# Patient Record
Sex: MALE | Race: WHITE | NOT HISPANIC OR LATINO | Employment: FULL TIME | URBAN - METROPOLITAN AREA
[De-identification: names, ages, dates, MRNs, and addresses within clinical notes are randomized per-mention and may not be internally consistent; named-entity substitution may affect disease eponyms.]

---

## 2017-01-13 ENCOUNTER — HOSPITAL ENCOUNTER (EMERGENCY)
Facility: HOSPITAL | Age: 57
Discharge: HOME | End: 2017-01-13
Attending: EMERGENCY MEDICINE
Payer: COMMERCIAL

## 2017-01-13 VITALS
HEIGHT: 73 IN | OXYGEN SATURATION: 95 % | RESPIRATION RATE: 16 BRPM | WEIGHT: 240 LBS | TEMPERATURE: 98 F | BODY MASS INDEX: 31.81 KG/M2 | DIASTOLIC BLOOD PRESSURE: 92 MMHG | HEART RATE: 56 BPM | SYSTOLIC BLOOD PRESSURE: 159 MMHG

## 2017-01-13 DIAGNOSIS — M54.9 PAIN IN BACK: ICD-10-CM

## 2017-01-13 DIAGNOSIS — M62.838 MUSCLE SPASM: Primary | ICD-10-CM

## 2017-01-13 LAB
BILIRUB UR QL STRIP: NEGATIVE
CLARITY UR: CLEAR
COLOR UR: YELLOW
GLUCOSE UR QL STRIP: NEGATIVE
HGB UR QL STRIP: ABNORMAL
KETONES UR QL STRIP: NEGATIVE
LEUKOCYTE ESTERASE UR QL STRIP: NEGATIVE
NITRITE UR QL STRIP: NEGATIVE
PH UR STRIP: 6 [PH] (ref 5–8)
PROT UR QL STRIP: NEGATIVE
SP GR UR STRIP: 1.02 (ref 1–1.03)
URN SPEC COLLECT METH UR: ABNORMAL
UROBILINOGEN UR STRIP-ACNC: NEGATIVE EU/DL

## 2017-01-13 PROCEDURE — 99284 EMERGENCY DEPT VISIT MOD MDM: CPT | Mod: 25

## 2017-01-13 PROCEDURE — 96372 THER/PROPH/DIAG INJ SC/IM: CPT

## 2017-01-13 PROCEDURE — 81003 URINALYSIS AUTO W/O SCOPE: CPT

## 2017-01-13 PROCEDURE — 63600175 PHARM REV CODE 636 W HCPCS: Performed by: EMERGENCY MEDICINE

## 2017-01-13 RX ORDER — ORPHENADRINE CITRATE 30 MG/ML
30 INJECTION INTRAMUSCULAR; INTRAVENOUS
Status: COMPLETED | OUTPATIENT
Start: 2017-01-13 | End: 2017-01-13

## 2017-01-13 RX ORDER — HYDROCODONE BITARTRATE AND ACETAMINOPHEN 5; 325 MG/1; MG/1
1 TABLET ORAL EVERY 6 HOURS PRN
Qty: 20 TABLET | Refills: 0 | Status: SHIPPED | OUTPATIENT
Start: 2017-01-13 | End: 2017-04-18

## 2017-01-13 RX ORDER — CYCLOBENZAPRINE HCL 10 MG
10 TABLET ORAL 3 TIMES DAILY PRN
Qty: 15 TABLET | Refills: 0 | Status: SHIPPED | OUTPATIENT
Start: 2017-01-13 | End: 2017-01-18

## 2017-01-13 RX ORDER — KETOROLAC TROMETHAMINE 30 MG/ML
30 INJECTION, SOLUTION INTRAMUSCULAR; INTRAVENOUS
Status: COMPLETED | OUTPATIENT
Start: 2017-01-13 | End: 2017-01-13

## 2017-01-13 RX ORDER — NAPROXEN 500 MG/1
500 TABLET ORAL 2 TIMES DAILY WITH MEALS
Qty: 14 TABLET | Refills: 0 | Status: SHIPPED | OUTPATIENT
Start: 2017-01-13 | End: 2017-02-06 | Stop reason: ALTCHOICE

## 2017-01-13 RX ADMIN — ORPHENADRINE CITRATE 30 MG: 30 INJECTION INTRAMUSCULAR; INTRAVENOUS at 02:01

## 2017-01-13 RX ADMIN — KETOROLAC TROMETHAMINE 30 MG: 30 INJECTION, SOLUTION INTRAMUSCULAR at 02:01

## 2017-01-13 NOTE — ED PROVIDER NOTES
Encounter Date: 1/13/2017       History     Chief Complaint   Patient presents with    Back Pain     pain to middle of back since lifting a heavy table about 10 days ago. Pain occasionally radiates to right side.      Review of patient's allergies indicates:  No Known Allergies  HPI Comments: Jonathan Villela, a 56 y.o. male, complains of mid back pain particularly on the right side for the past 10 days.  He said he did some heavy lifting about 10 days ago and felt a sharp pain in his mid back but 20 minutes after he had been lifting any objects.  He said over the past 10 days he has had gradually increasing pain in the lower back and in the mid back with intermittent relief with over-the-counter medication.  He denies any prior history of injuries to his back problems with back pain.  No history of kidney stones.  He denies any urinary tract or bowel problems.  Pain location: Mid back pain  Pain Severity: Moderate but severe with position changes    Pain timing: Approximately 10 days  Pain character: Sharp but nonradiating, no pain and lower extremities    Associated with or Modified by: (see above)        Past Medical History   Diagnosis Date    Colitis [id number]     infectious?    Diverticulitis     Hypertension     Migraine     Migraine headache     Obstructive sleep apnea (adult) (pediatric)     PUD (peptic ulcer disease)      Past Medical History Pertinent Negatives   Diagnosis Date Noted    Elevated PSA 8/14/2014    Kidney stone 8/14/2014    Urinary tract infection 8/14/2014     Past Surgical History   Procedure Laterality Date    Leg surgery      Nasal septum surgery      Cholecystectomy       Family History   Problem Relation Age of Onset    Crohn's disease       brother, sister, father, nephew    Prostate cancer Neg Hx     Kidney disease Neg Hx     Melanoma Neg Hx      Social History   Substance Use Topics    Smoking status: Never Smoker    Smokeless tobacco: None    Alcohol use Yes       Comment: ocasionally     Review of Systems   Constitutional: Negative.    Genitourinary: Negative.    Musculoskeletal: Positive for back pain.   All other systems reviewed and are negative.      Physical Exam   Initial Vitals   BP Pulse Resp Temp SpO2   01/13/17 1408 01/13/17 1408 01/13/17 1408 01/13/17 1408 01/13/17 1408   158/98 65 16 97.6 °F (36.4 °C) 95 %     Physical Exam    Nursing note and vitals reviewed.  Constitutional: He appears well-developed and well-nourished. No distress.   Abdominal: Soft. There is no tenderness.   Musculoskeletal:   Back: There is muscle tenderness in the para spinous muscles of the lower thoracic and upper lumbar region particularly on the right side.  There is no specific CVA tenderness.  Straight leg raises are equal to greater than 60° with mid back pain.  There is decreased truncal range of motion secondary to pain and position changes.  There is full range of motion of all joints of both upper and both lower extremities without tenderness.   Neurological: He is alert. He has normal strength. No sensory deficit.   Skin: Skin is warm and dry. No rash noted.   Psychiatric: He has a normal mood and affect. His behavior is normal. Thought content normal.         ED Course   Procedures  Labs Reviewed   URINALYSIS             Medical Decision Making:   Initial Assessment:   56 y.o. male, complains of mid back pain particularly on the right side for the past 10 days.  He said he did some heavy lifting about 10 days ago and felt a sharp pain in his mid back but 20 minutes after he had been lifting any objects.  He said over the past 10 days he has had gradually increasing pain in the lower back and in the mid back with intermittent relief with over-the-counter medication.  He denies any prior history of injuries to his back problems with back pain.  No history of kidney stones.  He denies any urinary tract or bowel problems.  Pain location: Mid back pain  Pain Severity: Moderate  but severe with position changes      Physical exam: Unremarkable except for paraspinous muscle spasm in the mid back.  Normal neurologic exam.  ED Management:  Improved with medication.  No significant findings on x-rays with only age-appropriate degenerative changes.  We'll treat for muscle spasm of the back with Flexeril, Naprosyn and Norco.  He'll follow-up with his primary care physician if not improved.                     ED Course   Comment By Time   Improved with medication.  No significant findings on x-rays with only age-appropriate degenerative changes.   Song Morales Jr., MD 01/13 1606   Elevated BP readings noted. + history of hypertension on medication.  Will follow up with his PCP. Song Morales Jr., MD 01/13 6178     Clinical Impression:   The primary encounter diagnosis was Muscle spasm. A diagnosis of Pain in back was also pertinent to this visit.          Song Morales Jr., MD  01/13/17 6285

## 2017-01-13 NOTE — DISCHARGE INSTRUCTIONS
Back Pain (Acute or Chronic)    Back pain is one of the most common problems. The good news is that most people feel better in 1 to 2 weeks, and most of the rest in 1 to 2 months. Most people can remain active.  People experience and describe pain differently; not everyone is the same.  · The pain can be sharp, stabbing, shooting, aching, cramping or burning.  · Movement, standing, bending, lifting, sitting, or walking may worsen pain.  · It can be localized to one spot or area, or it can be more generalized.  · It can spread or radiate upwards, to the front, or go down your arms or legs (sciatica).  · It can cause muscle spasm.  Most of the time, mechanical problems with the muscles or spine cause the pain. Mechanical problems are usually caused by an injury to the muscles or ligaments. While illness can cause back pain, it is usually not caused by a serious illness. Mechanical problems include:   · Physical activity such as sports, exercise, work, or normal activity  · Overexertion, lifting, pushing, pulling incorrectly or too aggressively  · Sudden twisting, bending, or stretching from an accident, or accidental movement  · Poor posture  · Stretching or moving wrong, without noticing pain at the time  · Poor coordination, lack of regular exercise (check with your doctor about this)  · Spinal disc disease or arthritis  · Stress  Pain can also be related to pregnancy, or illness like appendicitis, bladder or kidney infections, pelvic infections, and many other things.  Acute back pain usually gets better in 1 to 2 weeks. Back pain related to disk disease, arthritis in the spinal joints or spinal stenosis (narrowing of the spinal canal) can become chronic and last for months or years.  Unless you had a physical injury (for example, a car accident or fall) X-rays are usually not needed for the initial evaluation of back pain. If pain continues and does not respond to medical treatment, X-rays and other tests may be  needed.  Home care  Try these home care recommendations:  · When in bed, try to find a position of comfort. A firm mattress is best. Try lying flat on your back with pillows under your knees. You can also try lying on your side with your knees bent up towards your chest and a pillow between your knees.  · At first, do not try to stretch out the sore spots. If there is a strain, it is not like the good soreness you get after exercising without an injury. In this case, stretching may make it worse.  · Avoid prolong sitting, long car rides, or travel. This puts more stress on the lower back than standing or walking.  · During the first 24 to 72 hours after an acute injury or flare up of chronic back pain, apply an ice pack to the painful area for 20 minutes and then remove it for 20 minutes. Do this over a period of 60 to 90 minutes or several times a day. This will reduce swelling and pain. Wrap the ice pack in a thin towel or plastic to protect your skin.  · You can start with ice, then switch to heat. Heat (hot shower, hot bath, or heating pad) reduces pain and works well for muscle spasms. Heat can be applied to the painful area for 20 minutes then remove it for 20 minutes. Do this over a period of 60 to 90 minutes or several times a day. Do not sleep on a heating pad. It can lead to skin burns or tissue damage.  · You can alternate ice and heat therapy. Talk with your doctor about the best treatment for your back pain.  · Therapeutic massage can help relax the back muscles without stretching them.  · Be aware of safe lifting methods and do not lift anything without stretching first.  Medicines  Talk to your doctor before using medicine, especially if you have other medical problems or are taking other medicines.  · You may use over-the-counter medicine as directed on the bottle to control pain, unless another pain medicine was prescribed. If you have chronic conditions like diabetes, liver or kidney disease,  stomach ulcers, or gastrointestinal bleeding, or are taking blood thinners, talk to your doctor before taking any medicine.  · Be careful if you are given a prescription medicines, narcotics, or medicine for muscle spasms. They can cause drowsiness, affect your coordination, reflexes, and judgement. Do not drive or operate heavy machinery.  Follow-up care  Follow up with your healthcare provider, or as advised.   A radiologist will review any X-rays that were taken. Your provide will notify you of any new findings that may affect your care.  Call 911  Call emergency services if any of the following occur:  · Trouble breathing  · Confusion  · Very drowsy or trouble awakening  · Fainting or loss of consciousness  · Rapid or very slow heart rate  · Loss of bowel or bladder control  When to seek medical advice  Call your healthcare provider right away if any of these occur:   · Pain becomes worse or spreads to your legs  · Weakness or numbness in one or both legs  · Numbness in the groin or genital area  © 2437-8239 [employer]. [address] [place] [address]. All rights reserved. This information is not intended as a substitute for professional medical care. Always follow your healthcare professional's instructions.          Muscle Spasm  A muscle spasm is a sudden tightening of the muscle you cant control. This may be caused by strain, overworking the muscle, or injury. It can also be caused by dehydration, electrolyte imbalance, diabetes, alcohol use, and certain medicines. If it goes on long enough the muscle spasm causes pain. Common areas for muscle spasm are the legs, neck, and back.  Home care  · Heat, massage, and stretching will help relax muscle spasm.  · When the spasm is in your arm or leg, stretch the muscle passively. To do this, have someone bend or straighten the joint above or below the muscle until you feel the stretch on the sore muscle. You can stretch the  muscle actively by moving the affected body part. This will stretch the muscle that is in spasm. For example, if the spasm is in your calf, bend the ankle so your toes point upward toward your knee. This will stretch your calf muscle.  · You may use over-the-counter pain medicine to control pain, unless another medicine was prescribed. If you have chronic liver or kidney disease or ever had a stomach ulcer or GI bleeding, talk with your healthcare provider before using these medicines.  Follow-up care  Follow up with your healthcare provider, or as advised.    When to seek medical advice  Call your healthcare provider right away if any of the following occur:  · Fingers or toes become swollen, cold, blue, numb, or tingly  · You develop weakness in the affected arm or leg  · Pain increases and is not controlled by the above measures  © 7562-1703 [employer]. [address] [place] [address], [place] [address]. All rights reserved. This information is not intended as a substitute for professional medical care. Always follow your healthcare professional's instructions.

## 2017-01-13 NOTE — ED AVS SNAPSHOT
[facility]  [address]  [place] [address]               Jonathan Villela   2017  2:27 PM   ED    Description:  Male : 1960   Department:  [facility]           Your Care was Coordinated By:     Provider Role From To    Song Morales Jr., MD Attending Provider 17 1430 --    Brigette Gardner PA-C Physician Assistant 17 1430 17 1432      Reason for Visit     Back Pain           Diagnoses this Visit        Comments    Muscle spasm    -  Primary     Pain in back           ED Disposition     None           To Do List           Follow-up Information     Follow up with Manish Joel MD.    Specialty:  Family Medicine    Why:  If symptoms worsen or if not improved    Contact information:    [address]  [address]  [place] [address]  [phone]         These Medications        Disp Refills Start End    cyclobenzaprine (FLEXERIL) 10 MG tablet 15 tablet 0 2017    Take 1 tablet (10 mg total) by mouth 3 (three) times daily as needed for Muscle spasms. - Oral    [facility] [address] [place] [address] [address]  [address] [facility]: [phone]       naproxen (NAPROSYN) 500 MG tablet 14 tablet 0 2017     Take 1 tablet (500 mg total) by mouth 2 (two) times daily with meals. - Oral    [facility] [address] [place] [address] [address] [facility]: [phone]       hydrocodone-acetaminophen 5-325mg (NORCO) 5-325 mg per tablet 20 tablet 0 2017     Take 1 tablet by mouth every 6 (six) hours as needed for Pain. - Oral    [facility] [address] [place] [address] [address] [facility]: [phone]         [facility]     [facility] -  Assistance  Registered nurses in the [facility] provide clinical advisement, health education, appointment  booking, and other advisory services.  Call for this free service at [phone].             Medications           Message regarding Medications     Verify the changes and/or additions to your medication regime listed below are the same as discussed with your clinician today.  If any of these changes or additions are incorrect, please notify your healthcare provider.        START taking these NEW medications        Refills    cyclobenzaprine (FLEXERIL) 10 MG tablet 0    Sig: Take 1 tablet (10 mg total) by mouth 3 (three) times daily as needed for Muscle spasms.    Class: Print    Route: Oral    naproxen (NAPROSYN) 500 MG tablet 0    Sig: Take 1 tablet (500 mg total) by mouth 2 (two) times daily with meals.    Class: Print    Route: Oral    hydrocodone-acetaminophen 5-325mg (NORCO) 5-325 mg per tablet 0    Sig: Take 1 tablet by mouth every 6 (six) hours as needed for Pain.    Class: Print    Route: Oral      These medications were administered today        Dose Freq    ketorolac injection 30 mg 30 mg ED 1 Time    Sig: Inject 30 mg into the muscle ED 1 Time.    Class: Normal    Route: Intramuscular    orphenadrine injection 30 mg 30 mg ED 1 Time    Sig: Inject 1 mL (30 mg total) into the muscle ED 1 Time.    Class: Normal    Route: Intramuscular           Verify that the below list of medications is an accurate representation of the medications you are currently taking.  If none reported, the list may be blank. If incorrect, please contact your healthcare provider. Carry this list with you in case of emergency.           Current Medications     betamethasone valerate 0.1% (VALISONE) 0.1 % Crea CHAVEZ AA BID    butalbital-acetaminophen-caffeine -40 mg (FIORICET) -40 mg per tablet Take 1-2 tablets by mouth every 6 to 8 hours as needed for Headaches.    cyclobenzaprine (FLEXERIL) 10 MG tablet Take 1 tablet (10 mg total) by mouth 3 (three) times daily as needed for Muscle spasms.    dicyclomine (BENTYL) 10 MG  "capsule as needed.     frovatriptan (FROVA) 2.5 MG tablet Take 1 tablet (2.5 mg total) by mouth as needed for Migraine. If recurs, may repeat after 2 hours. Max of 3 tabs in 24 hours.    hydrocodone-acetaminophen 5-325mg (NORCO) 5-325 mg per tablet Take 1 tablet by mouth every 6 (six) hours as needed for Pain.    irbesartan-hydrochlorothiazide (AVALIDE) 300-12.5 mg per tablet Take 1 tablet by mouth once daily.    metronidazole 1% (METROGEL) 1 % Gel CHAVEZ QHS QHS AS DIRECTED    mometasone (ELOCON) 0.1 % lotion USE QHS  TO EAR PRN    naproxen (NAPROSYN) 500 MG tablet Take 1 tablet (500 mg total) by mouth 2 (two) times daily with meals.    ondansetron (ZOFRAN) 4 MG tablet Take 1 tablet (4 mg total) by mouth every 6 (six) hours.    RELPAX 40 mg tablet TAKE 1 TABLET BY MOUTH AS NEEDED, MAY REPEAT IN 2 HOURS IF NEEDED DO NOT EXCEED 4 TABLETS EVERY DAY    triamcinolone acetonide 0.1% (KENALOG) 0.1 % cream AAA bid after cool blow dry to affected areas of groin    verapamil (VERELAN) 120 MG C24P Take 1 capsule (120 mg total) by mouth once daily.           Clinical Reference Information           Your Vitals Were     BP Pulse Temp Resp Height Weight    159/92 (BP Location: Right arm, Patient Position: Sitting, BP Method: Automatic) 56 98.4 °F (36.9 °C) (Oral) 16 6' 1" (1.854 m) 108.9 kg (240 lb)    SpO2 BMI             95% 31.66 kg/m2         Allergies as of 1/13/2017     No Known Allergies      Immunizations Administered on Date of Encounter - 1/13/2017     None      ED Micro, Lab, POCT     Start Ordered       Status Ordering Provider    01/13/17 1440 01/13/17 1440  Urinalysis Clean Catch  STAT      Final result       ED Imaging Orders     Start Ordered       Status Ordering Provider    01/13/17 1441 01/13/17 1440  X-Ray Lumbar Spine Ap And Lateral  1 time imaging      Final result     01/13/17 1440 01/13/17 1440  X-Ray Thoracic Spine AP Lateral  1 time imaging      Final result         Discharge Instructions         Back Pain " (Acute or Chronic)    Back pain is one of the most common problems. The good news is that most people feel better in 1 to 2 weeks, and most of the rest in 1 to 2 months. Most people can remain active.  People experience and describe pain differently; not everyone is the same.  · The pain can be sharp, stabbing, shooting, aching, cramping or burning.  · Movement, standing, bending, lifting, sitting, or walking may worsen pain.  · It can be localized to one spot or area, or it can be more generalized.  · It can spread or radiate upwards, to the front, or go down your arms or legs (sciatica).  · It can cause muscle spasm.  Most of the time, mechanical problems with the muscles or spine cause the pain. Mechanical problems are usually caused by an injury to the muscles or ligaments. While illness can cause back pain, it is usually not caused by a serious illness. Mechanical problems include:   · Physical activity such as sports, exercise, work, or normal activity  · Overexertion, lifting, pushing, pulling incorrectly or too aggressively  · Sudden twisting, bending, or stretching from an accident, or accidental movement  · Poor posture  · Stretching or moving wrong, without noticing pain at the time  · Poor coordination, lack of regular exercise (check with your doctor about this)  · Spinal disc disease or arthritis  · Stress  Pain can also be related to pregnancy, or illness like appendicitis, bladder or kidney infections, pelvic infections, and many other things.  Acute back pain usually gets better in 1 to 2 weeks. Back pain related to disk disease, arthritis in the spinal joints or spinal stenosis (narrowing of the spinal canal) can become chronic and last for months or years.  Unless you had a physical injury (for example, a car accident or fall) X-rays are usually not needed for the initial evaluation of back pain. If pain continues and does not respond to medical treatment, X-rays and other tests may be  needed.  Home care  Try these home care recommendations:  · When in bed, try to find a position of comfort. A firm mattress is best. Try lying flat on your back with pillows under your knees. You can also try lying on your side with your knees bent up towards your chest and a pillow between your knees.  · At first, do not try to stretch out the sore spots. If there is a strain, it is not like the good soreness you get after exercising without an injury. In this case, stretching may make it worse.  · Avoid prolong sitting, long car rides, or travel. This puts more stress on the lower back than standing or walking.  · During the first 24 to 72 hours after an acute injury or flare up of chronic back pain, apply an ice pack to the painful area for 20 minutes and then remove it for 20 minutes. Do this over a period of 60 to 90 minutes or several times a day. This will reduce swelling and pain. Wrap the ice pack in a thin towel or plastic to protect your skin.  · You can start with ice, then switch to heat. Heat (hot shower, hot bath, or heating pad) reduces pain and works well for muscle spasms. Heat can be applied to the painful area for 20 minutes then remove it for 20 minutes. Do this over a period of 60 to 90 minutes or several times a day. Do not sleep on a heating pad. It can lead to skin burns or tissue damage.  · You can alternate ice and heat therapy. Talk with your doctor about the best treatment for your back pain.  · Therapeutic massage can help relax the back muscles without stretching them.  · Be aware of safe lifting methods and do not lift anything without stretching first.  Medicines  Talk to your doctor before using medicine, especially if you have other medical problems or are taking other medicines.  · You may use over-the-counter medicine as directed on the bottle to control pain, unless another pain medicine was prescribed. If you have chronic conditions like diabetes, liver or kidney disease,  stomach ulcers, or gastrointestinal bleeding, or are taking blood thinners, talk to your doctor before taking any medicine.  · Be careful if you are given a prescription medicines, narcotics, or medicine for muscle spasms. They can cause drowsiness, affect your coordination, reflexes, and judgement. Do not drive or operate heavy machinery.  Follow-up care  Follow up with your healthcare provider, or as advised.   A radiologist will review any X-rays that were taken. Your provide will notify you of any new findings that may affect your care.  Call 911  Call emergency services if any of the following occur:  · Trouble breathing  · Confusion  · Very drowsy or trouble awakening  · Fainting or loss of consciousness  · Rapid or very slow heart rate  · Loss of bowel or bladder control  When to seek medical advice  Call your healthcare provider right away if any of these occur:   · Pain becomes worse or spreads to your legs  · Weakness or numbness in one or both legs  · Numbness in the groin or genital area  © 5779-2285 [employer]. [address] [place] [address]. All rights reserved. This information is not intended as a substitute for professional medical care. Always follow your healthcare professional's instructions.          Muscle Spasm  A muscle spasm is a sudden tightening of the muscle you cant control. This may be caused by strain, overworking the muscle, or injury. It can also be caused by dehydration, electrolyte imbalance, diabetes, alcohol use, and certain medicines. If it goes on long enough the muscle spasm causes pain. Common areas for muscle spasm are the legs, neck, and back.  Home care  · Heat, massage, and stretching will help relax muscle spasm.  · When the spasm is in your arm or leg, stretch the muscle passively. To do this, have someone bend or straighten the joint above or below the muscle until you feel the stretch on the sore muscle. You can stretch the  muscle actively by moving the affected body part. This will stretch the muscle that is in spasm. For example, if the spasm is in your calf, bend the ankle so your toes point upward toward your knee. This will stretch your calf muscle.  · You may use over-the-counter pain medicine to control pain, unless another medicine was prescribed. If you have chronic liver or kidney disease or ever had a stomach ulcer or GI bleeding, talk with your healthcare provider before using these medicines.  Follow-up care  Follow up with your healthcare provider, or as advised.    When to seek medical advice  Call your healthcare provider right away if any of the following occur:  · Fingers or toes become swollen, cold, blue, numb, or tingly  · You develop weakness in the affected arm or leg  · Pain increases and is not controlled by the above measures  © 3489-0104 [employer]. [address] [place] [address]. All rights reserved. This information is not intended as a substitute for professional medical care. Always follow your healthcare professional's instructions.          Your Scheduled Appointments     Feb 06, 2017  8:40 AM CST   Follow Up/Office Visit with MD [facility] - Cardiology ([place])    [address] [facility] [address], [address]  [facility] [address] [phone]            Feb 15, 2017  9:20 AM CST   Ultrasound with Jose Reyes III, MD   [facility] ([place]    [address] [place] [id number] [phone]               [facility] complies with applicable Federal civil rights laws and does not discriminate on the basis of race, color, national origin, age, disability, or sex.        Language Assistance Services     ATTENTION: Language assistance services are available, free of charge. Please call [phone].      ATENCIÓN: Si chengla paola, tiene a harp disposición servicios gratuitos de asistencia lingüística. Llame al [phone].     CHÚ Ý:  N?u b?n nói Ti?ng Vi?t, có các d?ch v? h? tr? ngôn ng? mi?n phí judyh cho b?n. G?i s? [phone].

## 2017-01-13 NOTE — ED NOTES
Pt states he lifted something heavy approx 10 days ago and since then c/o mid back pain that is  worse with movement.

## 2017-01-20 ENCOUNTER — PATIENT MESSAGE (OUTPATIENT)
Dept: CARDIOLOGY | Facility: CLINIC | Age: 57
End: 2017-01-20

## 2017-01-23 NOTE — TELEPHONE ENCOUNTER
----- Message from Lea Hoffman sent at 1/23/2017  9:27 AM CST -----  Contact: self, [phone]  Patient requests to speak with you about why his irbesartan medication has not been refilled 10 days ago when his pharmacy sent in initial request. Please advise.

## 2017-01-24 ENCOUNTER — TELEPHONE (OUTPATIENT)
Dept: CARDIOLOGY | Facility: HOSPITAL | Age: 57
End: 2017-01-24

## 2017-01-24 RX ORDER — IRBESARTAN AND HYDROCHLOROTHIAZIDE 300; 12.5 MG/1; MG/1
1 TABLET, FILM COATED ORAL DAILY
Qty: 90 TABLET | Refills: 3 | Status: SHIPPED | OUTPATIENT
Start: 2017-01-24 | End: 2017-04-21 | Stop reason: SDUPTHER

## 2017-01-24 NOTE — TELEPHONE ENCOUNTER
I filled today      I just received his prescription request      It was sent to me 10 days ago      Hope all is ok      Thanks      ZN

## 2017-01-29 ENCOUNTER — PATIENT MESSAGE (OUTPATIENT)
Dept: NEUROLOGY | Facility: CLINIC | Age: 57
End: 2017-01-29

## 2017-01-29 DIAGNOSIS — G43.709 CHRONIC MIGRAINE WITHOUT AURA WITHOUT STATUS MIGRAINOSUS, NOT INTRACTABLE: ICD-10-CM

## 2017-01-30 RX ORDER — FROVATRIPTAN SUCCINATE 2.5 MG/1
2.5 TABLET, FILM COATED ORAL
Qty: 9 TABLET | Refills: 3 | Status: SHIPPED | OUTPATIENT
Start: 2017-01-30 | End: 2017-04-18 | Stop reason: SDUPTHER

## 2017-02-06 ENCOUNTER — OFFICE VISIT (OUTPATIENT)
Dept: CARDIOLOGY | Facility: CLINIC | Age: 57
End: 2017-02-06
Payer: COMMERCIAL

## 2017-02-06 VITALS
BODY MASS INDEX: 32.37 KG/M2 | SYSTOLIC BLOOD PRESSURE: 130 MMHG | HEART RATE: 82 BPM | HEIGHT: 72 IN | WEIGHT: 239 LBS | DIASTOLIC BLOOD PRESSURE: 90 MMHG

## 2017-02-06 DIAGNOSIS — N52.9 ERECTILE DYSFUNCTION, UNSPECIFIED ERECTILE DYSFUNCTION TYPE: ICD-10-CM

## 2017-02-06 DIAGNOSIS — G47.30 SLEEP APNEA, UNSPECIFIED TYPE: ICD-10-CM

## 2017-02-06 DIAGNOSIS — I10 ESSENTIAL HYPERTENSION: Primary | ICD-10-CM

## 2017-02-06 PROCEDURE — 99213 OFFICE O/P EST LOW 20 MIN: CPT | Mod: S$GLB,,, | Performed by: INTERNAL MEDICINE

## 2017-02-06 PROCEDURE — 99999 PR PBB SHADOW E&M-EST. PATIENT-LVL III: CPT | Mod: PBBFAC,,, | Performed by: INTERNAL MEDICINE

## 2017-02-06 PROCEDURE — 3075F SYST BP GE 130 - 139MM HG: CPT | Mod: S$GLB,,, | Performed by: INTERNAL MEDICINE

## 2017-02-06 PROCEDURE — 3080F DIAST BP >= 90 MM HG: CPT | Mod: S$GLB,,, | Performed by: INTERNAL MEDICINE

## 2017-02-06 NOTE — PROGRESS NOTES
Subjective:   Patient ID:  Jonathan Villela is a 56 y.o. male who presents for follow up of Hypertension; Sleep Apnea; and Hyperlipidemia      HPI:         Jonathan Villela 56 y.o. male is here follow up and feeling well without any new complaints. He has a history of hypertension. He had an extensive work up at [facility] that was unremarkable. He is currently on Avalide 300/12.5 mg daily. His BP is well controlled. He has ED documented before he started avalide but he wanted to know whether his ED is related to medication. He was prescribed cialis in 2014 but did not try it because of cost. He is seeing neurology for his headaches. He has 10 yr cardiac risk of 8.4% as of 2017. He has DELVIN and uses his CPAP machine.             Patient Active Problem List    Diagnosis Date Noted    Stiff neck 2016    Abnormal posture 2016    BPH with urinary obstruction 2015    Insomnia 2015    Snorings 2015    Sleep apnea 2015    Fatigue 2015    Somnolence, daytime 2015    Allergic rhinitis due to allergen 2015    Erectile dysfunction 2014    Hypertension     PUD (peptic ulcer disease)     Common migraine 2014           Right Arm BP - Sittin/90  Left Arm BP - Sittin/82        LABS    LAST HbA1c  Lab Results   Component Value Date    HGBA1C 5.4 2014       Lipid panel  Lab Results   Component Value Date    CHOL 220 (H) 2014     Lab Results   Component Value Date    HDL 44 2014     Lab Results   Component Value Date    LDLCALC 143.2 2014     Lab Results   Component Value Date    TRIG 164 (H) 2014     Lab Results   Component Value Date    CHOLHDL 20.0 2014            Review of Systems   Constitution: Negative for diaphoresis, weakness, night sweats, weight gain and weight loss.   HENT: Negative for congestion.    Eyes: Negative for blurred vision, discharge and double vision.   Cardiovascular: Negative for  chest pain, claudication, cyanosis, dyspnea on exertion, irregular heartbeat, leg swelling, near-syncope, orthopnea, palpitations, paroxysmal nocturnal dyspnea and syncope.   Respiratory: Negative for cough, shortness of breath and wheezing.    Endocrine: Negative for cold intolerance, heat intolerance and polyphagia.   Hematologic/Lymphatic: Negative for adenopathy and bleeding problem. Does not bruise/bleed easily.   Skin: Negative for dry skin and nail changes.   Musculoskeletal: Negative for arthritis, back pain, falls, joint pain, myalgias and neck pain.   Gastrointestinal: Negative for bloating, abdominal pain, change in bowel habit and constipation.   Genitourinary: Positive for decreased libido. Negative for bladder incontinence, dysuria, flank pain, genital sores and missed menses.   Neurological: Negative for aphonia, brief paralysis, difficulty with concentration and dizziness.   Psychiatric/Behavioral: Negative for altered mental status and memory loss. The patient does not have insomnia.    Allergic/Immunologic: Negative for environmental allergies.       Objective:   Physical Exam   Constitutional: He is oriented to person, place, and time. He appears well-developed and well-nourished. He is not intubated.   HENT:   Head: Normocephalic and atraumatic.   Right Ear: External ear normal.   Left Ear: External ear normal.   Mouth/Throat: Oropharynx is clear and moist.   Eyes: Conjunctivae and EOM are normal. Pupils are equal, round, and reactive to light. Right eye exhibits no discharge. Left eye exhibits no discharge. No scleral icterus.   Neck: Normal range of motion. Neck supple. Normal carotid pulses, no hepatojugular reflux and no JVD present. Carotid bruit is not present. No tracheal deviation present. No thyromegaly present.   Cardiovascular: Normal rate, regular rhythm, S1 normal and S2 normal.   No extrasystoles are present. PMI is not displaced.  Exam reveals no gallop, no S3, no distant heart  sounds, no friction rub and no midsystolic click.    No murmur heard.  Pulses:       Carotid pulses are 2+ on the right side, and 2+ on the left side.       Radial pulses are 2+ on the right side, and 2+ on the left side.        Femoral pulses are 2+ on the right side, and 2+ on the left side.       Popliteal pulses are 2+ on the right side, and 2+ on the left side.        Dorsalis pedis pulses are 2+ on the right side, and 2+ on the left side.        Posterior tibial pulses are 2+ on the right side, and 2+ on the left side.   Pulmonary/Chest: Effort normal and breath sounds normal. No accessory muscle usage or stridor. No apnea, no tachypnea and no bradypnea. He is not intubated. No respiratory distress. He has no decreased breath sounds. He has no wheezes. He has no rales. He exhibits no tenderness and no bony tenderness.   Abdominal: He exhibits no distension, no pulsatile liver, no abdominal bruit, no ascites, no pulsatile midline mass and no mass. There is no tenderness. There is no rebound and no guarding.   Musculoskeletal: Normal range of motion. He exhibits no edema or tenderness.   Lymphadenopathy:     He has no cervical adenopathy.   Neurological: He is alert and oriented to person, place, and time. He has normal reflexes. No cranial nerve deficit. Coordination normal.   Skin: Skin is warm. No rash noted. No erythema. No pallor.   Psychiatric: He has a normal mood and affect. His behavior is normal. Judgment and thought content normal.       Assessment:     1. Essential hypertension    2. Erectile dysfunction, unspecified erectile dysfunction type    3. Sleep apnea, unspecified type        Plan:           Exercise  Weight loss  Continue with avalide  Low salt diet  CPAP for DELVIN      He should be on statin in view of 8.4% 10 yr cardiac risk   His risk can drop with weight loss which will ultimately improve his lipid profile and blood pressure   He would like to try life style changes for now      Referral  to urology for ED    Continue with current medical plan and lifestyle changes.  Return sooner for concerns or questions. If symptoms persist go to the ED  I have reviewed all pertinent data on this patient       I have reviewed the patient's medical history in detail and updated the computerized patient record.    Orders Placed This Encounter   Procedures    Ambulatory consult to Urology     Referral Priority:   Routine     Referral Type:   Consultation     Referral Reason:   Specialty Services Required     Requested Specialty:   Urology     Number of Visits Requested:   1       Follow up as scheduled. Return sooner for concerns or questions            He expressed verbal understanding and agreed with the plan  Follow up yearly        Patient's Medications   New Prescriptions    No medications on file   Previous Medications    BUTALBITAL-ACETAMINOPHEN-CAFFEINE -40 MG (FIORICET) -40 MG PER TABLET    Take 1-2 tablets by mouth every 6 to 8 hours as needed for Headaches.    DICYCLOMINE (BENTYL) 10 MG CAPSULE    as needed.     FROVATRIPTAN (FROVA) 2.5 MG TABLET    Take 1 tablet (2.5 mg total) by mouth as needed for Migraine. If recurs, may repeat after 2 hours. Max of 3 tabs in 24 hours.    HYDROCODONE-ACETAMINOPHEN 5-325MG (NORCO) 5-325 MG PER TABLET    Take 1 tablet by mouth every 6 (six) hours as needed for Pain.    IRBESARTAN-HYDROCHLOROTHIAZIDE (AVALIDE) 300-12.5 MG PER TABLET    Take 1 tablet by mouth once daily.    METRONIDAZOLE 1% (METROGEL) 1 % GEL    CHAVEZ QHS QHS AS DIRECTED    ONDANSETRON (ZOFRAN) 4 MG TABLET    Take 1 tablet (4 mg total) by mouth every 6 (six) hours.    RELPAX 40 MG TABLET    TAKE 1 TABLET BY MOUTH AS NEEDED, MAY REPEAT IN 2 HOURS IF NEEDED DO NOT EXCEED 4 TABLETS EVERY DAY    TRIAMCINOLONE ACETONIDE 0.1% (KENALOG) 0.1 % CREAM    AAA bid after cool blow dry to affected areas of groin   Modified Medications    No medications on file   Discontinued Medications

## 2017-02-06 NOTE — PATIENT INSTRUCTIONS

## 2017-02-09 DIAGNOSIS — L98.9 DISEASE OF SKIN AND SUBCUTANEOUS TISSUE: ICD-10-CM

## 2017-02-10 ENCOUNTER — HOSPITAL ENCOUNTER (INPATIENT)
Facility: HOSPITAL | Age: 57
LOS: 2 days | Discharge: HOME | DRG: 378 | End: 2017-02-12
Attending: EMERGENCY MEDICINE | Admitting: EMERGENCY MEDICINE
Payer: COMMERCIAL

## 2017-02-10 ENCOUNTER — ANESTHESIA EVENT (OUTPATIENT)
Dept: ENDOSCOPY | Facility: HOSPITAL | Age: 57
DRG: 378 | End: 2017-02-10
Payer: COMMERCIAL

## 2017-02-10 DIAGNOSIS — K92.1 GASTROINTESTINAL HEMORRHAGE WITH MELENA: ICD-10-CM

## 2017-02-10 DIAGNOSIS — I95.1 ORTHOSTATIC HYPOTENSION: ICD-10-CM

## 2017-02-10 DIAGNOSIS — G43.009 MIGRAINE WITHOUT AURA AND WITHOUT STATUS MIGRAINOSUS, NOT INTRACTABLE: ICD-10-CM

## 2017-02-10 DIAGNOSIS — K92.2 GASTROINTESTINAL HEMORRHAGE, UNSPECIFIED GASTROINTESTINAL HEMORRHAGE TYPE: Primary | ICD-10-CM

## 2017-02-10 DIAGNOSIS — K92.2 ACUTE UPPER GI BLEED: ICD-10-CM

## 2017-02-10 DIAGNOSIS — K92.1 MELENA: ICD-10-CM

## 2017-02-10 DIAGNOSIS — I10 ESSENTIAL HYPERTENSION: ICD-10-CM

## 2017-02-10 DIAGNOSIS — D62 ACUTE POST-HEMORRHAGIC ANEMIA: ICD-10-CM

## 2017-02-10 DIAGNOSIS — K25.4 GASTROINTESTINAL HEMORRHAGE ASSOCIATED WITH GASTRIC ULCER: ICD-10-CM

## 2017-02-10 LAB
ABO + RH BLD: NORMAL
ALBUMIN SERPL BCP-MCNC: 3.7 G/DL
ALP SERPL-CCNC: 46 U/L
ALT SERPL W/O P-5'-P-CCNC: 33 U/L
ANION GAP SERPL CALC-SCNC: 8 MMOL/L
AST SERPL-CCNC: 25 U/L
BACTERIA #/AREA URNS AUTO: ABNORMAL /HPF
BASOPHILS # BLD AUTO: 0.05 K/UL
BASOPHILS NFR BLD: 0.4 %
BILIRUB SERPL-MCNC: 0.7 MG/DL
BILIRUB UR QL STRIP: ABNORMAL
BLD GP AB SCN CELLS X3 SERPL QL: NORMAL
BUN SERPL-MCNC: 56 MG/DL
CALCIUM SERPL-MCNC: 8.9 MG/DL
CHLORIDE SERPL-SCNC: 104 MMOL/L
CLARITY UR REFRACT.AUTO: CLEAR
CO2 SERPL-SCNC: 23 MMOL/L
COLOR UR AUTO: YELLOW
CREAT SERPL-MCNC: 1.3 MG/DL
DIFFERENTIAL METHOD: ABNORMAL
EOSINOPHIL # BLD AUTO: 0.1 K/UL
EOSINOPHIL NFR BLD: 0.8 %
ERYTHROCYTE [DISTWIDTH] IN BLOOD BY AUTOMATED COUNT: 12.5 %
EST. GFR  (AFRICAN AMERICAN): >60 ML/MIN/1.73 M^2
EST. GFR  (NON AFRICAN AMERICAN): >60 ML/MIN/1.73 M^2
GLUCOSE SERPL-MCNC: 115 MG/DL
GLUCOSE UR QL STRIP: NEGATIVE
HCT VFR BLD AUTO: 33.8 %
HCT VFR BLD AUTO: 38.4 %
HGB BLD-MCNC: 11.7 G/DL
HGB BLD-MCNC: 13.5 G/DL
HGB UR QL STRIP: NEGATIVE
HYALINE CASTS UR QL AUTO: 96 /LPF
INR PPP: 1.1
KETONES UR QL STRIP: NEGATIVE
LEUKOCYTE ESTERASE UR QL STRIP: ABNORMAL
LYMPHOCYTES # BLD AUTO: 2.3 K/UL
LYMPHOCYTES NFR BLD: 19.3 %
MCH RBC QN AUTO: 31 PG
MCHC RBC AUTO-ENTMCNC: 35.2 %
MCV RBC AUTO: 88 FL
MICROSCOPIC COMMENT: ABNORMAL
MONOCYTES # BLD AUTO: 0.6 K/UL
MONOCYTES NFR BLD: 5 %
NEUTROPHILS # BLD AUTO: 8.5 K/UL
NEUTROPHILS NFR BLD: 73.2 %
NITRITE UR QL STRIP: NEGATIVE
PH UR STRIP: 6 [PH] (ref 5–8)
PLATELET # BLD AUTO: 304 K/UL
PMV BLD AUTO: 9.4 FL
POTASSIUM SERPL-SCNC: 4.1 MMOL/L
PROT SERPL-MCNC: 6.7 G/DL
PROT UR QL STRIP: ABNORMAL
PROTHROMBIN TIME: 11.5 SEC
RBC # BLD AUTO: 4.36 M/UL
RBC #/AREA URNS AUTO: 3 /HPF (ref 0–4)
SODIUM SERPL-SCNC: 135 MMOL/L
SP GR UR STRIP: 1.02 (ref 1–1.03)
SQUAMOUS #/AREA URNS AUTO: 1 /HPF
URN SPEC COLLECT METH UR: ABNORMAL
UROBILINOGEN UR STRIP-ACNC: 2 EU/DL
WBC # BLD AUTO: 11.64 K/UL
WBC #/AREA URNS AUTO: 3 /HPF (ref 0–5)

## 2017-02-10 PROCEDURE — 86900 BLOOD TYPING SEROLOGIC ABO: CPT

## 2017-02-10 PROCEDURE — 81001 URINALYSIS AUTO W/SCOPE: CPT

## 2017-02-10 PROCEDURE — 96376 TX/PRO/DX INJ SAME DRUG ADON: CPT

## 2017-02-10 PROCEDURE — 96366 THER/PROPH/DIAG IV INF ADDON: CPT

## 2017-02-10 PROCEDURE — 36415 COLL VENOUS BLD VENIPUNCTURE: CPT

## 2017-02-10 PROCEDURE — 63600175 PHARM REV CODE 636 W HCPCS: Performed by: PHYSICIAN ASSISTANT

## 2017-02-10 PROCEDURE — 25000003 PHARM REV CODE 250: Performed by: PHYSICIAN ASSISTANT

## 2017-02-10 PROCEDURE — 85018 HEMOGLOBIN: CPT

## 2017-02-10 PROCEDURE — 96375 TX/PRO/DX INJ NEW DRUG ADDON: CPT

## 2017-02-10 PROCEDURE — 11000001 HC ACUTE MED/SURG PRIVATE ROOM

## 2017-02-10 PROCEDURE — 86850 RBC ANTIBODY SCREEN: CPT

## 2017-02-10 PROCEDURE — 85610 PROTHROMBIN TIME: CPT

## 2017-02-10 PROCEDURE — 99285 EMERGENCY DEPT VISIT HI MDM: CPT | Mod: ,,, | Performed by: EMERGENCY MEDICINE

## 2017-02-10 PROCEDURE — 99285 EMERGENCY DEPT VISIT HI MDM: CPT | Mod: 25

## 2017-02-10 PROCEDURE — 80053 COMPREHEN METABOLIC PANEL: CPT

## 2017-02-10 PROCEDURE — 99223 1ST HOSP IP/OBS HIGH 75: CPT | Mod: ,,, | Performed by: HOSPITALIST

## 2017-02-10 PROCEDURE — 96361 HYDRATE IV INFUSION ADD-ON: CPT

## 2017-02-10 PROCEDURE — C9113 INJ PANTOPRAZOLE SODIUM, VIA: HCPCS | Performed by: PHYSICIAN ASSISTANT

## 2017-02-10 PROCEDURE — 96365 THER/PROPH/DIAG IV INF INIT: CPT

## 2017-02-10 PROCEDURE — 85014 HEMATOCRIT: CPT

## 2017-02-10 PROCEDURE — 85025 COMPLETE CBC W/AUTO DIFF WBC: CPT

## 2017-02-10 RX ORDER — ONDANSETRON 2 MG/ML
4 INJECTION INTRAMUSCULAR; INTRAVENOUS
Status: COMPLETED | OUTPATIENT
Start: 2017-02-10 | End: 2017-02-10

## 2017-02-10 RX ORDER — ACETAMINOPHEN 325 MG/1
650 TABLET ORAL EVERY 6 HOURS PRN
Status: DISCONTINUED | OUTPATIENT
Start: 2017-02-10 | End: 2017-02-12 | Stop reason: HOSPADM

## 2017-02-10 RX ORDER — IBUPROFEN 200 MG
24 TABLET ORAL
Status: DISCONTINUED | OUTPATIENT
Start: 2017-02-10 | End: 2017-02-12 | Stop reason: HOSPADM

## 2017-02-10 RX ORDER — OXYCODONE HYDROCHLORIDE 5 MG/1
10 TABLET ORAL EVERY 4 HOURS PRN
Status: DISCONTINUED | OUTPATIENT
Start: 2017-02-10 | End: 2017-02-12 | Stop reason: HOSPADM

## 2017-02-10 RX ORDER — PANTOPRAZOLE SODIUM 40 MG/10ML
80 INJECTION, POWDER, LYOPHILIZED, FOR SOLUTION INTRAVENOUS
Status: COMPLETED | OUTPATIENT
Start: 2017-02-10 | End: 2017-02-10

## 2017-02-10 RX ORDER — AMOXICILLIN 250 MG
1 CAPSULE ORAL 2 TIMES DAILY PRN
Status: DISCONTINUED | OUTPATIENT
Start: 2017-02-10 | End: 2017-02-12 | Stop reason: HOSPADM

## 2017-02-10 RX ORDER — TRIAMCINOLONE ACETONIDE 1 MG/G
CREAM TOPICAL
Qty: 60 G | Refills: 0 | Status: ON HOLD | OUTPATIENT
Start: 2017-02-10 | End: 2017-02-16

## 2017-02-10 RX ORDER — IBUPROFEN 200 MG
16 TABLET ORAL
Status: DISCONTINUED | OUTPATIENT
Start: 2017-02-10 | End: 2017-02-12 | Stop reason: HOSPADM

## 2017-02-10 RX ORDER — RAMELTEON 8 MG/1
8 TABLET ORAL NIGHTLY PRN
Status: DISCONTINUED | OUTPATIENT
Start: 2017-02-10 | End: 2017-02-12 | Stop reason: HOSPADM

## 2017-02-10 RX ORDER — GLUCAGON 1 MG
1 KIT INJECTION
Status: DISCONTINUED | OUTPATIENT
Start: 2017-02-10 | End: 2017-02-12 | Stop reason: HOSPADM

## 2017-02-10 RX ORDER — ONDANSETRON 4 MG/1
8 TABLET, ORALLY DISINTEGRATING ORAL EVERY 8 HOURS PRN
Status: DISCONTINUED | OUTPATIENT
Start: 2017-02-10 | End: 2017-02-12 | Stop reason: HOSPADM

## 2017-02-10 RX ORDER — OXYCODONE HYDROCHLORIDE 5 MG/1
5 TABLET ORAL EVERY 4 HOURS PRN
Status: DISCONTINUED | OUTPATIENT
Start: 2017-02-10 | End: 2017-02-12 | Stop reason: HOSPADM

## 2017-02-10 RX ADMIN — ONDANSETRON 4 MG: 2 INJECTION INTRAMUSCULAR; INTRAVENOUS at 03:02

## 2017-02-10 RX ADMIN — PANTOPRAZOLE SODIUM 8 MG/HR: 40 INJECTION, POWDER, FOR SOLUTION INTRAVENOUS at 03:02

## 2017-02-10 RX ADMIN — SODIUM CHLORIDE 1000 ML: 0.9 INJECTION, SOLUTION INTRAVENOUS at 01:02

## 2017-02-10 RX ADMIN — PANTOPRAZOLE SODIUM 80 MG: 40 INJECTION, POWDER, FOR SOLUTION INTRAVENOUS at 02:02

## 2017-02-10 RX ADMIN — PANTOPRAZOLE SODIUM 8 MG/HR: 40 INJECTION, POWDER, FOR SOLUTION INTRAVENOUS at 08:02

## 2017-02-10 RX ADMIN — SODIUM CHLORIDE 1000 ML: 0.9 INJECTION, SOLUTION INTRAVENOUS at 03:02

## 2017-02-10 NOTE — IP AVS SNAPSHOT
[facility]  [address]  [facility] [address]  Phone: [phone]           Patient Discharge Instructions     Our goal is to set you up for success. This packet includes information on your condition, medications, and your home care. It will help you to care for yourself so you don't get sicker and need to go back to the hospital.     Please ask your nurse if you have any questions.        There are many details to remember when preparing to leave the hospital. Here is what you will need to do:    1. Take your medicine. If you are prescribed medications, review your Medication List in the following pages. You may have new medications to  at the pharmacy and others that you'll need to stop taking. Review the instructions for how and when to take your medications. Talk with your doctor or nurses if you are unsure of what to do.     2. Go to your follow-up appointments. Specific follow-up information is listed in the following pages. Your may be contacted by a transition nurse or clinical provider about future appointments. Be sure we have all of the phone numbers to reach you, if needed. Please contact your provider's office if you are unable to make an appointment.     3. Watch for warning signs. Your doctor or nurse will give you detailed warning signs to watch for and when to call for assistance. These instructions may also include educational information about your condition. If you experience any of warning signs to your health, call your doctor.               [facility]  Unless otherwise directed by your provider, please contact [facility], our nurse care line that is available for 24/7 assistance.     [phone] (toll-free)    Registered nurses in the [facility] provide clinical advisement, health education, appointment booking, and other advisory services.                    ** Verify the list of medication(s) below is accurate and up  to date. Carry this with you in case of emergency. If your medications have changed, please notify your healthcare provider.             Medication List      START taking these medications        Additional Info                      folic acid 1 MG tablet   Commonly known as:  FOLVITE   Quantity:  30 tablet   Refills:  0   Dose:  1 mg    Last time this was given:  1 mg on 2/12/2017 11:35 AM   Instructions:  Take 1 tablet (1 mg total) by mouth once daily.     Begin Date    AM    Noon    PM    Bedtime       iron polysaccharides 150 mg iron Cap   Commonly known as:  NIFEREX   Refills:  0   Dose:  150 mg    Last time this was given:  150 mg on 2/12/2017 11:35 AM   Instructions:  Take 1 capsule (150 mg total) by mouth once daily.     Begin Date    AM    Noon    PM    Bedtime       pantoprazole 40 MG tablet   Commonly known as:  PROTONIX   Quantity:  90 tablet   Refills:  3   Dose:  40 mg    Last time this was given:  40 mg on 2/12/2017  6:21 AM   Instructions:  Take 1 tablet (40 mg total) by mouth before breakfast.     Begin Date    AM    Noon    PM    Bedtime         CHANGE how you take these medications        Additional Info                      ondansetron 4 MG tablet   Commonly known as:  ZOFRAN   Quantity:  12 tablet   Refills:  0   Dose:  4 mg   What changed:    - when to take this  - reasons to take this    Instructions:  Take 1 tablet (4 mg total) by mouth every 6 (six) hours.     Begin Date    AM    Noon    PM    Bedtime       * triamcinolone acetonide 0.1% 0.1 % cream   Commonly known as:  KENALOG   Quantity:  1 Bottle   Refills:  3   What changed:  Another medication with the same name was added. Make sure you understand how and when to take each.   Comments:  Pharmacist: mix 30 grams of TAC 0.1% cream with 30 grams of Loprox cream in 120cc of Milk of Magnesia    Instructions:  AAA bid after cool blow dry to affected areas of groin     Begin Date    AM    Noon    PM    Bedtime       * triamcinolone acetonide  0.1% 0.1 % cream   Commonly known as:  KENALOG   Quantity:  60 g   Refills:  0   What changed:  You were already taking a medication with the same name, and this prescription was added. Make sure you understand how and when to take each.    Instructions:  APPLY TO THE AFFECTED AREA TWICE DAILY     Begin Date    AM    Noon    PM    Bedtime       * Notice:  This list has 2 medication(s) that are the same as other medications prescribed for you. Read the directions carefully, and ask your doctor or other care provider to review them with you.      CONTINUE taking these medications        Additional Info                      butalbital-acetaminophen-caffeine -40 mg -40 mg per tablet   Commonly known as:  FIORICET   Quantity:  20 tablet   Refills:  0   Dose:  1-2 tablet    Instructions:  Take 1-2 tablets by mouth every 6 to 8 hours as needed for Headaches.     Begin Date    AM    Noon    PM    Bedtime       dicyclomine 10 MG capsule   Commonly known as:  BENTYL   Refills:  1   Dose:  10 mg    Instructions:  Take 10 mg by mouth as needed.     Begin Date    AM    Noon    PM    Bedtime       frovatriptan 2.5 MG tablet   Commonly known as:  FROVA   Quantity:  9 tablet   Refills:  3   Dose:  2.5 mg    Instructions:  Take 1 tablet (2.5 mg total) by mouth as needed for Migraine. If recurs, may repeat after 2 hours. Max of 3 tabs in 24 hours.     Begin Date    AM    Noon    PM    Bedtime       hydrocodone-acetaminophen 5-325mg 5-325 mg per tablet   Commonly known as:  NORCO   Quantity:  20 tablet   Refills:  0   Dose:  1 tablet    Instructions:  Take 1 tablet by mouth every 6 (six) hours as needed for Pain.     Begin Date    AM    Noon    PM    Bedtime       irbesartan-hydrochlorothiazide 300-12.5 mg per tablet   Commonly known as:  AVALIDE   Quantity:  90 tablet   Refills:  3   Dose:  1 tablet    Instructions:  Take 1 tablet by mouth once daily.     Begin Date    AM    Noon    PM    Bedtime       metronidazole 1% 1  % Gel   Commonly known as:  METROGEL   Refills:  0    Instructions:  CHAVEZ QHS QHS AS DIRECTED     Begin Date    AM    Noon    PM    Bedtime       RELPAX 40 MG tablet   Quantity:  9 tablet   Refills:  0   Generic drug:  eletriptan    Instructions:  TAKE 1 TABLET BY MOUTH AS NEEDED, MAY REPEAT IN 2 HOURS IF NEEDED DO NOT EXCEED 4 TABLETS EVERY DAY     Begin Date    AM    Noon    PM    Bedtime            Where to Get Your Medications      These medications were sent to [id number]  [address] - [address] AT [facility] [address]  [address] [address]     Phone:  [phone]     folic acid 1 MG tablet    triamcinolone acetonide 0.1% 0.1 % cream         You can get these medications from any pharmacy     Bring a paper prescription for each of these medications     pantoprazole 40 MG tablet       You don't need a prescription for these medications     iron polysaccharides 150 mg iron Cap                  Please bring to all follow up appointments:    1. A copy of your discharge instructions.  2. All medicines you are currently taking in their original bottles.  3. Identification and insurance card.    Please arrive 15 minutes ahead of scheduled appointment time.    Please call 24 hours in advance if you must reschedule your appointment and/or time.        Your Scheduled Appointments     Feb 15, 2017  9:20 AM CST   Ultrasound with Jose Reyes III, MD   [facility]    [address]  [place] [address] [phone]              Follow-up Information     Follow up with Manish Joel MD In 1 week.    Specialty:  Family Medicine    Why:  Recent GI bleeding with hypotension    Contact information:    [address]  [address]  [place] [address] [phone]          Discharge Instructions     Future Orders    CBC W/ AUTO DIFFERENTIAL     Activity as tolerated     Call MD for:  difficulty breathing or increased cough     Call MD for:  increased  confusion or weakness     Call MD for:  persistent dizziness, light-headedness, or visual disturbances     Call MD for:  persistent nausea and vomiting or diarrhea     Call MD for:  severe persistent headache     Call MD for:  severe uncontrolled pain     Call MD for:  temperature >100.4     Call MD for:  worsening rash     Call MD for:     Scheduling Instructions:    Any significant dark tar-like stools (melena) in the following days    Diet general     Questions:    Total calories:      Fat restriction, if any:      Protein restriction, if any:      Na restriction, if any:      Fluid restriction:      Additional restrictions:          Discharge Instructions         Upper GI Endoscopy     During endoscopy, a long, flexible tube is used to view the inside of your upper GI tract.      Upper GI endoscopy allows your healthcare provider to look directly into the beginning of your gastrointestinal (GI) tract. The esophagus, stomach, and duodenum (the first part of the small intestine) make up the upper GI tract.   Before the exam  Follow these and any other instructions you are given before your endoscopy. If you dont follow the healthcare providers instructions carefully, the test may need to be canceled or done over:  · Don't eat or drink anything after midnight the night before your exam. If your exam is in the afternoon, drink only clear liquids in the morning. Don't eat or drink anything for 8 hours before the exam. In some cases, you may be able to take medicines with sips of water until 2 hours before the procedure. Speak with your healthcare provider about this.   · Bring your X-rays and any other test results you have.  · Because you will be sedated, arrange for an adult to drive you home after the exam.  · Tell your healthcare provider before the exam if you are taking any medicines or have any medical problems.  The procedure  Here is what to expect:  · You will lie on the endoscopy table. Usually patients  lie on the left side.  · You will be monitored and given oxygen.  · Your throat may be numbed with a spray or gargle. You are given medicine through an intravenous (IV) line that will help you relax and remain comfortable. You may be awake or asleep during the procedure.  · The healthcare provider will put the endoscope in your mouth and down your esophagus. It is thinner than most pieces of food that you swallow. It will not affect your breathing. The medicine helps keep you from gagging.  · Air is put into your GI tract to expand it. It can make you burp.  · During the procedure, the healthcare provider can take biopsies (tissue samples), remove abnormalities, such as polyps, or treat abnormalities through a variety of devices placed through the endoscope. You will not feel this.   · The endoscope carries images of your upper GI tract to a video screen. If you are awake, you may be able to look at the images.  · After the procedure is done, you will rest for a time. An adult must drive you home.  When to call your healthcare provider  Contact your healthcare provider if you have:  · Black or tarry stools, or blood in your stool  · Fever  · Pain in your belly that does not go away  · Nausea and vomiting, or vomiting blood   Date Last Reviewed: 7/1/2016  © 6201-7847 [employer]. [address] [place] [address]. All rights reserved. This information is not intended as a substitute for professional medical care. Always follow your healthcare professional's instructions.            Primary Diagnosis     Your primary diagnosis was:  Gastrointestinal Hemorrhage With Melena      Admission Information     Date & Time Provider Department CSN    2/10/2017  1:34 PM MAXIME Richardson MD [facility] [id number]      Care Providers     Provider Role Specialty Primary office phone    MAXIME Richardson MD Attending Provider Hospitalist [phone]    Juan Self MD Consulting Physician   "Hospitalist [phone]    Yoshi Erazo MD Surgeon  Gastroenterology [phone]      Your Vitals Were     BP Pulse Temp Resp Height Weight    154/86 (BP Location: Left arm, Patient Position: Lying, BP Method: Automatic) 95 97.3 °F (36.3 °C) (Oral) 18 6' 1" (1.854 m) 105.1 kg (231 lb 12.8 oz)    SpO2 BMI             95% 30.58 kg/m2         Recent Lab Values        8/29/2014                           8:58 AM           A1C 5.4                       Pending Labs     Order Current Status    Specimen to Pathology - Surgery Collected (02/11/17 1252)    Ferritin In process    H.Pylori Antibody IgG In process    H.Pylori Antibody IgG In process    Leukemia/Lymphoma Screen - Tissue Other (Specify Comments Below) In process    Leukemia/Lymphoma Screen - Tissue Other (Specify Comments Below) (Jar 1: RPMI, duodenal bulb, evaluate for lymphoma) Preliminary result      Allergies as of 2/12/2017     No Known Allergies      Advance Directives     An advance directive is a document which, in the event you are no longer able to make decisions for yourself, tells your healthcare team what kind of treatment you do or do not want to receive, or who you would like to make those decisions for you.  If you do not currently have an advance directive, [facility] encourages you to create one.  For more information call:  [phone]), [phone] ([phone]),  or log on to [email or web address].        Language Assistance Services     ATTENTION: Language assistance services are available, free of charge. Please call [phone].      ATENCIÓN: Si habla español, tiene a harp disposición servicios gratuitos de asistencia lingüística. Llame al [phone].     OSCAR Ý: N?u b?n nói Ti?ng Vi?t, có các d?ch v? h? tr? ngôn ng? mi?n phí dành cho b?n. G?i s? [phone].         [facility] complies with applicable Federal civil rights laws and does not discriminate on the basis of race, color, " national origin, age, disability, or sex.

## 2017-02-10 NOTE — PROGRESS NOTES
GI Note    Received call regarding patient Manohar, 55yo presenting with a 5 day history of melenic stool.      Patient reports being dizzy today; hypotense on arrival to ED but responding to IV fluids.     Hb 13.5, baseline ~16  INR 1.1      Recommendations:  Protonix 80mg IV bolus x 1 then gtt at 8mg/hr  Intravascular resuscitation/support with IVFs   Serial H/H's and pRBCs transfusion as indicated  Discontinue all NSAIDs and Heparin products  Please correct any coagulopathy with platelets and FFP to a goal of platelets >50K and INR <2.0  Maintain IV access with 2 large bore IVs  Clear liquid diet today and Keep NPO past midnight  Plan for EGD tomorrow AM or emergently if there is hemodynamic compromise in the setting of overt GI bleeding    Please notify GI team if there is significant change in patient's clinical status      Yahir Suarez   Gastroenterology Fellow PGY V  [phone]

## 2017-02-10 NOTE — ED NOTES
Pt placed on cardiac monitor, continuous pulse ox, cycling blood pressures. Side rails up x2, call bell in reach, bed in low position with brake engaged. Wife at bedside. Report to MI Cardenas LPN. Waiting for protonix drip to arrive from pharmacy

## 2017-02-10 NOTE — ANESTHESIA PREPROCEDURE EVALUATION
02/10/2017  Jonathan Villela is a 56 y.o., male c PmHx hypertension, PUD, diverticulitis, DELVIN,  presents to the ED complaining of epigastric abdominal pain for the past 6 days. Melanotic stools and drop in hgb noted; he is evaluated for below procedure    Pre-operative evaluation for Procedure(s) (LRB):  ESOPHAGOGASTRODUODENOSCOPY (EGD) (N/A)    IV Access:  PIV RF 18g    Oxygen/Ventilatory Requirements:  Room air    Infusions:   pantoprozole 40 mg in dextrose 5 % 100 mL infusion (ready to mix system) 8 mg/hr (02/10/17 6877)         Last Airway:        Patient Active Problem List   Diagnosis    Common migraine    Hypertension    PUD (peptic ulcer disease)    Erectile dysfunction    Insomnia    Snorings    Sleep apnea    Fatigue    Somnolence, daytime    Allergic rhinitis due to allergen    BPH with urinary obstruction    Stiff neck    Abnormal posture    Gastrointestinal hemorrhage       Review of patient's allergies indicates:  No Known Allergies    No current facility-administered medications on file prior to encounter.      Current Outpatient Prescriptions on File Prior to Encounter   Medication Sig Dispense Refill    butalbital-acetaminophen-caffeine -40 mg (FIORICET) -40 mg per tablet Take 1-2 tablets by mouth every 6 to 8 hours as needed for Headaches. 20 tablet 0    dicyclomine (BENTYL) 10 MG capsule Take 10 mg by mouth as needed.   1    frovatriptan (FROVA) 2.5 MG tablet Take 1 tablet (2.5 mg total) by mouth as needed for Migraine. If recurs, may repeat after 2 hours. Max of 3 tabs in 24 hours. 9 tablet 3    hydrocodone-acetaminophen 5-325mg (NORCO) 5-325 mg per tablet Take 1 tablet by mouth every 6 (six) hours as needed for Pain. 20 tablet 0    irbesartan-hydrochlorothiazide (AVALIDE) 300-12.5 mg per tablet Take 1 tablet by mouth once daily. 90 tablet 3     metronidazole 1% (METROGEL) 1 % Gel CHAVEZ QHS QHS AS DIRECTED  0    ondansetron (ZOFRAN) 4 MG tablet Take 1 tablet (4 mg total) by mouth every 6 (six) hours. (Patient taking differently: Take 4 mg by mouth every 6 (six) hours as needed. ) 12 tablet 0    RELPAX 40 mg tablet TAKE 1 TABLET BY MOUTH AS NEEDED, MAY REPEAT IN 2 HOURS IF NEEDED DO NOT EXCEED 4 TABLETS EVERY DAY 9 tablet 0    triamcinolone acetonide 0.1% (KENALOG) 0.1 % cream AAA bid after cool blow dry to affected areas of groin 1 Bottle 3    triamcinolone acetonide 0.1% (KENALOG) 0.1 % cream APPLY TO THE AFFECTED AREA TWICE DAILY 60 g 0       Past Surgical History   Procedure Laterality Date    Leg surgery      Nasal septum surgery      Cholecystectomy         Social History     Social History    Marital status:      Spouse name: N/A    Number of children: N/A    Years of education: N/A     Occupational History    Not on file.     Social History Main Topics    Smoking status: Never Smoker    Smokeless tobacco: Not on file    Alcohol use Yes      Comment: ocasionally    Drug use: No    Sexual activity: Yes     Partners: Female     Other Topics Concern    Not on file     Social History Narrative         Vital Signs Range (Last 24H):  Temp:  [36.7 °C (98 °F)]   Pulse:  []   Resp:  [18-20]   BP: ()/(56-68)   SpO2:  [94 %-96 %]       CBC:   Recent Labs      02/10/17   1357   WBC  11.64   RBC  4.36*   HGB  13.5*   HCT  38.4*   PLT  304   MCV  88   MCH  31.0   MCHC  35.2       CMP:   Recent Labs      02/10/17   1357   NA  135*   K  4.1   CL  104   CO2  23   BUN  56*   CREATININE  1.3   GLU  115*   CALCIUM  8.9   ALBUMIN  3.7   PROT  6.7   ALKPHOS  46*   ALT  33   AST  25   BILITOT  0.7       INR  Recent Labs      02/10/17   1357   INR  1.1           Diagnostic Studies:      EKG:  None on file    2D Echo:  None on file    [facility] Anesthesia Evaluation    I have reviewed the Patient Summary Reports.    I have reviewed the Nursing  Notes.   I have reviewed the Medications.     Review of Systems  Anesthesia Hx:  No problems with previous Anesthesia  History of prior surgery of interest to airway management or planning: Denies Family Hx of Anesthesia complications.   Denies Personal Hx of Anesthesia complications.   Social:  Social Alcohol Use, Non-Smoker    Hematology/Oncology:         -- Anemia:   Cardiovascular:   Hypertension    Pulmonary:   Sleep Apnea    Renal/:  Renal/ Normal     Hepatic/GI:   PUD,    Musculoskeletal:  Musculoskeletal Normal    Neurological:   Headaches    Endocrine:  Endocrine Normal    Dermatological:  Skin Normal    Psych:  Psychiatric Normal           Physical Exam  General:  Well nourished, Obesity    Airway/Jaw/Neck:  Airway Findings: Mouth Opening: Normal Mallampati: III  Improves to I with phonation.  TM Distance: Normal, at least 6 cm      Dental:  Dental Findings: In tact   Chest/Lungs:  Chest/Lungs Findings: Clear to auscultation     Heart/Vascular:  Heart Findings: Rate: Normal        Mental Status:  Mental Status Findings:  Cooperative         Anesthesia Plan  Type of Anesthesia, risks & benefits discussed:  Anesthesia Type:  general, MAC  Patient's Preference:   Intra-op Monitoring Plan:   Intra-op Monitoring Plan Comments:   Post Op Pain Control Plan:   Post Op Pain Control Plan Comments:   Induction:   IV  Beta Blocker:  Patient is not currently on a Beta-Blocker (No further documentation required).       Informed Consent: Patient understands risks and agrees with Anesthesia plan.  Questions answered. Anesthesia consent signed with patient.  ASA Score: 2     Day of Surgery Review of History & Physical:    H&P update referred to the provider.         Ready For Surgery From Anesthesia Perspective.

## 2017-02-10 NOTE — ED PROVIDER NOTES
"Encounter Date: 2/10/2017       History     Chief Complaint   Patient presents with    Weakness     C/o generalized weakness and nausea x 1 week.      Review of patient's allergies indicates:  No Known Allergies    HPI Comments: 56-year-old white male with past medical history of hypertension, PUD, diverticulitis presents to the ED complaining of epigastric abdominal pain for the past 6 days.  He describes the pain as "hunger pains" with associated nausea and severe heartburn.  He took a Prilosec last night with some relief of his symptoms.  He was feeling worse today became very pale and lightheaded.  His wife took his blood pressure and noted that it was 87/52.  He drink some water and blood pressure improved.  He had some BRBPR over the weekend and has had 5 episodes of melena today.  He reports a mild headache.  He had a colonoscopy 15 years ago.  Has had multiple EGDs and was diagnosed with peptic ulcer disease.  He is not currently on a PPI daily - takes as needed (was previously on protonix).  He denies any NSAID or steroid use but does report taking a lot of over-the-counter supplements.  He denies fever, chills, chest pain, shortness of breath, dysuria, hematuria, flank pain, numbness, weakness.  He occasionally drinks alcohol and denies tobacco or drug use.    The history is provided by the patient.     Past Medical History   Diagnosis Date    Colitis [id number]     infectious?    Diverticulitis     Hypertension     Migraine     Migraine headache     Obstructive sleep apnea (adult) (pediatric)     PUD (peptic ulcer disease)      Past Medical History Pertinent Negatives   Diagnosis Date Noted    Elevated PSA 8/14/2014    Kidney stone 8/14/2014    Urinary tract infection 8/14/2014     Past Surgical History   Procedure Laterality Date    Leg surgery      Nasal septum surgery      Cholecystectomy       Family History   Problem Relation Age of Onset    Crohn's disease       brother, sister, " father, nephew    Prostate cancer Neg Hx     Kidney disease Neg Hx     Melanoma Neg Hx      Social History   Substance Use Topics    Smoking status: Never Smoker    Smokeless tobacco: None    Alcohol use Yes      Comment: ocasionally     Review of Systems   Constitutional: Positive for fatigue. Negative for chills and fever.   HENT: Negative for congestion, rhinorrhea and sore throat.    Eyes: Negative for photophobia and visual disturbance.   Respiratory: Negative for shortness of breath.    Cardiovascular: Negative for chest pain.   Gastrointestinal: Positive for abdominal pain, blood in stool and nausea. Negative for constipation, diarrhea and vomiting.   Genitourinary: Negative for dysuria and hematuria.   Musculoskeletal: Negative for back pain, neck pain and neck stiffness.   Skin: Negative for rash and wound.   Neurological: Positive for light-headedness and headaches. Negative for dizziness, syncope, speech difficulty, weakness and numbness.   Psychiatric/Behavioral: Negative for confusion.       Physical Exam   Initial Vitals   BP Pulse Resp Temp SpO2   02/10/17 1319 02/10/17 1319 02/10/17 1319 02/10/17 1319 02/10/17 1319   112/58 105 18 98 °F (36.7 °C) 94 %     Physical Exam    Nursing note and vitals reviewed.  Constitutional: He appears well-developed and well-nourished. He is not diaphoretic. No distress.   HENT:   Head: Normocephalic and atraumatic.   Neck: Normal range of motion. Neck supple.   Cardiovascular: Regular rhythm and normal heart sounds. Tachycardia present.  Exam reveals no gallop and no friction rub.    No murmur heard.  Pulmonary/Chest: Breath sounds normal. He has no wheezes. He has no rhonchi. He has no rales.   Abdominal: Soft. Bowel sounds are normal. There is no tenderness. There is no rebound and no guarding.   Genitourinary: Rectal exam shows guaiac positive stool. Rectal exam shows no external hemorrhoid, no internal hemorrhoid, no fissure, no tenderness and anal tone  normal. Guaiac positive stool. : Acceptable.  Musculoskeletal: Normal range of motion.   Neurological: He is alert and oriented to person, place, and time.   Skin: Skin is warm and dry. No rash noted. No erythema.   Psychiatric: He has a normal mood and affect.         ED Course   Procedures  Labs Reviewed   CBC W/ AUTO DIFFERENTIAL - Abnormal; Notable for the following:        Result Value    RBC 4.36 (*)     Hemoglobin 13.5 (*)     Hematocrit 38.4 (*)     Gran # 8.5 (*)     Gran% 73.2 (*)     All other components within normal limits   COMPREHENSIVE METABOLIC PANEL - Abnormal; Notable for the following:     Sodium 135 (*)     Glucose 115 (*)     BUN, Bld 56 (*)     Alkaline Phosphatase 46 (*)     All other components within normal limits   URINALYSIS - Abnormal; Notable for the following:     Protein, UA Trace (*)     Leukocytes, UA Trace (*)     All other components within normal limits   URINALYSIS MICROSCOPIC - Abnormal; Notable for the following:     Hyaline Casts, UA 96 (*)     All other components within normal limits   PROTIME-INR   HEMATOCRIT   HEMOGLOBIN   TYPE & SCREEN             Medical Decision Making:   History:   Old Medical Records: I decided to obtain old medical records.  Clinical Tests:   Lab Tests: Ordered and Reviewed  Other:   I have discussed this case with another health care provider.       APC / Resident Notes:   56-year-old white male with past medical history of hypertension, PUD, diverticulitis presents to the ED complaining of epigastric abdominal pain for the past 6 days.  Tachycardic at 105.  /58.  Regular rhythm without murmurs.  Lungs are clear to auscultation bilaterally without wheezes.  Abdomen is soft and nontender to palpation.  Patient is orthostatic.  Rectal exam reveals dark brown heme positive stool.  DDx includes but is not limited to gastritis, PUD, GI bleed. Will get labs, give IVF and reassess.    CBC shows anemia with H/H 13.5/38.4 (baseline  16.0/47.1).  CMP shows elevated BUN at 56.  PT/INR WNL at 11.5/1.1.  Blood type: O positive. UA with no signs of infection or hematuria.     He was given 2L fluids, 80mg IV protonix, placed on protonix drip.  Will discuss with GI.    3:50 PM  Spoke with GI - consult placed. They will see the patient and likely scope tomorrow.  Blood consent obtained.     Will admit to internal medicine for further management of GI bleed.  Discussed with Dr Richardson prior to admission.          Attending Attestation:     Physician Attestation Statement for NP/PA:   I have conducted a face to face encounter with this patient in addition to the NP/PA, due to Medical Complexity          Attending ED Notes:   56y M with PUD presents with BRBPR and now melana. Hypotensive at home. Heme positive stool on exam. Low BP and symptomatic. Orthostatic positive. Will check labs, start protonix drip.    Addendum: concern for low BP, tachycardia. H&H dropped from prior, but still reasonable. Protonix drip continued. Given symptoms will admit.           ED Course     Clinical Impression:   The primary encounter diagnosis was Gastrointestinal hemorrhage, unspecified gastrointestinal hemorrhage type. Diagnoses of Melena and Orthostatic hypotension were also pertinent to this visit.    Disposition:   Disposition: Admitted  Condition: Fair  Internal Medicine       Rita Wesley PA-C  02/10/17 1642       Aurea Jones MD  02/10/17 9110

## 2017-02-10 NOTE — ED NOTES
LOC: The patient is awake, alert, and oriented to place, time, situation. Affect is appropriate.  Speech is appropriate and clear.     APPEARANCE: Patient resting comfortably in no acute distress.  Patient is clean and well groomed.    SKIN: The skin is warm and dry; color consistent with ethnicity.  Patient has normal skin turgor and moist mucus membranes.  Skin intact; no breakdown or bruising noted.     MUSCULOSKELETAL: Patient moving upper and lower extremities without difficulty.  Denies weakness.     RESPIRATORY: Airway is open and patent. Respirations spontaneous, even, easy, and non-labored.  Patient has a normal effort and rate.  No accessory muscle use noted. Denies cough.     CARDIAC:  Normal rhythm and rate noted.  No peripheral edema noted. No complaints of chest pain.      ABDOMEN: Soft and non tender to palpation.  No distention noted. Pt complains of nausea.    NEUROLOGIC: Eyes open spontaneously.  Behavior appropriate to situation.  Follows commands; facial expression symmetrical.  Purposeful motor response noted; normal sensation in all extremities.  Pt complains of weakness.

## 2017-02-10 NOTE — ED NOTES
Bed: 11  Expected date: 2/10/17  Expected time: 11:55 AM  Means of arrival:   Comments:  PROCEDURE RM

## 2017-02-10 NOTE — ED TRIAGE NOTES
Pt reporting dizziness and nausea with low blood pressure since last week.  Pt reporting bright red stool 2-3 days ago that is now black and tarry.  Pt has a hx of ulcers, IBS, and Diverticulitis.  Pt reporting one episode of vomiting on Sunday.

## 2017-02-11 ENCOUNTER — TELEPHONE (OUTPATIENT)
Dept: ENDOSCOPY | Facility: HOSPITAL | Age: 57
End: 2017-02-11

## 2017-02-11 ENCOUNTER — SURGERY (OUTPATIENT)
Age: 57
End: 2017-02-11

## 2017-02-11 ENCOUNTER — ANESTHESIA (OUTPATIENT)
Dept: ENDOSCOPY | Facility: HOSPITAL | Age: 57
DRG: 378 | End: 2017-02-11
Payer: COMMERCIAL

## 2017-02-11 DIAGNOSIS — K25.4 GASTROINTESTINAL HEMORRHAGE ASSOCIATED WITH GASTRIC ULCER: ICD-10-CM

## 2017-02-11 DIAGNOSIS — K25.9 MULTIPLE GASTRIC ULCERS: ICD-10-CM

## 2017-02-11 DIAGNOSIS — K25.9 MULTIPLE GASTRIC ULCERS: Primary | ICD-10-CM

## 2017-02-11 PROBLEM — K92.1 GASTROINTESTINAL HEMORRHAGE WITH MELENA: Status: ACTIVE | Noted: 2017-02-11

## 2017-02-11 PROBLEM — D62 ACUTE POST-HEMORRHAGIC ANEMIA: Status: ACTIVE | Noted: 2017-02-11

## 2017-02-11 PROBLEM — K92.2 ACUTE UPPER GI BLEED: Status: ACTIVE | Noted: 2017-02-11

## 2017-02-11 PROBLEM — R12 HEART BURN: Status: ACTIVE | Noted: 2017-02-11

## 2017-02-11 PROBLEM — K21.9 GASTROESOPHAGEAL REFLUX DISEASE WITHOUT ESOPHAGITIS: Status: ACTIVE | Noted: 2017-02-11

## 2017-02-11 LAB
ANION GAP SERPL CALC-SCNC: 7 MMOL/L
BUN SERPL-MCNC: 31 MG/DL
CALCIUM SERPL-MCNC: 8.8 MG/DL
CHLORIDE SERPL-SCNC: 106 MMOL/L
CO2 SERPL-SCNC: 25 MMOL/L
CREAT SERPL-MCNC: 0.9 MG/DL
EST. GFR  (AFRICAN AMERICAN): >60 ML/MIN/1.73 M^2
EST. GFR  (NON AFRICAN AMERICAN): >60 ML/MIN/1.73 M^2
GLUCOSE SERPL-MCNC: 87 MG/DL
HCT VFR BLD AUTO: 32.9 %
HCT VFR BLD AUTO: 34.1 %
HCT VFR BLD AUTO: 35.5 %
HGB BLD-MCNC: 11.2 G/DL
HGB BLD-MCNC: 11.7 G/DL
HGB BLD-MCNC: 12.2 G/DL
MAGNESIUM SERPL-MCNC: 1.9 MG/DL
PHOSPHATE SERPL-MCNC: 2.6 MG/DL
POTASSIUM SERPL-SCNC: 4.1 MMOL/L
SODIUM SERPL-SCNC: 138 MMOL/L

## 2017-02-11 PROCEDURE — 80048 BASIC METABOLIC PNL TOTAL CA: CPT

## 2017-02-11 PROCEDURE — 99253 IP/OBS CNSLTJ NEW/EST LOW 45: CPT | Mod: ,,, | Performed by: INTERNAL MEDICINE

## 2017-02-11 PROCEDURE — 99232 SBSQ HOSP IP/OBS MODERATE 35: CPT | Mod: ,,, | Performed by: INTERNAL MEDICINE

## 2017-02-11 PROCEDURE — 37000008 HC ANESTHESIA 1ST 15 MINUTES: Performed by: INTERNAL MEDICINE

## 2017-02-11 PROCEDURE — 25000003 PHARM REV CODE 250: Performed by: NURSE ANESTHETIST, CERTIFIED REGISTERED

## 2017-02-11 PROCEDURE — 37000009 HC ANESTHESIA EA ADD 15 MINS: Performed by: INTERNAL MEDICINE

## 2017-02-11 PROCEDURE — 63600175 PHARM REV CODE 636 W HCPCS: Performed by: NURSE ANESTHETIST, CERTIFIED REGISTERED

## 2017-02-11 PROCEDURE — 0DB68ZX EXCISION OF STOMACH, VIA NATURAL OR ARTIFICIAL OPENING ENDOSCOPIC, DIAGNOSTIC: ICD-10-PCS | Performed by: INTERNAL MEDICINE

## 2017-02-11 PROCEDURE — 88305 TISSUE EXAM BY PATHOLOGIST: CPT | Performed by: PATHOLOGY

## 2017-02-11 PROCEDURE — 27000221 HC OXYGEN, UP TO 24 HOURS

## 2017-02-11 PROCEDURE — D9220A PRA ANESTHESIA: Mod: ,,, | Performed by: ANESTHESIOLOGY

## 2017-02-11 PROCEDURE — 25000003 PHARM REV CODE 250: Performed by: HOSPITALIST

## 2017-02-11 PROCEDURE — 88184 FLOWCYTOMETRY/ TC 1 MARKER: CPT | Performed by: PATHOLOGY

## 2017-02-11 PROCEDURE — 84100 ASSAY OF PHOSPHORUS: CPT

## 2017-02-11 PROCEDURE — 25000003 PHARM REV CODE 250: Performed by: INTERNAL MEDICINE

## 2017-02-11 PROCEDURE — 85014 HEMATOCRIT: CPT | Mod: 91

## 2017-02-11 PROCEDURE — 0DB98ZX EXCISION OF DUODENUM, VIA NATURAL OR ARTIFICIAL OPENING ENDOSCOPIC, DIAGNOSTIC: ICD-10-PCS | Performed by: INTERNAL MEDICINE

## 2017-02-11 PROCEDURE — 43239 EGD BIOPSY SINGLE/MULTIPLE: CPT | Mod: ,,, | Performed by: INTERNAL MEDICINE

## 2017-02-11 PROCEDURE — 43239 EGD BIOPSY SINGLE/MULTIPLE: CPT | Performed by: INTERNAL MEDICINE

## 2017-02-11 PROCEDURE — 86677 HELICOBACTER PYLORI ANTIBODY: CPT

## 2017-02-11 PROCEDURE — 85018 HEMOGLOBIN: CPT

## 2017-02-11 PROCEDURE — 27201012 HC FORCEPS, HOT/COLD, DISP: Performed by: INTERNAL MEDICINE

## 2017-02-11 PROCEDURE — 88185 FLOWCYTOMETRY/TC ADD-ON: CPT | Performed by: PATHOLOGY

## 2017-02-11 PROCEDURE — 11000001 HC ACUTE MED/SURG PRIVATE ROOM

## 2017-02-11 PROCEDURE — 25000003 PHARM REV CODE 250: Performed by: PHYSICIAN ASSISTANT

## 2017-02-11 PROCEDURE — 83735 ASSAY OF MAGNESIUM: CPT

## 2017-02-11 PROCEDURE — 88189 FLOWCYTOMETRY/READ 16 & >: CPT | Mod: ,,, | Performed by: PATHOLOGY

## 2017-02-11 PROCEDURE — 88305 TISSUE EXAM BY PATHOLOGIST: CPT | Mod: 26,,, | Performed by: PATHOLOGY

## 2017-02-11 PROCEDURE — 36415 COLL VENOUS BLD VENIPUNCTURE: CPT

## 2017-02-11 RX ORDER — PANTOPRAZOLE SODIUM 40 MG/1
40 TABLET, DELAYED RELEASE ORAL
Qty: 90 TABLET | Refills: 3 | Status: SHIPPED | OUTPATIENT
Start: 2017-02-11 | End: 2017-02-11 | Stop reason: SDUPTHER

## 2017-02-11 RX ORDER — SODIUM CHLORIDE 9 MG/ML
INJECTION, SOLUTION INTRAVENOUS ONCE
Status: COMPLETED | OUTPATIENT
Start: 2017-02-11 | End: 2017-02-11

## 2017-02-11 RX ORDER — PHENYLEPHRINE HYDROCHLORIDE 10 MG/ML
INJECTION INTRAVENOUS
Status: DISCONTINUED | OUTPATIENT
Start: 2017-02-11 | End: 2017-02-11

## 2017-02-11 RX ORDER — ROCURONIUM BROMIDE 10 MG/ML
INJECTION, SOLUTION INTRAVENOUS
Status: DISCONTINUED | OUTPATIENT
Start: 2017-02-11 | End: 2017-02-11

## 2017-02-11 RX ORDER — PROPOFOL 10 MG/ML
VIAL (ML) INTRAVENOUS
Status: DISCONTINUED | OUTPATIENT
Start: 2017-02-11 | End: 2017-02-11

## 2017-02-11 RX ORDER — MIDAZOLAM HYDROCHLORIDE 1 MG/ML
INJECTION, SOLUTION INTRAMUSCULAR; INTRAVENOUS
Status: DISCONTINUED | OUTPATIENT
Start: 2017-02-11 | End: 2017-02-11

## 2017-02-11 RX ORDER — PANTOPRAZOLE SODIUM 40 MG/1
40 TABLET, DELAYED RELEASE ORAL EVERY MORNING
Status: DISCONTINUED | OUTPATIENT
Start: 2017-02-12 | End: 2017-02-12 | Stop reason: HOSPADM

## 2017-02-11 RX ORDER — LIDOCAINE HCL/PF 100 MG/5ML
SYRINGE (ML) INTRAVENOUS
Status: DISCONTINUED | OUTPATIENT
Start: 2017-02-11 | End: 2017-02-11

## 2017-02-11 RX ORDER — SODIUM CHLORIDE 9 MG/ML
INJECTION, SOLUTION INTRAVENOUS CONTINUOUS PRN
Status: DISCONTINUED | OUTPATIENT
Start: 2017-02-11 | End: 2017-02-11

## 2017-02-11 RX ORDER — PANTOPRAZOLE SODIUM 40 MG/1
40 TABLET, DELAYED RELEASE ORAL
Qty: 90 TABLET | Refills: 3 | Status: SHIPPED | OUTPATIENT
Start: 2017-02-11 | End: 2017-04-18

## 2017-02-11 RX ORDER — BUTALBITAL, ACETAMINOPHEN AND CAFFEINE 50; 325; 40 MG/1; MG/1; MG/1
1 TABLET ORAL EVERY 6 HOURS PRN
Status: DISCONTINUED | OUTPATIENT
Start: 2017-02-11 | End: 2017-02-12 | Stop reason: HOSPADM

## 2017-02-11 RX ORDER — SUCCINYLCHOLINE CHLORIDE 20 MG/ML
INJECTION INTRAMUSCULAR; INTRAVENOUS
Status: DISCONTINUED | OUTPATIENT
Start: 2017-02-11 | End: 2017-02-11

## 2017-02-11 RX ORDER — PROPOFOL 10 MG/ML
VIAL (ML) INTRAVENOUS CONTINUOUS PRN
Status: DISCONTINUED | OUTPATIENT
Start: 2017-02-11 | End: 2017-02-11

## 2017-02-11 RX ORDER — FENTANYL CITRATE 50 UG/ML
INJECTION, SOLUTION INTRAMUSCULAR; INTRAVENOUS
Status: DISCONTINUED | OUTPATIENT
Start: 2017-02-11 | End: 2017-02-11

## 2017-02-11 RX ORDER — ONDANSETRON 2 MG/ML
INJECTION INTRAMUSCULAR; INTRAVENOUS
Status: DISCONTINUED | OUTPATIENT
Start: 2017-02-11 | End: 2017-02-11

## 2017-02-11 RX ADMIN — MIDAZOLAM HYDROCHLORIDE 2 MG: 1 INJECTION, SOLUTION INTRAMUSCULAR; INTRAVENOUS at 11:02

## 2017-02-11 RX ADMIN — PHENYLEPHRINE HYDROCHLORIDE 100 MCG: 10 INJECTION INTRAVENOUS at 12:02

## 2017-02-11 RX ADMIN — PROPOFOL 100 MCG/KG/MIN: 10 INJECTION, EMULSION INTRAVENOUS at 12:02

## 2017-02-11 RX ADMIN — LIDOCAINE HYDROCHLORIDE 80 MG: 20 INJECTION, SOLUTION INTRAVENOUS at 12:02

## 2017-02-11 RX ADMIN — SODIUM CHLORIDE: 0.9 INJECTION, SOLUTION INTRAVENOUS at 11:02

## 2017-02-11 RX ADMIN — PANTOPRAZOLE SODIUM 8 MG/HR: 40 INJECTION, POWDER, FOR SOLUTION INTRAVENOUS at 05:02

## 2017-02-11 RX ADMIN — PHENYLEPHRINE HYDROCHLORIDE 200 MCG: 10 INJECTION INTRAVENOUS at 12:02

## 2017-02-11 RX ADMIN — PANTOPRAZOLE SODIUM 8 MG/HR: 40 INJECTION, POWDER, FOR SOLUTION INTRAVENOUS at 12:02

## 2017-02-11 RX ADMIN — PROPOFOL 50 MG: 10 INJECTION, EMULSION INTRAVENOUS at 12:02

## 2017-02-11 RX ADMIN — ROCURONIUM BROMIDE 10 MG: 10 INJECTION, SOLUTION INTRAVENOUS at 12:02

## 2017-02-11 RX ADMIN — ONDANSETRON 4 MG: 2 INJECTION INTRAMUSCULAR; INTRAVENOUS at 12:02

## 2017-02-11 RX ADMIN — FENTANYL CITRATE 50 MCG: 50 INJECTION, SOLUTION INTRAMUSCULAR; INTRAVENOUS at 12:02

## 2017-02-11 RX ADMIN — PROPOFOL 100 MG: 10 INJECTION, EMULSION INTRAVENOUS at 12:02

## 2017-02-11 RX ADMIN — SUCCINYLCHOLINE CHLORIDE 140 MG: 20 INJECTION, SOLUTION INTRAMUSCULAR; INTRAVENOUS at 12:02

## 2017-02-11 RX ADMIN — ONDANSETRON 8 MG: 4 TABLET, ORALLY DISINTEGRATING ORAL at 08:02

## 2017-02-11 RX ADMIN — SODIUM CHLORIDE: 0.9 INJECTION, SOLUTION INTRAVENOUS at 05:02

## 2017-02-11 RX ADMIN — PROPOFOL 80 MG: 10 INJECTION, EMULSION INTRAVENOUS at 12:02

## 2017-02-11 NOTE — TRANSFER OF CARE
"Anesthesia Transfer of Care Note    Patient: Jonathan Villela    Procedure(s) Performed: Procedure(s) (LRB):  ESOPHAGOGASTRODUODENOSCOPY (EGD) (N/A)    Patient location: PACU    Anesthesia Type: general    Transport from OR: Transported from OR on 6-10 L/min O2 by face mask with adequate spontaneous ventilation    Post pain: adequate analgesia    Post assessment: no apparent anesthetic complications and tolerated procedure well    Post vital signs: stable    Level of consciousness: awake, alert and oriented    Nausea/Vomiting: no nausea/vomiting    Complications: none          Last vitals:   Visit Vitals    /86 (BP Location: Right arm, Patient Position: Lying, BP Method: Automatic)    Pulse 96    Temp 36.7 °C (98 °F) (Axillary)    Resp 16    Ht 6' 1" (1.854 m)    Wt 105.1 kg (231 lb 12.8 oz)    SpO2 96%    BMI 30.58 kg/m2     "

## 2017-02-11 NOTE — ASSESSMENT & PLAN NOTE
- Acute upper GI bleed likely due to PUD vs gastritis vs AVM   - Positive orthostatic with dizziness upon presentation to ED   - Received 2 L NS bolus in ED with resolution of dizziness  - Currently asymptomatic; repeat Hgb 11.7 ( down from 13.5 at presentation )  - Abdominal exam benign   - Hemodynamically stable and no signs of active bleeding   - Last BM with melena was at home around 2 pm prior to coming to ED   -GI consult noted and appreciated with plan to have EGD in morning   -Intravascular volume resuscitation with IVF and PRBC transfusion prn Hgb < 7  - NPO, IVF, PPI infusion   - Serial H/H with CBC Q 8 hrs  - Maintain 2 functional IV access   - Avoid NSAID, antiplatelet, AC use

## 2017-02-11 NOTE — H&P
[facility] Medicine  History & Physical    Patient Name: Jonathan Villela  MRN: [id number]  Admission Date: 2/10/2017  Attending Physician: MAXIME Richardson MD   Primary Care Provider: Manish Joel MD    [facility] Medicine Team: [facility] MED O Marie Rowe DO     Patient information was obtained from patient, relative(s) and ER records.     Subjective:     Principal Problem:Gastrointestinal hemorrhage with melena    Chief Complaint:   Chief Complaint   Patient presents with    Weakness     C/o generalized weakness and nausea x 1 week.         HPI: 56 M with h/o PUD, GERD, diverticulitis, HTN, migraine, DELVIN presents to ED with five episodes of dark stool associated with fatigue and dizziness. States he had total five episodes of melena since this morning and last episode was around 2 PM prior to coming to ED. Reports heart burn, but denies abdominal pain, chest pain, palpitation, SOB, nausea, vomiting, fever, chills. Denies NSAID, Alcohol use. He was initially found to be orthostatic in ED with feeling dizzy upon standing that resolved after intravascular volume resuscitation with 2 L NS bolus. Initial hemoglobin of 13.5 which decreased to 11.7 on repeat labs. Patient was evaluated by GI with plan to have EGD in morning. Started on protonix infusion with NPO after midnight.       Of note, patient reports having EGD and multiple colonoscopies at [facility] in 2013 for GI bleed due to nonspecific colitis. States he had unremarkable colonoscopy recently only few months ago.     Currently appears comfortable, conversant, offers no complaints. Significant other at bedside ( Teresa : [phone] ), who wants to be notified in case of change in clinical status.       Past Medical History   Diagnosis Date    Colitis [id number]     infectious?    Diverticulitis     Hypertension     Migraine     Migraine headache     Obstructive sleep apnea (adult) (pediatric)     PUD (peptic ulcer disease)         Past Surgical History   Procedure Laterality Date    Leg surgery      Nasal septum surgery      Cholecystectomy         Review of patient's allergies indicates:  No Known Allergies    No current facility-administered medications on file prior to encounter.      Current Outpatient Prescriptions on File Prior to Encounter   Medication Sig    butalbital-acetaminophen-caffeine -40 mg (FIORICET) -40 mg per tablet Take 1-2 tablets by mouth every 6 to 8 hours as needed for Headaches.    dicyclomine (BENTYL) 10 MG capsule Take 10 mg by mouth as needed.     frovatriptan (FROVA) 2.5 MG tablet Take 1 tablet (2.5 mg total) by mouth as needed for Migraine. If recurs, may repeat after 2 hours. Max of 3 tabs in 24 hours.    hydrocodone-acetaminophen 5-325mg (NORCO) 5-325 mg per tablet Take 1 tablet by mouth every 6 (six) hours as needed for Pain.    irbesartan-hydrochlorothiazide (AVALIDE) 300-12.5 mg per tablet Take 1 tablet by mouth once daily.    metronidazole 1% (METROGEL) 1 % Gel CHAVEZ QHS QHS AS DIRECTED    ondansetron (ZOFRAN) 4 MG tablet Take 1 tablet (4 mg total) by mouth every 6 (six) hours. (Patient taking differently: Take 4 mg by mouth every 6 (six) hours as needed. )    RELPAX 40 mg tablet TAKE 1 TABLET BY MOUTH AS NEEDED, MAY REPEAT IN 2 HOURS IF NEEDED DO NOT EXCEED 4 TABLETS EVERY DAY    triamcinolone acetonide 0.1% (KENALOG) 0.1 % cream AAA bid after cool blow dry to affected areas of groin    triamcinolone acetonide 0.1% (KENALOG) 0.1 % cream APPLY TO THE AFFECTED AREA TWICE DAILY     Family History     Problem Relation (Age of Onset)    Crohn's disease         Social History Main Topics    Smoking status: Never Smoker    Smokeless tobacco: Not on file    Alcohol use Yes      Comment: ocasionally    Drug use: No    Sexual activity: Yes     Partners: Female     Review of Systems   Constitutional: Positive for fatigue. Negative for activity change, appetite change, chills,  diaphoresis, fever and unexpected weight change.   HENT: Negative for congestion, dental problem, drooling, ear discharge, ear pain, facial swelling, hearing loss, mouth sores, nosebleeds, postnasal drip, rhinorrhea, sinus pressure, sneezing, sore throat, tinnitus, trouble swallowing and voice change.    Eyes: Negative for photophobia, pain, discharge, redness, itching and visual disturbance.   Respiratory: Negative for apnea, cough, choking, chest tightness, shortness of breath, wheezing and stridor.    Cardiovascular: Negative for chest pain, palpitations and leg swelling.   Gastrointestinal: Negative for abdominal distention, abdominal pain, anal bleeding, blood in stool, constipation, diarrhea, nausea, rectal pain and vomiting.   Endocrine: Negative for cold intolerance, heat intolerance, polydipsia, polyphagia and polyuria.   Genitourinary: Negative for decreased urine volume, difficulty urinating, discharge, dysuria, enuresis, flank pain, frequency, genital sores, hematuria, penile pain, penile swelling, scrotal swelling, testicular pain and urgency.   Musculoskeletal: Negative for arthralgias, back pain, gait problem, joint swelling, myalgias, neck pain and neck stiffness.   Skin: Negative for color change, pallor, rash and wound.   Allergic/Immunologic: Negative for environmental allergies, food allergies and immunocompromised state.   Neurological: Positive for dizziness and light-headedness. Negative for tremors, seizures, syncope, facial asymmetry, speech difficulty, weakness, numbness and headaches.   Hematological: Negative for adenopathy. Does not bruise/bleed easily.   Psychiatric/Behavioral: Negative for agitation, behavioral problems, confusion, decreased concentration, dysphoric mood, hallucinations, self-injury, sleep disturbance and suicidal ideas. The patient is not nervous/anxious and is not hyperactive.      Objective:     Vital Signs (Most Recent):  Temp: 97.7 °F (36.5 °C) (02/11/17  0358)  Pulse: 74 (02/11/17 0358)  Resp: 16 (02/11/17 0358)  BP: (!) 96/54 (02/11/17 0358)  SpO2: 96 % (02/11/17 0358) Vital Signs (24h Range):  Temp:  [97.7 °F (36.5 °C)-98.1 °F (36.7 °C)] 97.7 °F (36.5 °C)  Pulse:  [] 74  Resp:  [16-24] 16  SpO2:  [93 %-96 %] 96 %  BP: ()/(52-76) 96/54     Weight: 105.1 kg (231 lb 12.8 oz)  Body mass index is 30.58 kg/(m^2).    Physical Exam   Constitutional: He is oriented to person, place, and time. He appears well-developed and well-nourished. No distress.   HENT:   Head: Normocephalic and atraumatic.   Right Ear: External ear normal.   Left Ear: External ear normal.   Nose: Nose normal.   Mouth/Throat: Oropharynx is clear and moist. No oropharyngeal exudate.   Eyes: Conjunctivae and EOM are normal. Pupils are equal, round, and reactive to light. Right eye exhibits no discharge. Left eye exhibits no discharge. No scleral icterus.   Neck: Normal range of motion. Neck supple. No JVD present. No tracheal deviation present. No thyromegaly present.   Cardiovascular: Normal rate, regular rhythm, normal heart sounds and intact distal pulses.  Exam reveals no gallop and no friction rub.    No murmur heard.  Pulmonary/Chest: Effort normal and breath sounds normal. No stridor. No respiratory distress. He has no wheezes. He has no rales. He exhibits no tenderness.   Abdominal: Soft. Bowel sounds are normal. He exhibits no distension and no mass. There is no tenderness. No hernia.   Musculoskeletal: Normal range of motion. He exhibits no edema, tenderness or deformity.   Lymphadenopathy:     He has no cervical adenopathy.   Neurological: He is alert and oriented to person, place, and time. He has normal reflexes. He displays normal reflexes. No cranial nerve deficit. He exhibits normal muscle tone. Coordination normal.   Skin: Skin is warm and dry. No rash noted. He is not diaphoretic. No erythema. No pallor.   Psychiatric: He has a normal mood and affect. His behavior is  normal. Judgment and thought content normal.        Significant Labs:   Admission on 02/10/2017   Component Date Value Ref Range Status    WBC 02/10/2017 11.64  3.90 - 12.70 K/uL Final    RBC 02/10/2017 4.36* 4.60 - 6.20 M/uL Final    Hemoglobin 02/10/2017 13.5* 14.0 - 18.0 g/dL Final    Hematocrit 02/10/2017 38.4* 40.0 - 54.0 % Final    MCV 02/10/2017 88  82 - 98 fL Final    MCH 02/10/2017 31.0  27.0 - 31.0 pg Final    MCHC 02/10/2017 35.2  32.0 - 36.0 % Final    RDW 02/10/2017 12.5  11.5 - 14.5 % Final    Platelets 02/10/2017 304  150 - 350 K/uL Final    MPV 02/10/2017 9.4  9.2 - 12.9 fL Final    Gran # 02/10/2017 8.5* 1.8 - 7.7 K/uL Final    Lymph # 02/10/2017 2.3  1.0 - 4.8 K/uL Final    Mono # 02/10/2017 0.6  0.3 - 1.0 K/uL Final    Eos # 02/10/2017 0.1  0.0 - 0.5 K/uL Final    Baso # 02/10/2017 0.05  0.00 - 0.20 K/uL Final    Gran% 02/10/2017 73.2* 38.0 - 73.0 % Final    Lymph% 02/10/2017 19.3  18.0 - 48.0 % Final    Mono% 02/10/2017 5.0  4.0 - 15.0 % Final    Eosinophil% 02/10/2017 0.8  0.0 - 8.0 % Final    Basophil% 02/10/2017 0.4  0.0 - 1.9 % Final    Differential Method 02/10/2017 Automated   Final    Sodium 02/10/2017 135* 136 - 145 mmol/L Final    Potassium 02/10/2017 4.1  3.5 - 5.1 mmol/L Final    Chloride 02/10/2017 104  95 - 110 mmol/L Final    CO2 02/10/2017 23  23 - 29 mmol/L Final    Glucose 02/10/2017 115* 70 - 110 mg/dL Final    BUN, Bld 02/10/2017 56* 6 - 20 mg/dL Final    Creatinine 02/10/2017 1.3  0.5 - 1.4 mg/dL Final    Calcium 02/10/2017 8.9  8.7 - 10.5 mg/dL Final    Total Protein 02/10/2017 6.7  6.0 - 8.4 g/dL Final    Albumin 02/10/2017 3.7  3.5 - 5.2 g/dL Final    Total Bilirubin 02/10/2017 0.7  0.1 - 1.0 mg/dL Final    Alkaline Phosphatase 02/10/2017 46* 55 - 135 U/L Final    AST 02/10/2017 25  10 - 40 U/L Final    ALT 02/10/2017 33  10 - 44 U/L Final    Anion Gap 02/10/2017 8  8 - 16 mmol/L Final    eGFR if African American 02/10/2017 >60.0  >60  mL/min/1.73 m^2 Final    eGFR if non African American 02/10/2017 >60.0  >60 mL/min/1.73 m^2 Final    Prothrombin Time 02/10/2017 11.5  9.0 - 12.5 sec Final    INR 02/10/2017 1.1  0.8 - 1.2 Final    Group & Rh 02/10/2017 O POS   Final    Indirect Enly 02/10/2017 NEG   Final    Specimen UA 02/10/2017 Urine, Clean Catch   Final    Color, UA 02/10/2017 Yellow  Yellow, Straw, Leyda Final    Appearance, UA 02/10/2017 Clear  Clear Final    pH, UA 02/10/2017 6.0  5.0 - 8.0 Final    Specific Gravity, UA 02/10/2017 1.020  1.005 - 1.030 Final    Protein, UA 02/10/2017 Trace* Negative Final    Glucose, UA 02/10/2017 Negative  Negative Final    Ketones, UA 02/10/2017 Negative  Negative Final    Bilirubin (UA) 02/10/2017 Trace  Negative Final    Occult Blood UA 02/10/2017 Negative  Negative Final    Nitrite, UA 02/10/2017 Negative  Negative Final    Urobilinogen, UA 02/10/2017 2.0  <2.0 EU/dL Final    Leukocytes, UA 02/10/2017 Trace* Negative Final    RBC, UA 02/10/2017 3  0 - 4 /hpf Final    WBC, UA 02/10/2017 3  0 - 5 /hpf Final    Bacteria, UA 02/10/2017 Occasional  None-Occ /hpf Final    Squam Epithel, UA 02/10/2017 1  /hpf Final    Hyaline Casts, UA 02/10/2017 96* 0-1/lpf /lpf Final    Microscopic Comment 02/10/2017 SEE COMMENT   Final    Hematocrit 02/10/2017 33.8* 40.0 - 54.0 % Final    Hemoglobin 02/10/2017 11.7* 14.0 - 18.0 g/dL Final         Significant Imaging: I have reviewed and interpreted all pertinent imaging results/findings within the past 24 hours.    Assessment/Plan:     * Gastrointestinal hemorrhage with melena  - Acute upper GI bleed likely due to PUD vs gastritis vs AVM   - Positive orthostatic with dizziness upon presentation to ED   - Received 2 L NS bolus in ED with resolution of dizziness  - Currently asymptomatic; repeat Hgb 11.7 ( down from 13.5 at presentation )  - Abdominal exam benign   - Hemodynamically stable and no signs of active bleeding   - Last BM with melena was  at home around 2 pm prior to coming to ED   -GI consult noted and appreciated with plan to have EGD in morning   -Intravascular volume resuscitation with IVF and PRBC transfusion prn Hgb < 7  - NPO, IVF, PPI infusion   - Serial H/H with CBC Q 8 hrs  - Maintain 2 functional IV access   - Avoid NSAID, antiplatelet, AC use       Hypertension  - Hold avalide in setting of acute GI bleed       Acute post-hemorrhagic anemia  - Blood transfusion prn Hgb < 7      Common migraine  - Fioricet prn       VTE Risk Mitigation         Ordered     Medium Risk of VTE  Once      02/10/17 1805     Place sequential compression device  Until discontinued      02/10/17 1805     Place JOVANY hose  Until discontinued      02/10/17 1805        Marie Rowe DO  Department of Hospital Medicine   [facility]

## 2017-02-11 NOTE — CONSULTS
"Gastroenterology  Consult note      SUBJECTIVE:     Reason for Consult: Melena    History of Present Illness:  Patient is a 56 y.o. male with a questionable history of Crohn's disease in past but after multiple EGD and colonoscopy in past he says he was told that he does not have Crohn's disease. He [place] [facility] [place] with a 1 day history of melena.    The patient states he felt nauseated this past week and then on Friday morning experienced a large, black tarry stool.  He denies BRBPR.  No vomiting.  No hematemesis.  No abdominal pain.  No fevers, chills, rigors.  He endorses NSAID use - Stephanie back pain recently.    The patient states he underwent "12 or 13" colonoscopies in 2013 which he states were initially consistent with Crohn's disease however when he was scoped at [facility] (2013) they told him he was "in remission."  He states he was put on "every Crohn's medication," none of which helped.  He does not remember any medication names.     Last EGD 2013, WNL.            PTA Medications   Medication Sig    butalbital-acetaminophen-caffeine -40 mg (FIORICET) -40 mg per tablet Take 1-2 tablets by mouth every 6 to 8 hours as needed for Headaches.    dicyclomine (BENTYL) 10 MG capsule Take 10 mg by mouth as needed.     frovatriptan (FROVA) 2.5 MG tablet Take 1 tablet (2.5 mg total) by mouth as needed for Migraine. If recurs, may repeat after 2 hours. Max of 3 tabs in 24 hours.    hydrocodone-acetaminophen 5-325mg (NORCO) 5-325 mg per tablet Take 1 tablet by mouth every 6 (six) hours as needed for Pain.    irbesartan-hydrochlorothiazide (AVALIDE) 300-12.5 mg per tablet Take 1 tablet by mouth once daily.    metronidazole 1% (METROGEL) 1 % Gel CHAVEZ QHS QHS AS DIRECTED    ondansetron (ZOFRAN) 4 MG tablet Take 1 tablet (4 mg total) by mouth every 6 (six) hours. (Patient taking differently: Take 4 mg by mouth every 6 (six) hours as needed. )    RELPAX 40 mg tablet TAKE 1 TABLET BY MOUTH AS " NEEDED, MAY REPEAT IN 2 HOURS IF NEEDED DO NOT EXCEED 4 TABLETS EVERY DAY    triamcinolone acetonide 0.1% (KENALOG) 0.1 % cream AAA bid after cool blow dry to affected areas of groin    triamcinolone acetonide 0.1% (KENALOG) 0.1 % cream APPLY TO THE AFFECTED AREA TWICE DAILY       Review of patient's allergies indicates:  No Known Allergies     Past Medical History   Diagnosis Date    Colitis [id number]     infectious?    Diverticulitis     Hypertension     Migraine     Migraine headache     Obstructive sleep apnea (adult) (pediatric)     PUD (peptic ulcer disease)      Past Surgical History   Procedure Laterality Date    Leg surgery      Nasal septum surgery      Cholecystectomy       Family History   Problem Relation Age of Onset    Crohn's disease       brother, sister, father, nephew    Prostate cancer Neg Hx     Kidney disease Neg Hx     Melanoma Neg Hx      Social History   Substance Use Topics    Smoking status: Never Smoker    Smokeless tobacco: None    Alcohol use Yes      Comment: ocasionally       Review of Systems:  Constitutional: no fever, chills or change in weight   Eyes: no visual changes   ENT: no sore throat or dysphagia  Respiratory: no cough or shortness of breath   Cardiovascular: no chest pain or palpitations   Gastrointestinal: as per HPI  Hematologic/Lymphatic: no easy bruising or lymphadenopathy   Musculoskeletal: no arthralgias or myalgias   Neurological: no seizures, tremors or change in mental status  Behavioral/Psych: no auditory or visual hallucinations    OBJECTIVE:     Vital Signs (Most Recent)  Temp: 98 °F (36.7 °C) (02/11/17 1146)  Pulse: 84 (02/11/17 1146)  Resp: 16 (02/11/17 1146)  BP: 137/79 (02/11/17 1146)  SpO2: 98 % (02/11/17 1146)    Physical Exam:  General appearance: well developed, well nourished, no acute distress  Eyes: anicteric sclerae  Throat: lips, mucosa, and tongue normal, Mallampati II  Neck: supple, symmetrical, trachea midline, no cervical  lymphadenopathy  Lungs: breath sounds vesicular in nature, air entry equal bilaterally, no wheeze nor crepitations  Heart: regular rate and rhythm, S1, S2 clearly audible, no murmur, click, rub or gallop  Abdomen: soft, non-tender, non-distended; BS present and normal; no masses,  no organomegaly, no rebound tenderness, rigidity, or guarding  Extremities: no cyanosis or edema, no clubbing  Pulses: 2+ and symmetric  Skin: Skin color, texture, turgor normal.   Neurologic: No focal deficits noted      Laboratory    CBC:   Recent Labs  Lab 02/10/17  1357 02/10/17  2200 02/11/17  0417 02/11/17  0833   WBC 11.64  --   --   --    RBC 4.36*  --   --   --    HGB 13.5* 11.7* 12.2* 11.7*   HCT 38.4* 33.8* 35.5* 34.1*     --   --   --    MCV 88  --   --   --    MCH 31.0  --   --   --    MCHC 35.2  --   --   --      BMP:   Recent Labs  Lab 02/10/17  1357 02/11/17  0408   * 87   * 138   K 4.1 4.1    106   CO2 23 25   BUN 56* 31*   CREATININE 1.3 0.9   CALCIUM 8.9 8.8   MG  --  1.9     Coagulation:   Recent Labs  Lab 02/10/17  1357   INR 1.1           ASSESSMENT/PLAN:     Melena  - Likely 2/2 NSAID induced PUD given recent use.  Esophagitis vs AVM are alternative possibilities  - Hemodynamically stable      Recommendations:   Continue PPI  EGD today  Keep NPO   patient to avoid NSAIDs    Continue follow up with outpatient gastroenterologist for history of colitis (uncertain etiology)      Yahir Suarez   Gastroenterology Fellow PGY V  [phone]  I was present with the fellow during the above evaluation, including history and exam.  I discussed the case with the fellow and agree with the findings and plan as documented in the fellow's note.

## 2017-02-11 NOTE — H&P
[facility]  History & Physical    Subjective:      Chief Complaint/Reason for Admission:     EGD    Jonathan Villela is a 56 y.o. male.    Past Medical History   Diagnosis Date    Colitis [id number]     infectious?    Diverticulitis     Hypertension     Migraine     Migraine headache     Obstructive sleep apnea (adult) (pediatric)     PUD (peptic ulcer disease)      Past Surgical History   Procedure Laterality Date    Leg surgery      Nasal septum surgery      Cholecystectomy       Family History   Problem Relation Age of Onset    Crohn's disease       brother, sister, father, nephew    Prostate cancer Neg Hx     Kidney disease Neg Hx     Melanoma Neg Hx      Social History   Substance Use Topics    Smoking status: Never Smoker    Smokeless tobacco: None    Alcohol use Yes      Comment: ocasionally       PTA Medications   Medication Sig    butalbital-acetaminophen-caffeine -40 mg (FIORICET) -40 mg per tablet Take 1-2 tablets by mouth every 6 to 8 hours as needed for Headaches.    dicyclomine (BENTYL) 10 MG capsule Take 10 mg by mouth as needed.     frovatriptan (FROVA) 2.5 MG tablet Take 1 tablet (2.5 mg total) by mouth as needed for Migraine. If recurs, may repeat after 2 hours. Max of 3 tabs in 24 hours.    hydrocodone-acetaminophen 5-325mg (NORCO) 5-325 mg per tablet Take 1 tablet by mouth every 6 (six) hours as needed for Pain.    irbesartan-hydrochlorothiazide (AVALIDE) 300-12.5 mg per tablet Take 1 tablet by mouth once daily.    metronidazole 1% (METROGEL) 1 % Gel CHAVEZ QHS QHS AS DIRECTED    ondansetron (ZOFRAN) 4 MG tablet Take 1 tablet (4 mg total) by mouth every 6 (six) hours. (Patient taking differently: Take 4 mg by mouth every 6 (six) hours as needed. )    RELPAX 40 mg tablet TAKE 1 TABLET BY MOUTH AS NEEDED, MAY REPEAT IN 2 HOURS IF NEEDED DO NOT EXCEED 4 TABLETS EVERY DAY    triamcinolone acetonide 0.1% (KENALOG) 0.1 % cream AAA bid after cool  blow dry to affected areas of groin    triamcinolone acetonide 0.1% (KENALOG) 0.1 % cream APPLY TO THE AFFECTED AREA TWICE DAILY     Review of patient's allergies indicates:  No Known Allergies     Review of Systems   Constitutional: Negative for chills, fever and weight loss.   Respiratory: Negative for shortness of breath and wheezing.    Cardiovascular: Negative for chest pain.   Gastrointestinal: Positive for blood in stool and melena.       Objective:      Vital Signs (Most Recent)  Temp: 98 °F (36.7 °C) (02/11/17 1146)  Pulse: 84 (02/11/17 1146)  Resp: 16 (02/11/17 1146)  BP: 137/79 (02/11/17 1146)  SpO2: 98 % (02/11/17 1146)    Vital Signs Range (Last 24H):  Temp:  [97.7 °F (36.5 °C)-98.1 °F (36.7 °C)]   Pulse:  []   Resp:  [16-24]   BP: ()/(52-79)   SpO2:  [93 %-98 %]     Physical Exam   Constitutional: He is oriented to person, place, and time. He appears well-developed and well-nourished.   Cardiovascular: Normal rate.    Pulmonary/Chest: Effort normal and breath sounds normal.   Abdominal: Soft. Bowel sounds are normal. He exhibits no distension. There is no tenderness.   Neurological: He is alert and oriented to person, place, and time.   Skin: Skin is warm and dry.   Psychiatric: He has a normal mood and affect. His behavior is normal. Judgment and thought content normal.     Assessment:      Active Hospital Problems    Diagnosis  POA    *Gastrointestinal hemorrhage with melena [K92.1]  Yes    Acute upper GI bleed [K92.2]  Yes    Heart burn [R12]  Yes    Gastroesophageal reflux disease without esophagitis [K21.9]  Yes    Acute post-hemorrhagic anemia [D62]  Yes    Fatigue [R53.83]  Yes    Hypertension [I10]  Yes    Common migraine [G43.009]  Yes      Resolved Hospital Problems    Diagnosis Date Resolved POA   No resolved problems to display.       Plan:    EGD for GI bleeding and history of GERD and NSAID uses last C-scope not here but reported by pt and his wife as normal 3 years  ago.

## 2017-02-11 NOTE — PROGRESS NOTES
GI Note    EGD today showed multiple likely NSAID-induced ulcers in antrum and duodenum.  Fresh blood in stomach likely 2/2 recently bleeding ulcers.  Biopsies taken for H.Pylori and of small bowel lesions (atypical appearing mucosa).       Recommendations:  - Await pathology results.  - Telephone endoscopist for pathology results in 2 weeks.  - Use Protonix (pantoprazole) 40 mg by mouth once daily. Best taken 45 minutes before  breakfast in the morning.  - Consider avoiding all non-steroidal anti-inflammatory drugs (aspirin, ibuprofen,  naproxen, etc.), unless needed for cardiovascular protection. Recommend you discuss  with your prescribing doctor (of your aspirin) to see if cardiovascular benefits of your  aspirin outweigh the risks of GI bleeding.  - Repeat the upper endoscopy in 8 weeks to check healing.  - The findings and recommendations were discussed with the patient.  - The findings and recommendations were discussed with the patient's family.  - The findings and recommendations were discussed with the patient's primary physician.  - Refer to a gastroenterologist.  - Return to GI clinic.  - Recommend out patient follow up Hemoglobin next week. Orders placed.  - Discontinue the use of any products containing nicotine.    If patient tolerates diet and feels well this afternoon, can be discharged from GI standpoint.        Yahir Suarez   Gastroenterology Fellow PGY V  [phone]

## 2017-02-11 NOTE — ANESTHESIA POSTPROCEDURE EVALUATION
"Anesthesia Post Evaluation    Patient: Jonathan Villela    Procedure(s) Performed: Procedure(s) (LRB):  ESOPHAGOGASTRODUODENOSCOPY (EGD) (N/A)    Final Anesthesia Type: general  Patient location during evaluation: PACU  Patient participation: Yes- Able to Participate  Level of consciousness: awake and alert and oriented  Post-procedure vital signs: reviewed and stable  Pain management: adequate  Airway patency: patent  PONV status at discharge: No PONV  Anesthetic complications: no      Cardiovascular status: hemodynamically stable  Respiratory status: unassisted, spontaneous ventilation and room air  Hydration status: euvolemic  Follow-up not needed.        Visit Vitals    /73    Pulse 78    Temp 36.7 °C (98 °F) (Axillary)    Resp 20    Ht 6' 1" (1.854 m)    Wt 105.1 kg (231 lb 12.8 oz)    SpO2 95%    BMI 30.58 kg/m2       Pain/Sara Score: Pain Assessment Performed: Yes (2/11/2017 12:58 PM)  Presence of Pain: denies (2/11/2017 12:58 PM)  Sara Score: 9 (2/11/2017 12:58 PM)      "

## 2017-02-11 NOTE — SUBJECTIVE & OBJECTIVE
Past Medical History   Diagnosis Date    Colitis [id number]     infectious?    Diverticulitis     Hypertension     Migraine     Migraine headache     Obstructive sleep apnea (adult) (pediatric)     PUD (peptic ulcer disease)        Past Surgical History   Procedure Laterality Date    Leg surgery      Nasal septum surgery      Cholecystectomy         Review of patient's allergies indicates:  No Known Allergies    No current facility-administered medications on file prior to encounter.      Current Outpatient Prescriptions on File Prior to Encounter   Medication Sig    butalbital-acetaminophen-caffeine -40 mg (FIORICET) -40 mg per tablet Take 1-2 tablets by mouth every 6 to 8 hours as needed for Headaches.    dicyclomine (BENTYL) 10 MG capsule Take 10 mg by mouth as needed.     frovatriptan (FROVA) 2.5 MG tablet Take 1 tablet (2.5 mg total) by mouth as needed for Migraine. If recurs, may repeat after 2 hours. Max of 3 tabs in 24 hours.    hydrocodone-acetaminophen 5-325mg (NORCO) 5-325 mg per tablet Take 1 tablet by mouth every 6 (six) hours as needed for Pain.    irbesartan-hydrochlorothiazide (AVALIDE) 300-12.5 mg per tablet Take 1 tablet by mouth once daily.    metronidazole 1% (METROGEL) 1 % Gel CHAVEZ QHS QHS AS DIRECTED    ondansetron (ZOFRAN) 4 MG tablet Take 1 tablet (4 mg total) by mouth every 6 (six) hours. (Patient taking differently: Take 4 mg by mouth every 6 (six) hours as needed. )    RELPAX 40 mg tablet TAKE 1 TABLET BY MOUTH AS NEEDED, MAY REPEAT IN 2 HOURS IF NEEDED DO NOT EXCEED 4 TABLETS EVERY DAY    triamcinolone acetonide 0.1% (KENALOG) 0.1 % cream AAA bid after cool blow dry to affected areas of groin    triamcinolone acetonide 0.1% (KENALOG) 0.1 % cream APPLY TO THE AFFECTED AREA TWICE DAILY     Family History     Problem Relation (Age of Onset)    Crohn's disease         Social History Main Topics    Smoking status: Never Smoker    Smokeless tobacco: Not on file     Alcohol use Yes      Comment: ocasionally    Drug use: No    Sexual activity: Yes     Partners: Female     Review of Systems   Constitutional: Positive for fatigue. Negative for activity change, appetite change, chills, diaphoresis, fever and unexpected weight change.   HENT: Negative for congestion, dental problem, drooling, ear discharge, ear pain, facial swelling, hearing loss, mouth sores, nosebleeds, postnasal drip, rhinorrhea, sinus pressure, sneezing, sore throat, tinnitus, trouble swallowing and voice change.    Eyes: Negative for photophobia, pain, discharge, redness, itching and visual disturbance.   Respiratory: Negative for apnea, cough, choking, chest tightness, shortness of breath, wheezing and stridor.    Cardiovascular: Negative for chest pain, palpitations and leg swelling.   Gastrointestinal: Negative for abdominal distention, abdominal pain, anal bleeding, blood in stool, constipation, diarrhea, nausea, rectal pain and vomiting.   Endocrine: Negative for cold intolerance, heat intolerance, polydipsia, polyphagia and polyuria.   Genitourinary: Negative for decreased urine volume, difficulty urinating, discharge, dysuria, enuresis, flank pain, frequency, genital sores, hematuria, penile pain, penile swelling, scrotal swelling, testicular pain and urgency.   Musculoskeletal: Negative for arthralgias, back pain, gait problem, joint swelling, myalgias, neck pain and neck stiffness.   Skin: Negative for color change, pallor, rash and wound.   Allergic/Immunologic: Negative for environmental allergies, food allergies and immunocompromised state.   Neurological: Positive for dizziness and light-headedness. Negative for tremors, seizures, syncope, facial asymmetry, speech difficulty, weakness, numbness and headaches.   Hematological: Negative for adenopathy. Does not bruise/bleed easily.   Psychiatric/Behavioral: Negative for agitation, behavioral problems, confusion, decreased concentration,  dysphoric mood, hallucinations, self-injury, sleep disturbance and suicidal ideas. The patient is not nervous/anxious and is not hyperactive.      Objective:     Vital Signs (Most Recent):  Temp: 97.7 °F (36.5 °C) (02/11/17 0358)  Pulse: 74 (02/11/17 0358)  Resp: 16 (02/11/17 0358)  BP: (!) 96/54 (02/11/17 0358)  SpO2: 96 % (02/11/17 0358) Vital Signs (24h Range):  Temp:  [97.7 °F (36.5 °C)-98.1 °F (36.7 °C)] 97.7 °F (36.5 °C)  Pulse:  [] 74  Resp:  [16-24] 16  SpO2:  [93 %-96 %] 96 %  BP: ()/(52-76) 96/54     Weight: 105.1 kg (231 lb 12.8 oz)  Body mass index is 30.58 kg/(m^2).    Physical Exam   Constitutional: He is oriented to person, place, and time. He appears well-developed and well-nourished. No distress.   HENT:   Head: Normocephalic and atraumatic.   Right Ear: External ear normal.   Left Ear: External ear normal.   Nose: Nose normal.   Mouth/Throat: Oropharynx is clear and moist. No oropharyngeal exudate.   Eyes: Conjunctivae and EOM are normal. Pupils are equal, round, and reactive to light. Right eye exhibits no discharge. Left eye exhibits no discharge. No scleral icterus.   Neck: Normal range of motion. Neck supple. No JVD present. No tracheal deviation present. No thyromegaly present.   Cardiovascular: Normal rate, regular rhythm, normal heart sounds and intact distal pulses.  Exam reveals no gallop and no friction rub.    No murmur heard.  Pulmonary/Chest: Effort normal and breath sounds normal. No stridor. No respiratory distress. He has no wheezes. He has no rales. He exhibits no tenderness.   Abdominal: Soft. Bowel sounds are normal. He exhibits no distension and no mass. There is no tenderness. No hernia.   Musculoskeletal: Normal range of motion. He exhibits no edema, tenderness or deformity.   Lymphadenopathy:     He has no cervical adenopathy.   Neurological: He is alert and oriented to person, place, and time. He has normal reflexes. He displays normal reflexes. No cranial nerve  deficit. He exhibits normal muscle tone. Coordination normal.   Skin: Skin is warm and dry. No rash noted. He is not diaphoretic. No erythema. No pallor.   Psychiatric: He has a normal mood and affect. His behavior is normal. Judgment and thought content normal.        Significant Labs:   Admission on 02/10/2017   Component Date Value Ref Range Status    WBC 02/10/2017 11.64  3.90 - 12.70 K/uL Final    RBC 02/10/2017 4.36* 4.60 - 6.20 M/uL Final    Hemoglobin 02/10/2017 13.5* 14.0 - 18.0 g/dL Final    Hematocrit 02/10/2017 38.4* 40.0 - 54.0 % Final    MCV 02/10/2017 88  82 - 98 fL Final    MCH 02/10/2017 31.0  27.0 - 31.0 pg Final    MCHC 02/10/2017 35.2  32.0 - 36.0 % Final    RDW 02/10/2017 12.5  11.5 - 14.5 % Final    Platelets 02/10/2017 304  150 - 350 K/uL Final    MPV 02/10/2017 9.4  9.2 - 12.9 fL Final    Gran # 02/10/2017 8.5* 1.8 - 7.7 K/uL Final    Lymph # 02/10/2017 2.3  1.0 - 4.8 K/uL Final    Mono # 02/10/2017 0.6  0.3 - 1.0 K/uL Final    Eos # 02/10/2017 0.1  0.0 - 0.5 K/uL Final    Baso # 02/10/2017 0.05  0.00 - 0.20 K/uL Final    Gran% 02/10/2017 73.2* 38.0 - 73.0 % Final    Lymph% 02/10/2017 19.3  18.0 - 48.0 % Final    Mono% 02/10/2017 5.0  4.0 - 15.0 % Final    Eosinophil% 02/10/2017 0.8  0.0 - 8.0 % Final    Basophil% 02/10/2017 0.4  0.0 - 1.9 % Final    Differential Method 02/10/2017 Automated   Final    Sodium 02/10/2017 135* 136 - 145 mmol/L Final    Potassium 02/10/2017 4.1  3.5 - 5.1 mmol/L Final    Chloride 02/10/2017 104  95 - 110 mmol/L Final    CO2 02/10/2017 23  23 - 29 mmol/L Final    Glucose 02/10/2017 115* 70 - 110 mg/dL Final    BUN, Bld 02/10/2017 56* 6 - 20 mg/dL Final    Creatinine 02/10/2017 1.3  0.5 - 1.4 mg/dL Final    Calcium 02/10/2017 8.9  8.7 - 10.5 mg/dL Final    Total Protein 02/10/2017 6.7  6.0 - 8.4 g/dL Final    Albumin 02/10/2017 3.7  3.5 - 5.2 g/dL Final    Total Bilirubin 02/10/2017 0.7  0.1 - 1.0 mg/dL Final    Alkaline Phosphatase  02/10/2017 46* 55 - 135 U/L Final    AST 02/10/2017 25  10 - 40 U/L Final    ALT 02/10/2017 33  10 - 44 U/L Final    Anion Gap 02/10/2017 8  8 - 16 mmol/L Final    eGFR if African American 02/10/2017 >60.0  >60 mL/min/1.73 m^2 Final    eGFR if non African American 02/10/2017 >60.0  >60 mL/min/1.73 m^2 Final    Prothrombin Time 02/10/2017 11.5  9.0 - 12.5 sec Final    INR 02/10/2017 1.1  0.8 - 1.2 Final    Group & Rh 02/10/2017 O POS   Final    Indirect Nely 02/10/2017 NEG   Final    Specimen UA 02/10/2017 Urine, Clean Catch   Final    Color, UA 02/10/2017 Yellow  Yellow, Straw, Leyda Final    Appearance, UA 02/10/2017 Clear  Clear Final    pH, UA 02/10/2017 6.0  5.0 - 8.0 Final    Specific Gravity, UA 02/10/2017 1.020  1.005 - 1.030 Final    Protein, UA 02/10/2017 Trace* Negative Final    Glucose, UA 02/10/2017 Negative  Negative Final    Ketones, UA 02/10/2017 Negative  Negative Final    Bilirubin (UA) 02/10/2017 Trace  Negative Final    Occult Blood UA 02/10/2017 Negative  Negative Final    Nitrite, UA 02/10/2017 Negative  Negative Final    Urobilinogen, UA 02/10/2017 2.0  <2.0 EU/dL Final    Leukocytes, UA 02/10/2017 Trace* Negative Final    RBC, UA 02/10/2017 3  0 - 4 /hpf Final    WBC, UA 02/10/2017 3  0 - 5 /hpf Final    Bacteria, UA 02/10/2017 Occasional  None-Occ /hpf Final    Squam Epithel, UA 02/10/2017 1  /hpf Final    Hyaline Casts, UA 02/10/2017 96* 0-1/lpf /lpf Final    Microscopic Comment 02/10/2017 SEE COMMENT   Final    Hematocrit 02/10/2017 33.8* 40.0 - 54.0 % Final    Hemoglobin 02/10/2017 11.7* 14.0 - 18.0 g/dL Final         Significant Imaging: I have reviewed and interpreted all pertinent imaging results/findings within the past 24 hours.

## 2017-02-11 NOTE — DISCHARGE INSTRUCTIONS

## 2017-02-11 NOTE — PROGRESS NOTES
[facility] Hospital Medicine  Progress Note    Patient Name: Jonathan Villela  MRN: [id number]  Patient Class: IP- Inpatient   Admission Date: 2/10/2017  Length of Stay: 1 days  Attending Physician: MAXIME Richardson MD  Primary Care Provider: Manish Joel MD    [facility] Medicine Team: [facility] HOSP MED O MATHIEU Richardson MD    Subjective:     Principal Problem:Gastrointestinal hemorrhage with melena      Interval History: Mr. Villela is resting comfortably on my exam with his wife also in the room.  He reports a mild headache.  He has no active abdominal pain right now, though prior days he had some burning GERD sensation and a full-tight pain to his gastric area.  He is still having a very small amount of melena. No nausea or vomiting.    Review of Systems   Constitutional: Negative for chills, fatigue and fever.   HENT: Negative for congestion.    Respiratory: Negative for cough and shortness of breath.    Cardiovascular: Negative for chest pain.   Gastrointestinal: Positive for abdominal pain and blood in stool. Negative for constipation, diarrhea, nausea and vomiting.   Neurological: Positive for dizziness, light-headedness and headaches. Negative for weakness.   Psychiatric/Behavioral: Negative for confusion. The patient is not nervous/anxious.      Objective:     Vital Signs (Most Recent):  Temp: 97.8 °F (36.6 °C) (02/11/17 0857)  Pulse: 79 (02/11/17 0857)  Resp: 18 (02/11/17 0857)  BP: 111/61 (02/11/17 0857)  SpO2: (!) 94 % (02/11/17 0857) Vital Signs (24h Range):  Temp:  [97.7 °F (36.5 °C)-98.1 °F (36.7 °C)] 97.8 °F (36.6 °C)  Pulse:  [] 79  Resp:  [16-24] 18  SpO2:  [93 %-96 %] 94 %  BP: ()/(52-76) 111/61     Weight: 105.1 kg (231 lb 12.8 oz)  Body mass index is 30.58 kg/(m^2).    Intake/Output Summary (Last 24 hours) at 02/11/17 0921  Last data filed at 02/10/17 2018   Gross per 24 hour   Intake                0 ml   Output              975 ml   Net             -975 ml       Physical Exam   Constitutional: He is oriented to person, place, and time. He appears well-developed and well-nourished.   HENT:   Head: Normocephalic and atraumatic.   Nose: Nose normal.   Mouth/Throat: Oropharynx is clear and moist. No oropharyngeal exudate.   Eyes: Conjunctivae and EOM are normal. Pupils are equal, round, and reactive to light. Right eye exhibits no discharge. Left eye exhibits no discharge. No scleral icterus.   Pale conjunctivae   Neck: Normal range of motion. Neck supple. No JVD present. No tracheal deviation present. No thyromegaly present.   Cardiovascular: Normal rate, regular rhythm, normal heart sounds and intact distal pulses.  Exam reveals no gallop and no friction rub.    No murmur heard.  Pulmonary/Chest: Effort normal and breath sounds normal. No stridor. No tachypnea and no bradypnea. No respiratory distress. He has no decreased breath sounds. He has no wheezes. He has no rhonchi. He has no rales. He exhibits no tenderness.   Abdominal: Soft. Bowel sounds are normal. He exhibits no distension and no mass. There is no tenderness. There is no rebound and no guarding.   Genitourinary:   Genitourinary Comments: No scott   Musculoskeletal: Normal range of motion. He exhibits no edema or tenderness.   Lymphadenopathy:     He has no cervical adenopathy.   No edema   Neurological: He is alert and oriented to person, place, and time. He is not disoriented. He displays normal reflexes. No cranial nerve deficit or sensory deficit. He exhibits normal muscle tone. Coordination normal. GCS eye subscore is 4. GCS verbal subscore is 5. GCS motor subscore is 6.   Skin: Skin is warm and dry. No rash noted. No erythema. No pallor.   Pale   Psychiatric: He has a normal mood and affect. His speech is normal and behavior is normal. Judgment and thought content normal. He is not actively hallucinating. Cognition and memory are normal. He is attentive.   Nursing note and vitals reviewed.      Significant  Labs:   Recent Results (from the past 24 hour(s))   CBC auto differential    Collection Time: 02/10/17  1:57 PM   Result Value Ref Range    WBC 11.64 3.90 - 12.70 K/uL    RBC 4.36 (L) 4.60 - 6.20 M/uL    Hemoglobin 13.5 (L) 14.0 - 18.0 g/dL    Hematocrit 38.4 (L) 40.0 - 54.0 %    MCV 88 82 - 98 fL    MCH 31.0 27.0 - 31.0 pg    MCHC 35.2 32.0 - 36.0 %    RDW 12.5 11.5 - 14.5 %    Platelets 304 150 - 350 K/uL    MPV 9.4 9.2 - 12.9 fL    Gran # 8.5 (H) 1.8 - 7.7 K/uL    Lymph # 2.3 1.0 - 4.8 K/uL    Mono # 0.6 0.3 - 1.0 K/uL    Eos # 0.1 0.0 - 0.5 K/uL    Baso # 0.05 0.00 - 0.20 K/uL    Gran% 73.2 (H) 38.0 - 73.0 %    Lymph% 19.3 18.0 - 48.0 %    Mono% 5.0 4.0 - 15.0 %    Eosinophil% 0.8 0.0 - 8.0 %    Basophil% 0.4 0.0 - 1.9 %    Differential Method Automated    Comprehensive metabolic panel    Collection Time: 02/10/17  1:57 PM   Result Value Ref Range    Sodium 135 (L) 136 - 145 mmol/L    Potassium 4.1 3.5 - 5.1 mmol/L    Chloride 104 95 - 110 mmol/L    CO2 23 23 - 29 mmol/L    Glucose 115 (H) 70 - 110 mg/dL    BUN, Bld 56 (H) 6 - 20 mg/dL    Creatinine 1.3 0.5 - 1.4 mg/dL    Calcium 8.9 8.7 - 10.5 mg/dL    Total Protein 6.7 6.0 - 8.4 g/dL    Albumin 3.7 3.5 - 5.2 g/dL    Total Bilirubin 0.7 0.1 - 1.0 mg/dL    Alkaline Phosphatase 46 (L) 55 - 135 U/L    AST 25 10 - 40 U/L    ALT 33 10 - 44 U/L    Anion Gap 8 8 - 16 mmol/L    eGFR if African American >60.0 >60 mL/min/1.73 m^2    eGFR if non African American >60.0 >60 mL/min/1.73 m^2   Protime-INR    Collection Time: 02/10/17  1:57 PM   Result Value Ref Range    Prothrombin Time 11.5 9.0 - 12.5 sec    INR 1.1 0.8 - 1.2   Type & Screen    Collection Time: 02/10/17  1:57 PM   Result Value Ref Range    Group & Rh O POS     Indirect Nely NEG    Urinalysis - clean catch    Collection Time: 02/10/17  2:24 PM   Result Value Ref Range    Specimen UA Urine, Clean Catch     Color, UA Yellow Yellow, Straw, Leyda    Appearance, UA Clear Clear    pH, UA 6.0 5.0 - 8.0    Specific  Gravity, UA 1.020 1.005 - 1.030    Protein, UA Trace (A) Negative    Glucose, UA Negative Negative    Ketones, UA Negative Negative    Bilirubin (UA) Trace Negative    Occult Blood UA Negative Negative    Nitrite, UA Negative Negative    Urobilinogen, UA 2.0 <2.0 EU/dL    Leukocytes, UA Trace (A) Negative   Urinalysis Microscopic    Collection Time: 02/10/17  2:24 PM   Result Value Ref Range    RBC, UA 3 0 - 4 /hpf    WBC, UA 3 0 - 5 /hpf    Bacteria, UA Occasional None-Occ /hpf    Squam Epithel, UA 1 /hpf    Hyaline Casts, UA 96 (A) 0-1/lpf /lpf    Microscopic Comment SEE COMMENT    Hematocrit    Collection Time: 02/10/17 10:00 PM   Result Value Ref Range    Hematocrit 33.8 (L) 40.0 - 54.0 %   Hemoglobin    Collection Time: 02/10/17 10:00 PM   Result Value Ref Range    Hemoglobin 11.7 (L) 14.0 - 18.0 g/dL   Magnesium    Collection Time: 02/11/17  4:08 AM   Result Value Ref Range    Magnesium 1.9 1.6 - 2.6 mg/dL   Phosphorus    Collection Time: 02/11/17  4:08 AM   Result Value Ref Range    Phosphorus 2.6 (L) 2.7 - 4.5 mg/dL   Basic Metabolic Panel (BMP)    Collection Time: 02/11/17  4:08 AM   Result Value Ref Range    Sodium 138 136 - 145 mmol/L    Potassium 4.1 3.5 - 5.1 mmol/L    Chloride 106 95 - 110 mmol/L    CO2 25 23 - 29 mmol/L    Glucose 87 70 - 110 mg/dL    BUN, Bld 31 (H) 6 - 20 mg/dL    Creatinine 0.9 0.5 - 1.4 mg/dL    Calcium 8.8 8.7 - 10.5 mg/dL    Anion Gap 7 (L) 8 - 16 mmol/L    eGFR if African American >60.0 >60 mL/min/1.73 m^2    eGFR if non African American >60.0 >60 mL/min/1.73 m^2   Hematocrit    Collection Time: 02/11/17  4:17 AM   Result Value Ref Range    Hematocrit 35.5 (L) 40.0 - 54.0 %   Hemoglobin    Collection Time: 02/11/17  4:17 AM   Result Value Ref Range    Hemoglobin 12.2 (L) 14.0 - 18.0 g/dL   Hematocrit    Collection Time: 02/11/17  8:33 AM   Result Value Ref Range    Hematocrit 34.1 (L) 40.0 - 54.0 %   Hemoglobin    Collection Time: 02/11/17  8:33 AM   Result Value Ref Range     Hemoglobin 11.7 (L) 14.0 - 18.0 g/dL   Results for MARY WORKMAN (MRN [id number]) as of 2/11/2017 12:12   Ref. Range 2/9/2015 08:00 2/10/2017 13:57 2/10/2017 22:00 2/11/2017 04:17 2/11/2017 08:33   Hemoglobin Latest Ref Range: 14.0 - 18.0 g/dL 16.0 13.5 (L) 11.7 (L) 12.2 (L) 11.7 (L)   Hematocrit Latest Ref Range: 40.0 - 54.0 % 47.1 38.4 (L) 33.8 (L) 35.5 (L) 34.1 (L)         Significant Imaging:   EGD PENDING    Assessment/Plan:      Gastrointestinal hemorrhage with melena  - Acute upper GI bleed likely due to PUD vs gastritis vs AVM   - Positive orthostatic with dizziness upon presentation to ED  - Received 2 L NS bolus in ED with resolution of dizziness  - Currently asymptomatic; repeat Hgb 11.7 ( down from 13.5 at presentation )  - Abdominal exam benign   - Hemodynamically stable, though still some melena over night  -Intravascular volume resuscitation with IVF and PRBC transfusion prn Hgb < 7  - NPO, IVF, PPI infusion - Protonix 80/8mg x 72 hours  - Serial H/H with CBC Q 8 hrs  - Maintain 2 functional IV access   - Avoid NSAID, antiplatelet, AC use   - Patient going for EGD after my exam at bedside  - H. Pylori sab        Hypertension  - Holding avalide in setting of acute GI bleed         Acute post-hemorrhagic anemia  - Blood transfusion prn Hgb < 7        Common migraine  - Fioricet prn     VTE Risk Mitigation         Ordered     Medium Risk of VTE  Once      02/10/17 1805     Place sequential compression device  Until discontinued      02/10/17 1805     Place JOVANY hose  Until discontinued      02/10/17 1805          MATHIEU Richardson MD  Department of Hospital Medicine   [facility] [address]

## 2017-02-12 ENCOUNTER — TELEPHONE (OUTPATIENT)
Dept: GASTROENTEROLOGY | Facility: CLINIC | Age: 57
End: 2017-02-12

## 2017-02-12 VITALS
DIASTOLIC BLOOD PRESSURE: 86 MMHG | OXYGEN SATURATION: 95 % | HEIGHT: 73 IN | RESPIRATION RATE: 18 BRPM | TEMPERATURE: 97 F | SYSTOLIC BLOOD PRESSURE: 154 MMHG | WEIGHT: 231.81 LBS | BODY MASS INDEX: 30.72 KG/M2 | HEART RATE: 95 BPM

## 2017-02-12 DIAGNOSIS — T39.395A GASTRIC ULCER DUE TO NONSTEROIDAL ANTI-INFLAMMATORY DRUG (NSAID): Primary | ICD-10-CM

## 2017-02-12 DIAGNOSIS — K25.9 GASTRIC ULCER DUE TO NONSTEROIDAL ANTI-INFLAMMATORY DRUG (NSAID): Primary | ICD-10-CM

## 2017-02-12 LAB
HCT VFR BLD AUTO: 30 %
HCT VFR BLD AUTO: 30.9 %
HGB BLD-MCNC: 10.2 G/DL
HGB BLD-MCNC: 10.5 G/DL

## 2017-02-12 PROCEDURE — 25000003 PHARM REV CODE 250: Performed by: INTERNAL MEDICINE

## 2017-02-12 PROCEDURE — 36415 COLL VENOUS BLD VENIPUNCTURE: CPT

## 2017-02-12 PROCEDURE — 63600175 PHARM REV CODE 636 W HCPCS: Performed by: INTERNAL MEDICINE

## 2017-02-12 PROCEDURE — 86677 HELICOBACTER PYLORI ANTIBODY: CPT

## 2017-02-12 PROCEDURE — 85014 HEMATOCRIT: CPT

## 2017-02-12 PROCEDURE — 85018 HEMOGLOBIN: CPT

## 2017-02-12 PROCEDURE — 99239 HOSP IP/OBS DSCHRG MGMT >30: CPT | Mod: ,,, | Performed by: INTERNAL MEDICINE

## 2017-02-12 RX ORDER — IRON POLYSACCHARIDE COMPLEX 150 MG
150 CAPSULE ORAL DAILY
Status: DISCONTINUED | OUTPATIENT
Start: 2017-02-12 | End: 2017-02-12 | Stop reason: HOSPADM

## 2017-02-12 RX ORDER — IRON POLYSACCHARIDE COMPLEX 150 MG
150 CAPSULE ORAL DAILY
Refills: 0 | Status: ON HOLD | COMMUNITY
Start: 2017-02-12 | End: 2017-02-16

## 2017-02-12 RX ORDER — FOLIC ACID 1 MG/1
1 TABLET ORAL DAILY
Status: DISCONTINUED | OUTPATIENT
Start: 2017-02-12 | End: 2017-02-12 | Stop reason: HOSPADM

## 2017-02-12 RX ORDER — CYANOCOBALAMIN 1000 UG/ML
1000 INJECTION, SOLUTION INTRAMUSCULAR; SUBCUTANEOUS ONCE
Status: COMPLETED | OUTPATIENT
Start: 2017-02-12 | End: 2017-02-12

## 2017-02-12 RX ORDER — FOLIC ACID 1 MG/1
1 TABLET ORAL DAILY
Qty: 30 TABLET | Refills: 0 | Status: ON HOLD | OUTPATIENT
Start: 2017-02-12 | End: 2017-02-16

## 2017-02-12 RX ADMIN — CYANOCOBALAMIN 1000 MCG: 1000 INJECTION, SOLUTION INTRAMUSCULAR; SUBCUTANEOUS at 12:02

## 2017-02-12 RX ADMIN — Medication 150 MG: at 11:02

## 2017-02-12 RX ADMIN — PANTOPRAZOLE SODIUM 40 MG: 40 TABLET, DELAYED RELEASE ORAL at 06:02

## 2017-02-12 RX ADMIN — FOLIC ACID 1 MG: 1 TABLET ORAL at 11:02

## 2017-02-12 NOTE — PLAN OF CARE
02/12/2017      Jonathan Villela  [address] [place] [id number]          Hospital Medicine Dept.  [facility] [address] [place] [address]  [phone] after hours  [phone] fax Jonathan Villela has been hospitalized at the [facility] since 2/10/2017.  Please excuse the patient from duties.  Patient may return on Monday, 2/20/17.  No restrictions.     Please contact me if you have any questions.                  __________________________  W Tru Richardson MD  02/12/2017

## 2017-02-12 NOTE — PROGRESS NOTES
[facility] Hospital Medicine  Progress Note    Patient Name: Jonathan Villela  MRN: [id number]  Patient Class: IP- Inpatient   Admission Date: 2/10/2017  Length of Stay: 2 days  Attending Physician: MAXIME Richardson MD  Primary Care Provider: Manish Joel MD    [facility] Medicine Team: [facility] HOSP MED O MATHIEU Richardson MD    Subjective:     Principal Problem:Gastrointestinal hemorrhage with melena    Interval History:Mr. Villela underwent his EGD well and has tolerated his diet with no abdominal pain, nausea or vomiting.  He denies dizziness or lightheadedness.  He is still having a mild amount of melena.    Review of Systems   Constitutional: Positive for fatigue. Negative for chills and fever.   HENT: Negative for congestion.    Respiratory: Negative for cough and shortness of breath.    Cardiovascular: Negative for chest pain.   Gastrointestinal: Negative for abdominal pain, constipation, diarrhea, nausea and vomiting.        + mild dark tar like stool   Neurological: Negative for dizziness and weakness.   Psychiatric/Behavioral: Negative for confusion. The patient is not nervous/anxious.      Objective:     Vital Signs (Most Recent):  Temp: 98.8 °F (37.1 °C) (02/12/17 0515)  Pulse: 97 (02/12/17 0900)  Resp: 16 (02/12/17 0515)  BP: (!) 108/56 (02/12/17 0515)  SpO2: (!) 93 % (02/12/17 0515) Vital Signs (24h Range):  Temp:  [98 °F (36.7 °C)-98.8 °F (37.1 °C)] 98.8 °F (37.1 °C)  Pulse:  [64-97] 97  Resp:  [15-20] 16  SpO2:  [93 %-98 %] 93 %  BP: (103-139)/(56-86) 108/56     Weight: 105.1 kg (231 lb 12.8 oz)  Body mass index is 30.58 kg/(m^2).    Intake/Output Summary (Last 24 hours) at 02/12/17 0919  Last data filed at 02/12/17 0500   Gross per 24 hour   Intake              240 ml   Output                0 ml   Net              240 ml      Physical Exam  Constitutional: He is oriented to person, place, and time. He appears well-developed and well-nourished.   HENT:   Head: Normocephalic and  atraumatic.   Nose: Nose normal.   Mouth/Throat: Oropharynx is clear and moist. No oropharyngeal exudate.   Eyes: Conjunctivae and EOM are normal. Pupils are equal, round, and reactive to light. Right eye exhibits no discharge. Left eye exhibits no discharge. No scleral icterus.   Pale conjunctivae   Neck: Normal range of motion. Neck supple. No JVD present. No tracheal deviation present. No thyromegaly present.   Cardiovascular: Normal rate, regular rhythm, normal heart sounds and intact distal pulses. Exam reveals no gallop and no friction rub.   No murmur heard.  Pulmonary/Chest: Effort normal and breath sounds normal. No stridor. No tachypnea and no bradypnea. No respiratory distress. He has no decreased breath sounds. He has no wheezes. He has no rhonchi. He has no rales. He exhibits no tenderness.   Abdominal: Soft. Bowel sounds are normal. He exhibits no distension and no mass. There is no tenderness. There is no rebound and no guarding.   Genitourinary:   Genitourinary Comments: No scott   Musculoskeletal: Normal range of motion. He exhibits no edema or tenderness.   Lymphadenopathy:   He has no cervical adenopathy.   No edema   Neurological: He is alert and oriented to person, place, and time. He is not disoriented. He displays normal reflexes. No cranial nerve deficit or sensory deficit. He exhibits normal muscle tone. Coordination normal. GCS eye subscore is 4. GCS verbal subscore is 5. GCS motor subscore is 6.   Skin: Skin is warm and dry. No rash noted. No erythema. No pallor.   Pale   Psychiatric: He has a normal mood and affect. His speech is normal and behavior is normal. Judgment and thought content normal. He is not actively hallucinating. Cognition and memory are normal. He is attentive.   Nursing note and vitals reviewed.  Significant Labs:   Recent Results (from the past 24 hour(s))   Leukemia/Lymphoma Screen - Tissue Other (Specify Comments Below) (Jar 1: RPMI, duodenal bulb, evaluate for  lymphoma)    Collection Time: 02/11/17  1:00 PM   Result Value Ref Range    Specimen Type - Tissue OTHER    Hematocrit    Collection Time: 02/11/17  4:24 PM   Result Value Ref Range    Hematocrit 32.9 (L) 40.0 - 54.0 %   Hemoglobin    Collection Time: 02/11/17  4:24 PM   Result Value Ref Range    Hemoglobin 11.2 (L) 14.0 - 18.0 g/dL   Hematocrit    Collection Time: 02/11/17 11:48 PM   Result Value Ref Range    Hematocrit 30.0 (L) 40.0 - 54.0 %   Hemoglobin    Collection Time: 02/11/17 11:48 PM   Result Value Ref Range    Hemoglobin 10.2 (L) 14.0 - 18.0 g/dL   Hematocrit    Collection Time: 02/12/17  8:18 AM   Result Value Ref Range    Hematocrit 30.9 (L) 40.0 - 54.0 %   Hemoglobin    Collection Time: 02/12/17  8:18 AM   Result Value Ref Range    Hemoglobin 10.5 (L) 14.0 - 18.0 g/dL         Significant Imaging:   Impression:   - Granular mucosa in the duodenal bulb. Biopsied.                        - Duodenal erosions without bleeding. Biopsied.                        - Erythematous mucosa in the stomach. Biopsied.                        - Non-bleeding gastric ulcers with a clean ulcer                         base ([place] Class III). Biopsied.                        - 1 cm hiatus hernia.                        - [place] Grade B reflux esophagitis.  Recommendation:       - Return patient to hospital best for ongoing care.                        - Await pathology results.                        - Telephone endoscopist for pathology results in 2                         weeks.                        - Use Protonix (pantoprazole) 40 mg by mouth once                         daily. Best taken 45 minutes before breakfast in                         the morning.                        - Consider avoiding all non-steroidal                         anti-inflammatory drugs (aspirin, ibuprofen,                         naproxen, etc.), unless needed for cardiovascular                         protection. Recommend you discuss with your                          prescribing doctor (of your aspirin) to see if                         cardiovascular benefits of your aspirin outweigh                         the risks of GI bleeding.                        - Repeat the upper endoscopy in 8 weeks to check                         healing.                        - The findings and recommendations were discussed                         with the patient.                        - The findings and recommendations were discussed                         with the patient's family.                        - The findings and recommendations were discussed                         with the patient's primary physician.                        - Refer to a gastroenterologist.                        - Return to GI clinic.                        - Recommend out patient follow up Hemoglobin next                         week. Orders placed.                        - Discontinue the use of any products containing                         nicotine.  Attending Participation:       I was present from insertion to withdraw and performed procedure        with the fellow.  Yoshi Erazo MD  2/11/2017 1:12:14 PM    Assessment/Plan:      Gastric ulcer with hemorrhage  Gastrointestinal hemorrhage with melena     - Granular mucosa in the duodenal bulb. Biopsied.       - Duodenal erosions without bleeding. Biopsied.        - Erythematous mucosa in the stomach. Biopsied.                        - Non-bleeding gastric ulcers with a clean ulcer base ([place] Class III). Biopsied.               - 1 cm hiatus hernia.                          - [place] Grade B reflux esophagitis.  - Received 2 L NS bolus in ED with resolution of dizziness  - Currently asymptomatic; repeat Hgb 11.7 ( down from 13.5 at presentation )  - Abdominal exam benign   - Hemodynamically stable, though still some melena over night  - Intravascular volume resuscitated with IVF and PRBC transfusion prn Hgb < 7  - NPO, IVF, PPI  infusion - Protonix 80/8mg x 72 hours  - Serial H/H with CBC Q 8 hrs  - Maintain 2 functional IV access   - Avoid NSAID, antiplatelet, AC use   - H. Pylori sab  - Use Protonix (pantoprazole) 40 mg by mouth once daily. Best taken 45 minutes before breakfast in  the morning.  - Avoid NSAIDS  - Repeat the upper endoscopy in 8 weeks to check healing.          Hypertension  - Holding avalide in setting of acute GI bleed   - May restart at home in one week if BP remains stable and follow up with PCP          Acute post-hemorrhagic anemia  - Blood transfusion prn Hgb < 7  - Hg remained relatively stable over hospital stay    Results for MARY WORKMAN (MRN [id number]) as of 2/12/2017 18:29   Ref. Range 2/10/2017 13:57 2/10/2017 22:00 2/11/2017 04:17 2/11/2017 08:33 2/11/2017 16:24 2/11/2017 23:48 2/12/2017 08:18   Hemoglobin Latest Ref Range: 14.0 - 18.0 g/dL 13.5 (L) 11.7 (L) 12.2 (L) 11.7 (L) 11.2 (L) 10.2 (L) 10.5 (L)   Hematocrit Latest Ref Range: 40.0 - 54.0 % 38.4 (L) 33.8 (L) 35.5 (L) 34.1 (L) 32.9 (L) 30.0 (L) 30.9 (L)         Common migraine  - Fioricet prn     VTE Risk Mitigation         Ordered     Medium Risk of VTE  Once      02/10/17 1805     Place sequential compression device  Until discontinued      02/10/17 1805     Place JOVANY hose  Until discontinued      02/10/17 1805          MATHIEU Richardson MD  Department of Hospital Medicine   [facility]

## 2017-02-12 NOTE — NURSING
Pt being discharged home with orders to follow up with GI clinic and Primary care physician.  Pt instructed to take all medications as ordered and to return to hospital if signs and symptoms reoccurs.  Pt states understanding; IV site removed; applied pressure to site with gauze and coban; tolerated well; awaiting pt escort for discharge home.

## 2017-02-13 ENCOUNTER — PATIENT MESSAGE (OUTPATIENT)
Dept: GASTROENTEROLOGY | Facility: CLINIC | Age: 57
End: 2017-02-13

## 2017-02-13 ENCOUNTER — TELEPHONE (OUTPATIENT)
Dept: GASTROENTEROLOGY | Facility: CLINIC | Age: 57
End: 2017-02-13

## 2017-02-13 DIAGNOSIS — K25.4 GASTROINTESTINAL HEMORRHAGE ASSOCIATED WITH GASTRIC ULCER: Primary | ICD-10-CM

## 2017-02-13 LAB
FLOW CYTOMETRY ANTIBODIES ANALYZED - TISSUE: NORMAL
FLOW CYTOMETRY COMMENT - TISSUE: NORMAL
FLOW CYTOMETRY INTERPRETATION - TISSUE: NORMAL
SPECIMEN TYPE - TISSUE: NORMAL

## 2017-02-13 NOTE — TELEPHONE ENCOUNTER
----- Message from Yahir Suarez MD sent at 2/12/2017 11:36 AM CST -----  Diana - this patient needs a follow up EGD in 8 weeks with Dr. Erazo.  Order placed.    Thanks,  Yahir

## 2017-02-13 NOTE — DISCHARGE SUMMARY
[facility] Hospital Medicine  Discharge Summary      Patient Name: Jonathan Villela  MRN: [id number]  Admission Date: 2/10/2017  Hospital Length of Stay: 2 days  Discharge Date and Time:  02/12/2017 6:31 PM  Attending Physician: No att. providers found   Discharging Provider: MATHIEU Richardson MD  Primary Care Provider: Manish Joel MD    [facility] Medicine Team: [facility]OSP MED O MATHIEU Richardson MD    HPI: 56 M with h/o PUD, GERD, diverticulitis, HTN, migraine, DELVIN presents to ED with five episodes of dark stool associated with fatigue and dizziness. States he had total five episodes of melena since this morning and last episode was around 2 PM prior to coming to ED. Reports heart burn, but denies abdominal pain, chest pain, palpitation, SOB, nausea, vomiting, fever, chills. Denies NSAID, Alcohol use. He was initially found to be orthostatic in ED with feeling dizzy upon standing that resolved after intravascular volume resuscitation with 2 L NS bolus. Initial hemoglobin of 13.5 which decreased to 11.7 on repeat labs. Patient was evaluated by GI with plan to have EGD in morning. Started on protonix infusion with NPO after midnight.         Of note, patient reports having EGD and multiple colonoscopies at [facility] in 2013 for GI bleed due to nonspecific colitis. States he had unremarkable colonoscopy recently only few months ago.     Procedure(s) (LRB):  ESOPHAGOGASTRODUODENOSCOPY (EGD) (N/A)      Indwelling Lines/Drains at time of discharge:   Lines/Drains/Airways          No matching active lines, drains, or airways        Hospital Course:  Gastric ulcer with hemorrhage  Gastrointestinal hemorrhage with melena  Underwent EGD:   - Granular mucosa in the duodenal bulb. Biopsied.    - Duodenal erosions without bleeding. Biopsied.    - Erythematous mucosa in the stomach. Biopsied.    - Non-bleeding gastric ulcers with a clean ulcer base ([place] Class III). Biopsied.              - 1 cm hiatus hernia.               - LA Grade B reflux esophagitis.  - Received 2 L NS bolus in ED with resolution of dizziness  - Currently asymptomatic; repeat Hgb 11.7 ( down from 13.5 at presentation )  - Abdominal exam benign   - Hemodynamically stable, though still some melena over night  - Intravascular volume resuscitated with IVF and PRBC transfusion prn Hgb < 7  - NPO, IVF, PPI infusion - Protonix 80/8mg x 72 hours  - Serial H/H with CBC Q 8 hrs  - Maintain 2 functional IV access   - Avoid NSAID, antiplatelet, AC use   - H. Pylori sab  - Use Protonix (pantoprazole) 40 mg by mouth once daily. Best taken 45 minutes before breakfast in  the morning.  - Avoid NSAIDS  - Repeat the upper endoscopy in 8 weeks to check healing.          Hypertension  - Holding avalide in setting of acute GI bleed   - May restart at home in one week if BP remains stable and follow up with PCP          Acute post-hemorrhagic anemia  - Blood transfusion prn Hgb < 7  - Hg remained relatively stable over hospital stay    Results for MARY WORKMAN (MRN [id number]) as of 2/12/2017 18:29    Ref. Range 2/10/2017 13:57 2/10/2017 22:00 2/11/2017 04:17 2/11/2017 08:33 2/11/2017 16:24 2/11/2017 23:48 2/12/2017 08:18   Hemoglobin Latest Ref Range: 14.0 - 18.0 g/dL 13.5 (L) 11.7 (L) 12.2 (L) 11.7 (L) 11.2 (L) 10.2 (L) 10.5 (L)   Hematocrit Latest Ref Range: 40.0 - 54.0 % 38.4 (L) 33.8 (L) 35.5 (L) 34.1 (L) 32.9 (L) 30.0 (L) 30.9 (L)          Common migraine  - Fioricet prn     Consults:   Consults         Status Ordering Provider     Inpatient consult to Gastroenterology  Once     Provider:  (Not yet assigned)    Completed KEREN FLORES          Significant Diagnostic Studies:  Recent Results (from the past 24 hour(s))   Hematocrit    Collection Time: 02/11/17 11:48 PM   Result Value Ref Range    Hematocrit 30.0 (L) 40.0 - 54.0 %   Hemoglobin    Collection Time: 02/11/17 11:48 PM   Result Value Ref Range    Hemoglobin 10.2 (L) 14.0 - 18.0 g/dL   Hematocrit     Collection Time: 02/12/17  8:18 AM   Result Value Ref Range    Hematocrit 30.9 (L) 40.0 - 54.0 %   Hemoglobin    Collection Time: 02/12/17  8:18 AM   Result Value Ref Range    Hemoglobin 10.5 (L) 14.0 - 18.0 g/dL       EGD:  Impression:   - Granular mucosa in the duodenal bulb. Biopsied.                        - Duodenal erosions without bleeding. Biopsied.                        - Erythematous mucosa in the stomach. Biopsied.                        - Non-bleeding gastric ulcers with a clean ulcer                         base ([place] Class III). Biopsied.                        - 1 cm hiatus hernia.                        - LA Grade B reflux esophagitis.  Recommendation:       - Return patient to hospital best for ongoing care.                        - Await pathology results.                        - Telephone endoscopist for pathology results in 2                         weeks.                        - Use Protonix (pantoprazole) 40 mg by mouth once                         daily. Best taken 45 minutes before breakfast in                         the morning.                        - Consider avoiding all non-steroidal                         anti-inflammatory drugs (aspirin, ibuprofen,                         naproxen, etc.), unless needed for cardiovascular                         protection. Recommend you discuss with your                         prescribing doctor (of your aspirin) to see if                         cardiovascular benefits of your aspirin outweigh                         the risks of GI bleeding.                        - Repeat the upper endoscopy in 8 weeks to check                         healing.                        - The findings and recommendations were discussed                         with the patient.                        - The findings and recommendations were discussed                         with the patient's family.                        - The findings and recommendations were  discussed                         with the patient's primary physician.                        - Refer to a gastroenterologist.                        - Return to GI clinic.                        - Recommend out patient follow up Hemoglobin next                         week. Orders placed.                        - Discontinue the use of any products containing                         nicotine.  Attending Participation:       I was present from insertion to withdraw and performed procedure        with the fellow.  Yoshi Erazo MD  2/11/2017 1:12:14 PM  Pending Diagnostic Studies:     Procedure Component Value Units Date/Time    Ferritin [[id number]] Collected:  02/11/17 1254    Order Status:  Sent Lab Status:  In process Updated:  02/11/17 1255    Specimen:  Blood from Blood     H.Pylori Antibody IgG [[id number]] Collected:  02/12/17 0352    Order Status:  Sent Lab Status:  In process Updated:  02/12/17 0438    Specimen:  Blood from Blood     H.Pylori Antibody IgG [[id number]] Collected:  02/11/17 0408    Order Status:  Sent Lab Status:  In process Updated:  02/11/17 0446    Specimen:  Blood from Blood     Leukemia/Lymphoma Screen - Tissue Other (Specify Comments Below) [[id number]] Collected:  02/11/17 1250    Order Status:  Sent Lab Status:  In process Updated:  02/11/17 1251    Specimen:  Tissue         Final Active Diagnoses:    Diagnosis Date Noted POA    PRINCIPAL PROBLEM:  Gastrointestinal hemorrhage with melena [K92.1] 02/11/2017 Yes    Acute upper GI bleed [K92.2] 02/11/2017 Yes    Heart burn [R12] 02/11/2017 Yes    Gastroesophageal reflux disease without esophagitis [K21.9] 02/11/2017 Yes    Acute post-hemorrhagic anemia [D62] 02/11/2017 Yes    Gastrointestinal hemorrhage [K92.2] 02/10/2017 Yes    Fatigue [R53.83] 02/09/2015 Yes    Hypertension [I10]  Yes    Common migraine [G43.009] 01/31/2014 Yes      Problems Resolved During this Admission:    Diagnosis Date Noted Date Resolved POA       Discharged Condition: good    Disposition: Home or Self Care    Follow Up:  Follow-up Information     Follow up with Manish Joel MD In 1 week.    Specialty:  Family Medicine    Why:  Recent GI bleeding with hypotension    Contact information:    [address]  [address]  [place] [address] [phone]          Patient Instructions:     CBC W/ AUTO DIFFERENTIAL   Standing Status: Future  Standing Exp. Date: 04/13/18     Diet general     Activity as tolerated     Call MD for:  temperature >100.4     Call MD for:  persistent nausea and vomiting or diarrhea     Call MD for:  severe uncontrolled pain     Call MD for:  difficulty breathing or increased cough     Call MD for:  severe persistent headache     Call MD for:  worsening rash     Call MD for:  persistent dizziness, light-headedness, or visual disturbances     Call MD for:  increased confusion or weakness     Call MD for:   Scheduling Instructions: Any significant dark tar-like stools (melena) in the following days       Medications:  Reconciled Home Medications:   Discharge Medication List as of 2/12/2017  1:24 PM      START taking these medications    Details   folic acid (FOLVITE) 1 MG tablet Take 1 tablet (1 mg total) by mouth once daily., Starting 2/12/2017, Until Mon 2/12/18, Normal      iron polysaccharides (NIFEREX) 150 mg iron Cap Take 1 capsule (150 mg total) by mouth once daily., Starting 2/12/2017, Until Discontinued, OTC         CONTINUE these medications which have NOT CHANGED    Details   butalbital-acetaminophen-caffeine -40 mg (FIORICET) -40 mg per tablet Take 1-2 tablets by mouth every 6 to 8 hours as needed for Headaches., Starting 6/20/2016, Until Discontinued, Print      dicyclomine (BENTYL) 10 MG capsule Take 10 mg by mouth as needed. , Starting 12/3/2014, Until Discontinued, Historical Med      frovatriptan (FROVA) 2.5 MG tablet Take 1 tablet (2.5 mg total) by mouth as needed for Migraine. If recurs, may repeat after 2  hours. Max of 3 tabs in 24 hours., Starting 1/30/2017, Until Wed 3/1/17, Normal      hydrocodone-acetaminophen 5-325mg (NORCO) 5-325 mg per tablet Take 1 tablet by mouth every 6 (six) hours as needed for Pain., Starting 1/13/2017, Until Discontinued, Print      irbesartan-hydrochlorothiazide (AVALIDE) 300-12.5 mg per tablet Take 1 tablet by mouth once daily., Starting 1/24/2017, Until Discontinued, Normal      metronidazole 1% (METROGEL) 1 % Gel CHAVEZ QHS QHS AS DIRECTED, Historical Med      ondansetron (ZOFRAN) 4 MG tablet Take 1 tablet (4 mg total) by mouth every 6 (six) hours., Starting 1/15/2014, Until Discontinued, Print      RELPAX 40 mg tablet TAKE 1 TABLET BY MOUTH AS NEEDED, MAY REPEAT IN 2 HOURS IF NEEDED DO NOT EXCEED 4 TABLETS EVERY DAY, Normal      !! triamcinolone acetonide 0.1% (KENALOG) 0.1 % cream AAA bid after cool blow dry to affected areas of groin, Normal      !! triamcinolone acetonide 0.1% (KENALOG) 0.1 % cream APPLY TO THE AFFECTED AREA TWICE DAILY, Normal      pantoprazole (PROTONIX) 40 MG tablet Take 1 tablet (40 mg total) by mouth before breakfast., Starting 2/11/2017, Until Sun 2/11/18, Print       !! - Potential duplicate medications found. Please discuss with provider.        Time spent on the discharge of patient: 30 minutes    MATHIEU Richardson MD  Department of Hospital Medicine  [facility]

## 2017-02-14 ENCOUNTER — LAB VISIT (OUTPATIENT)
Dept: LAB | Facility: HOSPITAL | Age: 57
End: 2017-02-14
Attending: INTERNAL MEDICINE
Payer: COMMERCIAL

## 2017-02-14 ENCOUNTER — PATIENT MESSAGE (OUTPATIENT)
Dept: GASTROENTEROLOGY | Facility: CLINIC | Age: 57
End: 2017-02-14

## 2017-02-14 DIAGNOSIS — K25.4 GASTROINTESTINAL HEMORRHAGE ASSOCIATED WITH GASTRIC ULCER: ICD-10-CM

## 2017-02-14 LAB
BASOPHILS # BLD AUTO: 0.04 K/UL
BASOPHILS NFR BLD: 0.5 %
DIFFERENTIAL METHOD: ABNORMAL
EOSINOPHIL # BLD AUTO: 0.1 K/UL
EOSINOPHIL NFR BLD: 1.4 %
ERYTHROCYTE [DISTWIDTH] IN BLOOD BY AUTOMATED COUNT: 12.9 %
FERRITIN SERPL-MCNC: 173 NG/ML
HCT VFR BLD AUTO: 24.3 %
HGB BLD-MCNC: 8 G/DL
INR PPP: 1.1
IRON SERPL-MCNC: 28 UG/DL
LYMPHOCYTES # BLD AUTO: 1.8 K/UL
LYMPHOCYTES NFR BLD: 23.1 %
MCH RBC QN AUTO: 29.6 PG
MCHC RBC AUTO-ENTMCNC: 32.9 %
MCV RBC AUTO: 90 FL
MONOCYTES # BLD AUTO: 0.6 K/UL
MONOCYTES NFR BLD: 8.1 %
NEUTROPHILS # BLD AUTO: 5 K/UL
NEUTROPHILS NFR BLD: 65.3 %
PLATELET # BLD AUTO: 316 K/UL
PMV BLD AUTO: 9.6 FL
PROTHROMBIN TIME: 11.2 SEC
RBC # BLD AUTO: 2.7 M/UL
SATURATED IRON: 9 %
TOTAL IRON BINDING CAPACITY: 299 UG/DL
TRANSFERRIN SERPL-MCNC: 202 MG/DL
WBC # BLD AUTO: 7.66 K/UL

## 2017-02-14 PROCEDURE — 83540 ASSAY OF IRON: CPT

## 2017-02-14 PROCEDURE — 85610 PROTHROMBIN TIME: CPT

## 2017-02-14 PROCEDURE — 82728 ASSAY OF FERRITIN: CPT

## 2017-02-14 PROCEDURE — 36415 COLL VENOUS BLD VENIPUNCTURE: CPT | Mod: PO

## 2017-02-14 PROCEDURE — 85025 COMPLETE CBC W/AUTO DIFF WBC: CPT

## 2017-02-15 ENCOUNTER — LAB VISIT (OUTPATIENT)
Dept: LAB | Facility: HOSPITAL | Age: 57
End: 2017-02-15
Attending: INTERNAL MEDICINE
Payer: COMMERCIAL

## 2017-02-15 ENCOUNTER — TELEPHONE (OUTPATIENT)
Dept: GASTROENTEROLOGY | Facility: CLINIC | Age: 57
End: 2017-02-15

## 2017-02-15 ENCOUNTER — HOSPITAL ENCOUNTER (INPATIENT)
Facility: HOSPITAL | Age: 57
LOS: 2 days | Discharge: HOME | DRG: 378 | End: 2017-02-17
Attending: INTERNAL MEDICINE | Admitting: INTERNAL MEDICINE
Payer: COMMERCIAL

## 2017-02-15 ENCOUNTER — OFFICE VISIT (OUTPATIENT)
Dept: NEUROLOGY | Facility: CLINIC | Age: 57
End: 2017-02-15
Payer: COMMERCIAL

## 2017-02-15 VITALS
HEIGHT: 72 IN | BODY MASS INDEX: 32.61 KG/M2 | DIASTOLIC BLOOD PRESSURE: 86 MMHG | SYSTOLIC BLOOD PRESSURE: 128 MMHG | WEIGHT: 240.75 LBS | HEART RATE: 90 BPM

## 2017-02-15 DIAGNOSIS — R29.3 ABNORMAL POSTURE: ICD-10-CM

## 2017-02-15 DIAGNOSIS — N13.8 BPH WITH URINARY OBSTRUCTION: ICD-10-CM

## 2017-02-15 DIAGNOSIS — K92.2 ACUTE UPPER GI BLEED: ICD-10-CM

## 2017-02-15 DIAGNOSIS — G47.33 OBSTRUCTIVE SLEEP APNEA SYNDROME: ICD-10-CM

## 2017-02-15 DIAGNOSIS — M54.81 BILATERAL OCCIPITAL NEURALGIA: ICD-10-CM

## 2017-02-15 DIAGNOSIS — D64.9 LOW HEMOGLOBIN: ICD-10-CM

## 2017-02-15 DIAGNOSIS — K25.0 ACUTE GASTRIC ULCER WITH HEMORRHAGE: ICD-10-CM

## 2017-02-15 DIAGNOSIS — N40.1 BPH WITH URINARY OBSTRUCTION: ICD-10-CM

## 2017-02-15 DIAGNOSIS — R06.09 DOE (DYSPNEA ON EXERTION): ICD-10-CM

## 2017-02-15 DIAGNOSIS — K21.9 GASTROESOPHAGEAL REFLUX DISEASE WITHOUT ESOPHAGITIS: ICD-10-CM

## 2017-02-15 DIAGNOSIS — R40.0 SOMNOLENCE, DAYTIME: ICD-10-CM

## 2017-02-15 DIAGNOSIS — G43.009 MIGRAINE WITHOUT AURA AND WITHOUT STATUS MIGRAINOSUS, NOT INTRACTABLE: ICD-10-CM

## 2017-02-15 DIAGNOSIS — D62 ACUTE POST-HEMORRHAGIC ANEMIA: Primary | ICD-10-CM

## 2017-02-15 DIAGNOSIS — D64.9 LOW HEMOGLOBIN: Primary | ICD-10-CM

## 2017-02-15 DIAGNOSIS — M43.6 STIFF NECK: ICD-10-CM

## 2017-02-15 DIAGNOSIS — K27.9 PUD (PEPTIC ULCER DISEASE): ICD-10-CM

## 2017-02-15 DIAGNOSIS — K92.2 GI BLEED: ICD-10-CM

## 2017-02-15 DIAGNOSIS — I10 ESSENTIAL HYPERTENSION: ICD-10-CM

## 2017-02-15 PROBLEM — K92.1 GASTROINTESTINAL HEMORRHAGE WITH MELENA: Status: RESOLVED | Noted: 2017-02-11 | Resolved: 2017-02-15

## 2017-02-15 PROBLEM — R12 HEART BURN: Status: RESOLVED | Noted: 2017-02-11 | Resolved: 2017-02-15

## 2017-02-15 LAB
ALBUMIN SERPL BCP-MCNC: 3.3 G/DL
ALP SERPL-CCNC: 63 U/L
ALT SERPL W/O P-5'-P-CCNC: 27 U/L
ANION GAP SERPL CALC-SCNC: 7 MMOL/L
AST SERPL-CCNC: 21 U/L
BASOPHILS # BLD AUTO: 0.05 K/UL
BASOPHILS NFR BLD: 0.7 %
BILIRUB SERPL-MCNC: 0.3 MG/DL
BUN SERPL-MCNC: 12 MG/DL
CALCIUM SERPL-MCNC: 8.8 MG/DL
CHLORIDE SERPL-SCNC: 104 MMOL/L
CO2 SERPL-SCNC: 29 MMOL/L
CREAT SERPL-MCNC: 0.9 MG/DL
DIFFERENTIAL METHOD: ABNORMAL
EOSINOPHIL # BLD AUTO: 0.2 K/UL
EOSINOPHIL NFR BLD: 2.5 %
ERYTHROCYTE [DISTWIDTH] IN BLOOD BY AUTOMATED COUNT: 13.1 %
ERYTHROCYTE [DISTWIDTH] IN BLOOD BY AUTOMATED COUNT: 13.3 %
EST. GFR  (AFRICAN AMERICAN): >60 ML/MIN/1.73 M^2
EST. GFR  (NON AFRICAN AMERICAN): >60 ML/MIN/1.73 M^2
GLUCOSE SERPL-MCNC: 83 MG/DL
H PYLORI IGG SERPL QL IA: NEGATIVE
H PYLORI IGG SERPL QL IA: NEGATIVE
HCT VFR BLD AUTO: 23.5 %
HCT VFR BLD AUTO: 23.6 %
HGB BLD-MCNC: 8 G/DL
HGB BLD-MCNC: 8.1 G/DL
LYMPHOCYTES # BLD AUTO: 1.8 K/UL
LYMPHOCYTES NFR BLD: 25.6 %
MCH RBC QN AUTO: 30.2 PG
MCH RBC QN AUTO: 30.4 PG
MCHC RBC AUTO-ENTMCNC: 33.9 %
MCHC RBC AUTO-ENTMCNC: 34.5 %
MCV RBC AUTO: 88 FL
MCV RBC AUTO: 90 FL
MONOCYTES # BLD AUTO: 0.7 K/UL
MONOCYTES NFR BLD: 9.2 %
NEUTROPHILS # BLD AUTO: 4.3 K/UL
NEUTROPHILS NFR BLD: 60.4 %
PLATELET # BLD AUTO: 325 K/UL
PLATELET # BLD AUTO: 331 K/UL
PMV BLD AUTO: 8.6 FL
PMV BLD AUTO: 9.2 FL
POTASSIUM SERPL-SCNC: 3.8 MMOL/L
PROT SERPL-MCNC: 6.4 G/DL
RBC # BLD AUTO: 2.63 M/UL
RBC # BLD AUTO: 2.68 M/UL
SODIUM SERPL-SCNC: 140 MMOL/L
WBC # BLD AUTO: 7.07 K/UL
WBC # BLD AUTO: 7.31 K/UL

## 2017-02-15 PROCEDURE — 63600175 PHARM REV CODE 636 W HCPCS: Performed by: INTERNAL MEDICINE

## 2017-02-15 PROCEDURE — 64640 INJECTION TREATMENT OF NERVE: CPT | Mod: 59,50,S$GLB, | Performed by: PSYCHIATRY & NEUROLOGY

## 2017-02-15 PROCEDURE — C9113 INJ PANTOPRAZOLE SODIUM, VIA: HCPCS | Performed by: INTERNAL MEDICINE

## 2017-02-15 PROCEDURE — 36415 COLL VENOUS BLD VENIPUNCTURE: CPT

## 2017-02-15 PROCEDURE — 85027 COMPLETE CBC AUTOMATED: CPT

## 2017-02-15 PROCEDURE — 64450 NJX AA&/STRD OTHER PN/BRANCH: CPT | Mod: 50,51,S$GLB, | Performed by: PSYCHIATRY & NEUROLOGY

## 2017-02-15 PROCEDURE — 64405 NJX AA&/STRD GR OCPL NRV: CPT | Mod: 50,51,S$GLB, | Performed by: PSYCHIATRY & NEUROLOGY

## 2017-02-15 PROCEDURE — 85025 COMPLETE CBC W/AUTO DIFF WBC: CPT

## 2017-02-15 PROCEDURE — 99499 UNLISTED E&M SERVICE: CPT | Mod: S$GLB,,, | Performed by: PSYCHIATRY & NEUROLOGY

## 2017-02-15 PROCEDURE — 99999 PR PBB SHADOW E&M-EST. PATIENT-LVL III: CPT | Mod: PBBFAC,,, | Performed by: PSYCHIATRY & NEUROLOGY

## 2017-02-15 PROCEDURE — 76942 ECHO GUIDE FOR BIOPSY: CPT | Mod: S$GLB,,, | Performed by: PSYCHIATRY & NEUROLOGY

## 2017-02-15 PROCEDURE — 11000001 HC ACUTE MED/SURG PRIVATE ROOM

## 2017-02-15 PROCEDURE — 80053 COMPREHEN METABOLIC PANEL: CPT

## 2017-02-15 PROCEDURE — 25000003 PHARM REV CODE 250: Performed by: STUDENT IN AN ORGANIZED HEALTH CARE EDUCATION/TRAINING PROGRAM

## 2017-02-15 RX ORDER — PANTOPRAZOLE SODIUM 40 MG/10ML
40 INJECTION, POWDER, LYOPHILIZED, FOR SOLUTION INTRAVENOUS 2 TIMES DAILY
Status: DISCONTINUED | OUTPATIENT
Start: 2017-02-15 | End: 2017-02-17 | Stop reason: HOSPADM

## 2017-02-15 RX ORDER — SUMATRIPTAN 50 MG/1
50 TABLET, FILM COATED ORAL EVERY 6 HOURS PRN
Status: DISCONTINUED | OUTPATIENT
Start: 2017-02-15 | End: 2017-02-17 | Stop reason: HOSPADM

## 2017-02-15 RX ORDER — FOLIC ACID 1 MG/1
1 TABLET ORAL DAILY
Status: DISCONTINUED | OUTPATIENT
Start: 2017-02-16 | End: 2017-02-17 | Stop reason: HOSPADM

## 2017-02-15 RX ORDER — RAMELTEON 8 MG/1
8 TABLET ORAL NIGHTLY PRN
Status: DISCONTINUED | OUTPATIENT
Start: 2017-02-15 | End: 2017-02-17 | Stop reason: HOSPADM

## 2017-02-15 RX ORDER — BUTALBITAL, ACETAMINOPHEN AND CAFFEINE 50; 325; 40 MG/1; MG/1; MG/1
1 TABLET ORAL EVERY 6 HOURS PRN
Status: DISCONTINUED | OUTPATIENT
Start: 2017-02-15 | End: 2017-02-17 | Stop reason: HOSPADM

## 2017-02-15 RX ADMIN — PANTOPRAZOLE SODIUM 40 MG: 40 INJECTION, POWDER, FOR SOLUTION INTRAVENOUS at 09:02

## 2017-02-15 RX ADMIN — RAMELTEON 8 MG: 8 TABLET, FILM COATED ORAL at 11:02

## 2017-02-15 NOTE — IP AVS SNAPSHOT
[facility]  [address]  [facility] [address]  Phone: [phone]           Patient Discharge Instructions     Our goal is to set you up for success. This packet includes information on your condition, medications, and your home care. It will help you to care for yourself so you don't get sicker and need to go back to the hospital.     Please ask your nurse if you have any questions.        There are many details to remember when preparing to leave the hospital. Here is what you will need to do:    1. Take your medicine. If you are prescribed medications, review your Medication List in the following pages. You may have new medications to  at the pharmacy and others that you'll need to stop taking. Review the instructions for how and when to take your medications. Talk with your doctor or nurses if you are unsure of what to do.     2. Go to your follow-up appointments. Specific follow-up information is listed in the following pages. Your may be contacted by a transition nurse or clinical provider about future appointments. Be sure we have all of the phone numbers to reach you, if needed. Please contact your provider's office if you are unable to make an appointment.     3. Watch for warning signs. Your doctor or nurse will give you detailed warning signs to watch for and when to call for assistance. These instructions may also include educational information about your condition. If you experience any of warning signs to your health, call your doctor.               [facility]  Unless otherwise directed by your provider, please contact [facility], our nurse care line that is available for 24/7 assistance.     [phone] (toll-free)    Registered nurses in the [facility] provide clinical advisement, health education, appointment booking, and other advisory services.                    ** Verify the list of medication(s) below is accurate and up  to date. Carry this with you in case of emergency. If your medications have changed, please notify your healthcare provider.             Medication List      CHANGE how you take these medications        Additional Info                      ondansetron 4 MG tablet   Commonly known as:  ZOFRAN   Quantity:  12 tablet   Refills:  0   Dose:  4 mg   What changed:    - when to take this  - reasons to take this    Last time this was given:  4 mg on 2/16/2017  8:58 PM   Instructions:  Take 1 tablet (4 mg total) by mouth every 6 (six) hours.     Begin Date    AM    Noon    PM    Bedtime         CONTINUE taking these medications        Additional Info                      butalbital-acetaminophen-caffeine -40 mg -40 mg per tablet   Commonly known as:  FIORICET   Quantity:  20 tablet   Refills:  0   Dose:  1-2 tablet    Last time this was given:  1 tablet on 2/16/2017  8:38 PM   Instructions:  Take 1-2 tablets by mouth every 6 to 8 hours as needed for Headaches.     Begin Date    AM    Noon    PM    Bedtime       frovatriptan 2.5 MG tablet   Commonly known as:  FROVA   Quantity:  9 tablet   Refills:  3   Dose:  2.5 mg    Instructions:  Take 1 tablet (2.5 mg total) by mouth as needed for Migraine. If recurs, may repeat after 2 hours. Max of 3 tabs in 24 hours.     Begin Date    AM    Noon    PM    Bedtime       hydrocodone-acetaminophen 5-325mg 5-325 mg per tablet   Commonly known as:  NORCO   Quantity:  20 tablet   Refills:  0   Dose:  1 tablet    Instructions:  Take 1 tablet by mouth every 6 (six) hours as needed for Pain.     Begin Date    AM    Noon    PM    Bedtime       irbesartan-hydrochlorothiazide 300-12.5 mg per tablet   Commonly known as:  AVALIDE   Quantity:  90 tablet   Refills:  3   Dose:  1 tablet    Instructions:  Take 1 tablet by mouth once daily.     Begin Date    AM    Noon    PM    Bedtime       metronidazole 1% 1 % Gel   Commonly known as:  METROGEL   Refills:  0    Instructions:  Apply at  bedtime as directed as needed for skin infection     Begin Date    AM    Noon    PM    Bedtime       NIFEREX-150 FORTE ORAL   Refills:  0   Dose:  1 tablet    Instructions:  Take 1 tablet by mouth once daily.     Begin Date    AM    Noon    PM    Bedtime       pantoprazole 40 MG tablet   Commonly known as:  PROTONIX   Quantity:  90 tablet   Refills:  3   Dose:  40 mg    Instructions:  Take 1 tablet (40 mg total) by mouth before breakfast.     Begin Date    AM    Noon    PM    Bedtime       RELPAX 40 MG tablet   Quantity:  9 tablet   Refills:  0   Generic drug:  eletriptan    Instructions:  TAKE 1 TABLET BY MOUTH AS NEEDED, MAY REPEAT IN 2 HOURS IF NEEDED DO NOT EXCEED 4 TABLETS EVERY DAY     Begin Date    AM    Noon    PM    Bedtime       triamcinolone acetonide 0.1% 0.1 % cream   Commonly known as:  KENALOG   Quantity:  1 Bottle   Refills:  3   Comments:  Pharmacist: mix 30 grams of TAC 0.1% cream with 30 grams of Loprox cream in 120cc of Milk of Magnesia    Instructions:  AAA bid after cool blow dry to affected areas of groin     Begin Date    AM    Noon    PM    Bedtime                  Please bring to all follow up appointments:    1. A copy of your discharge instructions.  2. All medicines you are currently taking in their original bottles.  3. Identification and insurance card.    Please arrive 15 minutes ahead of scheduled appointment time.    Please call 24 hours in advance if you must reschedule your appointment and/or time.        Your Scheduled Appointments     Mar 08, 2017  4:00 PM CST   GASTROENTEROLOGY ESTABLISHED PATIENT with Yahir Suarez MD   [facility] - Gastroenterology ([facility] )    [address]  [place] [address] [phone]            Apr 18, 2017  2:40 PM CDT   Neurology - Established Patient with Jose Reyes III, MD   [facility]    [address]  [facility] [address] [phone]              Your Future Surgeries/Procedures     Apr 12,  2017   Surgery with Yoshi Erazo MD   [facility] ([facility])    [address]  [facility] [id number] [phone]              Follow-up Information     Follow up with Yahir Alejandre MD On 3/8/2017.    Specialty:  Gastroenterology    Why:  At 4:00 PM in GI Clinic    Contact information:    [address] [facility] [address]  [phone]          Follow up with Jose Reyes III, MD On 4/18/2017.    Specialty:  Neurology    Why:  At 2:40 PM in Neurology Clinic    Contact information:    [address]  [address]  [facility] [address]  [phone]          Follow up with Manish Joel MD In 1 week.    Specialty:  Family Medicine    Why:  Nurse will call you to schedule follow-up appt     Contact information:    [address]  [address]  [place] [id number] [phone]          Discharge Instructions     Future Orders    CBC W/ AUTO DIFFERENTIAL     Process Instructions:    Please collect a Lavender, EDTA tube or EDTA Microtainer.  If the patient is a known platelet clumper, please collect an additional citrate (blue top) tube.    Diet general     Questions:    Total calories:      Fat restriction, if any:      Protein restriction, if any:      Na restriction, if any:      Fluid restriction:      Additional restrictions:          Discharge Instructions       Upper GI Endoscopy     During endoscopy, a long, flexible tube is used to view the inside of your upper GI tract.      Upper GI endoscopy allows your healthcare provider to look directly into the beginning of your gastrointestinal (GI) tract. The esophagus, stomach, and duodenum (the first part of the small intestine) make up the upper GI tract.   Before the exam  Follow these and any other instructions you are given before your endoscopy. If you dont follow the healthcare providers instructions carefully, the test may need to be canceled or done over:  · Don't eat or drink anything after midnight  the night before your exam. If your exam is in the afternoon, drink only clear liquids in the morning. Don't eat or drink anything for 8 hours before the exam. In some cases, you may be able to take medicines with sips of water until 2 hours before the procedure. Speak with your healthcare provider about this.   · Bring your X-rays and any other test results you have.  · Because you will be sedated, arrange for an adult to drive you home after the exam.  · Tell your healthcare provider before the exam if you are taking any medicines or have any medical problems.  The procedure  Here is what to expect:  · You will lie on the endoscopy table. Usually patients lie on the left side.  · You will be monitored and given oxygen.  · Your throat may be numbed with a spray or gargle. You are given medicine through an intravenous (IV) line that will help you relax and remain comfortable. You may be awake or asleep during the procedure.  · The healthcare provider will put the endoscope in your mouth and down your esophagus. It is thinner than most pieces of food that you swallow. It will not affect your breathing. The medicine helps keep you from gagging.  · Air is put into your GI tract to expand it. It can make you burp.  · During the procedure, the healthcare provider can take biopsies (tissue samples), remove abnormalities, such as polyps, or treat abnormalities through a variety of devices placed through the endoscope. You will not feel this.   · The endoscope carries images of your upper GI tract to a video screen. If you are awake, you may be able to look at the images.  · After the procedure is done, you will rest for a time. An adult must drive you home.  When to call your healthcare provider  Contact your healthcare provider if you have:  · Black or tarry stools, or blood in your stool  · Fever  · Pain in your belly that does not go away  · Nausea and vomiting, or vomiting blood   Date Last Reviewed: 7/1/2016  ©  2800-2232 [employer]. [address] [place] [address], [place] [address]. All rights reserved. This information is not intended as a substitute for professional medical care. Always follow your healthcare professional's instructions.      Colonoscopy     A camera attached to a flexible tube with a viewing lens is used to take video pictures.     Colonoscopy is a test to view the inside of your lower digestive tract (colon and rectum). Sometimes it can show the last part of the small intestine (ileum). During the test, small pieces of tissue may be removed for testing. This is called a biopsy. Small growths, such as polyps, may also be removed.   Why is colonoscopy done?  The test is done to help look for colon cancer. And it can help find the source of abdominal pain, bleeding, and changes in bowel habits. It may be needed once a year, depending on factors such as your:  · Age  · Health history  · Family health history  · Symptoms  · Results from any prior colonoscopy  Risks and possible complications  These include:  · Bleeding               · A puncture or tear in the colon   · Risks of anesthesia  · A cancer lesion not being seen  Getting ready   To prepare for the test:  · Talk with your healthcare provider about the risks of the test (see below). Also ask your healthcare provider about alternatives to the test.  · Tell your healthcare provider about any medicines you take. Also tell him or her about any health conditions you may have.  · Make sure your rectum and colon are empty for the test. Follow the diet and bowel prep instructions exactly. If you dont, the test may need to be rescheduled.  · Plan for a friend or family member to drive you home after the test.     Colonoscopy provides an inside view of the entire colon.     You may discuss the results with your doctor right away or at a future visit.  During the test   The test is usually done in the hospital on an outpatient basis. This means you go  home the same day. The procedure takes about 30 minutes. During that time:  · You are given relaxing (sedating) medicine through an IV line. You may be drowsy, or fully asleep.  · The healthcare provider will first give you a physical exam to check for anal and rectal problems.  · Then the anus is lubricated and the scope inserted.  · If you are awake, you may have a feeling similar to needing to have a bowel movement. You may also feel pressure as air is pumped into the colon. Its OK to pass gas during the procedure.  · Biopsy, polyp removal, or other treatments may be done during the test.  After the test   You may have gas right after the test. It can help to try to pass it to help prevent later bloating. Your healthcare provider may discuss the results with you right away. Or you may need to schedule a follow-up visit to talk about the results. After the test, you can go back to your normal eating and other activities. You may be tired from the sedation and need to rest for a few hours.  Date Last Reviewed: 11/1/2016 © 2000-2016 [employer]. [address] [place] [address]. All rights reserved. This information is not intended as a substitute for professional medical care. Always follow your healthcare professional's instructions.        Upper GI Endoscopy     During endoscopy, a long, flexible tube is used to view the inside of your upper GI tract.      Upper GI endoscopy allows your healthcare provider to look directly into the beginning of your gastrointestinal (GI) tract. The esophagus, stomach, and duodenum (the first part of the small intestine) make up the upper GI tract.   Before the exam  Follow these and any other instructions you are given before your endoscopy. If you dont follow the healthcare providers instructions carefully, the test may need to be canceled or done over:  · Don't eat or drink anything after midnight the night before your exam. If your exam is in the  afternoon, drink only clear liquids in the morning. Don't eat or drink anything for 8 hours before the exam. In some cases, you may be able to take medicines with sips of water until 2 hours before the procedure. Speak with your healthcare provider about this.   · Bring your X-rays and any other test results you have.  · Because you will be sedated, arrange for an adult to drive you home after the exam.  · Tell your healthcare provider before the exam if you are taking any medicines or have any medical problems.  The procedure  Here is what to expect:  · You will lie on the endoscopy table. Usually patients lie on the left side.  · You will be monitored and given oxygen.  · Your throat may be numbed with a spray or gargle. You are given medicine through an intravenous (IV) line that will help you relax and remain comfortable. You may be awake or asleep during the procedure.  · The healthcare provider will put the endoscope in your mouth and down your esophagus. It is thinner than most pieces of food that you swallow. It will not affect your breathing. The medicine helps keep you from gagging.  · Air is put into your GI tract to expand it. It can make you burp.  · During the procedure, the healthcare provider can take biopsies (tissue samples), remove abnormalities, such as polyps, or treat abnormalities through a variety of devices placed through the endoscope. You will not feel this.   · The endoscope carries images of your upper GI tract to a video screen. If you are awake, you may be able to look at the images.  · After the procedure is done, you will rest for a time. An adult must drive you home.  When to call your healthcare provider  Contact your healthcare provider if you have:  · Black or tarry stools, or blood in your stool  · Fever  · Pain in your belly that does not go away  · Nausea and vomiting, or vomiting blood   Date Last Reviewed: 7/1/2016  © 9964-7748 [employer]. [address] [place] [address]  [place] [address]. All rights reserved. This information is not intended as a substitute for professional medical care. Always follow your healthcare professional's instructions.              Primary Diagnosis     Your primary diagnosis was:  Bleeding From Upper Gastrointestinal Tract      Admission Information     Date & Time Provider Department CSN    2/15/2017  6:30 PM Temitope Nguyen MD [facility] [id number]      Care Providers     Provider Role Specialty Primary office phone    Temitope Nguyen MD Attending Provider Hospitalist [phone]    Temitope Nguyen MD Team Attending  Hospitalist [phone]    Yoshi Erazo MD Surgeon  Gastroenterology [phone]      Your Vitals Were     BP Pulse Temp Resp Height Weight    112/70 (BP Location: Right arm, Patient Position: Lying, BP Method: Automatic) 81 97.1 °F (36.2 °C) (Oral) 16 6' (1.829 m) 108.1 kg (238 lb 5.1 oz)    SpO2 BMI             95% 32.32 kg/m2         Recent Lab Values        8/29/2014                           8:58 AM           A1C 5.4                       Allergies as of 2/17/2017     No Known Allergies      Advance Directives     An advance directive is a document which, in the event you are no longer able to make decisions for yourself, tells your healthcare team what kind of treatment you do or do not want to receive, or who you would like to make those decisions for you.  If you do not currently have an advance directive, [facility] encourages you to create one.  For more information call:  [phone]), [phone] ([phone]),  or log on to [email or web address].        Language Assistance Services     ATTENTION: Language assistance services are available, free of charge. Please call [phone].      ATENCIÓN: Si habla español, tiene a harp disposición servicios gratuitos de asistencia lingüística. Llame al [phone].     CHÚ Ý: N?u b?n nói Ti?ng Vi?t, có các d?ch v? h? tr? ngôn  ng? mi?n phí dành cho b?n. G?i s? [phone].         [facility] complies with applicable Federal civil rights laws and does not discriminate on the basis of race, color, national origin, age, disability, or sex.

## 2017-02-15 NOTE — TELEPHONE ENCOUNTER
----- Message from Yoshi Erazo MD sent at 2/15/2017 11:21 AM CST -----  VERY IMPORTANT:    Desi - please tell Jonathan that his hemoglobin has dropped to 8.0 g/dl and not to drive or climb or operated machinery and please have him come today for a repeat Stat hemoglobin to make sure he is not dropping further.  Make sure he is taking his PPI as directed from his EGD and and is taking his iron.    Please order US Aorta today if possible or tomorrow if not fasting today.

## 2017-02-15 NOTE — PLAN OF CARE
Direct Admit from Clinic Acceptance Note    Clinic Physician or Mid-Level provider/Clinic giving report: Dr. Yahir Suarez from Gastroenterology clinic    Accepting Physician for admission to hospital: Millicent Alcaraz     Date of acceptance: 02/15/2017     Reason for direct admission from clinic: Patient needs repeat EGD due to concern for continued GI bleeding in patient recently discharged from [facility] for upper GI bleed due to gastric ulcers as patient still having dark stools and drop in Hgb to 8    Report from Clinic Physician/Mid-Level Provider: 55 y/o male with past medical history of peptic ulcer disease, GERD, migraine headaches due to occipital neuralgia and DELVIN was recently admitted to [facility] from 2/10/2017-2/12/2017 for an upper GI bleed and found on EGD on 2/11 to have duodenal erosions without bleeding and non-bleeding cratered gastric ulcers that were clean based. Patient was discharged on 2/12 with oral Protonix to treat.Hgb on hospital discharge was 10.5. Patient had follow-up labs done yesterday that showed a drop in Hgb to 8 and GI clinic called patient to come in today. In clinic today, patient reported he is still having dark, black stools and repeat Hgb today was 8.1. Patient was hemodynamically stable in clinic but due to concern for continued ongoing bleeding GI clinic is requesting admit to hospital for repeat endoscopy.     To Do List upon arrival: Consult GI for repeat EGD to re-evaluate upper GI tract; Serial H/H      Please call extension [phone] upon patient arrival to floor for Hospital Medicine admit team assignment and for additional admit orders for the patient.      MILLICENT ALCARAZ MD  Attending Staff Physician   Hospital Medicine  Pager: [phone]  Spectralink: [phone]

## 2017-02-15 NOTE — PROGRESS NOTES
[facility]  Date/Time: 2/15/2017 9:40 AM  Performed by: NOLBERTO DENNEY III  Authorized by: NOLBERTO DENNEY III   Local anesthesia used: yes  Anesthesia: nerve block    Anesthesia:  Local anesthesia used: yes  Anesthesia: nerve block  Local Anesthetic: lidocaine 1% without epinephrine   Anesthetic total: 4 mL  Sedation:  Patient sedated: no    Patient tolerance: Patient tolerated the procedure well with no immediate complications  Comments: Informed consent was obtained and will be scanned into the electronic medical record.  The patient was placed into a prone position with the neck flexed forward.  Nerve block was accomplished first with ultrasound guidance and anatomic landmarks using lidocaine 1% without epinephrine injected through a 27-gauge needle with topical spray and aesthetic and cleaning solution apply before injection at bilateral greater and lesser occipital nerves.  Afterwards cryoneurolysis was performed using ultrasound guidance and anatomic landmarks after nerve block at bilateral greater and lesser occipital nerves.     There were 4 sites treated using a total of 16 treatment cycles.

## 2017-02-15 NOTE — TELEPHONE ENCOUNTER
"----- Message from Yoshi Erazo MD sent at 2/15/2017 11:24 AM CST -----  Desi - please tell Jonathan that his EGD pathology was benign and no Celiac and no H. Pylori.    SPECIMEN  1) Biopsy for celiac sprue.  2) Stomach. Rule out H. Pylori.    FINAL PATHOLOGIC DIAGNOSIS    1. Submitted as "biopsy for celiac sprue", biopsy:  Duodenal mucosa with no significant histopathologic abnormality.  No evidence of celiac sprue, dysplasia or malignancy.    2. Stomach, biopsy:  Chronic gastritis.  No evidence of Helicobacter species on H&E stain.    Diagnosed by: Lolita Esteban M.D.  "

## 2017-02-15 NOTE — TELEPHONE ENCOUNTER
----- Message from Eliza Mobley sent at 2/15/2017 12:11 PM CST -----  Contact: Alvarez Moore- [phone]  Bryant sinha is returning a missed call regarding his blood test results- please call Teresa back at [phone]

## 2017-02-16 ENCOUNTER — ANESTHESIA EVENT (OUTPATIENT)
Dept: ENDOSCOPY | Facility: HOSPITAL | Age: 57
DRG: 378 | End: 2017-02-16
Payer: COMMERCIAL

## 2017-02-16 PROBLEM — R07.89 CHEST PRESSURE: Status: ACTIVE | Noted: 2017-02-16

## 2017-02-16 LAB
ERYTHROCYTE [DISTWIDTH] IN BLOOD BY AUTOMATED COUNT: 13.1 %
ERYTHROCYTE [DISTWIDTH] IN BLOOD BY AUTOMATED COUNT: 13.4 %
HCT VFR BLD AUTO: 23.4 %
HCT VFR BLD AUTO: 24.9 %
HGB BLD-MCNC: 7.8 G/DL
HGB BLD-MCNC: 8.4 G/DL
MCH RBC QN AUTO: 30.1 PG
MCH RBC QN AUTO: 30.1 PG
MCHC RBC AUTO-ENTMCNC: 33.3 %
MCHC RBC AUTO-ENTMCNC: 33.7 %
MCV RBC AUTO: 89 FL
MCV RBC AUTO: 90 FL
PLATELET # BLD AUTO: 301 K/UL
PLATELET # BLD AUTO: 356 K/UL
PMV BLD AUTO: 8.5 FL
PMV BLD AUTO: 9.1 FL
RBC # BLD AUTO: 2.59 M/UL
RBC # BLD AUTO: 2.79 M/UL
TROPONIN I SERPL DL<=0.01 NG/ML-MCNC: 0.01 NG/ML
WBC # BLD AUTO: 7.13 K/UL
WBC # BLD AUTO: 7.6 K/UL

## 2017-02-16 PROCEDURE — C9113 INJ PANTOPRAZOLE SODIUM, VIA: HCPCS | Performed by: INTERNAL MEDICINE

## 2017-02-16 PROCEDURE — 25000003 PHARM REV CODE 250: Performed by: STUDENT IN AN ORGANIZED HEALTH CARE EDUCATION/TRAINING PROGRAM

## 2017-02-16 PROCEDURE — 99223 1ST HOSP IP/OBS HIGH 75: CPT | Mod: ,,, | Performed by: HOSPITALIST

## 2017-02-16 PROCEDURE — 11000001 HC ACUTE MED/SURG PRIVATE ROOM

## 2017-02-16 PROCEDURE — 84484 ASSAY OF TROPONIN QUANT: CPT

## 2017-02-16 PROCEDURE — 25000003 PHARM REV CODE 250: Performed by: ANESTHESIOLOGY

## 2017-02-16 PROCEDURE — 99253 IP/OBS CNSLTJ NEW/EST LOW 45: CPT | Mod: ,,, | Performed by: INTERNAL MEDICINE

## 2017-02-16 PROCEDURE — 93010 ELECTROCARDIOGRAM REPORT: CPT | Mod: ,,, | Performed by: INTERNAL MEDICINE

## 2017-02-16 PROCEDURE — 36415 COLL VENOUS BLD VENIPUNCTURE: CPT

## 2017-02-16 PROCEDURE — 93005 ELECTROCARDIOGRAM TRACING: CPT

## 2017-02-16 PROCEDURE — 25000003 PHARM REV CODE 250: Performed by: INTERNAL MEDICINE

## 2017-02-16 PROCEDURE — 85027 COMPLETE CBC AUTOMATED: CPT

## 2017-02-16 PROCEDURE — 63600175 PHARM REV CODE 636 W HCPCS: Performed by: INTERNAL MEDICINE

## 2017-02-16 RX ORDER — ONDANSETRON 4 MG/1
4 TABLET, FILM COATED ORAL EVERY 8 HOURS PRN
Status: DISCONTINUED | OUTPATIENT
Start: 2017-02-16 | End: 2017-02-17 | Stop reason: HOSPADM

## 2017-02-16 RX ORDER — POLYETHYLENE GLYCOL 3350, SODIUM SULFATE ANHYDROUS, SODIUM BICARBONATE, SODIUM CHLORIDE, POTASSIUM CHLORIDE 236; 22.74; 6.74; 5.86; 2.97 G/4L; G/4L; G/4L; G/4L; G/4L
4000 POWDER, FOR SOLUTION ORAL ONCE
Status: COMPLETED | OUTPATIENT
Start: 2017-02-16 | End: 2017-02-16

## 2017-02-16 RX ADMIN — FOLIC ACID 1 MG: 1 TABLET ORAL at 08:02

## 2017-02-16 RX ADMIN — BUTALBITAL, ACETAMINOPHEN AND CAFFEINE 1 TABLET: 50; 325; 40 TABLET ORAL at 08:02

## 2017-02-16 RX ADMIN — SODIUM CHLORIDE 1000 ML: 0.9 INJECTION, SOLUTION INTRAVENOUS at 07:02

## 2017-02-16 RX ADMIN — PANTOPRAZOLE SODIUM 40 MG: 40 INJECTION, POWDER, FOR SOLUTION INTRAVENOUS at 08:02

## 2017-02-16 RX ADMIN — ONDANSETRON 4 MG: 4 TABLET, FILM COATED ORAL at 08:02

## 2017-02-16 RX ADMIN — POLYETHYLENE GLYCOL 3350, SODIUM SULFATE ANHYDROUS, SODIUM BICARBONATE, SODIUM CHLORIDE, POTASSIUM CHLORIDE 4000 ML: 236; 22.74; 6.74; 5.86; 2.97 POWDER, FOR SOLUTION ORAL at 05:02

## 2017-02-16 NOTE — H&P
"[facility] Hospital Medicine  History and Physical     Patient Name: Jonathan Villela  MRN: [id number]  Patient Class: IP- Inpatient   Admission Date: 2/15/2017  Length of Stay: 1  Attending Physician: Temitope Nguyen MD  Primary Care Provider Manish Joel MD                                                        [facility]: [facility] 1 Maulik Narvaez MD    SUBJECTIVE:     Principle Problem:  Acute upper GI bleed      HPI:     Mr. Villela is a 56 year old gentleman with PMH of PUD, GERD, migraine, and recent upper GIB here for evaluation of continued UGIB. He had been admitted from 2/10-2/12 for melanotic stools with symptomatic anemia and underwent EGD which revealed granular mucosa, duodenal erosions w/o bleeding, erythematous gastric mucosa, non-bleeding gastric ulcers, 1cm hiatus hernia, and grade B esophagitis. When his Hgb had stabilized at ~10.5 he was discharged home. Since that time his GERD symptoms had actually improved and he was able to tolerate a smaller but otherwise normal diet. He had f/u labs done 2/14 but did not get the results of those until the day of admission (2/15). He also did not have any bowel movements at home until he had two the night prior to admission, both of which were distinctly melanotic. He also noted that before his previous admission he was experiencing hematochezia in addition to melena.     At the time of exam his only complaints were weakness, fatigue, DICKINSON, and left sided abd pain which he described as "a baseball under my ribcage." He did also suffer a bad migraine the night before admission but this had resolved by the time of exam.    Review of Systems   Constitutional: Positive for malaise/fatigue. Negative for chills and fever.   HENT: Negative for congestion and sore throat.    Eyes: Negative for blurred vision and redness.   Respiratory: Positive for shortness of breath. Negative for cough and wheezing.    Cardiovascular: Negative for " chest pain, palpitations and leg swelling.   Gastrointestinal: Positive for abdominal pain and melena. Negative for blood in stool, diarrhea, nausea and vomiting.   Genitourinary: Negative for dysuria and hematuria.   Musculoskeletal: Negative for myalgias and neck pain.   Skin: Negative for rash.   Neurological: Positive for weakness. Negative for dizziness, focal weakness and seizures.   Psychiatric/Behavioral: The patient is not nervous/anxious.      OBJECTIVE:       Vital Signs Range (Last 24H):  Temp:  [97.6 °F (36.4 °C)-98.5 °F (36.9 °C)]   Pulse:  [58-93]   Resp:  [18-20]   BP: ()/(55-88)   SpO2:  [92 %-100 %] Body mass index is 32.32 kg/(m^2).    I & O (Last 24H):    Intake/Output Summary (Last 24 hours) at 02/16/17 1021  Last data filed at 02/16/17 0600   Gross per 24 hour   Intake                0 ml   Output                0 ml   Net                0 ml       Physical Exam:  General: well developed, well nourished, appears stated age, no distress  Eyes: conjunctivae/corneas clear. PERRL.  ENT: MMM, nasal turbinates not inflamed, no oropharyngeal edema or erythema  Neck: supple, symmetrical, trachea midline, no JVD and thyroid not enlarged, symmetric, no tenderness/mass/nodules  Lungs:  clear to auscultation bilaterally and normal respiratory effort  Cardiovascular: Heart: regular rate and rhythm, S1, S2 normal, no murmur, click, rub or gallop. Chest Wall: no tenderness. Extremities: no cyanosis or clubbing. No edema. Pulses: 2+ and symmetric.  Abdomen: soft, non-distended; TTP along left side; bowel sounds normal; no masses,  no organomegaly.  Musculoskeletal: moves all extremities spontaneously  Lymph Nodes: No cervical or supraclavicular adenopathy      Diagnostic Results:  Lab Results   Component Value Date    WBC 7.13 02/16/2017    HGB 7.8 (L) 02/16/2017    HCT 23.4 (L) 02/16/2017    MCV 90 02/16/2017     02/16/2017       Recent Labs  Lab 02/15/17  2055   GLU 83      K 3.8       CO2 29   BUN 12   CREATININE 0.9   CALCIUM 8.8     Lab Results   Component Value Date    INR 1.1 02/14/2017    INR 1.1 02/10/2017     Lab Results   Component Value Date    HGBA1C 5.4 08/29/2014       Significant imaging: I have reviewed all pertinent imaging results/findings within the past 24 hours    ASSESSMENT/PLAN:     Current Problems List:  Active Hospital Problems    Diagnosis  POA    *Acute upper GI bleed [K92.2]  Yes    Acute gastric ulcer with hemorrhage [K25.0]  Yes    DICKINSON (dyspnea on exertion) [R06.09]  Yes    Bilateral occipital neuralgia [M54.81]  Yes    Acute post-hemorrhagic anemia [D62]  Yes    Gastroesophageal reflux disease without esophagitis [K21.9]  Yes    Obstructive sleep apnea syndrome [G47.33]  Yes    Essential hypertension [I10]  Yes    PUD (peptic ulcer disease) [K27.9]  Yes    Migraine without aura and without status migrainosus, not intractable [G43.009]  Yes      Resolved Hospital Problems    Diagnosis Date Resolved POA   No resolved problems to display.       Active Problems, Status & Treatment Plan Addressed Today:    UGIB/ PUD/ acute post-hemorrhagic anemia  -- given history likely from gastric ulcers  -- repeat Hgb stable at ~8  -- will check again now and at least Q12H  -- currently HDS  -- GI consult, anticipate EGD +/- C-scope  -- NPO  -- protonix 40mg IV BID  -- prn ant-emetic    HTN  -- BP adequately controlled  -- will hold home irbesartan-HCTZ 300-12.5mg in context of GIB    Migraine  -- fioricet PRN  -- sumatriptan PRN    VTE Risk Mitigation         Ordered     Medium Risk of VTE  Once      02/15/17 2008     Reason for No Pharmacological VTE Prophylaxis  Once      02/15/17 2008          Signing Physician:    Maulik Narvaez MD  Department of Internal Medicine  PGY-3

## 2017-02-16 NOTE — PLAN OF CARE
Extended Emergency Contact Information  Primary Emergency Contact: deirdre cage   [place]  Mobile Phone: [phone]  Relation: Sister  Secondary Emergency Contact: Violetta Trent   [place]  Mobile Phone: [phone]  Relation: Significant other    Manish Joel MD  [address] / [place] [address]    Future Appointments  Date Time Provider Department Center   2/16/2017 3:30 PM [facility] US6 NOM ULS[facility]   2/17/2017 3:00 PM VASCULAR, LAB [facility] VASCLAB Shawn [facility]   3/8/2017 4:00 PM Yahir Suarez MD [facility] GASTRO [facility]   4/18/2017 2:40 PM Jose Reyes III, MD [facility] NEURO [facility]     Payor: [employer] / Plan: Get In RESOURCES (UMR) / Product Type: Commercial /       [facility] [id number]  [address]  [address] [facility]  [address] [place] [address]  Phone: [phone] Fax: [phone]       02/16/17 1433   Discharge Assessment   Assessment Type Discharge Planning Assessment   Confirmed/corrected address and phone number on facesheet? Yes   Assessment information obtained from? Patient;Caregiver;Medical Record   Expected Length of Stay (days) 3   Communicated expected length of stay with patient/caregiver yes   Type of Healthcare Directive Received (AD and POA forms provided at pt's request. )   If Healthcare Directive is received, is it scanned into Epic? yes   Prior to hospitilization cognitive status: Alert/Oriented   Prior to hospitalization functional status: Independent   Current cognitive status: Alert/Oriented   Current Functional Status: Independent   Arrived From home or self-care   Lives With significant other   Able to Return to Prior Arrangements yes   Is patient able to care for self after discharge? Yes   Does the patient have family/friends to help with healtcare needs after discharge? yes   How many people do you have in your home that can help with your care after  discharge? 1   Who are your caregiver(s) and their phone number(s)? Violetta Trent  (Significant other)     [phone]    Patient's perception of discharge disposition home or selfcare   Readmission Within The Last 30 Days previous discharge plan unsuccessful   Patient currently being followed by outpatient case management? No   Patient currently receives home health services? No   Does the patient currently use HME? No   Patient currently receives private duty nursing? N/A   Patient currently receives any other outside agency services? No   Equipment Currently Used at Home none   Do you have any problems affording any of your prescribed medications? No   Is the patient taking medications as prescribed? yes   Do you have any financial concerns preventing you from receiving the healthcare you need? No   Does the patient have transportation to healthcare appointments? Yes   Transportation Available car   On Dialysis? No   Does the patient receive services at the Coumadin Clinic? No   Are there any open cases? No   Discharge Plan A Home   Discharge Plan B Home   Patient/Family In Agreement With Plan yes

## 2017-02-16 NOTE — CONSULTS
"Gastroenterology  Consult note      SUBJECTIVE:     Reason for Consult: Melena    History of Present Illness:  Patient is a 56 y.o. male with a questionable history of Crohn's disease in past but after multiple EGD and colonoscopy in past he says he was told that he does not have Crohn's disease. He initially presented to [facility] ED last Friday with a 1 day history of melena. An EGD on Saturday showed multiple nonbleeding gastric ulcers.      The patient then returned for a repeat CBC on Tuesday and Wednesday which showed a hemoglobin of ~8.  A rectal exam in the GI department showed melenic stool.     Today the patient reports feeling chest pressure.  No bowel movements since yesterday.      The patient states he underwent "12 or 13" colonoscopies in 2013 which he states were initially consistent with Crohn's disease however when he was scoped at [facility] (2013) they told him he was "in remission."  He states he was put on "every Crohn's medication," none of which helped.  He does not remember any medication names.             PTA Medications   Medication Sig    dicyclomine (BENTYL) 10 MG capsule Take 10 mg by mouth as needed.     folic acid (FOLVITE) 1 MG tablet Take 1 tablet (1 mg total) by mouth once daily.    frovatriptan (FROVA) 2.5 MG tablet Take 1 tablet (2.5 mg total) by mouth as needed for Migraine. If recurs, may repeat after 2 hours. Max of 3 tabs in 24 hours.    irbesartan-hydrochlorothiazide (AVALIDE) 300-12.5 mg per tablet Take 1 tablet by mouth once daily.    iron polysaccharides (NIFEREX) 150 mg iron Cap Take 1 capsule (150 mg total) by mouth once daily.    metronidazole 1% (METROGEL) 1 % Gel CHAVEZ QHS QHS AS DIRECTED    ondansetron (ZOFRAN) 4 MG tablet Take 1 tablet (4 mg total) by mouth every 6 (six) hours. (Patient taking differently: Take 4 mg by mouth every 6 (six) hours as needed. )    pantoprazole (PROTONIX) 40 MG tablet Take 1 tablet (40 mg total) by mouth before breakfast.    RELPAX 40 mg " tablet TAKE 1 TABLET BY MOUTH AS NEEDED, MAY REPEAT IN 2 HOURS IF NEEDED DO NOT EXCEED 4 TABLETS EVERY DAY    triamcinolone acetonide 0.1% (KENALOG) 0.1 % cream AAA bid after cool blow dry to affected areas of groin    triamcinolone acetonide 0.1% (KENALOG) 0.1 % cream APPLY TO THE AFFECTED AREA TWICE DAILY    butalbital-acetaminophen-caffeine -40 mg (FIORICET) -40 mg per tablet Take 1-2 tablets by mouth every 6 to 8 hours as needed for Headaches.    hydrocodone-acetaminophen 5-325mg (NORCO) 5-325 mg per tablet Take 1 tablet by mouth every 6 (six) hours as needed for Pain.       Review of patient's allergies indicates:  No Known Allergies     Past Medical History   Diagnosis Date    Acute gastric ulcer with hemorrhage 2/15/2017    Bilateral occipital neuralgia     BPH with urinary obstruction 12/31/2015    Colitis [id number]     infectious?    Diverticulitis     Essential hypertension     Gastroesophageal reflux disease without esophagitis 2/11/2017    Migraine without aura and without status migrainosus, not intractable 1/31/2014    Obstructive sleep apnea syndrome 2/9/2015    PUD (peptic ulcer disease)      Past Surgical History   Procedure Laterality Date    Leg surgery      Nasal septum surgery      Cholecystectomy       Family History   Problem Relation Age of Onset    Crohn's disease       brother, sister, father, nephew    Prostate cancer Neg Hx     Kidney disease Neg Hx     Melanoma Neg Hx      Social History   Substance Use Topics    Smoking status: Never Smoker    Smokeless tobacco: None    Alcohol use Yes      Comment: ocasionally       Review of Systems:  Constitutional: no fever, chills or change in weight   Eyes: no visual changes   ENT: no sore throat or dysphagia  Respiratory: no cough or shortness of breath   Cardiovascular: per HPI  Gastrointestinal: as per HPI  Hematologic/Lymphatic: no easy bruising or lymphadenopathy   Musculoskeletal: no arthralgias or  myalgias   Neurological: no seizures, tremors or change in mental status  Behavioral/Psych: no auditory or visual hallucinations    OBJECTIVE:     Vital Signs (Most Recent)  Temp: 98 °F (36.7 °C) (02/16/17 0747)  Pulse: 66 (02/16/17 0900)  Resp: 20 (02/16/17 0747)  BP: 114/64 (02/16/17 0747)  SpO2: (!) 92 % (02/16/17 0747)    Physical Exam:  General appearance: well developed, well nourished, no acute distress  Eyes: anicteric sclerae  Throat: lips, mucosa, and tongue normal, Mallampati II  Neck: supple, symmetrical, trachea midline, no cervical lymphadenopathy  Lungs: breath sounds vesicular in nature, air entry equal bilaterally, no wheeze nor crepitations  Heart: regular rate and rhythm, S1, S2 clearly audible, no murmur, click, rub or gallop  Abdomen: soft, non-tender, non-distended; BS present and normal; no masses,  no organomegaly, no rebound tenderness, rigidity, or guarding  Extremities: no cyanosis or edema, no clubbing  Pulses: 2+ and symmetric  Skin: Skin color, texture, turgor normal.   Neurologic: No focal deficits noted      Laboratory    CBC:     Recent Labs  Lab 02/15/17  1305 02/15/17  2056 02/16/17  0515   WBC 7.07 7.31 7.13   RBC 2.68* 2.63* 2.59*   HGB 8.1* 8.0* 7.8*   HCT 23.5* 23.6* 23.4*    331 301   MCV 88 90 90   MCH 30.2 30.4 30.1   MCHC 34.5 33.9 33.3     BMP:     Recent Labs  Lab 02/10/17  1357 02/11/17  0408 02/15/17  2055   * 87 83   * 138 140   K 4.1 4.1 3.8    106 104   CO2 23 25 29   BUN 56* 31* 12   CREATININE 1.3 0.9 0.9   CALCIUM 8.9 8.8 8.8   MG  --  1.9  --      Coagulation:     Recent Labs  Lab 02/14/17  0830   INR 1.1           ASSESSMENT/PLAN:     Anemia, Ongoing melena  - May be 2/2 ongoing ooze from gastric ulcers vs colonic source vs no ongoing bleeding and current presentation is sequelae of recent bleed    Recommendations:   EGD&Colonoscopy tomorrow  Clear liquid diet today and NPO after midnight  Bedside commode and Tuck's wipes to  bedside  Golytely 1 gallon:   1/2 gallon between 6-8pm (8oz q15min)   1/2 gallon between 4-6am (8oz until complete)    Consider transfusing 1U PRBC given patient's current complaint of chest pressure (consider EKG+Troponin)    Yahir Suarez   Gastroenterology Fellow PGY V  [phone]  I was present with the fellow during the above evaluation, including history and exam.  I discussed the case with the fellow and agree with the findings and plan as documented in the fellow's note.

## 2017-02-16 NOTE — PROGRESS NOTES
"[facility] Hospital Medicine  Progress Note     Patient Name: Jonathan Villela  MRN: [id number]  Patient Class: IP- Inpatient   Admission Date: 2/15/2017  Length of Stay: 1  Attending Physician: Temitope Nguyen MD  Primary Care Provider Manish Joel MD                                                        Team: [facility] 1 Dashawn Sorenson MD    SUBJECTIVE:     Principle Problem:  Acute upper GI bleed      Interval history:   No BM since admission. Hypotensive this AM with systolic of 90's, patient asymptomatic. Heart rate stable. Patient reports feeling a pressure in his chest as "someone is sitting on my chest."        Review of Systems   Constitutional: Positive for malaise/fatigue. Negative for chills, diaphoresis, fever and weight loss.   Neurological: Positive for weakness.   HENT: Negative for congestion and sore throat.   Eyes: Negative for blurred vision and redness.   Respiratory: Positive for shortness of breath. Negative for cough and wheezing.   Cardiovascular: Positive for chest pressure. Negative for chest pain, palpitations and leg swelling.   Gastrointestinal: Negative abdominal pain, diarrhea, nausea and vomiting.    Genitourinary: Negative for dysuria and hematuria.   Musculoskeletal: Negative for myalgias and neck pain.   Skin: Negative for rash.   Neurological: Negative for dizziness, focal weakness and seizures.   Psychiatric/Behavioral: The patient is not nervous/anxious.       OBJECTIVE:       Vital Signs Range (Last 24H):  Temp:  [97.6 °F (36.4 °C)-98.5 °F (36.9 °C)]   Pulse:  [58-93]   Resp:  [16-20]   BP: ()/(55-88)   SpO2:  [92 %-100 %] Body mass index is 32.32 kg/(m^2).    I & O (Last 24H):    Intake/Output Summary (Last 24 hours) at 02/16/17 1412  Last data filed at 02/16/17 0600   Gross per 24 hour   Intake                0 ml   Output                0 ml   Net                0 ml       Physical Exam:  General: well developed, well nourished, appears " stated age, no distress  Eyes: conjunctivae/corneas clear. PERRL.  ENT: MMM, nasal turbinates not inflamed, no oropharyngeal edema or erythema  Neck: supple, symmetrical, trachea midline, no JVD and thyroid not enlarged, symmetric, no tenderness/mass/nodules  Lungs: clear to auscultation bilaterally and normal respiratory effort  Cardiovascular: Heart: regular rate and rhythm, S1, S2 normal, no murmur, click, rub or gallop. Chest Wall: no tenderness. Extremities: no cyanosis or clubbing. No edema. Pulses: 2+ and symmetric.  Abdomen: soft, non-distended; no tenderness to palpation; bowel sounds normal; no masses, no organomegaly.  Musculoskeletal: moves all extremities spontaneously  Lymph Nodes: No cervical or supraclavicular adenopathy      Diagnostic Results:  Lab Results   Component Value Date    WBC 7.13 02/16/2017    HGB 7.8 (L) 02/16/2017    HCT 23.4 (L) 02/16/2017    MCV 90 02/16/2017     02/16/2017       Recent Labs  Lab 02/15/17  2055   GLU 83      K 3.8      CO2 29   BUN 12   CREATININE 0.9   CALCIUM 8.8     Lab Results   Component Value Date    INR 1.1 02/14/2017    INR 1.1 02/10/2017     Lab Results   Component Value Date    HGBA1C 5.4 08/29/2014     No results for input(s): POCTGLUCOSE in the last 72 hours.      Significant imaging: No new imaging.       ASSESSMENT/PLAN:     Current Problems List:  Active Hospital Problems    Diagnosis  POA    *Acute upper GI bleed [K92.2]  Yes    Chest pressure [R07.89]  Yes    Acute gastric ulcer with hemorrhage [K25.0]  Yes    DICKINSON (dyspnea on exertion) [R06.09]  Yes    Bilateral occipital neuralgia [M54.81]  Yes    Acute post-hemorrhagic anemia [D62]  Yes    Gastroesophageal reflux disease without esophagitis [K21.9]  Yes    Obstructive sleep apnea syndrome [G47.33]  Yes    Essential hypertension [I10]  Yes    PUD (peptic ulcer disease) [K27.9]  Yes    Migraine without aura and without status migrainosus, not intractable [G43.009]  Yes       Resolved Hospital Problems    Diagnosis Date Resolved POA   No resolved problems to display.       Active Problems, Status & Treatment Plan Addressed Today:    Acute upper GI bleed   -- given history likely from gastric ulcers  -- repeat Hgb stable at 7.8, no BM since admission   -- one liter of NS bolus for hypotension   -- protonix 40 mg IV BID    -- EGD and Colonoscopy tomorrow with GI  -- Clear liquid diet today and NPO at midnight   -- Golytely prep per GI   -- CBC Q12H      PUD (peptic ulcer disease)  -- As above       Acute gastric ulcer with hemorrhage   -- As above        Essential hypertension   -- currently hypotensive   -- will hold home irbesartan-HCTZ 300-12.5mg in context of GIB       Migraine  -- fioricet PRN  -- sumatriptan PRN      Chest pressure  -- EKG normal sinus with no signs of ischemia   -- 1st troponin negative, will trend         VTE Risk Mitigation         Ordered     Medium Risk of VTE  Once      02/15/17 2008     Reason for No Pharmacological VTE Prophylaxis  Once      02/15/17 2008          Signing Physician:    Dashawn Sorenson MD  Department of Internal Medicine  PGY-1

## 2017-02-16 NOTE — PROGRESS NOTES
Pt arrived to unit as DIrect Admit at this time. VSs obtained and stable. Admission MD Armendariz notified. MD to come evaluate patient and place appropriate orders. 18g PIV placed to left posterior FA, in case blood transfusion indicated during admission. Wife at bedside, bed low position, call bell in reach and audible.

## 2017-02-16 NOTE — ANESTHESIA PREPROCEDURE EVALUATION
02/16/2017  Pre-operative evaluation for Procedure(s) (LRB):  ESOPHAGOGASTRODUODENOSCOPY (EGD) (N/A)  COLONOSCOPY (N/A)    Jonathan Villela is a 56 y.o. male with questionable history of Crohn's Disease who presented with melena. EGD Saturday showed multiple nonbleeding ulcers. He is scheduled for EGD/Colonoscopy tomorrow.     LDA:   - 18 G PIV in Left Forearm  - 18G PIV in Right AC    Prev airway:   Placement Date: 02/11/17; Placement Time: 1222; Method of Intubation: Direct laryngoscopy; Inserted by: CRNA; Airway Device: Endotracheal Tube; Mask Ventilation: Easy; Intubated: Postinduction; Blade: Collins #2; Airway Device Size: 7.5; Style: Cuffed; Cuff Inflation: Minimal occlusive pressure; Inflation Amount: 8; Placement Verified By: Capnometry, Auscultation, ETT Condensation; Grade: Grade I; Complicating Factors: None; Intubation Findings: Positive EtCO2, Bilateral breath sounds, Atraumatic/Condition of teeth unchanged;  Depth of Insertion: 23; Securment: Lips; Complications: None; Breath Sounds: Equal Bilateral; Insertion Attempts: 1; Removal Date: 02/11/17;  Removal Time: 1254    Drips: None    Patient Active Problem List   Diagnosis    Migraine without aura and without status migrainosus, not intractable    Essential hypertension    PUD (peptic ulcer disease)    Erectile dysfunction    Insomnia    Obstructive sleep apnea syndrome    Fatigue    Somnolence, daytime    Allergic rhinitis due to allergen    BPH with urinary obstruction    Stiff neck    Abnormal posture    Acute upper GI bleed    Gastroesophageal reflux disease without esophagitis    Acute post-hemorrhagic anemia    Acute gastric ulcer with hemorrhage    Bilateral occipital neuralgia    DICKINSON (dyspnea on exertion)    Chest pressure       Review of patient's allergies indicates:  No Known Allergies     No current  facility-administered medications on file prior to encounter.      Current Outpatient Prescriptions on File Prior to Encounter   Medication Sig Dispense Refill    dicyclomine (BENTYL) 10 MG capsule Take 10 mg by mouth as needed.   1    folic acid (FOLVITE) 1 MG tablet Take 1 tablet (1 mg total) by mouth once daily. 30 tablet 0    frovatriptan (FROVA) 2.5 MG tablet Take 1 tablet (2.5 mg total) by mouth as needed for Migraine. If recurs, may repeat after 2 hours. Max of 3 tabs in 24 hours. 9 tablet 3    irbesartan-hydrochlorothiazide (AVALIDE) 300-12.5 mg per tablet Take 1 tablet by mouth once daily. 90 tablet 3    iron polysaccharides (NIFEREX) 150 mg iron Cap Take 1 capsule (150 mg total) by mouth once daily.  0    metronidazole 1% (METROGEL) 1 % Gel CHAVEZ QHS QHS AS DIRECTED  0    ondansetron (ZOFRAN) 4 MG tablet Take 1 tablet (4 mg total) by mouth every 6 (six) hours. (Patient taking differently: Take 4 mg by mouth every 6 (six) hours as needed. ) 12 tablet 0    pantoprazole (PROTONIX) 40 MG tablet Take 1 tablet (40 mg total) by mouth before breakfast. 90 tablet 3    RELPAX 40 mg tablet TAKE 1 TABLET BY MOUTH AS NEEDED, MAY REPEAT IN 2 HOURS IF NEEDED DO NOT EXCEED 4 TABLETS EVERY DAY 9 tablet 0    triamcinolone acetonide 0.1% (KENALOG) 0.1 % cream AAA bid after cool blow dry to affected areas of groin 1 Bottle 3    triamcinolone acetonide 0.1% (KENALOG) 0.1 % cream APPLY TO THE AFFECTED AREA TWICE DAILY 60 g 0    butalbital-acetaminophen-caffeine -40 mg (FIORICET) -40 mg per tablet Take 1-2 tablets by mouth every 6 to 8 hours as needed for Headaches. 20 tablet 0    hydrocodone-acetaminophen 5-325mg (NORCO) 5-325 mg per tablet Take 1 tablet by mouth every 6 (six) hours as needed for Pain. 20 tablet 0       Past Surgical History   Procedure Laterality Date    Leg surgery      Nasal septum surgery      Cholecystectomy         Social History     Social History    Marital status:       Spouse name: N/A    Number of children: N/A    Years of education: N/A     Occupational History    Not on file.     Social History Main Topics    Smoking status: Never Smoker    Smokeless tobacco: Not on file    Alcohol use Yes      Comment: ocasionally    Drug use: No    Sexual activity: Yes     Partners: Female     Other Topics Concern    Not on file     Social History Narrative         Vital Signs Range (Last 24H):  Temp:  [36.4 °C (97.6 °F)-36.9 °C (98.5 °F)]   Pulse:  [58-93]   Resp:  [16-20]   BP: ()/(55-88)   SpO2:  [92 %-100 %]       CBC:   Recent Labs      02/15/17   2056  02/16/17   0515   WBC  7.31  7.13   RBC  2.63*  2.59*   HGB  8.0*  7.8*   HCT  23.6*  23.4*   PLT  331  301   MCV  90  90   MCH  30.4  30.1   MCHC  33.9  33.3       CMP:   Recent Labs      02/15/17   2055   NA  140   K  3.8   CL  104   CO2  29   BUN  12   CREATININE  0.9   GLU  83   CALCIUM  8.8   ALBUMIN  3.3*   PROT  6.4   ALKPHOS  63   ALT  27   AST  21   BILITOT  0.3       INR  Recent Labs      02/14/17   0830   INR  1.1           Diagnostic Studies: None      EKG: Normal Sinus Rhythm       2D Echo: None          [facility] Anesthesia Evaluation    I have reviewed the Patient Summary Reports.    I have reviewed the Nursing Notes.   I have reviewed the Medications.     Review of Systems  Anesthesia Hx:  No problems with previous Anesthesia  History of prior surgery of interest to airway management or planning: Previous anesthesia: General, MAC Denies Family Hx of Anesthesia complications.   Denies Personal Hx of Anesthesia complications.   Social:  Social Alcohol Use, Non-Smoker    Hematology/Oncology:         -- Anemia:   Cardiovascular:   Exercise tolerance: poor Hypertension    Pulmonary:   Denies Shortness of breath. Sleep Apnea    Renal/:  Renal/ Normal     Hepatic/GI:   Bowel Prep. PUD, GERD, well controlled    Musculoskeletal:  Musculoskeletal Normal    Neurological:   Headaches    Endocrine:  Endocrine Normal     Dermatological:  Skin Normal    Psych:  Psychiatric Normal           Physical Exam  General:  Well nourished, Obesity    Airway/Jaw/Neck:  Airway Findings: Mouth Opening: Normal General Airway Assessment: Adult  Mallampati: II  Improves to I with phonation.  TM Distance: Normal, at least 6 cm      Dental:  Dental Findings: In tact   Chest/Lungs:  Chest/Lungs Findings: Clear to auscultation, Normal Respiratory Rate     Heart/Vascular:  Heart Findings: Rate: Normal  Rhythm: Regular Rhythm  Heart murmur: negative    Abdomen:  Abdomen Findings: Normal    Musculoskeletal:  Musculoskeletal Findings: Normal   Skin:  Skin Findings: Normal    Mental Status:  Mental Status Findings:  Cooperative         Anesthesia Plan  Type of Anesthesia, risks & benefits discussed:  Anesthesia Type:  general, MAC  Patient's Preference:   Intra-op Monitoring Plan:   Intra-op Monitoring Plan Comments:   Post Op Pain Control Plan:   Post Op Pain Control Plan Comments:   Induction:   IV  Beta Blocker:  Patient is not currently on a Beta-Blocker (No further documentation required).       Informed Consent: Patient understands risks and agrees with Anesthesia plan.  Questions answered. Anesthesia consent signed with patient.  ASA Score: 2     Day of Surgery Review of History & Physical:    H&P update referred to the provider.         Ready For Surgery From Anesthesia Perspective.

## 2017-02-17 ENCOUNTER — SURGERY (OUTPATIENT)
Age: 57
End: 2017-02-17

## 2017-02-17 ENCOUNTER — ANESTHESIA (OUTPATIENT)
Dept: ENDOSCOPY | Facility: HOSPITAL | Age: 57
DRG: 378 | End: 2017-02-17
Payer: COMMERCIAL

## 2017-02-17 VITALS
HEIGHT: 72 IN | OXYGEN SATURATION: 95 % | SYSTOLIC BLOOD PRESSURE: 112 MMHG | TEMPERATURE: 97 F | DIASTOLIC BLOOD PRESSURE: 70 MMHG | RESPIRATION RATE: 16 BRPM | BODY MASS INDEX: 32.28 KG/M2 | WEIGHT: 238.31 LBS | HEART RATE: 82 BPM

## 2017-02-17 PROBLEM — K92.2 GI BLEED: Status: ACTIVE | Noted: 2017-02-17

## 2017-02-17 LAB
ANION GAP SERPL CALC-SCNC: 8 MMOL/L
BUN SERPL-MCNC: 8 MG/DL
CALCIUM SERPL-MCNC: 8.9 MG/DL
CHLORIDE SERPL-SCNC: 102 MMOL/L
CO2 SERPL-SCNC: 29 MMOL/L
CREAT SERPL-MCNC: 0.9 MG/DL
ERYTHROCYTE [DISTWIDTH] IN BLOOD BY AUTOMATED COUNT: 12.9 %
ERYTHROCYTE [DISTWIDTH] IN BLOOD BY AUTOMATED COUNT: 13.3 %
EST. GFR  (AFRICAN AMERICAN): >60 ML/MIN/1.73 M^2
EST. GFR  (NON AFRICAN AMERICAN): >60 ML/MIN/1.73 M^2
GLUCOSE SERPL-MCNC: 93 MG/DL
HCT VFR BLD AUTO: 24.5 %
HCT VFR BLD AUTO: 25.6 %
HGB BLD-MCNC: 8.4 G/DL
HGB BLD-MCNC: 8.8 G/DL
MCH RBC QN AUTO: 29.8 PG
MCH RBC QN AUTO: 30.3 PG
MCHC RBC AUTO-ENTMCNC: 34.3 %
MCHC RBC AUTO-ENTMCNC: 34.4 %
MCV RBC AUTO: 87 FL
MCV RBC AUTO: 88 FL
PLATELET # BLD AUTO: 377 K/UL
PLATELET # BLD AUTO: 400 K/UL
PMV BLD AUTO: 8.2 FL
PMV BLD AUTO: 8.6 FL
POTASSIUM SERPL-SCNC: 3.8 MMOL/L
RBC # BLD AUTO: 2.82 M/UL
RBC # BLD AUTO: 2.9 M/UL
SODIUM SERPL-SCNC: 139 MMOL/L
WBC # BLD AUTO: 6.53 K/UL
WBC # BLD AUTO: 6.63 K/UL

## 2017-02-17 PROCEDURE — 63600175 PHARM REV CODE 636 W HCPCS: Performed by: INTERNAL MEDICINE

## 2017-02-17 PROCEDURE — 25000003 PHARM REV CODE 250: Performed by: NURSE ANESTHETIST, CERTIFIED REGISTERED

## 2017-02-17 PROCEDURE — 99238 HOSP IP/OBS DSCHRG MGMT 30/<: CPT | Mod: ,,, | Performed by: HOSPITALIST

## 2017-02-17 PROCEDURE — 85027 COMPLETE CBC AUTOMATED: CPT

## 2017-02-17 PROCEDURE — 0DJ08ZZ INSPECTION OF UPPER INTESTINAL TRACT, VIA NATURAL OR ARTIFICIAL OPENING ENDOSCOPIC: ICD-10-PCS | Performed by: INTERNAL MEDICINE

## 2017-02-17 PROCEDURE — 43235 EGD DIAGNOSTIC BRUSH WASH: CPT | Mod: 51,,, | Performed by: INTERNAL MEDICINE

## 2017-02-17 PROCEDURE — 0DJD8ZZ INSPECTION OF LOWER INTESTINAL TRACT, VIA NATURAL OR ARTIFICIAL OPENING ENDOSCOPIC: ICD-10-PCS | Performed by: INTERNAL MEDICINE

## 2017-02-17 PROCEDURE — C9113 INJ PANTOPRAZOLE SODIUM, VIA: HCPCS | Performed by: INTERNAL MEDICINE

## 2017-02-17 PROCEDURE — 80048 BASIC METABOLIC PNL TOTAL CA: CPT

## 2017-02-17 PROCEDURE — 36415 COLL VENOUS BLD VENIPUNCTURE: CPT

## 2017-02-17 PROCEDURE — D9220A PRA ANESTHESIA: Mod: ,,, | Performed by: ANESTHESIOLOGY

## 2017-02-17 PROCEDURE — 63600175 PHARM REV CODE 636 W HCPCS: Performed by: NURSE ANESTHETIST, CERTIFIED REGISTERED

## 2017-02-17 PROCEDURE — 37000008 HC ANESTHESIA 1ST 15 MINUTES: Performed by: INTERNAL MEDICINE

## 2017-02-17 PROCEDURE — 45378 DIAGNOSTIC COLONOSCOPY: CPT | Mod: ,,, | Performed by: INTERNAL MEDICINE

## 2017-02-17 PROCEDURE — 43235 EGD DIAGNOSTIC BRUSH WASH: CPT | Performed by: INTERNAL MEDICINE

## 2017-02-17 PROCEDURE — 37000009 HC ANESTHESIA EA ADD 15 MINS: Performed by: INTERNAL MEDICINE

## 2017-02-17 PROCEDURE — 85027 COMPLETE CBC AUTOMATED: CPT | Mod: 91

## 2017-02-17 PROCEDURE — 45378 DIAGNOSTIC COLONOSCOPY: CPT | Performed by: INTERNAL MEDICINE

## 2017-02-17 RX ORDER — PROPOFOL 10 MG/ML
VIAL (ML) INTRAVENOUS
Status: DISCONTINUED | OUTPATIENT
Start: 2017-02-17 | End: 2017-02-17

## 2017-02-17 RX ORDER — LIDOCAINE HCL/PF 100 MG/5ML
SYRINGE (ML) INTRAVENOUS
Status: DISCONTINUED | OUTPATIENT
Start: 2017-02-17 | End: 2017-02-17

## 2017-02-17 RX ORDER — SODIUM CHLORIDE 9 MG/ML
INJECTION, SOLUTION INTRAVENOUS CONTINUOUS PRN
Status: DISCONTINUED | OUTPATIENT
Start: 2017-02-17 | End: 2017-02-17

## 2017-02-17 RX ORDER — PROPOFOL 10 MG/ML
VIAL (ML) INTRAVENOUS CONTINUOUS PRN
Status: DISCONTINUED | OUTPATIENT
Start: 2017-02-17 | End: 2017-02-17

## 2017-02-17 RX ADMIN — LIDOCAINE HYDROCHLORIDE 50 MG: 20 INJECTION, SOLUTION INTRAVENOUS at 01:02

## 2017-02-17 RX ADMIN — PROPOFOL 125 MCG/KG/MIN: 10 INJECTION, EMULSION INTRAVENOUS at 01:02

## 2017-02-17 RX ADMIN — PROPOFOL 30 MG: 10 INJECTION, EMULSION INTRAVENOUS at 01:02

## 2017-02-17 RX ADMIN — PROPOFOL 100 MG: 10 INJECTION, EMULSION INTRAVENOUS at 01:02

## 2017-02-17 RX ADMIN — SODIUM CHLORIDE: 0.9 INJECTION, SOLUTION INTRAVENOUS at 01:02

## 2017-02-17 RX ADMIN — PROPOFOL 50 MG: 10 INJECTION, EMULSION INTRAVENOUS at 01:02

## 2017-02-17 RX ADMIN — PANTOPRAZOLE SODIUM 40 MG: 40 INJECTION, POWDER, FOR SOLUTION INTRAVENOUS at 09:02

## 2017-02-17 NOTE — DISCHARGE INSTRUCTIONS
Upper GI Endoscopy     During endoscopy, a long, flexible tube is used to view the inside of your upper GI tract.      Upper GI endoscopy allows your healthcare provider to look directly into the beginning of your gastrointestinal (GI) tract. The esophagus, stomach, and duodenum (the first part of the small intestine) make up the upper GI tract.   Before the exam  Follow these and any other instructions you are given before your endoscopy. If you dont follow the healthcare providers instructions carefully, the test may need to be canceled or done over:  · Don't eat or drink anything after midnight the night before your exam. If your exam is in the afternoon, drink only clear liquids in the morning. Don't eat or drink anything for 8 hours before the exam. In some cases, you may be able to take medicines with sips of water until 2 hours before the procedure. Speak with your healthcare provider about this.   · Bring your X-rays and any other test results you have.  · Because you will be sedated, arrange for an adult to drive you home after the exam.  · Tell your healthcare provider before the exam if you are taking any medicines or have any medical problems.  The procedure  Here is what to expect:  · You will lie on the endoscopy table. Usually patients lie on the left side.  · You will be monitored and given oxygen.  · Your throat may be numbed with a spray or gargle. You are given medicine through an intravenous (IV) line that will help you relax and remain comfortable. You may be awake or asleep during the procedure.  · The healthcare provider will put the endoscope in your mouth and down your esophagus. It is thinner than most pieces of food that you swallow. It will not affect your breathing. The medicine helps keep you from gagging.  · Air is put into your GI tract to expand it. It can make you burp.  · During the procedure, the healthcare provider can take biopsies (tissue samples), remove abnormalities, such  as polyps, or treat abnormalities through a variety of devices placed through the endoscope. You will not feel this.   · The endoscope carries images of your upper GI tract to a video screen. If you are awake, you may be able to look at the images.  · After the procedure is done, you will rest for a time. An adult must drive you home.  When to call your healthcare provider  Contact your healthcare provider if you have:  · Black or tarry stools, or blood in your stool  · Fever  · Pain in your belly that does not go away  · Nausea and vomiting, or vomiting blood   Date Last Reviewed: 7/1/2016 © 2000-2016 [employer]. [address] [place] [address], [place] [address]. All rights reserved. This information is not intended as a substitute for professional medical care. Always follow your healthcare professional's instructions.      Colonoscopy     A camera attached to a flexible tube with a viewing lens is used to take video pictures.     Colonoscopy is a test to view the inside of your lower digestive tract (colon and rectum). Sometimes it can show the last part of the small intestine (ileum). During the test, small pieces of tissue may be removed for testing. This is called a biopsy. Small growths, such as polyps, may also be removed.   Why is colonoscopy done?  The test is done to help look for colon cancer. And it can help find the source of abdominal pain, bleeding, and changes in bowel habits. It may be needed once a year, depending on factors such as your:  · Age  · Health history  · Family health history  · Symptoms  · Results from any prior colonoscopy  Risks and possible complications  These include:  · Bleeding               · A puncture or tear in the colon   · Risks of anesthesia  · A cancer lesion not being seen  Getting ready   To prepare for the test:  · Talk with your healthcare provider about the risks of the test (see below). Also ask your healthcare provider about alternatives to the  test.  · Tell your healthcare provider about any medicines you take. Also tell him or her about any health conditions you may have.  · Make sure your rectum and colon are empty for the test. Follow the diet and bowel prep instructions exactly. If you dont, the test may need to be rescheduled.  · Plan for a friend or family member to drive you home after the test.     Colonoscopy provides an inside view of the entire colon.     You may discuss the results with your doctor right away or at a future visit.  During the test   The test is usually done in the hospital on an outpatient basis. This means you go home the same day. The procedure takes about 30 minutes. During that time:  · You are given relaxing (sedating) medicine through an IV line. You may be drowsy, or fully asleep.  · The healthcare provider will first give you a physical exam to check for anal and rectal problems.  · Then the anus is lubricated and the scope inserted.  · If you are awake, you may have a feeling similar to needing to have a bowel movement. You may also feel pressure as air is pumped into the colon. Its OK to pass gas during the procedure.  · Biopsy, polyp removal, or other treatments may be done during the test.  After the test   You may have gas right after the test. It can help to try to pass it to help prevent later bloating. Your healthcare provider may discuss the results with you right away. Or you may need to schedule a follow-up visit to talk about the results. After the test, you can go back to your normal eating and other activities. You may be tired from the sedation and need to rest for a few hours.  Date Last Reviewed: 11/1/2016 © 2000-2016 [employer]. [address] [place] [address]. All rights reserved. This information is not intended as a substitute for professional medical care. Always follow your healthcare professional's instructions.        Upper GI Endoscopy     During endoscopy, a long,  flexible tube is used to view the inside of your upper GI tract.      Upper GI endoscopy allows your healthcare provider to look directly into the beginning of your gastrointestinal (GI) tract. The esophagus, stomach, and duodenum (the first part of the small intestine) make up the upper GI tract.   Before the exam  Follow these and any other instructions you are given before your endoscopy. If you dont follow the healthcare providers instructions carefully, the test may need to be canceled or done over:  · Don't eat or drink anything after midnight the night before your exam. If your exam is in the afternoon, drink only clear liquids in the morning. Don't eat or drink anything for 8 hours before the exam. In some cases, you may be able to take medicines with sips of water until 2 hours before the procedure. Speak with your healthcare provider about this.   · Bring your X-rays and any other test results you have.  · Because you will be sedated, arrange for an adult to drive you home after the exam.  · Tell your healthcare provider before the exam if you are taking any medicines or have any medical problems.  The procedure  Here is what to expect:  · You will lie on the endoscopy table. Usually patients lie on the left side.  · You will be monitored and given oxygen.  · Your throat may be numbed with a spray or gargle. You are given medicine through an intravenous (IV) line that will help you relax and remain comfortable. You may be awake or asleep during the procedure.  · The healthcare provider will put the endoscope in your mouth and down your esophagus. It is thinner than most pieces of food that you swallow. It will not affect your breathing. The medicine helps keep you from gagging.  · Air is put into your GI tract to expand it. It can make you burp.  · During the procedure, the healthcare provider can take biopsies (tissue samples), remove abnormalities, such as polyps, or treat abnormalities through a  variety of devices placed through the endoscope. You will not feel this.   · The endoscope carries images of your upper GI tract to a video screen. If you are awake, you may be able to look at the images.  · After the procedure is done, you will rest for a time. An adult must drive you home.  When to call your healthcare provider  Contact your healthcare provider if you have:  · Black or tarry stools, or blood in your stool  · Fever  · Pain in your belly that does not go away  · Nausea and vomiting, or vomiting blood   Date Last Reviewed: 7/1/2016  © 9975-8892 [employer]. [address] [place] [address], [place] [address]. All rights reserved. This information is not intended as a substitute for professional medical care. Always follow your healthcare professional's instructions.

## 2017-02-17 NOTE — PROGRESS NOTES
[facility] Hospital Medicine  Progress Note     Patient Name: Jonathan Villela  MRN: [id number]  Patient Class: IP- Inpatient   Admission Date: 2/15/2017  Length of Stay: 2  Attending Physician: Temitope Nguyen MD  Primary Care Provider Manish Joel MD                                                        Team: [facility] Maulik Narvaez MD    SUBJECTIVE:     Principle Problem:  Acute upper GI bleed      Interval history:   NAEON. Chest pressure resolved. No further blood in stool.     Review of Systems   Constitutional: Negative for chills and fever.   HENT: Negative for congestion and sore throat.    Eyes: Negative for blurred vision and redness.   Respiratory: Negative for cough, shortness of breath and wheezing.    Cardiovascular: Negative for chest pain, palpitations and leg swelling.   Gastrointestinal: Negative for abdominal pain, blood in stool, diarrhea, nausea and vomiting.   Genitourinary: Negative for dysuria and hematuria.   Musculoskeletal: Negative for myalgias and neck pain.   Skin: Negative for rash.   Neurological: Negative for dizziness, focal weakness and seizures.   Psychiatric/Behavioral: The patient is not nervous/anxious.      OBJECTIVE:       Vital Signs Range (Last 24H):  Temp:  [97.3 °F (36.3 °C)-98.8 °F (37.1 °C)]   Pulse:  [56-91]   Resp:  [16-20]   BP: ()/(58-85)   SpO2:  [92 %-97 %] Body mass index is 32.32 kg/(m^2).    I & O (Last 24H):  Intake/Output Summary (Last 24 hours) at 02/17/17 1218  Last data filed at 02/16/17 1850   Gross per 24 hour   Intake              750 ml   Output                0 ml   Net              750 ml       Physical Exam:  General: well developed, well nourished, appears stated age, no distress  Eyes: conjunctivae/corneas clear. PERRL.  ENT: MMM, nasal turbinates not inflamed, no oropharyngeal edema or erythema  Neck: supple, symmetrical, trachea midline, no JVD and thyroid not enlarged, symmetric, no  tenderness/mass/nodules  Lungs:  clear to auscultation bilaterally and normal respiratory effort  Cardiovascular: Heart: regular rate and rhythm, S1, S2 normal, no murmur, click, rub or gallop. Chest Wall: no tenderness. Extremities: no cyanosis or clubbing. No edema. Pulses: 2+ and symmetric.  Abdomen: soft, non-tender non-distended; bowel sounds normal; no masses,  no organomegaly.  Musculoskeletal: moves all extremities spontaneously  Lymph Nodes: No cervical or supraclavicular adenopathy      Diagnostic Results:  Lab Results   Component Value Date    WBC 6.53 02/17/2017    HGB 8.8 (L) 02/17/2017    HCT 25.6 (L) 02/17/2017    MCV 88 02/17/2017     (H) 02/17/2017       Recent Labs  Lab 02/17/17  0422   GLU 93      K 3.8      CO2 29   BUN 8   CREATININE 0.9   CALCIUM 8.9     Lab Results   Component Value Date    INR 1.1 02/14/2017    INR 1.1 02/10/2017     Lab Results   Component Value Date    HGBA1C 5.4 08/29/2014     No results for input(s): POCTGLUCOSE in the last 72 hours.    Significant imaging: I have reviewed all pertinent imaging results/findings within the past 24 hours    ASSESSMENT/PLAN:     Current Problems List:  Active Hospital Problems    Diagnosis  POA    *Acute upper GI bleed [K92.2]  Yes    Chest pressure [R07.89]  Yes    Acute gastric ulcer with hemorrhage [K25.0]  Yes    DICKINSON (dyspnea on exertion) [R06.09]  Yes    Bilateral occipital neuralgia [M54.81]  Yes    Acute post-hemorrhagic anemia [D62]  Yes    Gastroesophageal reflux disease without esophagitis [K21.9]  Yes    Obstructive sleep apnea syndrome [G47.33]  Yes    Essential hypertension [I10]  Yes    PUD (peptic ulcer disease) [K27.9]  Yes    Migraine without aura and without status migrainosus, not intractable [G43.009]  Yes      Resolved Hospital Problems    Diagnosis Date Resolved POA   No resolved problems to display.       Active Problems, Status & Treatment Plan Addressed Today:  Acute upper GI bleed   --  given history likely from gastric ulcers  -- Hgb stable, no further bloody stools  -- one liter of NS bolus for hypotension   -- protonix 40 mg IV BID   -- EGD and Colonoscopy today with GI  -- Clear liquid diet today and NPO at midnight   -- Golytely prep per GI   -- CBC Q12H        PUD (peptic ulcer disease)  -- As above         Acute gastric ulcer with hemorrhage   -- As above          Essential hypertension   -- currently normotensive  -- will hold home irbesartan-HCTZ 300-12.5mg in context of GIB         Migraine  -- fioricet PRN  -- sumatriptan PRN        Chest pressure  -- EKG normal sinus with no signs of ischemia   -- troponin negative  -- resolved    VTE Risk Mitigation         Ordered     Medium Risk of VTE  Once      02/15/17 2008     Reason for No Pharmacological VTE Prophylaxis  Once      02/15/17 2008          Signing Physician:    Maulik Narvaez MD  Department of Internal Medicine  PGY-3

## 2017-02-17 NOTE — NURSING TRANSFER
Nursing Transfer Note      2/17/2017     Transfer To: Hospital, [facility], 9th Floor, Room 931A from [facility].    Transfer Via: Stretcher.    Transfer With: Continuous Cardiac Monitoring.    Transported By: ROBBIN Ireland.    Medicines Sent: None.    Chart Sent with Patient: Yes.    Notified: Provided patient with my SpectraLink so that he could call his girlfriend.  Lolita Seymour RN.    Patient Reassessed at: 1525 on 02/17/2017.    Upon arrival to floor: Cardiac monitor on, patient oriented to room, bed in lowest position, and call bell within reach.

## 2017-02-17 NOTE — H&P
[facility]  History & Physical    Subjective:      Chief Complaint/Reason for Admission:     EGD and colonoscopy    Jonathan Villela is a 56 y.o. male.    Past Medical History   Diagnosis Date    Acute gastric ulcer with hemorrhage 2/15/2017    Bilateral occipital neuralgia     BPH with urinary obstruction 12/31/2015    Colitis [id number]     infectious?    Diverticulitis     Essential hypertension     Gastroesophageal reflux disease without esophagitis 2/11/2017    Migraine without aura and without status migrainosus, not intractable 1/31/2014    Obstructive sleep apnea syndrome 2/9/2015    PUD (peptic ulcer disease)      Past Surgical History   Procedure Laterality Date    Leg surgery      Nasal septum surgery      Cholecystectomy      Esophagogastroduodenoscopy       Family History   Problem Relation Age of Onset    Crohn's disease       brother, sister, father, nephew    Prostate cancer Neg Hx     Kidney disease Neg Hx     Melanoma Neg Hx      Social History   Substance Use Topics    Smoking status: Never Smoker    Smokeless tobacco: None    Alcohol use Yes      Comment: ocasionally       PTA Medications   Medication Sig    butalbital-acetaminophen-caffeine -40 mg (FIORICET) -40 mg per tablet Take 1-2 tablets by mouth every 6 to 8 hours as needed for Headaches.    frovatriptan (FROVA) 2.5 MG tablet Take 1 tablet (2.5 mg total) by mouth as needed for Migraine. If recurs, may repeat after 2 hours. Max of 3 tabs in 24 hours.    hydrocodone-acetaminophen 5-325mg (NORCO) 5-325 mg per tablet Take 1 tablet by mouth every 6 (six) hours as needed for Pain.    irbesartan-hydrochlorothiazide (AVALIDE) 300-12.5 mg per tablet Take 1 tablet by mouth once daily.    IRON PS CMPLX/VIT B12/FA (NIFEREX-150 FORTE ORAL) Take 1 tablet by mouth once daily.    metronidazole 1% (METROGEL) 1 % Gel Apply at bedtime as directed as needed for skin infection    ondansetron  (ZOFRAN) 4 MG tablet Take 1 tablet (4 mg total) by mouth every 6 (six) hours. (Patient taking differently: Take 4 mg by mouth every 6 (six) hours as needed for Nausea. )    pantoprazole (PROTONIX) 40 MG tablet Take 1 tablet (40 mg total) by mouth before breakfast.    RELPAX 40 mg tablet TAKE 1 TABLET BY MOUTH AS NEEDED, MAY REPEAT IN 2 HOURS IF NEEDED DO NOT EXCEED 4 TABLETS EVERY DAY    triamcinolone acetonide 0.1% (KENALOG) 0.1 % cream AAA bid after cool blow dry to affected areas of groin     Review of patient's allergies indicates:  No Known Allergies     Review of Systems   Constitutional: Negative for chills and fever.   Respiratory: Negative for shortness of breath and wheezing.    Cardiovascular: Negative for chest pain.   Gastrointestinal: Positive for blood in stool and melena. Negative for abdominal pain, constipation and diarrhea.       Objective:      Vital Signs (Most Recent)  Temp: 98.6 °F (37 °C) (02/17/17 1132)  Pulse: 84 (02/17/17 1141)  Resp: 16 (02/17/17 1132)  BP: 129/82 (02/17/17 1132)  SpO2: 96 % (02/17/17 1132)    Vital Signs Range (Last 24H):  Temp:  [97.3 °F (36.3 °C)-98.8 °F (37.1 °C)]   Pulse:  [56-91]   Resp:  [16-20]   BP: ()/(58-85)   SpO2:  [92 %-98 %]     Physical Exam   Constitutional: He is oriented to person, place, and time. He appears well-developed and well-nourished.   Pulmonary/Chest: Effort normal and breath sounds normal.   Abdominal: Soft. Bowel sounds are normal. He exhibits no distension. There is no tenderness.   Neurological: He is alert and oriented to person, place, and time.   Skin: Skin is warm and dry.   Psychiatric: He has a normal mood and affect. His behavior is normal. Judgment and thought content normal.           Assessment:      Active Hospital Problems    Diagnosis  POA    *Acute upper GI bleed [K92.2]  Yes    Chest pressure [R07.89]  Yes    Acute gastric ulcer with hemorrhage [K25.0]  Yes    DICKINSON (dyspnea on exertion) [R06.09]  Yes     Bilateral occipital neuralgia [M54.81]  Yes    Acute post-hemorrhagic anemia [D62]  Yes    Gastroesophageal reflux disease without esophagitis [K21.9]  Yes    Obstructive sleep apnea syndrome [G47.33]  Yes    Essential hypertension [I10]  Yes    PUD (peptic ulcer disease) [K27.9]  Yes    Migraine without aura and without status migrainosus, not intractable [G43.009]  Yes      Resolved Hospital Problems    Diagnosis Date Resolved POA   No resolved problems to display.       Plan:    EGD and colonoscopy for history of melena and anemia and hematochezia.

## 2017-02-17 NOTE — ANESTHESIA POSTPROCEDURE EVALUATION
Anesthesia Post Evaluation    Patient: Jonathan Villela    Procedure(s) Performed: Procedure(s) (LRB):  ESOPHAGOGASTRODUODENOSCOPY (EGD) (N/A)  COLONOSCOPY (N/A)    Final Anesthesia Type: general  Patient location during evaluation: PACU  Patient participation: Yes- Able to Participate  Level of consciousness: awake and alert, awake and oriented  Post-procedure vital signs: reviewed and stable  Pain management: adequate  Airway patency: patent  PONV status at discharge: No PONV  Anesthetic complications: no      Cardiovascular status: blood pressure returned to baseline, stable and hemodynamically stable  Respiratory status: unassisted, spontaneous ventilation and room air  Hydration status: euvolemic  Follow-up not needed.        Visit Vitals    /82 (BP Location: Left arm, Patient Position: Lying, BP Method: Automatic)    Pulse 77    Temp 36.6 °C (97.8 °F) (Oral)    Resp 16    Ht 6' (1.829 m)    Wt 108.1 kg (238 lb 5.1 oz)    SpO2 (!) 94%    BMI 32.32 kg/m2       Pain/Sara Score: Pain Assessment Performed: Yes (2/17/2017 11:50 AM)  Presence of Pain: denies (2/17/2017 11:50 AM)  Pain Rating Prior to Med Admin: 4 (2/16/2017  8:38 PM)

## 2017-02-17 NOTE — TRANSFER OF CARE
Anesthesia Transfer of Care Note    Patient: Jonathan Villela    Procedure(s) Performed: Procedure(s) (LRB):  ESOPHAGOGASTRODUODENOSCOPY (EGD) (N/A)  COLONOSCOPY (N/A)    Patient location: [facility]    Anesthesia Type: general    Transport from OR: Transported from OR on room air with adequate spontaneous ventilation    Post pain: adequate analgesia    Post assessment: no apparent anesthetic complications    Post vital signs: stable    Level of consciousness: awake    Nausea/Vomiting: no nausea/vomiting    Complications: none          Last vitals:   Visit Vitals    /82 (BP Location: Left arm, Patient Position: Lying, BP Method: Automatic)    Pulse 77    Temp 36.6 °C (97.8 °F) (Oral)    Resp 16    Ht 6' (1.829 m)    Wt 108.1 kg (238 lb 5.1 oz)    SpO2 (!) 94%    BMI 32.32 kg/m2

## 2017-02-17 NOTE — PHARMACY MED REC
"Admission Medication Reconciliation - Pharmacy Consult Note    The home medication history was taken by Lachelle Barrientos, Pharmacy Tech.    You may go to "Admission" then "Reconcile Home Medications" tabs to review and/or act upon these items.    No issues noted with the medication reconciliation.    Please address this information as you see fit.  Feel free to contact us if you have any questions or require assistance.    Kassy Gongora, PharmD  [id number]        Patient's prior to admission medication regimen was as follows:  Prescriptions Prior to Admission   Medication Sig Dispense Refill Last Dose    butalbital-acetaminophen-caffeine -40 mg (FIORICET) -40 mg per tablet Take 1-2 tablets by mouth every 6 to 8 hours as needed for Headaches. 20 tablet 0     frovatriptan (FROVA) 2.5 MG tablet Take 1 tablet (2.5 mg total) by mouth as needed for Migraine. If recurs, may repeat after 2 hours. Max of 3 tabs in 24 hours. 9 tablet 3 2/14/2017 at night    hydrocodone-acetaminophen 5-325mg (NORCO) 5-325 mg per tablet Take 1 tablet by mouth every 6 (six) hours as needed for Pain. 20 tablet 0     irbesartan-hydrochlorothiazide (AVALIDE) 300-12.5 mg per tablet Take 1 tablet by mouth once daily. 90 tablet 3 2/10/2017 at Unknown time    IRON PS CMPLX/VIT B12/FA (NIFEREX-150 FORTE ORAL) Take 1 tablet by mouth once daily.       metronidazole 1% (METROGEL) 1 % Gel Apply at bedtime as directed as needed for skin infection  0 2/14/2017 at Unknown time    ondansetron (ZOFRAN) 4 MG tablet Take 1 tablet (4 mg total) by mouth every 6 (six) hours. (Patient taking differently: Take 4 mg by mouth every 6 (six) hours as needed for Nausea. ) 12 tablet 0 2/15/2017 at Unknown time    pantoprazole (PROTONIX) 40 MG tablet Take 1 tablet (40 mg total) by mouth before breakfast. 90 tablet 3 2/15/2017 at Unknown time    RELPAX 40 mg tablet TAKE 1 TABLET BY MOUTH AS NEEDED, MAY REPEAT IN 2 HOURS IF NEEDED DO NOT EXCEED 4 TABLETS EVERY " DAY 9 tablet 0 2/14/2017 at night    triamcinolone acetonide 0.1% (KENALOG) 0.1 % cream AAA bid after cool blow dry to affected areas of groin 1 Bottle 3 2/14/2017 at Unknown time         Please insert appropriate  SmartPhrase below:

## 2017-02-17 NOTE — PLAN OF CARE
Patient arrived from Endoscopy with CRNA and Endoscopy Nurse.  Patient stable.  Report received at this time.  Assumed care of patient at this time.

## 2017-02-17 NOTE — PROGRESS NOTES
Verified with Central Telemetry Monitoring that patient is visible on their monitor prior to patient's transfer back to room 931A.  Telemetry Tech verified that patient is visible on monitor.

## 2017-02-17 NOTE — PROGRESS NOTES
Notified Dr. Mack that patient has met all criteria for release from Anesthesia's care.  Dr. Mack stated that he would release patient from Anesthesia's care.

## 2017-02-17 NOTE — PLAN OF CARE
02/17/2017      Jonathan Villela  [address] [place] [address]          Hospital Medicine Dept.  [facility] [address] [place] [address]  [phone] after hours  [phone] fax Jonathan Villela was hospitalized at the [facility] from 2/15/2017 - 2/17/2017. Patient a follow up appointment scheduled for the following week.     Please contact me if you have any questions.                  __________________________  Dashawn Sorenson MD  02/17/2017

## 2017-02-17 NOTE — ANESTHESIA RELEASE NOTE
Anesthesia Release from PACU Note    Patient: Jonathan Villela    Procedure(s) Performed: Procedure(s) (LRB):  ESOPHAGOGASTRODUODENOSCOPY (EGD) (N/A)  COLONOSCOPY (N/A)    Anesthesia type: general    Post pain: Adequate analgesia    Post assessment: no apparent anesthetic complications, tolerated procedure well and no evidence of recall    Last Vitals:   Visit Vitals    /82 (BP Location: Left arm, Patient Position: Lying, BP Method: Automatic)    Pulse 77    Temp 36.6 °C (97.8 °F) (Oral)    Resp 16    Ht 6' (1.829 m)    Wt 108.1 kg (238 lb 5.1 oz)    SpO2 (!) 94%    BMI 32.32 kg/m2       Post vital signs: stable    Level of consciousness: awake, alert  and oriented    Nausea/Vomiting: no nausea/no vomiting    Complications: none    Airway Patency: patent    Respiratory: unassisted, spontaneous ventilation, room air    Cardiovascular: stable and blood pressure at baseline    Hydration: euvolemic

## 2017-02-17 NOTE — DISCHARGE SUMMARY
DISCHARGE SUMMARY  Hospital Medicine    Team: [facility] MED 1    Patient Name: Jonathan Villela  YOB: 1960    Admit Date: 2/15/2017    Discharge Date: 02/17/2017    Discharge Attending Physician: Temitope Nguyen MD     Diagnoses:  Active Hospital Problems    Diagnosis  POA    *Acute upper GI bleed [K92.2]  Yes    GI bleed [K92.2]  Yes    Chest pressure [R07.89]  Yes    Acute gastric ulcer with hemorrhage [K25.0]  Yes    DICKINSON (dyspnea on exertion) [R06.09]  Yes    Bilateral occipital neuralgia [M54.81]  Yes    Acute post-hemorrhagic anemia [D62]  Yes    Gastroesophageal reflux disease without esophagitis [K21.9]  Yes    Obstructive sleep apnea syndrome [G47.33]  Yes    Essential hypertension [I10]  Yes    PUD (peptic ulcer disease) [K27.9]  Yes    Migraine without aura and without status migrainosus, not intractable [G43.009]  Yes      Resolved Hospital Problems    Diagnosis Date Resolved POA   No resolved problems to display.       Discharged Condition: admit problems have stabilized    HOSPITAL COURSE:      Initial Presentation:    Mr. Villela is a 56 year old gentleman with PMH of PUD, GERD, migraine, and recent upper GIB here for evaluation of continued UGIB. He had been admitted from 2/10-2/12 for melanotic stools with symptomatic anemia and underwent EGD which revealed granular mucosa, duodenal erosions w/o bleeding, erythematous gastric mucosa, non-bleeding gastric ulcers, 1cm hiatus hernia, and grade B esophagitis. When his Hgb had stabilized at ~10.5 he was discharged home. Since that time his GERD symptoms had actually improved and he was able to tolerate a smaller but otherwise normal diet. He had f/u labs done 2/14 but did not get the results of those until the day of admission (2/15). He also did not have any bowel movements at home until he had two the night prior to admission, both of which were distinctly melanotic. He also noted that before his previous admission  he was experiencing hematochezia in addition to melena.     Course of Principle Problem for Admission:    Mr. Carrillo's hospital course was uncomplicated. His Hgb did not drop any further from his initial outpatient Hgb that prompted his presentation. He underwent bowel prep and had EGD and c-scope. EGD demonstrated multiple healing ulcers without stigmata of recent bleeding. C-scope was unremarkable.     CONSULTS:     GI    Last CBC/BMP/HgbA1c (if applicable):  Recent Results (from the past 336 hour(s))   CBC auto differential    Collection Time: 02/15/17  1:05 PM   Result Value Ref Range    WBC 7.07 3.90 - 12.70 K/uL    Hemoglobin 8.1 (L) 14.0 - 18.0 g/dL    Hematocrit 23.5 (L) 40.0 - 54.0 %    Platelets 325 150 - 350 K/uL   CBC auto differential    Collection Time: 02/14/17  8:30 AM   Result Value Ref Range    WBC 7.66 3.90 - 12.70 K/uL    Hemoglobin 8.0 (L) 14.0 - 18.0 g/dL    Hematocrit 24.3 (L) 40.0 - 54.0 %    Platelets 316 150 - 350 K/uL   CBC auto differential    Collection Time: 02/10/17  1:57 PM   Result Value Ref Range    WBC 11.64 3.90 - 12.70 K/uL    Hemoglobin 13.5 (L) 14.0 - 18.0 g/dL    Hematocrit 38.4 (L) 40.0 - 54.0 %    Platelets 304 150 - 350 K/uL     Recent Results (from the past 336 hour(s))   Basic metabolic panel    Collection Time: 02/17/17  4:22 AM   Result Value Ref Range    Sodium 139 136 - 145 mmol/L    Potassium 3.8 3.5 - 5.1 mmol/L    Chloride 102 95 - 110 mmol/L    CO2 29 23 - 29 mmol/L    BUN, Bld 8 6 - 20 mg/dL    Creatinine 0.9 0.5 - 1.4 mg/dL    Calcium 8.9 8.7 - 10.5 mg/dL    Anion Gap 8 8 - 16 mmol/L   Basic Metabolic Panel (BMP)    Collection Time: 02/11/17  4:08 AM   Result Value Ref Range    Sodium 138 136 - 145 mmol/L    Potassium 4.1 3.5 - 5.1 mmol/L    Chloride 106 95 - 110 mmol/L    CO2 25 23 - 29 mmol/L    BUN, Bld 31 (H) 6 - 20 mg/dL    Creatinine 0.9 0.5 - 1.4 mg/dL    Calcium 8.8 8.7 - 10.5 mg/dL    Anion Gap 7 (L) 8 - 16 mmol/L     Lab Results   Component  Value Date    HGBA1C 5.4 08/29/2014         Pertinent/Significant Diagnostic Studies:      EGD/C-scope:  Findings:       The terminal ileum appeared normal.       The perianal and digital rectal examinations were normal.       The retroflexed view of the distal rectum and anal verge was normal        and showed no anal or rectal abnormalities.       A few diverticula were found in the recto-sigmoid colon, in the        sigmoid colon, in the descending colon and in the transverse colon.        There was no evidence of diverticular bleeding.       The entire examined colon appeared normal.  Impression:           - The examined portion of the ileum was normal.                        - Diverticulosis in the recto-sigmoid colon, in the                         sigmoid colon, in the descending colon and in the                         transverse colon. There was no evidence of                         diverticular bleeding.                        - The entire examined colon is normal.                        - No specimens collected.  Recommendation:       - Return patient to hospital [facility] for ongoing care.                        - Repeat colonoscopy in 5 years for surveillance.                        - The findings and recommendations were discussed                         with the patient.                        - The findings and recommendations were discussed                         with the patient's primary physician.                        - The findings and recommendations were discussed                         with the designated responsible adult.    Findings:       The examined duodenum was normal.       Patchy mildly erythematous mucosa without bleeding was found in the        prepyloric region of the stomach.       The cardia and gastric fundus were normal on retroflexion.       A 1 cm hiatus hernia was found. The proximal extent of the gastric        folds (end of tubular esophagus) was 42 cm from the incisors.  The        hiatal narrowing was 43 cm from the incisors. The Z-line was 42 cm        from the incisors. Z-line was sharp. No esophagitis and no columnar        esophagus and no lesions seen with NBI or white light.  Impression:           - Normal examined duodenum.                        - Erythematous mucosa in the prepyloric region of                         the stomach.                        - 1 cm hiatus hernia.                        - No specimens collected.  Recommendation:       - Return patient to hospital [facility] for ongoing care.                        - Perform a colonoscopy today.                        - The findings and recommendations were discussed                         with the patient.                        - The findings and recommendations were discussed                         with the patient's primary physician.                        - The findings and recommendations were discussed                         with the patient's family.        Disposition:  Home        Future Scheduled Appointments:  Future Appointments  Date Time Provider Department Center   3/8/2017 4:00 PM Yahir Suarez MD [facility] GASTRO Shawn Deng   4/18/2017 2:40 PM Jose Reyes III, MD [facility] NEURO [facility]       Follow-up Plans from This Hospitalization:  Follow up with Yoshi Erazo MD In 2 weeks.      Specialty: Gastroenterology     Contact information:     [facility] [address]   [facility] [address]   [phone]          Discharge Medication List:        Jonathan Villela   Home Medication Instructions SHAAN:[id number]    Printed on:02/17/17 0652   Medication Information                      butalbital-acetaminophen-caffeine -40 mg (FIORICET) -40 mg per tablet  Take 1-2 tablets by mouth every 6 to 8 hours as needed for Headaches.             frovatriptan (FROVA) 2.5 MG tablet  Take 1 tablet (2.5 mg total) by mouth as needed for Migraine. If recurs, may repeat after 2 hours. Max of 3 tabs in 24  hours.             hydrocodone-acetaminophen 5-325mg (NORCO) 5-325 mg per tablet  Take 1 tablet by mouth every 6 (six) hours as needed for Pain.             irbesartan-hydrochlorothiazide (AVALIDE) 300-12.5 mg per tablet  Take 1 tablet by mouth once daily.             IRON PS CMPLX/VIT B12/FA (NIFEREX-150 FORTE ORAL)  Take 1 tablet by mouth once daily.             metronidazole 1% (METROGEL) 1 % Gel  Apply at bedtime as directed as needed for skin infection             ondansetron (ZOFRAN) 4 MG tablet  Take 1 tablet (4 mg total) by mouth every 6 (six) hours.             pantoprazole (PROTONIX) 40 MG tablet  Take 1 tablet (40 mg total) by mouth before breakfast.             RELPAX 40 mg tablet  TAKE 1 TABLET BY MOUTH AS NEEDED, MAY REPEAT IN 2 HOURS IF NEEDED DO NOT EXCEED 4 TABLETS EVERY DAY             triamcinolone acetonide 0.1% (KENALOG) 0.1 % cream  AAA bid after cool blow dry to affected areas of groin                 Patient Instructions:    Discharge Procedure Orders  CBC W/ AUTO DIFFERENTIAL   Standing Status: Future  Standing Exp. Date: 04/18/18     Diet general         At the time of discharge patient was told to take all medications as prescribed, to keep all followup appointments, and to call their primary care physician or return to the emergency room if they have any worsening or concerning symptoms.      Signing Physician:  Maulik Narvaez MD

## 2017-02-20 ENCOUNTER — TELEPHONE (OUTPATIENT)
Dept: FAMILY MEDICINE | Facility: CLINIC | Age: 57
End: 2017-02-20

## 2017-02-20 DIAGNOSIS — D62 ACUTE BLOOD LOSS ANEMIA: Primary | ICD-10-CM

## 2017-02-20 NOTE — TELEPHONE ENCOUNTER
Pt has a hospital f/u with you on 3/6/2017.  Pt was in hospital for low blood count.  Pt would like to know can he have an order for a H & H.  Please advise

## 2017-02-20 NOTE — TELEPHONE ENCOUNTER
----- Message from Rebekah Morel sent at 2/17/2017  3:59 PM CST -----  Contact: [phone]  Please call to schedule a 1 week hospital f/u for patient

## 2017-02-21 ENCOUNTER — LAB VISIT (OUTPATIENT)
Dept: LAB | Facility: HOSPITAL | Age: 57
End: 2017-02-21
Attending: FAMILY MEDICINE
Payer: COMMERCIAL

## 2017-02-21 DIAGNOSIS — D62 ACUTE BLOOD LOSS ANEMIA: ICD-10-CM

## 2017-02-21 LAB
BASOPHILS # BLD AUTO: 0.03 K/UL
BASOPHILS NFR BLD: 0.4 %
DIFFERENTIAL METHOD: ABNORMAL
EOSINOPHIL # BLD AUTO: 0.1 K/UL
EOSINOPHIL NFR BLD: 1.8 %
ERYTHROCYTE [DISTWIDTH] IN BLOOD BY AUTOMATED COUNT: 13.6 %
HCT VFR BLD AUTO: 27.7 %
HGB BLD-MCNC: 8.9 G/DL
LYMPHOCYTES # BLD AUTO: 1.8 K/UL
LYMPHOCYTES NFR BLD: 24.3 %
MCH RBC QN AUTO: 29.2 PG
MCHC RBC AUTO-ENTMCNC: 32.1 %
MCV RBC AUTO: 91 FL
MONOCYTES # BLD AUTO: 0.7 K/UL
MONOCYTES NFR BLD: 9.5 %
NEUTROPHILS # BLD AUTO: 4.5 K/UL
NEUTROPHILS NFR BLD: 62.3 %
PLATELET # BLD AUTO: 484 K/UL
PMV BLD AUTO: 8.5 FL
RBC # BLD AUTO: 3.05 M/UL
WBC # BLD AUTO: 7.27 K/UL

## 2017-02-21 PROCEDURE — 36415 COLL VENOUS BLD VENIPUNCTURE: CPT | Mod: PO

## 2017-02-21 PROCEDURE — 85025 COMPLETE CBC W/AUTO DIFF WBC: CPT

## 2017-02-22 ENCOUNTER — PATIENT MESSAGE (OUTPATIENT)
Dept: FAMILY MEDICINE | Facility: CLINIC | Age: 57
End: 2017-02-22

## 2017-02-22 ENCOUNTER — TELEPHONE (OUTPATIENT)
Dept: FAMILY MEDICINE | Facility: CLINIC | Age: 57
End: 2017-02-22

## 2017-02-22 NOTE — TELEPHONE ENCOUNTER
Call returned to pt.  Pt was informed that once Dr. Joel has received and review lab results the nurse will give him a call to go over.  Pt verbalized understanding.

## 2017-02-22 NOTE — TELEPHONE ENCOUNTER
----- Message from Obed Shelby sent at 2/22/2017 12:07 PM CST -----  Contact: [phone] or [phone]/self  Pt would like to speak with the nurse about test results.    Please advise

## 2017-02-23 ENCOUNTER — PATIENT MESSAGE (OUTPATIENT)
Dept: FAMILY MEDICINE | Facility: CLINIC | Age: 57
End: 2017-02-23

## 2017-02-23 ENCOUNTER — PATIENT MESSAGE (OUTPATIENT)
Dept: GASTROENTEROLOGY | Facility: CLINIC | Age: 57
End: 2017-02-23

## 2017-02-23 ENCOUNTER — PATIENT MESSAGE (OUTPATIENT)
Dept: NEUROLOGY | Facility: CLINIC | Age: 57
End: 2017-02-23

## 2017-02-24 ENCOUNTER — OFFICE VISIT (OUTPATIENT)
Dept: FAMILY MEDICINE | Facility: CLINIC | Age: 57
End: 2017-02-24
Payer: COMMERCIAL

## 2017-02-24 VITALS
SYSTOLIC BLOOD PRESSURE: 138 MMHG | HEIGHT: 73 IN | DIASTOLIC BLOOD PRESSURE: 86 MMHG | OXYGEN SATURATION: 78 % | WEIGHT: 240.5 LBS | HEART RATE: 97 BPM | BODY MASS INDEX: 31.87 KG/M2

## 2017-02-24 DIAGNOSIS — I10 ESSENTIAL HYPERTENSION: ICD-10-CM

## 2017-02-24 DIAGNOSIS — D62 ACUTE BLOOD LOSS ANEMIA: ICD-10-CM

## 2017-02-24 DIAGNOSIS — K92.2 GASTROINTESTINAL HEMORRHAGE, UNSPECIFIED GASTROINTESTINAL HEMORRHAGE TYPE: Primary | ICD-10-CM

## 2017-02-24 DIAGNOSIS — R53.83 FATIGUE, UNSPECIFIED TYPE: ICD-10-CM

## 2017-02-24 DIAGNOSIS — G47.33 OSA (OBSTRUCTIVE SLEEP APNEA): ICD-10-CM

## 2017-02-24 PROCEDURE — 99215 OFFICE O/P EST HI 40 MIN: CPT | Mod: S$GLB,,, | Performed by: FAMILY MEDICINE

## 2017-02-24 PROCEDURE — 99999 PR PBB SHADOW E&M-EST. PATIENT-LVL IV: CPT | Mod: PBBFAC,,, | Performed by: FAMILY MEDICINE

## 2017-02-24 PROCEDURE — 3079F DIAST BP 80-89 MM HG: CPT | Mod: S$GLB,,, | Performed by: FAMILY MEDICINE

## 2017-02-24 PROCEDURE — 3075F SYST BP GE 130 - 139MM HG: CPT | Mod: S$GLB,,, | Performed by: FAMILY MEDICINE

## 2017-02-24 PROCEDURE — 1160F RVW MEDS BY RX/DR IN RCRD: CPT | Mod: S$GLB,,, | Performed by: FAMILY MEDICINE

## 2017-02-24 NOTE — MR AVS SNAPSHOT
[address]  [place] [address]  Phone: [phone]  Fax: [phone]                  Jonathan Villela   2017 4:00 PM   Office Visit    Description:  Male : 1960   Provider:  Manish Joel MD   Department:  [facility]           Reason for Visit     Hospital Follow Up           Diagnoses this Visit        Comments    DELVIN (obstructive sleep apnea)    -  Primary     Acute blood loss anemia                To Do List           Future Appointments        Provider Department Dept Phone    3/6/2017 7:10 AM APPOINTMENT LAB, [facility] [phone]    3/6/2017 11:00 AM aMnish Joel MD [facility] [phone]    3/8/2017 4:00 PM Yahir Suarez MD [facility] Gastroenterology [phone]    2017 2:40 PM Jose Reyes III, MD [facility] Neurology [phone]      Your Future Surgeries/Procedures     2017   Surgery with Yoshi Erazo MD   [facility]    [address] [facility] [address] [phone]              Goals (5 Years of Data)     None      [facility]     [facility] Care [facility] -  Assistance  Registered nurses in the [facility] provide clinical advisement, health education, appointment booking, and other advisory services.  Call for this free service at [phone].             Medications           Message regarding Medications     Verify the changes and/or additions to your medication regime listed below are the same as discussed with your clinician today.  If any of these changes or additions are incorrect, please notify your healthcare provider.             Verify that the below list of medications is an accurate representation of the medications you are currently taking.  If none reported, the list may be blank. If incorrect, please contact your healthcare provider.  Carry this list with you in case of emergency.           Current Medications     butalbital-acetaminophen-caffeine -40 mg (FIORICET) -40 mg per tablet Take 1-2 tablets by mouth every 6 to 8 hours as needed for Headaches.    frovatriptan (FROVA) 2.5 MG tablet Take 1 tablet (2.5 mg total) by mouth as needed for Migraine. If recurs, may repeat after 2 hours. Max of 3 tabs in 24 hours.    IRON PS CMPLX/VIT B12/FA (NIFEREX-150 FORTE ORAL) Take 1 tablet by mouth once daily.    metronidazole 1% (METROGEL) 1 % Gel Apply at bedtime as directed as needed for skin infection    ondansetron (ZOFRAN) 4 MG tablet Take 1 tablet (4 mg total) by mouth every 6 (six) hours.    pantoprazole (PROTONIX) 40 MG tablet Take 1 tablet (40 mg total) by mouth before breakfast.    RELPAX 40 mg tablet TAKE 1 TABLET BY MOUTH AS NEEDED, MAY REPEAT IN 2 HOURS IF NEEDED DO NOT EXCEED 4 TABLETS EVERY DAY    triamcinolone acetonide 0.1% (KENALOG) 0.1 % cream AAA bid after cool blow dry to affected areas of groin    hydrocodone-acetaminophen 5-325mg (NORCO) 5-325 mg per tablet Take 1 tablet by mouth every 6 (six) hours as needed for Pain.    irbesartan-hydrochlorothiazide (AVALIDE) 300-12.5 mg per tablet Take 1 tablet by mouth once daily.           Clinical Reference Information           Your Vitals Were     BP                   147/87           Blood Pressure          Most Recent Value    BP  (!)  147/87      Allergies as of 2/24/2017     No Known Allergies      Immunizations Administered on Date of Encounter - 2/24/2017     None      Orders Placed During Today's Visit      Normal Orders This Visit    Ambulatory consult to Sleep Disorders     Future Labs/Procedures Expected by Expires    CBC auto differential  2/24/2017 2/24/2018    Ferritin  2/24/2017 4/25/2018    Iron and TIBC  2/24/2017 4/25/2018      Language Assistance Services     ATTENTION: Language assistance services are available, free of charge. Please call [phone].       ATENCIÓN: Si habla español, tiene a harp disposición servicios gratuitos de asistencia lingüística. Medina al [phone].     CHÚ Ý: N?u b?n nói Ti?ng Vi?t, có các d?ch v? h? tr? ngôn ng? mi?n phí dành cho b?n. G?i s? [phone].         [facility] complies with applicable Federal civil rights laws and does not discriminate on the basis of race, color, national origin, age, disability, or sex.

## 2017-02-24 NOTE — PROGRESS NOTES
(Portions of this note were dictated using voice recognition software and may contain dictation related errors in spelling/grammar/syntax not found on text review)    CC:   Chief Complaint   Patient presents with    Hospital Follow Up       HPI: 56 y.o. male  Last office visit was with me over 2 years ago in January 2015    In the meantime he was admitted from 2/10-2/12 for melanotic stools and symptomatic anemia.  Underwent EGD which showed granular mucosa, duodenal erosions without bleeding, erythematous gastric mucosa and nonbleeding gastric ulcers, 1 cm hiatal hernia, and grade B esophagitis.  Stabilize hemoglobin around 10 range prior to discharge home.  Later was readmitted to the hospital on 2:15 secondary to recurrence of melanotic stools and hematochezia.  Again underwent EGD and colonoscopy.  EGD showed multiple healing ulcers.  Colonoscopy is unremarkable.  He is followed by gastroenterology, Dr. Erazo.  He is currently maintained on Protonix 40 mg daily.    Hemoglobin in February 2015 was 16.0.  Hemoglobin alicja in February 15, 2017 was 7.8.  Follow-up testing has shown most recently hemoglobin is stable at 8.9.      Patient had written an email stating that he was having some symptoms like fatigue, shortness of breath, dizziness.  He did have an episode of chest pain in the hospital, initial troponin was negative.  Troponin was drawn 2 hours after his chest pain episode started.  He was concerned whether this could be related to heart problems.  Had discussed chest pain etiology possibility of demand ischemia secondary to his anemia.  Patient had a follow-up with me scheduled on March 6 but was told that if symptoms significantly worsen, may need earlier evaluation    He presents today with his wife.  No more bleeding in the stool.  Still feeling weak when he is doing anything exertional.  No chest pain.  He sometimes feels dizzy.  He was taken off his hypertensive therapy in the hospital secondary  to hypotension.  Has not restarted on this.  Blood pressure at home or in the 130s over 80s         Past Medical History:   Diagnosis Date    Acute gastric ulcer with hemorrhage 2/15/2017    Bilateral occipital neuralgia     BPH with urinary obstruction 12/31/2015    Colitis [id number]    infectious?    Diverticulitis     Essential hypertension     Gastroesophageal reflux disease without esophagitis 2/11/2017    Migraine without aura and without status migrainosus, not intractable 1/31/2014    Obstructive sleep apnea syndrome 2/9/2015    PUD (peptic ulcer disease)        Past Surgical History:   Procedure Laterality Date    CHOLECYSTECTOMY      COLONOSCOPY N/A 2/17/2017    Procedure: COLONOSCOPY;  Surgeon: Yoshi Erazo MD;  Location: [address]);  Service: Endoscopy;  Laterality: N/A;    ESOPHAGOGASTRODUODENOSCOPY      LEG SURGERY      NASAL SEPTUM SURGERY         Family History   Problem Relation Age of Onset    Crohn's disease       brother, sister, father, nephew    Prostate cancer Neg Hx     Kidney disease Neg Hx     Melanoma Neg Hx        Social History     Social History    Marital status:      Spouse name: N/A    Number of children: N/A    Years of education: N/A     Occupational History    Not on file.     Social History Main Topics    Smoking status: Never Smoker    Smokeless tobacco: Not on file    Alcohol use Yes      Comment: ocasionally    Drug use: No    Sexual activity: Yes     Partners: Female     Other Topics Concern    Not on file     Social History Narrative     Lab Results   Component Value Date    WBC 7.27 02/21/2017    HGB 8.9 (L) 02/21/2017    HCT 27.7 (L) 02/21/2017     (H) 02/21/2017    CHOL 220 (H) 08/29/2014    TRIG 164 (H) 08/29/2014    HDL 44 08/29/2014    ALT 27 02/15/2017    AST 21 02/15/2017     02/17/2017    K 3.8 02/17/2017     02/17/2017    CREATININE 0.9 02/17/2017    BUN 8 02/17/2017    CO2 29 02/17/2017    TSH  1.900 02/09/2015    PSA 0.87 08/29/2014    INR 1.1 02/14/2017    HGBA1C 5.4 08/29/2014    LDLCALC 143.2 08/29/2014    GLU 93 02/17/2017       ROS:  GENERAL: Fatigue, weakness.  SKIN: No rashes, no itching.  HEAD: Migraines  EYES: No visual changes  EARS: No ear pain or changes in hearing.  NOSE: No congestion or rhinorrhea.  MOUTH & THROAT: No hoarseness, change in voice, or sore throat.  NODES: Denies swollen glands.  CHEST: Denies DICKINSON, cyanosis, wheezing, cough and sputum production.  CARDIOVASCULAR: Denies chest pain, PND, orthopnea.  ABDOMEN: No nausea, vomiting, or changes in bowel function.  URINARY: No flank pain, dysuria or hematuria.  PERIPHERAL VASCULAR: No claudication or cyanosis.  MUSCULOSKELETAL: Back pain.  NEUROLOGIC: No weakness or numbness.    Vital signs reviewed  PE:   APPEARANCE: Well nourished, well developed, in no acute distress.    HEAD: Normocephalic, atraumatic.  EYES: PERRL. EOMI.   Conjunctivae noninjected.  Conjunctival pallor noted bilaterally  EARS: TM's intact. Light reflex normal. No retraction or perforation  NOSE: Mucosa pink. Airway clear.  MOUTH & THROAT: No tonsillar enlargement. No pharyngeal erythema or exudate.   NECK: Supple with no cervical lymphadenopathy.    CHEST: Good inspiratory effort. Lungs clear to auscultation with no wheezes or crackles.  CARDIOVASCULAR: Normal S1, S2.  Normal heart rate, not tachycardic.  No rubs, murmurs, or gallops.  ABDOMEN: Bowel sounds normal. Not distended. Soft. No tenderness or masses. No organomegaly.  EXTREMITIES: Trace pitting edema bilateral ankles    IMPRESSION    1. Gastrointestinal hemorrhage, unspecified gastrointestinal hemorrhage type    2. DELVIN (obstructive sleep apnea)    3. Acute blood loss anemia    4. Fatigue, unspecified type    5. Essential hypertension          PLAN  Reviewed hospital discharge summary, endoscopy reports, labs as above    We will check CBC again on March 6.  Has appointment with GI coming up on March 8.   He is currently on iron daily but I would advise twice a day iron with vitamin C to help with absorption given his continued symptoms and significant drop overall in his hemoglobin from his baseline of about 16.  If any return of melanotic stools, hematochezia, or significant worsening weakness or shortness of breath, needs to go back to the ER for more urgent evaluation and urgent labs and fluids.  Did not need transfusion but if recurrence of symptoms and/or significant drop in H&H, may need to consider transfusion.    Hypertension: Overall stable without medications.  Stay off Avalide for now.  Blood pressure starts rising, can always reintroduce    Patient is very concerned about his fatigue issues.  We discussed that this is likely the cause of his anemia as above, but also may have a lot of basis with his uncontrolled sleep apnea.  He was tested in 2015 2016.  A 2016 titration study, full control of his sleep-disordered breathing was not possible during the study.  The recommendation was for AutoPap .  Patient states that he is not currently using any device for his sleep apnea because he has difficulty sleeping on his back and was very uncomfortable for him to use machine.  He is wondering if there is any other options out there for him for this.  I will place referral to sleep medicine to discuss other therapy options.    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  Does feel that he will be ready to go to work this Monday favor 27th 2017.  He feels that he would be able to take it easy and not have to do any over strenuous activities especially while he is feeling bad.  He does have an FMLA form to explain his hospitalization.  This will be filled out with the recommendation that his continuous leave can and on February 27 per patient.  He may potentially and unpredictably may need some leeway if any of the above symptoms recur so that he can take off work and either go to the doctor or go to the  emergency room for more urgent issues.  This will be clarified on his form    He will follow back up in the office in 1-2 months or sooner if needed

## 2017-03-06 ENCOUNTER — PATIENT MESSAGE (OUTPATIENT)
Dept: FAMILY MEDICINE | Facility: CLINIC | Age: 57
End: 2017-03-06

## 2017-03-06 ENCOUNTER — LAB VISIT (OUTPATIENT)
Dept: LAB | Facility: HOSPITAL | Age: 57
End: 2017-03-06
Attending: FAMILY MEDICINE
Payer: COMMERCIAL

## 2017-03-06 DIAGNOSIS — D62 ACUTE BLOOD LOSS ANEMIA: ICD-10-CM

## 2017-03-06 DIAGNOSIS — G47.33 OSA (OBSTRUCTIVE SLEEP APNEA): ICD-10-CM

## 2017-03-06 LAB
BASOPHILS # BLD AUTO: 0.03 K/UL
BASOPHILS NFR BLD: 0.5 %
DIFFERENTIAL METHOD: ABNORMAL
EOSINOPHIL # BLD AUTO: 0.1 K/UL
EOSINOPHIL NFR BLD: 2.2 %
ERYTHROCYTE [DISTWIDTH] IN BLOOD BY AUTOMATED COUNT: 13.8 %
FERRITIN SERPL-MCNC: 69 NG/ML
HCT VFR BLD AUTO: 38.5 %
HGB BLD-MCNC: 12.2 G/DL
IRON SERPL-MCNC: 35 UG/DL
LYMPHOCYTES # BLD AUTO: 1.8 K/UL
LYMPHOCYTES NFR BLD: 30.5 %
MCH RBC QN AUTO: 29 PG
MCHC RBC AUTO-ENTMCNC: 31.7 %
MCV RBC AUTO: 91 FL
MONOCYTES # BLD AUTO: 0.4 K/UL
MONOCYTES NFR BLD: 6.8 %
NEUTROPHILS # BLD AUTO: 3.5 K/UL
NEUTROPHILS NFR BLD: 59.7 %
PLATELET # BLD AUTO: 315 K/UL
PMV BLD AUTO: 9.6 FL
RBC # BLD AUTO: 4.21 M/UL
SATURATED IRON: 8 %
TOTAL IRON BINDING CAPACITY: 431 UG/DL
TRANSFERRIN SERPL-MCNC: 291 MG/DL
WBC # BLD AUTO: 5.84 K/UL

## 2017-03-06 PROCEDURE — 82728 ASSAY OF FERRITIN: CPT

## 2017-03-06 PROCEDURE — 85025 COMPLETE CBC W/AUTO DIFF WBC: CPT

## 2017-03-06 PROCEDURE — 83540 ASSAY OF IRON: CPT

## 2017-03-06 PROCEDURE — 36415 COLL VENOUS BLD VENIPUNCTURE: CPT | Mod: PO

## 2017-03-08 ENCOUNTER — OFFICE VISIT (OUTPATIENT)
Dept: GASTROENTEROLOGY | Facility: CLINIC | Age: 57
End: 2017-03-08
Payer: COMMERCIAL

## 2017-03-08 VITALS
HEIGHT: 73 IN | DIASTOLIC BLOOD PRESSURE: 93 MMHG | HEART RATE: 66 BPM | SYSTOLIC BLOOD PRESSURE: 141 MMHG | WEIGHT: 239.44 LBS | BODY MASS INDEX: 31.73 KG/M2

## 2017-03-08 DIAGNOSIS — K25.4 GASTROINTESTINAL HEMORRHAGE ASSOCIATED WITH GASTRIC ULCER: Primary | ICD-10-CM

## 2017-03-08 PROCEDURE — 1160F RVW MEDS BY RX/DR IN RCRD: CPT | Mod: S$GLB,,, | Performed by: INTERNAL MEDICINE

## 2017-03-08 PROCEDURE — 3080F DIAST BP >= 90 MM HG: CPT | Mod: S$GLB,,, | Performed by: INTERNAL MEDICINE

## 2017-03-08 PROCEDURE — 99213 OFFICE O/P EST LOW 20 MIN: CPT | Mod: S$GLB,,, | Performed by: INTERNAL MEDICINE

## 2017-03-08 PROCEDURE — 3077F SYST BP >= 140 MM HG: CPT | Mod: S$GLB,,, | Performed by: INTERNAL MEDICINE

## 2017-03-08 PROCEDURE — 99999 PR PBB SHADOW E&M-EST. PATIENT-LVL III: CPT | Mod: PBBFAC,,, | Performed by: INTERNAL MEDICINE

## 2017-03-08 NOTE — PROGRESS NOTES
Subjective:       Patient ID: Jonathan Villela is a 56 y.o. male.    Chief Complaint: Hx NSAID-induced ulcers    HPI     Mr. Villela is a 56 year old male with a recent history of upper GI bleed 2/2 gastric ulcers.     He was initially admitted to hospital in February with a 1 day history of melena.  A subsequent EGD showed nonbleeding clean-based gastric ulcers.  The patient was subsequently discharged from hospital on a PPI.  A repeat hemoglobin as an outpatient was in the 8-9 range and the patient reported ongoing symptoms of presyncope so he was readmitted to hospital for a repeat EGD which showed healing gastric ulcers. A colonoscopy was also performed which showed nonbleeding diverticulosis.      The patient today reports that he is doing much better.  He has been placed on twice daily iron supplementation by his PCP.  He states he has noticed some occasional red-streaking in his stool but no consistent large volume bleeding or melena.  No abdominal pain.  Some occasional back pain but denies NSAID use.  Still taking Protonix daily.     A CBC on 3/6 showed a Hb of 12.2, up from 8.9      Review of Systems   Constitutional: Negative for chills, diaphoresis, fatigue, fever and unexpected weight change.   HENT: Negative for trouble swallowing and voice change.    Respiratory: Negative for cough, choking and shortness of breath.    Cardiovascular: Negative for chest pain and leg swelling.   Gastrointestinal: Negative for abdominal distention, abdominal pain, anal bleeding, blood in stool, constipation, diarrhea, nausea, rectal pain and vomiting.   Musculoskeletal: Positive for back pain.   Skin: Negative for color change and pallor.   Neurological: Negative for dizziness and tremors.   Psychiatric/Behavioral: Negative for confusion and hallucinations.       Objective:      Physical Exam   Constitutional: He is oriented to person, place, and time. He appears well-developed and well-nourished. No distress.    Eyes: Conjunctivae are normal. No scleral icterus.   Cardiovascular: Normal rate and regular rhythm.    Pulmonary/Chest: Effort normal.   Abdominal: He exhibits no distension.   Neurological: He is alert and oriented to person, place, and time.   Skin: He is not diaphoretic.   Psychiatric: He has a normal mood and affect.       Assessment:       Hx of NSAID-induced gastric ulcers  No recurrent bleeding  Recovering blood counts and iron stores    Plan:       Continue Iron Supplementation    Advised patient that if he does not want repeat EGD (previous repeat EGD showed ulcer healing) in late April (as scheduled) to let me know and I will see him in clinic.    Advised patient to call clinic if he sees melena or BRBPR    Yahir Suarez   Gastroenterology Fellow PGY V  [phone]

## 2017-03-29 ENCOUNTER — TELEPHONE (OUTPATIENT)
Dept: GASTROENTEROLOGY | Facility: CLINIC | Age: 57
End: 2017-03-29

## 2017-03-29 NOTE — TELEPHONE ENCOUNTER
----- Message from Eliza Mobley sent at 3/29/2017  3:13 PM CDT -----  Contact: Alona Malone Serv. [phone]  Kacey- pre service called to determine if the pts procedure was medically urgent or not- please call Ashanti back at ext. [phone]

## 2017-04-02 ENCOUNTER — PATIENT MESSAGE (OUTPATIENT)
Dept: ENDOSCOPY | Facility: HOSPITAL | Age: 57
End: 2017-04-02

## 2017-04-02 ENCOUNTER — DOCUMENTATION ONLY (OUTPATIENT)
Dept: ENDOSCOPY | Facility: HOSPITAL | Age: 57
End: 2017-04-02

## 2017-04-02 ENCOUNTER — TELEPHONE (OUTPATIENT)
Dept: ENDOSCOPY | Facility: HOSPITAL | Age: 57
End: 2017-04-02

## 2017-04-03 NOTE — PROGRESS NOTES
Desi - please tell Jonathan that his abdominal ultrasound was read as follows:      No evidence of abdominal aortic aneurysm.

## 2017-04-11 ENCOUNTER — PATIENT MESSAGE (OUTPATIENT)
Dept: GASTROENTEROLOGY | Facility: CLINIC | Age: 57
End: 2017-04-11

## 2017-04-11 ENCOUNTER — PATIENT MESSAGE (OUTPATIENT)
Dept: FAMILY MEDICINE | Facility: CLINIC | Age: 57
End: 2017-04-11

## 2017-04-11 DIAGNOSIS — K92.2 ACUTE UPPER GI BLEED: Primary | ICD-10-CM

## 2017-04-12 ENCOUNTER — PATIENT MESSAGE (OUTPATIENT)
Dept: ENDOSCOPY | Facility: HOSPITAL | Age: 57
End: 2017-04-12

## 2017-04-18 ENCOUNTER — LAB VISIT (OUTPATIENT)
Dept: LAB | Facility: HOSPITAL | Age: 57
End: 2017-04-18
Attending: FAMILY MEDICINE
Payer: COMMERCIAL

## 2017-04-18 ENCOUNTER — OFFICE VISIT (OUTPATIENT)
Dept: NEUROLOGY | Facility: CLINIC | Age: 57
End: 2017-04-18
Payer: COMMERCIAL

## 2017-04-18 VITALS
DIASTOLIC BLOOD PRESSURE: 98 MMHG | BODY MASS INDEX: 32.26 KG/M2 | HEART RATE: 64 BPM | SYSTOLIC BLOOD PRESSURE: 160 MMHG | WEIGHT: 243.38 LBS | HEIGHT: 73 IN

## 2017-04-18 DIAGNOSIS — K92.2 ACUTE UPPER GI BLEED: ICD-10-CM

## 2017-04-18 DIAGNOSIS — G43.709 CHRONIC MIGRAINE WITHOUT AURA WITHOUT STATUS MIGRAINOSUS, NOT INTRACTABLE: Primary | ICD-10-CM

## 2017-04-18 LAB
BASOPHILS # BLD AUTO: 0.03 K/UL
BASOPHILS NFR BLD: 0.4 %
DIFFERENTIAL METHOD: ABNORMAL
EOSINOPHIL # BLD AUTO: 0.1 K/UL
EOSINOPHIL NFR BLD: 2 %
ERYTHROCYTE [DISTWIDTH] IN BLOOD BY AUTOMATED COUNT: 12.7 %
FERRITIN SERPL-MCNC: 14 NG/ML
HCT VFR BLD AUTO: 45 %
HGB BLD-MCNC: 14.4 G/DL
IRON SERPL-MCNC: 33 UG/DL
LYMPHOCYTES # BLD AUTO: 1.9 K/UL
LYMPHOCYTES NFR BLD: 26.2 %
MCH RBC QN AUTO: 26.8 PG
MCHC RBC AUTO-ENTMCNC: 32 %
MCV RBC AUTO: 84 FL
MONOCYTES # BLD AUTO: 0.7 K/UL
MONOCYTES NFR BLD: 9.1 %
NEUTROPHILS # BLD AUTO: 4.4 K/UL
NEUTROPHILS NFR BLD: 61.7 %
PLATELET # BLD AUTO: 296 K/UL
PMV BLD AUTO: 9.7 FL
RBC # BLD AUTO: 5.37 M/UL
SATURATED IRON: 7 %
TOTAL IRON BINDING CAPACITY: 487 UG/DL
TRANSFERRIN SERPL-MCNC: 329 MG/DL
WBC # BLD AUTO: 7.13 K/UL

## 2017-04-18 PROCEDURE — 99214 OFFICE O/P EST MOD 30 MIN: CPT | Mod: S$GLB,,, | Performed by: PSYCHIATRY & NEUROLOGY

## 2017-04-18 PROCEDURE — 99999 PR PBB SHADOW E&M-EST. PATIENT-LVL III: CPT | Mod: PBBFAC,,, | Performed by: PSYCHIATRY & NEUROLOGY

## 2017-04-18 PROCEDURE — 3080F DIAST BP >= 90 MM HG: CPT | Mod: S$GLB,,, | Performed by: PSYCHIATRY & NEUROLOGY

## 2017-04-18 PROCEDURE — 1160F RVW MEDS BY RX/DR IN RCRD: CPT | Mod: S$GLB,,, | Performed by: PSYCHIATRY & NEUROLOGY

## 2017-04-18 PROCEDURE — 3077F SYST BP >= 140 MM HG: CPT | Mod: S$GLB,,, | Performed by: PSYCHIATRY & NEUROLOGY

## 2017-04-18 RX ORDER — CICLOPIROX OLAMINE 7.7 MG/G
CREAM TOPICAL
COMMUNITY
Start: 2017-03-08 | End: 2017-07-31 | Stop reason: SDUPTHER

## 2017-04-18 RX ORDER — ONDANSETRON 8 MG/1
8 TABLET, ORALLY DISINTEGRATING ORAL EVERY 12 HOURS PRN
Qty: 20 TABLET | Refills: 3 | Status: SHIPPED | OUTPATIENT
Start: 2017-04-18 | End: 2018-08-07

## 2017-04-18 RX ORDER — VERAPAMIL HYDROCHLORIDE 120 MG/1
120 CAPSULE, EXTENDED RELEASE ORAL DAILY
Qty: 30 CAPSULE | Refills: 3 | Status: SHIPPED | OUTPATIENT
Start: 2017-04-18 | End: 2017-06-12

## 2017-04-18 RX ORDER — FROVATRIPTAN SUCCINATE 2.5 MG/1
2.5 TABLET, FILM COATED ORAL
Qty: 9 TABLET | Refills: 3 | Status: SHIPPED | OUTPATIENT
Start: 2017-04-18 | End: 2018-04-19

## 2017-04-18 NOTE — PROGRESS NOTES
Subjective:       Patient ID: Jonathan Villela is a 57 y.o. male.    Reason for Consult: Headache      Interval History:  Jonathan Villela is here for follow up. Their condition has improved with regards to the occipital neuralgia.  He notes he has not had a single day of headache with occipital neuralgia for the last 22 days.  He does note however that his migraines are back and uncontrolled.  He previously stopped taking the verapamil as this was not helping his occipital neuralgia.  He is not currently on any preventative medication for the migraine headaches.  He notes that the distribution of pain is worried had been classically bitemporal and retro-orbital bifrontal with light sensitivity visual blurring significant nausea.  He notes that vomiting sometimes helped him with his symptoms although notes that recently it has not.  He has had to miss time from work due to disability from his headache.  He notes trying to sleep at night is no longer as bothersome as it was before.  He initially seems to be quite upset with the fact that he still having headaches however after discussing it we have again restated previous goals of treating his occipital neuralgia with the cryoneurolysis of the Iovera device.  I have again explained that this is not a treatment for migraines rather specific to occipital neuralgia instead.  I have explained to him that we now need to change tactics and start treating his migraines.  I have explained to him the voltage-gated serious pain that the occipital neuralgia was overriding the potential headaches that he would have for migraine previously.    Objective:     Vitals:    04/18/17 1444   BP: (!) 160/98   Pulse: 64     Patient is awake alert oriented to person place and time.  Cranial nerves II through XII are without focal deficit.  Gait and station within normal limits.  Tinel sign negative at bilateral greater and lesser occipital nerve.  Focused examination was undertaken  today. Over 50% of face to face time of 25 minute visit time was in giving guidance, counseling and discussing treatment options.    Results for orders placed or performed during the hospital encounter of 01/15/14   CT Head Without Contrast    Narrative    CT brain without contrast.    Comparison: None     Technique: Multiple 5 mm axial images of the head were obtained without intravenous contrast.    Findings: No evidence for acute intracranial hemorrhage or sulcal effacement.  Ventricles right larger than the left likely developmental variant, no evidence for a hydrocephalus.  There is no midline shift or mass effect. The visualized paranasal   sinuses and mastoid air cells are clear..    Impression     Unremarkable noncontrast CT head specifically without evidence for acute intracranial hemorrhage. Further evaluation as warrented clinically.      Electronically signed by: WILY GILES DO  Date:     01/15/14  Time:    12:30        Assessment:     1. Chronic migraine without aura without status migrainosus, not intractable  verapamil (VERELAN) 120 MG C24P    ondansetron (ZOFRAN-ODT) 8 MG TbDL    frovatriptan (FROVA) 2.5 MG tablet       Plan:   57-year-old male presents for evaluation and follow-up of headaches.  At this point in time we have successfully treated his occipital neuralgia.  He unfortunately has now had his migraines returned.  At this time I will have him start verapamil again 100 mg daily.  His blood pressure is elevated today so I have told him we'll treat his hypertension as well as his headache potentially with this medication.  Consider titration of this medication in the future.  I'll also refill his Zofran as well as his Frova for his migraine headaches.  I will have her track his headaches for 60 days after that point we'll determine whether or not he is a candidate for Botox headache treatment.  We have discussed risk benefits and alternatives to the treatment plan as outlined above.  I will  follow up with them in 2 month(s).  The patient verbalizes understanding and agreement with the treatment plan. Questions were sought and answered to his stated verbal satisfaction.        Negrito Reyes MD    This note is dictated on Dragon Natural Speaking word recognition program. There are word recognition mistakes that are occasionally missed on review.

## 2017-04-21 RX ORDER — IRBESARTAN AND HYDROCHLOROTHIAZIDE 300; 12.5 MG/1; MG/1
TABLET, FILM COATED ORAL
Qty: 90 TABLET | Refills: 3 | Status: SHIPPED | OUTPATIENT
Start: 2017-04-21 | End: 2017-10-11 | Stop reason: ALTCHOICE

## 2017-04-23 ENCOUNTER — PATIENT MESSAGE (OUTPATIENT)
Dept: FAMILY MEDICINE | Facility: CLINIC | Age: 57
End: 2017-04-23

## 2017-05-03 ENCOUNTER — PATIENT MESSAGE (OUTPATIENT)
Dept: GASTROENTEROLOGY | Facility: CLINIC | Age: 57
End: 2017-05-03

## 2017-05-03 ENCOUNTER — PATIENT MESSAGE (OUTPATIENT)
Dept: NEUROLOGY | Facility: CLINIC | Age: 57
End: 2017-05-03

## 2017-05-03 DIAGNOSIS — G43.709 CHRONIC MIGRAINE WITHOUT AURA WITHOUT STATUS MIGRAINOSUS, NOT INTRACTABLE: Primary | ICD-10-CM

## 2017-05-03 DIAGNOSIS — G43.109 MIGRAINE WITH AURA, WITHOUT MENTION OF INTRACTABLE MIGRAINE WITHOUT MENTION OF STATUS MIGRAINOSUS: Primary | ICD-10-CM

## 2017-05-03 RX ORDER — TOPIRAMATE 25 MG/1
25 TABLET ORAL NIGHTLY
Qty: 60 TABLET | Refills: 3 | Status: SHIPPED | OUTPATIENT
Start: 2017-05-03 | End: 2017-05-03 | Stop reason: ALTCHOICE

## 2017-05-03 RX ORDER — DIVALPROEX SODIUM 500 MG/1
500 TABLET, DELAYED RELEASE ORAL NIGHTLY
Qty: 30 TABLET | Refills: 3 | Status: SHIPPED | OUTPATIENT
Start: 2017-05-03 | End: 2017-06-12

## 2017-06-12 ENCOUNTER — OFFICE VISIT (OUTPATIENT)
Dept: NEUROLOGY | Facility: CLINIC | Age: 57
End: 2017-06-12
Payer: COMMERCIAL

## 2017-06-12 ENCOUNTER — OFFICE VISIT (OUTPATIENT)
Dept: FAMILY MEDICINE | Facility: CLINIC | Age: 57
End: 2017-06-12
Payer: COMMERCIAL

## 2017-06-12 VITALS
WEIGHT: 246.94 LBS | SYSTOLIC BLOOD PRESSURE: 132 MMHG | HEART RATE: 79 BPM | BODY MASS INDEX: 32.73 KG/M2 | HEIGHT: 73 IN | DIASTOLIC BLOOD PRESSURE: 92 MMHG

## 2017-06-12 VITALS
HEART RATE: 78 BPM | HEIGHT: 73 IN | SYSTOLIC BLOOD PRESSURE: 136 MMHG | WEIGHT: 245.81 LBS | DIASTOLIC BLOOD PRESSURE: 86 MMHG | OXYGEN SATURATION: 95 % | BODY MASS INDEX: 32.58 KG/M2

## 2017-06-12 DIAGNOSIS — Z00.00 ROUTINE GENERAL MEDICAL EXAMINATION AT A HEALTH CARE FACILITY: Primary | ICD-10-CM

## 2017-06-12 DIAGNOSIS — M50.30 DDD (DEGENERATIVE DISC DISEASE), CERVICAL: Primary | ICD-10-CM

## 2017-06-12 DIAGNOSIS — G44.86 CERVICOGENIC HEADACHE: ICD-10-CM

## 2017-06-12 DIAGNOSIS — D62 ACUTE BLOOD LOSS ANEMIA: ICD-10-CM

## 2017-06-12 DIAGNOSIS — I10 ESSENTIAL HYPERTENSION: ICD-10-CM

## 2017-06-12 DIAGNOSIS — G43.709 CHRONIC MIGRAINE WITHOUT AURA WITHOUT STATUS MIGRAINOSUS, NOT INTRACTABLE: ICD-10-CM

## 2017-06-12 PROBLEM — K92.2 ACUTE UPPER GI BLEED: Status: RESOLVED | Noted: 2017-02-11 | Resolved: 2017-06-12

## 2017-06-12 PROBLEM — R07.89 CHEST PRESSURE: Status: RESOLVED | Noted: 2017-02-16 | Resolved: 2017-06-12

## 2017-06-12 PROCEDURE — 99214 OFFICE O/P EST MOD 30 MIN: CPT | Mod: S$GLB,,, | Performed by: PSYCHIATRY & NEUROLOGY

## 2017-06-12 PROCEDURE — 99396 PREV VISIT EST AGE 40-64: CPT | Mod: S$GLB,,, | Performed by: FAMILY MEDICINE

## 2017-06-12 PROCEDURE — 99999 PR PBB SHADOW E&M-EST. PATIENT-LVL III: CPT | Mod: PBBFAC,,, | Performed by: FAMILY MEDICINE

## 2017-06-12 PROCEDURE — 99999 PR PBB SHADOW E&M-EST. PATIENT-LVL IV: CPT | Mod: PBBFAC,,, | Performed by: PSYCHIATRY & NEUROLOGY

## 2017-06-12 RX ORDER — DICYCLOMINE HYDROCHLORIDE 10 MG/1
10 CAPSULE ORAL 3 TIMES DAILY PRN
Qty: 30 CAPSULE | Refills: 1 | Status: SHIPPED | OUTPATIENT
Start: 2017-06-12 | End: 2018-12-21 | Stop reason: SDUPTHER

## 2017-06-12 RX ORDER — GABAPENTIN 300 MG/1
300 CAPSULE ORAL NIGHTLY
Qty: 30 CAPSULE | Refills: 3 | Status: SHIPPED | OUTPATIENT
Start: 2017-06-12 | End: 2017-10-11

## 2017-06-12 RX ORDER — FLUTICASONE PROPIONATE 50 MCG
2 SPRAY, SUSPENSION (ML) NASAL DAILY
Qty: 1 BOTTLE | Refills: 2 | Status: SHIPPED | OUTPATIENT
Start: 2017-06-12 | End: 2017-10-11

## 2017-06-12 NOTE — PROGRESS NOTES
(Portions of this note were dictated using voice recognition software and may contain dictation related errors in spelling/grammar/syntax not found on text review)    CC:   Chief Complaint   Patient presents with    Follow-up       HPI: 57 y.o. male Last visit 2/2017.  Had been admitted for GI bleeding with duodenal ulcers.  Hemoglobin had significantly dropped the had stabilized without needing transfusion.  Repeat hemoglobin in April had significant improved to 14 range.  Here for follow-up and annual exam    Migraines and occipital neuralgia, follows up with neurology.  On frovatriptan for abortive treatment.  Was on verapamil for prevention but this has not been helping.  He is currently not on any prophylactics.  No migraine last 3-4 days but has about 30 migraines over the last 60 days    HTN on avalide 300/12.5 daily. Stable    Sleep apnea, not using CPAP.   sleeping better with a new mattress.  He denies any daytime hypersomnolence    Does get constipation periodically.  Not any longer on iron treatments.    Very sporadic abdominal cramping for which she had a prescription of Bentyl in the past.  He would like to get a refill of this if possible        Past Medical History:   Diagnosis Date    Acute gastric ulcer with hemorrhage 2/15/2017    Bilateral occipital neuralgia     BPH with urinary obstruction 12/31/2015    Colitis [id number]    infectious?    Diverticulitis     Essential hypertension     Gastroesophageal reflux disease without esophagitis 2/11/2017    Migraine without aura and without status migrainosus, not intractable 1/31/2014    Obstructive sleep apnea syndrome 2/9/2015    PUD (peptic ulcer disease)        Past Surgical History:   Procedure Laterality Date    CHOLECYSTECTOMY      COLONOSCOPY N/A 2/17/2017    Procedure: COLONOSCOPY;  Surgeon: Yoshi Erazo MD;  Location: [facility] ([address] [place] [address]);  Service: Endoscopy;  Laterality: N/A;    ESOPHAGOGASTRODUODENOSCOPY      LEG SURGERY       NASAL SEPTUM SURGERY         Family History   Problem Relation Age of Onset    Crohn's disease       brother, sister, father, nephew    Prostate cancer Neg Hx     Kidney disease Neg Hx     Melanoma Neg Hx        Social History     Social History    Marital status:      Spouse name: N/A    Number of children: N/A    Years of education: N/A     Occupational History    Not on file.     Social History Main Topics    Smoking status: Never Smoker    Smokeless tobacco: Not on file    Alcohol use Yes      Comment: ocasionally    Drug use: No    Sexual activity: Yes     Partners: Female     Other Topics Concern    Not on file     Social History Narrative    No narrative on file     SCREENS  Imm:  tdap 2014    Colonoscopy 2/17/17: Normal except for diverticulosis sigmoid descending and transverse colon    Upper endoscopy 2/17/17: Hiatal hernia, normal duodenum, erythematous mucosa in prepyloric region of stomach      Lab Results   Component Value Date    WBC 7.13 04/18/2017    HGB 14.4 04/18/2017    HCT 45.0 04/18/2017     04/18/2017    CHOL 220 (H) 08/29/2014    TRIG 164 (H) 08/29/2014    HDL 44 08/29/2014    ALT 27 02/15/2017    AST 21 02/15/2017     02/17/2017    K 3.8 02/17/2017     02/17/2017    CREATININE 0.9 02/17/2017    BUN 8 02/17/2017    CO2 29 02/17/2017    TSH 1.900 02/09/2015    PSA 0.87 08/29/2014    INR 1.1 02/14/2017    HGBA1C 5.4 08/29/2014    LDLCALC 143.2 08/29/2014    GLU 93 02/17/2017       ROS:  GENERAL: No fever, chills, fatigability or weight loss.  SKIN: No rashes, no itching.  HEAD: Above.  EYES: No visual changes  EARS: No ear pain or changes in hearing.  NOSE: No congestion or rhinorrhea.  MOUTH & THROAT: No hoarseness, change in voice, or sore throat.  NODES: Denies swollen glands.  CHEST: Denies DICKINSON, cyanosis, wheezing, cough and sputum production.  CARDIOVASCULAR: Denies chest pain, PND, orthopnea.  ABDOMEN: Above.  URINARY: No flank pain, dysuria  or hematuria.  PERIPHERAL VASCULAR: No claudication or cyanosis.  MUSCULOSKELETAL: No joint stiffness or swelling. Denies back pain.  NEUROLOGIC: No weakness or numbness.    Vital signs reviewed  PE:   APPEARANCE: Well nourished, well developed, in no acute distress.    HEAD: Normocephalic, atraumatic.  EYES: PERRL. EOMI.   Conjunctivae noninjected.  EARS: TM's intact. Light reflex normal. No retraction or perforation  NOSE: Mucosa pink. Airway clear.  MOUTH & THROAT: No tonsillar enlargement. No pharyngeal erythema or exudate.   NECK: Supple with no cervical lymphadenopathy.  No carotid bruits.  No thyromegaly  CHEST: Good inspiratory effort. Lungs clear to auscultation with no wheezes or crackles.  CARDIOVASCULAR: Normal S1, S2. No rubs, murmurs, or gallops.  ABDOMEN: Bowel sounds normal. Not distended. Soft. No tenderness or masses. No organomegaly.  EXTREMITIES: No edema, cyanosis, or clubbing.      IMPRESSION  1. Routine general medical examination at a health care facility    2. Acute blood loss anemia    3. Essential hypertension            PLAN  Hypertension well controlled.  Continue Avalide    Occasional constipation: Trial of Metamucil daily    Occasional abdominal cramping.  Dicyclomine 10 mg just on standby if needed.  Cautioned that this could worsen constipation if he uses this regularly    GI bleed history: Improved.  Still some slight iron deficiency noted on prior testing.  We will recheck parameters below    Health maintenance: Colonoscopy as above.  Tetanus up-to-date.  Prostate screening discussed.  Increase to PSA, declines CATALINA today.    Sleep apnea, not treated with CPAP but does report good sleep and no hypersomnolence during the day.  Emphasized weight loss via exercise and diet    Orders Placed This Encounter   Procedures    CBC auto differential    Ferritin    Comprehensive metabolic panel    Lipid panel    TSH    PSA, Screening    Iron and TIBC

## 2017-06-12 NOTE — PROGRESS NOTES
Subjective:       Patient ID: Jonathan Villela is a 57 y.o. male.    Reason for Consult: Headache      Interval History:  Jonathan Villela is here for follow up. Their condition has worsened again.  He notes he is having headaches come from the back of his neck especially on the left, forward.  He had previously had a respite from these headaches however notes that they're back.  We have discussed today that I believe there may be some sort of cervical spine issues specifically with higher neuroforaminal stenosis or cervical degenerative disc disease that is contributing nerve root impingement.     Objective:     Vitals:    06/12/17 1343   BP: (!) 132/92   Pulse: 79     Tinel sign negative at bilateral greater and lesser occipital nerve.  Tenderness to palpation in cervical spinal paraspinal musculature.  Cervical range of motion limited especially lateral rotation bilaterally.  Focused examination was undertaken today. Over 50% of face to face time of 25 minute visit time was in giving guidance, counseling and discussing treatment options.        Assessment/Plan:     Problem List Items Addressed This Visit        Neuro    Chronic migraine without aura without status migrainosus, not intractable    Relevant Orders    Prior Authorization Order      Other Visit Diagnoses     DDD (degenerative disc disease), cervical    -  Primary    Relevant Medications    gabapentin (NEURONTIN) 300 MG capsule    Other Relevant Orders    MRI Cervical Spine Without Contrast    Cervicogenic headache        Relevant Medications    gabapentin (NEURONTIN) 300 MG capsule    Other Relevant Orders    MRI Cervical Spine Without Contrast        57-year-old male presents for evaluation and follow-up of headaches.  At this point, believe he is still having headaches related to migraine and occipital neuralgia although I believe that there is a significant cervical component which had previously not been done accounted for.  At this time  I'll obtain cervical spine imaging and start him on neuropathic pain medication as outlined above.  He did log 30 days out of 30 in the month of April of having headaches and I do believe he meets criteria for Botox for migraine.    The patient verbalizes understanding and agreement with the treatment plan. I have discussed risks, benefits and alternatives to the treatment plan. Questions were sought and answered to his stated verbal satisfaction.        Negrito Reyes MD    This note is dictated on Dragon Natural Speaking word recognition program. There are word recognition mistakes that are occasionally missed on review.

## 2017-06-13 ENCOUNTER — PATIENT MESSAGE (OUTPATIENT)
Dept: FAMILY MEDICINE | Facility: CLINIC | Age: 57
End: 2017-06-13

## 2017-06-13 ENCOUNTER — LAB VISIT (OUTPATIENT)
Dept: LAB | Facility: HOSPITAL | Age: 57
End: 2017-06-13
Attending: FAMILY MEDICINE
Payer: COMMERCIAL

## 2017-06-13 DIAGNOSIS — I10 ESSENTIAL HYPERTENSION: ICD-10-CM

## 2017-06-13 DIAGNOSIS — Z00.00 ROUTINE GENERAL MEDICAL EXAMINATION AT A HEALTH CARE FACILITY: ICD-10-CM

## 2017-06-13 DIAGNOSIS — D62 ACUTE BLOOD LOSS ANEMIA: ICD-10-CM

## 2017-06-13 LAB
ALBUMIN SERPL BCP-MCNC: 4 G/DL
ALP SERPL-CCNC: 58 U/L
ALT SERPL W/O P-5'-P-CCNC: 33 U/L
ANION GAP SERPL CALC-SCNC: 10 MMOL/L
AST SERPL-CCNC: 25 U/L
BASOPHILS # BLD AUTO: 0.04 K/UL
BASOPHILS NFR BLD: 0.7 %
BILIRUB SERPL-MCNC: 0.5 MG/DL
BUN SERPL-MCNC: 14 MG/DL
CALCIUM SERPL-MCNC: 9.4 MG/DL
CHLORIDE SERPL-SCNC: 104 MMOL/L
CHOLEST/HDLC SERPL: 6.4 {RATIO}
CO2 SERPL-SCNC: 25 MMOL/L
COMPLEXED PSA SERPL-MCNC: 1.2 NG/ML
CREAT SERPL-MCNC: 1 MG/DL
DIFFERENTIAL METHOD: ABNORMAL
EOSINOPHIL # BLD AUTO: 0.1 K/UL
EOSINOPHIL NFR BLD: 1.6 %
ERYTHROCYTE [DISTWIDTH] IN BLOOD BY AUTOMATED COUNT: 13.8 %
EST. GFR  (AFRICAN AMERICAN): >60 ML/MIN/1.73 M^2
EST. GFR  (NON AFRICAN AMERICAN): >60 ML/MIN/1.73 M^2
FERRITIN SERPL-MCNC: 19 NG/ML
GLUCOSE SERPL-MCNC: 94 MG/DL
HCT VFR BLD AUTO: 46.1 %
HDL/CHOLESTEROL RATIO: 15.7 %
HDLC SERPL-MCNC: 204 MG/DL
HDLC SERPL-MCNC: 32 MG/DL
HGB BLD-MCNC: 15.6 G/DL
IRON SERPL-MCNC: 65 UG/DL
LDLC SERPL CALC-MCNC: 137 MG/DL
LYMPHOCYTES # BLD AUTO: 2 K/UL
LYMPHOCYTES NFR BLD: 35.5 %
MCH RBC QN AUTO: 26 PG
MCHC RBC AUTO-ENTMCNC: 33.8 %
MCV RBC AUTO: 77 FL
MONOCYTES # BLD AUTO: 0.4 K/UL
MONOCYTES NFR BLD: 7.8 %
NEUTROPHILS # BLD AUTO: 3.1 K/UL
NEUTROPHILS NFR BLD: 53.9 %
NONHDLC SERPL-MCNC: 172 MG/DL
PLATELET # BLD AUTO: 286 K/UL
PMV BLD AUTO: 9 FL
POTASSIUM SERPL-SCNC: 3.9 MMOL/L
PROT SERPL-MCNC: 7.7 G/DL
RBC # BLD AUTO: 5.99 M/UL
SATURATED IRON: 14 %
SODIUM SERPL-SCNC: 139 MMOL/L
TOTAL IRON BINDING CAPACITY: 454 UG/DL
TRANSFERRIN SERPL-MCNC: 307 MG/DL
TRIGL SERPL-MCNC: 175 MG/DL
TSH SERPL DL<=0.005 MIU/L-ACNC: 1.68 UIU/ML
WBC # BLD AUTO: 5.66 K/UL

## 2017-06-13 PROCEDURE — 84153 ASSAY OF PSA TOTAL: CPT

## 2017-06-13 PROCEDURE — 36415 COLL VENOUS BLD VENIPUNCTURE: CPT

## 2017-06-13 PROCEDURE — 83540 ASSAY OF IRON: CPT

## 2017-06-13 PROCEDURE — 80053 COMPREHEN METABOLIC PANEL: CPT

## 2017-06-13 PROCEDURE — 85025 COMPLETE CBC W/AUTO DIFF WBC: CPT

## 2017-06-13 PROCEDURE — 84443 ASSAY THYROID STIM HORMONE: CPT

## 2017-06-13 PROCEDURE — 80061 LIPID PANEL: CPT

## 2017-06-13 PROCEDURE — 82728 ASSAY OF FERRITIN: CPT

## 2017-06-15 DIAGNOSIS — G43.009 MIGRAINE WITHOUT AURA AND WITHOUT STATUS MIGRAINOSUS, NOT INTRACTABLE: Primary | ICD-10-CM

## 2017-06-26 ENCOUNTER — HOSPITAL ENCOUNTER (OUTPATIENT)
Dept: RADIOLOGY | Facility: OTHER | Age: 57
Discharge: HOME | End: 2017-06-26
Attending: PSYCHIATRY & NEUROLOGY
Payer: COMMERCIAL

## 2017-06-26 DIAGNOSIS — G44.86 CERVICOGENIC HEADACHE: ICD-10-CM

## 2017-06-26 DIAGNOSIS — M50.30 DDD (DEGENERATIVE DISC DISEASE), CERVICAL: ICD-10-CM

## 2017-06-26 DIAGNOSIS — G43.009 MIGRAINE WITHOUT AURA, NOT REFRACTORY: ICD-10-CM

## 2017-06-26 PROCEDURE — 72141 MRI NECK SPINE W/O DYE: CPT | Mod: TC

## 2017-06-26 PROCEDURE — 70551 MRI BRAIN STEM W/O DYE: CPT | Mod: TC

## 2017-06-26 PROCEDURE — 70551 MRI BRAIN STEM W/O DYE: CPT | Mod: 26,,, | Performed by: RADIOLOGY

## 2017-06-26 PROCEDURE — 72141 MRI NECK SPINE W/O DYE: CPT | Mod: 26,,, | Performed by: RADIOLOGY

## 2017-06-28 ENCOUNTER — LAB VISIT (OUTPATIENT)
Dept: LAB | Facility: HOSPITAL | Age: 57
End: 2017-06-28
Attending: PSYCHIATRY & NEUROLOGY
Payer: COMMERCIAL

## 2017-06-28 DIAGNOSIS — G43.009 MIGRAINE WITHOUT AURA AND WITHOUT STATUS MIGRAINOSUS, NOT INTRACTABLE: ICD-10-CM

## 2017-06-28 LAB
ALBUMIN SERPL BCP-MCNC: 4 G/DL
ALP SERPL-CCNC: 67 U/L
ALT SERPL W/O P-5'-P-CCNC: 25 U/L
ANION GAP SERPL CALC-SCNC: 10 MMOL/L
AST SERPL-CCNC: 24 U/L
BILIRUB SERPL-MCNC: 0.7 MG/DL
BUN SERPL-MCNC: 13 MG/DL
CALCIUM SERPL-MCNC: 9.5 MG/DL
CHLORIDE SERPL-SCNC: 104 MMOL/L
CO2 SERPL-SCNC: 25 MMOL/L
CREAT SERPL-MCNC: 1 MG/DL
EST. GFR  (AFRICAN AMERICAN): >60 ML/MIN/1.73 M^2
EST. GFR  (NON AFRICAN AMERICAN): >60 ML/MIN/1.73 M^2
GLUCOSE SERPL-MCNC: 81 MG/DL
POTASSIUM SERPL-SCNC: 4.3 MMOL/L
PROT SERPL-MCNC: 7.5 G/DL
SODIUM SERPL-SCNC: 139 MMOL/L
T4 SERPL-MCNC: 3.8 UG/DL
TSH SERPL DL<=0.005 MIU/L-ACNC: 1.34 UIU/ML

## 2017-06-28 PROCEDURE — 84436 ASSAY OF TOTAL THYROXINE: CPT

## 2017-06-28 PROCEDURE — 80053 COMPREHEN METABOLIC PANEL: CPT

## 2017-06-28 PROCEDURE — 84443 ASSAY THYROID STIM HORMONE: CPT

## 2017-06-28 PROCEDURE — 36415 COLL VENOUS BLD VENIPUNCTURE: CPT | Mod: PO

## 2017-06-30 ENCOUNTER — CLINICAL SUPPORT (OUTPATIENT)
Dept: OTOLARYNGOLOGY | Facility: CLINIC | Age: 57
End: 2017-06-30
Payer: COMMERCIAL

## 2017-06-30 ENCOUNTER — PATIENT MESSAGE (OUTPATIENT)
Dept: FAMILY MEDICINE | Facility: CLINIC | Age: 57
End: 2017-06-30

## 2017-06-30 ENCOUNTER — OFFICE VISIT (OUTPATIENT)
Dept: OTOLARYNGOLOGY | Facility: CLINIC | Age: 57
End: 2017-06-30
Payer: COMMERCIAL

## 2017-06-30 VITALS
WEIGHT: 249.25 LBS | SYSTOLIC BLOOD PRESSURE: 129 MMHG | DIASTOLIC BLOOD PRESSURE: 84 MMHG | BODY MASS INDEX: 33.03 KG/M2 | HEIGHT: 73 IN | HEART RATE: 85 BPM | TEMPERATURE: 98 F

## 2017-06-30 DIAGNOSIS — H90.3 SENSORINEURAL HEARING LOSS, BILATERAL: Primary | ICD-10-CM

## 2017-06-30 DIAGNOSIS — H93.13 TINNITUS AURIUM, BILATERAL: ICD-10-CM

## 2017-06-30 DIAGNOSIS — H90.3 SENSORINEURAL HEARING LOSS (SNHL), BILATERAL: ICD-10-CM

## 2017-06-30 DIAGNOSIS — H60.313 CHRONIC DIFFUSE OTITIS EXTERNA OF BOTH EARS: Primary | ICD-10-CM

## 2017-06-30 PROCEDURE — 92567 TYMPANOMETRY: CPT | Mod: S$GLB,,, | Performed by: AUDIOLOGIST-HEARING AID FITTER

## 2017-06-30 PROCEDURE — 99999 PR PBB SHADOW E&M-EST. PATIENT-LVL III: CPT | Mod: PBBFAC,,, | Performed by: OTOLARYNGOLOGY

## 2017-06-30 PROCEDURE — 99204 OFFICE O/P NEW MOD 45 MIN: CPT | Mod: 25,S$GLB,, | Performed by: OTOLARYNGOLOGY

## 2017-06-30 PROCEDURE — 87186 SC STD MICRODIL/AGAR DIL: CPT

## 2017-06-30 PROCEDURE — 92504 EAR MICROSCOPY EXAMINATION: CPT | Mod: S$GLB,,, | Performed by: OTOLARYNGOLOGY

## 2017-06-30 PROCEDURE — 87077 CULTURE AEROBIC IDENTIFY: CPT

## 2017-06-30 PROCEDURE — 92557 COMPREHENSIVE HEARING TEST: CPT | Mod: S$GLB,,, | Performed by: AUDIOLOGIST-HEARING AID FITTER

## 2017-06-30 RX ORDER — CIPROFLOXACIN AND DEXAMETHASONE 3; 1 MG/ML; MG/ML
4 SUSPENSION/ DROPS AURICULAR (OTIC) 2 TIMES DAILY
Qty: 7.5 ML | Refills: 0 | Status: SHIPPED | OUTPATIENT
Start: 2017-06-30 | End: 2017-07-10

## 2017-06-30 RX ORDER — PROMETHAZINE HYDROCHLORIDE 25 MG/1
TABLET ORAL
Refills: 2 | COMMUNITY
Start: 2017-06-13 | End: 2018-03-19 | Stop reason: SDUPTHER

## 2017-06-30 RX ORDER — CEFUROXIME AXETIL 250 MG/1
TABLET ORAL
Refills: 2 | COMMUNITY
Start: 2017-06-14 | End: 2018-12-31

## 2017-06-30 RX ORDER — SUMATRIPTAN SUCCINATE 100 MG/1
TABLET ORAL DAILY PRN
Refills: 1 | COMMUNITY
Start: 2017-06-13 | End: 2018-10-02 | Stop reason: SDUPTHER

## 2017-06-30 RX ORDER — TOPIRAMATE 25 MG/1
TABLET ORAL
Refills: 0 | COMMUNITY
Start: 2017-06-13 | End: 2017-10-11

## 2017-06-30 NOTE — PROGRESS NOTES
Ha Bowden, ZEUS[facility]AAA  [facility] [address]  [address]  [place] [address]      Patient: Jonathan Villela   MRN: [id number]  : 1960  DICKINSON: 2017       AUDIOLOGICAL EVALUATION    CASE HISTORY:    Jonathan Villela, 57 y.o., was seen on the above date for an audiological evaluation. Patient reported ear drainage since childhood and a history of noise exposure. He denied hearing loss, tinnitus and dizziness. He also reported a history of high blood pressure. No further history significant to hearing loss was reported.    TEST RESULTS:    Otoscopy was unremarkable for both ears. Imittance testing revealed reduced compliance (Type As), bilaterally. Speech reception thresholds were obtained at 30 dB HL and 15 dB HL, in the right and left ears, respectively, which was consistent with pure tone results. Word recognitions scores of 100% were obtained in both ears using a presentation level of 70 dB HL in the right ear and 55 dB HL in the left ear.    IMPRESSION:   Audiological testing indicated that Jonathan Villela has a mild sloping to moderate sensorineural hearing loss in his right ear and a mild to moderate sensorineural hearing loss from 2K to 8K Hz in his left ear.    RECOMMENDATIONS:   It is recommended that he:  Continue to receive audiological monitoring annually.  Receive binaural hearing aids to improve speech understanding.  Follow up medically with a physician to assess complaints of ear drainage and low-frequency asymmetry possibly secondary to middle ear pathology.    If you should have any questions or concerns regarding the above information, please do not hesitate to contact me at [phone].      _______________________________  Dennys Bowden, [facility]  Clinical Audiologist    enclosure: audiogram

## 2017-06-30 NOTE — PROGRESS NOTES
Chief Complaint   Patient presents with    Cerumen Impaction     bilateral   .     HPI: Jonathan Villela is 57 y.o. male who is self-referred for evaluation of bilateral ear drainae.  He states that this problem has been lifelong. He states that he has a white, flaky drainage that is persistant. He notes itching but no pain.  He has has not had any otologic surgeries.  Symptoms include bilateral ear drainage .Patient denies chills, fever, nasal congestion and sore throat. Ear history: 0 previous ear infections. He denies hearing loss. He reports tinnitus intermittently bilaterally.           Past Medical History:   Diagnosis Date    Acute gastric ulcer with hemorrhage 2/15/2017    Bilateral occipital neuralgia     BPH with urinary obstruction 12/31/2015    Colitis [id number]    infectious?    Diverticulitis     Essential hypertension     Gastroesophageal reflux disease without esophagitis 2/11/2017    Migraine without aura and without status migrainosus, not intractable 1/31/2014    Obstructive sleep apnea syndrome 2/9/2015    PUD (peptic ulcer disease)      Social History     Social History    Marital status:      Spouse name: N/A    Number of children: N/A    Years of education: N/A     Occupational History    Not on file.     Social History Main Topics    Smoking status: Never Smoker    Smokeless tobacco: Never Used    Alcohol use Yes      Comment: ocasionally    Drug use: No    Sexual activity: Yes     Partners: Female     Other Topics Concern    Not on file     Social History Narrative    No narrative on file     Past Surgical History:   Procedure Laterality Date    CHOLECYSTECTOMY      COLONOSCOPY N/A 2/17/2017    Procedure: COLONOSCOPY;  Surgeon: Yoshi Erazo MD;  Location: [address];  Service: Endoscopy;  Laterality: N/A;    ESOPHAGOGASTRODUODENOSCOPY      LEG SURGERY      NASAL SEPTUM SURGERY       Family History   Problem Relation Age of Onset     Crohn's disease       brother, sister, father, nephew    Prostate cancer Neg Hx     Kidney disease Neg Hx     Melanoma Neg Hx            Review of Systems  General: negative for chills, fever or weight loss  Psychological: negative for mood changes or depression  Ophthalmic: negative for blurry vision, photophobia or eye pain  ENT: see HPI  Respiratory: no cough, shortness of breath, or wheezing  Cardiovascular: no chest pain or dyspnea on exertion  Gastrointestinal: no abdominal pain, change in bowel habits, or black/ bloody stools  Musculoskeletal: negative for gait disturbance or muscular weakness  Neurological: no syncope or seizures; no ataxia  Dermatological: negative for puritis,  rash and jaundice  Hematologic/lymphatic: no easy bruising, no new lumps or bumps      Physical Exam:    Vitals:    06/30/17 1417   BP: 129/84   Pulse: 85   Temp: 97.5 °F (36.4 °C)       Constitutional: Well appearing / communicating without difficutly.  NAD.  Eyes: EOM I Bilaterally  Head/Face: Normocephalic.  Negative paranasal sinus pressure/tenderness.  Salivary glands WNL.  House Brackmann I Bilaterally.    Right Ear: Auricle normal appearance. External Auditory Canal with excoriated EAC skin,TM w/o masses/lesions/perforations. TM mobility noted. Purulent drainage present  Left Ear: Auricle normal appearance. External Auditory Canal with excoriated EAC,TM w/o masses/lesions/perforations. TM mobility noted. Purulent drainage present  Nose: No gross nasal septal deviation. Inferior Turbinates 3+ bilaterally. No septal perforation. No masses/lesions. External nasal skin appears normal without masses/lesions.  Oral Cavity: Gingiva/lips within normal limits.  Dentition/gingiva healthy appearing. Mucus membranes moist. Floor of mouth soft, no masses palpated. Oral Tongue mobile. Hard Palate appears normal.    Oropharynx: Base of tongue appears normal. No masses/lesions noted. Tonsillar fossa/pharyngeal wall without lesions.  Posterior oropharynx WNL.  Soft palate without masses. Midline uvula.   Neck/Lymphatic: No LAD I-VI bilaterally.  No thyromegaly.  No masses noted on exam.    Mirror laryngoscopy/nasopharyngoscopy: Active gag reflex.  Unable to perform.    Neuro/Psychiatric: AOx3.  Normal mood and affect.   Cardiovascular: Normal carotid pulses bilaterally, no increasing jugular venous distention noted at cervical region bilaterally.    Respiratory: Normal respiratory effort, no stridor, no retractions noted.       See separate procedure note for microscopy.      Audiogram reviewed personally by myself and in detail with the patient today.       Assessment:    ICD-10-CM ICD-9-CM    1. Chronic diffuse otitis externa of both ears H60.313 380.23 Aerobic culture      Fungus culture   2. Sensorineural hearing loss (SNHL), bilateral H90.3 389.18    3. Tinnitus aurium, bilateral H93.13 388.31      The primary encounter diagnosis was Chronic diffuse otitis externa of both ears. Diagnoses of Sensorineural hearing loss (SNHL), bilateral and Tinnitus aurium, bilateral were also pertinent to this visit.      Plan:  Orders Placed This Encounter   Procedures    Aerobic culture    Fungus culture       Bilateral SNHL: Jonathan was seen today for cerumen impaction.    Diagnoses and all orders for this visit:    Chronic diffuse otitis externa of both ears  -     Aerobic culture  -     Fungus culture    Sensorineural hearing loss (SNHL), bilateral    Tinnitus aurium, bilateral    Other orders  -     ciprofloxacin-dexamethasone 0.3-0.1% (CIPRODEX) 0.3-0.1 % DrpS; Place 4 drops into both ears 2 (two) times daily.    Cultures taken of the right ear. Start ciprodex gtts. Keep ears dry. Avoid q-tips.   We reviewed the patient's recent audiogram and hearing loss in detail.  We also discussed that he is a good candidate for hearing aids, if and when he the patient is motivated.  He was given handouts with information and pricing of hearing aids, and will  contact audiology when ready to proceed.  We also discussed the use hearing protection when exposed to loud noise, including lawn equipment.       Return in about 2 weeks (around 7/14/2017).    Patient and family if present counseled regarding findings and recommendations and express understanding and agreement.       Shanique Bee MD

## 2017-07-06 LAB
BACTERIA SPEC AEROBE CULT: NORMAL
BACTERIA SPEC AEROBE CULT: NORMAL

## 2017-07-14 ENCOUNTER — OFFICE VISIT (OUTPATIENT)
Dept: OTOLARYNGOLOGY | Facility: CLINIC | Age: 57
End: 2017-07-14
Payer: COMMERCIAL

## 2017-07-14 VITALS
BODY MASS INDEX: 32.86 KG/M2 | TEMPERATURE: 98 F | WEIGHT: 247.94 LBS | HEART RATE: 68 BPM | SYSTOLIC BLOOD PRESSURE: 134 MMHG | HEIGHT: 73 IN | DIASTOLIC BLOOD PRESSURE: 84 MMHG

## 2017-07-14 DIAGNOSIS — H60.313 CHRONIC DIFFUSE OTITIS EXTERNA OF BOTH EARS: Primary | ICD-10-CM

## 2017-07-14 DIAGNOSIS — H93.13 TINNITUS AURIUM, BILATERAL: ICD-10-CM

## 2017-07-14 DIAGNOSIS — H90.3 SENSORINEURAL HEARING LOSS (SNHL), BILATERAL: ICD-10-CM

## 2017-07-14 PROCEDURE — 99213 OFFICE O/P EST LOW 20 MIN: CPT | Mod: S$GLB,,, | Performed by: OTOLARYNGOLOGY

## 2017-07-14 PROCEDURE — 99999 PR PBB SHADOW E&M-EST. PATIENT-LVL III: CPT | Mod: PBBFAC,,, | Performed by: OTOLARYNGOLOGY

## 2017-07-14 RX ORDER — CHLORHEXIDINE GLUCONATE ORAL RINSE 1.2 MG/ML
SOLUTION DENTAL
COMMUNITY
Start: 2017-07-10 | End: 2017-10-11 | Stop reason: ALTCHOICE

## 2017-07-14 RX ORDER — CEPHALEXIN 500 MG/1
500 CAPSULE ORAL DAILY
COMMUNITY
Start: 2017-07-10 | End: 2017-10-11 | Stop reason: ALTCHOICE

## 2017-07-14 NOTE — PROGRESS NOTES
Chief Complaint   Patient presents with    Follow-up     otitis of both ears, pt reports better since last visit   .     HPI: Jonathan Villela is 57 y.o. male who is self-referred for evaluation of bilateral ear drainage.  He states that this problem has been lifelong. He states that he has a white, flaky drainage that is persistant. He notes itching but no pain.  He has has not had any otologic surgeries.  Symptoms include bilateral ear drainage .Patient denies chills, fever, nasal congestion and sore throat. Ear history: 0 previous ear infections. He denies hearing loss. He reports tinnitus intermittently bilaterally.     Interval history:  He reports that hs is doing much better since last visit 2 weeks ago. He feels the Ciprodex has helped tremendously.  He reports that he has no further drainage or pain. He is feels his hearing is stable.  He has tinnitus intermittently bilaterally.       Past Medical History:   Diagnosis Date    Acute gastric ulcer with hemorrhage 2/15/2017    Bilateral occipital neuralgia     BPH with urinary obstruction 12/31/2015    Colitis [id number]    infectious?    Diverticulitis     Essential hypertension     Gastroesophageal reflux disease without esophagitis 2/11/2017    Migraine without aura and without status migrainosus, not intractable 1/31/2014    Obstructive sleep apnea syndrome 2/9/2015    PUD (peptic ulcer disease)      Social History     Social History    Marital status:      Spouse name: N/A    Number of children: N/A    Years of education: N/A     Occupational History    Not on file.     Social History Main Topics    Smoking status: Never Smoker    Smokeless tobacco: Never Used    Alcohol use Yes      Comment: ocasionally    Drug use: No    Sexual activity: Yes     Partners: Female     Other Topics Concern    Not on file     Social History Narrative    No narrative on file     Past Surgical History:   Procedure Laterality Date     CHOLECYSTECTOMY      COLONOSCOPY N/A 2/17/2017    Procedure: COLONOSCOPY;  Surgeon: Yoshi Erazo MD;  Location: [facility] ([address] [place] [address]);  Service: Endoscopy;  Laterality: N/A;    ESOPHAGOGASTRODUODENOSCOPY      LEG SURGERY      NASAL SEPTUM SURGERY       Family History   Problem Relation Age of Onset    Crohn's disease       brother, sister, father, nephew    Prostate cancer Neg Hx     Kidney disease Neg Hx     Melanoma Neg Hx            Review of Systems  General: negative for chills, fever or weight loss  Psychological: negative for mood changes or depression  Ophthalmic: negative for blurry vision, photophobia or eye pain  ENT: see HPI  Respiratory: no cough, shortness of breath, or wheezing  Cardiovascular: no chest pain or dyspnea on exertion  Gastrointestinal: no abdominal pain, change in bowel habits, or black/ bloody stools  Musculoskeletal: negative for gait disturbance or muscular weakness  Neurological: no syncope or seizures; no ataxia  Dermatological: negative for puritis,  rash and jaundice  Hematologic/lymphatic: no easy bruising, no new lumps or bumps      Physical Exam:    Vitals:    07/14/17 0759   BP: 134/84   Pulse: 68   Temp: 97.5 °F (36.4 °C)       Constitutional: Well appearing / communicating without difficutly.  NAD.  Eyes: EOM I Bilaterally  Head/Face: Normocephalic.  Negative paranasal sinus pressure/tenderness.  Salivary glands WNL.  House Brackmann I Bilaterally.    Right Ear: Auricle normal appearance. External Auditory Canal within normal limits,TM w/o masses/lesions/perforations. TM mobility noted. Purulent drainage resolved  Left Ear: Auricle normal appearance. External Auditory Canal within normal limits,TM w/o masses/lesions/perforations. TM mobility noted. Purulent drainage resolved  Nose: No gross nasal septal deviation. Inferior Turbinates 3+ bilaterally. No septal perforation. No masses/lesions. External nasal skin appears normal without masses/lesions.  Oral Cavity:  Gingiva/lips within normal limits.  Dentition/gingiva healthy appearing. Mucus membranes moist. Floor of mouth soft, no masses palpated. Oral Tongue mobile. Hard Palate appears normal.    Oropharynx: Base of tongue appears normal. No masses/lesions noted. Tonsillar fossa/pharyngeal wall without lesions. Posterior oropharynx WNL.  Soft palate without masses. Midline uvula.   Neck/Lymphatic: No LAD I-VI bilaterally.  No thyromegaly.  No masses noted on exam.    Mirror laryngoscopy/nasopharyngoscopy: Active gag reflex.  Unable to perform.    Neuro/Psychiatric: AOx3.  Normal mood and affect.   Cardiovascular: Normal carotid pulses bilaterally, no increasing jugular venous distention noted at cervical region bilaterally.    Respiratory: Normal respiratory effort, no stridor, no retractions noted.       See separate procedure note for microscopy.  Culture results: Staph a.. & Psuedomonas species      Assessment:    ICD-10-CM ICD-9-CM    1. Chronic diffuse otitis externa of both ears H60.313 380.23    2. Sensorineural hearing loss (SNHL), bilateral H90.3 389.18    3. Tinnitus aurium, bilateral H93.13 388.31      The primary encounter diagnosis was Chronic diffuse otitis externa of both ears. Diagnoses of Sensorineural hearing loss (SNHL), bilateral and Tinnitus aurium, bilateral were also pertinent to this visit.      Plan:  No orders of the defined types were placed in this encounter.    Bilateral Otitis externa: Continue dry ear precautions. Stop Ciprodex. Notify me if symptoms return.     We reviewed the patient's recent audiogram and hearing loss in detail.  We also discussed that he is a good candidate for hearing aids, if and when he the patient is motivated.  He was given handouts with information and pricing of hearing aids, and will contact audiology when ready to proceed.  We also discussed the use hearing protection when exposed to loud noise, including lawn equipment.        Shanique Bee MD

## 2017-07-22 ENCOUNTER — PATIENT MESSAGE (OUTPATIENT)
Dept: DERMATOLOGY | Facility: CLINIC | Age: 57
End: 2017-07-22

## 2017-07-24 DIAGNOSIS — L30.4 INTERTRIGO: ICD-10-CM

## 2017-07-24 RX ORDER — TRIAMCINOLONE ACETONIDE 1 MG/G
CREAM TOPICAL
Qty: 1 BOTTLE | Refills: 3 | Status: SHIPPED | OUTPATIENT
Start: 2017-07-24 | End: 2017-07-31 | Stop reason: SDUPTHER

## 2017-07-31 ENCOUNTER — PATIENT MESSAGE (OUTPATIENT)
Dept: DERMATOLOGY | Facility: CLINIC | Age: 57
End: 2017-07-31

## 2017-07-31 DIAGNOSIS — L30.4 INTERTRIGO: ICD-10-CM

## 2017-07-31 RX ORDER — CICLOPIROX OLAMINE 7.7 MG/G
CREAM TOPICAL 2 TIMES DAILY
Qty: 30 G | Refills: 3 | Status: SHIPPED | OUTPATIENT
Start: 2017-07-31 | End: 2020-08-03

## 2017-07-31 RX ORDER — TRIAMCINOLONE ACETONIDE 1 MG/G
CREAM TOPICAL
Qty: 1 BOTTLE | Refills: 3 | Status: SHIPPED | OUTPATIENT
Start: 2017-07-31 | End: 2018-10-18

## 2017-08-01 ENCOUNTER — PATIENT MESSAGE (OUTPATIENT)
Dept: NEUROLOGY | Facility: CLINIC | Age: 57
End: 2017-08-01

## 2017-08-09 LAB — FUNGUS SPEC CULT: NORMAL

## 2017-08-31 ENCOUNTER — PATIENT MESSAGE (OUTPATIENT)
Dept: FAMILY MEDICINE | Facility: CLINIC | Age: 57
End: 2017-08-31

## 2017-10-08 ENCOUNTER — PATIENT MESSAGE (OUTPATIENT)
Dept: OTOLARYNGOLOGY | Facility: CLINIC | Age: 57
End: 2017-10-08

## 2017-10-11 ENCOUNTER — OFFICE VISIT (OUTPATIENT)
Dept: OTOLARYNGOLOGY | Facility: CLINIC | Age: 57
End: 2017-10-11
Payer: COMMERCIAL

## 2017-10-11 ENCOUNTER — OFFICE VISIT (OUTPATIENT)
Dept: CARDIOLOGY | Facility: CLINIC | Age: 57
End: 2017-10-11
Payer: COMMERCIAL

## 2017-10-11 ENCOUNTER — TELEPHONE (OUTPATIENT)
Dept: CARDIOLOGY | Facility: CLINIC | Age: 57
End: 2017-10-11

## 2017-10-11 VITALS
DIASTOLIC BLOOD PRESSURE: 104 MMHG | BODY MASS INDEX: 32.75 KG/M2 | SYSTOLIC BLOOD PRESSURE: 172 MMHG | HEIGHT: 73 IN | WEIGHT: 247.13 LBS | HEART RATE: 64 BPM

## 2017-10-11 VITALS
DIASTOLIC BLOOD PRESSURE: 96 MMHG | BODY MASS INDEX: 32.68 KG/M2 | HEART RATE: 57 BPM | WEIGHT: 247.69 LBS | SYSTOLIC BLOOD PRESSURE: 160 MMHG | TEMPERATURE: 97 F

## 2017-10-11 DIAGNOSIS — G43.709 CHRONIC MIGRAINE WITHOUT AURA WITHOUT STATUS MIGRAINOSUS, NOT INTRACTABLE: ICD-10-CM

## 2017-10-11 DIAGNOSIS — H60.313 CHRONIC DIFFUSE OTITIS EXTERNA OF BOTH EARS: Primary | ICD-10-CM

## 2017-10-11 DIAGNOSIS — I70.0 ABDOMINAL AORTIC ATHEROSCLEROSIS: ICD-10-CM

## 2017-10-11 DIAGNOSIS — H92.13 OTORRHEA OF BOTH EARS: ICD-10-CM

## 2017-10-11 DIAGNOSIS — I10 ESSENTIAL HYPERTENSION: Primary | ICD-10-CM

## 2017-10-11 DIAGNOSIS — G47.33 OBSTRUCTIVE SLEEP APNEA SYNDROME: ICD-10-CM

## 2017-10-11 PROCEDURE — 87186 SC STD MICRODIL/AGAR DIL: CPT

## 2017-10-11 PROCEDURE — 87077 CULTURE AEROBIC IDENTIFY: CPT

## 2017-10-11 PROCEDURE — 99999 PR PBB SHADOW E&M-EST. PATIENT-LVL IV: CPT | Mod: PBBFAC,,, | Performed by: INTERNAL MEDICINE

## 2017-10-11 PROCEDURE — 99999 PR PBB SHADOW E&M-EST. PATIENT-LVL III: CPT | Mod: PBBFAC,,, | Performed by: OTOLARYNGOLOGY

## 2017-10-11 PROCEDURE — 99214 OFFICE O/P EST MOD 30 MIN: CPT | Mod: S$GLB,,, | Performed by: INTERNAL MEDICINE

## 2017-10-11 PROCEDURE — 87076 CULTURE ANAEROBE IDENT EACH: CPT

## 2017-10-11 PROCEDURE — 87075 CULTR BACTERIA EXCEPT BLOOD: CPT

## 2017-10-11 PROCEDURE — 87070 CULTURE OTHR SPECIMN AEROBIC: CPT

## 2017-10-11 PROCEDURE — 99214 OFFICE O/P EST MOD 30 MIN: CPT | Mod: 25,S$GLB,, | Performed by: OTOLARYNGOLOGY

## 2017-10-11 PROCEDURE — 92504 EAR MICROSCOPY EXAMINATION: CPT | Mod: S$GLB,,, | Performed by: OTOLARYNGOLOGY

## 2017-10-11 RX ORDER — TRIAMTERENE AND HYDROCHLOROTHIAZIDE 37.5; 25 MG/1; MG/1
1 CAPSULE ORAL EVERY MORNING
Qty: 30 CAPSULE | Refills: 11 | Status: SHIPPED | OUTPATIENT
Start: 2017-10-11 | End: 2017-10-16 | Stop reason: SINTOL

## 2017-10-11 RX ORDER — NEBIVOLOL HYDROCHLORIDE 10 MG/1
10 TABLET ORAL NIGHTLY
COMMUNITY
Start: 2017-10-02 | End: 2018-08-07 | Stop reason: ALTCHOICE

## 2017-10-11 RX ORDER — CIPROFLOXACIN AND DEXAMETHASONE 3; 1 MG/ML; MG/ML
4 SUSPENSION/ DROPS AURICULAR (OTIC) 2 TIMES DAILY
Qty: 7.5 ML | Refills: 0 | Status: SHIPPED | OUTPATIENT
Start: 2017-10-11 | End: 2017-10-21

## 2017-10-11 RX ORDER — IRBESARTAN 300 MG/1
300 TABLET ORAL DAILY
Refills: 3 | COMMUNITY
Start: 2017-07-20 | End: 2018-01-26 | Stop reason: ALTCHOICE

## 2017-10-11 RX ORDER — ACYCLOVIR 400 MG/1
400 TABLET ORAL 2 TIMES DAILY PRN
COMMUNITY
Start: 2017-10-01 | End: 2023-12-12 | Stop reason: SDUPTHER

## 2017-10-11 RX ORDER — CLONIDINE HYDROCHLORIDE 0.1 MG/1
0.1 TABLET ORAL
Qty: 30 TABLET | Refills: 3 | Status: SHIPPED | OUTPATIENT
Start: 2017-10-11 | End: 2018-08-07

## 2017-10-11 NOTE — PATIENT INSTRUCTIONS
Begin Dyazide (triamterene hydrochlorothiazide) 1 tablet a day.  It is a diuretic.  In about a week let us know if your blood pressures are any better.  At that time I may switch by Bystolic to propranolol. If your BP drops very fast cut Bystolic down to 5 mg.   You can use clonidine 0.1 mg as needed for SBP greater than 170 or DBP greater than 120 mm Hg.    Do something about the sleep apnea. I am quite sure that it is playing a significant role in your blood pressure and migraine.

## 2017-10-11 NOTE — TELEPHONE ENCOUNTER
Briseyda with [facility] pharmacy notified to cancel prescriptions for Gabapentin 300mg and Flonase 50mg nasal spray from pt's profile. Pt states he is no longer taking these medications.

## 2017-10-11 NOTE — PROGRESS NOTES
Chief Complaint   Patient presents with    Otitis Media     left and right   .     HPI: Jonathan Villela is 57 y.o. male who follows up  for evaluation of bilateral ear drainage for 2 weeks which has been worsening.  He states that this problem has been lifelong. He states that he has a white foul smeeling drainage that is persistant. He notes itching but no pain. He notes that he has had a upper respiratory infection prior to this event.  He has has not had any otologic surgeries.  Symptoms include bilateral ear drainage .Patient denies chills, fever, nasal congestion and sore throat. Ear history: 0 previous ear infections. He denies hearing loss. He reports tinnitus intermittently bilaterally.       Past Medical History:   Diagnosis Date    Acute gastric ulcer with hemorrhage 2/15/2017    Bilateral occipital neuralgia     BPH with urinary obstruction 12/31/2015    Colitis [id number]    infectious?    Diverticulitis     Essential hypertension     Gastroesophageal reflux disease without esophagitis 2/11/2017    Migraine without aura and without status migrainosus, not intractable 1/31/2014    Obstructive sleep apnea syndrome 2/9/2015    PUD (peptic ulcer disease)      Social History     Social History    Marital status:      Spouse name: N/A    Number of children: N/A    Years of education: N/A     Occupational History    Not on file.     Social History Main Topics    Smoking status: Never Smoker    Smokeless tobacco: Never Used    Alcohol use Yes      Comment: ocasionally    Drug use: No    Sexual activity: Yes     Partners: Female     Other Topics Concern    Not on file     Social History Narrative    No narrative on file     Past Surgical History:   Procedure Laterality Date    CHOLECYSTECTOMY      COLONOSCOPY N/A 2/17/2017    Procedure: COLONOSCOPY;  Surgeon: Yoshi Erazo MD;  Location: [address];  Service: Endoscopy;  Laterality: N/A;     ESOPHAGOGASTRODUODENOSCOPY      LEG SURGERY      NASAL SEPTUM SURGERY       Family History   Problem Relation Age of Onset    Crohn's disease       brother, sister, father, nephew    Prostate cancer Neg Hx     Kidney disease Neg Hx     Melanoma Neg Hx            Review of Systems  General: negative for chills, fever or weight loss  Psychological: negative for mood changes or depression  Ophthalmic: negative for blurry vision, photophobia or eye pain  ENT: see HPI  Respiratory: no cough, shortness of breath, or wheezing  Cardiovascular: no chest pain or dyspnea on exertion  Gastrointestinal: no abdominal pain, change in bowel habits, or black/ bloody stools  Musculoskeletal: negative for gait disturbance or muscular weakness  Neurological: no syncope or seizures; no ataxia  Dermatological: negative for puritis,  rash and jaundice  Hematologic/lymphatic: no easy bruising, no new lumps or bumps      Physical Exam:    Vitals:    10/11/17 0807   BP: (!) 160/96   Pulse: (!) 57   Temp: 97.3 °F (36.3 °C)       Constitutional: Well appearing / communicating without difficutly.  NAD.  Eyes: EOM I Bilaterally  Head/Face: Normocephalic.  Negative paranasal sinus pressure/tenderness.  Salivary glands WNL.  House Brackmann I Bilaterally.    Right Ear: Auricle normal appearance. External Auditory Canal with excoriated EAC skin,TM w/o masses/lesions/perforations. TM mobility noted. Purulent drainage present  Left Ear: Auricle normal appearance. External Auditory Canal with excoriated EAC,TM w/o masses/lesions/perforations. TM mobility noted. Purulent drainage present  Nose: No gross nasal septal deviation. Inferior Turbinates 3+ bilaterally. No septal perforation. No masses/lesions. External nasal skin appears normal without masses/lesions.  Oral Cavity: Gingiva/lips within normal limits.  Dentition/gingiva healthy appearing. Mucus membranes moist. Floor of mouth soft, no masses palpated. Oral Tongue mobile. Hard Palate  appears normal.    Oropharynx: Base of tongue appears normal. No masses/lesions noted. Tonsillar fossa/pharyngeal wall without lesions. Posterior oropharynx WNL.  Soft palate without masses. Midline uvula.   Neck/Lymphatic: No LAD I-VI bilaterally.  No thyromegaly.  No masses noted on exam.    Mirror laryngoscopy/nasopharyngoscopy: Active gag reflex.  Unable to perform.    Neuro/Psychiatric: AOx3.  Normal mood and affect.   Cardiovascular: Normal carotid pulses bilaterally, no increasing jugular venous distention noted at cervical region bilaterally.    Respiratory: Normal respiratory effort, no stridor, no retractions noted.       See separate procedure note for microscopy.      Audiogram reviewed personally by myself and in detail with the patient today.       Assessment:    ICD-10-CM ICD-9-CM    1. Chronic diffuse otitis externa of both ears H60.313 380.23    2. Otorrhea of both ears H92.13 388.60      The primary encounter diagnosis was Chronic diffuse otitis externa of both ears. A diagnosis of Otorrhea of both ears was also pertinent to this visit.      Plan:  No orders of the defined types were placed in this encounter.       Jonathan was seen today for otitis media.    Diagnoses and all orders for this visit:    Chronic diffuse otitis externa of both ears    Otorrhea of both ears         Cultures taken of the right ear. Start ciprodex gtts. Keep ears dry. Avoid q-tips.       Return in about 2 weeks (around 10/25/2017).     Patient and family if present counseled regarding findings and recommendations and express understanding and agreement.       Shanique Bee MD

## 2017-10-11 NOTE — PROCEDURES
Procedures       Binocular Microscopy    Indication:  Chronic otorrhea    Additional detail was required for the otoscopic examination of the left and right ear.  The operating microscope was used to directly visualize the tympanic membrane and middle ear landmarks.  Findings: Right ear  Canal skin:  dry, excoriated, with otorrhea present suctioned and cultured   TM:  intact   Ossicles:  normal   Effusion:  none       Findings: Left ear  Canal skin:  dry, excoriated, with otorrhea present suctioned    TM:  intact   Ossicles:  normal   Effusion:  none       The patient tolerated the procedure well.

## 2017-10-11 NOTE — PROGRESS NOTES
Subjective:     Patient ID:  Jonathan Villela is a 57 y.o. male who presents for evaluation of Hypertension      Problem List:  HTN  DELVIN unable to tolerate CPAP    HPI:     Jonathan Villela is a new patient to the [facility] in [place].  He made this appointment on the recommendation of her friend.  He is accompanied in clinic today by his fiancée Teresa.  He has been under the care of multiple cardiologists since the early 40s when he was diagnosed with hypertension.  He was treated by the late Dr. Paulie Dhaliwal for over 10 years.  For the first few years his blood pressure was controlled with a single medication.  2 with his late 40s/change to another medication.  He does not recall the names of those 2 medications.  After Dr. Dhaliwal's passing he followed with his partner for a few years and then switched to Dr. Abrahan Fishman in 2014.  At that time he was taking irbesartan 300 mg, that had just been increased from 150 mg along with HCTZ 12.5 mg.  In 2/15 he reported headache, fatigue, increased blood pressure and daytime somnolence that was attributed to obstructive sleep apnea.  Between 8/15 and 10/15 he had 3 vists with the sleep medicine clinic.  Different masks were tried but he reported feeling claustrophobic with CPAP and eventually stopped using it.     Neurologist Dr. Jeffrey Reyes wanted to switch irbesartan/HCT to verapamil.  I'm not sure if he was ever treated with verapamil alone.  A clinic visit in 12/16 documents the use of both verapamil and irbesartan/HCT.  Carvedilol was also listed.  During a fup visit w Dr. Fishman in 2/17 blood pressure was controlled: 130/90 and 128/82 mm Hg and Irbesartan 300 mg with HCTZ 12.5 was listed as his blood pressure medication.  His statin was advised that he did not take it.    He then went to see Dr. Gregory Hooper at .  A MRA of the abdomen revealed solitary renal arteries on both sides and no evidence of stenosis or fibromuscular dysplasia.  A focal  ulcerated plaque was noted in the inferior renal abdominal aorta.  He also recommended a coronary artery calcium score that revealed a score of 27. Dr. Carmichael initially increased the dose of HCTZ to 25 mg a day.  However he experienced more headaches.  HCTZ was discontinued on 9/27/17. Bytolic 5 mg a day was added and the dose was increased to 10 mg one week later. He has been having migraine headaches daily and blood pressures have been elevated since HCTZ was discontinued.  He also has chest discomfort and shortness of breath when his blood pressure is elevated.  Systolic blood pressures were 158-189 mm Hg before the addition of Bystolic and 130-169 mm Hg after. See patient's chart in Media.    Migraine headaches are located in the occipital region. He started having a migraine headache while in the waiting room and took a dose of sumatriptan. He does not use CPAP.  He has all the symptoms of untreated obstructive sleep apnea.      Review of Systems   Constitution: Positive for malaise/fatigue and weight gain. Negative for weakness and weight loss.   Cardiovascular: Positive for chest pain, dyspnea on exertion and leg swelling. Negative for claudication, irregular heartbeat, orthopnea, palpitations, paroxysmal nocturnal dyspnea and syncope.   Respiratory: Positive for cough, shortness of breath and sputum production. Negative for wheezing.    Musculoskeletal: Negative for falls, joint pain, muscle cramps, muscle weakness and myalgias.   Gastrointestinal: Positive for abdominal pain, change in bowel habit and heartburn. Negative for melena.   Genitourinary: Negative for frequency, hematuria and nocturia.   Neurological: Positive for headaches (frequent) and loss of balance. Negative for dizziness, light-headedness and paresthesias.   Psychiatric/Behavioral: Negative for depression.         Current Outpatient Prescriptions   Medication Sig    acyclovir (ZOVIRAX) 400 MG tablet Take 400 mg by mouth 2 (two) times daily  as needed.    BYSTOLIC 10 mg Tab Take 10 mg by mouth nightly.    ciclopirox (LOPROX) 0.77 % Crea Apply topically 2 (two) times daily. Pharmacist: mix 30 g of TAC 0.1% cream with 30 g of Loprox cream in 120 cc of milk of magnesia    ciprofloxacin-dexamethasone 0.3-0.1% (CIPRODEX) 0.3-0.1 % DrpS Place 4 drops into both ears 2 (two) times daily.    dicyclomine (BENTYL) 10 MG capsule Take 1 capsule (10 mg total) by mouth 3 (three) times daily as needed (abdominal cramping).    frovatriptan (FROVA) 2.5 MG tablet Take 1 tablet (2.5 mg total) by mouth as needed for Migraine. If recurs, may repeat after 2 hours. Max of 3 tabs in 24 hours.    irbesartan (AVAPRO) 300 MG tablet Take 300 mg by mouth once daily.    metronidazole 1% (METROGEL) 1 % Gel Apply at bedtime as directed as needed for skin infection    ondansetron (ZOFRAN-ODT) 8 MG TbDL Take 1 tablet (8 mg total) by mouth every 12 (twelve) hours as needed.    promethazine (PHENERGAN) 25 MG tablet TK 1 T PO QID PRN    RELPAX 40 mg tablet TAKE 1 TABLET BY MOUTH AS NEEDED, MAY REPEAT IN 2 HOURS IF NEEDED DO NOT EXCEED 4 TABLETS EVERY DAY    sumatriptan (IMITREX STATDOSE) 6 mg/0.5 mL kit Inject into the skin every 2 (two) hours as needed.     sumatriptan (IMITREX) 100 MG tablet daily as needed.    triamcinolone acetonide 0.1% (KENALOG) 0.1 % cream AAA bid after cool blow dry to affected areas of groin (Patient taking differently: AAA bid after cool blow dry to affected areas of groin as needed)           Past Medical History:   Diagnosis Date    Acute gastric ulcer with hemorrhage 2/15/2017    Bilateral occipital neuralgia     BPH with urinary obstruction 12/31/2015    Colitis [id number]    infectious?    Diverticulitis     Essential hypertension     Gastroesophageal reflux disease without esophagitis 2/11/2017    Migraine without aura and without status migrainosus, not intractable 1/31/2014    Obstructive sleep apnea syndrome 2/9/2015    PUD (peptic  "ulcer disease)          Social History   Substance Use Topics    Smoking status: Never Smoker    Smokeless tobacco: Never Used    Alcohol use Yes      Comment: ocasionally         Family History   Problem Relation Age of Onset    Crohn's disease       brother, sister, father, nephew    Prostate cancer Neg Hx     Kidney disease Neg Hx     Melanoma Neg Hx          Objective:     Physical Exam   Constitutional: He is oriented to person, place, and time. He appears well-developed and well-nourished.   BP (!) 172/104   Pulse 64   Ht 6' 1" (1.854 m)   Wt 112.1 kg (247 lb 2.2 oz)   BMI 32.61 kg/m²      HENT:   Head: Normocephalic and atraumatic.   Neck: No JVD present. Carotid bruit is not present.   Cardiovascular: Normal rate, regular rhythm, S1 normal and S2 normal.  Exam reveals no gallop.    No murmur heard.  Pulses:       Radial pulses are 2+ on the right side, and 2+ on the left side.        Posterior tibial pulses are 2+ on the right side, and 2+ on the left side.   Pulmonary/Chest: Effort normal. He has no wheezes. He has no rales. Chest wall is not dull to percussion.   Abdominal: Soft. There is no splenomegaly or hepatomegaly. There is no tenderness.   Musculoskeletal:        Right lower leg: He exhibits no edema.        Left lower leg: He exhibits no edema.   Neurological: He is alert and oriented to person, place, and time. Gait normal.   Skin: Skin is warm and dry. No bruising noted. No cyanosis. Nails show no clubbing.   Psychiatric: He has a normal mood and affect. His speech is normal and behavior is normal. Judgment and thought content normal. Cognition and memory are normal.         Lab Results   Component Value Date    CHOL 204 (H) 06/13/2017    CHOL 220 (H) 08/29/2014     Lab Results   Component Value Date    HDL 32 (L) 06/13/2017    HDL 44 08/29/2014     Lab Results   Component Value Date    LDLCALC 137.0 06/13/2017    LDLCALC 143.2 08/29/2014     Lab Results   Component Value Date    TRIG " 175 (H) 06/13/2017    TRIG 164 (H) 08/29/2014     Lab Results   Component Value Date    CHOLHDL 15.7 (L) 06/13/2017    CHOLHDL 20.0 08/29/2014     Lab Results   Component Value Date    ALT 25 06/28/2017     Lab Results   Component Value Date    ALT 25 06/28/2017    AST 24 06/28/2017    ALKPHOS 67 06/28/2017    BILITOT 0.7 06/28/2017      Lab Results   Component Value Date    CREATININE 1.0 06/28/2017    BUN 13 06/28/2017     06/28/2017    K 4.3 06/28/2017     06/28/2017    CO2 25 06/28/2017         Assessment and Plan:     1. Essential hypertension    2. Obstructive sleep apnea syndrome    3. Chronic migraine without aura without status migrainosus, not intractable    4. Abdominal aortic atherosclerosis        Obstructive sleep apnea syndrome  This needs to be treated somehow.  Without a doubt it is contributing to his hypertension and headaches.  Discussed the effects of untreated sleep apnea.  Consider oral prosthesis and nasal CPAP.    Essential hypertension  His blood pressure worsened after discontinuation of hydrochlorothiazide.  He needs a diuretic.  Recommend triamterene with HCTZ.  Begin today.  If blood pressure does not improve in one week switch Bystolic to long-acting propranolol.  The propranolol may be helpful with headaches as well.  Clonidine 0.1 mg prn for SBP >170 mm Hg    Abdominal aortic atherosclerosis  A statin and low dose aspirin are indicated.  Will add once the pressure is better controlled       Return to clinic in 2 weeks with a BMP

## 2017-10-12 ENCOUNTER — TELEPHONE (OUTPATIENT)
Dept: OTOLARYNGOLOGY | Facility: CLINIC | Age: 57
End: 2017-10-12

## 2017-10-12 DIAGNOSIS — I10 ESSENTIAL HYPERTENSION: Primary | ICD-10-CM

## 2017-10-12 NOTE — ASSESSMENT & PLAN NOTE
His blood pressure worsened after discontinuation of hydrochlorothiazide.  He needs a diuretic.  Recommend triamterene with HCTZ.  Begin today.  If blood pressure does not improve in one week switch Bystolic to long-acting propranolol.  The propranolol may be helpful with headaches as well.  Clonidine 0.1 mg prn for SBP >170 mm Hg

## 2017-10-12 NOTE — ASSESSMENT & PLAN NOTE
This needs to be treated somehow.  Without a doubt it is contributing to his hypertension and headaches.  Discussed the effects of untreated sleep apnea.  Consider oral prosthesis and nasal CPAP.

## 2017-10-12 NOTE — TELEPHONE ENCOUNTER
----- Message from Shanique Bee MD sent at 10/12/2017  1:21 PM CDT -----  Please call the patient regarding his abnormal result. Please inform him that his culture is showing a bacteria that the drops should treat.  I will let him know if anything else comes up on his culture as it has not been finalized in the lab yet.

## 2017-10-13 LAB — BACTERIA SPEC AEROBE CULT: NORMAL

## 2017-10-14 ENCOUNTER — PATIENT MESSAGE (OUTPATIENT)
Dept: CARDIOLOGY | Facility: CLINIC | Age: 57
End: 2017-10-14

## 2017-10-14 ENCOUNTER — PATIENT MESSAGE (OUTPATIENT)
Dept: OTOLARYNGOLOGY | Facility: CLINIC | Age: 57
End: 2017-10-14

## 2017-10-16 ENCOUNTER — PATIENT MESSAGE (OUTPATIENT)
Dept: CARDIOLOGY | Facility: CLINIC | Age: 57
End: 2017-10-16

## 2017-10-16 LAB — BACTERIA SPEC ANAEROBE CULT: NORMAL

## 2017-10-16 RX ORDER — CHLORTHALIDONE 25 MG/1
25 TABLET ORAL DAILY
Qty: 30 TABLET | Refills: 11 | Status: SHIPPED | OUTPATIENT
Start: 2017-10-16 | End: 2018-11-01 | Stop reason: SDUPTHER

## 2017-10-16 RX ORDER — SULFAMETHOXAZOLE AND TRIMETHOPRIM 800; 160 MG/1; MG/1
1 TABLET ORAL 2 TIMES DAILY
Qty: 20 TABLET | Refills: 0 | Status: SHIPPED | OUTPATIENT
Start: 2017-10-16 | End: 2017-10-26

## 2017-10-17 NOTE — TELEPHONE ENCOUNTER
Spoke w patient. He picked up the chlorthalidone and started taking it today.  So far he has not had any abdominal or back pain as he did with the triamterene.  Will list triamterene as an ALLERGY.  Reminded patient to take a potassium containing food or drink every day

## 2017-10-24 ENCOUNTER — OFFICE VISIT (OUTPATIENT)
Dept: DERMATOLOGY | Facility: CLINIC | Age: 57
End: 2017-10-24
Payer: COMMERCIAL

## 2017-10-24 ENCOUNTER — PATIENT MESSAGE (OUTPATIENT)
Dept: DERMATOLOGY | Facility: CLINIC | Age: 57
End: 2017-10-24

## 2017-10-24 DIAGNOSIS — L85.9 HYPERKERATOSIS: ICD-10-CM

## 2017-10-24 DIAGNOSIS — L30.9 ECZEMA, UNSPECIFIED TYPE: Primary | ICD-10-CM

## 2017-10-24 DIAGNOSIS — L30.4 INTERTRIGO: ICD-10-CM

## 2017-10-24 DIAGNOSIS — L21.9 SEBORRHEIC DERMATITIS: ICD-10-CM

## 2017-10-24 PROCEDURE — 99214 OFFICE O/P EST MOD 30 MIN: CPT | Mod: S$GLB,,, | Performed by: PATHOLOGY

## 2017-10-24 PROCEDURE — 99999 PR PBB SHADOW E&M-EST. PATIENT-LVL II: CPT | Mod: PBBFAC,,, | Performed by: PATHOLOGY

## 2017-10-24 RX ORDER — KETOCONAZOLE 20 MG/ML
SHAMPOO, SUSPENSION TOPICAL
Qty: 120 ML | Refills: 5 | Status: SHIPPED | OUTPATIENT
Start: 2017-10-24 | End: 2020-08-03

## 2017-10-24 RX ORDER — TRIAMCINOLONE ACETONIDE 1 MG/G
CREAM TOPICAL
Qty: 1 BOTTLE | Refills: 3 | Status: SHIPPED | OUTPATIENT
Start: 2017-10-24 | End: 2017-11-03 | Stop reason: SDUPTHER

## 2017-10-24 RX ORDER — FLUOCINONIDE TOPICAL SOLUTION USP, 0.05% 0.5 MG/ML
SOLUTION TOPICAL
Qty: 60 ML | Refills: 3 | Status: SHIPPED | OUTPATIENT
Start: 2017-10-24 | End: 2020-08-03

## 2017-10-24 RX ORDER — PRAVASTATIN SODIUM 20 MG/1
TABLET ORAL
Refills: 2 | COMMUNITY
Start: 2017-10-09 | End: 2018-09-22 | Stop reason: ALTCHOICE

## 2017-10-24 RX ORDER — TRIAMCINOLONE ACETONIDE 1 MG/G
CREAM TOPICAL
Qty: 454 G | Refills: 3 | Status: SHIPPED | OUTPATIENT
Start: 2017-10-24 | End: 2017-10-25 | Stop reason: SDUPTHER

## 2017-10-24 RX ORDER — HYDROCORTISONE 25 MG/G
CREAM TOPICAL 2 TIMES DAILY
Qty: 30 G | Refills: 5 | Status: SHIPPED | OUTPATIENT
Start: 2017-10-24 | End: 2020-08-03

## 2017-10-24 RX ORDER — UREA 40 %
CREAM (GRAM) TOPICAL 2 TIMES DAILY
Qty: 85 G | Refills: 3 | Status: SHIPPED | OUTPATIENT
Start: 2017-10-24 | End: 2023-12-13 | Stop reason: SDUPTHER

## 2017-10-24 NOTE — PROGRESS NOTES
Subjective:       Patient ID:  Jonathan Villela is a 57 y.o. male who presents for   Chief Complaint   Patient presents with    Skin Check     UBSE     HPI  Pt well known to me with >1 year h/o recurrent pruritic urticarial and eczematous dermatitis.  Patch test negative.  Cardiologist changed prescriptions in case it was one of his newer medications, as biopsy suggestive of a drug eruption.  Has used antihistamines and topical steroids with mild relief.  Responsive to systemic steroids.  Currently flaring to face, scalp, neck, right lower leg.  Also has chronic rash to inguinal folds and penis - using Zeasorb powder without improvement.  Also has had recurrent ear infections - currently being treated for MRSA with Bactrim.      Review of Systems   Constitutional: Negative for fever, chills and fatigue.   Skin: Positive for itching, rash and dry skin.   Hematologic/Lymphatic: Does not bruise/bleed easily.        Objective:    Physical Exam   Constitutional: He appears well-developed and well-nourished. No distress.   Genitourinary:         Neurological: He is alert and oriented to person, place, and time. He is not disoriented.   Psychiatric: He has a normal mood and affect.   Skin:   Areas Examined (abnormalities noted in diagram):   Scalp / Hair Palpated and Inspected  Head / Face Inspection Performed  Neck Inspection Performed  Chest / Axilla Inspection Performed  Abdomen Inspection Performed  Genitals / Buttocks / Groin Inspection Performed  Back Inspection Performed  RUE Inspected  LUE Inspection Performed  RLE Inspected  LLE Inspection Performed  Nails and Digits Inspection Performed                   Diagram Legend     Erythematous scaling macule/papule c/w actinic keratosis       Vascular papule c/w angioma      Pigmented verrucoid papule/plaque c/w seborrheic keratosis      Yellow umbilicated papule c/w sebaceous hyperplasia      Irregularly shaped tan macule c/w lentigo     1-2 mm smooth white  papules consistent with Milia      Movable subcutaneous cyst with punctum c/w epidermal inclusion cyst      Subcutaneous movable cyst c/w pilar cyst      Firm pink to brown papule c/w dermatofibroma      Pedunculated fleshy papule(s) c/w skin tag(s)      Evenly pigmented macule c/w junctional nevus     Mildly variegated pigmented, slightly irregular-bordered macule c/w mildly atypical nevus      Flesh colored to evenly pigmented papule c/w intradermal nevus       Pink pearly papule/plaque c/w basal cell carcinoma      Erythematous hyperkeratotic cursted plaque c/w SCC      Surgical scar with no sign of skin cancer recurrence      Open and closed comedones      Inflammatory papules and pustules      Verrucoid papule consistent consistent with wart     Erythematous eczematous patches and plaques     Dystrophic onycholytic nail with subungual debris c/w onychomycosis     Umbilicated papule    Erythematous-base heme-crusted tan verrucoid plaque consistent with inflamed seborrheic keratosis     Erythematous Silvery Scaling Plaque c/w Psoriasis     See annotation      Assessment / Plan:        Eczema, unspecified type - suspect id reaction  -     triamcinolone acetonide 0.1% (KENALOG) 0.1 % cream; AAA bid  Dispense: 454 g; Refill: 3  - antihistamines prn  - prefer to hold off on systemic steroids for now given current MRSA ear infection    Intertrigo  -     triamcinolone acetonide 0.1% (KENALOG) 0.1 % cream; AAA bid after cool blow dry  Dispense: 1 Bottle; Refill: 3    Recommend white vinegar: water 1:1 compresses 2x/day followed by cool blow dry and then application of prescription medication.     Cool blow dry after showering. Once clear, use Zeasorb AF powder for maintenance.    Can consider Diflucan if not improved.    Seborrheic dermatitis - scalp and nasolabial area of face  -     ketoconazole (NIZORAL) 2 % shampoo; Wash hair with medicated shampoo at least 2x/week - let sit on scalp at least 5 minutes prior to  rinsing  Dispense: 120 mL; Refill: 5  -     hydrocortisone 2.5 % cream; Apply topically 2 (two) times daily. To affected areas of face  Dispense: 30 g; Refill: 5  -     fluocinonide (LIDEX) 0.05 % external solution; AAA scalp qday - bid prn pruritus  Dispense: 60 mL; Refill: 3    Hyperkeratosis - elbows  -     urea (CARMOL) 40 % Crea; Apply topically 2 (two) times daily. To affected areas of elbows  Dispense: 85 g; Refill: 3             Return in about 3 months (around 1/24/2018), or if symptoms worsen or fail to improve.

## 2017-10-25 ENCOUNTER — PATIENT MESSAGE (OUTPATIENT)
Dept: DERMATOLOGY | Facility: CLINIC | Age: 57
End: 2017-10-25

## 2017-10-25 ENCOUNTER — OFFICE VISIT (OUTPATIENT)
Dept: OTOLARYNGOLOGY | Facility: CLINIC | Age: 57
End: 2017-10-25
Payer: COMMERCIAL

## 2017-10-25 VITALS
BODY MASS INDEX: 32.33 KG/M2 | WEIGHT: 245.06 LBS | TEMPERATURE: 97 F | HEART RATE: 60 BPM | SYSTOLIC BLOOD PRESSURE: 103 MMHG | DIASTOLIC BLOOD PRESSURE: 69 MMHG

## 2017-10-25 DIAGNOSIS — H60.8X3 CHRONIC ECZEMATOUS OTITIS EXTERNA OF BOTH EARS: Primary | ICD-10-CM

## 2017-10-25 DIAGNOSIS — J30.89 CHRONIC NON-SEASONAL ALLERGIC RHINITIS, UNSPECIFIED TRIGGER: ICD-10-CM

## 2017-10-25 PROCEDURE — 99214 OFFICE O/P EST MOD 30 MIN: CPT | Mod: S$GLB,,, | Performed by: OTOLARYNGOLOGY

## 2017-10-25 PROCEDURE — 99999 PR PBB SHADOW E&M-EST. PATIENT-LVL IV: CPT | Mod: PBBFAC,,, | Performed by: OTOLARYNGOLOGY

## 2017-10-25 RX ORDER — MUPIROCIN 20 MG/G
OINTMENT TOPICAL 2 TIMES DAILY
Qty: 15 G | Refills: 3 | Status: SHIPPED | OUTPATIENT
Start: 2017-10-25 | End: 2017-11-08

## 2017-10-25 RX ORDER — CETIRIZINE HYDROCHLORIDE 10 MG/1
10 TABLET ORAL DAILY
Qty: 30 TABLET | Refills: 12
Start: 2017-10-25 | End: 2017-11-03

## 2017-10-25 RX ORDER — FLUOCINOLONE ACETONIDE 0.11 MG/ML
3 OIL AURICULAR (OTIC) 2 TIMES DAILY
Qty: 1 BOTTLE | Refills: 3 | Status: SHIPPED | OUTPATIENT
Start: 2017-10-25 | End: 2020-08-03

## 2017-10-25 RX ORDER — TRIAMCINOLONE ACETONIDE 1 MG/G
CREAM TOPICAL
Qty: 454 G | Refills: 3 | Status: SHIPPED | OUTPATIENT
Start: 2017-10-25 | End: 2017-11-03 | Stop reason: SDUPTHER

## 2017-10-25 NOTE — PROGRESS NOTES
Chief Complaint   Patient presents with    Follow-up   .     HPI: Jonathan Villela is 57 y.o. male who follows up  for evaluation of bilateral ear drainage for 2 weeks which has been worsening.  He states that this problem has been lifelong. He states that he has a white foul smeeling drainage that is persistant. He notes itching but no pain. He notes that he has had a upper respiratory infection prior to this event.  He has has not had any otologic surgeries.  Symptoms include bilateral ear drainage .Patient denies chills, fever, nasal congestion and sore throat. Ear history: 0 previous ear infections. He denies hearing loss. He reports tinnitus intermittently bilaterally.      Interval HPI: He has been on Ciprodex and Bactrim since last visit. He states that the ears are improved. He states that they still feel dry and itchy.  He has seen his dermatologist yesterday who is treating him for multiple skin conditions on of which is eczema.       Past Medical History:   Diagnosis Date    Acute gastric ulcer with hemorrhage 2/15/2017    Bilateral occipital neuralgia     BPH with urinary obstruction 12/31/2015    Colitis 1220-1727    infectious?    Diverticulitis     Essential hypertension     Gastroesophageal reflux disease without esophagitis 2/11/2017    Migraine without aura and without status migrainosus, not intractable 1/31/2014    Obstructive sleep apnea syndrome 2/9/2015    PUD (peptic ulcer disease)      Social History     Social History    Marital status:      Spouse name: N/A    Number of children: N/A    Years of education: N/A     Occupational History    Not on file.     Social History Main Topics    Smoking status: Never Smoker    Smokeless tobacco: Never Used    Alcohol use Yes      Comment: ocasionally    Drug use: No    Sexual activity: Yes     Partners: Female     Other Topics Concern    Not on file     Social History Narrative    No narrative on file     Past  Surgical History:   Procedure Laterality Date    CHOLECYSTECTOMY      COLONOSCOPY N/A 2/17/2017    Procedure: COLONOSCOPY;  Surgeon: Yoshi Erazo MD;  Location: [facility] ([address] [place] [address]);  Service: Endoscopy;  Laterality: N/A;    ESOPHAGOGASTRODUODENOSCOPY      LEG SURGERY      NASAL SEPTUM SURGERY       Family History   Problem Relation Age of Onset    Crohn's disease       brother, sister, father, nephew    Prostate cancer Neg Hx     Kidney disease Neg Hx     Melanoma Neg Hx            Review of Systems  General: negative for chills, fever or weight loss  Psychological: negative for mood changes or depression  Ophthalmic: negative for blurry vision, photophobia or eye pain  ENT: see HPI  Respiratory: no cough, shortness of breath, or wheezing  Cardiovascular: no chest pain or dyspnea on exertion  Gastrointestinal: no abdominal pain, change in bowel habits, or black/ bloody stools  Musculoskeletal: negative for gait disturbance or muscular weakness  Neurological: no syncope or seizures; no ataxia  Dermatological: negative for puritis,  rash and jaundice  Hematologic/lymphatic: no easy bruising, no new lumps or bumps      Physical Exam:    Vitals:    10/25/17 0806   BP: 103/69   Pulse: 60   Temp: 97.1 °F (36.2 °C)       Constitutional: Well appearing / communicating without difficutly.  NAD.  Eyes: EOM I Bilaterally  Head/Face: Normocephalic.  Negative paranasal sinus pressure/tenderness.  Salivary glands WNL.  House Brackmann I Bilaterally.    Right Ear: Auricle normal appearance. External Auditory Canal with excoriated EAC skin, no purulence,TM w/o masses/lesions/perforations. TM mobility noted. Purulent drainage present  Left Ear: Auricle normal appearance. External Auditory Canal with excoriated EAC no purulence.,TM w/o masses/lesions/perforations. TM mobility noted. Purulent drainage present  Nose: No gross nasal septal deviation. Inferior Turbinates 3+ bilaterally. No septal perforation. No  masses/lesions. External nasal skin appears normal without masses/lesions.  Oral Cavity: Gingiva/lips within normal limits.  Dentition/gingiva healthy appearing. Mucus membranes moist. Floor of mouth soft, no masses palpated. Oral Tongue mobile. Hard Palate appears normal.    Oropharynx: Base of tongue appears normal. No masses/lesions noted. Tonsillar fossa/pharyngeal wall without lesions. Posterior oropharynx WNL.  Soft palate without masses. Midline uvula.   Neck/Lymphatic: No LAD I-VI bilaterally.  No thyromegaly.  No masses noted on exam.    Mirror laryngoscopy/nasopharyngoscopy: Active gag reflex.  Unable to perform.    Neuro/Psychiatric: AOx3.  Normal mood and affect.   Cardiovascular: Normal carotid pulses bilaterally, no increasing jugular venous distention noted at cervical region bilaterally.    Respiratory: Normal respiratory effort, no stridor, no retractions noted.        Assessment:    ICD-10-CM ICD-9-CM    1. Chronic eczematous otitis externa of both ears H60.8X3 380.23    2. Chronic non-seasonal allergic rhinitis, unspecified trigger J30.89 477.9      The primary encounter diagnosis was Chronic eczematous otitis externa of both ears. A diagnosis of Chronic non-seasonal allergic rhinitis, unspecified trigger was also pertinent to this visit.      Plan:  No orders of the defined types were placed in this encounter.       Jonathan was seen today for follow-up.    Diagnoses and all orders for this visit:    Chronic eczematous otitis externa of both ears    Chronic non-seasonal allergic rhinitis, unspecified trigger    Other orders  -     fluocinolone acetonide oil (DERMOTIC OIL) 0.01 % Drop; Place 3 drops in ear(s) 2 (two) times daily.  -     mupirocin (BACTROBAN) 2 % ointment; by Nasal route 2 (two) times daily. Apply to nose twice daily.  -     cetirizine (ZYRTEC) 10 MG tablet; Take 1 tablet (10 mg total) by mouth once daily.        Stop ciprodex.   Avoid q-tips.       Return in about 4 weeks (around  11/22/2017).     Patient and family if present counseled regarding findings and recommendations and express understanding and agreement.     Shnaique Bee MD

## 2017-10-30 ENCOUNTER — LAB VISIT (OUTPATIENT)
Dept: LAB | Facility: HOSPITAL | Age: 57
End: 2017-10-30
Attending: INTERNAL MEDICINE
Payer: COMMERCIAL

## 2017-10-30 DIAGNOSIS — I10 ESSENTIAL HYPERTENSION: ICD-10-CM

## 2017-10-30 LAB
ANION GAP SERPL CALC-SCNC: 9 MMOL/L
BUN SERPL-MCNC: 25 MG/DL
CALCIUM SERPL-MCNC: 10 MG/DL
CHLORIDE SERPL-SCNC: 100 MMOL/L
CO2 SERPL-SCNC: 27 MMOL/L
CREAT SERPL-MCNC: 1.9 MG/DL
EST. GFR  (AFRICAN AMERICAN): 44.6 ML/MIN/1.73 M^2
EST. GFR  (NON AFRICAN AMERICAN): 38.6 ML/MIN/1.73 M^2
GLUCOSE SERPL-MCNC: 87 MG/DL
POTASSIUM SERPL-SCNC: 4.4 MMOL/L
SODIUM SERPL-SCNC: 136 MMOL/L

## 2017-10-30 PROCEDURE — 36415 COLL VENOUS BLD VENIPUNCTURE: CPT | Mod: PO

## 2017-10-30 PROCEDURE — 80048 BASIC METABOLIC PNL TOTAL CA: CPT

## 2017-10-31 ENCOUNTER — TELEPHONE (OUTPATIENT)
Dept: CARDIOLOGY | Facility: CLINIC | Age: 57
End: 2017-10-31

## 2017-10-31 NOTE — TELEPHONE ENCOUNTER
Pt notified, verbalized understanding. Pt states he has already reviewed his results on his Mychart.

## 2017-10-31 NOTE — TELEPHONE ENCOUNTER
----- Message from Karla Escalante MD sent at 10/31/2017 12:56 PM CDT -----  Potassium is within normal limits, but BUN and creatinine are increased. Skip the diuretic for 2 days then resume at 1/2 a tab a day.  See [place]

## 2017-11-03 ENCOUNTER — OFFICE VISIT (OUTPATIENT)
Dept: CARDIOLOGY | Facility: CLINIC | Age: 57
End: 2017-11-03
Payer: COMMERCIAL

## 2017-11-03 VITALS
BODY MASS INDEX: 32.17 KG/M2 | DIASTOLIC BLOOD PRESSURE: 82 MMHG | SYSTOLIC BLOOD PRESSURE: 116 MMHG | WEIGHT: 242.75 LBS | HEIGHT: 73 IN | HEART RATE: 72 BPM

## 2017-11-03 DIAGNOSIS — E61.1 IRON DEFICIENCY: ICD-10-CM

## 2017-11-03 DIAGNOSIS — G47.33 OBSTRUCTIVE SLEEP APNEA SYNDROME: Chronic | ICD-10-CM

## 2017-11-03 DIAGNOSIS — E66.9 OBESITY (BMI 30.0-34.9): ICD-10-CM

## 2017-11-03 DIAGNOSIS — I10 ESSENTIAL HYPERTENSION: Primary | Chronic | ICD-10-CM

## 2017-11-03 DIAGNOSIS — I70.0 ATHEROSCLEROSIS OF AORTA: ICD-10-CM

## 2017-11-03 DIAGNOSIS — E78.00 PURE HYPERCHOLESTEROLEMIA: Chronic | ICD-10-CM

## 2017-11-03 DIAGNOSIS — N17.9 AKI (ACUTE KIDNEY INJURY): ICD-10-CM

## 2017-11-03 PROBLEM — E78.1 PURE HYPERGLYCERIDEMIA: Status: ACTIVE | Noted: 2017-11-03

## 2017-11-03 PROBLEM — E66.811 OBESITY (BMI 30.0-34.9): Status: ACTIVE | Noted: 2017-11-03

## 2017-11-03 PROCEDURE — 99214 OFFICE O/P EST MOD 30 MIN: CPT | Mod: S$GLB,,, | Performed by: NURSE PRACTITIONER

## 2017-11-03 PROCEDURE — 99999 PR PBB SHADOW E&M-EST. PATIENT-LVL IV: CPT | Mod: PBBFAC,,, | Performed by: NURSE PRACTITIONER

## 2017-11-03 NOTE — PATIENT INSTRUCTIONS
Iron rich foods  · Wheat germ  · Nuts and seeds  · Whole grain and fortified breads and cereals  · Dried beans, lentils, and split peas  · Leafy, dark-green vegetables  · Red meat, poultry, fish, eggs  · Dried fruits (especially raisins, prunes, figs)  · Legumes such as dried beans and lentils  · Breads and cereals with iron added  · Blackstrap molasses  · Spinach  · Foods cooked in cast-iron pans. This is especially true of acidic foods, such as tomatoes and haider.    Restrict salt to no more than 2,000mg a day  -No more than 100mg of salt in a snack  -No more than 250mg of salt in an entree  -No more than 500mg of salt in an entire meal    LDL - bad type - improves with diet and medications: typically statins; most other                   medications that lower LDL but have not been proven to prevent heart attacks.             May not improve significantly with exercise alone.             Ideally less than 100    HDL - good type - improves with exercise             Ideally greater than 50    TGs (triglycerides) - also bad - can change very quickly and considerably with food -           improve with diet and exercise            In some cases a low carbohydrate diet will lower TGs better than a low fat diet.            Ideal range     Sugar, fat and cholesterol in food:     A sensible diet that limits the intake of sugars, saturated (bad) fats and trans fats while increasing the intake of unsaturated (good)  fats and plant proteins is the basis of the current dietary recommendations.      We now recommend drastically reducing the intake of sugar. There is less emphasis on excluding fat. And cholesterol in our food is no longer a significant concern. However please do not confuse this with the role of cholesterol in our blood and arteries. It is still cholesterol that clogs up our arteries whether it comes from our food or is manufactured by our bodies.       Most foods that are high in cholesterol are also  high in saturated fat. But there is way more saturated fat than cholesterol in these foods. On the other hand there are a handful of foods that are high in cholesterol but do not contain much saturated fat: eggs, shrimp, crab legs and crawfish are OK to eat.       Saturated fat is the bad fat - you should limit your intake of this. Deep fried foods, meats and other animal fats are high in saturated fat. Cookies, donuts and most dessert and cakes are usually high in both saturated fat and sugar.       Unsaturated fat is the good fat. It contains the same number of calories as saturated fat but does not get deposited in our arteries. The Mediterranean style diet encourages the intake of unsaturated fat - olive oil, avocado and unsalted nuts.      You should eat a few servings of vegetables (and fruit as long as you are not diabetic) everyday. Substitute some plant proteins in place of meat: soy, beans, lentils, quinoa and oatmeal.      Do not use stick butter or stick margarine. Butter that comes in a tub is soft butter. It consists of 1/2 butter and 1/2 canola or another type of vegetable oil. It is fine to use that.       Trans fats should definitely be avoided. Most foods that are labelled as containing 0 gms of trans fat can still contain several hundred milligrams of trans fat: creamer, margarine, refrigerator dough, deep fried foods, ready made frosting, potato, corn and torilla chips, cakes, cookies, pie crusts and crackers containing shortening made with hydrogenated vegetable oil.

## 2017-11-03 NOTE — PROGRESS NOTES
Mr. Villela is a patient of Dr. Escalante and was last seen in [facility] 10/11/2017.    Subjective:   Patient ID:  Jonathan Villela is a 57 y.o. male who presents for follow-up of Hypertension    Problems:  HTN  DELVIN not treated  Obesity  HLD    HPI  Mr. Villela is in clinic today for routine follow up.  He was started on chlorthalidone on 10/16/17 but had an acute rise in his CRT.  His chlorthalidone dose was held for 2 days and then decreased from 25 to 12.5mg a day starting today and instructed to increase his oral hydration.  Patient denies chest pain with exertion or at rest, palpitations, SOB, DICKINSON, dizziness, syncope, edema, orthopnea, PND, or claudication.  Reports routine exercise.  He has sleep apnea that is untreated because he hasn't been able to tolerate the cpap and has tried multiple masks.  He has hyperlipidemia and was started on pravastatin 20mg about a month ago.     Review of Systems   Constitution: Positive for malaise/fatigue. Negative for decreased appetite, diaphoresis, weakness, weight gain and weight loss.   Eyes: Negative for visual disturbance.   Cardiovascular: Positive for claudication. Negative for chest pain, dyspnea on exertion, irregular heartbeat, leg swelling, near-syncope, orthopnea, palpitations, paroxysmal nocturnal dyspnea and syncope.        Denies chest pressure   Respiratory: Positive for sleep disturbances due to breathing and snoring. Negative for cough, hemoptysis and shortness of breath.    Endocrine: Negative for cold intolerance and heat intolerance.   Hematologic/Lymphatic: Negative for bleeding problem. Does not bruise/bleed easily.   Musculoskeletal: Negative for myalgias.   Gastrointestinal: Negative for bloating, abdominal pain, anorexia, change in bowel habit, constipation, diarrhea, nausea and vomiting.   Neurological: Negative for difficulty with concentration, disturbances in coordination, excessive daytime sleepiness, dizziness, headaches,  light-headedness, loss of balance and numbness.   Psychiatric/Behavioral: The patient does not have insomnia.      Allergies and current medications updated and reviewed:  Review of patient's allergies indicates:   Allergen Reactions    Triamterene      Back and lower abdominal pain     Current Outpatient Prescriptions   Medication Sig    acyclovir (ZOVIRAX) 400 MG tablet Take 400 mg by mouth 2 (two) times daily as needed.    BYSTOLIC 10 mg Tab Take 10 mg by mouth nightly.    chlorthalidone (HYGROTEN) 25 MG Tab Take 1 tablet (25 mg total) by mouth once daily. (Patient taking differently: Take 12.5 mg by mouth once daily. )    ciclopirox (LOPROX) 0.77 % Crea Apply topically 2 (two) times daily. Pharmacist: mix 30 g of TAC 0.1% cream with 30 g of Loprox cream in 120 cc of milk of magnesia    cloNIDine (CATAPRES) 0.1 MG tablet Take 1 tablet (0.1 mg total) by mouth as needed (SBP >170 or DBP >120 mm Hg). (Patient taking differently: Take 0.1 mg by mouth as needed (SBP >170 or DBP >120 mm Hg). )    dicyclomine (BENTYL) 10 MG capsule Take 1 capsule (10 mg total) by mouth 3 (three) times daily as needed (abdominal cramping).    fluocinolone acetonide oil (DERMOTIC OIL) 0.01 % Drop Place 3 drops in ear(s) 2 (two) times daily.    fluocinonide (LIDEX) 0.05 % external solution AAA scalp qday - bid prn pruritus    frovatriptan (FROVA) 2.5 MG tablet Take 1 tablet (2.5 mg total) by mouth as needed for Migraine. If recurs, may repeat after 2 hours. Max of 3 tabs in 24 hours.    hydrocortisone 2.5 % cream Apply topically 2 (two) times daily. To affected areas of face    irbesartan (AVAPRO) 300 MG tablet Take 300 mg by mouth once daily.    ketoconazole (NIZORAL) 2 % shampoo Wash hair with medicated shampoo at least 2x/week - let sit on scalp at least 5 minutes prior to rinsing    metronidazole 1% (METROGEL) 1 % Gel Apply at bedtime as directed as needed for skin infection    mupirocin (BACTROBAN) 2 % ointment by Nasal  "route 2 (two) times daily. Apply to nose twice daily.    ondansetron (ZOFRAN-ODT) 8 MG TbDL Take 1 tablet (8 mg total) by mouth every 12 (twelve) hours as needed.    pravastatin (PRAVACHOL) 20 MG tablet TK 1 T PO QHS    promethazine (PHENERGAN) 25 MG tablet TK 1 T PO QID PRN    RELPAX 40 mg tablet TAKE 1 TABLET BY MOUTH AS NEEDED, MAY REPEAT IN 2 HOURS IF NEEDED DO NOT EXCEED 4 TABLETS EVERY DAY    sumatriptan (IMITREX STATDOSE) 6 mg/0.5 mL kit Inject into the skin every 2 (two) hours as needed.     sumatriptan (IMITREX) 100 MG tablet daily as needed.    triamcinolone acetonide 0.1% (KENALOG) 0.1 % cream AAA bid after cool blow dry to affected areas of groin (Patient taking differently: AAA bid after cool blow dry to affected areas of groin as needed)    urea (CARMOL) 40 % Crea Apply topically 2 (two) times daily. To affected areas of elbows     No current facility-administered medications for this visit.      Objective:        /82   Pulse 72   Ht 6' 1" (1.854 m)   Wt 110.1 kg (242 lb 11.6 oz)   BMI 32.02 kg/m²     Physical Exam   Constitutional: He is oriented to person, place, and time. Vital signs are normal. He appears well-developed and well-nourished. He is active. No distress.   HENT:   Head: Normocephalic and atraumatic.   Eyes: Conjunctivae and lids are normal. No scleral icterus.   Neck: Neck supple. Normal carotid pulses, no hepatojugular reflux and no JVD present. Carotid bruit is not present.   Cardiovascular: Normal rate, regular rhythm, S1 normal, S2 normal and intact distal pulses.  PMI is not displaced.  Exam reveals gallop and S4. Exam reveals no friction rub.    No murmur heard.  Pulses:       Carotid pulses are 2+ on the right side, and 2+ on the left side.       Radial pulses are 2+ on the right side, and 2+ on the left side.        Dorsalis pedis pulses are 1+ on the right side, and 1+ on the left side.        Posterior tibial pulses are 1+ on the right side, and 1+ on the " left side.   Pulmonary/Chest: Effort normal and breath sounds normal. No respiratory distress. He has no decreased breath sounds. He has no wheezes. He has no rhonchi. He has no rales. He exhibits no tenderness.   Abdominal: Soft. Normal appearance and bowel sounds are normal. He exhibits no distension, no fluid wave, no abdominal bruit, no ascites and no pulsatile midline mass. There is no hepatosplenomegaly. There is no tenderness.   Musculoskeletal: He exhibits no edema.   Neurological: He is alert and oriented to person, place, and time. Gait normal.   Skin: Skin is warm, dry and intact. No rash noted. He is not diaphoretic. Nails show no clubbing.   Psychiatric: He has a normal mood and affect. His speech is normal and behavior is normal. Judgment and thought content normal. Cognition and memory are normal.   Nursing note and vitals reviewed.      Chemistry        Component Value Date/Time     10/30/2017 0708    K 4.4 10/30/2017 0708     10/30/2017 0708    CO2 27 10/30/2017 0708    BUN 25 (H) 10/30/2017 0708    CREATININE 1.9 (H) 10/30/2017 0708    GLU 87 10/30/2017 0708        Component Value Date/Time    CALCIUM 10.0 10/30/2017 0708    ALKPHOS 67 06/28/2017 0845    AST 24 06/28/2017 0845    ALT 25 06/28/2017 0845    BILITOT 0.7 06/28/2017 0845    ESTGFRAFRICA 44.6 (A) 10/30/2017 0708    EGFRNONAA 38.6 (A) 10/30/2017 0708          Recent Labs  Lab 06/13/17  0740 06/28/17  0845   WHITE BLOOD CELL COUNT 5.66  --    HEMOGLOBIN 15.6  --    HEMATOCRIT 46.1  --    MCV 77 L  --    PLATELETS 286  --    TSH 1.681 1.343   CHOLESTEROL 204 H  --    HDL 32 L  --    LDL CHOLESTEROL 137.0  --    TRIGLYCERIDES 175 H  --    HDL/CHOLESTEROL RATIO 15.7 L  --      Lab Results   Component Value Date    IRON 65 06/13/2017    TIBC 454 (H) 06/13/2017    FERRITIN 19 (L) 06/13/2017       Recent Labs  Lab 02/10/17  1357 02/14/17  0830   INR 1.1 1.1        Test(s) Reviewed  I have reviewed the following in detail:  [] Stress  test   [] Angiography   [] Echocardiogram   [x] Labs   [] Other:       Assessment/Plan:     Essential hypertension  Comments:  Well controlled in clinic today. Home BPs not controlled, 130-150s/80-90s. 2gm sodium diet. If remains >140/90, will plan to increase bystolic to 20mg. Obtain echo report from Dr. Carmichael given untreated apnea and HTN would like to assess structure of the heart.  Orders:  -     Basic metabolic panel; Future; Expected date: 11/17/2017    SANDRA (acute kidney injury) drug induced  Comments:  SANDRA induced with chlorthalidone 25mg. Dose reduced after being held for 2 days.  Repeat bmp in 2 weeks.  MRA of abdomen in 9/2017 revealed no stenosis of renal arteries.  Orders:  -     Basic metabolic panel; Future; Expected date: 11/17/2017    Obstructive sleep apnea syndrome  Comments:  Untreated. Discussed potential complications of untreated sleep apnea. Encouraged to go to sleep clinic to discuss other treatment options.     Pure hypercholesterolemia  Comments:  Elevated lipids in 6/2017. Started on pravastatin 20mg about a month ago. Repeat lipid panel. Encourage mediterranean diet.  Orders:  -     Lipid panel; Future; Expected date: 11/17/2017    Iron deficiency  Comments:  Iron stores low in 6/2017. Reports not taking an iron supplement. Discussed eating iron rich foods and following up with PCP    Obesity (BMI 30.0-34.9)  Comments:  BMI 32    Atherosclerosis of aorta  Comments:  Seen on MRA of the abdomen      Return in about 3 months (around 2/3/2018).

## 2017-11-14 ENCOUNTER — OFFICE VISIT (OUTPATIENT)
Dept: OTOLARYNGOLOGY | Facility: CLINIC | Age: 57
End: 2017-11-14
Payer: COMMERCIAL

## 2017-11-14 VITALS
BODY MASS INDEX: 32.42 KG/M2 | HEART RATE: 71 BPM | TEMPERATURE: 98 F | HEIGHT: 73 IN | SYSTOLIC BLOOD PRESSURE: 96 MMHG | WEIGHT: 244.63 LBS | DIASTOLIC BLOOD PRESSURE: 60 MMHG

## 2017-11-14 DIAGNOSIS — J30.89 CHRONIC NON-SEASONAL ALLERGIC RHINITIS, UNSPECIFIED TRIGGER: ICD-10-CM

## 2017-11-14 DIAGNOSIS — H90.3 SENSORINEURAL HEARING LOSS (SNHL), BILATERAL: ICD-10-CM

## 2017-11-14 DIAGNOSIS — H60.8X3 CHRONIC ECZEMATOUS OTITIS EXTERNA OF BOTH EARS: Primary | ICD-10-CM

## 2017-11-14 PROCEDURE — 99213 OFFICE O/P EST LOW 20 MIN: CPT | Mod: S$GLB,,, | Performed by: OTOLARYNGOLOGY

## 2017-11-14 PROCEDURE — 99999 PR PBB SHADOW E&M-EST. PATIENT-LVL IV: CPT | Mod: PBBFAC,,, | Performed by: OTOLARYNGOLOGY

## 2017-11-14 RX ORDER — LORATADINE 10 MG/1
10 TABLET ORAL DAILY
Refills: 0
Start: 2017-11-14 | End: 2018-01-26

## 2017-11-14 NOTE — PROGRESS NOTES
Chief Complaint   Patient presents with    Follow-up     boths ears have improved since last visit   .     HPI: Jonathan Villela is 57 y.o. male who follows up  for evaluation of bilateral ear drainage for 2 weeks which has been worsening.  He states that this problem has been lifelong. He states that he has a white foul smeeling drainage that is persistant. He notes itching but no pain. He notes that he has had a upper respiratory infection prior to this event.  He has has not had any otologic surgeries.  Symptoms include bilateral ear drainage .Patient denies chills, fever, nasal congestion and sore throat. Ear history: 0 previous ear infections. He denies hearing loss. He reports tinnitus intermittently bilaterally.      Interval HPI:   He has been on Derm Otic since last visit and feel that his ears are improved. He denies pain or otorrhea.  He notes that his ears are dry but are not pruritic.       Past Medical History:   Diagnosis Date    Acute gastric ulcer with hemorrhage 2/15/2017    Bilateral occipital neuralgia     BPH with urinary obstruction 12/31/2015    Colitis [id number]    infectious?    Diverticulitis     Essential hypertension     Gastroesophageal reflux disease without esophagitis 2/11/2017    Migraine without aura and without status migrainosus, not intractable 1/31/2014    Obstructive sleep apnea syndrome 2/9/2015    PUD (peptic ulcer disease)      Social History     Social History    Marital status:      Spouse name: N/A    Number of children: N/A    Years of education: N/A     Occupational History    Not on file.     Social History Main Topics    Smoking status: Never Smoker    Smokeless tobacco: Never Used    Alcohol use Yes      Comment: ocasionally    Drug use: No    Sexual activity: Yes     Partners: Female     Other Topics Concern    Not on file     Social History Narrative    No narrative on file     Past Surgical History:   Procedure Laterality Date     CHOLECYSTECTOMY      COLONOSCOPY N/A 2/17/2017    Procedure: COLONOSCOPY;  Surgeon: Yoshi Erazo MD;  Location: [facility] ([address] [place] [address]);  Service: Endoscopy;  Laterality: N/A;    ESOPHAGOGASTRODUODENOSCOPY      LEG SURGERY      NASAL SEPTUM SURGERY       Family History   Problem Relation Age of Onset    Crohn's disease       brother, sister, father, nephew    Prostate cancer Neg Hx     Kidney disease Neg Hx     Melanoma Neg Hx            Review of Systems  General: negative for chills, fever or weight loss  Psychological: negative for mood changes or depression  Ophthalmic: negative for blurry vision, photophobia or eye pain  ENT: see HPI  Respiratory: no cough, shortness of breath, or wheezing  Cardiovascular: no chest pain or dyspnea on exertion  Gastrointestinal: no abdominal pain, change in bowel habits, or black/ bloody stools  Musculoskeletal: negative for gait disturbance or muscular weakness  Neurological: no syncope or seizures; no ataxia  Dermatological: negative for puritis,  rash and jaundice  Hematologic/lymphatic: no easy bruising, no new lumps or bumps      Physical Exam:    Vitals:    11/14/17 1332   BP: 96/60   Pulse: 71   Temp:        Constitutional: Well appearing / communicating without difficutly.  NAD.  Eyes: EOM I Bilaterally  Head/Face: Normocephalic.  Negative paranasal sinus pressure/tenderness.  Salivary glands WNL.  House Brackmann I Bilaterally.    Right Ear: Auricle normal appearance. External Auditory Canal with excoriated EAC skin, no purulence,TM w/o masses/lesions/perforations. TM mobility noted.   Left Ear: Auricle normal appearance. External Auditory Canal with excoriated EAC no purulence.,TM w/o masses/lesions/perforations. TM mobility noted.   Nose: No gross nasal septal deviation. Inferior Turbinates 3+ bilaterally. No septal perforation. No masses/lesions. External nasal skin appears normal without masses/lesions.  Oral Cavity: Gingiva/lips within normal limits.   Dentition/gingiva healthy appearing. Mucus membranes moist. Floor of mouth soft, no masses palpated. Oral Tongue mobile. Hard Palate appears normal.    Oropharynx: Base of tongue appears normal. No masses/lesions noted. Tonsillar fossa/pharyngeal wall without lesions. Posterior oropharynx WNL.  Soft palate without masses. Midline uvula.   Neck/Lymphatic: No LAD I-VI bilaterally.  No thyromegaly.  No masses noted on exam.    Mirror laryngoscopy/nasopharyngoscopy: Active gag reflex.  Unable to perform.    Neuro/Psychiatric: AOx3.  Normal mood and affect.   Cardiovascular: Normal carotid pulses bilaterally, no increasing jugular venous distention noted at cervical region bilaterally.    Respiratory: Normal respiratory effort, no stridor, no retractions noted.        Assessment:    ICD-10-CM ICD-9-CM    1. Chronic eczematous otitis externa of both ears H60.8X3 380.23    2. Chronic non-seasonal allergic rhinitis, unspecified trigger J30.89 477.9    3. Sensorineural hearing loss (SNHL), bilateral H90.3 389.18      The primary encounter diagnosis was Chronic eczematous otitis externa of both ears. Diagnoses of Chronic non-seasonal allergic rhinitis, unspecified trigger and Sensorineural hearing loss (SNHL), bilateral were also pertinent to this visit.      Plan:  No orders of the defined types were placed in this encounter.    Continue derm otic 1 drop daily. Keep ears dry. Avoid q-tips or digital maniopulation of the ears. Follow up in 6 -8 weeks.        Shanique Bee MD

## 2017-11-17 ENCOUNTER — LAB VISIT (OUTPATIENT)
Dept: LAB | Facility: HOSPITAL | Age: 57
End: 2017-11-17
Attending: FAMILY MEDICINE
Payer: COMMERCIAL

## 2017-11-17 DIAGNOSIS — N17.9 AKI (ACUTE KIDNEY INJURY): ICD-10-CM

## 2017-11-17 DIAGNOSIS — I10 ESSENTIAL HYPERTENSION: Chronic | ICD-10-CM

## 2017-11-17 DIAGNOSIS — E78.00 PURE HYPERCHOLESTEROLEMIA: Chronic | ICD-10-CM

## 2017-11-17 LAB
ANION GAP SERPL CALC-SCNC: 10 MMOL/L
BUN SERPL-MCNC: 22 MG/DL
CALCIUM SERPL-MCNC: 9.5 MG/DL
CHLORIDE SERPL-SCNC: 101 MMOL/L
CHOLEST SERPL-MCNC: 177 MG/DL
CHOLEST/HDLC SERPL: 5.5 {RATIO}
CO2 SERPL-SCNC: 26 MMOL/L
CREAT SERPL-MCNC: 1.3 MG/DL
EST. GFR  (AFRICAN AMERICAN): >60 ML/MIN/1.73 M^2
EST. GFR  (NON AFRICAN AMERICAN): >60 ML/MIN/1.73 M^2
GLUCOSE SERPL-MCNC: 92 MG/DL
HDLC SERPL-MCNC: 32 MG/DL
HDLC SERPL: 18.1 %
LDLC SERPL CALC-MCNC: 85.2 MG/DL
NONHDLC SERPL-MCNC: 145 MG/DL
POTASSIUM SERPL-SCNC: 3.8 MMOL/L
SODIUM SERPL-SCNC: 137 MMOL/L
TRIGL SERPL-MCNC: 299 MG/DL

## 2017-11-17 PROCEDURE — 80048 BASIC METABOLIC PNL TOTAL CA: CPT

## 2017-11-17 PROCEDURE — 80061 LIPID PANEL: CPT

## 2017-11-17 PROCEDURE — 36415 COLL VENOUS BLD VENIPUNCTURE: CPT | Mod: PO

## 2017-12-01 ENCOUNTER — TELEPHONE (OUTPATIENT)
Dept: FAMILY MEDICINE | Facility: CLINIC | Age: 57
End: 2017-12-01

## 2017-12-01 NOTE — TELEPHONE ENCOUNTER
----- Message from Rebekah Adriel sent at 12/1/2017  3:59 PM CST -----  Contact: ms summit from [facility] [phone] ext [phone]  Ms summit would  Like to speak with you regarding patient medical records, I informed her that she has to speak with medical records however she wants to speak with dr. meyer's office regarding records for the last 3 year

## 2017-12-01 NOTE — TELEPHONE ENCOUNTER
Returned call, left message with number to medical records.  Advised all requests need to go through medical records.

## 2017-12-21 ENCOUNTER — OFFICE VISIT (OUTPATIENT)
Dept: DERMATOLOGY | Facility: CLINIC | Age: 57
End: 2017-12-21
Payer: COMMERCIAL

## 2017-12-21 DIAGNOSIS — L30.0 NUMMULAR DERMATITIS: ICD-10-CM

## 2017-12-21 DIAGNOSIS — L21.9 SEBORRHEIC DERMATITIS: ICD-10-CM

## 2017-12-21 DIAGNOSIS — B00.9 HERPES: ICD-10-CM

## 2017-12-21 DIAGNOSIS — L30.4 INTERTRIGO: Primary | ICD-10-CM

## 2017-12-21 PROCEDURE — 99999 PR PBB SHADOW E&M-EST. PATIENT-LVL IV: CPT | Mod: PBBFAC,,, | Performed by: PATHOLOGY

## 2017-12-21 PROCEDURE — 99214 OFFICE O/P EST MOD 30 MIN: CPT | Mod: S$GLB,,, | Performed by: PATHOLOGY

## 2017-12-21 RX ORDER — FLUCONAZOLE 200 MG/1
TABLET ORAL
Qty: 6 TABLET | Refills: 3 | Status: SHIPPED | OUTPATIENT
Start: 2017-12-21 | End: 2018-01-26 | Stop reason: ALTCHOICE

## 2017-12-21 RX ORDER — TRIAMCINOLONE ACETONIDE 1 MG/G
CREAM TOPICAL
Qty: 1 BOTTLE | Refills: 3 | Status: SHIPPED | OUTPATIENT
Start: 2017-12-21 | End: 2018-01-26 | Stop reason: SDUPTHER

## 2017-12-21 NOTE — PROGRESS NOTES
Subjective:       Patient ID:  Jonathan Villela is a 57 y.o. male who presents for   Chief Complaint   Patient presents with    Follow-up     eczematous dermatitis     This is a 57 y.o man presenting for follow up on eczematous/urticarial dermatitis. Patient reported that his rash still comes and goes, he is using TAC 0.1% cream daily, but overall he feels much better.  He is complaining of new scaly spots over the forearm and legs few days with no itching.  He is using the ketoconazole 2% shampoo to the scalp and feels that the seborrheic dermatitis is under control.  His intertrigo is still active he is using the combination of Loprox: TAC and milk of magnesia but he is still complaining of itching and erythema, he denies excessive sweating.  He also reported vesicular lesion over the right buttock   3 days that started with burning, he has a history of HSV previously  , he started on acyclovir and feels that the lesions are resolving,.         Review of Systems   Constitutional: Negative for fever, chills and fatigue.   Skin: Positive for itching and rash.   Hematologic/Lymphatic: Does not bruise/bleed easily.        Objective:    Physical Exam   Constitutional: He appears well-developed and well-nourished.   Neurological: He is alert and oriented to person, place, and time.   Psychiatric: He has a normal mood and affect.   Skin:   Areas Examined (abnormalities noted in diagram):   Scalp / Hair Palpated and Inspected  Head / Face Inspection Performed  Neck Inspection Performed  Chest / Axilla Inspection Performed  Abdomen Inspection Performed  Genitals / Buttocks / Groin Inspection Performed  Back Inspection Performed  RUE Inspected  LUE Inspection Performed  RLE Inspected  LLE Inspection Performed                   Diagram Legend     Erythematous scaling macule/papule c/w actinic keratosis       Vascular papule c/w angioma      Pigmented verrucoid papule/plaque c/w seborrheic keratosis      Yellow  umbilicated papule c/w sebaceous hyperplasia      Irregularly shaped tan macule c/w lentigo     1-2 mm smooth white papules consistent with Milia      Movable subcutaneous cyst with punctum c/w epidermal inclusion cyst      Subcutaneous movable cyst c/w pilar cyst      Firm pink to brown papule c/w dermatofibroma      Pedunculated fleshy papule(s) c/w skin tag(s)      Evenly pigmented macule c/w junctional nevus     Mildly variegated pigmented, slightly irregular-bordered macule c/w mildly atypical nevus      Flesh colored to evenly pigmented papule c/w intradermal nevus       Pink pearly papule/plaque c/w basal cell carcinoma      Erythematous hyperkeratotic cursted plaque c/w SCC      Surgical scar with no sign of skin cancer recurrence      Open and closed comedones      Inflammatory papules and pustules      Verrucoid papule consistent consistent with wart     Erythematous eczematous patches and plaques     Dystrophic onycholytic nail with subungual debris c/w onychomycosis     Umbilicated papule    Erythematous-base heme-crusted tan verrucoid plaque consistent with inflamed seborrheic keratosis     Erythematous Silvery Scaling Plaque c/w Psoriasis     See annotation      Assessment / Plan:        Intertrigo    We will start patient on diflucan 200 mg 1 then repeat in 1 week.  Continue with combination of loprox, milk of magnesia and triamcinolone    -     fluconazole (DIFLUCAN) 200 MG Tab; Take 1 pill po qday x 3 repeat in 1 week  Dispense: 6 tablet; Refill: 3    Nummular dermatitis over forearm and legs  Start on traimcinolone 0.1% cream bid to affected areas.    -     triamcinolone acetonide 0.1% (KENALOG) 0.1 % cream; AAA bid after cool blow dry  Dispense: 1 Bottle; Refill: 3    Seborrheic dermatitis  Well controlled continue with ketoconazole 2% shampoo three times per week and lidex 0.05% solution when needed     Herpes over the buttocks  Patient is on acyclovir           Return in about 3 months (around  3/21/2018), or if symptoms worsen or fail to improve.

## 2018-01-04 ENCOUNTER — OFFICE VISIT (OUTPATIENT)
Dept: OTOLARYNGOLOGY | Facility: CLINIC | Age: 58
End: 2018-01-04
Payer: COMMERCIAL

## 2018-01-04 VITALS
SYSTOLIC BLOOD PRESSURE: 112 MMHG | TEMPERATURE: 97 F | DIASTOLIC BLOOD PRESSURE: 75 MMHG | BODY MASS INDEX: 32.94 KG/M2 | WEIGHT: 249.69 LBS | HEART RATE: 61 BPM

## 2018-01-04 DIAGNOSIS — H60.8X3 CHRONIC ECZEMATOUS OTITIS EXTERNA OF BOTH EARS: Primary | ICD-10-CM

## 2018-01-04 DIAGNOSIS — J30.89 CHRONIC NON-SEASONAL ALLERGIC RHINITIS, UNSPECIFIED TRIGGER: ICD-10-CM

## 2018-01-04 DIAGNOSIS — H60.313 CHRONIC DIFFUSE OTITIS EXTERNA OF BOTH EARS: ICD-10-CM

## 2018-01-04 PROCEDURE — 99213 OFFICE O/P EST LOW 20 MIN: CPT | Mod: S$GLB,,, | Performed by: OTOLARYNGOLOGY

## 2018-01-04 PROCEDURE — 99999 PR PBB SHADOW E&M-EST. PATIENT-LVL IV: CPT | Mod: PBBFAC,,, | Performed by: OTOLARYNGOLOGY

## 2018-01-04 RX ORDER — NEEDLES, FILTER 19GX1 1/2"
NEEDLE, DISPOSABLE MISCELLANEOUS
Refills: 6 | COMMUNITY
Start: 2017-12-07 | End: 2019-06-13

## 2018-01-04 RX ORDER — NEEDLES, DISPOSABLE 25GX5/8"
NEEDLE, DISPOSABLE MISCELLANEOUS
Refills: 6 | COMMUNITY
Start: 2017-12-07 | End: 2019-06-13

## 2018-01-04 RX ORDER — TESTOSTERONE CYPIONATE 200 MG/ML
INJECTION, SOLUTION INTRAMUSCULAR WEEKLY
COMMUNITY
Start: 2017-12-07 | End: 2019-05-28

## 2018-01-04 NOTE — PROGRESS NOTES
Chief Complaint   Patient presents with    Follow-up   .     HPI: Jonathan Villela is 57 y.o. male who follows up  for evaluation of bilateral ear drainage for 2 weeks which has been worsening.  He states that this problem has been lifelong. He states that he has a white foul smeeling drainage that is persistant. He notes itching but no pain. He notes that he has had a upper respiratory infection prior to this event.  He has has not had any otologic surgeries.  Symptoms include bilateral ear drainage .Patient denies chills, fever, nasal congestion and sore throat. Ear history: 0 previous ear infections. He denies hearing loss. He reports tinnitus intermittently bilaterally.      Interval HPI:   He has been on Derm Otic since last visit and feel that he is having intermittent symptoms with this medication. He notes that his ears will alternate from feeling dry and itchy to feeling like they are draining. He denies pain. He denies changes in hearing.       Past Medical History:   Diagnosis Date    Acute gastric ulcer with hemorrhage 2/15/2017    Bilateral occipital neuralgia     BPH with urinary obstruction 12/31/2015    Colitis [id number]    infectious?    Diverticulitis     Essential hypertension     Gastroesophageal reflux disease without esophagitis 2/11/2017    Migraine without aura and without status migrainosus, not intractable 1/31/2014    Obstructive sleep apnea syndrome 2/9/2015    PUD (peptic ulcer disease)      Social History     Social History    Marital status:      Spouse name: N/A    Number of children: N/A    Years of education: N/A     Occupational History    Not on file.     Social History Main Topics    Smoking status: Never Smoker    Smokeless tobacco: Never Used    Alcohol use Yes      Comment: ocasionally    Drug use: No    Sexual activity: Yes     Partners: Female     Other Topics Concern    Not on file     Social History Narrative    No narrative on file      Past Surgical History:   Procedure Laterality Date    CHOLECYSTECTOMY      COLONOSCOPY N/A 2/17/2017    Procedure: COLONOSCOPY;  Surgeon: Yoshi Erazo MD;  Location: [facility] ([address] [place] [address]);  Service: Endoscopy;  Laterality: N/A;    ESOPHAGOGASTRODUODENOSCOPY      LEG SURGERY      NASAL SEPTUM SURGERY       Family History   Problem Relation Age of Onset    Crohn's disease       brother, sister, father, nephew    Prostate cancer Neg Hx     Kidney disease Neg Hx     Melanoma Neg Hx            Review of Systems  General: negative for chills, fever or weight loss  Psychological: negative for mood changes or depression  Ophthalmic: negative for blurry vision, photophobia or eye pain  ENT: see HPI  Respiratory: no cough, shortness of breath, or wheezing  Cardiovascular: no chest pain or dyspnea on exertion  Gastrointestinal: no abdominal pain, change in bowel habits, or black/ bloody stools  Musculoskeletal: negative for gait disturbance or muscular weakness  Neurological: no syncope or seizures; no ataxia  Dermatological: negative for puritis,  rash and jaundice  Hematologic/lymphatic: no easy bruising, no new lumps or bumps      Physical Exam:    Vitals:    01/04/18 1535   BP: 112/75   Pulse: 61   Temp: 97.2 °F (36.2 °C)       Constitutional: Well appearing / communicating without difficutly.  NAD.  Eyes: EOM I Bilaterally  Head/Face: Normocephalic.  Negative paranasal sinus pressure/tenderness.  Salivary glands WNL.  House Brackmann I Bilaterally.    Right Ear: Auricle normal appearance. External Auditory Canal with excoriated EAC skin, no purulence,TM w/o masses/lesions/perforations. TM mobility noted.   Left Ear: Auricle normal appearance. External Auditory Canal with excoriated EAC no purulence.,TM w/o masses/lesions/perforations. TM mobility noted.   Nose: No gross nasal septal deviation. Inferior Turbinates 3+ bilaterally. No septal perforation. No masses/lesions. External nasal skin appears normal  without masses/lesions.  Oral Cavity: Gingiva/lips within normal limits.  Dentition/gingiva healthy appearing. Mucus membranes moist. Floor of mouth soft, no masses palpated. Oral Tongue mobile. Hard Palate appears normal.    Oropharynx: Base of tongue appears normal. No masses/lesions noted. Tonsillar fossa/pharyngeal wall without lesions. Posterior oropharynx WNL.  Soft palate without masses. Midline uvula.   Neck/Lymphatic: No LAD I-VI bilaterally.  No thyromegaly.  No masses noted on exam.    Mirror laryngoscopy/nasopharyngoscopy: Active gag reflex.  Unable to perform.    Neuro/Psychiatric: AOx3.  Normal mood and affect.   Cardiovascular: Normal carotid pulses bilaterally, no increasing jugular venous distention noted at cervical region bilaterally.    Respiratory: Normal respiratory effort, no stridor, no retractions noted.      Assessment:    ICD-10-CM ICD-9-CM    1. Chronic eczematous otitis externa of both ears H60.8X3 380.23    2. Chronic non-seasonal allergic rhinitis, unspecified trigger J30.89 477.9    3. Chronic diffuse otitis externa of both ears H60.313 380.23      The primary encounter diagnosis was Chronic eczematous otitis externa of both ears. Diagnoses of Chronic non-seasonal allergic rhinitis, unspecified trigger and Chronic diffuse otitis externa of both ears were also pertinent to this visit.      Plan:  No orders of the defined types were placed in this encounter.    Compound otic drops prescribed.  Keep ears dry. Avoid q-tips or digital maniopulation of the ears. Eliminate the use of ear buds into the EAC.  Follow up in 6 weeks.        Shanique Bee MD

## 2018-01-26 ENCOUNTER — OFFICE VISIT (OUTPATIENT)
Dept: CARDIOLOGY | Facility: CLINIC | Age: 58
End: 2018-01-26
Payer: COMMERCIAL

## 2018-01-26 ENCOUNTER — HOSPITAL ENCOUNTER (OUTPATIENT)
Dept: RADIOLOGY | Facility: HOSPITAL | Age: 58
Discharge: HOME | End: 2018-01-26
Attending: NURSE PRACTITIONER
Payer: COMMERCIAL

## 2018-01-26 VITALS
HEART RATE: 68 BPM | DIASTOLIC BLOOD PRESSURE: 90 MMHG | HEIGHT: 73 IN | SYSTOLIC BLOOD PRESSURE: 134 MMHG | BODY MASS INDEX: 33.42 KG/M2 | WEIGHT: 252.19 LBS

## 2018-01-26 DIAGNOSIS — I10 ESSENTIAL HYPERTENSION: Primary | ICD-10-CM

## 2018-01-26 DIAGNOSIS — G47.33 OBSTRUCTIVE SLEEP APNEA SYNDROME: Chronic | ICD-10-CM

## 2018-01-26 DIAGNOSIS — R53.83 FATIGUE, UNSPECIFIED TYPE: ICD-10-CM

## 2018-01-26 DIAGNOSIS — E78.2 MIXED HYPERLIPIDEMIA: Chronic | ICD-10-CM

## 2018-01-26 DIAGNOSIS — R07.9 CHEST PAIN, UNSPECIFIED TYPE: ICD-10-CM

## 2018-01-26 DIAGNOSIS — E66.9 OBESITY (BMI 30.0-34.9): ICD-10-CM

## 2018-01-26 PROBLEM — N17.9 AKI (ACUTE KIDNEY INJURY): Status: RESOLVED | Noted: 2017-11-03 | Resolved: 2018-01-26

## 2018-01-26 PROCEDURE — 99999 PR PBB SHADOW E&M-EST. PATIENT-LVL III: CPT | Mod: PBBFAC,,, | Performed by: NURSE PRACTITIONER

## 2018-01-26 PROCEDURE — 71046 X-RAY EXAM CHEST 2 VIEWS: CPT | Mod: TC,PO

## 2018-01-26 PROCEDURE — 93000 ELECTROCARDIOGRAM COMPLETE: CPT | Mod: S$GLB,,, | Performed by: INTERNAL MEDICINE

## 2018-01-26 PROCEDURE — 71046 X-RAY EXAM CHEST 2 VIEWS: CPT | Mod: 26,,, | Performed by: RADIOLOGY

## 2018-01-26 PROCEDURE — 99214 OFFICE O/P EST MOD 30 MIN: CPT | Mod: S$GLB,,, | Performed by: NURSE PRACTITIONER

## 2018-01-26 RX ORDER — VALSARTAN 320 MG/1
320 TABLET ORAL DAILY
Qty: 90 TABLET | Refills: 3 | Status: SHIPPED | OUTPATIENT
Start: 2018-01-26 | End: 2018-07-30

## 2018-01-26 RX ORDER — DICLOFENAC SODIUM 10 MG/G
2 GEL TOPICAL 2 TIMES DAILY PRN
Qty: 1 TUBE | Refills: 11 | Status: SHIPPED | OUTPATIENT
Start: 2018-01-26 | End: 2018-10-05

## 2018-01-26 NOTE — PATIENT INSTRUCTIONS
Stop irbesartan (avapro)    Start valsartan 320mg one time daily.  Please call in 2-3 weeks if you continue to have trouble with clearing your throat.     Please get a chest x-ray today    Voltaren (diclofenac) 1% cream applied to the chest wall where it is hurting.    You can also try using the 4% lidocaine cream as needed for pain.     Ways to lower triglycerides    Triglycerides are made worse by the following  Foods containing fat and sugars: ice cream, desserts, cakes, chocolate  Beer, fruit juices  Starches: rice, potatoes, bread, pasta  Lack of exercise  Uncontrolled diabetes    Cut simple sugars out of your diet (white breads, candies, cookies, cakes, etc.)  Reduce or eliminate your intake of vegetable fats and highly processed trans fatty acids.   Exercise 30 minutes a day for 4-5 days a week.   Continue taking the Omega 3 supplement.  Eat more fiber.    Increase cardiovascular exercise to 30 minutes of brisk walking a day for 4-5 days a week.  You can use a stationary bike or swim for 30 minutes a day instead of walking.  Whatever exercise you choose, make sure you are working hard enough to increase your heart rate.     LOW-SALT DIET (2 grams/day)   This diet eliminates foods that are high in salt and restricts the amount of salt that you cook with. It is most often used for patients with high blood pressure, edema (fluid retention), kidney, liver, and heart disease.   Table salt contains the mineral sodium. The body needs a little bit of sodium to work normally. But too much sodium can make your health problems worse. Your total daily allowance of salt (sodium) is 2 grams. This equals 2000 milligrams (mg). This is about a teaspoon of salt. This means you can have only about 500 mg of sodium at each meal. Most individuals get excessive sodium from snacks. Look carefully for sodium levels at or around 250 mg per serving and do not eat more than 1 serving. Really low-sodium snacks contain less than 100 mg  per serving.    When you cook, limit the salt you use. And if you can avoid using salt, even better. Do not add salt at the table. So, throw away the saltshaker!   When shopping, read the package labels. Salt is often called sodium on the label. Choose foods that are Salt-Free, Low Salt, or Very Low Salt. Note that foods with Reduced Salt may not lower your salt intake enough.     BEVERAGES OK: Tea, coffee, carbonated beverages, juices   AVOID: Flavored international coffees, electrolyte replacement drinks, sports beverages     BREAD & CEREALS OK: All regular bread, rolls, cereals, cakes; low-salt crackers, matzoh crackers   AVOID: Salted crackers, pretzels, popcorn; Czech toast, pancakes, muffins     FRUITS & DESSERTS OK: Ice cream, frozen yogurt, juice bars, gelatin (Jell-O), cookies and pies, sugar, honey, jelly, hard candy   AVOID: Most pies, cakes and cookies prepared or processed with salt, instant pudding     MEATS OK: All fresh meat, fish, poultry, low-salt tuna   AVOID: Smoked, pickled, brine-cured, or salted meats or fish. This includes polk, chipped beef, corned beef, hot dogs, luncheon meats, ham, kosher meats, salt pork, sausage, canned tuna, salted codfish, smoked salmon, herring, sardines, or anchovies.     DAIRY OK: Milk, chocolate milk, hot chocolate mix; eggs, Low Salt cheeses, yogurt, egg substitute   AVOID: Processed cheese, cheese spreads, Roquefort, Camembert, and cottage cheese, buttermilk, instant breakfast drink     BEANS, POTATOES & PASTA OK: Dry beans, split peas, lentils, potatoes, rice, macaroni, noodles, spaghetti without added salt   AVOID: Potato chips, tortilla chips, and similar products     SOUPS OK: Low-salt soups and broths made with allowed foods   AVOID: Bouillon cubes, soups with smoked or salted meats, regular soup and broth     VEGETABLES OK: Most are okay; low-salt tomato and vegetable juices   AVOID: Sauerkraut and other brine-soaked vegetables, pickles and other  pickled vegetables, tomato juice, olives     SEASONING & SPICES: OK: Most seasonings are okay. Good substitutes for salt include: fresh herb blends, Tabasco, lemon, garlic, fair, vinegar, dry mustard, parsley, cilantro, horseradish, tomato paste, regular margarine, mayonnaise, butter, cream cheese, vegetable oil, cream, low-salt salad dressing and gravy   AVOID: Regular ketchup, relishes, pickles, soy sauce, teriyaki sauce, Worcestershire sauce, BBQ sauce, tartar sauce, meat tenderizer, chili sauce, regular gravy, regular salad dressing   © 8103-2130 [facility], [address] [place] [address], [place] [address]. All rights reserved. This information is not intended as a substitute for professional medical care. Always follow your healthcare professional's instructions.

## 2018-01-26 NOTE — PROGRESS NOTES
"Mr. Villela is a patient of Dr. Escalante and was last seen in [facility] 11/3/2017.    Subjective:   Patient ID:  Jonathan iVllela is a 57 y.o. male who presents for follow-up of Chest Pain    Problems:  HTN  DELVIN not treated  Obesity  HLD  PUD    HPI  Mr. Villela is in clinic today for routine follow up.  Reports chronically clearing his throat and waking with dry mouth.  States that he thinks it is related to the irbesartan and the chlorthalidone.  Chest pressure for 15 minutes to 2 hours.  Pressures started about 3 months ago.  It comes on mostly when he is sitting.  Exertion does not bring the pressure on.  Patient denies palpitations, SOB, DICKINSON, dizziness, syncope, edema, orthopnea, PND, or claudication.  Reports no routine exercise.  He is treated with low dose ASA and low intensity statin.  Patient is taking pravastatin 20mg and LDL is 85.  Hypertension is treated with bystolic 10mg, irbesartan 300mg, and chlorthalidone 12.5mg.  Reports following a low salt diet. Home blood pressure readings are 115-150s.  He had SANDRA with chlorthalidone 25mg. Dose decreased to 12.5mg daily.   He has DELVIN that is "mild" and is currently untreated.  He is not tolerating the cpap and states that he has tried multiple masks.  He has a follow up with sleep clinic to discuss other option.       Obtain echo report from Dr. Carmichael given untreated apnea and HTN would like to assess structure of the heart.    Review of Systems   Constitution: Negative for decreased appetite, diaphoresis, weakness, malaise/fatigue, weight gain and weight loss.   Eyes: Negative for visual disturbance.   Cardiovascular: Negative for chest pain, claudication, dyspnea on exertion, irregular heartbeat, leg swelling, near-syncope, orthopnea, palpitations, paroxysmal nocturnal dyspnea and syncope.        Reports chest pressure   Respiratory: Negative for cough, hemoptysis, shortness of breath, sleep disturbances due to breathing and snoring.  " "  Endocrine: Negative for cold intolerance and heat intolerance.   Hematologic/Lymphatic: Negative for bleeding problem. Does not bruise/bleed easily.   Musculoskeletal: Negative for myalgias.   Gastrointestinal: Negative for bloating, abdominal pain, anorexia, change in bowel habit, constipation, diarrhea, nausea and vomiting.   Neurological: Negative for difficulty with concentration, disturbances in coordination, excessive daytime sleepiness, dizziness, headaches, light-headedness, loss of balance and numbness.   Psychiatric/Behavioral: The patient does not have insomnia.      Allergies and current medications updated and reviewed:  Review of patient's allergies indicates:   Allergen Reactions    Triamterene      Back and lower abdominal pain     Current Outpatient Prescriptions   Medication Sig    acyclovir (ZOVIRAX) 400 MG tablet Take 400 mg by mouth 2 (two) times daily as needed.    BD INTEGRA SYRINGE 3 mL 22 gauge x 1 1/2" Syrg USE TO DRAW UP TESTOSTERONE    BD PRECISIONGLIDE 25 gauge x 1" Ndle USE TO INJECT TESTOSERTONE    BYSTOLIC 10 mg Tab Take 10 mg by mouth nightly.    chlorthalidone (HYGROTEN) 25 MG Tab Take 1 tablet (25 mg total) by mouth once daily. (Patient taking differently: Take 12.5 mg by mouth once daily. )    ciclopirox (LOPROX) 0.77 % Crea Apply topically 2 (two) times daily. Pharmacist: mix 30 g of TAC 0.1% cream with 30 g of Loprox cream in 120 cc of milk of magnesia    cloNIDine (CATAPRES) 0.1 MG tablet Take 1 tablet (0.1 mg total) by mouth as needed (SBP >170 or DBP >120 mm Hg). (Patient taking differently: Take 0.1 mg by mouth as needed (SBP >170 or DBP >120 mm Hg). )    dicyclomine (BENTYL) 10 MG capsule Take 1 capsule (10 mg total) by mouth 3 (three) times daily as needed (abdominal cramping).    fluocinolone acetonide oil (DERMOTIC OIL) 0.01 % Drop Place 3 drops in ear(s) 2 (two) times daily.    fluocinonide (LIDEX) 0.05 % external solution AAA scalp qday - bid prn pruritus " "   frovatriptan (FROVA) 2.5 MG tablet Take 1 tablet (2.5 mg total) by mouth as needed for Migraine. If recurs, may repeat after 2 hours. Max of 3 tabs in 24 hours.    irbesartan (AVAPRO) 300 MG tablet Take 300 mg by mouth once daily.    ketoconazole (NIZORAL) 2 % shampoo Wash hair with medicated shampoo at least 2x/week - let sit on scalp at least 5 minutes prior to rinsing    metronidazole 1% (METROGEL) 1 % Gel Apply at bedtime as directed as needed for skin infection    ondansetron (ZOFRAN-ODT) 8 MG TbDL Take 1 tablet (8 mg total) by mouth every 12 (twelve) hours as needed.    pravastatin (PRAVACHOL) 20 MG tablet TK 1 T PO QHS    promethazine (PHENERGAN) 25 MG tablet TK 1 T PO QID PRN    RELPAX 40 mg tablet TAKE 1 TABLET BY MOUTH AS NEEDED, MAY REPEAT IN 2 HOURS IF NEEDED DO NOT EXCEED 4 TABLETS EVERY DAY    sumatriptan (IMITREX STATDOSE) 6 mg/0.5 mL kit Inject into the skin every 2 (two) hours as needed.     sumatriptan (IMITREX) 100 MG tablet daily as needed.    testosterone cypionate (DEPOTESTOTERONE CYPIONATE) 200 mg/mL injection Inject into the muscle once a week.     triamcinolone acetonide 0.1% (KENALOG) 0.1 % cream AAA bid after cool blow dry to affected areas of groin (Patient taking differently: AAA bid after cool blow dry to affected areas of groin as needed)    urea (CARMOL) 40 % Crea Apply topically 2 (two) times daily. To affected areas of elbows (Patient taking differently: Apply topically as needed. To affected areas of elbows)    hydrocortisone 2.5 % cream Apply topically 2 (two) times daily. To affected areas of face (Patient taking differently: Apply topically as needed. To affected areas of face)     No current facility-administered medications for this visit.      Objective:      BP (!) 134/90   Pulse 68   Ht 6' 1" (1.854 m)   Wt 114.4 kg (252 lb 3.3 oz)   BMI 33.27 kg/m²     Physical Exam   Constitutional: He is oriented to person, place, and time. Vital signs are normal. He " appears well-developed and well-nourished. He is active. No distress.   HENT:   Head: Normocephalic and atraumatic.   Eyes: Conjunctivae and lids are normal. No scleral icterus.   Neck: Neck supple. Normal carotid pulses, no hepatojugular reflux and no JVD present. Carotid bruit is not present.   Cardiovascular: Normal rate, regular rhythm, S1 normal, S2 normal and intact distal pulses.  PMI is not displaced.  Exam reveals no gallop and no friction rub.    No murmur heard.  Pulses:       Carotid pulses are 2+ on the right side, and 2+ on the left side.       Radial pulses are 2+ on the right side, and 2+ on the left side.        Dorsalis pedis pulses are 2+ on the right side, and 2+ on the left side.        Posterior tibial pulses are 1+ on the right side, and 1+ on the left side.   Pulmonary/Chest: Effort normal and breath sounds normal. No respiratory distress. He has no decreased breath sounds. He has no wheezes. He has no rhonchi. He has no rales. He exhibits no tenderness.   Abdominal: Soft. Normal appearance and bowel sounds are normal. He exhibits no distension, no fluid wave, no abdominal bruit, no ascites and no pulsatile midline mass. There is no hepatosplenomegaly. There is no tenderness.   Musculoskeletal: He exhibits no edema.   Neurological: He is alert and oriented to person, place, and time. Gait normal.   Skin: Skin is warm, dry and intact. No rash noted. He is not diaphoretic. Nails show no clubbing.   Psychiatric: He has a normal mood and affect. His speech is normal and behavior is normal. Judgment and thought content normal. Cognition and memory are normal.   Nursing note and vitals reviewed.      Chemistry        Component Value Date/Time     11/17/2017 0716    K 3.8 11/17/2017 0716     11/17/2017 0716    CO2 26 11/17/2017 0716    BUN 22 (H) 11/17/2017 0716    CREATININE 1.3 11/17/2017 0716    GLU 92 11/17/2017 0716        Component Value Date/Time    CALCIUM 9.5 11/17/2017 0716     ALKPHOS 67 06/28/2017 0845    AST 24 06/28/2017 0845    ALT 25 06/28/2017 0845    BILITOT 0.7 06/28/2017 0845    ESTGFRAFRICA >60.0 11/17/2017 0716    EGFRNONAA >60.0 11/17/2017 0716        Lab Results   Component Value Date    HGBA1C 5.4 08/29/2014       Recent Labs  Lab 06/13/17  0740 06/28/17  0845 11/17/17  0716   WHITE BLOOD CELL COUNT 5.66  --   --    HEMOGLOBIN 15.6  --   --    HEMATOCRIT 46.1  --   --    MCV 77 L  --   --    PLATELETS 286  --   --    TSH 1.681 1.343  --    CHOLESTEROL 204 H  --  177   HDL 32 L  --  32 L   LDL CHOLESTEROL 137.0  --  85.2   TRIGLYCERIDES 175 H  --  299 H   HDL/CHOLESTEROL RATIO 15.7 L  --  18.1 L     Lab Results   Component Value Date    HGBA1C 5.4 08/29/2014         Recent Labs  Lab 02/10/17  1357 02/14/17  0830   INR 1.1 1.1        Test(s) Reviewed  I have reviewed the following in detail:  [] Stress test   [] Angiography   [x] Echocardiogram   [x] Labs   [] Other:         Assessment/Plan:     Essential hypertension  Comments:  Not well controlled. Reports SE (throat clearing). Stop irbesartan. Start valsaratan 320mg. Increase bystolic to 5mg qam and 10mg qpm if remains >130/80.  Orders:  -     valsartan (DIOVAN) 320 MG tablet; Take 1 tablet (320 mg total) by mouth once daily.  Dispense: 90 tablet; Refill: 3  -     Comprehensive metabolic panel; Future; Expected date: 04/26/2018  -     Magnesium; Future; Expected date: 01/26/2018  -     VITAMIN D; Future; Expected date: 01/26/2018    Obstructive sleep apnea syndrome  Comments:  Discussed potential complications of untreated DELVIN and stressed that DELVIN could be contributing to his elevated BP    Obesity (BMI 30.0-34.9)  Comments:  BMI 33.3. Encouraged increased CV exercise to 30 minutes/day for 4-5 days/week.     Mixed hyperlipidemia  Comments:  LDL at goal <100. Continue pravastatin 20mg. TG>150. Discussed ways to lower TG.   Orders:  -     Lipid panel; Future; Expected date: 04/26/2018  -     Comprehensive metabolic panel;  Future; Expected date: 04/26/2018  -     Magnesium; Future; Expected date: 01/26/2018  -     TSH; Future; Expected date: 01/26/2018  -     VITAMIN D; Future; Expected date: 01/26/2018    Chest pain, unspecified type  Comments:  Non-exertional reproducible chest pain is unlikely to be cardiac in origin. ECG unchanged. CXR today. Voltaren and lidocaine cream PRN pain.   Orders:  -     IN OFFICE EKG 12-LEAD (to Muse); Future  -     X-Ray Chest PA And Lateral; Future; Expected date: 01/26/2018  -     diclofenac sodium (VOLTAREN) 1 % Gel; Apply 2 g topically 2 (two) times daily as needed. For pain  Dispense: 1 Tube; Refill: 11    Fatigue, unspecified type  Comments:  Unclear etiology. Mostly from DELVIN. Check vitamin D, TSH, and CBC  Orders:  -     VITAMIN D; Future; Expected date: 01/26/2018  -     CBC auto differential; Future; Expected date: 04/26/2018        Follow-up in about 3 months (around 4/26/2018).

## 2018-01-30 ENCOUNTER — PATIENT MESSAGE (OUTPATIENT)
Dept: CARDIOLOGY | Facility: CLINIC | Age: 58
End: 2018-01-30

## 2018-02-23 ENCOUNTER — OFFICE VISIT (OUTPATIENT)
Dept: OTOLARYNGOLOGY | Facility: CLINIC | Age: 58
End: 2018-02-23
Payer: COMMERCIAL

## 2018-02-23 VITALS
BODY MASS INDEX: 33.19 KG/M2 | HEIGHT: 73 IN | WEIGHT: 250.44 LBS | DIASTOLIC BLOOD PRESSURE: 59 MMHG | HEART RATE: 60 BPM | SYSTOLIC BLOOD PRESSURE: 93 MMHG

## 2018-02-23 DIAGNOSIS — H60.8X3 CHRONIC ECZEMATOUS OTITIS EXTERNA OF BOTH EARS: Primary | ICD-10-CM

## 2018-02-23 DIAGNOSIS — H90.3 SENSORINEURAL HEARING LOSS, BILATERAL: ICD-10-CM

## 2018-02-23 DIAGNOSIS — J30.89 CHRONIC NON-SEASONAL ALLERGIC RHINITIS, UNSPECIFIED TRIGGER: ICD-10-CM

## 2018-02-23 PROCEDURE — 99213 OFFICE O/P EST LOW 20 MIN: CPT | Mod: S$GLB,,, | Performed by: OTOLARYNGOLOGY

## 2018-02-23 PROCEDURE — 99999 PR PBB SHADOW E&M-EST. PATIENT-LVL IV: CPT | Mod: PBBFAC,,, | Performed by: OTOLARYNGOLOGY

## 2018-02-23 PROCEDURE — 3008F BODY MASS INDEX DOCD: CPT | Mod: S$GLB,,, | Performed by: OTOLARYNGOLOGY

## 2018-02-23 RX ORDER — HYDROCORTISONE AND ACETIC ACID 20.75; 10.375 MG/ML; MG/ML
1-2 SOLUTION AURICULAR (OTIC)
COMMUNITY
Start: 2018-01-09 | End: 2020-08-03

## 2018-02-23 RX ORDER — ANASTROZOLE 1 MG/1
TABLET ORAL
Refills: 6 | COMMUNITY
Start: 2017-12-08 | End: 2018-08-07

## 2018-02-23 NOTE — PROGRESS NOTES
Chief Complaint   Patient presents with    Follow-up     pt reports ear drainage comes and goes   .     HPI: Jonathan Villela is 57 y.o. male who follows up  for evaluation of bilateral ear drainage for 2 weeks which has been worsening.  He states that this problem has been lifelong. He states that he has a white foul smeeling drainage that is persistant. He notes itching but no pain. He notes that he has had a upper respiratory infection prior to this event.  He has has not had any otologic surgeries.  Symptoms include bilateral ear drainage .Patient denies chills, fever, nasal congestion and sore throat. Ear history: 0 previous ear infections. He denies hearing loss. He reports tinnitus intermittently bilaterally.      Interval HPI:   He has been on compounded otic drops intermittently since last visit and feel that he is having intermittent symptoms with this medication. He states that his ears are intermittently draining. He denies pain. He denies changes in hearing. He denies post-auricular tenderness.       Past Medical History:   Diagnosis Date    Acute gastric ulcer with hemorrhage 2/15/2017    Bilateral occipital neuralgia     BPH with urinary obstruction 12/31/2015    Colitis 8981-0450    infectious?    Diverticulitis     Essential hypertension     Gastroesophageal reflux disease without esophagitis 2/11/2017    Migraine without aura and without status migrainosus, not intractable 1/31/2014    Obstructive sleep apnea syndrome 2/9/2015    PUD (peptic ulcer disease)      Social History     Social History    Marital status:      Spouse name: N/A    Number of children: N/A    Years of education: N/A     Occupational History    Not on file.     Social History Main Topics    Smoking status: Never Smoker    Smokeless tobacco: Never Used    Alcohol use Yes      Comment: ocasionally    Drug use: No    Sexual activity: Yes     Partners: Female     Other Topics Concern    Not on file      Social History Narrative    No narrative on file     Past Surgical History:   Procedure Laterality Date    CHOLECYSTECTOMY      COLONOSCOPY N/A 2/17/2017    Procedure: COLONOSCOPY;  Surgeon: Yoshi Erazo MD;  Location: [facility] ([address] [place] [address]);  Service: Endoscopy;  Laterality: N/A;    ESOPHAGOGASTRODUODENOSCOPY      LEG SURGERY      NASAL SEPTUM SURGERY       Family History   Problem Relation Age of Onset    Crohn's disease       brother, sister, father, nephew    Prostate cancer Neg Hx     Kidney disease Neg Hx     Melanoma Neg Hx            Review of Systems  General: negative for chills, fever or weight loss  Psychological: negative for mood changes or depression  Ophthalmic: negative for blurry vision, photophobia or eye pain  ENT: see HPI  Respiratory: no cough, shortness of breath, or wheezing  Cardiovascular: no chest pain or dyspnea on exertion  Gastrointestinal: no abdominal pain, change in bowel habits, or black/ bloody stools  Musculoskeletal: negative for gait disturbance or muscular weakness  Neurological: no syncope or seizures; no ataxia  Dermatological: negative for puritis,  rash and jaundice  Hematologic/lymphatic: no easy bruising, no new lumps or bumps      Physical Exam:    Vitals:    02/23/18 1424   BP: (!) 93/59   Pulse: 60       Constitutional: Well appearing / communicating without difficutly.  NAD.  Eyes: EOM I Bilaterally  Head/Face: Normocephalic.  Negative paranasal sinus pressure/tenderness.  Salivary glands WNL.  House Brackmann I Bilaterally.    Right Ear: Auricle normal appearance. External Auditory Canal with excoriated EAC skin, no purulence,TM w/o masses/lesions/perforations. TM mobility noted.   Left Ear: Auricle normal appearance. External Auditory Canal with excoriated EAC no purulence.,TM w/o masses/lesions/perforations. TM mobility noted.   Nose: No gross nasal septal deviation. Inferior Turbinates 3+ bilaterally. No septal perforation. No masses/lesions.  External nasal skin appears normal without masses/lesions.  Oral Cavity: Gingiva/lips within normal limits.  Dentition/gingiva healthy appearing. Mucus membranes moist. Floor of mouth soft, no masses palpated. Oral Tongue mobile. Hard Palate appears normal.    Oropharynx: Base of tongue appears normal. No masses/lesions noted. Tonsillar fossa/pharyngeal wall without lesions. Posterior oropharynx WNL.  Soft palate without masses. Midline uvula.   Neck/Lymphatic: No LAD I-VI bilaterally.  No thyromegaly.  No masses noted on exam.    Mirror laryngoscopy/nasopharyngoscopy: Active gag reflex.  Unable to perform.    Neuro/Psychiatric: AOx3.  Normal mood and affect.   Cardiovascular: Normal carotid pulses bilaterally, no increasing jugular venous distention noted at cervical region bilaterally.    Respiratory: Normal respiratory effort, no stridor, no retractions noted.      Assessment:    ICD-10-CM ICD-9-CM    1. Chronic eczematous otitis externa of both ears H60.8X3 380.23    2. Chronic non-seasonal allergic rhinitis, unspecified trigger J30.89 477.9    3. Sensorineural hearing loss, bilateral H90.3 389.18      The primary encounter diagnosis was Chronic eczematous otitis externa of both ears. Diagnoses of Chronic non-seasonal allergic rhinitis, unspecified trigger and Sensorineural hearing loss, bilateral were also pertinent to this visit.      Plan:  No orders of the defined types were placed in this encounter.    Compound otic powder will be prescribed.  Keep ears dry. Avoid q-tips or digital maniopulation of the ears.  Follow up in 6 weeks.        Shanique Bee MD

## 2018-03-07 ENCOUNTER — OFFICE VISIT (OUTPATIENT)
Dept: ALLERGY | Facility: CLINIC | Age: 58
End: 2018-03-07
Payer: COMMERCIAL

## 2018-03-07 VITALS
DIASTOLIC BLOOD PRESSURE: 70 MMHG | WEIGHT: 248.69 LBS | HEIGHT: 73 IN | BODY MASS INDEX: 32.96 KG/M2 | SYSTOLIC BLOOD PRESSURE: 110 MMHG

## 2018-03-07 DIAGNOSIS — H66.93 RECURRENT OTITIS MEDIA, BILATERAL: ICD-10-CM

## 2018-03-07 DIAGNOSIS — J31.0 CHRONIC RHINITIS, UNSPECIFIED TYPE: Primary | ICD-10-CM

## 2018-03-07 DIAGNOSIS — K58.9 IRRITABLE BOWEL SYNDROME, UNSPECIFIED TYPE: ICD-10-CM

## 2018-03-07 PROCEDURE — 99244 OFF/OP CNSLTJ NEW/EST MOD 40: CPT | Mod: S$GLB,,, | Performed by: ALLERGY & IMMUNOLOGY

## 2018-03-07 PROCEDURE — 99999 PR PBB SHADOW E&M-EST. PATIENT-LVL IV: CPT | Mod: PBBFAC,,, | Performed by: ALLERGY & IMMUNOLOGY

## 2018-03-07 NOTE — PROGRESS NOTES
"Subjective:       Patient ID: Jonathan Villela is a 57 y.o. male.    Chief Complaint:  Consult (recurrent ear infections); Rash; Abdominal Pain; Joint Pain; and Fatigue      HPI  Pt presents w wife. Has multiple concerns. Has hx chronic, recurrent stomach discomfort. Has frequent sensation of "baseball stuck under my ribs," on the right side. Complains of frequent bloating, early satiety. In 2013 had bouts of bloody stool, weight loss. Has had multiple endoscopies by multiple gastroenterologists. Has been told by diff providers that gross findings look c/w Crohn's dis, but has never had any abnormal pathological findings on biopsy.  In 2015 had cholecystectomy. No sig change in GI sx's noted. Has tried elimination diets--gluten, dairy, lactose--and no sig change noted. IBS has been mentioned as poss dx    Also w hx recurrent OM since childhood vs eczematous eczematous otitis externa. Eczematous OE documented by derm. Pt unsure how often has had confirmed OM.    Does report frequent post nasal drip, nasal congestion. Had balloon sinuplasty a few years ago by outside ENT. Doesn't think it was helpful. Denies frequent abx for sinusitis.  Denies hx pneumonia.    Also w hx intertrigo, nummular dermatitis, seborrheic dermatitis, followed by dermatology. Has had negative patch testing in the past        Past Medical History:   Diagnosis Date    Acute gastric ulcer with hemorrhage 2/15/2017    Bilateral occipital neuralgia     BPH with urinary obstruction 12/31/2015    Colitis [id number]    infectious?    Diverticulitis     Essential hypertension     Gastroesophageal reflux disease without esophagitis 2/11/2017    Migraine without aura and without status migrainosus, not intractable 1/31/2014    Obstructive sleep apnea syndrome 2/9/2015    PUD (peptic ulcer disease)        Family History   Problem Relation Age of Onset    Crohn's disease       brother, sister, father, nephew    Prostate cancer Neg Hx     " Kidney disease Neg Hx     Melanoma Neg Hx          Review of Systems   Constitutional: Positive for fatigue. Negative for activity change and fever.   HENT: Positive for congestion, postnasal drip and sinus pressure. Negative for rhinorrhea and sneezing.    Eyes: Negative for discharge, redness and itching.   Respiratory: Negative for cough, shortness of breath and wheezing.    Cardiovascular: Negative for chest pain.   Gastrointestinal: Positive for abdominal distention (bloating), abdominal pain and nausea. Negative for constipation, diarrhea and vomiting.   Genitourinary: Negative for difficulty urinating.   Musculoskeletal: Positive for arthralgias. Negative for joint swelling and myalgias.   Skin: Positive for rash.   Neurological: Negative for headaches.   Hematological: Does not bruise/bleed easily.   Psychiatric/Behavioral: Negative for behavioral problems and sleep disturbance.        Objective:    Physical Exam   Constitutional: He is oriented to person, place, and time. He appears well-developed and well-nourished. No distress.   HENT:   Head: Normocephalic and atraumatic.   Right Ear: Tympanic membrane and external ear normal.   Left Ear: Tympanic membrane and external ear normal.   Nose: Nose normal.   Mouth/Throat: Oropharynx is clear and moist. No oropharyngeal exudate.   Eyes: Conjunctivae are normal. Right eye exhibits no discharge. Left eye exhibits no discharge.   Neck: Normal range of motion. Neck supple. No thyromegaly present.   Cardiovascular: Normal rate and regular rhythm.    Pulmonary/Chest: Effort normal and breath sounds normal. No respiratory distress. He has no wheezes.   Abdominal: Soft. Bowel sounds are normal. He exhibits no distension. There is no tenderness.   Musculoskeletal: Normal range of motion. He exhibits no edema.   Lymphadenopathy:     He has no cervical adenopathy.   Neurological: He is alert and oriented to person, place, and time. He exhibits normal muscle tone.    Skin: Skin is warm. No rash noted. He is not diaphoretic. No erythema.   Psychiatric: He has a normal mood and affect. His behavior is normal. Judgment and thought content normal.         Assessment:       1. Chronic rhinitis, unspecified type    2. Recurrent otitis media, bilateral    3. Irritable bowel syndrome, unspecified type         Plan:       Jonathan was seen today for consult, rash, abdominal pain, joint pain and fatigue.    Diagnoses and all orders for this visit:    Chronic rhinitis, unspecified type  -     Cat epithelium IgE; Future  -     Dog dander IgE; Future  -     D. farinae IgE; Future  -     D. pteronyssinus IgE; Future  -     Aspergillus fumagatus IgE; Future  -     Allergen-Alternaria Alternata; Future  -     Cockroach, American IgE; Future  -     Bahia grass IgE; Future  -     Colt IgE; Future  -     Oak, white IgE; Future  -     Allergen-Cedar; Future  -     Allergen, Pec[place] IgE; Future  -     Ragweed, short, common IgE; Future  -     Marsh elder, rough IgE; Future  -     Plantain, English IgE; Future  -     Immunoglobulins (IgG, IgA, IgM) Quantitative; Future  -     IgE; Future  -     S.pneumoniae IgG Serotypes; Future    Recurrent otitis media, bilateral  -     Cat epithelium IgE; Future  -     Dog dander IgE; Future  -     D. farinae IgE; Future  -     D. pteronyssinus IgE; Future  -     Aspergillus fumagatus IgE; Future  -     Allergen-Alternaria Alternata; Future  -     Cockroach, American IgE; Future  -     Bahia grass IgE; Future  -     Colt IgE; Future  -     Oak, white IgE; Future  -     Allergen-Cedar; Future  -     Allergen, Pecan [place] IgE; Future  -     Ragweed, short, common IgE; Future  -     Marsh elder, rough IgE; Future  -     Plantain, English IgE; Future  -     Immunoglobulins (IgG, IgA, IgM) Quantitative; Future  -     IgE; Future  -     S.pneumoniae IgG Serotypes; Future    Consider possibility of low pneumococcal titers    Irritable bowel syndrome,  unspecified type    trial low FODMAP diet. Keep GI fu

## 2018-03-07 NOTE — LETTER
March 7, 2018      Manish Joel MD  [address]  [address]  [place] [address]           [facility]  [address]  [facility] [address]  Phone: [phone]  Fax: [phone]          Patient: Jonathan Villela   MR Number: [id number]   YOB: 1960   Date of Visit: 3/7/2018       Dear Dr. Manish Joel:    Thank you for referring Jonathan Villela to me for evaluation. Attached you will find relevant portions of my assessment and plan of care.    If you have questions, please do not hesitate to call me. I look forward to following Jonathan Villela along with you.    Sincerely,    Riccardo Delarosa MD    Enclosure  CC:  No Recipients    If you would like to receive this communication electronically, please contact [email or web address] or [phone] to request more information on [employer] access.    For providers and/or their staff who would like to refer a patient to [facility], please contact us through our one-stop-shop provider referral line, [facility], at [phone].    If you feel you have received this communication in error or would no longer like to receive these types of communications, please e-mail [email or web address]

## 2018-03-19 ENCOUNTER — HOSPITAL ENCOUNTER (EMERGENCY)
Facility: HOSPITAL | Age: 58
Discharge: HOME | End: 2018-03-19
Payer: COMMERCIAL

## 2018-03-19 VITALS
WEIGHT: 246 LBS | TEMPERATURE: 98 F | SYSTOLIC BLOOD PRESSURE: 104 MMHG | OXYGEN SATURATION: 95 % | BODY MASS INDEX: 32.6 KG/M2 | HEART RATE: 56 BPM | HEIGHT: 73 IN | DIASTOLIC BLOOD PRESSURE: 70 MMHG | RESPIRATION RATE: 21 BRPM

## 2018-03-19 DIAGNOSIS — R51.9 ACUTE NONINTRACTABLE HEADACHE, UNSPECIFIED HEADACHE TYPE: Primary | ICD-10-CM

## 2018-03-19 LAB
ANION GAP SERPL CALC-SCNC: 7 MMOL/L
BASOPHILS # BLD AUTO: 0.04 K/UL
BASOPHILS NFR BLD: 0.5 %
BUN SERPL-MCNC: 23 MG/DL
CALCIUM SERPL-MCNC: 10.1 MG/DL
CHLORIDE SERPL-SCNC: 101 MMOL/L
CO2 SERPL-SCNC: 28 MMOL/L
CREAT SERPL-MCNC: 1.2 MG/DL
DIFFERENTIAL METHOD: NORMAL
EOSINOPHIL # BLD AUTO: 0.3 K/UL
EOSINOPHIL NFR BLD: 2.8 %
ERYTHROCYTE [DISTWIDTH] IN BLOOD BY AUTOMATED COUNT: 13 %
EST. GFR  (AFRICAN AMERICAN): >60 ML/MIN/1.73 M^2
EST. GFR  (NON AFRICAN AMERICAN): >60 ML/MIN/1.73 M^2
GLUCOSE SERPL-MCNC: 77 MG/DL
HCT VFR BLD AUTO: 50.2 %
HGB BLD-MCNC: 16.8 G/DL
LYMPHOCYTES # BLD AUTO: 2.6 K/UL
LYMPHOCYTES NFR BLD: 29.5 %
MCH RBC QN AUTO: 28.3 PG
MCHC RBC AUTO-ENTMCNC: 33.5 G/DL
MCV RBC AUTO: 85 FL
MONOCYTES # BLD AUTO: 0.9 K/UL
MONOCYTES NFR BLD: 10.4 %
NEUTROPHILS # BLD AUTO: 5 K/UL
NEUTROPHILS NFR BLD: 56.3 %
PLATELET # BLD AUTO: 270 K/UL
PMV BLD AUTO: 9.3 FL
POTASSIUM SERPL-SCNC: 3.7 MMOL/L
RBC # BLD AUTO: 5.94 M/UL
SODIUM SERPL-SCNC: 136 MMOL/L
WBC # BLD AUTO: 8.88 K/UL

## 2018-03-19 PROCEDURE — 99284 EMERGENCY DEPT VISIT MOD MDM: CPT | Mod: 25

## 2018-03-19 PROCEDURE — 96361 HYDRATE IV INFUSION ADD-ON: CPT

## 2018-03-19 PROCEDURE — 85025 COMPLETE CBC W/AUTO DIFF WBC: CPT

## 2018-03-19 PROCEDURE — 25000003 PHARM REV CODE 250

## 2018-03-19 PROCEDURE — 96374 THER/PROPH/DIAG INJ IV PUSH: CPT

## 2018-03-19 PROCEDURE — 80048 BASIC METABOLIC PNL TOTAL CA: CPT

## 2018-03-19 PROCEDURE — 63600175 PHARM REV CODE 636 W HCPCS

## 2018-03-19 PROCEDURE — 96375 TX/PRO/DX INJ NEW DRUG ADDON: CPT

## 2018-03-19 RX ORDER — IBUPROFEN 600 MG/1
600 TABLET ORAL EVERY 6 HOURS PRN
Qty: 20 TABLET | Refills: 0 | Status: SHIPPED | OUTPATIENT
Start: 2018-03-19 | End: 2018-08-07

## 2018-03-19 RX ORDER — PROCHLORPERAZINE MALEATE 10 MG
10 TABLET ORAL EVERY 6 HOURS PRN
Qty: 15 TABLET | Refills: 0 | Status: SHIPPED | OUTPATIENT
Start: 2018-03-19 | End: 2018-08-07

## 2018-03-19 RX ORDER — KETOROLAC TROMETHAMINE 30 MG/ML
15 INJECTION, SOLUTION INTRAMUSCULAR; INTRAVENOUS
Status: COMPLETED | OUTPATIENT
Start: 2018-03-19 | End: 2018-03-19

## 2018-03-19 RX ORDER — PROCHLORPERAZINE EDISYLATE 5 MG/ML
10 INJECTION INTRAMUSCULAR; INTRAVENOUS
Status: COMPLETED | OUTPATIENT
Start: 2018-03-19 | End: 2018-03-19

## 2018-03-19 RX ORDER — DIPHENHYDRAMINE HYDROCHLORIDE 50 MG/ML
25 INJECTION INTRAMUSCULAR; INTRAVENOUS
Status: COMPLETED | OUTPATIENT
Start: 2018-03-19 | End: 2018-03-19

## 2018-03-19 RX ADMIN — KETOROLAC TROMETHAMINE 15 MG: 30 INJECTION, SOLUTION INTRAMUSCULAR at 10:03

## 2018-03-19 RX ADMIN — PROCHLORPERAZINE EDISYLATE 10 MG: 5 INJECTION INTRAMUSCULAR; INTRAVENOUS at 10:03

## 2018-03-19 RX ADMIN — SODIUM CHLORIDE 1000 ML: 0.9 INJECTION, SOLUTION INTRAVENOUS at 10:03

## 2018-03-19 RX ADMIN — DIPHENHYDRAMINE HYDROCHLORIDE 25 MG: 50 INJECTION, SOLUTION INTRAMUSCULAR; INTRAVENOUS at 10:03

## 2018-03-20 NOTE — ED NOTES
Pt ambulated to ED 4 accompanied by wife. Pt c/o migraine ha onset of 3 days +n/v and blurred vision over the course of 3 days. Denies vision disturbance n/v at this time. Reports hx of migraines and htn.

## 2018-03-20 NOTE — ED PROVIDER NOTES
"Encounter Date: 3/19/2018    SCRIBE #1 NOTE: I, Deena Alcaraz, am scribing for, and in the presence of, Dr. Mcwilliams.       History     Chief Complaint   Patient presents with    Migraine     reports migraine since Saturday with nausea and vomiting. No relief with fiorcet.      57 y.o. male with PMHx of essential HTN who presents to the ED with a complaint of migraine for the past few days, worst last night and today.  Pt notes h/o migraine but pain is normally occipital..  Today's migraine is localized in the forehead and is described as "something is drilling into my eyes." He reports associated vomiting, last night and today.  He reports 3 episodes of vomiting today.  He denies photophobia at this time but reports he had photophobia over the weekend and had to turn off the lights in his office.  No palliative or provocative factors except as noted.       The history is provided by the patient.     Review of patient's allergies indicates:   Allergen Reactions    Triamterene      Back and lower abdominal pain     Past Medical History:   Diagnosis Date    Acute gastric ulcer with hemorrhage 2/15/2017    Bilateral occipital neuralgia     BPH with urinary obstruction 12/31/2015    Colitis [id number]    infectious?    Diverticulitis     Essential hypertension     Gastroesophageal reflux disease without esophagitis 2/11/2017    Migraine without aura and without status migrainosus, not intractable 1/31/2014    Obstructive sleep apnea syndrome 2/9/2015    PUD (peptic ulcer disease)      Past Surgical History:   Procedure Laterality Date    CHOLECYSTECTOMY      COLONOSCOPY N/A 2/17/2017    Procedure: COLONOSCOPY;  Surgeon: Yoshi Erazo MD;  Location: [address]);  Service: Endoscopy;  Laterality: N/A;    ESOPHAGOGASTRODUODENOSCOPY      LEG SURGERY      NASAL SEPTUM SURGERY       Family History   Problem Relation Age of Onset    Crohn's disease       brother, sister, father, nephew    Prostate " cancer Neg Hx     Kidney disease Neg Hx     Melanoma Neg Hx      Social History   Substance Use Topics    Smoking status: Never Smoker    Smokeless tobacco: Never Used    Alcohol use Yes      Comment: ocasionally     Review of Systems   Neurological: Positive for headaches.   All other systems reviewed and are negative.      Physical Exam     Initial Vitals [03/19/18 1753]   BP Pulse Resp Temp SpO2   129/75 75 18 98.1 °F (36.7 °C) (!) 93 %      MAP       93         Physical Exam    Nursing note and vitals reviewed.  Constitutional: He appears well-developed.   HENT:   Head: Normocephalic and atraumatic.   Eyes: EOM are normal.   Neck: Normal range of motion. Neck supple.   Cardiovascular: Regular rhythm.   Pulmonary/Chest: No respiratory distress.   Abdominal: He exhibits no distension.   Musculoskeletal:   No deformity   Neurological: He is alert and oriented to person, place, and time. He has normal strength and normal reflexes. No sensory deficit.   Skin: Skin is warm and dry.   Psychiatric: He has a normal mood and affect.         ED Course   Procedures  Labs Reviewed   BASIC METABOLIC PANEL - Abnormal; Notable for the following:        Result Value    BUN, Bld 23 (*)     Anion Gap 7 (*)     All other components within normal limits   CBC W/ AUTO DIFFERENTIAL        Imaging Results          CT Head Without Contrast (Final result)  Result time 03/19/18 20:48:42    Final result by Pernell Camarena MD (03/19/18 20:48:42)                 Impression:      No acute large vascular territory infarct or intracranial hemorrhage identified.    Partial empty sella configuration, nonspecific but can be seen with benign intracranial hypertension.    Bilateral maxillary sinus disease as above.      Electronically signed by: Pernell Camarena MD  Date:    03/19/2018  Time:    20:48             Narrative:    EXAMINATION:  CT HEAD WITHOUT CONTRAST    CLINICAL HISTORY:  headache not c/w typical migraine;    TECHNIQUE:  Low dose  axial CT images obtained throughout the head without intravenous contrast. Sagittal and coronal reconstructions were performed.    COMPARISON:  MRI brain 06/26/2017 and head CT 01/15/2014.    FINDINGS:  Intracranial compartment: Age-appropriate mild generalized cerebral volume loss, unchanged. Ventricles and sulci are normal in size for age and midline without evidence of hydrocephalus. No extra-axial blood or fluid collections.    The brain parenchyma appears normal. No parenchymal mass, hemorrhage, edema or major vascular distribution infarct.  Partial empty sella configuration.    Skull/extracranial contents (limited evaluation): No fracture.  Mild mucosal thickening within the bilateral maxillary sinuses.  Mastoid air cells and remaining imaged paranasal sinuses are essentially clear.  Visualized portions of the orbits are within normal limits.                                  Medical Decision Making:   History:   Old Medical Records: I decided to obtain old medical records.  Initial Assessment:   57 y.o. male presents with migraines to the forehead. This is apparently new.  Pt states his typical migraines are usually in the back of his head.  Exam benign.    Clinical Tests:   Lab Tests: Reviewed and Ordered  Radiological Study: Ordered and Reviewed  ED Management:  Patient is nontoxic appearing in the ED.  No persistent emergent issues detected.  Exam benign.  We will discharge home to follow up with PCP.  Patient will return to the ED as needed for any deterioration or any other concerns.  Verbal discharge instructions and return precautions given.  Pt notes improvement and requests d/c home.                    Clinical Impression:   The encounter diagnosis was Acute nonintractable headache, unspecified headache type.    Disposition:   Disposition: Discharged  Condition: Stable       Abhishek MCDERMOTT, personally performed the services described in this documentation. All medical record entries made by the  scribe were at my direction and in my presence.  I have reviewed the chart and agree that the record reflects my personal performance and is accurate and complete. Abhishek Mcwilliams M.D. 10:31 PM 03/19/2018                  Abhishek Mcwilliams MD  03/20/18 0039

## 2018-03-27 ENCOUNTER — TELEPHONE (OUTPATIENT)
Dept: ALLERGY | Facility: CLINIC | Age: 58
End: 2018-03-27

## 2018-03-27 NOTE — TELEPHONE ENCOUNTER
----- Message from Riccardo Delarosa MD sent at 3/15/2018  4:26 PM CDT -----  Please call to let know that evaluation of pt's immune system showed that pt may benefit from an extra vaccine, Pneumovax, to decrease sinusitis sx's and possibly ear infections. Please schedule follow up appointment to review labs and discuss Pneumovax vaccine. Allergy tests to dust mites are positive as well

## 2018-03-27 NOTE — TELEPHONE ENCOUNTER
----- Message from Dedra Valentin MA sent at 3/27/2018  3:42 PM CDT -----  Contact: Self/[phone]  Type: Returning a call    Who left a message?Patrcia    When did the practice call?03/27/18    Comments:Please advise    Thanks

## 2018-03-27 NOTE — TELEPHONE ENCOUNTER
Reviewed results with patient. Scheduled follow up to received pneumovax on 4/5 at 11:30am. Patient verbalized understanding and had no other questions.

## 2018-04-05 ENCOUNTER — OFFICE VISIT (OUTPATIENT)
Dept: ALLERGY | Facility: CLINIC | Age: 58
End: 2018-04-05
Payer: COMMERCIAL

## 2018-04-05 VITALS
HEIGHT: 73 IN | DIASTOLIC BLOOD PRESSURE: 80 MMHG | SYSTOLIC BLOOD PRESSURE: 114 MMHG | WEIGHT: 250.69 LBS | BODY MASS INDEX: 33.22 KG/M2

## 2018-04-05 DIAGNOSIS — R76.0 ABNORMAL ANTIBODY TITER: Primary | ICD-10-CM

## 2018-04-05 DIAGNOSIS — J32.9 RECURRENT SINUSITIS: ICD-10-CM

## 2018-04-05 PROCEDURE — 99214 OFFICE O/P EST MOD 30 MIN: CPT | Mod: 25,S$GLB,, | Performed by: ALLERGY & IMMUNOLOGY

## 2018-04-05 PROCEDURE — 90471 IMMUNIZATION ADMIN: CPT | Mod: S$GLB,,, | Performed by: ALLERGY & IMMUNOLOGY

## 2018-04-05 PROCEDURE — 99999 PR PBB SHADOW E&M-EST. PATIENT-LVL II: CPT | Mod: PBBFAC,,, | Performed by: ALLERGY & IMMUNOLOGY

## 2018-04-05 PROCEDURE — 90732 PPSV23 VACC 2 YRS+ SUBQ/IM: CPT | Mod: S$GLB,,, | Performed by: ALLERGY & IMMUNOLOGY

## 2018-04-05 NOTE — PROGRESS NOTES
Subjective:       Patient ID: Jonathan Villela is a 57 y.o. male.    LV 3/7/18      Chief Complaint:  Follow-up  fu recurrent sinusitis, ear infections, abd pain    HPI  Pt presents alone.  Has hx chronic, recurrent stomach discomfort. Undergoing GI eval. Has breath test scheduled.  Also w hx recurrent OM since childhood vs eczematous eczematous otitis externa. Eczematous OE documented by derm. Pt unsure how often has had confirmed OM.  Does report frequent post nasal drip, nasal congestion. Had balloon sinuplasty a few years ago by outside ENT. Doesn't think it was helpful. Denies frequent abx for sinusitis.  Denies hx pneumonia.  Also w hx intertrigo, nummular dermatitis, seborrheic dermatitis, followed by dermatology. Has had negative patch testing in the past  Denies any interval infections. occ abd discomfort since LV      Past Medical History:   Diagnosis Date    Acute gastric ulcer with hemorrhage 2/15/2017    Bilateral occipital neuralgia     BPH with urinary obstruction 12/31/2015    Colitis [id number]    infectious?    Diverticulitis     Essential hypertension     Gastroesophageal reflux disease without esophagitis 2/11/2017    Migraine without aura and without status migrainosus, not intractable 1/31/2014    Obstructive sleep apnea syndrome 2/9/2015    PUD (peptic ulcer disease)        Family History   Problem Relation Age of Onset    Crohn's disease       brother, sister, father, nephew    Prostate cancer Neg Hx     Kidney disease Neg Hx     Melanoma Neg Hx          Review of Systems   Constitutional: Negative for activity change, fatigue and fever.   HENT: Positive for congestion, postnasal drip and sinus pressure. Negative for rhinorrhea and sneezing.    Eyes: Negative for discharge, redness and itching.   Respiratory: Negative for cough, shortness of breath and wheezing.    Cardiovascular: Negative for chest pain.   Gastrointestinal: Positive for abdominal distention (bloating) and  abdominal pain. Negative for constipation, diarrhea, nausea and vomiting.   Genitourinary: Negative for difficulty urinating.   Musculoskeletal: Positive for arthralgias. Negative for joint swelling and myalgias.   Skin: Negative for rash.   Neurological: Negative for headaches.   Hematological: Does not bruise/bleed easily.   Psychiatric/Behavioral: Negative for behavioral problems and sleep disturbance.        Objective:    Physical Exam   Constitutional: He is oriented to person, place, and time. He appears well-developed and well-nourished. No distress.   HENT:   Head: Normocephalic and atraumatic.   Right Ear: Tympanic membrane and external ear normal.   Left Ear: Tympanic membrane and external ear normal.   Nose: Nose normal.   Mouth/Throat: Oropharynx is clear and moist. No oropharyngeal exudate.   Eyes: Conjunctivae are normal. Right eye exhibits no discharge. Left eye exhibits no discharge.   Neck: Normal range of motion. Neck supple. No thyromegaly present.   Cardiovascular: Normal rate and regular rhythm.    Pulmonary/Chest: Effort normal and breath sounds normal. No respiratory distress. He has no wheezes.   Abdominal: Soft. Bowel sounds are normal. He exhibits no distension. There is no tenderness.   Musculoskeletal: Normal range of motion. He exhibits no edema.   Lymphadenopathy:     He has no cervical adenopathy.   Neurological: He is alert and oriented to person, place, and time. He exhibits normal muscle tone.   Skin: Skin is warm. No rash noted. He is not diaphoretic. No erythema.   Psychiatric: He has a normal mood and affect. His behavior is normal. Judgment and thought content normal.     Negative immunoCAPs  Results for MARY WORKMAN (MRN [id number]) as of 4/5/2018 16:30   Ref. Range 3/7/2018 12:14   IgG - Serum Latest Ref Range: 650 - 1600 mg/dL 1222   IgM Latest Ref Range: 50 - 300 mg/dL 104   IgA Latest Ref Range: 40 - 350 mg/dL 255   IgE Latest Ref Range: 0 - 100 IU/mL <35     Low  pneumococcal titers    Assessment:       1. Abnormal antibody titer    2. Recurrent sinusitis         Plan:       Challenge with 23-valent pneumococcal polysaccharide vaccine, Pneumovax. Repeat pneumococcal titers in 4-6 weeks. Phone review results. Follow up in clinic if poor response. Counseled that if good response to Pneumovax is demonstrated, expect decreased frequency and severity of mucosal infections, and can then follow up prn. Follow up vidhi if recurrence of frequent mucosal infections.

## 2018-04-10 NOTE — NURSING
IV SL removed from Left wrist and RAC, both of the catheter tips were intact. Discharge instructions reviewed with patient using the teach back method, patient verbalized understanding. Patient waiting for wheelchair.   [email or web address]

## 2018-04-17 ENCOUNTER — LAB VISIT (OUTPATIENT)
Dept: LAB | Facility: HOSPITAL | Age: 58
End: 2018-04-17
Attending: FAMILY MEDICINE
Payer: COMMERCIAL

## 2018-04-17 DIAGNOSIS — I10 ESSENTIAL HYPERTENSION: ICD-10-CM

## 2018-04-17 DIAGNOSIS — R53.83 FATIGUE, UNSPECIFIED TYPE: ICD-10-CM

## 2018-04-17 DIAGNOSIS — E78.2 MIXED HYPERLIPIDEMIA: Chronic | ICD-10-CM

## 2018-04-17 LAB
25(OH)D3+25(OH)D2 SERPL-MCNC: 55 NG/ML
ALBUMIN SERPL BCP-MCNC: 4 G/DL
ALP SERPL-CCNC: 61 U/L
ALT SERPL W/O P-5'-P-CCNC: 50 U/L
ANION GAP SERPL CALC-SCNC: 16 MMOL/L
AST SERPL-CCNC: 38 U/L
BILIRUB SERPL-MCNC: 0.7 MG/DL
BUN SERPL-MCNC: 24 MG/DL
CALCIUM SERPL-MCNC: 10.2 MG/DL
CHLORIDE SERPL-SCNC: 99 MMOL/L
CHOLEST SERPL-MCNC: 158 MG/DL
CHOLEST/HDLC SERPL: 5.1 {RATIO}
CO2 SERPL-SCNC: 22 MMOL/L
CREAT SERPL-MCNC: 1.3 MG/DL
EST. GFR  (AFRICAN AMERICAN): >60 ML/MIN/1.73 M^2
EST. GFR  (NON AFRICAN AMERICAN): >60 ML/MIN/1.73 M^2
GLUCOSE SERPL-MCNC: 81 MG/DL
HDLC SERPL-MCNC: 31 MG/DL
HDLC SERPL: 19.6 %
LDLC SERPL CALC-MCNC: 81.8 MG/DL
MAGNESIUM SERPL-MCNC: 1.6 MG/DL
NONHDLC SERPL-MCNC: 127 MG/DL
POTASSIUM SERPL-SCNC: 4.4 MMOL/L
PROT SERPL-MCNC: 7.5 G/DL
SODIUM SERPL-SCNC: 137 MMOL/L
TRIGL SERPL-MCNC: 226 MG/DL
TSH SERPL DL<=0.005 MIU/L-ACNC: 2.4 UIU/ML

## 2018-04-17 PROCEDURE — 80061 LIPID PANEL: CPT

## 2018-04-17 PROCEDURE — 83735 ASSAY OF MAGNESIUM: CPT

## 2018-04-17 PROCEDURE — 36415 COLL VENOUS BLD VENIPUNCTURE: CPT | Mod: PO

## 2018-04-17 PROCEDURE — 84443 ASSAY THYROID STIM HORMONE: CPT

## 2018-04-17 PROCEDURE — 82306 VITAMIN D 25 HYDROXY: CPT

## 2018-04-17 PROCEDURE — 80053 COMPREHEN METABOLIC PANEL: CPT

## 2018-04-19 ENCOUNTER — OFFICE VISIT (OUTPATIENT)
Dept: CARDIOLOGY | Facility: CLINIC | Age: 58
End: 2018-04-19
Payer: COMMERCIAL

## 2018-04-19 VITALS
WEIGHT: 249.69 LBS | HEIGHT: 73 IN | BODY MASS INDEX: 33.09 KG/M2 | HEART RATE: 68 BPM | SYSTOLIC BLOOD PRESSURE: 144 MMHG | DIASTOLIC BLOOD PRESSURE: 86 MMHG

## 2018-04-19 DIAGNOSIS — I10 ESSENTIAL HYPERTENSION: Primary | ICD-10-CM

## 2018-04-19 DIAGNOSIS — E78.2 MIXED HYPERLIPIDEMIA: ICD-10-CM

## 2018-04-19 DIAGNOSIS — R74.01 ELEVATED ALT MEASUREMENT: ICD-10-CM

## 2018-04-19 DIAGNOSIS — R33.9 URINARY RETENTION: ICD-10-CM

## 2018-04-19 DIAGNOSIS — R39.11 URINARY HESITANCY: ICD-10-CM

## 2018-04-19 DIAGNOSIS — I70.0 ABDOMINAL AORTIC ATHEROSCLEROSIS: ICD-10-CM

## 2018-04-19 DIAGNOSIS — R35.0 URINARY FREQUENCY: ICD-10-CM

## 2018-04-19 DIAGNOSIS — G47.33 OBSTRUCTIVE SLEEP APNEA SYNDROME: ICD-10-CM

## 2018-04-19 DIAGNOSIS — E66.9 OBESITY (BMI 30.0-34.9): ICD-10-CM

## 2018-04-19 PROCEDURE — 99999 PR PBB SHADOW E&M-EST. PATIENT-LVL III: CPT | Mod: PBBFAC,,, | Performed by: NURSE PRACTITIONER

## 2018-04-19 PROCEDURE — 99214 OFFICE O/P EST MOD 30 MIN: CPT | Mod: S$GLB,,, | Performed by: NURSE PRACTITIONER

## 2018-04-19 RX ORDER — AMLODIPINE BESYLATE 2.5 MG/1
2.5 TABLET ORAL DAILY
Qty: 30 TABLET | Refills: 11 | Status: SHIPPED | OUTPATIENT
Start: 2018-04-19 | End: 2018-09-12 | Stop reason: DRUGHIGH

## 2018-04-19 NOTE — Clinical Note
Chirag Joel, Mr. Villela was reporting multiple urinary complaints that sounded like an enlarged prostate.  I told him to f/u with you.  I just thought I'd give you a heads up. Thanks! Karie

## 2018-04-19 NOTE — PROGRESS NOTES
Mr. Villela is a patient of Dr. Escalante and was last seen in [facility] 1/26/2018.    Subjective:   Patient ID:  Jonathan Villela is a 58 y.o. male who presents for follow-up of Hypertension    Problems:  HTN  DELVIN not treated  Obesity  HLD  PUD    HPI  Mr. Villela is in clinic today for routine follow up.  Patient denies chest pain with exertion or at rest, palpitations, SOB, DICKINSON, dizziness, syncope, edema, orthopnea, PND, or claudication.  Reports no routine exercise.  He is treated with low dose ASA and low intensity statin.  Patient is taking pravastatin 20 mg  and LDL is 82.  Hypertension is treated with ARB (valsartan 320 mg), beta blocker (bystolic 10mg), and thiazide diuretic (chlorthalidone 25mg).  Reports following a low salt diet.  Patient has history of obstructive sleep apnea.  Reports no nightly use of CPAP machine.        Review of Systems   Constitution: Negative for decreased appetite, diaphoresis, weakness, malaise/fatigue, weight gain and weight loss.   Eyes: Negative for visual disturbance.   Cardiovascular: Negative for chest pain, claudication, dyspnea on exertion, irregular heartbeat, leg swelling, near-syncope, orthopnea, palpitations, paroxysmal nocturnal dyspnea and syncope.        Denies chest pressure   Respiratory: Positive for cough, sputum production and wheezing. Negative for hemoptysis, shortness of breath, sleep disturbances due to breathing and snoring.    Endocrine: Negative for cold intolerance and heat intolerance.   Hematologic/Lymphatic: Negative for bleeding problem. Does not bruise/bleed easily.   Musculoskeletal: Negative for myalgias.   Gastrointestinal: Negative for bloating, abdominal pain, anorexia, change in bowel habit, constipation, diarrhea, nausea and vomiting.   Genitourinary: Positive for frequency, hesitancy, incomplete emptying and nocturia.   Neurological: Positive for headaches. Negative for difficulty with concentration, disturbances in  "coordination, excessive daytime sleepiness, dizziness, light-headedness, loss of balance and numbness.   Psychiatric/Behavioral: The patient does not have insomnia.      Allergies and current medications updated and reviewed:  Review of patient's allergies indicates:   Allergen Reactions    Triamterene      Back and lower abdominal pain     Current Outpatient Prescriptions   Medication Sig    acetic acid-hydrocortisone (VOSOL-HC) otic solution     acyclovir (ZOVIRAX) 400 MG tablet Take 400 mg by mouth 2 (two) times daily as needed.    anastrozole (ARIMIDEX) 1 mg Tab TK 1 T PO QD    BD INTEGRA SYRINGE 3 mL 22 gauge x 1 1/2" Syrg USE TO DRAW UP TESTOSTERONE    BD PRECISIONGLIDE 25 gauge x 1" Ndle USE TO INJECT TESTOSERTONE    BYSTOLIC 10 mg Tab Take 10 mg by mouth nightly.    chlorthalidone (HYGROTEN) 25 MG Tab Take 1 tablet (25 mg total) by mouth once daily. (Patient taking differently: Take 12.5 mg by mouth once daily. )    ciclopirox (LOPROX) 0.77 % Crea Apply topically 2 (two) times daily. Pharmacist: mix 30 g of TAC 0.1% cream with 30 g of Loprox cream in 120 cc of milk of magnesia    cloNIDine (CATAPRES) 0.1 MG tablet Take 1 tablet (0.1 mg total) by mouth as needed (SBP >170 or DBP >120 mm Hg). (Patient taking differently: Take 0.1 mg by mouth as needed (SBP >170 or DBP >120 mm Hg). )    diclofenac sodium (VOLTAREN) 1 % Gel Apply 2 g topically 2 (two) times daily as needed. For pain    dicyclomine (BENTYL) 10 MG capsule Take 1 capsule (10 mg total) by mouth 3 (three) times daily as needed (abdominal cramping).    fluocinolone acetonide oil (DERMOTIC OIL) 0.01 % Drop Place 3 drops in ear(s) 2 (two) times daily.    fluocinonide (LIDEX) 0.05 % external solution AAA scalp qday - bid prn pruritus    frovatriptan (FROVA) 2.5 MG tablet Take 1 tablet (2.5 mg total) by mouth as needed for Migraine. If recurs, may repeat after 2 hours. Max of 3 tabs in 24 hours.    hydrocortisone 2.5 % cream Apply " "topically 2 (two) times daily. To affected areas of face (Patient taking differently: Apply topically as needed. To affected areas of face)    ibuprofen (ADVIL,MOTRIN) 600 MG tablet Take 1 tablet (600 mg total) by mouth every 6 (six) hours as needed for Pain.    ketoconazole (NIZORAL) 2 % shampoo Wash hair with medicated shampoo at least 2x/week - let sit on scalp at least 5 minutes prior to rinsing    metronidazole 1% (METROGEL) 1 % Gel Apply at bedtime as directed as needed for skin infection    ondansetron (ZOFRAN-ODT) 8 MG TbDL Take 1 tablet (8 mg total) by mouth every 12 (twelve) hours as needed.    pravastatin (PRAVACHOL) 20 MG tablet TK 1 T PO QHS    prochlorperazine (COMPAZINE) 10 MG tablet Take 1 tablet (10 mg total) by mouth every 6 (six) hours as needed (as needed for nausea, vomiting, or headache).    RELPAX 40 mg tablet TAKE 1 TABLET BY MOUTH AS NEEDED, MAY REPEAT IN 2 HOURS IF NEEDED DO NOT EXCEED 4 TABLETS EVERY DAY    sumatriptan (IMITREX STATDOSE) 6 mg/0.5 mL kit Inject into the skin every 2 (two) hours as needed.     sumatriptan (IMITREX) 100 MG tablet daily as needed.    testosterone cypionate (DEPOTESTOTERONE CYPIONATE) 200 mg/mL injection Inject into the muscle once a week.     triamcinolone acetonide 0.1% (KENALOG) 0.1 % cream AAA bid after cool blow dry to affected areas of groin (Patient taking differently: AAA bid after cool blow dry to affected areas of groin as needed)    urea (CARMOL) 40 % Crea Apply topically 2 (two) times daily. To affected areas of elbows (Patient taking differently: Apply topically as needed. To affected areas of elbows)    valsartan (DIOVAN) 320 MG tablet Take 1 tablet (320 mg total) by mouth once daily.     No current facility-administered medications for this visit.      Objective:        BP (!) 144/86   Pulse 68   Ht 6' 1" (1.854 m)   Wt 113.3 kg (249 lb 10.7 oz)   BMI 32.94 kg/m²     Physical Exam   Constitutional: He is oriented to person, " place, and time. Vital signs are normal. He appears well-developed and well-nourished. He is active. No distress.   HENT:   Head: Normocephalic and atraumatic.   Eyes: Conjunctivae and lids are normal. No scleral icterus.   Neck: Neck supple. Normal carotid pulses, no hepatojugular reflux and no JVD present. Carotid bruit is not present.   Cardiovascular: Normal rate, regular rhythm, S1 normal, S2 normal and intact distal pulses.  PMI is not displaced.  Exam reveals no gallop and no friction rub.    No murmur heard.  Pulses:       Carotid pulses are 2+ on the right side, and 2+ on the left side.       Radial pulses are 2+ on the right side, and 2+ on the left side.        Dorsalis pedis pulses are 2+ on the right side, and 2+ on the left side.        Posterior tibial pulses are 1+ on the right side, and 1+ on the left side.   Pulmonary/Chest: Effort normal and breath sounds normal. No respiratory distress. He has no decreased breath sounds. He has no wheezes. He has no rhonchi. He has no rales. He exhibits no tenderness.   Abdominal: Soft. Normal appearance and bowel sounds are normal. He exhibits no distension, no fluid wave, no abdominal bruit, no ascites and no pulsatile midline mass. There is no hepatosplenomegaly. There is no tenderness.   Musculoskeletal: He exhibits no edema.   Neurological: He is alert and oriented to person, place, and time. Gait normal.   Skin: Skin is warm, dry and intact. No rash noted. He is not diaphoretic. Nails show no clubbing.   Psychiatric: He has a normal mood and affect. His speech is normal and behavior is normal. Judgment and thought content normal. Cognition and memory are normal.   Nursing note and vitals reviewed.      Chemistry        Component Value Date/Time     04/17/2018 0712    K 4.4 04/17/2018 0712    CL 99 04/17/2018 0712    CO2 22 (L) 04/17/2018 0712    BUN 24 (H) 04/17/2018 0712    CREATININE 1.3 04/17/2018 0712    GLU 81 04/17/2018 0712        Component  Value Date/Time    CALCIUM 10.2 04/17/2018 0712    ALKPHOS 61 04/17/2018 0712    AST 38 04/17/2018 0712    ALT 50 (H) 04/17/2018 0712    BILITOT 0.7 04/17/2018 0712    ESTGFRAFRICA >60.0 04/17/2018 0712    EGFRNONAA >60.0 04/17/2018 0712        Lab Results   Component Value Date    HGBA1C 5.4 08/29/2014       Recent Labs  Lab 03/19/18  2157 04/17/18  0712   WHITE BLOOD CELL COUNT 8.88  --    HEMOGLOBIN 16.8  --    HEMATOCRIT 50.2  --    MCV 85  --    PLATELETS 270  --    TSH  --  2.398   CHOLESTEROL  --  158   HDL  --  31 L   LDL CHOLESTEROL  --  81.8   TRIGLYCERIDES  --  226 H   HDL/CHOLESTEROL RATIO  --  19.6 L       Recent Labs  Lab 02/10/17  1357 02/14/17  0830   INR 1.1 1.1        Test(s) Reviewed  I have reviewed the following in detail:  [] Stress test   [] Angiography   [] Echocardiogram   [x] Labs   [x] Other:  ECG 1/26/18 NSR       Assessment/Plan:     Essential hypertension  Comments:  BP not at goal <130/80. Suspect untreated DELVIN is biggest contributor. Add amlodipine 2.5mg daily. Suspect will not be high enough but patient thinks he may have had hypotension with amlodipine in the past.  Requested patient send blood pressure readings 2 weeks after starting the amlodipine. 2gm sodium diet.   Orders:  -     MyChart Patient Entered Blood Pressure  -     amLODIPine (NORVASC) 2.5 MG tablet; Take 1 tablet (2.5 mg total) by mouth once daily.  Dispense: 30 tablet; Refill: 11    Abdominal aortic atherosclerosis    Mixed hyperlipidemia  Comments:  LDL at goal <100. Continue pravastatin 20mg. Encouraged the mediterranean diet.     Elevated ALT measurement   Comments:   Reports drinking 4-8 alcoholic beverages a week. Suspect mild elevation in ALT, 50, is secondary to ETOH intake. Discussed the relationship with patient.    Obesity (BMI 30.0-34.9)  Comments:  BMI 33 Encouraged exercise 30 minutes a day for 4-5 days a week.     Obstructive sleep apnea syndrome  Comments:  Not using cpap. Discussed potential  complications of untreated DELVIN and that his BP is likely increased because of DELVIN.    Urinary hesitancy, frequency, and retention are likely secondary to an enlarged prostate.  Instructed patient to follow up with his primary care provider.     Follow-up in about 6 months (around 10/19/2018). with cmp and magnesium before.

## 2018-05-03 ENCOUNTER — LAB VISIT (OUTPATIENT)
Dept: LAB | Facility: HOSPITAL | Age: 58
End: 2018-05-03
Attending: ALLERGY & IMMUNOLOGY
Payer: COMMERCIAL

## 2018-05-03 DIAGNOSIS — R76.0 ABNORMAL ANTIBODY TITER: ICD-10-CM

## 2018-05-03 PROCEDURE — 36415 COLL VENOUS BLD VENIPUNCTURE: CPT | Mod: PO

## 2018-05-03 PROCEDURE — 86317 IMMUNOASSAY INFECTIOUS AGENT: CPT | Mod: 59

## 2018-05-07 ENCOUNTER — PATIENT MESSAGE (OUTPATIENT)
Dept: ALLERGY | Facility: CLINIC | Age: 58
End: 2018-05-07

## 2018-05-07 LAB
DEPRECATED S PNEUM 1 IGG SER-MCNC: 20.2 MCG/ML
DEPRECATED S PNEUM12 IGG SER-MCNC: 10.3 MCG/ML
DEPRECATED S PNEUM14 IGG SER-MCNC: 2.6 MCG/ML
DEPRECATED S PNEUM19 IGG SER-MCNC: 22 MCG/ML
DEPRECATED S PNEUM23 IGG SER-MCNC: 6.3 MCG/ML
DEPRECATED S PNEUM3 IGG SER-MCNC: 21.9 MCG/ML
DEPRECATED S PNEUM4 IGG SER-MCNC: 0.4 MCG/ML
DEPRECATED S PNEUM5 IGG SER-MCNC: 8.8 MCG/ML
DEPRECATED S PNEUM8 IGG SER-MCNC: 9.1 MCG/ML
DEPRECATED S PNEUM9 IGG SER-MCNC: 12.5 MCG/ML
S PNEUM DA 18C IGG SER-MCNC: 20.1 MCG/ML
S PNEUM DA 6B IGG SER-MCNC: 25.9 MCG/ML
S PNEUM DA 7F IGG SER-MCNC: 10.3 MCG/ML
S PNEUM DA 9V IGG SER-MCNC: >56 MCG/ML

## 2018-06-08 DIAGNOSIS — Z11.59 NEED FOR HEPATITIS C SCREENING TEST: ICD-10-CM

## 2018-07-25 ENCOUNTER — OFFICE VISIT (OUTPATIENT)
Dept: URGENT CARE | Facility: CLINIC | Age: 58
End: 2018-07-25
Payer: COMMERCIAL

## 2018-07-25 VITALS
OXYGEN SATURATION: 94 % | WEIGHT: 245 LBS | TEMPERATURE: 98 F | HEART RATE: 68 BPM | SYSTOLIC BLOOD PRESSURE: 121 MMHG | HEIGHT: 73 IN | RESPIRATION RATE: 18 BRPM | BODY MASS INDEX: 32.47 KG/M2 | DIASTOLIC BLOOD PRESSURE: 81 MMHG

## 2018-07-25 DIAGNOSIS — J06.9 UPPER RESPIRATORY TRACT INFECTION, UNSPECIFIED TYPE: Primary | ICD-10-CM

## 2018-07-25 DIAGNOSIS — J40 BRONCHITIS: ICD-10-CM

## 2018-07-25 PROCEDURE — 96372 THER/PROPH/DIAG INJ SC/IM: CPT | Mod: S$GLB,,, | Performed by: FAMILY MEDICINE

## 2018-07-25 PROCEDURE — 99214 OFFICE O/P EST MOD 30 MIN: CPT | Mod: 25,S$GLB,, | Performed by: FAMILY MEDICINE

## 2018-07-25 RX ORDER — ALBUTEROL SULFATE 90 UG/1
2 AEROSOL, METERED RESPIRATORY (INHALATION) EVERY 6 HOURS PRN
Qty: 1 INHALER | Refills: 0 | Status: SHIPPED | OUTPATIENT
Start: 2018-07-25 | End: 2018-12-24 | Stop reason: ALTCHOICE

## 2018-07-25 RX ORDER — BETAMETHASONE SODIUM PHOSPHATE AND BETAMETHASONE ACETATE 3; 3 MG/ML; MG/ML
6 INJECTION, SUSPENSION INTRA-ARTICULAR; INTRALESIONAL; INTRAMUSCULAR; SOFT TISSUE
Status: COMPLETED | OUTPATIENT
Start: 2018-07-25 | End: 2018-07-25

## 2018-07-25 RX ORDER — AMOXICILLIN AND CLAVULANATE POTASSIUM 875; 125 MG/1; MG/1
1 TABLET, FILM COATED ORAL 2 TIMES DAILY
Qty: 20 TABLET | Refills: 0 | Status: SHIPPED | OUTPATIENT
Start: 2018-07-25 | End: 2018-08-07

## 2018-07-25 RX ORDER — PROMETHAZINE HYDROCHLORIDE AND DEXTROMETHORPHAN HYDROBROMIDE 6.25; 15 MG/5ML; MG/5ML
5 SYRUP ORAL 3 TIMES DAILY PRN
Qty: 118 ML | Refills: 0 | Status: SHIPPED | OUTPATIENT
Start: 2018-07-25 | End: 2018-08-07

## 2018-07-25 RX ADMIN — BETAMETHASONE SODIUM PHOSPHATE AND BETAMETHASONE ACETATE 6 MG: 3; 3 INJECTION, SUSPENSION INTRA-ARTICULAR; INTRALESIONAL; INTRAMUSCULAR; SOFT TISSUE at 11:07

## 2018-07-25 NOTE — PATIENT INSTRUCTIONS
Bronchitis with Wheezing (Viral or Bacterial: Adult)    Bronchitis is an infection of the air passages. It often occurs during a cold and is usually caused by a virus. Symptoms include cough with mucus (phlegm) and low-grade fever. This illness is contagious during the first few days and is spread through the air by coughing and sneezing, or by direct contact (touching the sick person and then touching your own eyes, nose, or mouth).  If there is a lot of inflammation, air flow is restricted. The air passages may also go into spasm, especially if you have asthma. This causes wheezing and difficulty breathing even in people who do not have asthma.  Bronchitis usually lasts 7 to 14 days. The wheezing should improve with treatment during the first week. An inhaler is often prescribed to relax the air passages and stop wheezing. Antibiotics will be prescribed if your doctor thinks there is also a secondary bacterial infection.  Home care  · If symptoms are severe, rest at home for the first 2 to 3 days. When you go back to your usual activities, don't let yourself get too tired.  · Do not smoke. Also avoid being exposed to secondhand smoke.  · You may use over-the-counter medicine to control fever or pain, unless another medicine was prescribed. Note: If you have chronic liver or kidney disease or have ever had a stomach ulcer or gastrointestinal bleeding, talk with your healthcare provider before using these medicines. Also talk to your provider if you are taking medicine to prevent blood clots.) Aspirin should never be given to anyone younger than 18 years of age who is ill with a viral infection or fever. It may cause severe liver or brain damage.  · Your appetite may be poor, so a light diet is fine. Avoid dehydration by drinking 6 to 8 glasses of fluids per day (such as water, soft drinks, sports drinks, juices, tea, or soup). Extra fluids will help loosen secretions in the nose and lungs.  · Over-the-counter  cough, cold, and sore-throat medicines will not shorten the length of the illness, but they may be helpful to reduce symptoms. (Note: Do not use decongestants if you have high blood pressure.)  · If you were given an inhaler, use it exactly as directed. If you need to use it more often than prescribed, your condition may be worsening. If this happens, contact your healthcare provider.  · If prescribed, finish all antibiotic medicine, even if you are feeling better after only a few days.  Follow-up care  Follow up with your healthcare provider, or as advised. If you had an X-ray or ECG (electrocardiogram), a specialist will review it. You will be notified of any new findings that may affect your care.  Note: If you are age 65 or older, or if you have a chronic lung disease or condition that affects your immune system, or you smoke, talk to your healthcare provider about having a pneumococcal vaccinations and a yearly influenza vaccination (flu shot).  When to seek medical advice  Call your healthcare provider right away if any of these occur:  · Fever of 100.4°F (38°C) or higher  · Coughing up increasing amounts of colored sputum  · Weakness, drowsiness, headache, facial pain, ear pain, or a stiff neck  Call 911, or get immediate medical care  Contact emergency services right away if any of these occur.  · Coughing up blood  · Worsening weakness, drowsiness, headache, or stiff neck  · Increased wheezing not helped with medication, shortness of breath, or pain with breathing  Date Last Reviewed: 9/13/2015  © 2395-2625 [employer]. [address] [place] [address], [place] [address]. All rights reserved. This information is not intended as a substitute for professional medical care. Always follow your healthcare professional's instructions.    Jonathan was seen today for cough.    Diagnoses and all orders for this visit:    Upper respiratory tract infection, unspecified type    Bronchitis  -     betamethasone  acetate-betamethasone sodium phosphate injection 6 mg; Inject 1 mL (6 mg total) into the muscle one time.  -     amoxicillin-clavulanate 875-125mg (AUGMENTIN) 875-125 mg per tablet; Take 1 tablet by mouth 2 (two) times daily.  -     albuterol 90 mcg/actuation inhaler; Inhale 2 puffs into the lungs every 6 (six) hours as needed for Wheezing or Shortness of Breath. Rescue  -     promethazine-dextromethorphan (PROMETHAZINE-DM) 6.25-15 mg/5 mL Syrp; Take 5 mLs by mouth 3 (three) times daily as needed. May cause drowsiness, use especially bedtime            Follow Up Comments   Make sure that you follow up with your primary care doctor in the next 2-5 days if needed .  Return to the Urgent Care if signs or symptoms change and certainly if you have worsening symptoms go to the nearest emergency department for further evaluation.     Nataliia Hayward MD

## 2018-07-25 NOTE — PROGRESS NOTES
"Subjective:       Patient ID: Jonathan Villela is a 58 y.o. male.    Vitals:  height is 6' 1" (1.854 m) and weight is 111.1 kg (245 lb). His oral temperature is 98.2 °F (36.8 °C). His blood pressure is 121/81 and his pulse is 68. His respiration is 18 and oxygen saturation is 94% (abnormal).     Chief Complaint: Cough    Patient denies smoking or second hand smoke\      Cough   This is a new problem. The current episode started in the past 7 days (7/21/2018). The problem has been gradually worsening. The problem occurs hourly. The cough is productive of bloody sputum and productive of purulent sputum. Associated symptoms include chest pain, myalgias, rhinorrhea, a sore throat, shortness of breath, sweats and wheezing. Pertinent negatives include no chills, ear pain, eye redness, fever or headaches. Nothing aggravates the symptoms. He has tried OTC cough suppressant for the symptoms. The treatment provided no relief.     Review of Systems   Constitution: Positive for night sweats. Negative for chills, fever and malaise/fatigue.   HENT: Positive for hoarse voice, rhinorrhea and sore throat. Negative for congestion and ear pain.    Eyes: Negative for discharge and redness.   Cardiovascular: Positive for chest pain. Negative for dyspnea on exertion and leg swelling.   Respiratory: Positive for cough, shortness of breath, sputum production and wheezing.    Musculoskeletal: Positive for myalgias.   Gastrointestinal: Negative for abdominal pain and nausea.   Neurological: Negative for headaches.       Objective:      Physical Exam   Constitutional: He is oriented to person, place, and time. He appears well-developed and well-nourished. He is cooperative.  Non-toxic appearance. He does not appear ill. No distress.   HENT:   Head: Normocephalic and atraumatic.   Right Ear: Hearing, tympanic membrane, external ear and ear canal normal.   Left Ear: Hearing, tympanic membrane, external ear and ear canal normal.   Nose: Nose " normal. No mucosal edema, rhinorrhea or nasal deformity. No epistaxis. Right sinus exhibits no maxillary sinus tenderness and no frontal sinus tenderness. Left sinus exhibits no maxillary sinus tenderness and no frontal sinus tenderness.   Mouth/Throat: Uvula is midline and mucous membranes are normal. No trismus in the jaw. Normal dentition. No uvula swelling. Posterior oropharyngeal edema and posterior oropharyngeal erythema present.   Eyes: Conjunctivae and lids are normal. No scleral icterus.   Sclera clear bilat   Neck: Trachea normal, full passive range of motion without pain and phonation normal. Neck supple.   Cardiovascular: Normal rate, regular rhythm, normal heart sounds, intact distal pulses and normal pulses.    Pulmonary/Chest: Effort normal and breath sounds normal. No respiratory distress. He has no decreased breath sounds. He has no wheezes. He has no rhonchi. He has no rales.   Patient has a deep wet harsh cough.  When he coughs you can hear end expiratory wheezing very mild in bases and rhonchi in bases.    Abdominal: Soft. Normal appearance and bowel sounds are normal. He exhibits no distension. There is no tenderness.   Musculoskeletal: Normal range of motion. He exhibits no edema or deformity.   Neurological: He is alert and oriented to person, place, and time. He exhibits normal muscle tone. Coordination normal.   Skin: Skin is warm, dry and intact. He is not diaphoretic. No pallor.   Psychiatric: He has a normal mood and affect. His speech is normal and behavior is normal. Judgment and thought content normal. Cognition and memory are normal.   Nursing note and vitals reviewed.      Assessment:       1. Upper respiratory tract infection, unspecified type    2. Bronchitis        Plan:         Upper respiratory tract infection, unspecified type    Bronchitis  -     betamethasone acetate-betamethasone sodium phosphate injection 6 mg; Inject 1 mL (6 mg total) into the muscle one time.  -      amoxicillin-clavulanate 875-125mg (AUGMENTIN) 875-125 mg per tablet; Take 1 tablet by mouth 2 (two) times daily.  Dispense: 20 tablet; Refill: 0  -     albuterol 90 mcg/actuation inhaler; Inhale 2 puffs into the lungs every 6 (six) hours as needed for Wheezing or Shortness of Breath. Rescue  Dispense: 1 Inhaler; Refill: 0  -     promethazine-dextromethorphan (PROMETHAZINE-DM) 6.25-15 mg/5 mL Syrp; Take 5 mLs by mouth 3 (three) times daily as needed. May cause drowsiness, use especially bedtime  Dispense: 118 mL; Refill: 0      Follow Up Comments   Make sure that you follow up with your primary care doctor in the next 2-5 days if needed .  Return to the Urgent Care if signs or symptoms change and certainly if you have worsening symptoms go to the nearest emergency department for further evaluation.

## 2018-07-30 ENCOUNTER — PATIENT MESSAGE (OUTPATIENT)
Dept: GASTROENTEROLOGY | Facility: CLINIC | Age: 58
End: 2018-07-30

## 2018-07-30 ENCOUNTER — PATIENT MESSAGE (OUTPATIENT)
Dept: CARDIOLOGY | Facility: CLINIC | Age: 58
End: 2018-07-30

## 2018-07-30 DIAGNOSIS — K22.10 ESOPHAGITIS, EROSIVE: Primary | ICD-10-CM

## 2018-07-30 RX ORDER — IRBESARTAN 300 MG/1
300 TABLET ORAL NIGHTLY
Qty: 90 TABLET | Refills: 3 | Status: SHIPPED | OUTPATIENT
Start: 2018-07-30 | End: 2018-08-14 | Stop reason: SINTOL

## 2018-07-30 RX ORDER — PANTOPRAZOLE SODIUM 40 MG/1
40 TABLET, DELAYED RELEASE ORAL
Qty: 90 TABLET | Refills: 3 | Status: SHIPPED | OUTPATIENT
Start: 2018-07-30 | End: 2019-09-08 | Stop reason: SDUPTHER

## 2018-07-30 NOTE — TELEPHONE ENCOUNTER
Switch valsartan to irbesartan 300 mg once a day. Sent prescription for 90 days to his local pharmacy

## 2018-07-30 NOTE — TELEPHONE ENCOUNTER
Pt notified, states he took this medication in the past. Pt states he will begin medication as ordered and will monitor his BP. Advised pt contact our office if BP is elevated. Pt verbalized understanding.

## 2018-08-01 ENCOUNTER — PATIENT MESSAGE (OUTPATIENT)
Dept: FAMILY MEDICINE | Facility: CLINIC | Age: 58
End: 2018-08-01

## 2018-08-04 ENCOUNTER — PATIENT MESSAGE (OUTPATIENT)
Dept: CARDIOLOGY | Facility: CLINIC | Age: 58
End: 2018-08-04

## 2018-08-07 ENCOUNTER — OFFICE VISIT (OUTPATIENT)
Dept: FAMILY MEDICINE | Facility: CLINIC | Age: 58
End: 2018-08-07
Payer: COMMERCIAL

## 2018-08-07 VITALS
BODY MASS INDEX: 32.43 KG/M2 | HEART RATE: 70 BPM | DIASTOLIC BLOOD PRESSURE: 82 MMHG | WEIGHT: 245.81 LBS | SYSTOLIC BLOOD PRESSURE: 120 MMHG

## 2018-08-07 DIAGNOSIS — I10 ESSENTIAL HYPERTENSION: ICD-10-CM

## 2018-08-07 DIAGNOSIS — R05.9 COUGH: Primary | ICD-10-CM

## 2018-08-07 DIAGNOSIS — I70.0 ABDOMINAL AORTIC ATHEROSCLEROSIS: ICD-10-CM

## 2018-08-07 PROCEDURE — 99999 PR PBB SHADOW E&M-EST. PATIENT-LVL II: CPT | Mod: PBBFAC,,, | Performed by: FAMILY MEDICINE

## 2018-08-07 PROCEDURE — 99214 OFFICE O/P EST MOD 30 MIN: CPT | Mod: S$GLB,,, | Performed by: FAMILY MEDICINE

## 2018-08-07 RX ORDER — CARVEDILOL 12.5 MG/1
TABLET ORAL
COMMUNITY
End: 2018-08-07

## 2018-08-07 RX ORDER — DIVALPROEX SODIUM 500 MG/1
TABLET, DELAYED RELEASE ORAL
COMMUNITY
End: 2018-08-07

## 2018-08-07 RX ORDER — DICYCLOMINE HYDROCHLORIDE 10 MG/1
CAPSULE ORAL
COMMUNITY
End: 2018-08-24 | Stop reason: SDUPTHER

## 2018-08-07 RX ORDER — HYDROCODONE BITARTRATE AND ACETAMINOPHEN 7.5; 325 MG/1; MG/1
TABLET ORAL
COMMUNITY
End: 2018-08-07

## 2018-08-07 RX ORDER — ONDANSETRON 8 MG/1
TABLET, ORALLY DISINTEGRATING ORAL
COMMUNITY
End: 2018-10-02 | Stop reason: SDUPTHER

## 2018-08-07 RX ORDER — FROVATRIPTAN SUCCINATE 2.5 MG/1
TABLET, FILM COATED ORAL
COMMUNITY
End: 2020-01-30

## 2018-08-07 RX ORDER — RIZATRIPTAN BENZOATE 10 MG/1
TABLET ORAL
COMMUNITY
End: 2018-10-10

## 2018-08-07 RX ORDER — ELETRIPTAN HYDROBROMIDE 40 MG/1
TABLET, FILM COATED ORAL
COMMUNITY
End: 2020-01-30

## 2018-08-07 RX ORDER — GABAPENTIN 300 MG/1
CAPSULE ORAL
COMMUNITY
End: 2018-08-07

## 2018-08-07 RX ORDER — DOXYCYCLINE 100 MG/1
CAPSULE ORAL
COMMUNITY
End: 2018-08-24

## 2018-08-07 RX ORDER — IRBESARTAN AND HYDROCHLOROTHIAZIDE 300; 12.5 MG/1; MG/1
TABLET, FILM COATED ORAL
COMMUNITY
End: 2018-08-07

## 2018-08-07 RX ORDER — SUMATRIPTAN SUCCINATE 100 MG/1
TABLET ORAL
COMMUNITY
End: 2018-08-07

## 2018-08-07 RX ORDER — PROMETHAZINE HYDROCHLORIDE 25 MG/1
TABLET ORAL
COMMUNITY
End: 2018-08-07

## 2018-08-07 RX ORDER — BETAMETHASONE VALERATE 1.2 MG/G
CREAM TOPICAL
COMMUNITY
End: 2020-08-03

## 2018-08-07 RX ORDER — BUTALBITAL, ACETAMINOPHEN AND CAFFEINE 50; 325; 40 MG/1; MG/1; MG/1
TABLET ORAL
COMMUNITY
End: 2020-02-20

## 2018-08-07 RX ORDER — PREDNISONE 20 MG/1
TABLET ORAL
COMMUNITY
End: 2018-08-07

## 2018-08-07 RX ORDER — VERAPAMIL HYDROCHLORIDE 80 MG/1
TABLET ORAL
COMMUNITY
End: 2018-08-07

## 2018-08-07 RX ORDER — TOPIRAMATE 25 MG/1
TABLET ORAL
COMMUNITY
End: 2018-08-07

## 2018-08-07 RX ORDER — PANTOPRAZOLE SODIUM 40 MG/1
TABLET, DELAYED RELEASE ORAL
COMMUNITY
End: 2018-08-07

## 2018-08-07 RX ORDER — VERAPAMIL HYDROCHLORIDE 120 MG/1
CAPSULE, EXTENDED RELEASE ORAL
COMMUNITY
End: 2018-08-07

## 2018-08-07 RX ORDER — ACYCLOVIR 400 MG/1
TABLET ORAL
COMMUNITY
End: 2018-08-24 | Stop reason: SDUPTHER

## 2018-08-07 RX ORDER — DIVALPROEX SODIUM 125 MG/1
TABLET, DELAYED RELEASE ORAL
COMMUNITY
End: 2018-08-07

## 2018-08-07 RX ORDER — CEFUROXIME AXETIL 250 MG/1
TABLET ORAL
COMMUNITY
End: 2018-08-07

## 2018-08-07 RX ORDER — MOMETASONE FUROATE 1 MG/ML
SOLUTION TOPICAL
COMMUNITY
End: 2019-06-13

## 2018-08-07 RX ORDER — CARVEDILOL 12.5 MG/1
12.5 TABLET ORAL 2 TIMES DAILY WITH MEALS
Qty: 180 TABLET | Refills: 3 | Status: SHIPPED | OUTPATIENT
Start: 2018-08-07 | End: 2018-11-04

## 2018-08-07 RX ORDER — SULFAMETHOXAZOLE AND TRIMETHOPRIM 800; 160 MG/1; MG/1
TABLET ORAL
COMMUNITY
End: 2018-08-07

## 2018-08-07 RX ORDER — HYDROCODONE BITARTRATE AND ACETAMINOPHEN 5; 325 MG/1; MG/1
TABLET ORAL
COMMUNITY
End: 2018-08-07

## 2018-08-07 RX ORDER — FLUTICASONE PROPIONATE 50 MCG
SPRAY, SUSPENSION (ML) NASAL
COMMUNITY
End: 2020-08-03

## 2018-08-07 RX ORDER — PENICILLIN V POTASSIUM 500 MG/1
TABLET, FILM COATED ORAL
COMMUNITY
End: 2018-08-24 | Stop reason: ALTCHOICE

## 2018-08-07 NOTE — PROGRESS NOTES
(Portions of this note were dictated using voice recognition software and may contain dictation related errors in spelling/grammar/syntax not found on text review)    CC:   Chief Complaint   Patient presents with    Cough    Hypertension       HPI: 58 y.o. male presents for a follow-up from urgent care facility a couple of weeks ago.  Was having significant productive cough and wheezing.  Was diagnosed with URI with bronchitis.  Was given steroid shot, Phenergan DM cough syrup, Augmentin, albuterol.  Could take Augmentin for more than a couple of days because of significant abdominal discomfort.  Once he stopped the antibiotic, his abdominal pain got better.  He still states he still has a cough but it is getting better.  No shortness of breath.  No fevers, chills, sweats.  No significant nasal congestion. Does get some periodic postnasal drip symptoms.  Was also advised on Flonase which he uses periodically.  History of childhood asthma but nothing recently.  Chest x-ray was not done during that above timeframe although he does have a chest x-ray on file from January 2018 which is normal.  He is a nonsmoker    Also he is followed by Cardiology regularly for hypertension.  Was on valsartan  but given recall issues, was switched off this and added irbesartan instead (was on irbesartan hydrochlorothiazide in the past).  He has been out of his valsartan for the last several days, has not yet filled the irbesartan yet.  However, his blood pressure was in good range today at 120/82 in the office.  He is furthermore on amlodipine 2.5 mg daily, Bystolic 10 mg daily, chlorthalidone half of a 25 mg pill daily.  However, home blood pressure readings over the last few days do register several readings in the 140-150 range systolic over 90 range diastolic.  He actually feels much better being off the valsartan.  He wonders if he needs to get on the irbesartan  to begin with.      Furthermore, he was having some MSK pains in  his neck and had seen a chiropractor who was helping with this.  Had mentioned that he was having some abdominal discomfort to at the time of the visit and x-rays were ordered that showed some aortic atherosclerosis.  His chiropractor mentioned that he should present for concern about getting an abdominal ultrasound to assess for aneurysm.  Chart reviewed however and he did have similar findings in 2017, and an abdominal ultrasound did show no evidence of aneurysm.  Is currently on statin therapy.  Discussed the importance of proper blood pressure control alongside statin therapy to help with atherosclerosis stabilization.  He does have a history of peptic ulcer disease with hemorrhage in such I would be cautious about any use of aspirin.  He does take Protonix on occasional basis for GERD    Furthermore he does have hypogonadism, followed by outside Endocrinology.  He is on testosterone therapy.  Have recommended that he get routine monitoring for this especially given the fact of at least supratherapeutic testosterone levels on blood pressure control    Past Medical History:   Diagnosis Date    Acute gastric ulcer with hemorrhage 2/15/2017    Bilateral occipital neuralgia     BPH with urinary obstruction 12/31/2015    Colitis [id number]    infectious?    Diverticulitis     Essential hypertension     Gastroesophageal reflux disease without esophagitis 2/11/2017    Migraine without aura and without status migrainosus, not intractable 1/31/2014    Obstructive sleep apnea syndrome 2/9/2015    PUD (peptic ulcer disease)        Past Surgical History:   Procedure Laterality Date    CHOLECYSTECTOMY      COLONOSCOPY N/A 2/17/2017    Procedure: COLONOSCOPY;  Surgeon: Yoshi Erazo MD;  Location: [address]);  Service: Endoscopy;  Laterality: N/A;    ESOPHAGOGASTRODUODENOSCOPY      LEG SURGERY      NASAL SEPTUM SURGERY         Family History   Problem Relation Age of Onset    Crohn's disease  Unknown         brother, sister, father, nephew    Heart disease Father     Crohn's disease Father     Crohn's disease Sister     Crohn's disease Brother     Prostate cancer Neg Hx     Kidney disease Neg Hx     Melanoma Neg Hx        Social History     Social History    Marital status:      Spouse name: N/A    Number of children: N/A    Years of education: N/A     Occupational History    Not on file.     Social History Main Topics    Smoking status: Never Smoker    Smokeless tobacco: Never Used    Alcohol use Yes      Comment: ocasionally    Drug use: No    Sexual activity: Yes     Partners: Female     Other Topics Concern    Not on file     Social History Narrative    No narrative on file     Lab Results   Component Value Date    WBC 8.88 03/19/2018    HGB 16.8 03/19/2018    HCT 50.2 03/19/2018     03/19/2018    CHOL 158 04/17/2018    TRIG 226 (H) 04/17/2018    HDL 31 (L) 04/17/2018    ALT 50 (H) 04/17/2018    AST 38 04/17/2018     04/17/2018    K 4.4 04/17/2018    CL 99 04/17/2018    CREATININE 1.3 04/17/2018    CALCIUM 10.2 04/17/2018    BUN 24 (H) 04/17/2018    CO2 22 (L) 04/17/2018    TSH 2.398 04/17/2018    PSA 1.2 06/13/2017    INR 1.1 02/14/2017    HGBA1C 5.4 08/29/2014    LDLCALC 81.8 04/17/2018    GLU 81 04/17/2018           ROS:  GENERAL: No fever, chills, fatigability or weight loss.  SKIN: No rashes, no itching.  HEAD: No headaches.  EYES: No visual changes  EARS: No ear pain or changes in hearing.  NOSE: No congestion or rhinorrhea.  MOUTH & THROAT: No hoarseness, change in voice, or sore throat.  NODES: Denies swollen glands.  CHEST:  Improving cough  CARDIOVASCULAR: Denies chest pain, PND, orthopnea.  ABDOMEN:  Improved abdominal pain  URINARY: No flank pain, dysuria or hematuria.  PERIPHERAL VASCULAR: No claudication or cyanosis.  MUSCULOSKELETAL:  Above, no acute issues.  NEUROLOGIC: No weakness or numbness.    Vital signs reviewed  PE:   APPEARANCE: Well  nourished, well developed, in no acute distress.    HEAD: Normocephalic, atraumatic.  EYES: PERRL. EOMI.   Conjunctivae noninjected.  EARS: TM's intact. Light reflex normal. No retraction or perforation  NOSE: Mucosa pink. Airway clear.  MOUTH & THROAT: No tonsillar enlargement. No pharyngeal erythema or exudate.   NECK: Supple with no cervical lymphadenopathy.    CHEST: Good inspiratory effort. Lungs overall clear, very mild bibasilar crackles noted, possibly atelectasis?  CARDIOVASCULAR: Normal S1, S2. No rubs, murmurs, or gallops.  ABDOMEN: Bowel sounds normal. Not distended. Soft. No tenderness or masses. No organomegaly.  EXTREMITIES: No edema, cyanosis, or clubbing.      IMPRESSION  1. Cough    2. Essential hypertension    3. Abdominal aortic atherosclerosis            PLAN  Review medical chart documentation as noted above.  Cough is improving.  Mildly abnormal lung exam with bibasilar mild crackles noted but given clinical improvement, can hold off on further imaging at this time.  Likely diagnosis of viral bronchitis.  Continue supportive care.  In case of allergic mediated symptoms, can continue his Flonase regularly for the next couple of weeks.  He uses albuterol just p.r.n. for wheezing.  If any worsening symptoms, would recommend chest imaging    Hypertension:  Stable today but has elevated readings at home sometimes.  Would recommend getting started on the irbesartan.  Patient states that his Bystolic is apparently not covered by his insurance any longer.  He states he was offered some alternatives from his cardiologist office such as carvedilol.  He will get back with his cardiologist to see what else he needs prescribed was the Bystolic runs out    Abdominal aortic atherosclerosis:  Looked at his last imaging from last year.  Reassured that he had an abdominal aortic ultrasound at that time that showed no aneurysm.  Discussed as above control of risk factors for atherosclerosis.  Not currently on  aspirin.  His cardiologist had recommended perhaps starting low-dose aspirin with stabilization of blood pressure.  Would have to be used with caution especially given his history of peptic ulcer disease with acute hemorrhage and anemia.

## 2018-08-08 ENCOUNTER — PATIENT MESSAGE (OUTPATIENT)
Dept: GASTROENTEROLOGY | Facility: CLINIC | Age: 58
End: 2018-08-08

## 2018-08-09 ENCOUNTER — PATIENT MESSAGE (OUTPATIENT)
Dept: GASTROENTEROLOGY | Facility: CLINIC | Age: 58
End: 2018-08-09

## 2018-08-12 ENCOUNTER — PATIENT MESSAGE (OUTPATIENT)
Dept: DERMATOLOGY | Facility: CLINIC | Age: 58
End: 2018-08-12

## 2018-08-12 ENCOUNTER — PATIENT MESSAGE (OUTPATIENT)
Dept: CARDIOLOGY | Facility: CLINIC | Age: 58
End: 2018-08-12

## 2018-08-13 ENCOUNTER — PATIENT MESSAGE (OUTPATIENT)
Dept: CARDIOLOGY | Facility: CLINIC | Age: 58
End: 2018-08-13

## 2018-08-14 RX ORDER — ALISKIREN 150 MG/1
150 TABLET, FILM COATED ORAL DAILY
Qty: 30 TABLET | Refills: 6 | Status: SHIPPED | OUTPATIENT
Start: 2018-08-14 | End: 2018-10-05

## 2018-08-14 NOTE — TELEPHONE ENCOUNTER
Please cancel appointment for 4 pm this Thursday and reschedule for next Friday at 1:30 pm.  Spoke with patient and recommended that he increase amlodipine from 2.5 to 5 mg and to discontinue irbesartan.  Will try Tekturna 150 mg q.h.s.

## 2018-08-24 ENCOUNTER — LAB VISIT (OUTPATIENT)
Dept: LAB | Facility: HOSPITAL | Age: 58
End: 2018-08-24
Attending: INTERNAL MEDICINE
Payer: COMMERCIAL

## 2018-08-24 ENCOUNTER — OFFICE VISIT (OUTPATIENT)
Dept: CARDIOLOGY | Facility: CLINIC | Age: 58
End: 2018-08-24
Payer: COMMERCIAL

## 2018-08-24 VITALS
HEIGHT: 73 IN | BODY MASS INDEX: 32.94 KG/M2 | DIASTOLIC BLOOD PRESSURE: 90 MMHG | HEART RATE: 80 BPM | WEIGHT: 248.56 LBS | SYSTOLIC BLOOD PRESSURE: 134 MMHG

## 2018-08-24 DIAGNOSIS — I10 ESSENTIAL HYPERTENSION: Primary | ICD-10-CM

## 2018-08-24 DIAGNOSIS — Z91.89 AT RISK FOR CORONARY ARTERY DISEASE: ICD-10-CM

## 2018-08-24 DIAGNOSIS — I70.0 ABDOMINAL AORTIC ATHEROSCLEROSIS: ICD-10-CM

## 2018-08-24 DIAGNOSIS — E78.2 MIXED HYPERLIPIDEMIA: ICD-10-CM

## 2018-08-24 DIAGNOSIS — I10 ESSENTIAL HYPERTENSION: ICD-10-CM

## 2018-08-24 LAB
ANION GAP SERPL CALC-SCNC: 12 MMOL/L
BUN SERPL-MCNC: 18 MG/DL
CALCIUM SERPL-MCNC: 9.9 MG/DL
CHLORIDE SERPL-SCNC: 99 MMOL/L
CO2 SERPL-SCNC: 27 MMOL/L
CREAT SERPL-MCNC: 1.2 MG/DL
EST. GFR  (AFRICAN AMERICAN): >60 ML/MIN/1.73 M^2
EST. GFR  (NON AFRICAN AMERICAN): >60 ML/MIN/1.73 M^2
GLUCOSE SERPL-MCNC: 108 MG/DL
POTASSIUM SERPL-SCNC: 3.7 MMOL/L
SODIUM SERPL-SCNC: 138 MMOL/L

## 2018-08-24 PROCEDURE — 80048 BASIC METABOLIC PNL TOTAL CA: CPT

## 2018-08-24 PROCEDURE — 99214 OFFICE O/P EST MOD 30 MIN: CPT | Mod: S$GLB,,, | Performed by: INTERNAL MEDICINE

## 2018-08-24 PROCEDURE — 84244 ASSAY OF RENIN: CPT

## 2018-08-24 PROCEDURE — 83835 ASSAY OF METANEPHRINES: CPT

## 2018-08-24 PROCEDURE — 99999 PR PBB SHADOW E&M-EST. PATIENT-LVL IV: CPT | Mod: PBBFAC,,, | Performed by: INTERNAL MEDICINE

## 2018-08-24 NOTE — PATIENT INSTRUCTIONS
Drink plenty of fluids before and after the CT scan.  Increase carvedilol to twice a day.  After 7-10 days, try 1/2 a tab of Tekturna at night.  If your blood pressure gets too low I may back off the amlodipine.  Avoid salt.  This is very important.  Take an extra chlorthalidone (diuretic) if you eat food continue salt.  Use clonidine as needed for SBP >180 or DBP >120 mm Hg.   Untreated sleep apnea will affect your blood pressure and eventually your heart. Pl reconsider visiting w a sleep specialist      ============    LOW-SALT DIET (2 grams/day)   This diet eliminates foods that are high in salt and restricts the amount of salt that you cook with. It is most often used for patients with high blood pressure, edema (fluid retention), kidney, liver, and heart disease.   Table salt contains the mineral sodium. The body needs a little bit of sodium to work normally. But too much sodium can make your health problems worse. Your total daily allowance of salt (sodium) is 2 grams. This equals 2000 milligrams (mg). This is about a teaspoon of salt. This means you can have only about 500 mg of sodium at each meal. Most individuals get excessive sodium from snacks. Look carefully for sodium levels at or around 250 mg per serving and do not eat more than 1 serving. Really low-sodium snacks contain less than 100 mg per serving.      When you cook, limit the salt you use. And if you can avoid using salt, even better. Do not add salt at the table. So, throw away the saltshaker!   When shopping, read the package labels. Salt is often called sodium on the label. Choose foods that are Salt-Free, Low Salt, or Very Low Salt. Note that foods with Reduced Salt may not lower your salt intake enough.     BEVERAGES OK: Tea, coffee, carbonated beverages, juices   AVOID: Flavored international coffees, electrolyte replacement drinks, sports beverages     BREAD & CEREALS OK: All regular bread, rolls, cereals, cakes; low-salt crackers,  matzoh crackers   AVOID: Salted crackers, pretzels, popcorn; Romansh toast, pancakes, muffins     FRUITS & DESSERTS OK: Ice cream, frozen yogurt, juice bars, gelatin (Jell-O), cookies and pies, sugar, honey, jelly, hard candy   AVOID: Most pies, cakes and cookies prepared or processed with salt, instant pudding     MEATS OK: All fresh meat, fish, poultry, low-salt tuna   AVOID: Smoked, pickled, brine-cured, or salted meats or fish. This includes polk, chipped beef, corned beef, hot dogs, luncheon meats, ham, kosher meats, salt pork, sausage, canned tuna, salted codfish, smoked salmon, herring, sardines, or anchovies.     DAIRY OK: Milk, chocolate milk, hot chocolate mix; eggs, Low Salt cheeses, yogurt, egg substitute   AVOID: Processed cheese, cheese spreads, Roquefort, Camembert, and cottage cheese, buttermilk, instant breakfast drink     BEANS, POTATOES & PASTA OK: Dry beans, split peas, lentils, potatoes, rice, macaroni, noodles, spaghetti without added salt   AVOID: Potato chips, tortilla chips, and similar products     SOUPS OK: Low-salt soups and broths made with allowed foods   AVOID: Bouillon cubes, soups with smoked or salted meats, regular soup and broth     VEGETABLES OK: Most are okay; low-salt tomato and vegetable juices   AVOID: Sauerkraut and other brine-soaked vegetables, pickles and other pickled vegetables, tomato juice, olives       SEASONING & SPICES: OK: Most seasonings are okay. Good substitutes for salt include: fresh herb blends, Tabasco, lemon, garlic, fair, vinegar, dry mustard, parsley, cilantro, horseradish, tomato paste, regular margarine, mayonnaise, butter, cream cheese, vegetable oil, cream, low-salt salad dressing and gravy   AVOID: Regular ketchup, relishes, pickles, soy sauce, teriyaki sauce, Worcestershire sauce, BBQ sauce, tartar sauce, meat tenderizer, chili sauce, regular gravy, regular salad dressing   © 0686-8947 [employer], [address] [place] [address], [place] [address].  All rights reserved. This information is not intended as a substitute for professional medical care. Always follow your healthcare professional's instructions.     =====================    Triglycerides:   Foods containing both fat and sugar (cakes, pastries, desserts, ice cream, donuts) and alcohol are the 2 most common groups of foods that raise triglycerides followed by sodas and starches (rice, potatoes, pasta and bread). If you eliminate the first two from your diet for about a week before your blood test and the triglycerides are still elevated you should work on the other food groups.

## 2018-08-24 NOTE — PROGRESS NOTES
"Subjective:   Patient ID:  Jonathan Villela is a 58 y.o. male who presents for follow-up of Hypertension      Problem List:  HTN  DELVIN unable to tolerate CPAP    HPI:   This is Mr. Jonathan Villela's second visit with me. See note dated 10/11/17. He is accompanied by his ficatalina Moore. He has hypertension that has been difficult to control because of intolerance to several meds. Most recently he reported several rashes over his body while on valsartan. When valsartan was discontinued due to the recall he reported that the rashes disappeared overnight. He reports that the rash reappeared the day after he took irbesartan for the first time. He also c/o having to constantly clear his throat while on valsartan.  I recommended that he discontinue irbesartan and increase amlodipine from 2.5 to 5 mg which he has done. I also recommended that he start aliskiren aka Tekturna which he has not done. Earlier his insurance company stopped paying for bisoprolol and he had to switch to carvedilol. However he is taking carvedilol once a day instead of bid. Also takin chlorthalidone.    Recent BPs   8/5   135/87-62       142/87-63     154/86-55   8/6   159/94-81       134/73-73       8/7   152/88-55        154/90-63    145/71-69     137/68-59       147/80-63        149/83-62   8/8    154/91-59        144/87-62    133/80-63    140/77-57       138/83-60        137/84-54   8/9    165/99-57        162/106-67    180/106-65    158/92-64     135/78-67     142/85-64   8/10    153/95-59     131/76-94      161/94-64      158/88-70      114/69-75    146/83-85   150/82-64  ( NOTE Took 300mg Irbesartan PM)   8/11    138/83-55                                                                                                              139/74-77                              "                                 "   8/12     174/90-75         Review of Systems   Constitution: Positive for malaise/fatigue and weight gain. Negative for weakness and " "weight loss.   HENT: Negative for hearing loss and nosebleeds.    Eyes: Negative for visual disturbance.   Cardiovascular: Positive for dyspnea on exertion. Negative for chest pain, claudication, irregular heartbeat, leg swelling, orthopnea, palpitations, paroxysmal nocturnal dyspnea and syncope.   Respiratory: Positive for cough, sputum production and wheezing. Negative for hemoptysis.    Hematologic/Lymphatic: Does not bruise/bleed easily.   Musculoskeletal: Negative for arthritis, back pain, falls, joint pain, muscle cramps, muscle weakness and myalgias.   Gastrointestinal: Positive for change in bowel habit and heartburn. Negative for abdominal pain and melena.   Genitourinary: Positive for frequency. Negative for hematuria and nocturia.   Neurological: Positive for headaches. Negative for dizziness, light-headedness, loss of balance, numbness and paresthesias.   Psychiatric/Behavioral: Negative for depression. The patient is not nervous/anxious.        Current Outpatient Medications   Medication Sig    acetic acid-hydrocortisone (VOSOL-HC) otic solution Place 1-2 drops into both ears as needed.     acyclovir (ZOVIRAX) 400 MG tablet Take 400 mg by mouth 2 (two) times daily as needed.    albuterol 90 mcg/actuation inhaler Inhale 2 puffs into the lungs every 6 (six) hours as needed for Wheezing or Shortness of Breath. Rescue    amLODIPine (NORVASC) 2.5 MG tablet Take 1 tablet (2.5 mg total) by mouth once daily. (Patient taking differently: Take 5 mg by mouth once daily. )    BD INTEGRA SYRINGE 3 mL 22 gauge x 1 1/2" Syrg USE TO DRAW UP TESTOSTERONE    BD PRECISIONGLIDE 25 gauge x 1" Ndle USE TO INJECT TESTOSERTONE    betamethasone valerate 0.1% (VALISONE) 0.1 % Crea betamethasone valerate 0.1 % topical cream as needed    butalbital-acetaminophen-caffeine -40 mg (FIORICET, ESGIC) -40 mg per tablet as needed    carvedilol (COREG) 12.5 MG tablet Take 1 tablet (12.5 mg total) by mouth - taking " once a day    chlorthalidone (HYGROTEN) 25 MG Tab Take 1 tablet (25 mg total) by mouth once daily. (Patient taking differently: Take 12.5 mg by mouth once daily. )    ciclopirox (LOPROX) 0.77 % Crea Apply topically 2 (two) times daily. Pharmacist: mix 30 g of TAC 0.1% cream with 30 g of Loprox cream in 120 cc of milk of magnesia (Patient taking differently: Apply topically as needed. Pharmacist: mix 30 g of TAC 0.1% cream with 30 g of Loprox cream in 120 cc of milk of magnesia)    diclofenac sodium (VOLTAREN) 1 % Gel Apply 2 g topically 2 (two) times daily as needed. For pain    dicyclomine (BENTYL) 10 MG capsule Take 1 capsule (10 mg total) by mouth 3 (three) times daily as needed (abdominal cramping).    eletriptan (RELPAX) 40 MG tablet as needed    fluocinolone acetonide oil (DERMOTIC OIL) 0.01 % Drop Place 3 drops in ear(s) 2 (two) times daily. (Patient taking differently: Place 3 drops in ear(s) as needed. )    fluocinonide (LIDEX) 0.05 % external solution AAA scalp qday - bid prn pruritus    fluticasone (FLONASE) 50 mcg/actuation nasal spray 1 spay each nasal as needed    frovatriptan (FROVA) 2.5 MG tablet as needed    hydrocortisone 2.5 % cream Apply topically 2 (two) times daily. To affected areas of face (Patient taking differently: Apply topically as needed. To affected areas of face)    ketoconazole (NIZORAL) 2 % shampoo Wash hair with medicated shampoo at least 2x/week - let sit on scalp at least 5 minutes prior to rinsing    metronidazole 1% (METROGEL) 1 % Gel Apply at bedtime as directed as needed for skin infection    mometasone (ELOCON) 0.1 % lotion as needed    ondansetron (ZOFRAN-ODT) 8 MG TbDL as needed    pantoprazole (PROTONIX) 40 MG tablet Take 1 tablet (40 mg total) by mouth before breakfast. (Patient taking differently: Take 40 mg by mouth as needed. )    rizatriptan (MAXALT) 10 MG tablet 10 mg tablet as needed    sumatriptan (IMITREX STATDOSE) 6 mg/0.5 mL kit Inject into the  "skin every 2 (two) hours as needed.     sumatriptan (IMITREX) 100 MG tablet daily as needed.    testosterone cypionate (DEPOTESTOTERONE CYPIONATE) 200 mg/mL injection Inject into the muscle once a week.     triamcinolone acetonide 0.1% (KENALOG) 0.1 % cream AAA bid after cool blow dry to affected areas of groin (Patient taking differently: AAA bid after cool blow dry to affected areas of groin as needed)    urea (CARMOL) 40 % Crea Apply topically 2 (two) times daily. To affected areas of elbows (Patient taking differently: Apply topically as needed. To affected areas of elbows)    aliskiren (TEKTURNA) 150 MG Tab Take 1 tablet (150 mg total) by mouth once daily - not started yet    pravastatin (PRAVACHOL) 20 MG tablet TK 1 T PO QHS         Social History     Tobacco Use    Smoking status: Never Smoker    Smokeless tobacco: Never Used   Substance Use Topics    Alcohol use: Yes     Comment: ocasionally    Drug use: No         Objective:     Physical Exam   Constitutional: He is oriented to person, place, and time. He appears well-developed and well-nourished.   BP (!) 134/90   Pulse 80   Ht 6' 1" (1.854 m)   Wt 112.8 kg (248 lb 9.1 oz)   BMI 32.79 kg/m²      HENT:   Head: Normocephalic.   Neck: No JVD present. Carotid bruit is not present.   Cardiovascular: Normal rate, regular rhythm, S1 normal and S2 normal.   No murmur heard.  Pulses:       Radial pulses are 2+ on the right side, and 2+ on the left side.        Posterior tibial pulses are 2+ on the right side, and 2+ on the left side.   Pulmonary/Chest: Breath sounds normal. Chest wall is not dull to percussion.   Abdominal: Soft. He exhibits no mass. There is no splenomegaly or hepatomegaly.   Musculoskeletal:        Right lower leg: He exhibits no edema.        Left lower leg: He exhibits no edema.   Neurological: He is alert and oriented to person, place, and time. Gait normal.   Skin: No bruising noted. Nails show no clubbing.   Psychiatric: He has " a normal mood and affect. His speech is normal and behavior is normal.           Lab Results   Component Value Date    CHOL 158 04/17/2018    HDL 31 (L) 04/17/2018    LDLCALC 81.8 04/17/2018    TRIG 226 (H) 04/17/2018    CHOLHDL 19.6 (L) 04/17/2018     Lab Results   Component Value Date    GLU 81 04/17/2018    CREATININE 1.3 04/17/2018    BUN 24 (H) 04/17/2018     04/17/2018    K 4.4 04/17/2018    CL 99 04/17/2018    CO2 22 (L) 04/17/2018     Lab Results   Component Value Date    ALT 50 (H) 04/17/2018    AST 38 04/17/2018    ALKPHOS 61 04/17/2018    BILITOT 0.7 04/17/2018           Assessment and Plan:     Essential hypertension   He has longstanding hypertension.  Renal artery stenosis was ruled out by Dr. Hooper.  I doubt if he has secondary cause but it is reasonable to investigate further.  -     Basic metabolic panel; Future; Expected date: 02/22/2019  -     METANEPHRINES, PLASMA FREE; Future; Expected date: 08/24/2018  -     RENIN; Future; Expected date: 08/24/2018  Low-sodium diet.  Extra chlorthalidone if he consumes food containing sodium.  Increase carvedilol to b.i.d..  After 1 week he can add Tekturna 150 mg tablet q.h.s.    Abdominal aortic atherosclerosis   Noted on x-ray.  -     CT Abdomen Pelvis W Wo Contrast; Future; Expected date: 08/24/2018    Mixed hyperlipidemia   Nutritional counseling.    At risk for coronary artery disease  -     CT Calcium Scoring Cardiac; Future; Expected date: 08/24/2018        Follow-up in about 8 weeks (around 10/16/2018).

## 2018-08-29 LAB — RENIN PLAS-CCNC: 8.4 NG/ML/H

## 2018-08-31 ENCOUNTER — PATIENT MESSAGE (OUTPATIENT)
Dept: CARDIOLOGY | Facility: CLINIC | Age: 58
End: 2018-08-31

## 2018-09-01 ENCOUNTER — PATIENT MESSAGE (OUTPATIENT)
Dept: CARDIOLOGY | Facility: CLINIC | Age: 58
End: 2018-09-01

## 2018-09-02 ENCOUNTER — PATIENT MESSAGE (OUTPATIENT)
Dept: DERMATOLOGY | Facility: CLINIC | Age: 58
End: 2018-09-02

## 2018-09-02 ENCOUNTER — PATIENT MESSAGE (OUTPATIENT)
Dept: CARDIOLOGY | Facility: CLINIC | Age: 58
End: 2018-09-02

## 2018-09-02 LAB
METANEPH FREE SERPL-MCNC: 33 PG/ML
METANEPHS SERPL-MCNC: 171 PG/ML
NORMETANEPH FREE SERPL-MCNC: 138 PG/ML

## 2018-09-05 ENCOUNTER — HOSPITAL ENCOUNTER (OUTPATIENT)
Dept: RADIOLOGY | Facility: HOSPITAL | Age: 58
Discharge: HOME | End: 2018-09-05
Attending: INTERNAL MEDICINE
Payer: COMMERCIAL

## 2018-09-05 DIAGNOSIS — Z91.89 AT RISK FOR CORONARY ARTERY DISEASE: ICD-10-CM

## 2018-09-05 DIAGNOSIS — I70.0 ABDOMINAL AORTIC ATHEROSCLEROSIS: ICD-10-CM

## 2018-09-05 PROCEDURE — 25500020 PHARM REV CODE 255: Performed by: INTERNAL MEDICINE

## 2018-09-05 PROCEDURE — 74177 CT ABD & PELVIS W/CONTRAST: CPT | Mod: TC

## 2018-09-05 PROCEDURE — 75571 CT HRT W/O DYE W/CA TEST: CPT | Mod: TC

## 2018-09-05 PROCEDURE — 75571 CT HRT W/O DYE W/CA TEST: CPT | Mod: 26,,, | Performed by: RADIOLOGY

## 2018-09-05 PROCEDURE — 74177 CT ABD & PELVIS W/CONTRAST: CPT | Mod: 26,,, | Performed by: RADIOLOGY

## 2018-09-05 RX ADMIN — IOHEXOL 15 ML: 350 INJECTION, SOLUTION INTRAVENOUS at 12:09

## 2018-09-05 RX ADMIN — IOHEXOL 100 ML: 350 INJECTION, SOLUTION INTRAVENOUS at 01:09

## 2018-09-06 NOTE — TELEPHONE ENCOUNTER
I spoke w Dr. Delarosa about the pts rash that appears to be triggered by ARB and now aliskaren. Recommend that he go see his dermatologist to see if the rash is caused by the medication. If possible he should stay on the medication until then. If not he should take several good pictures (include close ups) of the rash before stopping the medication.

## 2018-09-10 ENCOUNTER — PATIENT MESSAGE (OUTPATIENT)
Dept: CARDIOLOGY | Facility: CLINIC | Age: 58
End: 2018-09-10

## 2018-09-12 RX ORDER — AMLODIPINE BESYLATE 5 MG/1
5 TABLET ORAL DAILY
COMMUNITY
End: 2018-09-12 | Stop reason: SDUPTHER

## 2018-09-12 NOTE — TELEPHONE ENCOUNTER
Find out if pt is taking amlodipine in am or pm. Also I think he is taking two 2.5 mg tab. Pl send me a prescription for 5 mg tabs. I may later increase that to 10 mg.  Confirm that he is taking carvedilol bid, ie that he is not skipping the night time dose. Remind him to keep his appt w Dermatology later this month.  I sent him a [employer] message.

## 2018-09-12 NOTE — TELEPHONE ENCOUNTER
Pt notified, states he is taking Amlodipine 5mg at bedtime. Pt states he is taking Carvedilol BID as ordered.

## 2018-09-13 RX ORDER — AMLODIPINE BESYLATE 5 MG/1
5 TABLET ORAL DAILY
Qty: 90 TABLET | Refills: 3 | Status: SHIPPED | OUTPATIENT
Start: 2018-09-13 | End: 2018-10-05

## 2018-09-14 ENCOUNTER — PATIENT MESSAGE (OUTPATIENT)
Dept: CARDIOLOGY | Facility: CLINIC | Age: 58
End: 2018-09-14

## 2018-09-18 ENCOUNTER — PATIENT MESSAGE (OUTPATIENT)
Dept: CARDIOLOGY | Facility: CLINIC | Age: 58
End: 2018-09-18

## 2018-09-18 DIAGNOSIS — G47.33 OBSTRUCTIVE SLEEP APNEA SYNDROME: Primary | ICD-10-CM

## 2018-09-19 NOTE — TELEPHONE ENCOUNTER
Ask him if he has taken Botox for his headaches?  He should go back and see the Neurologist Dr. Gloria Reyes. Also needs to see a dentist or oral surgeon if he cannot tolerate CPAP. Or he can go back to the Sleep clinic and they can suggest an oral device. Untreated sleep apnea could very well be affecting his BP.

## 2018-09-19 NOTE — TELEPHONE ENCOUNTER
Pt states he was approved for botox injections but never took them. Pt states he has contact information to sleep clinic to arrange an appointment.

## 2018-09-20 ENCOUNTER — OFFICE VISIT (OUTPATIENT)
Dept: DERMATOLOGY | Facility: CLINIC | Age: 58
End: 2018-09-20
Payer: COMMERCIAL

## 2018-09-20 DIAGNOSIS — R21 RASH AND OTHER NONSPECIFIC SKIN ERUPTION: Primary | ICD-10-CM

## 2018-09-20 PROCEDURE — 99213 OFFICE O/P EST LOW 20 MIN: CPT | Mod: 25,S$GLB,, | Performed by: PATHOLOGY

## 2018-09-20 PROCEDURE — 11100 PR BIOPSY OF SKIN LESION: CPT | Mod: S$GLB,,, | Performed by: PATHOLOGY

## 2018-09-20 PROCEDURE — 88305 TISSUE EXAM BY PATHOLOGIST: CPT | Performed by: PATHOLOGY

## 2018-09-20 PROCEDURE — 88312 SPECIAL STAINS GROUP 1: CPT | Mod: 26,,, | Performed by: PATHOLOGY

## 2018-09-20 PROCEDURE — 99999 PR PBB SHADOW E&M-EST. PATIENT-LVL V: CPT | Mod: PBBFAC,,, | Performed by: PATHOLOGY

## 2018-09-20 RX ORDER — AMLODIPINE BESYLATE 2.5 MG/1
TABLET ORAL
Refills: 11 | COMMUNITY
Start: 2018-09-14 | End: 2019-04-16

## 2018-09-20 RX ORDER — CLONIDINE HYDROCHLORIDE 0.1 MG/1
0.1 TABLET ORAL
Refills: 3 | COMMUNITY
Start: 2018-08-29 | End: 2023-02-01

## 2018-09-20 RX ORDER — ANASTROZOLE 1 MG/1
TABLET ORAL
Refills: 6 | COMMUNITY
Start: 2018-09-14 | End: 2019-06-06

## 2018-09-20 NOTE — PROGRESS NOTES
Subjective:       Patient ID:  Jonathan Villela is a 58 y.o. male who presents for   Chief Complaint   Patient presents with    Follow-up     Patient here for followup of a rash. He was last seen 12/2017 at which time he was seen for nummular dermatitis of the forearms and legs as well as intertrigo of the inguinal folds and penis. He has a history of bx-proven drug eruption in 2016, thought to be attributed to a blood pressure medication. He reports that he was doing well for a few months after stopping irbesartan about 2-3 months ago. His rash had resolved and he had no red itchy edematous bumps. He has not restarted irbesartan, but has instead just started aliskiren in the last 3-4 weeks. Since then he has noted a new eruption of red bumps on his lower legs mainly (vs. His upper face and neck 2 years ago). They are not itchy but do not resolve. They do not turn into blisters. He has been applying triamcinolone cream with no improvement. Is using shake lotion to his groin without improvement. The groin region does itch and sting. He also reports recurrence of his migraines which he attributes to his BP medications.    Medication history per patient:  Aliskiren x 1 month  Carvedilol x 3 months  Chlorthalidone x 1 year  Amlodipine x 1 year            Review of Systems   Constitutional: Negative for fever, chills, weight loss, weight gain, fatigue, night sweats and malaise.   Skin: Positive for activity-related sunscreen use. Negative for daily sunscreen use and recent sunburn.   Hematologic/Lymphatic: Does not bruise/bleed easily.        Objective:    Physical Exam   Constitutional: He appears well-developed and well-nourished.   Genitourinary: Penile erythema present.         Neurological: He is alert.   Skin:   Areas Examined (abnormalities noted in diagram):   Scalp / Hair Palpated and Inspected  Head / Face Inspection Performed  Neck Inspection Performed  Chest / Axilla Inspection Performed  Abdomen  Inspection Performed  Genitals / Buttocks / Groin Inspection Performed  Back Inspection Performed  RUE Inspected  LUE Inspection Performed  RLE Inspected  LLE Inspection Performed                   Diagram Legend     Erythematous scaling macule/papule c/w actinic keratosis       Vascular papule c/w angioma      Pigmented verrucoid papule/plaque c/w seborrheic keratosis      Yellow umbilicated papule c/w sebaceous hyperplasia      Irregularly shaped tan macule c/w lentigo     1-2 mm smooth white papules consistent with Milia      Movable subcutaneous cyst with punctum c/w epidermal inclusion cyst      Subcutaneous movable cyst c/w pilar cyst      Firm pink to brown papule c/w dermatofibroma      Pedunculated fleshy papule(s) c/w skin tag(s)      Evenly pigmented macule c/w junctional nevus     Mildly variegated pigmented, slightly irregular-bordered macule c/w mildly atypical nevus      Flesh colored to evenly pigmented papule c/w intradermal nevus       Pink pearly papule/plaque c/w basal cell carcinoma      Erythematous hyperkeratotic cursted plaque c/w SCC      Surgical scar with no sign of skin cancer recurrence      Open and closed comedones      Inflammatory papules and pustules      Verrucoid papule consistent consistent with wart     Erythematous eczematous patches and plaques     Dystrophic onycholytic nail with subungual debris c/w onychomycosis     Umbilicated papule    Erythematous-base heme-crusted tan verrucoid plaque consistent with inflamed seborrheic keratosis     Erythematous Silvery Scaling Plaque c/w Psoriasis     See annotation      Assessment / Plan:      Pathology Orders:     Normal Orders This Visit    Tissue Specimen To Pathology, Dermatology     Questions:    Directional Terms:  Other(comment)    Left    Clinical information:  scaly erythematous papule; r/o guttate psoriasis vs PLC vs folliculitis vs drug eruption vs other    Specific Site:  thigh        Rash and other nonspecific skin  eruption - very small guttate appearing psoriasiform papules on thighs.  Few in number.  No involvement today of face, neck, torso.  Continued and worsening involvement of penis and inguinal folds.  Pt continues to believe that these rashes are drug induced.  Although minimal non-genital involvement today, will biopsy papule on left thigh.  R/o psoriasis vs drug induced psoriasis vs other drug eruption.  Perhaps intertrigo-like rash and penile rash representative of inverse psoriasis?  Not responding to topical TAC/Loprox/milk of magnesia; waxes and wanes.  -     Tissue Specimen To Pathology, Dermatology    Punch biopsy procedure note:  Punch biopsy performed after verbal consent obtained. Area marked and prepped with alcohol. Approximately 1cc of 1% lidocaine with epinephrine injected. 3 mm disposable punch used to remove lesion. Hemostasis obtained and biopsy site closed with 1 - 2 Prolene sutures. Wound care instructions reviewed with patient and handout given.           Follow-up in about 2 weeks (around 10/4/2018) for f/u appt and suture removal.

## 2018-09-20 NOTE — PATIENT INSTRUCTIONS
Punch Biopsy Wound Care    Your doctor has performed a punch biopsy today.  A band aid and antibiotic ointment has been placed over the site.  This should remain in place for 24 hours.  It is recommended that you keep the area dry for the first 24 hours.  After 24 hours, you may remove the band aid and wash the area with warm soap and water and apply Vaseline jelly.  Many patients prefer to use Neosporin or Bacitracin ointment.  This is acceptable; however know that you can develop an allergy to this medication even if you have used it safely for years.  It is important to keep the area moist.  Letting it dry out and get air slows healing time, will worsen the scar, and make it more difficult to remove the stitches if they were placed.  Band aid is optional after first 24 hours.      If you notice increasing redness, tenderness, pain, or yellow drainage at the biopsy or surgical site, please notify your doctor.  These are signs of an infection.    If your biopsy/surgical site is bleeding, apply firm pressure for 15 minutes straight.  Repeat for another 15 minutes, if it is still bleeding.   If the surgical site continues to bleed, then please contact your doctor.      [address] [facility] [place] [address]/ [phone] FAX/ [email or web address]

## 2018-09-22 ENCOUNTER — PATIENT MESSAGE (OUTPATIENT)
Dept: CARDIOLOGY | Facility: CLINIC | Age: 58
End: 2018-09-22

## 2018-09-22 DIAGNOSIS — I70.0 ABDOMINAL AORTIC ATHEROSCLEROSIS: Primary | ICD-10-CM

## 2018-09-22 DIAGNOSIS — E78.2 MIXED HYPERLIPIDEMIA: ICD-10-CM

## 2018-09-22 RX ORDER — ATORVASTATIN CALCIUM 40 MG/1
40 TABLET, FILM COATED ORAL DAILY
Qty: 30 TABLET | Refills: 11 | Status: SHIPPED | OUTPATIENT
Start: 2018-09-22 | End: 2018-10-05

## 2018-09-26 ENCOUNTER — TELEPHONE (OUTPATIENT)
Dept: CARDIOLOGY | Facility: CLINIC | Age: 58
End: 2018-09-26

## 2018-09-26 NOTE — TELEPHONE ENCOUNTER
----- Message from Karla Escalanet MD sent at 9/22/2018  1:28 PM CDT -----  CT scan of the heart showed a bit of calcified plaque within the blood vessels of the heart. Also see the same thing in the aorta in your abdomen. This means you have already started forming plaque. Its common to have some plaque in the arteries but not ideal and certainly not at 58 years. So you should also take a statin to protect against blockages progressing and causing heart attack and stroke later in life. You already take 20 mg of pravastatin, that is good medicine but its a very low dose. You should switch to either 40 mg of atorvastatin aka Lipitor or 20 mg of rosuvastatin aka Crestor. I/ll send you a prescription (you can of course finish your pravastatin).

## 2018-09-30 ENCOUNTER — PATIENT MESSAGE (OUTPATIENT)
Dept: DERMATOLOGY | Facility: CLINIC | Age: 58
End: 2018-09-30

## 2018-09-30 ENCOUNTER — PATIENT MESSAGE (OUTPATIENT)
Dept: CARDIOLOGY | Facility: CLINIC | Age: 58
End: 2018-09-30

## 2018-10-01 ENCOUNTER — TELEPHONE (OUTPATIENT)
Dept: CARDIOLOGY | Facility: CLINIC | Age: 58
End: 2018-10-01

## 2018-10-01 ENCOUNTER — PATIENT MESSAGE (OUTPATIENT)
Dept: NEUROLOGY | Facility: CLINIC | Age: 58
End: 2018-10-01

## 2018-10-01 ENCOUNTER — PATIENT MESSAGE (OUTPATIENT)
Dept: CARDIOLOGY | Facility: CLINIC | Age: 58
End: 2018-10-01

## 2018-10-01 DIAGNOSIS — M54.81 BILATERAL OCCIPITAL NEURALGIA: ICD-10-CM

## 2018-10-01 NOTE — TELEPHONE ENCOUNTER
Called pt to remind him  recommends pt see Sleep clinic for sleep apnea evaluation. Offered to transfer pt to Sleep clinic to arrange appointment. Pt declined, states he has contact information to sleep clinic to arrange appointment at a later time.

## 2018-10-02 ENCOUNTER — OFFICE VISIT (OUTPATIENT)
Dept: NEUROLOGY | Facility: CLINIC | Age: 58
End: 2018-10-02
Payer: COMMERCIAL

## 2018-10-02 VITALS
BODY MASS INDEX: 33.31 KG/M2 | HEART RATE: 60 BPM | WEIGHT: 251.31 LBS | DIASTOLIC BLOOD PRESSURE: 77 MMHG | HEIGHT: 73 IN | SYSTOLIC BLOOD PRESSURE: 110 MMHG

## 2018-10-02 DIAGNOSIS — R11.0 NAUSEA: ICD-10-CM

## 2018-10-02 DIAGNOSIS — G43.709 CHRONIC MIGRAINE WITHOUT AURA WITHOUT STATUS MIGRAINOSUS, NOT INTRACTABLE: Primary | ICD-10-CM

## 2018-10-02 DIAGNOSIS — M54.81 BILATERAL OCCIPITAL NEURALGIA: ICD-10-CM

## 2018-10-02 PROCEDURE — 99214 OFFICE O/P EST MOD 30 MIN: CPT | Mod: S$GLB,,, | Performed by: PSYCHIATRY & NEUROLOGY

## 2018-10-02 PROCEDURE — 99999 PR PBB SHADOW E&M-EST. PATIENT-LVL III: CPT | Mod: PBBFAC,,, | Performed by: PSYCHIATRY & NEUROLOGY

## 2018-10-02 RX ORDER — ONDANSETRON 8 MG/1
8 TABLET, ORALLY DISINTEGRATING ORAL EVERY 6 HOURS PRN
Qty: 20 TABLET | Refills: 3 | Status: SHIPPED | OUTPATIENT
Start: 2018-10-02 | End: 2019-01-10 | Stop reason: SDUPTHER

## 2018-10-02 RX ORDER — SUMATRIPTAN SUCCINATE 100 MG/1
100 TABLET ORAL
Qty: 9 TABLET | Refills: 3 | Status: SHIPPED | OUTPATIENT
Start: 2018-10-02 | End: 2018-12-31

## 2018-10-02 NOTE — PROGRESS NOTES
Subjective:       Patient ID: Jonathan Villela is a 58 y.o. male.    Reason for Consult: Headache      Interval History:  Jonathan Villela is here for follow up. Their condition has changed.  He notes that after he last saw me in May of last year he was taken off as I have her start on and site chiropractor and he was having 3 just since per week for about a year.  He notes that he also started on valsartan and he notes his headaches have gone down significantly.  Unfortunately this medication was recall that he was placed back on eye were started and started having severe headaches again.  He is now on a new antihypertensive agent and is dealing with more than 15 days of headache a month.  He notes that his headaches are primarily occipital but they can be frontal in nature.  He notes they are worse with stress.  He thinks this is a side effect of the medication he is currently on.  Of note it has been 18 months since the cryoneurolysis of bilateral greater and lesser occipital nerve.  There is not a good defined amount of side effects from his new antihypertensive agent causing headaches in the digital pharmacopedia.    Objective:     Vitals:    10/02/18 1305   BP: 110/77   Pulse: 60     Tenderness to palpation of bilateral cervical paraspinal musculature.  Positive muscle twitch response.  Tinel sign positive bilateral greater and lesser occipital nerve.  Focused examination was undertaken today. Over 50% of face to face time of 25 minute visit time was in giving guidance, counseling and discussing treatment options.    Assessment/Plan:     Problem List Items Addressed This Visit        Neuro    Chronic migraine without aura without status migrainosus, not intractable - Primary    Relevant Medications    erenumab-aooe (AIMOVIG AUTOINJECTOR) 70 mg/mL AtIn    sumatriptan (IMITREX) 100 MG tablet       Orthopedic    Bilateral occipital neuralgia    Overview     S/p cryoneurolysis of bilateral greater and lesser  occipital nerves in 2/2017           Other Visit Diagnoses     Nausea        Relevant Medications    ondansetron (ZOFRAN-ODT) 8 MG TbDL        58-year-old right-handed male presents for evaluation follow-up of headaches.  At this point in time we have discussed that while this could be a theoretical adverse reaction to his new antihypertensive agent I am unsure about this.  I will have him discuss this medication with his cardiologist.  From my standpoint we have discussed the new anti C Grp antibody treatments.  He has tried and failed antiepileptics, antihypertensives, antidepressants, triptans, anti-inflammatories for his headaches.  He is having more than 15 days of headache a month with his headaches lasting more than 4 hr at a time.  I believe he would be a good candidate for Aimovig.  I will send in the prior authorization request for Aimovig through the option of specialty pharmacy.  I will also refill his sumatriptan for headache rescue and Zofran for nausea.  I will see him back in 3 months.  At that time if he has not had any significant benefit from the a moving then I would consider repeat cryoneurolysis/nerve blocks for occipital neuralgia.  We have discussed that 18 months is plenty of time for the occipital nerves grow back.  I will follow up with them in 3 month(s).  The patient verbalizes understanding and agreement with the treatment plan. I have discussed risks, benefits and alternatives to the treatment plan. Questions were sought and answered to his stated verbal satisfaction.        Negrito Reyes MD    This note is dictated on M*Modal Fluency Direct word recognition program. There are word recognition mistakes that are occasionally missed on review.

## 2018-10-03 ENCOUNTER — TELEPHONE (OUTPATIENT)
Dept: PHARMACY | Facility: CLINIC | Age: 58
End: 2018-10-03

## 2018-10-04 ENCOUNTER — OFFICE VISIT (OUTPATIENT)
Dept: DERMATOLOGY | Facility: CLINIC | Age: 58
End: 2018-10-04
Payer: COMMERCIAL

## 2018-10-04 ENCOUNTER — TELEPHONE (OUTPATIENT)
Dept: DERMATOLOGY | Facility: CLINIC | Age: 58
End: 2018-10-04

## 2018-10-04 DIAGNOSIS — R21 RASH AND OTHER NONSPECIFIC SKIN ERUPTION: Primary | ICD-10-CM

## 2018-10-04 PROCEDURE — 99999 PR PBB SHADOW E&M-EST. PATIENT-LVL V: CPT | Mod: PBBFAC,,, | Performed by: PATHOLOGY

## 2018-10-04 PROCEDURE — 99213 OFFICE O/P EST LOW 20 MIN: CPT | Mod: 25,S$GLB,, | Performed by: PATHOLOGY

## 2018-10-04 PROCEDURE — 11100 PR BIOPSY OF SKIN LESION: CPT | Mod: S$GLB,,, | Performed by: PATHOLOGY

## 2018-10-04 PROCEDURE — 88312 SPECIAL STAINS GROUP 1: CPT | Mod: 26,,, | Performed by: PATHOLOGY

## 2018-10-04 PROCEDURE — 88341 IMHCHEM/IMCYTCHM EA ADD ANTB: CPT | Mod: 26,,, | Performed by: PATHOLOGY

## 2018-10-04 PROCEDURE — 88342 IMHCHEM/IMCYTCHM 1ST ANTB: CPT | Mod: 26,,, | Performed by: PATHOLOGY

## 2018-10-04 PROCEDURE — 11101 PR BIOPSY, EACH ADDED LESION: CPT | Mod: S$GLB,,, | Performed by: PATHOLOGY

## 2018-10-04 PROCEDURE — 88305 TISSUE EXAM BY PATHOLOGIST: CPT | Mod: 59 | Performed by: PATHOLOGY

## 2018-10-04 RX ORDER — FLUCONAZOLE 200 MG/1
TABLET ORAL
Qty: 6 TABLET | Refills: 3 | Status: SHIPPED | OUTPATIENT
Start: 2018-10-04 | End: 2018-11-04

## 2018-10-04 RX ORDER — NEEDLES, DISPOSABLE 25GX5/8"
NEEDLE, DISPOSABLE MISCELLANEOUS
Refills: 6 | COMMUNITY
Start: 2018-09-17 | End: 2019-06-13

## 2018-10-04 NOTE — PATIENT INSTRUCTIONS
"Punch Biopsy Wound Care    Your doctor has performed a punch biopsy today.  A band aid and antibiotic ointment has been placed over the site.  This should remain in place for 24 hours.  It is recommended that you keep the area dry for the first 24 hours.  After 24 hours, you may remove the band aid and wash the area with warm soap and water and apply Vaseline jelly.  Many patients prefer to use Neosporin or Bacitracin ointment.  This is acceptable; however know that you can develop an allergy to this medication even if you have used it safely for years.  It is important to keep the area moist.  Letting it dry out and get air slows healing time, will worsen the scar, and make it more difficult to remove the stitches if they were placed.  Band aid is optional after first 24 hours.      If you notice increasing redness, tenderness, pain, or yellow drainage at the biopsy or surgical site, please notify your doctor.  These are signs of an infection.    If your biopsy/surgical site is bleeding, apply firm pressure for 15 minutes straight.  Repeat for another 15 minutes, if it is still bleeding.   If the surgical site continues to bleed, then please contact your doctor.      For [facility] users:   You will receive a [facility] notification after the pathologist has finished reviewing your biopsy specimen. Pathology results, however, will not be released online so you will see a "no content" message. Once your dermatologist reviews and clinically correlates your biopsy results, you will either receive a letter in the mail with the results of a phone call from your doctor's office if further explanation or treatment is warranted.       [address] [place] [address]/ [phone] FAX/ [email or web address]        Shave Biopsy Wound Care    Your doctor has performed a shave biopsy today.  A band aid and vaseline ointment has been placed over the site.  This should remain in place for 24 hours.  It is " "recommended that you keep the area dry for the first 24 hours.  After 24 hours, you may remove the band aid and wash the area with warm soap and water and apply Vaseline jelly.  Many patients prefer to use Neosporin or Bacitracin ointment.  This is acceptable; however, know that you can develop an allergy to this medication even if you have used it safely for years.  It is important to keep the area moist.  Letting it dry out and get air slows healing time, and will worsen the scar.  Band aid is optional after first 24 hours.      If you notice increasing redness, tenderness, pain, or yellow drainage at the biopsy site, please notify your doctor.  These are signs of an infection.    If your biopsy site is bleeding, apply firm pressure for 15 minutes straight.  Repeat for another 15 minutes, if it is still bleeding.   If the surgical site continues to bleed, then please contact your doctor.      For [facility] users:   You will receive a [facility] notification after the pathologist has finished reviewing your biopsy specimen. Pathology results, however, will not be released online so you will see a "no content" message. Once your dermatologist reviews and clinically correlates your biopsy results, you will either receive a letter in the mail with the results of a phone call from your doctor's office if further explanation or treatment is warranted.       [address] [place] [address]/ [phone] FAX/ [email or web address]        "

## 2018-10-04 NOTE — TELEPHONE ENCOUNTER
----- Message from Dilia Lambert sent at 10/4/2018 11:42 AM CDT -----  Contact: [facility] pharmacy  Pharmacy Calling    Reason for call:Clarify instructions    Pharmacy Name: [facility]     Prescription Name:fluconazole (DIFLUCAN) 200 MG Tab    Phone Number: [phone]

## 2018-10-04 NOTE — PROGRESS NOTES
Subjective:       Patient ID:  Jonathan Villela is a 58 y.o. male who presents for   Chief Complaint   Patient presents with    Follow-up     Rash is worse on back      Patient here for followup of a dermatitis on his legs and groin, last seen 9/20/2018, at which time biopsy of the left leg was performed with concern for psoriasis. Treatment was deferred pending biopsy results. He reports he has stopped using TAC cream to all areas and rash has worsened. He also has a new area of breakout on his upper back, which is itchy. Heat seems to make the itch worse. Currently using no topicals so rash could be at its worst. Rash on groin still burns.      He has a history of bx-proven drug eruption in 2016, thought to be attributed to a blood pressure medication, mainly located on his scalp and neck . He reports that he was doing well for a few months after stopping irbesartan about 2-3 months ago. His rash had resolved and he had no red itchy edematous bumps. He has not restarted irbesartan, but has instead just started aliskiren in the last 3-4 weeks. Since then he has noted a new eruption of red bumps on his lower legs mainly (vs. His upper face and neck 2 years ago). They are not itchy but do not resolve. They do not turn into blisters.     Medication history per patient:  Aliskiren x 1 month  Carvedilol x 3 months  Chlorthalidone x 1 year  Amlodipine x 1 year           Review of Systems   Constitutional: Negative for fever, chills, weight loss, fatigue, night sweats and malaise.   Skin: Positive for activity-related sunscreen use. Negative for daily sunscreen use.   Hematologic/Lymphatic: Does not bruise/bleed easily.        Objective:    Physical Exam   Constitutional: He appears well-developed and well-nourished.   Neurological: He is alert and oriented to person, place, and time.   Psychiatric: He has a normal mood and affect.   Skin:                 Diagram Legend     Erythematous scaling macule/papule c/w  actinic keratosis       Vascular papule c/w angioma      Pigmented verrucoid papule/plaque c/w seborrheic keratosis      Yellow umbilicated papule c/w sebaceous hyperplasia      Irregularly shaped tan macule c/w lentigo     1-2 mm smooth white papules consistent with Milia      Movable subcutaneous cyst with punctum c/w epidermal inclusion cyst      Subcutaneous movable cyst c/w pilar cyst      Firm pink to brown papule c/w dermatofibroma      Pedunculated fleshy papule(s) c/w skin tag(s)      Evenly pigmented macule c/w junctional nevus     Mildly variegated pigmented, slightly irregular-bordered macule c/w mildly atypical nevus      Flesh colored to evenly pigmented papule c/w intradermal nevus       Pink pearly papule/plaque c/w basal cell carcinoma      Erythematous hyperkeratotic cursted plaque c/w SCC      Surgical scar with no sign of skin cancer recurrence      Open and closed comedones      Inflammatory papules and pustules      Verrucoid papule consistent consistent with wart     Erythematous eczematous patches and plaques     Dystrophic onycholytic nail with subungual debris c/w onychomycosis     Umbilicated papule    Erythematous-base heme-crusted tan verrucoid plaque consistent with inflamed seborrheic keratosis     Erythematous Silvery Scaling Plaque c/w Psoriasis     See annotation      Assessment / Plan:      Pathology Orders:     Normal Orders This Visit    Tissue Specimen To Pathology, Dermatology     Questions:    Directional Terms:  Other(comment)    Right    Clinical information:  ill defined pityriasiform scaly macules and papules; r/o drug eruption vs PLC vs other    Specific Site:  chest    Tissue Specimen To Pathology, Dermatology     Questions:    Directional Terms:  Other(comment)    Right    Upper    Clinical information:  ill defined pityriasiform scaly erythematous macules and papules; r/o PLC vs drug eruption vs other    Specific Site:  back    Tissue Specimen To Pathology, Dermatology      Questions:    Directional Terms:  Other(comment)    Right    Clinical information:  well defined erythematous intertriginous non-scaly plaques; r/o intertrigo vs psoriasis vs drug eruption vs other    Specific Site:  groin    Tissue Specimen To Pathology, Dermatology     Questions:    Directional Terms:  Other(comment)    Clinical information:  erythematous vaguely lichenoid appearing papules, some follicular; r/o candida vs PLC vs psoriasis vs LP vs drug vs other    Specific Site:  penile base        Rash and other nonspecific skin eruption  -     fluconazole (DIFLUCAN) 200 MG Tab; 1 po biw x 2 weeks; take 1 po weekly thereafter  Dispense: 6 tablet; Refill: 3  -     Tissue Specimen To Pathology, Dermatology  -     Tissue Specimen To Pathology, Dermatology  -     Tissue Specimen To Pathology, Dermatology  -     Tissue Specimen To Pathology, Dermatology    Patient with pityriasiform dermatitis on the upper back, chest, with erythematous plaques in the groin and papules and plaques on the penis. Patient concerned this is a drug eruption, which he had in 2016. Recent biopsy of left leg demonstrated PLC. Rash has worsened over the last 3 weeks. Ddx includes drug eruption vs. PLC vs. Lichen planus vs. Psoriasis vs. Other.  - Discussed with pt best way to come to final dx is to obtain more tissue including of the groin and penis. Patient amenable to this.   - 3 punch biopsies performed today: right upper back, right chest, right inguinal fold, and penile base (shave bx today).  - KOH demonstrated rare yeast on scraping of inguinal folds. Will also treat for possible tinea cruris with diflucan x 3 weeks.   - Patient may resume shake lotion, triamcinolone cream to itchy areas.  - Followup in 2 weeks for biopsy results, suture removal.  - Sutures removed from left leg biopsy site.    Punch biopsy procedure note:  Punch biopsy performed after verbal consent obtained. Area marked and prepped with alcohol. Approximately 1cc  of 1% lidocaine with epinephrine injected. 3 3 mm disposable punch used to remove lesion. Hemostasis obtained and biopsy site closed with 1 - 2 Prolene sutures on chest and back, and silk suture on the groin. Wound care instructions reviewed with patient and handout given.  Shave biopsy procedure note:    Shave biopsy performed after verbal consent including risk of infection, scar, recurrence, need for additional treatment of site. Area prepped with alcohol, anesthetized with approximately 1.0cc of 1% lidocaine with epinephrine. Lesional tissue shaved with razor blade. Hemostasis achieved with application of aluminum chloride followed by hyfrecation. No complications. Dressing applied. Wound care explained.           Follow-up in about 2 weeks (around 10/18/2018).

## 2018-10-05 ENCOUNTER — OFFICE VISIT (OUTPATIENT)
Dept: CARDIOLOGY | Facility: CLINIC | Age: 58
End: 2018-10-05
Payer: COMMERCIAL

## 2018-10-05 ENCOUNTER — TELEPHONE (OUTPATIENT)
Dept: FAMILY MEDICINE | Facility: CLINIC | Age: 58
End: 2018-10-05

## 2018-10-05 VITALS
HEIGHT: 73 IN | HEART RATE: 81 BPM | WEIGHT: 250.69 LBS | DIASTOLIC BLOOD PRESSURE: 82 MMHG | BODY MASS INDEX: 33.22 KG/M2 | SYSTOLIC BLOOD PRESSURE: 124 MMHG

## 2018-10-05 DIAGNOSIS — E78.2 MIXED HYPERLIPIDEMIA: ICD-10-CM

## 2018-10-05 DIAGNOSIS — I10 ESSENTIAL HYPERTENSION: Primary | ICD-10-CM

## 2018-10-05 DIAGNOSIS — G47.33 OBSTRUCTIVE SLEEP APNEA SYNDROME: ICD-10-CM

## 2018-10-05 PROCEDURE — 99214 OFFICE O/P EST MOD 30 MIN: CPT | Mod: S$GLB,,, | Performed by: INTERNAL MEDICINE

## 2018-10-05 PROCEDURE — 99999 PR PBB SHADOW E&M-EST. PATIENT-LVL III: CPT | Mod: PBBFAC,,, | Performed by: INTERNAL MEDICINE

## 2018-10-05 RX ORDER — PRAVASTATIN SODIUM 80 MG/1
80 TABLET ORAL DAILY
Qty: 90 TABLET | Refills: 3 | Status: SHIPPED | OUTPATIENT
Start: 2018-10-05 | End: 2019-08-22

## 2018-10-05 NOTE — PATIENT INSTRUCTIONS
Take pravastatin half a tablet once a day for about a month and then you can increase it to a full tab once a day.     LDL - bad type - improves with diet and medications: typically statins; most other medications that lower LDL have not been proven to prevent heart attacks.  May not improve significantly with exercise alone.  The lower the better. If you have coronary artery disease or blockages anywhere else in the body, it should be well below 70 mg/dl.    HDL - good type - improves with exercise.  Ideally greater than 50 mg/dl.    TGs (triglycerides) - also bad - can change very quickly and considerably with certain foods. Improve with diet and exercise  In some cases a low carbohydrate diet will lower TGs better than a low fat diet.  Ideal range  mg/dl.    Sugar, fat and cholesterol in food:     A sensible diet that limits the intake of sugars, saturated (bad) fats and trans fats while increasing the intake of unsaturated (good) fats and plant proteins is the basis of the current dietary recommendations.      We now recommend drastically reducing the intake of sugar. There is less emphasis on excluding fat.     Cholesterol in our food is no longer a significant concern, mainly because it is generally present in relatively small amounts. However please do not confuse this with the role of cholesterol in our blood and arteries. The liver converts certain foods into cholesterol.  It is this cholesterol and other fats that clogs up our arteries.       Most foods that are high in cholesterol are also high in saturated fat. But there is way more saturated fat than cholesterol in these foods. So its the saturated fat content that matters more than cholesterol. On the other hand, there are a handful of foods that are high in cholesterol but do not contain much saturated fat: eggs, shrimp, crab legs and crawfish are OK to eat.       Saturated fat is the bad fat - you should limit your intake of this. Deep fried  foods, meats and other animal fats are high in saturated fat. Cookies, donuts and most dessert and cakes are usually high in both saturated fat and sugar.       Unsaturated fat is the good fat. It contains the same number of calories as saturated fat but these fats do not get deposited in our arteries. The Mediterranean style diet encourages the intake of unsaturated fat - olive oil, avocado and unsalted nuts. So instead of baking a piece of fish, it may be better to pan-nixon it using olive oil.     You should eat a few servings of vegetables (and fruit as long as you are not diabetic) everyday. Substitute some plant proteins in place of meat: beans, lentils, quinoa and oatmeal. They are lean proteins.     Do not use stick butter or stick margarine. Butter that comes in a tub is soft butter. It consists of 1/2 butter and 1/2 vegetable oil., either canola or olive oil It is fine to use that.       Trans fats should definitely be avoided. Most foods that are labelled as containing 0 gms of trans fat may still contain several hundred milligrams of trans fat: creamer, margarine, dough, deep fried foods, ready made frosting, potato, corn and torilla chips, cakes, cookies, pie crusts and crackers containing shortening made with hydrogenated vegetable oil.

## 2018-10-05 NOTE — TELEPHONE ENCOUNTER
Dr. Escalante called requesting to speak with Dr. Joel.  Dr. Joel was with patient at the time.  Requested that Dr. Joel add orders for PSA and Hep B antibody to include with order for Hep C antibody that was placed previously.  Please advise.

## 2018-10-05 NOTE — TELEPHONE ENCOUNTER
DOCUMENTATION ONLY:  Prior Authorization for [employer] approved from 10/04/18 to 01/04/19    Case Id: [id number]    Co-pay: $0    Patient Assistance IS NOT required.    Forwarded to the clinical pharmacist for consult and shipment.    -ARR

## 2018-10-05 NOTE — PROGRESS NOTES
"Subjective:   Patient ID:  Jonathan Villela is a 58 y.o. male who presents for follow-up of essential hypertension      Problem List:  HTN  DELVIN unable to tolerate CPAP  Headaches  Atherosclerotic disease  - ulcerated plaque infrarenal abdominal aorta  CACS 60    HPI:   Jonathan Villela is here for follow-up of her hypertension.  I had last prescribed aliskiren, a direct inhibitor of the renin angiotensin system, however he has not been able to tolerate that.  He has a detail record his blood pressures. He has clearly documented that his blood pressures are paradoxically higher while on aliskiren.  He reported fatigue, joint pain, migraine headaches and the feeling of sleeping on a hatchet" positioned on his occipital region. He had a severe migraine headache and also took clonidine for an elevated BP recently.  He has required clonidine a total of 3 times for since the last visit  Stopped aliskiren on 10/1/18 and noted an improvement in blood pressure.  He is tolerating amlodipine, carvedilol and chlorthalidone.  He also stopped atorvastatin on 9/29/18. I believe the the fatigue and joint pain were attributed to atorvastatin.  He was previously on pravastatin.  He has not yet made a follow-up appointment with Sleep Medicine      Review of Systems   Constitution: Positive for weight gain. Negative for weakness, malaise/fatigue and weight loss.   HENT: Negative for hearing loss and nosebleeds.    Eyes: Negative for visual disturbance.   Cardiovascular: Negative for chest pain, claudication, dyspnea on exertion, irregular heartbeat, leg swelling, orthopnea, palpitations, paroxysmal nocturnal dyspnea and syncope.   Respiratory: Negative for cough, hemoptysis, sputum production and wheezing.    Hematologic/Lymphatic: Does not bruise/bleed easily.   Musculoskeletal: Negative for arthritis, back pain, falls, joint pain, muscle cramps, muscle weakness and myalgias.   Gastrointestinal: Negative for abdominal pain, " "heartburn and melena.   Genitourinary: Negative for frequency, hematuria and nocturia.   Neurological: Positive for headaches. Negative for dizziness, light-headedness, loss of balance, numbness and paresthesias.   Psychiatric/Behavioral: Negative for depression. The patient is not nervous/anxious.        Current Outpatient Medications   Medication Sig    acetic acid-hydrocortisone (VOSOL-HC) otic solution Place 1-2 drops into both ears as needed.     acyclovir (ZOVIRAX) 400 MG tablet Take 400 mg by mouth 2 (two) times daily as needed.    albuterol 90 mcg/actuation inhaler Inhale 2 puffs into the lungs every 6 (six) hours as needed for Wheezing or Shortness of Breath. Rescue    amLODIPine (NORVASC) 2.5 MG tablet Taking 1 tablet once a day    anastrozole (ARIMIDEX) 1 mg Tab TK 1 T PO QD    BD INTEGRA SYRINGE 3 mL 22 gauge x 1 1/2" Syrg USE TO DRAW UP TESTOSTERONE    BD PRECISIONGLIDE 25 gauge x 1" Ndle USE TO INJECT TESTOSERTONE    BD REGULAR BEVEL NEEDLES 25 gauge x 5/8" Ndle USE TO INJECT TESTOSTERONE    betamethasone valerate 0.1% (VALISONE) 0.1 % Crea betamethasone valerate 0.1 % topical cream as needed    butalbital-acetaminophen-caffeine -40 mg (FIORICET, ESGIC) -40 mg per tablet as needed    carvedilol (COREG) 12.5 MG tablet Take 1 tablet (12.5 mg total) by mouth 2 (two) times daily with meals.    chlorthalidone (HYGROTEN) 25 MG Tab Take 1 tablet (25 mg total) by mouth once daily. (Patient taking differently: Take 12.5 mg by mouth once daily. )    ciclopirox (LOPROX) 0.77 % Crea Apply topically 2 (two) times daily. Pharmacist: mix 30 g of TAC 0.1% cream with 30 g of Loprox cream in 120 cc of milk of magnesia (Patient taking differently: Apply topically as needed. Pharmacist: mix 30 g of TAC 0.1% cream with 30 g of Loprox cream in 120 cc of milk of magnesia)    cloNIDine (CATAPRES) 0.1 MG tablet Take 0.1 mg by mouth as needed.     diclofenac sodium (VOLTAREN) 1 % Gel Apply 2 g " topically 2 (two) times daily as needed. For pain    dicyclomine (BENTYL) 10 MG capsule Take 1 capsule (10 mg total) by mouth 3 (three) times daily as needed (abdominal cramping).    eletriptan (RELPAX) 40 MG tablet as needed    erenumab-aooe (AIMOVIG AUTOINJECTOR) 70 mg/mL AtIn Inject 2 mLs (140 mg total) into the skin every 28 days.    fluconazole (DIFLUCAN) 200 MG Tab 1 po biw x 2 weeks; take 1 po weekly thereafter    fluocinolone acetonide oil (DERMOTIC OIL) 0.01 % Drop Place 3 drops in ear(s) 2 (two) times daily. (Patient taking differently: Place 3 drops in ear(s) as needed. )    fluocinonide (LIDEX) 0.05 % external solution AAA scalp qday - bid prn pruritus    fluticasone (FLONASE) 50 mcg/actuation nasal spray 1 spay each nasal as needed    frovatriptan (FROVA) 2.5 MG tablet as needed    hydrocortisone 2.5 % cream Apply topically 2 (two) times daily. To affected areas of face (Patient taking differently: Apply topically as needed. To affected areas of face)    ketoconazole (NIZORAL) 2 % shampoo Wash hair with medicated shampoo at least 2x/week - let sit on scalp at least 5 minutes prior to rinsing    metronidazole 1% (METROGEL) 1 % Gel Apply at bedtime as directed as needed for skin infection    mometasone (ELOCON) 0.1 % lotion as needed    ondansetron (ZOFRAN-ODT) 8 MG TbDL Take 1 tablet (8 mg total) by mouth every 6 (six) hours as needed (nausea).    pantoprazole (PROTONIX) 40 MG tablet Take 1 tablet (40 mg total) by mouth before breakfast. (Patient taking differently: Take 40 mg by mouth as needed. )    rizatriptan (MAXALT) 10 MG tablet 10 mg tablet as needed    sumatriptan (IMITREX STATDOSE) 6 mg/0.5 mL kit Inject into the skin every 2 (two) hours as needed.     sumatriptan (IMITREX) 100 MG tablet Take 1 tablet (100 mg total) by mouth every 2 (two) hours as needed.    testosterone cypionate (DEPOTESTOTERONE CYPIONATE) 200 mg/mL injection Inject into the muscle once a week.      "triamcinolone acetonide 0.1% (KENALOG) 0.1 % cream AAA bid after cool blow dry to affected areas of groin (Patient taking differently: AAA bid after cool blow dry to affected areas of groin as needed)    TURMERIC ORAL Take by mouth once daily.    UNABLE TO FIND Take by mouth once daily. Healthy heart supplement    urea (CARMOL) 40 % Crea Apply topically 2 (two) times daily. To affected areas of elbows (Patient taking differently: Apply topically as needed. To affected areas of elbows)         Social History     Tobacco Use    Smoking status: Never Smoker    Smokeless tobacco: Never Used   Substance Use Topics    Alcohol use: Yes     Comment: ocasionally    Drug use: No         Objective:     Physical Exam   Constitutional: He is oriented to person, place, and time. He appears well-developed and well-nourished.   /82   Pulse 81   Ht 6' 1" (1.854 m)   Wt 113.7 kg (250 lb 10.6 oz)   BMI 33.07 kg/m²      HENT:   Head: Normocephalic and atraumatic.   Neck: No JVD present. Carotid bruit is not present.   Cardiovascular: Normal rate, regular rhythm, S1 normal and S2 normal. Exam reveals no gallop.   No murmur heard.  Pulses:       Radial pulses are 2+ on the right side, and 2+ on the left side.        Posterior tibial pulses are 2+ on the right side, and 2+ on the left side.   Pulmonary/Chest: Effort normal and breath sounds normal. He has no wheezes. He has no rales. Chest wall is not dull to percussion.   Abdominal: Soft. He exhibits no mass. There is no splenomegaly or hepatomegaly. There is no tenderness.   Musculoskeletal:        Right lower leg: He exhibits no edema.        Left lower leg: He exhibits no edema.   Neurological: He is alert and oriented to person, place, and time. Gait normal.   Skin: Skin is warm and dry. No bruising noted. No cyanosis. Nails show no clubbing.   Psychiatric: He has a normal mood and affect. His speech is normal and behavior is normal. Judgment and thought content " normal. Cognition and memory are normal.           Lab Results   Component Value Date    CHOL 158 04/17/2018    HDL 31 (L) 04/17/2018    LDLCALC 81.8 04/17/2018    TRIG 226 (H) 04/17/2018    CHOLHDL 19.6 (L) 04/17/2018     Lab Results   Component Value Date     08/24/2018    CREATININE 1.2 08/24/2018    BUN 18 08/24/2018     08/24/2018    K 3.7 08/24/2018    CL 99 08/24/2018    CO2 27 08/24/2018             Assessment and Plan:     Essential hypertension   I have to believe that he has a paradoxical reaction to aliskiren.   Continue current meds.  If blood pressure increases in the future, amlodipine can be increased.  -     Basic metabolic panel; Future; Expected date: 12/05/2018    Obstructive sleep apnea syndrome   He should reconsult Sleep Medicine.    Mixed hyperlipidemia  -     In view of atherosclerotic disease he needs a moderate intensity statin.  He prefers to use pravastatin.  I will therefore prescribe the higher dose: pravastatin (PRAVACHOL) 80 MG tablet; Take 1 tablet (80 mg total) by mouth once daily.  Dispense: 90 tablet; Refill: 3.  He seems more comfortable starting at a lower dose for the 1st month.        Follow-up in about 2 months (around 12/5/2018).

## 2018-10-05 NOTE — TELEPHONE ENCOUNTER
Please check with patient.  Is he coming in for his annual exam?  Last visit for annual was in June of 2018.  Unsure about the hepatitis-B antibody unless he needs this for work.  Can this wait until his exam and we can discuss labs that are needed for his health maintenance?

## 2018-10-07 ENCOUNTER — PATIENT MESSAGE (OUTPATIENT)
Dept: FAMILY MEDICINE | Facility: CLINIC | Age: 58
End: 2018-10-07

## 2018-10-08 ENCOUNTER — TELEPHONE (OUTPATIENT)
Dept: FAMILY MEDICINE | Facility: CLINIC | Age: 58
End: 2018-10-08

## 2018-10-08 DIAGNOSIS — Z12.5 SCREENING FOR MALIGNANT NEOPLASM OF PROSTATE: Primary | ICD-10-CM

## 2018-10-08 NOTE — TELEPHONE ENCOUNTER
Patient is requesting lab for PSA.  Would like to do this with his Hep C lab that is currently ordered.

## 2018-10-10 ENCOUNTER — TELEPHONE (OUTPATIENT)
Dept: PHARMACY | Facility: CLINIC | Age: 58
End: 2018-10-10

## 2018-10-10 NOTE — TELEPHONE ENCOUNTER
Initial Aimovig consult completed on 10/10 at [facility]. [id number] was picked up at consult. $0 copay. Patient intends to start Aimovig on 10/10. Address confirmed. Confirmed 2 patient identifiers - name and . Therapy Appropriate.    Counseled patient on administration directions:  - Inject 140 mg (two auto-injectors) into the skin every 28 days.   - Take out of the refrigerator 30-60 minutes prior to injection.  - Wash hands before and after injection.  - Monthly RX will come with gauze, bandaids, and alcohol swabs.  - Patient may inject in either the tops of the thighs, abdomen- but at least 2 inches away from belly button, or the outer part of her upper arm (with assistance). If injecting 2 pens, use 2 different injection sites.  - Patient is to wipe down the injection site with the alcohol pad, wait to dry.    - Remove white cap from pen  - Gently squeeze OR stretch the area of the cleaned skin and hold it firmly.  Place the pen flat against the skin then push down on the purple button and release - there will be an initial click; in 10-15 seconds you will hear a second click and the window will go from from clear to yellow, indicating injection is complete.  - Patient should rotate injection sites.   - Patient will use sharps container; once full, per [place] law, she/ he may lock the sharps container and place in trash. Pharmacy will replace the sharps at no additional charge.    Patient was counseled on possible side effects:  - Injection site reaction: redness, soreness, itching, bruising, which should resolve within 3-5 days.  - constipation   - possible dizziness    Advised to keep a calendar to stay compliant. Consultation included: indication; goals of treatment; administration; storage and handling; side effects; how to handle side effects; the importance of compliance; the importance of keeping all follow up appointments.  Patient understands to report any medication changes to [facility]  and provider. All questions answered and addressed to patients satisfaction. I will f/u with patient in 7-10 days from start, [facility] to contact patient in 3 weeks for refills.     InBasket sent at 1:26 pm on 10/10/18    Kennedy Carr, PharmD  Clinical Pharmacist   [facility]   P: [phone]

## 2018-10-15 ENCOUNTER — LAB VISIT (OUTPATIENT)
Dept: LAB | Facility: HOSPITAL | Age: 58
End: 2018-10-15
Attending: FAMILY MEDICINE
Payer: COMMERCIAL

## 2018-10-15 DIAGNOSIS — Z12.5 SCREENING FOR MALIGNANT NEOPLASM OF PROSTATE: ICD-10-CM

## 2018-10-15 LAB — COMPLEXED PSA SERPL-MCNC: 1.2 NG/ML

## 2018-10-15 PROCEDURE — 36415 COLL VENOUS BLD VENIPUNCTURE: CPT | Mod: PO

## 2018-10-15 PROCEDURE — 84153 ASSAY OF PSA TOTAL: CPT

## 2018-10-18 ENCOUNTER — LAB VISIT (OUTPATIENT)
Dept: LAB | Facility: HOSPITAL | Age: 58
End: 2018-10-18
Payer: COMMERCIAL

## 2018-10-18 ENCOUNTER — PATIENT MESSAGE (OUTPATIENT)
Dept: DERMATOLOGY | Facility: CLINIC | Age: 58
End: 2018-10-18

## 2018-10-18 ENCOUNTER — OFFICE VISIT (OUTPATIENT)
Dept: DERMATOLOGY | Facility: CLINIC | Age: 58
End: 2018-10-18
Payer: COMMERCIAL

## 2018-10-18 DIAGNOSIS — Z79.899 ENCOUNTER FOR LONG-TERM (CURRENT) USE OF HIGH-RISK MEDICATION: ICD-10-CM

## 2018-10-18 DIAGNOSIS — L40.8 INVERSE PSORIASIS: Primary | ICD-10-CM

## 2018-10-18 DIAGNOSIS — L30.9 ECZEMA, UNSPECIFIED TYPE: ICD-10-CM

## 2018-10-18 LAB
ALBUMIN SERPL BCP-MCNC: 4.5 G/DL
ALP SERPL-CCNC: 61 U/L
ALT SERPL W/O P-5'-P-CCNC: 52 U/L
ANION GAP SERPL CALC-SCNC: 8 MMOL/L
AST SERPL-CCNC: 44 U/L
BASOPHILS # BLD AUTO: 0.06 K/UL
BASOPHILS NFR BLD: 1 %
BILIRUB SERPL-MCNC: 0.9 MG/DL
BUN SERPL-MCNC: 23 MG/DL
CALCIUM SERPL-MCNC: 10.4 MG/DL
CHLORIDE SERPL-SCNC: 100 MMOL/L
CO2 SERPL-SCNC: 28 MMOL/L
CREAT SERPL-MCNC: 1.3 MG/DL
DIFFERENTIAL METHOD: ABNORMAL
EOSINOPHIL # BLD AUTO: 0.1 K/UL
EOSINOPHIL NFR BLD: 2 %
ERYTHROCYTE [DISTWIDTH] IN BLOOD BY AUTOMATED COUNT: 13.1 %
EST. GFR  (AFRICAN AMERICAN): >60 ML/MIN/1.73 M^2
EST. GFR  (NON AFRICAN AMERICAN): >60 ML/MIN/1.73 M^2
GLUCOSE SERPL-MCNC: 81 MG/DL
HBV SURFACE AB SER-ACNC: NEGATIVE M[IU]/ML
HBV SURFACE AG SERPL QL IA: NEGATIVE
HCT VFR BLD AUTO: 55.7 %
HCV AB SERPL QL IA: NEGATIVE
HGB BLD-MCNC: 18 G/DL
IMM GRANULOCYTES # BLD AUTO: 0.04 K/UL
IMM GRANULOCYTES NFR BLD AUTO: 0.7 %
LYMPHOCYTES # BLD AUTO: 1.8 K/UL
LYMPHOCYTES NFR BLD: 29.8 %
MCH RBC QN AUTO: 27.4 PG
MCHC RBC AUTO-ENTMCNC: 32.3 G/DL
MCV RBC AUTO: 85 FL
MONOCYTES # BLD AUTO: 0.7 K/UL
MONOCYTES NFR BLD: 12 %
NEUTROPHILS # BLD AUTO: 3.2 K/UL
NEUTROPHILS NFR BLD: 54.5 %
NRBC BLD-RTO: 0 /100 WBC
PLATELET # BLD AUTO: 290 K/UL
PMV BLD AUTO: 9.1 FL
POTASSIUM SERPL-SCNC: 4.2 MMOL/L
PROT SERPL-MCNC: 8.3 G/DL
RBC # BLD AUTO: 6.58 M/UL
SODIUM SERPL-SCNC: 136 MMOL/L
WBC # BLD AUTO: 5.94 K/UL

## 2018-10-18 PROCEDURE — 99999 PR PBB SHADOW E&M-EST. PATIENT-LVL III: CPT | Mod: PBBFAC,,, | Performed by: PATHOLOGY

## 2018-10-18 PROCEDURE — 86038 ANTINUCLEAR ANTIBODIES: CPT

## 2018-10-18 PROCEDURE — 99214 OFFICE O/P EST MOD 30 MIN: CPT | Mod: S$GLB,,, | Performed by: PATHOLOGY

## 2018-10-18 PROCEDURE — 86803 HEPATITIS C AB TEST: CPT

## 2018-10-18 PROCEDURE — 85025 COMPLETE CBC W/AUTO DIFF WBC: CPT

## 2018-10-18 PROCEDURE — 80053 COMPREHEN METABOLIC PANEL: CPT

## 2018-10-18 PROCEDURE — 86706 HEP B SURFACE ANTIBODY: CPT

## 2018-10-18 PROCEDURE — 36415 COLL VENOUS BLD VENIPUNCTURE: CPT

## 2018-10-18 PROCEDURE — 87340 HEPATITIS B SURFACE AG IA: CPT

## 2018-10-18 RX ORDER — TRIAMCINOLONE ACETONIDE 1 MG/G
CREAM TOPICAL
Qty: 454 G | Refills: 3 | Status: SHIPPED | OUTPATIENT
Start: 2018-10-18 | End: 2020-08-03 | Stop reason: SDUPTHER

## 2018-10-18 RX ORDER — HYDROCORTISONE BUTYRATE 1 MG/G
CREAM TOPICAL
Qty: 60 G | Refills: 3 | Status: SHIPPED | OUTPATIENT
Start: 2018-10-18 | End: 2021-02-04 | Stop reason: SDUPTHER

## 2018-10-18 NOTE — PATIENT INSTRUCTIONS
XEROSIS (DRY SKIN)        1. Definition    Xerosis is the term for dry skin.  We all have a natural oil coating over our skin produced by the skin oil glands.  If this oil is removed, the skin becomes dry which can lead to cracking, which can lead to inflammation.  Xerosis is usually a long-term problem that recurs often, especially in the winter.    2. Cause     Long hot baths or showers can remove our natural oil and lead to xerosis.  One should never take more than one bath or shower a day and for no longer than ten minutes.   Use of harsh soaps such as Zest, Dial, and Ivory can worsen and cause xerosis.   Cold winter weather worsens xerosis because the amount of moisture contained in cold air is much less than the amount of moisture in warm air.    3. Treatment     Treatment is intended to restore the natural oil to your skin.  Keep the skin lubricated.     Do not take more than one bath or shower a day.  Use lukewarm water, not hot.  Hot water dries out the skin.     Use a gentle moisturizing soap such as Cetaphil soap, Oil of Olay, Dove, Basis, Ivory moisture care, Restoraderm cleanser.     When toweling dry, dont rub.  Blot the skin so there is still some water left on the skin.  You should apply a moisturizing cream to all of the skin such as Cerave cream, Cetaphil cream, Restoraderm or Eucerin Original Formula cream.   Alpha hydroxyacid lotions, i.e., AmLactin, also work very well for preventing dry skin, but may burn when used on inflamed or reddened skin.     If you like to swim during the winter months, you should not use soap when getting out of the pool.  When you have finished swimming, rinse off the chlorine with cool to warm water.  If this will be the only shower of the day, then you may use Cetaphil or another mild soap to cleanse your skin.  After the shower, apply a moisturizing cream to all of the skin as above.        [address] [facility] [place] [address]/ [phone]  [phone] FAX/ [email or web address]

## 2018-10-19 LAB — ANA SER QL IF: NORMAL

## 2018-10-22 DIAGNOSIS — L40.9 PSORIASIS: Primary | ICD-10-CM

## 2018-10-22 RX ORDER — ADALIMUMAB 40MG/0.8ML
KIT SUBCUTANEOUS
Qty: 4 PEN | Refills: 0 | Status: SHIPPED | OUTPATIENT
Start: 2018-10-22 | End: 2018-10-24

## 2018-10-22 RX ORDER — ADALIMUMAB 40MG/0.8ML
KIT SUBCUTANEOUS
Qty: 2 PEN | Refills: 10 | Status: SHIPPED | OUTPATIENT
Start: 2018-10-22 | End: 2018-10-24

## 2018-10-24 ENCOUNTER — PATIENT MESSAGE (OUTPATIENT)
Dept: ADMINISTRATIVE | Facility: OTHER | Age: 58
End: 2018-10-24

## 2018-10-24 DIAGNOSIS — L40.9 PSORIASIS: Primary | ICD-10-CM

## 2018-10-24 RX ORDER — ADALIMUMAB 40MG/0.8ML
KIT SUBCUTANEOUS
Qty: 4 SYRINGE | Refills: 6 | Status: SHIPPED | OUTPATIENT
Start: 2018-10-24 | End: 2018-11-01

## 2018-10-24 RX ORDER — ADALIMUMAB 40MG/0.8ML
KIT SUBCUTANEOUS
Qty: 4.8 ML | Refills: 0 | Status: SHIPPED | OUTPATIENT
Start: 2018-10-24 | End: 2018-11-01

## 2018-10-24 NOTE — TELEPHONE ENCOUNTER
FYI:  Humira prior authorization has been approved through 10/23/20. Patient's insurance requires the patient to fill through [facility]. Please send prescription to [facility], which has been added to the patients EPIC profile. Patient has been notified and provided with the necessary info to call and schedule a delivery.    To complete this in EPIC, the original order MUST be discontinued and re-typed as a new prescription with the updated pharmacy listed. Clicking reorder will continue to route the rx to [facility] even if the pharmacy is changed. Please opt the patient out of [facility] when the BPA is fired.

## 2018-10-29 ENCOUNTER — PATIENT MESSAGE (OUTPATIENT)
Dept: DERMATOLOGY | Facility: CLINIC | Age: 58
End: 2018-10-29

## 2018-10-30 ENCOUNTER — PATIENT MESSAGE (OUTPATIENT)
Dept: DERMATOLOGY | Facility: CLINIC | Age: 58
End: 2018-10-30

## 2018-10-31 ENCOUNTER — LAB VISIT (OUTPATIENT)
Dept: LAB | Facility: HOSPITAL | Age: 58
End: 2018-10-31
Attending: PATHOLOGY
Payer: COMMERCIAL

## 2018-10-31 ENCOUNTER — PATIENT MESSAGE (OUTPATIENT)
Dept: DERMATOLOGY | Facility: CLINIC | Age: 58
End: 2018-10-31

## 2018-10-31 ENCOUNTER — TELEPHONE (OUTPATIENT)
Dept: DERMATOLOGY | Facility: CLINIC | Age: 58
End: 2018-10-31

## 2018-10-31 DIAGNOSIS — Z79.899 ENCOUNTER FOR LONG-TERM (CURRENT) USE OF HIGH-RISK MEDICATION: ICD-10-CM

## 2018-10-31 PROCEDURE — 36415 COLL VENOUS BLD VENIPUNCTURE: CPT | Mod: PO

## 2018-10-31 PROCEDURE — 86480 TB TEST CELL IMMUN MEASURE: CPT

## 2018-10-31 NOTE — TELEPHONE ENCOUNTER
Spoke with Sherice in  the Pharmacy department and confirmed the Dx code. I was transferred to a pharmacist to confirm clarification on dosage of the starter kit. I spoke to Karmen In regards to the Humira Pen starter Kit. Karmen advised me that the dosage is the wrong dose for the Dx code of L40.0. The correct dosage is usually 2 pens 80 mg day, 1 pen 40 mg day 8, and 1 pen 40 mg q 2 weeks on day 22. I advised Karmen that someone will contact her on tomorrow to confirm correct dosage.    Pharmacy [phone] ext [phone]

## 2018-11-01 ENCOUNTER — TELEPHONE (OUTPATIENT)
Dept: DERMATOLOGY | Facility: CLINIC | Age: 58
End: 2018-11-01

## 2018-11-01 ENCOUNTER — PATIENT MESSAGE (OUTPATIENT)
Dept: DERMATOLOGY | Facility: CLINIC | Age: 58
End: 2018-11-01

## 2018-11-01 DIAGNOSIS — L40.9 PSORIASIS: Primary | ICD-10-CM

## 2018-11-01 RX ORDER — ADALIMUMAB 40MG/0.8ML
KIT SUBCUTANEOUS
Qty: 4 PEN | Refills: 0 | Status: SHIPPED | OUTPATIENT
Start: 2018-11-01 | End: 2019-05-28

## 2018-11-01 RX ORDER — ADALIMUMAB 40MG/0.8ML
KIT SUBCUTANEOUS
Qty: 2 PEN | Refills: 10 | Status: SHIPPED | OUTPATIENT
Start: 2018-11-01 | End: 2019-05-28

## 2018-11-01 NOTE — TELEPHONE ENCOUNTER
Spoke to Mahesh at Pharmacy to clarify Humira starter kit for pt. Order confirmed verbally and read back for dx code L40.0    JT

## 2018-11-02 LAB
M TB IFN-G CD4+ BCKGRND COR BLD-ACNC: 0.01 IU/ML
MITOGEN IGNF BCKGRD COR BLD-ACNC: >10 IU/ML
MITOGEN IGNF BCKGRD COR BLD-ACNC: NEGATIVE [IU]/ML
NIL: 0.05 IU/ML
TB2 - NIL: 0 IU/ML

## 2018-11-02 RX ORDER — CHLORTHALIDONE 25 MG/1
TABLET ORAL
Qty: 30 TABLET | Refills: 11 | Status: SHIPPED | OUTPATIENT
Start: 2018-11-02 | End: 2019-11-21 | Stop reason: SDUPTHER

## 2018-11-04 ENCOUNTER — OFFICE VISIT (OUTPATIENT)
Dept: URGENT CARE | Facility: CLINIC | Age: 58
End: 2018-11-04
Payer: COMMERCIAL

## 2018-11-04 VITALS
DIASTOLIC BLOOD PRESSURE: 83 MMHG | OXYGEN SATURATION: 95 % | SYSTOLIC BLOOD PRESSURE: 114 MMHG | WEIGHT: 250 LBS | BODY MASS INDEX: 47.2 KG/M2 | HEIGHT: 61 IN | RESPIRATION RATE: 16 BRPM | TEMPERATURE: 98 F | HEART RATE: 76 BPM

## 2018-11-04 DIAGNOSIS — M79.9 MUSCULOSKELETAL DISORDER: Primary | ICD-10-CM

## 2018-11-04 LAB
BILIRUB UR QL STRIP: NEGATIVE
GLUCOSE UR QL STRIP: NEGATIVE
KETONES UR QL STRIP: NEGATIVE
LEUKOCYTE ESTERASE UR QL STRIP: NEGATIVE
PH, POC UA: 6 (ref 5–8)
POC BLOOD, URINE: NEGATIVE
POC NITRATES, URINE: NEGATIVE
PROT UR QL STRIP: NEGATIVE
SP GR UR STRIP: 1.02 (ref 1–1.03)
UROBILINOGEN UR STRIP-ACNC: ABNORMAL (ref 0.3–2.2)

## 2018-11-04 PROCEDURE — 71100 X-RAY EXAM RIBS UNI 2 VIEWS: CPT | Mod: FY,S$GLB,, | Performed by: RADIOLOGY

## 2018-11-04 PROCEDURE — 81003 URINALYSIS AUTO W/O SCOPE: CPT | Mod: QW,S$GLB,, | Performed by: FAMILY MEDICINE

## 2018-11-04 PROCEDURE — 99214 OFFICE O/P EST MOD 30 MIN: CPT | Mod: 25,S$GLB,, | Performed by: FAMILY MEDICINE

## 2018-11-04 PROCEDURE — 96372 THER/PROPH/DIAG INJ SC/IM: CPT | Mod: S$GLB,,, | Performed by: FAMILY MEDICINE

## 2018-11-04 RX ORDER — CYCLOBENZAPRINE HCL 10 MG
10 TABLET ORAL 3 TIMES DAILY PRN
Qty: 30 TABLET | Refills: 0 | Status: SHIPPED | OUTPATIENT
Start: 2018-11-04 | End: 2018-11-14

## 2018-11-04 RX ORDER — BETAMETHASONE SODIUM PHOSPHATE AND BETAMETHASONE ACETATE 3; 3 MG/ML; MG/ML
6 INJECTION, SUSPENSION INTRA-ARTICULAR; INTRALESIONAL; INTRAMUSCULAR; SOFT TISSUE
Status: COMPLETED | OUTPATIENT
Start: 2018-11-04 | End: 2018-11-04

## 2018-11-04 RX ORDER — DICLOFENAC SODIUM 75 MG/1
75 TABLET, DELAYED RELEASE ORAL 2 TIMES DAILY WITH MEALS
Qty: 60 TABLET | Refills: 2 | Status: SHIPPED | OUTPATIENT
Start: 2018-11-04 | End: 2019-04-16

## 2018-11-04 RX ORDER — KETOROLAC TROMETHAMINE 30 MG/ML
60 INJECTION, SOLUTION INTRAMUSCULAR; INTRAVENOUS
Status: COMPLETED | OUTPATIENT
Start: 2018-11-04 | End: 2018-11-04

## 2018-11-04 RX ADMIN — KETOROLAC TROMETHAMINE 60 MG: 30 INJECTION, SOLUTION INTRAMUSCULAR; INTRAVENOUS at 09:11

## 2018-11-04 RX ADMIN — BETAMETHASONE SODIUM PHOSPHATE AND BETAMETHASONE ACETATE 6 MG: 3; 3 INJECTION, SUSPENSION INTRA-ARTICULAR; INTRALESIONAL; INTRAMUSCULAR; SOFT TISSUE at 09:11

## 2018-11-04 NOTE — PROGRESS NOTES
"Subjective:       Patient ID: Jonathan Villela is a 58 y.o. male.    Vitals:  height is 5' 1" (1.549 m) and weight is 113.4 kg (250 lb). His oral temperature is 98 °F (36.7 °C). His blood pressure is 114/83 and his pulse is 76. His respiration is 16 and oxygen saturation is 95%.     Chief Complaint: Back Pain    C/o right sided mid back pain x 2 weeks, doew not recall any injury or trauma  No rash,       Back Pain   This is a new problem. Episode onset: 2 weeks. The problem occurs constantly. The problem is unchanged. The pain is present in the costovertebral angle. The quality of the pain is described as stabbing. Radiates to: to the abdomen. The pain is at a severity of 5/10. The pain is moderate. The pain is the same all the time. Pertinent negatives include no abdominal pain, chest pain, fever or headaches. He has tried nothing for the symptoms.     Review of Systems   Constitution: Negative for chills and fever.   HENT: Negative for sore throat.    Eyes: Negative for blurred vision.   Cardiovascular: Negative for chest pain.   Respiratory: Negative for shortness of breath.    Skin: Negative for rash.   Musculoskeletal: Positive for back pain. Negative for joint pain.   Gastrointestinal: Negative for abdominal pain, diarrhea, nausea and vomiting.   Neurological: Negative for headaches.   Psychiatric/Behavioral: The patient is not nervous/anxious.        Objective:      Physical Exam   Constitutional: He is oriented to person, place, and time. He appears well-developed and well-nourished. He is cooperative.  Non-toxic appearance. He does not appear ill. No distress.   HENT:   Head: Normocephalic and atraumatic.   Right Ear: Hearing, tympanic membrane, external ear and ear canal normal.   Left Ear: Hearing, tympanic membrane, external ear and ear canal normal.   Nose: Nose normal. No mucosal edema, rhinorrhea or nasal deformity. No epistaxis. Right sinus exhibits no maxillary sinus tenderness and no frontal " sinus tenderness. Left sinus exhibits no maxillary sinus tenderness and no frontal sinus tenderness.   Mouth/Throat: Uvula is midline, oropharynx is clear and moist and mucous membranes are normal. No trismus in the jaw. Normal dentition. No uvula swelling. No posterior oropharyngeal erythema.   Eyes: Conjunctivae and lids are normal. Right eye exhibits no discharge. Left eye exhibits no discharge. No scleral icterus.   Sclera clear bilat   Neck: Trachea normal, normal range of motion, full passive range of motion without pain and phonation normal. Neck supple.   Cardiovascular: Normal rate, regular rhythm, normal heart sounds, intact distal pulses and normal pulses. Exam reveals no gallop and no friction rub.   No murmur heard.  Pulmonary/Chest: Effort normal and breath sounds normal. No stridor.   Abdominal: Soft. Normal appearance and bowel sounds are normal. He exhibits no distension, no pulsatile midline mass and no mass. There is no tenderness.   Musculoskeletal: Normal range of motion. He exhibits no edema or deformity.   Right latissmus area with minimal tenderness  Lateral Lumbar movement with increase tenderness  No rash   Lymphadenopathy:     He has no cervical adenopathy.   Neurological: He is alert and oriented to person, place, and time. He exhibits normal muscle tone. Coordination normal.   Skin: Skin is warm, dry and intact. He is not diaphoretic. No pallor.   Psychiatric: He has a normal mood and affect. His speech is normal and behavior is normal. Judgment and thought content normal. Cognition and memory are normal.   Nursing note and vitals reviewed.      Assessment:       1. Musculoskeletal disorder        Plan:         Musculoskeletal disorder  -     POCT Urinalysis, Dipstick, Automated, W/O Scope  -     X-Ray Ribs 2 View Right; Future; Expected date: 11/04/2018  -     diclofenac (VOLTAREN) 75 MG EC tablet; Take 1 tablet (75 mg total) by mouth 2 (two) times daily with meals.  Dispense: 60  tablet; Refill: 2  -     cyclobenzaprine (FLEXERIL) 10 MG tablet; Take 1 tablet (10 mg total) by mouth 3 (three) times daily as needed for Muscle spasms.  Dispense: 30 tablet; Refill: 0        Apply heating pad

## 2018-11-04 NOTE — PATIENT INSTRUCTIONS
Reducing Risk of Musculoskeletal Disorders (MSDs): Posture  Standing, sitting, and moving incorrectly all increase your risk of musculoskeletal disorders (MSDs). Why? Because posture problems overwork your body. They strain your muscles and tendons and stress your joints. With a little adjustment, however, you can correct most posture problems. Whatever you do, try to stay in a near neutral position and to work within easy reach. Tasks take less force when you work from a stable base. Take your knowledge of ergonomic principles home with you.    Stay near neutral  Whether you're standing or sitting, neutral posture places the least amount of stress on your body. To find neutral, line up your ears, shoulders, and hips. Keep your head upright and relax while you do this. If you're holding your breath or your shoulders are creeping toward your ears, try again. Your shoulders should be level, with your arms relaxed at your sides. You can rest your body by returning to neutral as often as possible. Other helpful positions include:  · Keep your hands, wrists, and forearms straight and parallel to the floor.  · Keep your head level, facing forward, and in line with your torso.  · Your feet should be supported by the floor, and your thighs and hips supported by a padded seat.  · If you are sitting, it is important to have your lower back supported.  Work within reach  Keep your work within 14 to 18 inches of your body, depending on your size. Reaching too far can be awkward. It also reduces your muscle power, so you need to use more force. Never lock a joint by extending it until it can't go any farther. Also, avoid reaching overhead or behind your back, if you can. If you can't, return to neutral as soon as possible.  Support your body off the job  Have you thought about your posture while you clean house or watch TV? Anytime you're not using a neutral posture, you might be straining muscles or joints. Just sitting on a  sagging sofa every evening may be enough to strain your back. Remember these tips:  · When relaxing, support your body so you're comfortable and not twisted. On a couch or chair, put a rolled-up pillow behind your back to support it.  · No matter what you're doing (cooking, cleaning, carpentry), work within reach.  · An old, sagging, lumpy mattress can be doing your body harm. A comfortable mattress that's firm but has enough cushion to support your body's natural curves provides better rest and opportunity for your body to recover the demands of the day.  Date Last Reviewed: 12/31/2015  © 8869-9698 [employer]. [address] [place] [address], [place] [address]. All rights reserved. This information is not intended as a substitute for professional medical care. Always follow your healthcare professional's instructions.

## 2018-11-05 ENCOUNTER — TELEPHONE (OUTPATIENT)
Dept: DERMATOLOGY | Facility: CLINIC | Age: 58
End: 2018-11-05

## 2018-11-05 ENCOUNTER — PATIENT MESSAGE (OUTPATIENT)
Dept: DERMATOLOGY | Facility: CLINIC | Age: 58
End: 2018-11-05

## 2018-11-05 NOTE — TELEPHONE ENCOUNTER
----- Message from Najma Hough sent at 11/5/2018  9:22 AM CST -----  Contact: pt at [phone]  his cell  Patient Returning Call from [facility]    Who Left Message for Patient:Sonya  Communication Preference:call  Additional Information:Just missed  your call

## 2018-11-07 ENCOUNTER — PATIENT MESSAGE (OUTPATIENT)
Dept: DERMATOLOGY | Facility: CLINIC | Age: 58
End: 2018-11-07

## 2018-11-26 ENCOUNTER — TELEPHONE (OUTPATIENT)
Dept: PHARMACY | Facility: CLINIC | Age: 58
End: 2018-11-26

## 2018-11-27 NOTE — TELEPHONE ENCOUNTER
Patient called to refill Aimovig.  Patient confirmed he has 0 doses left next injection 12/4.  Ship 11/29 for 11/30 delivery.  Copay $0.00 in 004.  Patient confirmed no new meds, allergies, or health conditions.  Verified address.  Declined [facility] . [place]LE[place]

## 2018-12-03 ENCOUNTER — PATIENT MESSAGE (OUTPATIENT)
Dept: DERMATOLOGY | Facility: CLINIC | Age: 58
End: 2018-12-03

## 2018-12-04 ENCOUNTER — TELEPHONE (OUTPATIENT)
Dept: DERMATOLOGY | Facility: CLINIC | Age: 58
End: 2018-12-04

## 2018-12-04 DIAGNOSIS — Z79.899 ENCOUNTER FOR LONG-TERM (CURRENT) USE OF HIGH-RISK MEDICATION: Primary | ICD-10-CM

## 2018-12-06 ENCOUNTER — PATIENT MESSAGE (OUTPATIENT)
Dept: CARDIOLOGY | Facility: CLINIC | Age: 58
End: 2018-12-06

## 2018-12-13 ENCOUNTER — OFFICE VISIT (OUTPATIENT)
Dept: DERMATOLOGY | Facility: CLINIC | Age: 58
End: 2018-12-13
Payer: COMMERCIAL

## 2018-12-13 ENCOUNTER — LAB VISIT (OUTPATIENT)
Dept: LAB | Facility: HOSPITAL | Age: 58
End: 2018-12-13
Payer: COMMERCIAL

## 2018-12-13 DIAGNOSIS — Z79.899 ENCOUNTER FOR LONG-TERM (CURRENT) USE OF HIGH-RISK MEDICATION: ICD-10-CM

## 2018-12-13 DIAGNOSIS — L40.9 PSORIASIS: ICD-10-CM

## 2018-12-13 DIAGNOSIS — L28.0 LICHENOID DERMATITIS: ICD-10-CM

## 2018-12-13 DIAGNOSIS — Z79.899 ENCOUNTER FOR LONG-TERM (CURRENT) USE OF HIGH-RISK MEDICATION: Primary | ICD-10-CM

## 2018-12-13 LAB
ALBUMIN SERPL BCP-MCNC: 4.2 G/DL
ALP SERPL-CCNC: 47 U/L
ALT SERPL W/O P-5'-P-CCNC: 69 U/L
AST SERPL-CCNC: 47 U/L
BASOPHILS # BLD AUTO: 0.07 K/UL
BASOPHILS NFR BLD: 1.3 %
BILIRUB DIRECT SERPL-MCNC: 0.3 MG/DL
BILIRUB SERPL-MCNC: 0.8 MG/DL
DIFFERENTIAL METHOD: ABNORMAL
EOSINOPHIL # BLD AUTO: 0.2 K/UL
EOSINOPHIL NFR BLD: 3.5 %
ERYTHROCYTE [DISTWIDTH] IN BLOOD BY AUTOMATED COUNT: 13.3 %
HCT VFR BLD AUTO: 55.2 %
HGB BLD-MCNC: 18.3 G/DL
IMM GRANULOCYTES # BLD AUTO: 0.02 K/UL
IMM GRANULOCYTES NFR BLD AUTO: 0.4 %
LYMPHOCYTES # BLD AUTO: 2 K/UL
LYMPHOCYTES NFR BLD: 36.4 %
MCH RBC QN AUTO: 27.8 PG
MCHC RBC AUTO-ENTMCNC: 33.2 G/DL
MCV RBC AUTO: 84 FL
MONOCYTES # BLD AUTO: 0.7 K/UL
MONOCYTES NFR BLD: 13.3 %
NEUTROPHILS # BLD AUTO: 2.5 K/UL
NEUTROPHILS NFR BLD: 45.1 %
NRBC BLD-RTO: 0 /100 WBC
PLATELET # BLD AUTO: 255 K/UL
PMV BLD AUTO: 9.3 FL
PROT SERPL-MCNC: 7.6 G/DL
RBC # BLD AUTO: 6.58 M/UL
WBC # BLD AUTO: 5.5 K/UL

## 2018-12-13 PROCEDURE — 36415 COLL VENOUS BLD VENIPUNCTURE: CPT

## 2018-12-13 PROCEDURE — 80076 HEPATIC FUNCTION PANEL: CPT

## 2018-12-13 PROCEDURE — 85025 COMPLETE CBC W/AUTO DIFF WBC: CPT

## 2018-12-13 PROCEDURE — 99999 PR PBB SHADOW E&M-EST. PATIENT-LVL II: CPT | Mod: PBBFAC,,, | Performed by: PATHOLOGY

## 2018-12-13 PROCEDURE — 99214 OFFICE O/P EST MOD 30 MIN: CPT | Mod: S$GLB,,, | Performed by: PATHOLOGY

## 2018-12-13 NOTE — PROGRESS NOTES
Subjective:       Patient ID:  Jonathan Villela is a 58 y.o. male who presents for   Chief Complaint   Patient presents with    Follow-up     8wk f/u vincent      HPI  Complicated pt here for f/u for focally psoriasiform, focally lichenoid and spongiotic dermatitis.  Face now more red and pruritic.  Genital involvement persists and is worse than last visit, though it improved after the first 2 doses of Humira - has now had 3 doses.  Continued rash/itching to upper back.  Using locoid cream to groin/genitals and TAC cream to back.  GI symptoms persist (diarrhea, constipation) - has not seen GI doctor recently.  Labs today with mildly elevated LFTs, though patient had 2 holiday parties this week with greater than normal alcohol consumption.      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 At that time he was also on numerous herbal supplements, which he discontinued.  He was continued on several BP medications including valsartan, chlorthalidone, beta blockers (bystolic, carvedilol), and calcium channel blockers throughout 2016 to 2018. He continued to have episodes of severe migraines. Most recently he was started on aliskiren, and his rash seemed to worsen 3 months ago with worsening headaches. He has since stopped aliskiren 2 months ago; he now is only on amlodipine, carvedilol, and chlorthalidone with clonidine PRN for hypertension.  He was started on a new injectable migraine medication, and also has prescriptions for imitrex, frovatriptan, and eletriptan. He recalls taking at least imitrex fairly recently. He gets testosterone injections and pairs that with anastrozole to prevent gynecomastia. The only supplements he takes now is tumeric, and heart healthy vitamin supplement.      Biopsy result 10/4/2018  FINAL PATHOLOGIC DIAGNOSIS  1. Skin, right chest, punch biopsy:  - CHANGES MOST COMPATIBLE  WITH A DRUG ERUPTION.  MICROSCOPIC DESCRIPTION: The biopsy shows minimal focal parakeratosis alternating with compact  orthokeratosis. There is mild epidermal spongiosis and focal subtle vacuolar interface changes with rare apoptotic  keratinocytes that localize to multiple levels within the epidermis. The dermis is edematous. There is a superficial  and mid dermal perivascular and perifollicular lymphohistiocytic infiltrate with rarer neutrophils and mast cells. PAS  stain is negative for fungi. Appropriately reactive controls were reviewed. Multiple levels were examined.  2. Skin, right upper back, punch biopsy:  - SUBACUTE SPONGIOTIC DERMATITIS (See Comment).  MICROSCOPIC DESCRIPTION: The biopsy shows focal parakeratosis, epidermal acanthosis with elongation of  rete ridges, spongiosis with exocytosis of lymphocytes and a superficial perivascular and perifollicular dermal  lymphohistiocytic infiltrate with rare neutrophils. A majority of the superficial dermal and intraepidermal lymphocytes  stain positive for both CD3 and CD4, while CD8 stains a sparser population of cells. The approximate CD4 to CD8  ratio is 4:1. PAS stain is negative for fungi. Appropriately reactive controls were reviewed. Multiple levels were  examined.  COMMENT: These histologic features are compatible with an eczematous process such as atopic dermatitis, an  irritant or allergic contact dermatitis, or an id reaction. A drug eruption cannot be excluded. Clinical correlation is  essential.  3. Skin, right groin, punch biopsy:  - SPARSE SUPERFICIAL PERIVASCULAR DERMATITIS (SEE COMMENT).  MICROSCOPIC DESCRIPTION: Sections show a punch biopsy of skin extending to the level of the subcutis. The  stratum corneum consists of compact orthokeratosis. The granular layer is intact. The epidermis is largely  unremarkable, however, apoptotic keratinocytes are noted at multiple levels within the epidermis. Vacuolar  interface alteration is not  appreciated. Within the superficial dermis, there is a sparse perivascular lymphohistiocytic  infiltrate with scattered melanophages. PAS stain is negative for yeasts/fungi. Appropriately reactive controls were  reviewed. Multiple levels were examined.  COMMENT: These histologic features are mild and nonspecific. In the appropriate clinical context, they may  represent and resolving eczematous process with postinflammatory pigmentary alteration. Differential diagnosis  includes a viral exanthem or a morbilliform drug eruption. Clinical correlation is advised.  4. Skin, penile base, shave biopsy:  - LICHENOID AND SPONGIOTIC DERMATITIS (SEE COMMENT).  MICROSCOPIC DESCRIPTION: Sections show parakeratosis that is focally associated with neutrophils overlying  an area of epidermis exhibiting a diminished granular layer. There is mild epidermal spongiosis with exocytosis of  lymphocytes and Langerhans cells. Subtle vacuolar interface alteration is appreciated in association with rare  colloid bodies. The underlying superficial dermis contains a vaguely lichenoid and perivascular lymphohistiocytic  infiltrate with a few scattered neutrophils and a rare eosinophil. Erythrocyte extravasation is also noted within the  superficial dermis. PAS stain is negative for yeasts/fungi. A majority of the superficial dermal and intraepidermal  lymphocytes stain positive for both CD3 and CD4, while CD8 stains a sparser population of cells. The approximate  CD4 to CD8 ratio is 4:1. CD20 is negative, and CD30 stains only a few rare, widely scattered cells. CD1a shows  an essentially normal distribution of Langerhans cells within the involved epidermis. Appropriately reactive controls  were reviewed. Multiple levels were examined.  COMMENT: The differential diagnosis includes pityriasis lichenoides chronica versus a lichenoid drug eruption .  Clinical correlation is advised.       Review of Systems   Constitutional: Negative for fever,  chills, weight loss, weight gain, fatigue, night sweats and malaise.   Eyes: Negative for visual change.   Respiratory: Negative for cough and shortness of breath.    Gastrointestinal: Positive for diarrhea and constipation.   Musculoskeletal: Negative for joint swelling and arthralgias.   Skin: Positive for daily sunscreen use and activity-related sunscreen use. Negative for recent sunburn and abscesses.   Neurological: Negative for focal weakness, seizures and numbness.   Hematologic/Lymphatic: Does not bruise/bleed easily.        Objective:    Physical Exam       Diagram Legend     Erythematous scaling macule/papule c/w actinic keratosis       Vascular papule c/w angioma      Pigmented verrucoid papule/plaque c/w seborrheic keratosis      Yellow umbilicated papule c/w sebaceous hyperplasia      Irregularly shaped tan macule c/w lentigo     1-2 mm smooth white papules consistent with Milia      Movable subcutaneous cyst with punctum c/w epidermal inclusion cyst      Subcutaneous movable cyst c/w pilar cyst      Firm pink to brown papule c/w dermatofibroma      Pedunculated fleshy papule(s) c/w skin tag(s)      Evenly pigmented macule c/w junctional nevus     Mildly variegated pigmented, slightly irregular-bordered macule c/w mildly atypical nevus      Flesh colored to evenly pigmented papule c/w intradermal nevus       Pink pearly papule/plaque c/w basal cell carcinoma      Erythematous hyperkeratotic cursted plaque c/w SCC      Surgical scar with no sign of skin cancer recurrence      Open and closed comedones      Inflammatory papules and pustules      Verrucoid papule consistent consistent with wart     Erythematous eczematous patches and plaques     Dystrophic onycholytic nail with subungual debris c/w onychomycosis     Umbilicated papule    Erythematous-base heme-crusted tan verrucoid plaque consistent with inflamed seborrheic keratosis     Erythematous Silvery Scaling Plaque c/w Psoriasis     See  annotation      Assessment / Plan:        Encounter for long-term (current) use of high-risk medication  -     CBC auto differential; Future  -     Comprehensive metabolic panel; Future    Psoriasis with overlap of lichenoid/spongiotic dermatitis - not responding to Humira and topical steroids.  Overlap of morphologies remains suspicious for drug induced cause.    - Shake lotion to inguinal folds  - locoid cream to face, genitals  - TAC cream to back/body  - continue Humira for now  - would like to switch to MTX given clinical and histologic overlap, but pt's LFTs mildly elevated.  Plan to d/c alcohol after New Years.  Will re-check labs in 1 month and consider switch to MTX  - pt urged to contact GI doctor for appt              Follow-up in about 4 weeks (around 1/10/2019).

## 2018-12-14 ENCOUNTER — TELEPHONE (OUTPATIENT)
Dept: NEUROLOGY | Facility: CLINIC | Age: 58
End: 2018-12-14

## 2018-12-14 ENCOUNTER — OFFICE VISIT (OUTPATIENT)
Dept: GASTROENTEROLOGY | Facility: CLINIC | Age: 58
End: 2018-12-14
Payer: COMMERCIAL

## 2018-12-14 ENCOUNTER — TELEPHONE (OUTPATIENT)
Dept: ENDOSCOPY | Facility: HOSPITAL | Age: 58
End: 2018-12-14

## 2018-12-14 ENCOUNTER — ANESTHESIA (OUTPATIENT)
Dept: ENDOSCOPY | Facility: HOSPITAL | Age: 58
End: 2018-12-14
Payer: COMMERCIAL

## 2018-12-14 ENCOUNTER — HOSPITAL ENCOUNTER (OUTPATIENT)
Facility: HOSPITAL | Age: 58
Discharge: HOME | End: 2018-12-14
Attending: INTERNAL MEDICINE | Admitting: INTERNAL MEDICINE
Payer: COMMERCIAL

## 2018-12-14 ENCOUNTER — ANESTHESIA EVENT (OUTPATIENT)
Dept: ENDOSCOPY | Facility: HOSPITAL | Age: 58
End: 2018-12-14
Payer: COMMERCIAL

## 2018-12-14 VITALS
HEART RATE: 62 BPM | OXYGEN SATURATION: 97 % | SYSTOLIC BLOOD PRESSURE: 126 MMHG | WEIGHT: 251 LBS | RESPIRATION RATE: 18 BRPM | HEIGHT: 73 IN | TEMPERATURE: 98 F | DIASTOLIC BLOOD PRESSURE: 86 MMHG | BODY MASS INDEX: 33.27 KG/M2

## 2018-12-14 VITALS
BODY MASS INDEX: 33.27 KG/M2 | DIASTOLIC BLOOD PRESSURE: 78 MMHG | SYSTOLIC BLOOD PRESSURE: 112 MMHG | WEIGHT: 251 LBS | HEART RATE: 67 BPM | HEIGHT: 73 IN

## 2018-12-14 DIAGNOSIS — R10.12 LUQ PAIN: ICD-10-CM

## 2018-12-14 DIAGNOSIS — R14.0 ABDOMINAL BLOATING: ICD-10-CM

## 2018-12-14 DIAGNOSIS — K26.9 DUODENAL EROSION: ICD-10-CM

## 2018-12-14 DIAGNOSIS — K58.1 IRRITABLE BOWEL SYNDROME WITH CONSTIPATION: ICD-10-CM

## 2018-12-14 DIAGNOSIS — R10.9 ABDOMINAL PAIN, UNSPECIFIED ABDOMINAL LOCATION: Primary | ICD-10-CM

## 2018-12-14 DIAGNOSIS — K21.9 GASTROESOPHAGEAL REFLUX DISEASE WITHOUT ESOPHAGITIS: Primary | ICD-10-CM

## 2018-12-14 DIAGNOSIS — K21.9 GASTROESOPHAGEAL REFLUX DISEASE, ESOPHAGITIS PRESENCE NOT SPECIFIED: Primary | ICD-10-CM

## 2018-12-14 DIAGNOSIS — R10.11 RUQ ABDOMINAL PAIN: ICD-10-CM

## 2018-12-14 DIAGNOSIS — R10.9 ABDOMINAL PAIN: ICD-10-CM

## 2018-12-14 PROCEDURE — 99999 PR PBB SHADOW E&M-EST. PATIENT-LVL V: CPT | Mod: PBBFAC,,, | Performed by: PHYSICIAN ASSISTANT

## 2018-12-14 PROCEDURE — 63600175 PHARM REV CODE 636 W HCPCS: Performed by: NURSE ANESTHETIST, CERTIFIED REGISTERED

## 2018-12-14 PROCEDURE — 88305 TISSUE EXAM BY PATHOLOGIST: CPT | Mod: 59

## 2018-12-14 PROCEDURE — 37000009 HC ANESTHESIA EA ADD 15 MINS: Performed by: INTERNAL MEDICINE

## 2018-12-14 PROCEDURE — 43239 EGD BIOPSY SINGLE/MULTIPLE: CPT | Performed by: INTERNAL MEDICINE

## 2018-12-14 PROCEDURE — 88305 TISSUE EXAM BY PATHOLOGIST: CPT | Mod: 26,,, | Performed by: PATHOLOGY

## 2018-12-14 PROCEDURE — 37000008 HC ANESTHESIA 1ST 15 MINUTES: Performed by: INTERNAL MEDICINE

## 2018-12-14 PROCEDURE — 27201012 HC FORCEPS, HOT/COLD, DISP: Performed by: INTERNAL MEDICINE

## 2018-12-14 PROCEDURE — 99214 OFFICE O/P EST MOD 30 MIN: CPT | Mod: S$GLB,,, | Performed by: PHYSICIAN ASSISTANT

## 2018-12-14 PROCEDURE — 43239 EGD BIOPSY SINGLE/MULTIPLE: CPT | Mod: ,,, | Performed by: INTERNAL MEDICINE

## 2018-12-14 PROCEDURE — 88342 IMHCHEM/IMCYTCHM 1ST ANTB: CPT | Mod: 26,,, | Performed by: PATHOLOGY

## 2018-12-14 PROCEDURE — 25000003 PHARM REV CODE 250: Performed by: NURSE ANESTHETIST, CERTIFIED REGISTERED

## 2018-12-14 PROCEDURE — 25000003 PHARM REV CODE 250: Performed by: INTERNAL MEDICINE

## 2018-12-14 PROCEDURE — E9220 PRA ENDO ANESTHESIA: HCPCS | Mod: ,,, | Performed by: NURSE ANESTHETIST, CERTIFIED REGISTERED

## 2018-12-14 RX ORDER — SODIUM CHLORIDE 0.9 % (FLUSH) 0.9 %
3 SYRINGE (ML) INJECTION
Status: DISCONTINUED | OUTPATIENT
Start: 2018-12-14 | End: 2018-12-14 | Stop reason: HOSPADM

## 2018-12-14 RX ORDER — LIDOCAINE HCL/PF 100 MG/5ML
SYRINGE (ML) INTRAVENOUS
Status: DISCONTINUED | OUTPATIENT
Start: 2018-12-14 | End: 2018-12-14

## 2018-12-14 RX ORDER — SODIUM CHLORIDE 9 MG/ML
INJECTION, SOLUTION INTRAVENOUS CONTINUOUS
Status: DISCONTINUED | OUTPATIENT
Start: 2018-12-14 | End: 2018-12-14 | Stop reason: HOSPADM

## 2018-12-14 RX ORDER — PROPOFOL 10 MG/ML
VIAL (ML) INTRAVENOUS
Status: DISCONTINUED | OUTPATIENT
Start: 2018-12-14 | End: 2018-12-14

## 2018-12-14 RX ORDER — GLYCOPYRROLATE 0.2 MG/ML
INJECTION INTRAMUSCULAR; INTRAVENOUS
Status: DISCONTINUED | OUTPATIENT
Start: 2018-12-14 | End: 2018-12-14

## 2018-12-14 RX ADMIN — PROPOFOL 30 MG: 10 INJECTION, EMULSION INTRAVENOUS at 03:12

## 2018-12-14 RX ADMIN — PROPOFOL 50 MG: 10 INJECTION, EMULSION INTRAVENOUS at 03:12

## 2018-12-14 RX ADMIN — SODIUM CHLORIDE: 0.9 INJECTION, SOLUTION INTRAVENOUS at 02:12

## 2018-12-14 RX ADMIN — PROPOFOL 10 MG: 10 INJECTION, EMULSION INTRAVENOUS at 03:12

## 2018-12-14 RX ADMIN — PROPOFOL 90 MG: 10 INJECTION, EMULSION INTRAVENOUS at 03:12

## 2018-12-14 RX ADMIN — LIDOCAINE HYDROCHLORIDE 100 MG: 20 INJECTION, SOLUTION INTRAVENOUS at 03:12

## 2018-12-14 RX ADMIN — PROPOFOL 20 MG: 10 INJECTION, EMULSION INTRAVENOUS at 03:12

## 2018-12-14 RX ADMIN — GLYCOPYRROLATE 0.2 MG: 0.2 INJECTION, SOLUTION INTRAMUSCULAR; INTRAVENOUS at 03:12

## 2018-12-14 NOTE — TRANSFER OF CARE
"Anesthesia Transfer of Care Note    Patient: Jonathan Villela    Procedure(s) Performed: Procedure(s) (LRB):  EGD (ESOPHAGOGASTRODUODENOSCOPY) (N/A)    Patient location: PACU    Anesthesia Type: general    Transport from OR: Transported from OR on 2-3 L/min O2 by NC with adequate spontaneous ventilation    Post pain: adequate analgesia    Post assessment: no apparent anesthetic complications and tolerated procedure well    Post vital signs: stable    Level of consciousness: sedated    Nausea/Vomiting: no nausea/vomiting    Complications: none    Transfer of care protocol was followed      Last vitals:   Visit Vitals  BP (!) 140/91   Pulse 63   Temp 36.4 °C (97.5 °F) (Temporal)   Resp 16   Ht 6' 1" (1.854 m)   Wt 113.9 kg (251 lb)   SpO2 (!) 93%   BMI 33.12 kg/m²     "

## 2018-12-14 NOTE — PROGRESS NOTES
[facility] Consultation Note    Reason for Consult:  The primary encounter diagnosis was Gastroesophageal reflux disease without esophagitis. Diagnoses of LUQ pain, RUQ abdominal pain, Abdominal bloating, Irritable bowel syndrome with constipation, and Duodenal erosion were also pertinent to this visit.    PCP:   Manish Joel       Referring MD:  No referring provider defined for this encounter.    HPI:  This is a 58 y.o. male here for evaluation of a number of GI issues  He admits to being diagnosed with Chron's disease in the past, and then later told he did not have chron's disease    Has chronic abdominal bloating   LUQ and RUQ Cramping  LUQ is daily, after meals, radiates to the back on occasion, moderate in severity  occasional stabbing lower abdominal pain after meals  Relief with bentyl  Also has constipation  May go 2-3 days without a BM,then will pass a hard stool, occasional diarrhea  Small amounts of blood in stools with straining couple times a year    Taking [employer] probiotics  Denies melena    Has frequent GERD  Taking protonix 40mg 3 times a month when GERD is severe       Drinks 60 oz of water daily  Tea, 1 cup of coffee daily, stevia  Milk, and ice cream- give him diarrhea    Travel - [employer] 2012    Denies dysuria    Denies naisds use    On Humira for his psoriasis, he does not think it has helped his GI issues    ROS:  Constitutional: No fevers, chills, No weight loss  ENT: No allergies  CV: No chest pain  Pulm: No cough, No shortness of breath  Ophtho: No vision changes  GI: see HPI  Derm: +psoriasis  Heme: No lymphadenopathy, No bruising  MSK: No arthritis  : No dysuria, No hematuria  Endo: No hot or cold intolerance  Neuro: No syncope, No seizure  Psych: No anxiety, No depression    Medical History:  has a past medical history of Acute gastric ulcer with hemorrhage (2/15/2017), Bilateral occipital neuralgia, BPH with urinary obstruction (12/31/2015), Chronic  constipation, Colitis (4638-9198), Diverticulitis, Essential hypertension, Gastroesophageal reflux disease without esophagitis (2/11/2017), Migraine without aura and without status migrainosus, not intractable (1/31/2014), Obstructive sleep apnea syndrome (2/9/2015), and PUD (peptic ulcer disease).    Surgical History:  has a past surgical history that includes Leg Surgery; Nasal septum surgery; Cholecystectomy; Esophagogastroduodenoscopy; Upper gastrointestinal endoscopy; ESOPHAGOGASTRODUODENOSCOPY (EGD) (N/A, 2/17/2017); COLONOSCOPY (N/A, 2/17/2017); and ESOPHAGOGASTRODUODENOSCOPY (EGD) (N/A, 2/11/2017).    Family History: family history includes Crohn's disease in his brother, father, sister, and unknown relative; Cystic fibrosis in his maternal uncle; Heart disease in his father; Hypertension in his mother; Kidney disease in his mother; Thyroid disease in his mother..     Social History:  reports that  has never smoked. he has never used smokeless tobacco. He reports that he drinks alcohol. He reports that he does not use drugs.    Review of patient's allergies indicates:   Allergen Reactions    Triamterene      Back and lower abdominal pain       Current Outpatient Medications on File Prior to Visit   Medication Sig Dispense Refill    acetic acid-hydrocortisone (VOSOL-HC) otic solution Place 1-2 drops into both ears as needed.       acyclovir (ZOVIRAX) 400 MG tablet Take 400 mg by mouth 2 (two) times daily as needed.      adalimumab (HUMIRA PEN PSORIASIS-UVEITIS) 40 mg/0.8 mL PnKt Inject 2-40mg (80mg) SQ on day 0 then 1-40mg SQ on day 7 then 40mg SQ qoweek 4 pen 0    adalimumab (HUMIRA PEN) 40 mg/0.8 mL PnKt Inject 1 pen SQ qoweek 2 pen 10    albuterol 90 mcg/actuation inhaler Inhale 2 puffs into the lungs every 6 (six) hours as needed for Wheezing or Shortness of Breath. Rescue 1 Inhaler 0    amLODIPine (NORVASC) 2.5 MG tablet Taking 1 tablet once a day  11    anastrozole (ARIMIDEX) 1 mg Tab TK 1 T PO  "QD  6    BD INTEGRA SYRINGE 3 mL 22 gauge x 1 1/2" Syrg USE TO DRAW UP TESTOSTERONE  6    BD PRECISIONGLIDE 25 gauge x 1" Ndle USE TO INJECT TESTOSERTONE  6    BD REGULAR BEVEL NEEDLES 25 gauge x 5/8" Ndle USE TO INJECT TESTOSTERONE  6    betamethasone valerate 0.1% (VALISONE) 0.1 % Crea betamethasone valerate 0.1 % topical cream as needed      butalbital-acetaminophen-caffeine -40 mg (FIORICET, ESGIC) -40 mg per tablet as needed      chlorthalidone (HYGROTEN) 25 MG Tab TAKE 1 TABLET(25 MG) BY MOUTH EVERY DAY 30 tablet 11    ciclopirox (LOPROX) 0.77 % Crea Apply topically 2 (two) times daily. Pharmacist: mix 30 g of TAC 0.1% cream with 30 g of Loprox cream in 120 cc of milk of magnesia (Patient taking differently: Apply topically as needed. Pharmacist: mix 30 g of TAC 0.1% cream with 30 g of Loprox cream in 120 cc of milk of magnesia) 30 g 3    cloNIDine (CATAPRES) 0.1 MG tablet Take 0.1 mg by mouth as needed.   3    diclofenac (VOLTAREN) 75 MG EC tablet Take 1 tablet (75 mg total) by mouth 2 (two) times daily with meals. 60 tablet 2    dicyclomine (BENTYL) 10 MG capsule Take 1 capsule (10 mg total) by mouth 3 (three) times daily as needed (abdominal cramping). 30 capsule 1    eletriptan (RELPAX) 40 MG tablet as needed      erenumab-aooe (AIMOVIG AUTOINJECTOR) 70 mg/mL AtIn Inject 2 mLs (140 mg total) into the skin every 28 days. 2 Syringe 5    fluocinolone acetonide oil (DERMOTIC OIL) 0.01 % Drop Place 3 drops in ear(s) 2 (two) times daily. (Patient taking differently: Place 3 drops in ear(s) as needed. ) 1 Bottle 3    fluocinonide (LIDEX) 0.05 % external solution AAA scalp qday - bid prn pruritus 60 mL 3    fluticasone (FLONASE) 50 mcg/actuation nasal spray 1 spay each nasal as needed      frovatriptan (FROVA) 2.5 MG tablet as needed      hydrocortisone butyrate (LOCOID) 0.1 % Crea cream AAA bid to affected areas of groin/genitals 60 g 3    ketoconazole (NIZORAL) 2 % shampoo Wash hair " "with medicated shampoo at least 2x/week - let sit on scalp at least 5 minutes prior to rinsing 120 mL 5    metronidazole 1% (METROGEL) 1 % Gel Apply at bedtime as directed as needed for skin infection  0    mometasone (ELOCON) 0.1 % lotion as needed      ondansetron (ZOFRAN-ODT) 8 MG TbDL Take 1 tablet (8 mg total) by mouth every 6 (six) hours as needed (nausea). 20 tablet 3    pantoprazole (PROTONIX) 40 MG tablet Take 1 tablet (40 mg total) by mouth before breakfast. (Patient taking differently: Take 40 mg by mouth as needed. ) 90 tablet 3    pravastatin (PRAVACHOL) 80 MG tablet Take 1 tablet (80 mg total) by mouth once daily. 90 tablet 3    sumatriptan (IMITREX STATDOSE) 6 mg/0.5 mL kit Inject into the skin every 2 (two) hours as needed.   2    sumatriptan (IMITREX) 100 MG tablet Take 1 tablet (100 mg total) by mouth every 2 (two) hours as needed. 9 tablet 3    testosterone cypionate (DEPOTESTOTERONE CYPIONATE) 200 mg/mL injection Inject into the muscle once a week.       triamcinolone acetonide 0.1% (KENALOG) 0.1 % cream AAA bid 454 g 3    TURMERIC ORAL Take by mouth once daily.      UNABLE TO FIND Take by mouth once daily. Healthy heart supplement      urea (CARMOL) 40 % Crea Apply topically 2 (two) times daily. To affected areas of elbows (Patient taking differently: Apply topically as needed. To affected areas of elbows) 85 g 3    hydrocortisone 2.5 % cream Apply topically 2 (two) times daily. To affected areas of face (Patient taking differently: Apply topically as needed. To affected areas of face) 30 g 5     No current facility-administered medications on file prior to visit.          Objective Findings:    Vital Signs:  /78   Pulse 67   Ht 6' 1" (1.854 m)   Wt 113.9 kg (251 lb)   BMI 33.12 kg/m²   Body mass index is 33.12 kg/m².    Physical Exam:  General Appearance: Well appearing in no acute distress  Head:   Normocephalic, without obvious abnormality  Eyes:    No scleral " icterus  ENT: Neck supple, Lips, mucosa, and tongue normal  Lungs: CTA bilaterally in anterior and posterior fields, no wheezes, no crackles.  Heart:  Regular rate and rhythm, S1, S2 normal, no murmurs heard  Abdomen: Soft, RLQ and Rt inguinal tenderness,no guarding or rebound tenderness, non distended with positive bowel sounds in all four quadrants.   Extremities: no edema  Skin: No rash  Neurologic: AAO x 3      Labs:  Lab Results   Component Value Date    WBC 5.50 12/13/2018    HGB 18.3 (H) 12/13/2018    HCT 55.2 (H) 12/13/2018     12/13/2018    CHOL 158 04/17/2018    TRIG 226 (H) 04/17/2018    HDL 31 (L) 04/17/2018    ALT 69 (H) 12/13/2018    AST 47 (H) 12/13/2018     10/18/2018    K 4.2 10/18/2018     10/18/2018    CREATININE 1.3 10/18/2018    BUN 23 (H) 10/18/2018    CO2 28 10/18/2018    TSH 2.398 04/17/2018    PSA 1.2 10/15/2018    INR 1.1 02/14/2017    HGBA1C 5.4 08/29/2014       Imaging:    Endoscopy:    12/14/18 EGD - Normal esophagus.                        - LA Grade A esophagitis.                        - Z-line regular.                        - Erythematous mucosa in the stomach. Biopsied.                        - A single gastric polyp. Biopsied.                        - Duodenal erosions.                        Biopsies are still pending    2/2017 - Normal examined duodenum.                        - Erythematous mucosa in the prepyloric region of                         the stomach.                        - 1 cm hiatus hernia.    2/2017 colonoscopy - normal ileum, normal colon, tics, f/u in 5yrs    Assessment:  1. Gastroesophageal reflux disease without esophagitis    2. LUQ pain    3. RUQ abdominal pain    4. Abdominal bloating    5. Irritable bowel syndrome with constipation    6. Duodenal erosion       59yo M with GERD and duodenal erosions presents with uncontrolled GERD and abdominal pain after meals. He also has chronic constipation with chronic abdominal bloating    He  "reports being diagnosed with chron's disease in the past. He is concerned about the "inflammation" in his intestines.  is on humira for psoriasis without relief of hie GI symptoms    Recommendations:  1. Stool studies to rule out infection, inflammation, and malabsorption  2. Labs to rule out H. Pylori  3. Start linzess 145mcg for constipation  4. Advise he drop off his records from his previous GI who diagnosed with with chron's disease    His EGD Bx is still pending    Follow-up in about 2 months (around 2/14/2019). To address pt's concerns about potentially having crohn's.      Order summary:  Orders Placed This Encounter    Stool culture    Calprotectin    Stool Exam-Ova,Cysts,Parasites    Stool Exam-Ova,Cysts,Parasites    Stool Exam-Ova,Cysts,Parasites    Giardia / Cryptosporidum, EIA    Giardia / Cryptosporidum, EIA    Giardia / Cryptosporidum, EIA    Fecal fat, qualitative    H. PYLORI ANTIBODY, IGG    linaclotide (LINZESS) 145 mcg Cap capsule         Thank you so much for allowing me to participate in the care of Jonathan Hampton PA-C        "

## 2018-12-14 NOTE — ANESTHESIA PREPROCEDURE EVALUATION
12/14/2018  Jonathan iVllela is a 58 y.o., male.    Anesthesia Evaluation    I have reviewed the Patient Summary Reports.     I have reviewed the Medications.     Review of Systems  Anesthesia Hx:  No problems with previous Anesthesia Denies Hx of Anesthetic complications   Denies Personal Hx of Anesthesia complications.   Social:  Non-Smoker, No Alcohol Use    Hematology/Oncology:  Hematology Normal   Oncology Normal     EENT/Dental:EENT/Dental Normal   Cardiovascular:  Cardiovascular Normal Hypertension  DICKINSON ECG has been reviewed.    Pulmonary:   Shortness of breath Sleep Apnea    Renal/:  Renal/ Normal     Hepatic/GI:   PUD, GERD    Musculoskeletal:  Musculoskeletal Normal    Neurological:   Headaches    Endocrine:  Endocrine Normal    Dermatological:  Skin Normal    Psych:  Psychiatric Normal           Physical Exam  General:  Well nourished    Airway/Jaw/Neck:  Airway Findings: Mouth Opening: Normal Tongue: Normal  General Airway Assessment: Adult  Mallampati: II  TM Distance: Normal, at least 6 cm        Eyes/Ears/Nose:  EYES/EARS/NOSE FINDINGS: Normal   Dental:  Dental Findings: In tact   Chest/Lungs:  Chest/Lungs Clear    Heart/Vascular:  Heart Findings: Normal Heart murmur: negative Vascular Findings: Normal    Abdomen:  Abdomen Findings: Normal    Musculoskeletal:  Musculoskeletal Findings: Normal   Skin:  Skin Findings: Normal    Mental Status:  Mental Status Findings: Normal        Anesthesia Plan  Type of Anesthesia, risks & benefits discussed:  Anesthesia Type:  general  Patient's Preference: General  Intra-op Monitoring Plan: standard ASA monitors  Intra-op Monitoring Plan Comments:   Post Op Pain Control Plan:   Post Op Pain Control Plan Comments:   Induction:   IV  Beta Blocker:  Patient is not currently on a Beta-Blocker (No further documentation required).       Informed Consent:  Patient understands risks and agrees with Anesthesia plan.  Questions answered. Anesthesia consent signed with patient.  ASA Score: 2     Day of Surgery Review of History & Physical:    H&P update referred to the surgeon.         Ready For Surgery From Anesthesia Perspective.

## 2018-12-14 NOTE — DISCHARGE INSTRUCTIONS

## 2018-12-14 NOTE — TELEPHONE ENCOUNTER
----- Message from Elisabet Leblanc sent at 12/14/2018  3:49 PM CST -----  Contact: [facility]                          Name of Who is Calling: MARY WORKMAN [[id number]]      What is the request in detail: PP needs clinical notes, medical necessity form sent to [phone].. Please advise    Can the clinic reply by [facility]: no      What [facility] to [facility] Back [facility]: [phone]

## 2018-12-14 NOTE — PATIENT INSTRUCTIONS
Start taking the protonix 40mg once daily 30 mins before breakfast    continue taking a lactose-free probiotic    Drop you records off at the office prior to your visit with Dr Erazo    For GERD:    Take your PPI 30-45 minutes before your first protein containing meal (breakfast) every day.    Remain upright for at least 3 hours after eating.    Elevate the head of the bead about 6 inches.  Some patients place cinder blocks under the head of the bed for elevation.    Avoid foods that you have noticed make your symptoms worse.    Set a weight loss goal of 10% of your body weight. (For example, if you weigh 150 lbs, your goal should be to loose 15 lbs).    Http://www.refluxcookbook.com/  Dropping Acid The Reflux Diet Cookbook and Camila -  Jd Senior M.D.    GERD  Worst Foods for Acid Reflux  Chocolate (milk chocolate worse than dark chocolate)  Soda (all carbonated beverages)  Alcohol (beer, liquor, wine)  Fried foods  Grover, sausage, ribs  Cream sauce  Fatty meats (beef)  Butter, margarine, lard, shortening  Coffee, tea  Mint   High fat nuts  Hot sauces and pepper  Citrus fruit/juices      Acidic foods (pH - 1 is MORE acidic, 5 is LESS acidic)     Do not eat or drink these (lower numbers are worse)    Induction diet - For 2 weeks eat nothing below pH 5     Lemon juice 2.3  Grape cranberry juice 2.5  Stomach Acid 2.5  Gelatin Dessert 2.6  Lemon/lime 2.9/2.7  Vinegar 2.9  Gatorade 3.0  Fruits - plums, apricots, strawberries, cherries 3.0  Vitamin C (ascorbic acid) 3.0  Iced tea, Snapple 3.1  Mustard 3.2  Soft drinks 3.3  Nectarines 3.3  Pomegranate 3.3  Applesauce 3.4  Grapefruit 3.4  Kiwi 3.4  Barbecue sauce 3.4  Caesar dressing 3.5  [place] island dressing 3.6  Strawberries 3.5  Pineapple juice 3.5  Beer 3.5  Wine 3.5  Grape 3.6  Apples 3.6  Pineapple 3.7  Pickle 3.7  Blackberries, blueberries 3.7  Singer 3.7  Orange 3.8  Cherries 3.9  Red Bull 3.9  Tomatoes 4.2  Coffee 5.1      These are Safe  foods:  Agave  Aloe Vera  Apple (only red)  Bagels  Banana (worsens reflux in 1%)  Beans - black, red, lima, lentils  Bread - whole grain, rye  Caramel  Celery  Chamomile tea  Chicken - skinless, never fried  Chicken stock or bouillon  Coffee - one cup/day with milk  Fennel  Fish  Mari  Green vegetables (no green peppers)  Herbs  Honey  Melon  Milk - skin, soy, or Lactaid skim milk  Mushrooms  Oatmeal  Olive oil  Parsley  Pasta  Pears  Popcorn  Potatoes  Red bell peppers  Rice  Soups  Tofu  Turkey Breast  Turnip  Vegetables - no onion, tomatoes, peppers  Vinaigrette  Water - non carbonated  Whole grain breads, crackers, breakfast cereals      Best Foods for Acid Reflux  Whole grain breads  Oatmeal  Aloe Vera  Salad (no tomatoes, onions, cheese, or high fat dressing)  Banana  Melon  Fennel  Chicken and turkey (skinless, never fried)  Fish/seafood (never fried)  Celery  Parsley  Couscous and Rice    Maybe bad foods (Everyone is unique)  Tomatoes  Garlic  Onion  Nuts (macadamia nuts)  Apples (especially green)  Cucumber  Green peppers  Spicy food  Some herbal teas    GERD tips  Change what you eat:  Eat smaller meals  Eat slowly and chew thoroughly until food is almost liquid  Cut down on junk carbohydrates such as sugar and white flour  Use herbs in your cooking  Eat more raw foods (more than 10 ingredients is not a raw food)  Avoid trans fats and partially hydrogenated oils  Eat more fish and switch to grass fed beef  Switch your cooking oil to macadamia nut or olive oil  Watch extremes of salt intake (too high or too low is bad)    If just cutting out acidic foods is not enough, change how you eat:  Large breakfast, medium lunch, light dinner  Dont mix fruit juices, sweet fruits, and refined starches with meats and heavy food  Dont wash your food down with a lot of liquid    List A Proteins - meat, poultry, cheese, eggs, fish, beans, yogurt    List B Neutral - vegetables, salads, seeds, nuts, herbs, cream,  butter, olive oil    List C Starches - biscuit, breads, cake, crackers, oats, pasta, potatoes, rice, sugar/honey, sweets    A + B = ok  B + C = ok  Never mix list A and C!!    Change these habits:  Stop smoking  Eat dinner earlier (3-4 hours before lying down to sleep)  Elevate the head of your bed 6 inches (blocks under the head of the bed are better than pillows)  Exercise (but wait 2 hours after eating)  Drink more water (between meals)    Take these supplements:  Multi vitamin  Probiotic  Fish oil    Most common food allergens: milk, eggs, peanuts, tree nuts, fish, shellfish, wheat, and soy    All natural immediate relief:  Chew 2-3 soft probiotic capsules - Dr. Garzon's Probiotics 12 Plus  Chew chewable DGL licorice tablet  Chew papaya tablet with high protein meal - [employer]  Drink 2 ounces of aloe vera juice  Swedish bitters  Prelief- reduce the acid in food to keep it form burning sensitive tissue  Iberogast  [place]  Drink Chamomile Tea  Teaspoon of baking soda in water  Spoonful of vinegar in water      All natural ulcer healers:  Zinc carnosine - 75.5 mg with food twice a day x 8 weeks   Martita Montez - $8 for 60 pills  DGL (deglycyrrhizinated licorice) - 2 tablets before meals. Heals stomach lining   Natural Factors brand, Enzymatic therapy brand.  Aloe Vera juice  - 2 to 8 ounces a day   [place] or [place]

## 2018-12-14 NOTE — [employer] PATIENT INSTRUCTIONS
Discharge Summary/Instructions after an Endoscopic Procedure  Patient Name: Jonathan Villela  Patient MRN: [id number]  Patient YOB: 1960 Friday, December 14, 2018  Lexii Carlson MD  RESTRICTIONS:  During your procedure today, you received medications for sedation.  These   medications may affect your judgment, balance and coordination.  Therefore,   for 24 hours, you have the following restrictions:   - DO NOT drive a car, operate machinery, make legal/financial decisions,   sign important papers or drink alcohol.    ACTIVITY:  Today: no heavy lifting, straining or running due to procedural   sedation/anesthesia.  The following day: return to full activity including work.  DIET:  Eat and drink normally unless instructed otherwise.     TREATMENT FOR COMMON SIDE EFFECTS:  - Mild abdominal pain, nausea, belching, bloating or excessive gas:  rest,   eat lightly and use a heating pad.  - Sore Throat: treat with throat lozenges and/or gargle with warm salt   water.  - Because air was used during the procedure, expelling large amounts of air   from your rectum or belching is normal.  - If a bowel prep was taken, you may not have a bowel movement for 1-3 days.    This is normal.  SYMPTOMS TO WATCH FOR AND REPORT TO YOUR PHYSICIAN:  1. Abdominal pain or bloating, other than gas cramps.  2. Chest pain.  3. Back pain.  4. Signs of infection such as: chills or fever occurring within 24 hours   after the procedure.  5. Rectal bleeding, which would show as bright red, maroon, or black stools.   (A tablespoon of blood from the rectum is not serious, especially if   hemorrhoids are present.)  6. Vomiting.  7. Weakness or dizziness.  GO DIRECTLY TO THE NEAREST EMERGENCY ROOM IF YOU HAVE ANY OF THE FOLLOWING:      Difficulty breathing              Chills and/or fever over 101 F   Persistent vomiting and/or vomiting blood   Severe abdominal pain   Severe chest pain   Black, tarry stools   Bleeding- more than one  tablespoon   Any other symptom or condition that you feel may need urgent attention  Your doctor recommends these additional instructions:  If any biopsies were taken, your doctors clinic will contact you in 1 to 2   weeks with any results.  - Await pathology results.   - Discharge patient to home (with escort).   - Resume previous diet.   - Continue present medications.   - The findings and recommendations were discussed with the patient.   - Patient has a contact number available for emergencies.  The signs and   symptoms of potential delayed complications were discussed with the   patient.  Return to normal activities tomorrow.  Written discharge   instructions were provided to the patient.   - Return to referring physician as previously scheduled.  For questions, problems or results please call your physician - Lexii Carlson MD at Work:  [phone].  [facility] [place], EMERGENCY ROOM PHONE NUMBER: [phone]  IF A COMPLICATION OR EMERGENCY SITUATION ARISES AND YOU ARE UNABLE TO REACH   YOUR PHYSICIAN - GO DIRECTLY TO THE EMERGENCY ROOM.  Lexii Carlson MD  12/14/2018 3:42:19 PM  This report has been verified and signed electronically.  [employer]

## 2018-12-15 ENCOUNTER — PATIENT MESSAGE (OUTPATIENT)
Dept: GASTROENTEROLOGY | Facility: CLINIC | Age: 58
End: 2018-12-15

## 2018-12-17 ENCOUNTER — LAB VISIT (OUTPATIENT)
Dept: LAB | Facility: HOSPITAL | Age: 58
End: 2018-12-17
Payer: COMMERCIAL

## 2018-12-17 DIAGNOSIS — R14.0 ABDOMINAL BLOATING: ICD-10-CM

## 2018-12-17 PROCEDURE — 87328 CRYPTOSPORIDIUM AG IA: CPT

## 2018-12-17 PROCEDURE — 87209 SMEAR COMPLEX STAIN: CPT

## 2018-12-17 PROCEDURE — 87046 STOOL CULTR AEROBIC BACT EA: CPT

## 2018-12-17 PROCEDURE — 87329 GIARDIA AG IA: CPT | Mod: 59

## 2018-12-18 LAB
CRYPTOSP AG STL QL IA: NEGATIVE
FAT STL SUDAN IV STN: NORMAL
G LAMBLIA AG STL QL IA: NEGATIVE
O+P STL TRI STN: NORMAL

## 2018-12-18 NOTE — ANESTHESIA RELEASE NOTE
"Anesthesia Release from PACU Note    Patient: Jonathan Villela    Procedure(s) Performed: Procedure(s) (LRB):  EGD (ESOPHAGOGASTRODUODENOSCOPY) (N/A)    Anesthesia type: general    Post pain: Adequate analgesia    Post assessment: no apparent anesthetic complications and tolerated procedure well    Last Vitals:   Visit Vitals  /86   Pulse 62   Temp 36.5 °C (97.7 °F)   Resp 18   Ht 6' 1" (1.854 m)   Wt 113.9 kg (251 lb)   SpO2 97%   BMI 33.12 kg/m²       Post vital signs: stable    Level of consciousness: awake and alert     Nausea/Vomiting: no nausea/no vomiting    Complications: none    Airway Patency: patent    Respiratory: unassisted, spontaneous ventilation, room air    Cardiovascular: stable and blood pressure at baseline    Hydration: euvolemic  "

## 2018-12-18 NOTE — ANESTHESIA POSTPROCEDURE EVALUATION
"Anesthesia Post Evaluation    Patient: Jonathan Villela    Procedure(s) Performed: Procedure(s) (LRB):  EGD (ESOPHAGOGASTRODUODENOSCOPY) (N/A)    Final Anesthesia Type: general  Patient location during evaluation: GI PACU  Patient participation: Yes- Able to Participate  Level of consciousness: awake and alert  Post-procedure vital signs: reviewed and stable  Pain management: adequate  Airway patency: patent  PONV status at discharge: No PONV  Anesthetic complications: no      Cardiovascular status: hemodynamically stable  Respiratory status: unassisted, spontaneous ventilation and room air  Hydration status: euvolemic  Follow-up not needed.        Visit Vitals  /86   Pulse 62   Temp 36.5 °C (97.7 °F)   Resp 18   Ht 6' 1" (1.854 m)   Wt 113.9 kg (251 lb)   SpO2 97%   BMI 33.12 kg/m²       Pain/Sara Score: No Data Recorded      "

## 2018-12-19 LAB
E COLI SXT1 STL QL IA: NEGATIVE
E COLI SXT2 STL QL IA: NEGATIVE

## 2018-12-20 ENCOUNTER — PATIENT MESSAGE (OUTPATIENT)
Dept: FAMILY MEDICINE | Facility: CLINIC | Age: 58
End: 2018-12-20

## 2018-12-20 LAB — BACTERIA STL CULT: NORMAL

## 2018-12-21 ENCOUNTER — TELEPHONE (OUTPATIENT)
Dept: ENDOSCOPY | Facility: HOSPITAL | Age: 58
End: 2018-12-21

## 2018-12-21 ENCOUNTER — TELEPHONE (OUTPATIENT)
Dept: PHARMACY | Facility: CLINIC | Age: 58
End: 2018-12-21

## 2018-12-21 RX ORDER — DICYCLOMINE HYDROCHLORIDE 10 MG/1
10 CAPSULE ORAL 3 TIMES DAILY PRN
Qty: 30 CAPSULE | Refills: 1 | Status: SHIPPED | OUTPATIENT
Start: 2018-12-21 | End: 2023-12-12 | Stop reason: SDUPTHER

## 2018-12-21 NOTE — TELEPHONE ENCOUNTER
Refill call for [facility]. Patient confirmed they are in need of a refill. Will prepare for  ship on 12/26 to arrive 12/27 with patient consent.

## 2018-12-23 LAB — CALPROTECTIN STL-MCNT: 20 MCG/G

## 2018-12-24 ENCOUNTER — OFFICE VISIT (OUTPATIENT)
Dept: URGENT CARE | Facility: CLINIC | Age: 58
End: 2018-12-24
Payer: COMMERCIAL

## 2018-12-24 ENCOUNTER — PATIENT MESSAGE (OUTPATIENT)
Dept: GASTROENTEROLOGY | Facility: CLINIC | Age: 58
End: 2018-12-24

## 2018-12-24 ENCOUNTER — TELEPHONE (OUTPATIENT)
Dept: NEUROLOGY | Facility: CLINIC | Age: 58
End: 2018-12-24

## 2018-12-24 VITALS
BODY MASS INDEX: 32.74 KG/M2 | DIASTOLIC BLOOD PRESSURE: 85 MMHG | HEIGHT: 73 IN | TEMPERATURE: 98 F | RESPIRATION RATE: 18 BRPM | WEIGHT: 247 LBS | OXYGEN SATURATION: 96 % | SYSTOLIC BLOOD PRESSURE: 128 MMHG | HEART RATE: 73 BPM

## 2018-12-24 DIAGNOSIS — R05.9 COUGH: ICD-10-CM

## 2018-12-24 DIAGNOSIS — J40 BRONCHITIS: Primary | ICD-10-CM

## 2018-12-24 DIAGNOSIS — J98.01 BRONCHOSPASM: ICD-10-CM

## 2018-12-24 PROCEDURE — 99214 OFFICE O/P EST MOD 30 MIN: CPT | Mod: S$GLB,,, | Performed by: PHYSICIAN ASSISTANT

## 2018-12-24 PROCEDURE — 71046 X-RAY EXAM CHEST 2 VIEWS: CPT | Mod: FY,S$GLB,, | Performed by: RADIOLOGY

## 2018-12-24 RX ORDER — SUCRALFATE 1 G/1
1 TABLET ORAL 2 TIMES DAILY
Qty: 60 TABLET | Refills: 1 | Status: SHIPPED | OUTPATIENT
Start: 2018-12-24 | End: 2019-05-16

## 2018-12-24 RX ORDER — BENZONATATE 100 MG/1
100 CAPSULE ORAL 3 TIMES DAILY PRN
Qty: 30 CAPSULE | Refills: 0 | Status: SHIPPED | OUTPATIENT
Start: 2018-12-24 | End: 2019-01-03

## 2018-12-24 RX ORDER — ALBUTEROL SULFATE 90 UG/1
1-2 AEROSOL, METERED RESPIRATORY (INHALATION) EVERY 6 HOURS PRN
Qty: 18 G | Refills: 0 | Status: SHIPPED | OUTPATIENT
Start: 2018-12-24 | End: 2020-08-03

## 2018-12-24 RX ORDER — PROMETHAZINE HYDROCHLORIDE AND DEXTROMETHORPHAN HYDROBROMIDE 6.25; 15 MG/5ML; MG/5ML
5 SYRUP ORAL NIGHTLY PRN
Qty: 118 ML | Refills: 0 | Status: SHIPPED | OUTPATIENT
Start: 2018-12-24 | End: 2019-01-03

## 2018-12-24 NOTE — PATIENT INSTRUCTIONS
- Rest.    - Drink plenty of fluids.    - Tylenol or Ibuprofen as directed as needed for fever/pain.    - Take over-the-counter claritin, zyrtec, allegra, or xyzal as directed.  You may use a decongestant form (D) of this medication if you DO NOT have a history of hypertension or heart disease.  - Take the tessalon (benzonatate) throughout the daytime as directed as needed for cough  - Only take the promethazine- DM at night as needed for cough  - DO NOT take both cough medications together at the same time.   - If you begin to experience any worsening or new symptoms, return to clinic.  - Follow up with your Primary care provider or specialty clinic on Thursday.   - If you begin to develop severe worsening of symptoms, go to ER immediately.   - You can use the albuterol inhaler as needed for wheezing or coughing fits.   - You must understand that you have received an Urgent Care treatment only and that you may be released before all of your medical problems are known or treated.   - You, the patient, will arrange for follow up care as instructed.   - If your condition worsens or fails to improve we recommend that you receive another evaluation at the ER immediately or contact your PCP to discuss your concerns or return here.         Bronchitis, Viral (Adult)    You have a viral bronchitis. Bronchitis is inflammation and swelling of the lining of the lungs. This is often caused by an infection. Symptoms include a dry, hacking cough that is worse at night. The cough may bring up yellow-green mucus. You may also feel short of breath or wheeze. Other symptoms may include tiredness, chest discomfort, and chills.  Bronchitis that is caused by a virus is not treated with antibiotics. Instead, medicines may be given to help relieve symptoms. Symptoms can last up to 2 weeks, although the cough may last much longer.  This illness is contagious during the first few days and is spread through the air by coughing and sneezing,  or by direct contact (touching the sick person and then touching your own eyes, nose, or mouth).  Most viral illnesses resolve within 10 to 14 days with rest and simple home remedies, although they may sometimes last for several weeks.  Home care  · If symptoms are severe, rest at home for the first 2 to 3 days. When you go back to your usual activities, don't let yourself get too tired.  · Do not smoke. Also avoid being exposed to secondhand smoke.  · You may use over-the-counter medicine to control fever or pain, unless another pain medicine was prescribed. (Note: If you have chronic liver or kidney disease or have ever had a stomach ulcer or gastrointestinal bleeding, talk with your healthcare provider before using these medicines. Also talk to your provider if you are taking medicine to prevent blood clots.) Aspirin should never be given to anyone younger than 18 years of age who is ill with a viral infection or fever. It may cause severe liver or brain damage.  · Your appetite may be poor, so a light diet is fine. Avoid dehydration by drinking 6 to 8 glasses of fluids per day (such as water, soft drinks, sports drinks, juices, tea, or soup). Extra fluids will help loosen secretions in the nose and lungs.  · Over-the-counter cough, cold, and sore-throat medicines will not shorten the length of the illness, but they may help to reduce symptoms. (Note: Do not use decongestants if you have high blood pressure.)  Follow-up care  Follow up with your healthcare provider, or as advised. If you had an X-ray or ECG (electrocardiogram), a specialist will review it. You will be notified of any new findings that may affect your care.  Note: If you are age 65 or older, or if you have a chronic lung disease or condition that affects your immune system, or you smoke, talk to your healthcare provider about having pneumococcal vaccinations and a yearly influenza vaccination (flu shot).  When to seek medical advice  Call your  healthcare provider right away if any of these occur:  · Fever of 100.4°F (38°C) or higher  · Coughing up increased amounts of colored sputum  · Weakness, drowsiness, headache, facial pain, ear pain, or a stiff neck  Call 911, or get immediate medical care  Contact emergency services right away if any of these occur:  · Coughing up blood  · Worsening weakness, drowsiness, headache, or stiff neck  · Trouble breathing, wheezing, or pain with breathing  Date Last Reviewed: 9/13/2015 © 2000-2017 "[employer]". [address] [place] [address]. All rights reserved. This information is not intended as a substitute for professional medical care. Always follow your healthcare professional's instructions.        Bronchospasm (Adult)    Bronchospasm occurs when the airways (bronchial tubes) go into spasm and contract. This makes it hard to breathe and causes wheezing (a high-pitched whistling sound). Bronchospasm can also cause frequent coughing without wheezing.  Bronchospasm is due to irritation, inflammation, or allergic reaction of the airways. People with asthma get bronchospasm. However, not everyone with bronchospasm has asthma.  Being exposed to harmful fumes, a recent case of bronchitis, exercise, or a flare-up of chronic obstructive pulmonary disease (COPD) may cause the airways to spasm. An episode of bronchospasm may last 7 to 14 days. Medicine may be prescribed to relax the airways and prevent wheezing. Antibiotics will be prescribed only if your healthcare provider thinks there is a bacterial infection. Antibiotics do not help a viral infection.  Home care  · Drink lots of water or other fluids (at least 10 glasses a day) during an attack. This will loosen lung secretions and make it easier to breathe. If you have heart or kidney disease, check with your doctor before you drink extra fluids.  · Take prescribed medicine exactly at the times advised. If you take an inhaled medicine to help  with breathing, do not use it more than once every 4 hours, unless told to do so. If prescribed an antibiotic or prednisone, take all of the medicine, even if you are feeling better after a few days.  · Do not smoke. Also avoid being exposed to secondhand smoke.  · If you were given an inhaler, use it exactly as directed. If you need to use it more often than prescribed, your condition may be getting worse. Contact your healthcare provider.  Follow-up care  Follow up with your healthcare provider, or as advised.  Note: If you are age 65 or older, have a chronic lung disease or condition that affects your immune system, or you smoke, we recommend getting pneumococcal vaccinations, as well as an influenza vaccination (flu shot) every autumn. Ask your healthcare provider about this.  When to seek medical advice  Call your healthcare provider right away if any of these occur:  · You need to use your inhalers more often than usual.  · You develop a fever of 100.4°F (38°C) or higher.  · You are coughing up lots of dark-colored sputum (mucus).  · You do not start to improve within 24 hours.  Call 911, or get immediate medical care  Contact emergency services if any of these occur:  · Coughing up bloody sputum (mucus)  · Chest pain with each breath  · Increased wheezing or shortness of breath  Date Last Reviewed: 9/13/2015  © 2595-3822 [employer]. [address] [place] [address], [place] [address]. All rights reserved. This information is not intended as a substitute for professional medical care. Always follow your healthcare professional's instructions.

## 2018-12-24 NOTE — TELEPHONE ENCOUNTER
----- Message from Navid Puentes sent at 12/24/2018  9:10 AM CST -----  Contact: Natividad with [facility]            Name of Who is Calling: Natividad with [facility]      What is the request in detail: is requesting clinical note in regards to the patient      Can the clinic reply by [facility]: no      What [facility] to [facility] Back [facility]: [phone]

## 2018-12-24 NOTE — PROGRESS NOTES
"Subjective:       Patient ID: Jonathan Villela is a 58 y.o. male.    Vitals:  height is 6' 1" (1.854 m) and weight is 112 kg (247 lb). His oral temperature is 98 °F (36.7 °C). His blood pressure is 128/85 and his pulse is 73. His respiration is 18 and oxygen saturation is 96%.     Chief Complaint: Cough    This is a 58 y.o. male who presents today with a chief complaint of cough for about 7 days. States he had a sore throat that began 10 days ago after he had EGD- and assumed it was irritation from the scope.  He says cough is worse in morning and at night. He admits to some wheezing and mild dyspnea with coughing. Taking old rx cough with codeine. Also taking dayquil without relief. Denies fever, chest pain, dyspnea. Pt is currently on humira for psoriasis.       Cough   This is a new problem. The current episode started 1 to 4 weeks ago. The problem has been gradually worsening. The problem occurs every few minutes. The cough is non-productive. Associated symptoms include headaches, myalgias, shortness of breath and wheezing. Pertinent negatives include no chest pain, chills, ear pain, eye redness, fever, hemoptysis, nasal congestion, rash, rhinorrhea, sore throat or sweats. The symptoms are aggravated by lying down. He has tried OTC cough suppressant and prescription cough suppressant for the symptoms. The treatment provided mild relief.       Constitution: Negative for chills, sweating, fatigue and fever.   HENT: Negative for ear pain, congestion, sinus pain, sinus pressure, sore throat and voice change.    Neck: Negative for painful lymph nodes.   Cardiovascular: Negative for chest pain.   Eyes: Negative for eye redness.   Respiratory: Positive for cough, shortness of breath and wheezing. Negative for chest tightness, sputum production, bloody sputum, COPD, stridor and asthma.    Gastrointestinal: Negative for nausea and vomiting.   Musculoskeletal: Positive for muscle ache.   Skin: Negative for rash. "   Allergic/Immunologic: Negative for seasonal allergies, asthma and flu shot.   Neurological: Positive for headaches. Negative for altered mental status.   Hematologic/Lymphatic: Negative for swollen lymph nodes.   Psychiatric/Behavioral: Negative for altered mental status.       Objective:      Physical Exam   Constitutional: He is oriented to person, place, and time. He appears well-developed and well-nourished. He is cooperative.  Non-toxic appearance. He does not appear ill. No distress.   HENT:   Head: Normocephalic and atraumatic.   Right Ear: Hearing, tympanic membrane, external ear and ear canal normal.   Left Ear: Hearing, tympanic membrane, external ear and ear canal normal.   Nose: Nose normal. No mucosal edema, rhinorrhea or nasal deformity. No epistaxis. Right sinus exhibits no maxillary sinus tenderness and no frontal sinus tenderness. Left sinus exhibits no maxillary sinus tenderness and no frontal sinus tenderness.   Mouth/Throat: Uvula is midline and mucous membranes are normal. No trismus in the jaw. Normal dentition. No uvula swelling. Posterior oropharyngeal erythema present. No oropharyngeal exudate, posterior oropharyngeal edema or tonsillar abscesses. Tonsils are 1+ on the right. Tonsils are 1+ on the left. No tonsillar exudate.   Eyes: Conjunctivae and lids are normal. No scleral icterus.   Sclera clear bilat   Neck: Trachea normal, full passive range of motion without pain and phonation normal. Neck supple.   Cardiovascular: Normal rate, regular rhythm, normal heart sounds, intact distal pulses and normal pulses.   Pulmonary/Chest: Effort normal and breath sounds normal. No accessory muscle usage or stridor. No tachypnea and no bradypnea. No respiratory distress. He has no decreased breath sounds. He has no wheezes. He has no rhonchi. He has no rales.   Abdominal: Soft. Normal appearance and bowel sounds are normal. He exhibits no distension. There is no tenderness.   Musculoskeletal: Normal  range of motion. He exhibits no edema or deformity.   Neurological: He is alert and oriented to person, place, and time. He exhibits normal muscle tone. Coordination normal.   Skin: Skin is warm, dry and intact. He is not diaphoretic. No pallor.   Psychiatric: He has a normal mood and affect. His speech is normal and behavior is normal. Judgment and thought content normal. Cognition and memory are normal.   Nursing note and vitals reviewed.        Xr Chest Pa And Lateral    Result Date: 12/24/2018  EXAMINATION: XR CHEST PA AND LATERAL CLINICAL HISTORY: Cough FINDINGS: Heart size is normal.  There is left basal scar versus discoid atelectasis.  Right lung is clear.  Bones showed DJD.     See above No acute process seen. Electronically signed by: Kenan Weiss MD Date:    12/24/2018 Time:    13:09      Discussed case with Dr. Live given patient's immunocompromised state on humira. Patient is stable and looks well clinically. Discussed treatment plan and x ray results with patient. Will discharge with close follow up and ER precautions. Patient expressed understanding.       Assessment:       1. Bronchitis    2. Bronchospasm    3. Cough        Plan:         Bronchitis  -     promethazine-dextromethorphan (PROMETHAZINE-DM) 6.25-15 mg/5 mL Syrp; Take 5 mLs by mouth nightly as needed.  Dispense: 118 mL; Refill: 0  -     albuterol (PROVENTIL/VENTOLIN HFA) 90 mcg/actuation inhaler; Inhale 1-2 puffs into the lungs every 6 (six) hours as needed for Wheezing. Rescue  Dispense: 18 g; Refill: 0  -     benzonatate (TESSALON) 100 MG capsule; Take 1 capsule (100 mg total) by mouth 3 (three) times daily as needed for Cough.  Dispense: 30 capsule; Refill: 0    Bronchospasm  -     albuterol (PROVENTIL/VENTOLIN HFA) 90 mcg/actuation inhaler; Inhale 1-2 puffs into the lungs every 6 (six) hours as needed for Wheezing. Rescue  Dispense: 18 g; Refill: 0    Cough  -     XR CHEST PA AND LATERAL; Future; Expected date: 12/24/2018  -      benzonatate (TESSALON) 100 MG capsule; Take 1 capsule (100 mg total) by mouth 3 (three) times daily as needed for Cough.  Dispense: 30 capsule; Refill: 0      Patient Instructions   - Rest.    - Drink plenty of fluids.    - Tylenol or Ibuprofen as directed as needed for fever/pain.    - Take over-the-counter claritin, zyrtec, allegra, or xyzal as directed.  You may use a decongestant form (D) of this medication if you DO NOT have a history of hypertension or heart disease.  - Take the tessalon (benzonatate) throughout the daytime as directed as needed for cough  - Only take the promethazine- DM at night as needed for cough  - DO NOT take both cough medications together at the same time.   - If you begin to experience any worsening or new symptoms, return to clinic.  - Follow up with your Primary care provider or specialty clinic on Thursday.   - If you begin to develop severe worsening of symptoms, go to ER immediately.   - You can use the albuterol inhaler as needed for wheezing or coughing fits.   - You must understand that you have received an Urgent Care treatment only and that you may be released before all of your medical problems are known or treated.   - You, the patient, will arrange for follow up care as instructed.   - If your condition worsens or fails to improve we recommend that you receive another evaluation at the ER immediately or contact your PCP to discuss your concerns or return here.         Bronchitis, Viral (Adult)    You have a viral bronchitis. Bronchitis is inflammation and swelling of the lining of the lungs. This is often caused by an infection. Symptoms include a dry, hacking cough that is worse at night. The cough may bring up yellow-green mucus. You may also feel short of breath or wheeze. Other symptoms may include tiredness, chest discomfort, and chills.  Bronchitis that is caused by a virus is not treated with antibiotics. Instead, medicines may be given to help relieve symptoms.  Symptoms can last up to 2 weeks, although the cough may last much longer.  This illness is contagious during the first few days and is spread through the air by coughing and sneezing, or by direct contact (touching the sick person and then touching your own eyes, nose, or mouth).  Most viral illnesses resolve within 10 to 14 days with rest and simple home remedies, although they may sometimes last for several weeks.  Home care  · If symptoms are severe, rest at home for the first 2 to 3 days. When you go back to your usual activities, don't let yourself get too tired.  · Do not smoke. Also avoid being exposed to secondhand smoke.  · You may use over-the-counter medicine to control fever or pain, unless another pain medicine was prescribed. (Note: If you have chronic liver or kidney disease or have ever had a stomach ulcer or gastrointestinal bleeding, talk with your healthcare provider before using these medicines. Also talk to your provider if you are taking medicine to prevent blood clots.) Aspirin should never be given to anyone younger than 18 years of age who is ill with a viral infection or fever. It may cause severe liver or brain damage.  · Your appetite may be poor, so a light diet is fine. Avoid dehydration by drinking 6 to 8 glasses of fluids per day (such as water, soft drinks, sports drinks, juices, tea, or soup). Extra fluids will help loosen secretions in the nose and lungs.  · Over-the-counter cough, cold, and sore-throat medicines will not shorten the length of the illness, but they may help to reduce symptoms. (Note: Do not use decongestants if you have high blood pressure.)  Follow-up care  Follow up with your healthcare provider, or as advised. If you had an X-ray or ECG (electrocardiogram), a specialist will review it. You will be notified of any new findings that may affect your care.  Note: If you are age 65 or older, or if you have a chronic lung disease or condition that affects your immune  system, or you smoke, talk to your healthcare provider about having pneumococcal vaccinations and a yearly influenza vaccination (flu shot).  When to seek medical advice  Call your healthcare provider right away if any of these occur:  · Fever of 100.4°F (38°C) or higher  · Coughing up increased amounts of colored sputum  · Weakness, drowsiness, headache, facial pain, ear pain, or a stiff neck  Call 911, or get immediate medical care  Contact emergency services right away if any of these occur:  · Coughing up blood  · Worsening weakness, drowsiness, headache, or stiff neck  · Trouble breathing, wheezing, or pain with breathing  Date Last Reviewed: 9/13/2015 © 2000-2017 [employer]. [address] [place] [address], [place] [address]. All rights reserved. This information is not intended as a substitute for professional medical care. Always follow your healthcare professional's instructions.        Bronchospasm (Adult)    Bronchospasm occurs when the airways (bronchial tubes) go into spasm and contract. This makes it hard to breathe and causes wheezing (a high-pitched whistling sound). Bronchospasm can also cause frequent coughing without wheezing.  Bronchospasm is due to irritation, inflammation, or allergic reaction of the airways. People with asthma get bronchospasm. However, not everyone with bronchospasm has asthma.  Being exposed to harmful fumes, a recent case of bronchitis, exercise, or a flare-up of chronic obstructive pulmonary disease (COPD) may cause the airways to spasm. An episode of bronchospasm may last 7 to 14 days. Medicine may be prescribed to relax the airways and prevent wheezing. Antibiotics will be prescribed only if your healthcare provider thinks there is a bacterial infection. Antibiotics do not help a viral infection.  Home care  · Drink lots of water or other fluids (at least 10 glasses a day) during an attack. This will loosen lung secretions and make it easier to breathe. If you  have heart or kidney disease, check with your doctor before you drink extra fluids.  · Take prescribed medicine exactly at the times advised. If you take an inhaled medicine to help with breathing, do not use it more than once every 4 hours, unless told to do so. If prescribed an antibiotic or prednisone, take all of the medicine, even if you are feeling better after a few days.  · Do not smoke. Also avoid being exposed to secondhand smoke.  · If you were given an inhaler, use it exactly as directed. If you need to use it more often than prescribed, your condition may be getting worse. Contact your healthcare provider.  Follow-up care  Follow up with your healthcare provider, or as advised.  Note: If you are age 65 or older, have a chronic lung disease or condition that affects your immune system, or you smoke, we recommend getting pneumococcal vaccinations, as well as an influenza vaccination (flu shot) every autumn. Ask your healthcare provider about this.  When to seek medical advice  Call your healthcare provider right away if any of these occur:  · You need to use your inhalers more often than usual.  · You develop a fever of 100.4°F (38°C) or higher.  · You are coughing up lots of dark-colored sputum (mucus).  · You do not start to improve within 24 hours.  Call 911, or get immediate medical care  Contact emergency services if any of these occur:  · Coughing up bloody sputum (mucus)  · Chest pain with each breath  · Increased wheezing or shortness of breath  Date Last Reviewed: 9/13/2015  © 5986-4883 [employer]. [address] [place] [address], [place] [address]. All rights reserved. This information is not intended as a substitute for professional medical care. Always follow your healthcare professional's instructions.

## 2018-12-26 ENCOUNTER — PATIENT MESSAGE (OUTPATIENT)
Dept: DERMATOLOGY | Facility: CLINIC | Age: 58
End: 2018-12-26

## 2018-12-26 ENCOUNTER — PATIENT MESSAGE (OUTPATIENT)
Dept: NEUROLOGY | Facility: CLINIC | Age: 58
End: 2018-12-26

## 2018-12-27 ENCOUNTER — PATIENT MESSAGE (OUTPATIENT)
Dept: DERMATOLOGY | Facility: CLINIC | Age: 58
End: 2018-12-27

## 2018-12-31 ENCOUNTER — OFFICE VISIT (OUTPATIENT)
Dept: FAMILY MEDICINE | Facility: CLINIC | Age: 58
End: 2018-12-31
Payer: COMMERCIAL

## 2018-12-31 ENCOUNTER — PATIENT MESSAGE (OUTPATIENT)
Dept: NEUROLOGY | Facility: CLINIC | Age: 58
End: 2018-12-31

## 2018-12-31 VITALS
OXYGEN SATURATION: 94 % | WEIGHT: 249.31 LBS | SYSTOLIC BLOOD PRESSURE: 124 MMHG | DIASTOLIC BLOOD PRESSURE: 80 MMHG | HEIGHT: 73 IN | BODY MASS INDEX: 33.04 KG/M2 | HEART RATE: 74 BPM

## 2018-12-31 DIAGNOSIS — Z23 NEEDS FLU SHOT: ICD-10-CM

## 2018-12-31 DIAGNOSIS — G43.709 CHRONIC MIGRAINE WITHOUT AURA WITHOUT STATUS MIGRAINOSUS, NOT INTRACTABLE: ICD-10-CM

## 2018-12-31 DIAGNOSIS — J40 BRONCHITIS: Primary | ICD-10-CM

## 2018-12-31 PROCEDURE — 90686 IIV4 VACC NO PRSV 0.5 ML IM: CPT | Mod: S$GLB,,, | Performed by: FAMILY MEDICINE

## 2018-12-31 PROCEDURE — 90471 IMMUNIZATION ADMIN: CPT | Mod: S$GLB,,, | Performed by: FAMILY MEDICINE

## 2018-12-31 PROCEDURE — 99213 OFFICE O/P EST LOW 20 MIN: CPT | Mod: 25,S$GLB,, | Performed by: FAMILY MEDICINE

## 2018-12-31 PROCEDURE — 99999 PR PBB SHADOW E&M-EST. PATIENT-LVL III: CPT | Mod: PBBFAC,,, | Performed by: FAMILY MEDICINE

## 2018-12-31 RX ORDER — CEFUROXIME AXETIL 250 MG/1
6 TABLET ORAL
Qty: 1 KIT | Refills: 0 | Status: SHIPPED | OUTPATIENT
Start: 2018-12-31 | End: 2019-01-10 | Stop reason: SDUPTHER

## 2018-12-31 NOTE — PROGRESS NOTES
Subjective:       Patient ID: Jonathan Villela is a 58 y.o. male.    Chief Complaint: Follow-up ( )    59 yo M with PMH significant for remote h/o asthma, seasonal allergies, Essential hypertension, Chronic Migraine without aura, Psoriasis, Bilateral occipital neuralgia, BPH with urinary obstruction, Erectile dysfunction, Chronic constipation, Colitis, Diverticulitis, Gastroesophageal reflux disease without esophagitis, PUD (peptic ulcer disease), and Obstructive sleep apnea. He presents for f/u urgent care visit. Pt was evaluated at [facility] on 12/24/18 with c/o cough and sore throat x 7 days. CXR at that time negative. Dx'd with Bronchitis and discharged with Tessalon Perles, albuterol, and promethazine cough syrup. At this time he complains of continued non-productive coughing spells which are worse at night and in the morning. He took albuterol once. Finds that Tessalon Perles and promethazine have not been helpful. Cough seems to be stimulated from sensation of post-nasal drip. He's had occasional shortness of breath. No chest chest pain. Denies runny nose and nasal congestion. No fevers/chills. Pt finds that migraine headaches have been worse over the past 4 weeks, but mostly attributes this to initiation of Humira 4 weeks ago for psoriasis. He is followed by neurology and started on Aimovig once per month 10/2018. Pt is scheduled for neurology follow-up on 1/10/19. He is requesting refill of SQ sumatriptan as he has been having 15-20 migraines per month.  No seizure/shaking activity. No numbness/weakness/tingling to extremities.      Review of Systems   Constitutional: Negative for chills and fever.   HENT: Positive for postnasal drip. Negative for congestion, ear discharge, ear pain, sinus pressure, sneezing and sore throat.    Eyes: Negative for pain and discharge.   Respiratory: Positive for cough, shortness of breath and wheezing.    Gastrointestinal: Negative for abdominal distention,  constipation, diarrhea, nausea and vomiting.   Musculoskeletal: Negative for arthralgias and myalgias.   Skin: Positive for rash (c/w diagnosis of psoriatic dermatitis).   Neurological: Positive for headaches (c/w chronic migraines).   Psychiatric/Behavioral: Negative for dysphoric mood. The patient is not nervous/anxious.          Past Medical History:   Diagnosis Date    Acute gastric ulcer with hemorrhage 2/15/2017    Bilateral occipital neuralgia     BPH with urinary obstruction 12/31/2015    Chronic constipation     Colitis [id number]    infectious?    Diverticulitis     Erectile dysfunction     Essential hypertension     Gastroesophageal reflux disease without esophagitis 2/11/2017    Migraine without aura and without status migrainosus, not intractable 1/31/2014    Obstructive sleep apnea syndrome 2/9/2015    Psoriasis     PUD (peptic ulcer disease)      Past Surgical History:   Procedure Laterality Date    CHOLECYSTECTOMY      COLONOSCOPY N/A 2/17/2017    Performed by Yoshi Erazo MD at [facility] ENDO (2ND FLR)    EGD (ESOPHAGOGASTRODUODENOSCOPY) N/A 12/14/2018    Performed by Lexii Carlson MD at [facility] ENDO (4TH FLR)    EGD (ESOPHAGOGASTRODUODENOSCOPY) N/A 12/14/2018    Performed by Javon Maldonado MD at [facility] ENDO (4TH FLR)    ESOPHAGOGASTRODUODENOSCOPY      ESOPHAGOGASTRODUODENOSCOPY (EGD) N/A 2/17/2017    Performed by Yoshi Erazo MD at [facility] ENDO (2ND FLR)    ESOPHAGOGASTRODUODENOSCOPY (EGD) N/A 2/11/2017    Performed by Yoshi Erazo MD at [facility] ENDO (2ND FLR)    LEG SURGERY      NASAL SEPTUM SURGERY      UPPER GASTROINTESTINAL ENDOSCOPY         Family History   Problem Relation Age of Onset    Crohn's disease Unknown         brother, sister, father, nephew    Heart disease Father     Crohn's disease Father     Crohn's disease Sister     Crohn's disease Brother     Kidney disease Mother     Hypertension Mother     Thyroid disease Mother     Cystic fibrosis Maternal  "Uncle     Prostate cancer Neg Hx     Melanoma Neg Hx     Colon cancer Neg Hx        Social History     Tobacco Use   Smoking Status Never Smoker   Smokeless Tobacco Never Used       Objective:        Vitals:    12/31/18 0827   BP: 124/80   Pulse: 74   SpO2: (!) 94%   Weight: 113.1 kg (249 lb 5.4 oz)   Height: 6' 1" (1.854 m)       Physical Exam   Constitutional: He is oriented to person, place, and time. He appears well-developed and well-nourished. No distress.   HENT:   Head: Normocephalic and atraumatic.   Right Ear: External ear and ear canal normal. A middle ear effusion (minimal) is present.   Left Ear: External ear and ear canal normal. A middle ear effusion (minimal) is present.   Nose: Mucosal edema (+ inflamed/boggy inferior nasal turbinates) present. Right sinus exhibits no maxillary sinus tenderness and no frontal sinus tenderness. Left sinus exhibits no maxillary sinus tenderness and no frontal sinus tenderness.   Mouth/Throat: Oropharynx is clear and moist and mucous membranes are normal.   + post-nasal drip noted   Eyes: EOM are normal. Right eye exhibits no discharge. Left eye exhibits no discharge. No scleral icterus.   Neck: Normal range of motion.   Cardiovascular: Normal rate, regular rhythm and normal heart sounds. Exam reveals no gallop and no friction rub.   No murmur heard.  Pulmonary/Chest: Effort normal and breath sounds normal. No respiratory distress. He has no wheezes. He has no rales.   Musculoskeletal: Normal range of motion. He exhibits no edema, tenderness or deformity.   Lymphadenopathy:     He has no cervical adenopathy.   Neurological: He is alert and oriented to person, place, and time. No cranial nerve deficit. He exhibits normal muscle tone. Gait normal.   Skin: Skin is warm and dry.   Psychiatric: He has a normal mood and affect. His behavior is normal.       Assessment:       1. Bronchitis    2. Chronic migraine without aura without status migrainosus, not intractable  "   3. Needs flu shot        Plan:       Jonathan was seen today for follow-up.    Diagnoses and all orders for this visit:    Bronchitis    Chronic migraine without aura without status migrainosus, not intractable  -     sumatriptan (IMITREX STATDOSE) 6 mg/0.5 mL kit; Inject 0.5 mLs (6 mg total) into the skin every 2 (two) hours as needed for Migraine (up to 2 doses daily. Hold for blood pressure 155/110).    Needs flu shot  -     Influenza - Quadrivalent (3 years & older) (PF)    Bronchitis; Viral   Cough is most problematic  Advised albuterol q4-6 hrs prn cough, shortness of breath, or wheezing  Add OTC cetirizine 10 mg daily x 2 weeks  Add OTC Flonase 2 sprays each nostril daily x 2 weeks  Humidifier prn   Cautioned that most viral illnesses take 7-10 days for resolution, however post-viral cough can last an additional 2-4 weeks     Chronic Migraine  -Currently taking Aimovig once per month (started 10/2018)   -Feels that initiation of Humira 4 weeks ago has been exacerbating headaches  -Anticipate f/u neuro appt 1/10/19  -Will refill SQ sumatriptan per pt request    HM  Flu vaccine today 12/31/18      Follow-up if symptoms worsen or fail to improve.

## 2018-12-31 NOTE — PATIENT INSTRUCTIONS
Please use your albuterol inhaler every 4-6 hours as needed for shortness of breath.   Please use over the counter zyrtec 10 mg daily for 2 weeks  Please use over the counter flonase 2 sprays each nostril daily for 2 weeks  Please schedule a follow-up office visit with your neurologist for management of migraines.

## 2019-01-05 ENCOUNTER — PATIENT MESSAGE (OUTPATIENT)
Dept: GASTROENTEROLOGY | Facility: CLINIC | Age: 59
End: 2019-01-05

## 2019-01-07 ENCOUNTER — LAB VISIT (OUTPATIENT)
Dept: LAB | Facility: HOSPITAL | Age: 59
End: 2019-01-07
Attending: INTERNAL MEDICINE
Payer: COMMERCIAL

## 2019-01-07 DIAGNOSIS — E78.2 MIXED HYPERLIPIDEMIA: ICD-10-CM

## 2019-01-07 DIAGNOSIS — I10 ESSENTIAL HYPERTENSION: ICD-10-CM

## 2019-01-07 LAB
ALT SERPL W/O P-5'-P-CCNC: 60 U/L
ANION GAP SERPL CALC-SCNC: 8 MMOL/L
AST SERPL-CCNC: 44 U/L
BUN SERPL-MCNC: 19 MG/DL
CALCIUM SERPL-MCNC: 9.9 MG/DL
CHLORIDE SERPL-SCNC: 99 MMOL/L
CHOLEST SERPL-MCNC: 157 MG/DL
CHOLEST/HDLC SERPL: 4.8 {RATIO}
CO2 SERPL-SCNC: 31 MMOL/L
CREAT SERPL-MCNC: 1.1 MG/DL
EST. GFR  (AFRICAN AMERICAN): >60 ML/MIN/1.73 M^2
EST. GFR  (NON AFRICAN AMERICAN): >60 ML/MIN/1.73 M^2
GLUCOSE SERPL-MCNC: 99 MG/DL
HDLC SERPL-MCNC: 33 MG/DL
HDLC SERPL: 21 %
LDLC SERPL CALC-MCNC: 85.2 MG/DL
NONHDLC SERPL-MCNC: 124 MG/DL
POTASSIUM SERPL-SCNC: 3.4 MMOL/L
SODIUM SERPL-SCNC: 138 MMOL/L
TRIGL SERPL-MCNC: 194 MG/DL

## 2019-01-07 PROCEDURE — 84460 ALANINE AMINO (ALT) (SGPT): CPT

## 2019-01-07 PROCEDURE — 80048 BASIC METABOLIC PNL TOTAL CA: CPT

## 2019-01-07 PROCEDURE — 84450 TRANSFERASE (AST) (SGOT): CPT

## 2019-01-07 PROCEDURE — 80061 LIPID PANEL: CPT

## 2019-01-07 PROCEDURE — 36415 COLL VENOUS BLD VENIPUNCTURE: CPT | Mod: PO

## 2019-01-10 ENCOUNTER — OFFICE VISIT (OUTPATIENT)
Dept: NEUROLOGY | Facility: CLINIC | Age: 59
End: 2019-01-10
Payer: COMMERCIAL

## 2019-01-10 VITALS
HEART RATE: 65 BPM | SYSTOLIC BLOOD PRESSURE: 123 MMHG | WEIGHT: 251.75 LBS | HEIGHT: 73 IN | BODY MASS INDEX: 33.36 KG/M2 | DIASTOLIC BLOOD PRESSURE: 84 MMHG

## 2019-01-10 DIAGNOSIS — R11.0 NAUSEA: ICD-10-CM

## 2019-01-10 DIAGNOSIS — M54.81 BILATERAL OCCIPITAL NEURALGIA: Primary | ICD-10-CM

## 2019-01-10 DIAGNOSIS — G43.709 CHRONIC MIGRAINE WITHOUT AURA WITHOUT STATUS MIGRAINOSUS, NOT INTRACTABLE: ICD-10-CM

## 2019-01-10 PROCEDURE — 99999 PR PBB SHADOW E&M-EST. PATIENT-LVL IV: ICD-10-PCS | Mod: PBBFAC,,, | Performed by: PSYCHIATRY & NEUROLOGY

## 2019-01-10 PROCEDURE — 99214 PR OFFICE/OUTPT VISIT, EST, LEVL IV, 30-39 MIN: ICD-10-PCS | Mod: S$GLB,,, | Performed by: PSYCHIATRY & NEUROLOGY

## 2019-01-10 PROCEDURE — 99999 PR PBB SHADOW E&M-EST. PATIENT-LVL IV: CPT | Mod: PBBFAC,,, | Performed by: PSYCHIATRY & NEUROLOGY

## 2019-01-10 PROCEDURE — 99214 OFFICE O/P EST MOD 30 MIN: CPT | Mod: S$GLB,,, | Performed by: PSYCHIATRY & NEUROLOGY

## 2019-01-10 RX ORDER — CEFUROXIME AXETIL 250 MG/1
6 TABLET ORAL
Qty: 1 KIT | Refills: 6 | Status: SHIPPED | OUTPATIENT
Start: 2019-01-10 | End: 2020-02-20 | Stop reason: SDUPTHER

## 2019-01-10 RX ORDER — ONDANSETRON 8 MG/1
8 TABLET, ORALLY DISINTEGRATING ORAL EVERY 6 HOURS PRN
Qty: 20 TABLET | Refills: 6 | Status: SHIPPED | OUTPATIENT
Start: 2019-01-10

## 2019-01-10 NOTE — PROGRESS NOTES
Subjective:       Patient ID: Jonathan Villela is a 58 y.o. male.    Reason for Consult: Headache    Interval History:  Jonathan Villela is here for follow up. Their condition has been clinically worsening.  He notes that he initially got some relief from the Aimovig and had about 4-6 weeks of benefit.  He notes that since then he has been having only about 2 weeks of benefit and then his headaches will come back. He notes predominant occipital type of headaches.  He also notes that he has been losing vision with some of his headaches.  He notes that the vision loss with headaches has been occurring since at least 2013 so this is not a new issue for him.  We have discussed back and forth about the intersection of migraine occipital neuralgia and cervical degenerative disc disease as contributing to headache.    Objective:     Vitals:    01/10/19 1119   BP: 123/84   Pulse: 65     Patient is awake alert oriented to person place and time.  Moves all 4 extremities against gravity.  Gait and station within normal limits.  Cranial nerves 2-12 were without focal deficits.  Cervical range of motion limited especially on lateral rotation left worse than right.  Tenderness to palpation of bilateral cervical paraspinal musculature positive muscle twitch response.  Greater occipital and lesser occipital nerve Tinel sign positive with radiation of pain forward on his scalp.  Focused examination was undertaken today. Over 50% of face to face time of 25 minute visit time was in giving guidance, counseling and discussing treatment options.    Results for orders placed or performed during the hospital encounter of 03/19/18   CT Head Without Contrast    Narrative    EXAMINATION:  CT HEAD WITHOUT CONTRAST    CLINICAL HISTORY:  headache not c/w typical migraine;    TECHNIQUE:  Low dose axial CT images obtained throughout the head without intravenous contrast. Sagittal and coronal reconstructions were  performed.    COMPARISON:  MRI brain 06/26/2017 and head CT 01/15/2014.    FINDINGS:  Intracranial compartment: Age-appropriate mild generalized cerebral volume loss, unchanged. Ventricles and sulci are normal in size for age and midline without evidence of hydrocephalus. No extra-axial blood or fluid collections.    The brain parenchyma appears normal. No parenchymal mass, hemorrhage, edema or major vascular distribution infarct.  Partial empty sella configuration.    Skull/extracranial contents (limited evaluation): No fracture.  Mild mucosal thickening within the bilateral maxillary sinuses.  Mastoid air cells and remaining imaged paranasal sinuses are essentially clear.  Visualized portions of the orbits are within normal limits.      Impression    No acute large vascular territory infarct or intracranial hemorrhage identified.    Partial empty sella configuration, nonspecific but can be seen with benign intracranial hypertension.    Bilateral maxillary sinus disease as above.      Electronically signed by: Pernell Camarena MD  Date:    03/19/2018  Time:    20:48   Results for orders placed or performed during the hospital encounter of 06/26/17   MRI Brain Without Contrast    Narrative    Procedure: MRI the brain without contrast.    Technique: Sagittal and axial T1, axial T2, axial FLAIR, axial gradient, and axial diffusion imaging of the whole brain.    Comparison: None    Findings: Mild degree of generalized cerebral volume loss.  There is ill-defined small punctate size region of T2 flair signal hyperintensity supratentorially matter which are nonspecific and may represent mild chronic ischemic change, differential to include sequela of migraine headaches..  There is no restricted diffusion to suggest acute infarction.  Ventricles normal without hydrocephalus.  No midline shift or mass effect.  No abnormal parenchymal susceptibility.  The major intracranial T2 flow voids are present.  No diffusion signal  abnormality.    Impression     Mild generalized volume loss with few scatter foci of T2/FLAIR signal abnormality throughout the supratentorial white matter these are nonspecific with differential to include mild chronic ischemic change with differential to include sequela of migraine headaches.    Otherwise unremarkable noncontrast MRI brain specifically without evidence for hydrocephalus or acute infarction..      Electronically signed by: WILY GILES DO  Date:     06/26/17  Time:    16:32        Assessment/Plan:     Problem List Items Addressed This Visit        Neuro    Chronic migraine without aura without status migrainosus, not intractable    Relevant Medications    erenumab-aooe (AIMOVIG AUTOINJECTOR) 70 mg/mL AtIn    sumatriptan (IMITREX STATDOSE) 6 mg/0.5 mL kit       Orthopedic    Bilateral occipital neuralgia - Primary    Overview     S/p cryoneurolysis of bilateral greater and lesser occipital nerves in 2/2017    Repeat nerve block with a thought of resumption of headaches due to ON.          Relevant Orders    Nerve block      Other Visit Diagnoses     Nausea        Relevant Medications    ondansetron (ZOFRAN-ODT) 8 MG TbDL        58-year-old right-handed male presents for evaluation follow-up complex multifactorial migraines and headaches featuring occipital neuralgia.  At this point in time we have discussed that his occipital nerves may have grown back over a year and a half.  Will have him come back for diagnostic and therapeutic ultrasound-guided bilateral greater and lesser occipital nerve blocks.  I will ask for prior authorization through his insurance.  We have discussed that as far as his migraines are concerned these do not seem to his predominant primary headache subtype.  He has some features of migraine, some features of occipital neuralgia and some features of cervicogenic headache.  Depending on the results of the occipital nerve block consider further treatment options.    The patient  verbalizes understanding and agreement with the treatment plan. I have discussed risks, benefits and alternatives to the treatment plan. Questions were sought and answered to his stated verbal satisfaction.        Negrito Reyes MD    This note is dictated on M*Modal Fluency Direct word recognition program. There are word recognition mistakes that are occasionally missed on review.

## 2019-01-15 ENCOUNTER — PATIENT MESSAGE (OUTPATIENT)
Dept: DERMATOLOGY | Facility: CLINIC | Age: 59
End: 2019-01-15

## 2019-01-22 ENCOUNTER — PATIENT MESSAGE (OUTPATIENT)
Dept: CARDIOLOGY | Facility: CLINIC | Age: 59
End: 2019-01-22

## 2019-01-22 ENCOUNTER — OFFICE VISIT (OUTPATIENT)
Dept: URGENT CARE | Facility: CLINIC | Age: 59
End: 2019-01-22
Payer: COMMERCIAL

## 2019-01-22 VITALS
BODY MASS INDEX: 33.27 KG/M2 | HEART RATE: 75 BPM | WEIGHT: 251 LBS | DIASTOLIC BLOOD PRESSURE: 93 MMHG | HEIGHT: 73 IN | TEMPERATURE: 98 F | OXYGEN SATURATION: 96 % | SYSTOLIC BLOOD PRESSURE: 140 MMHG | RESPIRATION RATE: 18 BRPM

## 2019-01-22 DIAGNOSIS — J40 BRONCHITIS: ICD-10-CM

## 2019-01-22 DIAGNOSIS — J30.1 SEASONAL ALLERGIC RHINITIS DUE TO POLLEN: Primary | ICD-10-CM

## 2019-01-22 PROCEDURE — 96372 PR INJECTION,THERAP/PROPH/DIAG2ST, IM OR SUBCUT: ICD-10-PCS | Mod: S$GLB,,, | Performed by: FAMILY MEDICINE

## 2019-01-22 PROCEDURE — 96372 THER/PROPH/DIAG INJ SC/IM: CPT | Mod: S$GLB,,, | Performed by: FAMILY MEDICINE

## 2019-01-22 PROCEDURE — 99213 OFFICE O/P EST LOW 20 MIN: CPT | Mod: 25,S$GLB,, | Performed by: FAMILY MEDICINE

## 2019-01-22 PROCEDURE — 99213 PR OFFICE/OUTPT VISIT, EST, LEVL III, 20-29 MIN: ICD-10-PCS | Mod: 25,S$GLB,, | Performed by: FAMILY MEDICINE

## 2019-01-22 RX ORDER — BENZONATATE 100 MG/1
200 CAPSULE ORAL 3 TIMES DAILY PRN
Qty: 30 CAPSULE | Refills: 1 | Status: SHIPPED | OUTPATIENT
Start: 2019-01-22 | End: 2019-05-03

## 2019-01-22 RX ORDER — AZITHROMYCIN 250 MG/1
TABLET, FILM COATED ORAL
Qty: 6 TABLET | Refills: 0 | Status: SHIPPED | OUTPATIENT
Start: 2019-01-22 | End: 2019-04-16

## 2019-01-22 RX ORDER — DEXAMETHASONE SODIUM PHOSPHATE 100 MG/10ML
10 INJECTION INTRAMUSCULAR; INTRAVENOUS
Status: COMPLETED | OUTPATIENT
Start: 2019-01-22 | End: 2019-01-22

## 2019-01-22 RX ORDER — POTASSIUM CHLORIDE 750 MG/1
10 TABLET, EXTENDED RELEASE ORAL ONCE
Qty: 30 TABLET | Refills: 6 | Status: SHIPPED | OUTPATIENT
Start: 2019-01-22 | End: 2019-01-22

## 2019-01-22 RX ORDER — CODEINE PHOSPHATE AND GUAIFENESIN 10; 100 MG/5ML; MG/5ML
5 SOLUTION ORAL 3 TIMES DAILY PRN
Qty: 180 ML | Refills: 0 | Status: SHIPPED | OUTPATIENT
Start: 2019-01-22 | End: 2019-02-01

## 2019-01-22 RX ADMIN — DEXAMETHASONE SODIUM PHOSPHATE 10 MG: 100 INJECTION INTRAMUSCULAR; INTRAVENOUS at 06:01

## 2019-01-23 ENCOUNTER — OFFICE VISIT (OUTPATIENT)
Dept: ENDOCRINOLOGY | Facility: CLINIC | Age: 59
End: 2019-01-23
Attending: INTERNAL MEDICINE
Payer: COMMERCIAL

## 2019-01-23 ENCOUNTER — PATIENT MESSAGE (OUTPATIENT)
Dept: DERMATOLOGY | Facility: CLINIC | Age: 59
End: 2019-01-23

## 2019-01-23 VITALS
RESPIRATION RATE: 16 BRPM | DIASTOLIC BLOOD PRESSURE: 100 MMHG | HEIGHT: 73 IN | BODY MASS INDEX: 33.28 KG/M2 | SYSTOLIC BLOOD PRESSURE: 146 MMHG | WEIGHT: 251.13 LBS

## 2019-01-23 DIAGNOSIS — I10 ESSENTIAL HYPERTENSION: ICD-10-CM

## 2019-01-23 DIAGNOSIS — M25.50 ARTHRALGIA, UNSPECIFIED JOINT: ICD-10-CM

## 2019-01-23 DIAGNOSIS — E29.1 HYPOGONADISM MALE: Primary | ICD-10-CM

## 2019-01-23 DIAGNOSIS — Z79.899 ENCOUNTER FOR LONG-TERM (CURRENT) USE OF HIGH-RISK MEDICATION: Primary | ICD-10-CM

## 2019-01-23 PROCEDURE — 99204 OFFICE O/P NEW MOD 45 MIN: CPT | Mod: S$GLB,,, | Performed by: INTERNAL MEDICINE

## 2019-01-23 PROCEDURE — 99999 PR PBB SHADOW E&M-EST. PATIENT-LVL IV: CPT | Mod: PBBFAC,,, | Performed by: INTERNAL MEDICINE

## 2019-01-23 PROCEDURE — 99999 PR PBB SHADOW E&M-EST. PATIENT-LVL IV: ICD-10-PCS | Mod: PBBFAC,,, | Performed by: INTERNAL MEDICINE

## 2019-01-23 PROCEDURE — 99204 PR OFFICE/OUTPT VISIT, NEW, LEVL IV, 45-59 MIN: ICD-10-PCS | Mod: S$GLB,,, | Performed by: INTERNAL MEDICINE

## 2019-01-23 NOTE — PATIENT INSTRUCTIONS
What Is Acute Bronchitis?  Acute bronchitis is when the airways in your lungs (bronchial tubes) become red and swollen (inflamed). It is usually caused by a viral infection. But it can also occur because of a bacteria or allergen. Symptoms include a cough that produces yellow or greenish mucus and can last for days or sometimes weeks.  Inside healthy lungs    Air travels in and out of the lungs through the airways. The linings of these airways produce sticky mucus. This mucus traps particles that enter the lungs. Tiny structures called cilia then sweep the particles out of the airways.     Healthy airway: Airways are normally open. Air moves in and out easily.      Healthy cilia: Tiny, hairlike cilia sweep mucus and particles up and out of the airways.   Lungs with bronchitis  Bronchitis often occurs with a cold or the flu virus. The airways become inflamed (red and swollen). There is a deep hacking cough from the extra mucus. Other symptoms may include:  · Wheezing  · Chest discomfort  · Shortness of breath  · Mild fever  A second infection, this time due to bacteria, may then occur. And airways irritated by allergens or smoke are more likely to get infected.        Inflamed airway: Inflammation and extra mucus narrow the airway, causing shortness of breath.      Impaired cilia: Extra mucus impairs cilia, causing congestion and wheezing. Smoking makes the problem worse.   Making a diagnosis  A physical exam, health history, and certain tests help your healthcare provider make the diagnosis.  Health history  Your healthcare provider will ask you about your symptoms.  The exam  Your provider listens to your chest for signs of congestion. He or she may also check your ears, nose, and throat.  Possible tests  · A sputum test for bacteria. This requires a sample of mucus from your lungs.  · A nasal or throat swab. This tests to see if you have a bacterial infection.  · A chest X-ray. This is done if your healthcare  provider thinks you have pneumonia.  · Tests to check for an underlying condition. Other tests may be done to check for things such as allergies, asthma, or COPD (chronic obstructive pulmonary disease). You may need to see a specialist for more lung function testing.  Treating a cough  The main treatment for bronchitis is easing symptoms. Avoiding smoke, allergens, and other things that trigger coughing can often help. If the infection is bacterial, you may be given antibiotics. During the illness, it's important to get plenty of sleep. To ease symptoms:  · Dont smoke. Also avoid secondhand smoke.  · Use a humidifier. Or try breathing in steam from a hot shower. This may help loosen mucus.  · Drink a lot of water and juice. They can soothe the throat and may help thin mucus.  · Sit up or use extra pillows when in bed. This helps to lessen coughing and congestion.  · Ask your provider about using medicine. Ask about using cough medicine, pain and fever medicine, or a decongestant.  Antibiotics  Most cases of bronchitis are caused by cold or flu viruses. They dont need antibiotics to treat them, even if your mucus is thick and green or yellow. Antibiotics dont treat viral illness and antibiotics have not been shown to have any benefit in cases of acute bronchitis. Taking antibiotics when they are not needed increases your risk of getting an infection later that is antibiotic-resistant. Antibiotics can also cause severe cases of diarrhea that require other antibiotics to treat.  It is important that you accept your healthcare provider's opinion to not use antibiotics. Your provider will prescribe antibiotics if the infection is caused by bacteria. If they are prescribed:  · Take all of the medicine. Take the medicine until it is used up, even if symptoms have improved. If you dont, the bronchitis may come back.  · Take the medicines as directed. For instance, some medicines should be taken with food.  · Ask about  side effects. Ask your provider or pharmacist what side effects are common, and what to do about them.  Follow-up care  You should see your provider again in 2 to 3 weeks. By this time, symptoms should have improved. An infection that lasts longer may mean you have a more serious problem.  Prevention  · Avoid tobacco smoke. If you smoke, quit. Stay away from smoky places. Ask friends and family not to smoke around you, or in your home or car.  · Get checked for allergies.  · Ask your provider about getting a yearly flu shot. Also ask about pneumococcal or pneumonia shots.  · Wash your hands often. This helps reduce the chance of picking up viruses that cause colds and flu.  Call your healthcare provider if:  · Symptoms worsen, or you have new symptoms  · Breathing problems worsen or  become severe  · Symptoms dont get better within a week, or within 3 days of taking antibiotics   Date Last Reviewed: 2/1/2017  © 2688-4208 [employer], [employer]. [address] [place] [address], [place] [address]. All rights reserved. This information is not intended as a substitute for professional medical care. Always follow your healthcare professional's instructions.

## 2019-01-23 NOTE — PROGRESS NOTES
Subjective:      Patient ID: Jonathan Villela is a 58 y.o. male.    Chief Complaint:  Hypogonadism      History of Present Illness  Mr. Momin presents for endocrine evaluation of multiple complaints.     Has active history of hypogonadism on T replacement, psoriasis, HTN, migraines, DELVIN and IBS.    Diagnosed with hypogonadism in late 2017 and had been on replacement since that time.   Currently taking 200 mg weekly. Has been on this dose since diagnosis. Managed by Dr. Tsai with urology at .   Recent labs show slightly increasing polycythemia.     Also reports having intermittent abdominal cramping, with diarrhea and constipation. Has had extensive GI workup in the past. At one point was told that he likely had Crohn's disease, and he has strong family history of Crohn's disease. Was eventually told that he has IBS. Still gets intermittent abdominal cramping and diarrhea. Reports that he gets bilateral knee pain when he is having abdominal cramping. Saw rheumatology remotely in the past but was unsure as to the extent of the evaluation.     Recently diagnosed with psoriasis as well. Has had some improvement on Humira.    Has had HTN for about 15 years. Recently with one episode of hypokalemia. No history of hypokalemia.   Had normal plasma metanephrines in the past.    Review of Systems   Constitutional: Negative for unexpected weight change.   Eyes: Negative for visual disturbance.   Respiratory: Negative for shortness of breath.    Cardiovascular: Negative for chest pain.   Gastrointestinal: Positive for abdominal pain.   Endocrine: Negative for cold intolerance.   Genitourinary: Negative for frequency.   Musculoskeletal: Positive for arthralgias.   Skin: Negative for rash.   Neurological: Positive for headaches.       Objective:   Physical Exam   Constitutional: He is oriented to person, place, and time. He appears well-developed and well-nourished.   HENT:   Head: Normocephalic and atraumatic.    Right Ear: External ear normal.   Left Ear: External ear normal.   Nose: Nose normal.   Neck: No tracheal deviation present. No thyromegaly present.   Cardiovascular: Normal rate, regular rhythm and normal heart sounds.   No edema   Pulmonary/Chest: Effort normal and breath sounds normal.   Abdominal: Soft. Bowel sounds are normal. There is no tenderness.   Musculoskeletal:   Normal gait, no cyanosis or clubbing   Neurological: He is alert and oriented to person, place, and time. He has normal reflexes.   Vibration sense intact   Skin: Skin is warm and dry.   No nodules, no ulcers   Psychiatric: He has a normal mood and affect. Judgment normal.   Nursing note and vitals reviewed.      Lab Review:   Results for MARY WORKMAN (MRN [id number]) as of 1/23/2019 07:28   Ref. Range 8/29/2014 08:58 2/9/2015 08:00 6/13/2017 07:40 6/28/2017 08:45 4/17/2018 07:12   Hemoglobin A1C External Latest Ref Range: 4.5 - 6.2 % 5.4       Estimated Avg Glucose Latest Ref Range: 68 - 131 mg/dL 108       TSH Latest Ref Range: 0.400 - 4.000 uIU/mL 1.912 1.900 1.681 1.343 2.398   T4 Total Latest Ref Range: 4.5 - 11.5 ug/dL    3.8 (L)      Results for MARY WORKMAN (MRN [id number]) as of 1/23/2019 07:28   Ref. Range 1/7/2019 07:36   Sodium Latest Ref Range: 136 - 145 mmol/L 138   Potassium Latest Ref Range: 3.5 - 5.1 mmol/L 3.4 (L)   Chloride Latest Ref Range: 95 - 110 mmol/L 99   CO2 Latest Ref Range: 23 - 29 mmol/L 31 (H)   Anion Gap Latest Ref Range: 8 - 16 mmol/L 8   BUN, Bld Latest Ref Range: 6 - 20 mg/dL 19   Creatinine Latest Ref Range: 0.5 - 1.4 mg/dL 1.1   eGFR if non African American Latest Ref Range: >60 mL/min/1.73 m^2 >60.0   eGFR if African American Latest Ref Range: >60 mL/min/1.73 m^2 >60.0   Glucose Latest Ref Range: 70 - 110 mg/dL 99   Calcium Latest Ref Range: 8.7 - 10.5 mg/dL 9.9   AST Latest Ref Range: 10 - 40 U/L 44 (H)   ALT Latest Ref Range: 10 - 44 U/L 60 (H)   Triglycerides Latest Ref Range: 30 - 150 mg/dL  194 (H)     Results for MARY WORKMAN (MRN [id number]) as of 1/23/2019 07:28   Ref. Range 8/24/2018 14:33 10/15/2018 08:02   PSA, SCREEN Latest Ref Range: 0.00 - 4.00 ng/mL  1.2   Metanephrine, Free Latest Ref Range: < OR = 57 pg/mL 33    Normetanephrine, Free Latest Ref Range: < OR = 148 pg/mL 138    Metanephrine, Total, Plasma Latest Ref Range: < OR = 205 pg/mL 171        Assessment:     1. Hypogonadism male    2. Arthralgia, unspecified joint    3. Essential hypertension      Plan:     --Patient with hypogonadism  --Recent labs show increasing polycythemia  --Will check CBC and testosterone now and likely decrease T replacement  --R/o thyroid dysfunction with TSH and FT4  --Given HTN and hypokalemia will screen for primary hyperaldosteronism with renin/janine and BMP  --Will refer to rheumatology given arthralgias, and history of psoriasis and possible Crohn's    Jonnie Rivera M.D. Staff Endocrinology

## 2019-01-23 NOTE — PROGRESS NOTES
"Subjective:       Patient ID: Jonathan Villela is a 58 y.o. male.    Vitals:  height is 6' 1" (1.854 m) and weight is 113.9 kg (251 lb). His temperature is 98.3 °F (36.8 °C). His blood pressure is 140/93 (abnormal) and his pulse is 75. His respiration is 18 and oxygen saturation is 96%.     Chief Complaint: Cough    57 y/o male with c/o cough x   6-7 days, was seen here for same 4 weeks ago, no steroid or abx, takes humira for Psoriasis. Denies fever or chills. No relief of cough with promethazine DM      Cough   This is a new problem. The current episode started in the past 7 days. The problem has been unchanged. The problem occurs constantly. The cough is productive of sputum. Associated symptoms include a sore throat. Pertinent negatives include no chills, ear pain, eye redness, fever, hemoptysis, myalgias, rash, shortness of breath or wheezing. Nothing aggravates the symptoms. He has tried OTC cough suppressant (tessalon pearls, nasal spray, zertic, mucinex) for the symptoms. The treatment provided no relief.       Constitution: Positive for generalized weakness. Negative for chills, sweating, fatigue and fever.   HENT: Positive for sinus pain, sinus pressure and sore throat. Negative for ear pain, congestion and voice change.    Neck: Negative for painful lymph nodes.   Eyes: Negative for eye redness.   Respiratory: Positive for cough and sputum production. Negative for chest tightness, bloody sputum, COPD, shortness of breath, stridor, wheezing and asthma.    Gastrointestinal: Negative for nausea and vomiting.   Musculoskeletal: Negative for muscle ache.   Skin: Negative for rash.   Allergic/Immunologic: Negative for seasonal allergies and asthma.   Hematologic/Lymphatic: Negative for swollen lymph nodes.       Objective:      Physical Exam   Constitutional: He is oriented to person, place, and time. He appears well-developed and well-nourished. He is cooperative.  Non-toxic appearance. He does not appear " ill. No distress.   HENT:   Head: Normocephalic and atraumatic.   Right Ear: Hearing, tympanic membrane, external ear and ear canal normal.   Left Ear: Hearing, tympanic membrane, external ear and ear canal normal.   Nose: Nose normal. No mucosal edema, rhinorrhea or nasal deformity. No epistaxis. Right sinus exhibits no maxillary sinus tenderness and no frontal sinus tenderness. Left sinus exhibits no maxillary sinus tenderness and no frontal sinus tenderness.   Mouth/Throat: Uvula is midline, oropharynx is clear and moist and mucous membranes are normal. No trismus in the jaw. Normal dentition. No uvula swelling. No oropharyngeal exudate or posterior oropharyngeal erythema.   Eyes: Conjunctivae and lids are normal. Right eye exhibits no discharge. Left eye exhibits no discharge.   Sclera clear bilat   Neck: Trachea normal, normal range of motion, full passive range of motion without pain and phonation normal. Neck supple. No JVD present. No tracheal deviation present.   Cardiovascular: Normal rate, regular rhythm, normal heart sounds, intact distal pulses and normal pulses. Exam reveals no friction rub.   No murmur heard.  Pulmonary/Chest: Effort normal and breath sounds normal. No stridor. He has no wheezes. He has no rales. He exhibits no tenderness.   Abdominal: Soft. Normal appearance and bowel sounds are normal. He exhibits no distension, no pulsatile midline mass and no mass. There is no tenderness.   Musculoskeletal: Normal range of motion. He exhibits no edema or deformity.   Lymphadenopathy:     He has no cervical adenopathy.   Neurological: He is alert and oriented to person, place, and time. He exhibits normal muscle tone. Coordination normal.   Skin: Skin is warm, dry and intact. He is not diaphoretic. No pallor.   Psychiatric: He has a normal mood and affect. His speech is normal and behavior is normal. Judgment and thought content normal. Cognition and memory are normal.   Nursing note and vitals  reviewed.      Assessment:       1. Seasonal allergic rhinitis due to pollen    2. Bronchitis        Plan:         Seasonal allergic rhinitis due to pollen  -     dexamethasone injection 10 mg  -     benzonatate (TESSALON PERLES) 100 MG capsule; Take 2 capsules (200 mg total) by mouth 3 (three) times daily as needed for Cough.  Dispense: 30 capsule; Refill: 1  -     azithromycin (ZITHROMAX Z-MARIA ELENA) 250 MG tablet; Take 2 tablets (500 mg) on  Day 1,  followed by 1 tablet (250 mg) once daily on Days 2 through 5.  Dispense: 6 tablet; Refill: 0    Bronchitis  -     guaifenesin-codeine 100-10 mg/5 ml (TUSSI-ORGANIDIN NR)  mg/5 mL syrup; Take 5 mLs by mouth 3 (three) times daily as needed.  Dispense: 180 mL; Refill: 0  -     benzonatate (TESSALON PERLES) 100 MG capsule; Take 2 capsules (200 mg total) by mouth 3 (three) times daily as needed for Cough.  Dispense: 30 capsule; Refill: 1  -     azithromycin (ZITHROMAX Z-MARIA ELENA) 250 MG tablet; Take 2 tablets (500 mg) on  Day 1,  followed by 1 tablet (250 mg) once daily on Days 2 through 5.  Dispense: 6 tablet; Refill: 0

## 2019-01-24 ENCOUNTER — TELEPHONE (OUTPATIENT)
Dept: DERMATOLOGY | Facility: CLINIC | Age: 59
End: 2019-01-24

## 2019-01-24 NOTE — TELEPHONE ENCOUNTER
Spoke to pt and informed him if he was getting Dr. Flores labs drawn he can wait until March to get them done however she thought he was getting CMP done from another provider which would of been helpful to see if his liver enzymes were elevated since they was elevated back in Dec. No other concerns at moment.    GAL

## 2019-01-25 ENCOUNTER — LAB VISIT (OUTPATIENT)
Dept: LAB | Facility: HOSPITAL | Age: 59
End: 2019-01-25
Attending: INTERNAL MEDICINE
Payer: COMMERCIAL

## 2019-01-25 DIAGNOSIS — E29.1 HYPOGONADISM MALE: ICD-10-CM

## 2019-01-25 DIAGNOSIS — M25.50 ARTHRALGIA, UNSPECIFIED JOINT: ICD-10-CM

## 2019-01-25 LAB
ANION GAP SERPL CALC-SCNC: 8 MMOL/L
BASOPHILS # BLD AUTO: 0.05 K/UL
BASOPHILS NFR BLD: 0.6 %
BUN SERPL-MCNC: 21 MG/DL
CALCIUM SERPL-MCNC: 9.5 MG/DL
CHLORIDE SERPL-SCNC: 96 MMOL/L
CO2 SERPL-SCNC: 33 MMOL/L
CORTIS SERPL-MCNC: 4.6 UG/DL
CREAT SERPL-MCNC: 1.1 MG/DL
DIFFERENTIAL METHOD: ABNORMAL
EOSINOPHIL # BLD AUTO: 0.1 K/UL
EOSINOPHIL NFR BLD: 1.3 %
ERYTHROCYTE [DISTWIDTH] IN BLOOD BY AUTOMATED COUNT: 14.2 %
EST. GFR  (AFRICAN AMERICAN): >60 ML/MIN/1.73 M^2
EST. GFR  (NON AFRICAN AMERICAN): >60 ML/MIN/1.73 M^2
GLUCOSE SERPL-MCNC: 88 MG/DL
HCT VFR BLD AUTO: 52.8 %
HGB BLD-MCNC: 17.4 G/DL
IMM GRANULOCYTES # BLD AUTO: 0.05 K/UL
IMM GRANULOCYTES NFR BLD AUTO: 0.6 %
LYMPHOCYTES # BLD AUTO: 2.2 K/UL
LYMPHOCYTES NFR BLD: 24.7 %
MCH RBC QN AUTO: 27.5 PG
MCHC RBC AUTO-ENTMCNC: 33 G/DL
MCV RBC AUTO: 84 FL
MONOCYTES # BLD AUTO: 1.1 K/UL
MONOCYTES NFR BLD: 12 %
NEUTROPHILS # BLD AUTO: 5.3 K/UL
NEUTROPHILS NFR BLD: 60.8 %
NRBC BLD-RTO: 0 /100 WBC
PLATELET # BLD AUTO: 258 K/UL
PMV BLD AUTO: 9.2 FL
POTASSIUM SERPL-SCNC: 3.4 MMOL/L
RBC # BLD AUTO: 6.32 M/UL
SODIUM SERPL-SCNC: 137 MMOL/L
T4 FREE SERPL-MCNC: 0.88 NG/DL
TESTOST SERPL-MCNC: 1167 NG/DL
TSH SERPL DL<=0.005 MIU/L-ACNC: 2.21 UIU/ML
WBC # BLD AUTO: 8.75 K/UL

## 2019-01-25 PROCEDURE — 84443 ASSAY THYROID STIM HORMONE: CPT

## 2019-01-25 PROCEDURE — 84439 ASSAY OF FREE THYROXINE: CPT

## 2019-01-25 PROCEDURE — 82088 ASSAY OF ALDOSTERONE: CPT

## 2019-01-25 PROCEDURE — 85025 COMPLETE CBC W/AUTO DIFF WBC: CPT

## 2019-01-25 PROCEDURE — 82533 TOTAL CORTISOL: CPT

## 2019-01-25 PROCEDURE — 84244 ASSAY OF RENIN: CPT

## 2019-01-25 PROCEDURE — 80048 BASIC METABOLIC PNL TOTAL CA: CPT

## 2019-01-25 PROCEDURE — 84403 ASSAY OF TOTAL TESTOSTERONE: CPT

## 2019-01-28 ENCOUNTER — TELEPHONE (OUTPATIENT)
Dept: DERMATOLOGY | Facility: CLINIC | Age: 59
End: 2019-01-28

## 2019-01-28 LAB
ALDOST SERPL-MCNC: 9.6 NG/DL
RENIN PLAS-CCNC: 1.3 NG/ML/H

## 2019-01-28 NOTE — TELEPHONE ENCOUNTER
----- Message from Millie Haney sent at 1/28/2019  3:53 PM CST -----  Contact: pt   Patient Returning Call from [facility]    Who Left Message for Patient: pt is returning Jillian call   Communication Preference: can you please call pt at [phone]  Additional Information: none    CHAI

## 2019-02-01 ENCOUNTER — PATIENT MESSAGE (OUTPATIENT)
Dept: ENDOCRINOLOGY | Facility: CLINIC | Age: 59
End: 2019-02-01

## 2019-02-04 ENCOUNTER — OFFICE VISIT (OUTPATIENT)
Dept: CARDIOLOGY | Facility: CLINIC | Age: 59
End: 2019-02-04
Payer: COMMERCIAL

## 2019-02-04 VITALS
SYSTOLIC BLOOD PRESSURE: 139 MMHG | DIASTOLIC BLOOD PRESSURE: 92 MMHG | WEIGHT: 252 LBS | BODY MASS INDEX: 33.4 KG/M2 | HEIGHT: 73 IN | OXYGEN SATURATION: 91 % | HEART RATE: 72 BPM

## 2019-02-04 DIAGNOSIS — E78.2 MIXED HYPERLIPIDEMIA: ICD-10-CM

## 2019-02-04 DIAGNOSIS — I10 ESSENTIAL HYPERTENSION: Primary | ICD-10-CM

## 2019-02-04 DIAGNOSIS — E66.9 OBESITY (BMI 30.0-34.9): ICD-10-CM

## 2019-02-04 DIAGNOSIS — I70.0 ABDOMINAL AORTIC ATHEROSCLEROSIS: ICD-10-CM

## 2019-02-04 PROCEDURE — 99214 OFFICE O/P EST MOD 30 MIN: CPT | Mod: S$GLB,,, | Performed by: NURSE PRACTITIONER

## 2019-02-04 PROCEDURE — 99999 PR PBB SHADOW E&M-EST. PATIENT-LVL V: ICD-10-PCS | Mod: PBBFAC,,, | Performed by: NURSE PRACTITIONER

## 2019-02-04 PROCEDURE — 99214 PR OFFICE/OUTPT VISIT, EST, LEVL IV, 30-39 MIN: ICD-10-PCS | Mod: S$GLB,,, | Performed by: NURSE PRACTITIONER

## 2019-02-04 PROCEDURE — 99999 PR PBB SHADOW E&M-EST. PATIENT-LVL V: CPT | Mod: PBBFAC,,, | Performed by: NURSE PRACTITIONER

## 2019-02-04 RX ORDER — CARVEDILOL 12.5 MG/1
12.5 TABLET ORAL 2 TIMES DAILY WITH MEALS
COMMUNITY
Start: 2019-02-01 | End: 2019-08-22 | Stop reason: SDUPTHER

## 2019-02-04 RX ORDER — AMLODIPINE BESYLATE 5 MG/1
5 TABLET ORAL DAILY
COMMUNITY
End: 2020-02-20

## 2019-02-04 NOTE — PROGRESS NOTES
"Subjective:   Patient ID:  Jonathan Villela is a 58 y.o. male who presents for follow-up of Essential hypertension    DELVIN unable to tolerate CPAP  Headaches  Atherosclerotic disease  - ulcerated plaque infrarenal abdominal aorta  CACS 60     HPI:   Jonathan Villela is here for follow-up of her hypertension.  I had last prescribed aliskiren, a direct inhibitor of the renin angiotensin system, however he has not been able to tolerate that.  He has a detail record his blood pressures. He has clearly documented that his blood pressures are paradoxically higher while on aliskiren.  He reported fatigue, joint pain, migraine headaches and the feeling of sleeping on a hatchet" positioned on his occipital region. He had a severe migraine headache and also took clonidine for an elevated BP recently.  He has required clonidine a total of 3 times for since the last visit  Stopped aliskiren on 10/1/18 and noted an improvement in blood pressure.  He is tolerating amlodipine, carvedilol and chlorthalidone.  He also stopped atorvastatin on 9/29/18. I believe the the fatigue and joint pain were attributed to atorvastatin.  He was previously on pravastatin.  He has not yet made a follow-up appointment with Sleep Medicine      HPI:     Patient Active Problem List   Diagnosis    Migraine without aura and without status migrainosus, not intractable    Essential hypertension    PUD (peptic ulcer disease)    Erectile dysfunction    Insomnia    Obstructive sleep apnea syndrome    Fatigue    Somnolence, daytime    Allergic rhinitis due to allergen    BPH with urinary obstruction    Stiff neck    Abnormal posture    Gastroesophageal reflux disease without esophagitis    Acute post-hemorrhagic anemia    Acute gastric ulcer with hemorrhage    Bilateral occipital neuralgia    DICKINSON (dyspnea on exertion)    GI bleed    Chronic migraine without aura without status migrainosus, not intractable    Abdominal aortic " "atherosclerosis    Mixed hyperlipidemia    Iron deficiency    Obesity (BMI 30.0-34.9)    Urinary hesitancy    Urinary frequency    Urinary retention    Elevated ALT measurement    Upper respiratory tract infection    Bronchitis    Abdominal pain    Hypogonadism male     BP (!) 139/92 (BP Location: Left arm, Patient Position: Sitting, BP Method: X-Large (Automatic))   Pulse 72   Ht 6' 1" (1.854 m)   Wt 114.3 kg (251 lb 15.8 oz)   SpO2 (!) 91%   BMI 33.25 kg/m²   Body mass index is 33.25 kg/m².  CrCl cannot be calculated (Patient's most recent lab result is older than the maximum 7 days allowed.).    Lab Results   Component Value Date     01/25/2019    K 3.4 (L) 01/25/2019    CL 96 01/25/2019    CO2 33 (H) 01/25/2019    BUN 21 (H) 01/25/2019    CREATININE 1.1 01/25/2019    GLU 88 01/25/2019    HGBA1C 5.4 08/29/2014    MG 1.6 04/17/2018    AST 35 01/25/2019    ALT 50 (H) 01/25/2019    ALBUMIN 3.8 01/25/2019    ALBUMIN 4.8 04/30/2014    PROT 7.2 01/25/2019    BILITOT 0.9 01/25/2019    WBC 8.75 01/25/2019    HGB 17.4 01/25/2019    HCT 52.8 01/25/2019    MCV 84 01/25/2019     01/25/2019    INR 1.1 02/14/2017    PSA 1.2 10/15/2018    TSH 2.212 01/25/2019    CHOL 157 01/07/2019    HDL 33 (L) 01/07/2019    LDLCALC 85.2 01/07/2019    TRIG 194 (H) 01/07/2019       Current Outpatient Medications   Medication Sig    acetic acid-hydrocortisone (VOSOL-HC) otic solution Place 1-2 drops into both ears as needed.     acyclovir (ZOVIRAX) 400 MG tablet Take 400 mg by mouth 2 (two) times daily as needed.    adalimumab (HUMIRA PEN PSORIASIS-UVEITIS) 40 mg/0.8 mL PnKt Inject 2-40mg (80mg) SQ on day 0 then 1-40mg SQ on day 7 then 40mg SQ qoweek    adalimumab (HUMIRA PEN) 40 mg/0.8 mL PnKt Inject 1 pen SQ qoweek    albuterol (PROVENTIL/VENTOLIN HFA) 90 mcg/actuation inhaler Inhale 1-2 puffs into the lungs every 6 (six) hours as needed for Wheezing. Rescue    amLODIPine (NORVASC) 5 MG tablet Take 5 mg by " "mouth once daily.    anastrozole (ARIMIDEX) 1 mg Tab TK 1 T PO QD    BD INTEGRA SYRINGE 3 mL 22 gauge x 1 1/2" Syrg USE TO DRAW UP TESTOSTERONE    BD PRECISIONGLIDE 25 gauge x 1" Ndle USE TO INJECT TESTOSERTONE    BD REGULAR BEVEL NEEDLES 25 gauge x 5/8" Ndle USE TO INJECT TESTOSTERONE    betamethasone valerate 0.1% (VALISONE) 0.1 % Crea betamethasone valerate 0.1 % topical cream as needed    butalbital-acetaminophen-caffeine -40 mg (FIORICET, ESGIC) -40 mg per tablet as needed    carvedilol (COREG) 12.5 MG tablet     chlorthalidone (HYGROTEN) 25 MG Tab TAKE 1 TABLET(25 MG) BY MOUTH EVERY DAY    ciclopirox (LOPROX) 0.77 % Crea Apply topically 2 (two) times daily. Pharmacist: mix 30 g of TAC 0.1% cream with 30 g of Loprox cream in 120 cc of milk of magnesia (Patient taking differently: Apply topically as needed. Pharmacist: mix 30 g of TAC 0.1% cream with 30 g of Loprox cream in 120 cc of milk of magnesia)    cloNIDine (CATAPRES) 0.1 MG tablet Take 0.1 mg by mouth as needed.     dicyclomine (BENTYL) 10 MG capsule Take 1 capsule (10 mg total) by mouth 3 (three) times daily as needed (abdominal cramping).    eletriptan (RELPAX) 40 MG tablet as needed    erenumab-aooe (AIMOVIG AUTOINJECTOR) 70 mg/mL AtIn Inject 2 mLs (140 mg total) into the skin every 28 days.    fluocinolone acetonide oil (DERMOTIC OIL) 0.01 % Drop Place 3 drops in ear(s) 2 (two) times daily. (Patient taking differently: Place 3 drops in ear(s) as needed. )    fluocinonide (LIDEX) 0.05 % external solution AAA scalp qday - bid prn pruritus    fluticasone (FLONASE) 50 mcg/actuation nasal spray 1 spay each nasal as needed    frovatriptan (FROVA) 2.5 MG tablet as needed    hydrocortisone butyrate (LOCOID) 0.1 % Crea cream AAA bid to affected areas of groin/genitals    ketoconazole (NIZORAL) 2 % shampoo Wash hair with medicated shampoo at least 2x/week - let sit on scalp at least 5 minutes prior to rinsing    metronidazole 1% " (METROGEL) 1 % Gel Apply at bedtime as directed as needed for skin infection    mometasone (ELOCON) 0.1 % lotion as needed    ondansetron (ZOFRAN-ODT) 8 MG TbDL Take 1 tablet (8 mg total) by mouth every 6 (six) hours as needed (nausea).    pantoprazole (PROTONIX) 40 MG tablet Take 1 tablet (40 mg total) by mouth before breakfast. (Patient taking differently: Take 40 mg by mouth as needed. )    pravastatin (PRAVACHOL) 80 MG tablet Take 1 tablet (80 mg total) by mouth once daily. (Patient taking differently: Take 40 mg by mouth once daily. )    sumatriptan (IMITREX STATDOSE) 6 mg/0.5 mL kit Inject 0.5 mLs (6 mg total) into the skin every 2 (two) hours as needed for Migraine (up to 2 doses daily. Hold for blood pressure 155/110).    testosterone cypionate (DEPOTESTOTERONE CYPIONATE) 200 mg/mL injection Inject into the muscle once a week.     triamcinolone acetonide 0.1% (KENALOG) 0.1 % cream AAA bid    TURMERIC ORAL Take by mouth once daily.    UNABLE TO FIND Take by mouth once daily. Healthy heart supplement    amLODIPine (NORVASC) 2.5 MG tablet Taking 1 tablet once a day    azithromycin (ZITHROMAX Z-MARIA ELENA) 250 MG tablet Take 2 tablets (500 mg) on  Day 1,  followed by 1 tablet (250 mg) once daily on Days 2 through 5.    benzonatate (TESSALON PERLES) 100 MG capsule Take 2 capsules (200 mg total) by mouth 3 (three) times daily as needed for Cough.    diclofenac (VOLTAREN) 75 MG EC tablet Take 1 tablet (75 mg total) by mouth 2 (two) times daily with meals.    hydrocortisone 2.5 % cream Apply topically 2 (two) times daily. To affected areas of face (Patient taking differently: Apply topically as needed. To affected areas of face)    linaclotide (LINZESS) 145 mcg Cap capsule Take 1 capsule (145 mcg total) by mouth once daily.    sucralfate (CARAFATE) 1 gram tablet Take 1 tablet (1 g total) by mouth 2 (two) times daily. Separate from other medicine by 1 hr    urea (CARMOL) 40 % Crea Apply topically 2 (two)  times daily. To affected areas of elbows (Patient taking differently: Apply topically as needed. To affected areas of elbows)     No current facility-administered medications for this visit.        ROS    Objective:   Physical Exam    Assessment:     No diagnosis found.    Plan:

## 2019-02-04 NOTE — PROGRESS NOTES
Mr. Villela is a patient of Dr. Escalante and was last seen in [facility] Visit 10/5/18.      Subjective:   Patient ID:  Jonathan Villela is a 58 y.o. male who presents for follow-up of Essential hypertension    Problem List:  HTN  DELVIN unable to tolerate CPAP  Headaches  Atherosclerotic disease  - ulcerated plaque infrarenal abdominal aorta  CACS 60    HPI  Mr. Villela is in clinic today for routine follow up hypertension.  He is following a low salt diet.  Reports home blood pressure readings have been in the 120s/80s predominantly.  He has not increased the amlodipine as d/w Dr. Escalante on 1/22/19.  He is treated with low dose ASA and low intensity statin.  Patient is taking pravastatin 40 mg and LDL is 85.      Review of Systems   Constitution: Negative for decreased appetite, diaphoresis, weakness, malaise/fatigue, weight gain and weight loss.   Eyes: Negative for visual disturbance.   Cardiovascular: Negative for chest pain, claudication, dyspnea on exertion, irregular heartbeat, leg swelling, near-syncope, orthopnea, palpitations, paroxysmal nocturnal dyspnea and syncope.        Denies chest pressure   Respiratory: Negative for cough, hemoptysis, shortness of breath, sleep disturbances due to breathing and snoring.    Endocrine: Negative for cold intolerance and heat intolerance.   Hematologic/Lymphatic: Negative for bleeding problem. Does not bruise/bleed easily.   Musculoskeletal: Negative for myalgias.   Gastrointestinal: Negative for bloating, abdominal pain, anorexia, change in bowel habit, constipation, diarrhea, nausea and vomiting.   Neurological: Negative for difficulty with concentration, disturbances in coordination, excessive daytime sleepiness, dizziness, headaches, light-headedness, loss of balance and numbness.   Psychiatric/Behavioral: The patient does not have insomnia.        Allergies and current medications updated and reviewed:  Review of patient's allergies indicates:  "  Allergen Reactions    Triamterene      Back and lower abdominal pain     Current Outpatient Medications   Medication Sig    acetic acid-hydrocortisone (VOSOL-HC) otic solution Place 1-2 drops into both ears as needed.     acyclovir (ZOVIRAX) 400 MG tablet Take 400 mg by mouth 2 (two) times daily as needed.    adalimumab (HUMIRA PEN PSORIASIS-UVEITIS) 40 mg/0.8 mL PnKt Inject 2-40mg (80mg) SQ on day 0 then 1-40mg SQ on day 7 then 40mg SQ qoweek    adalimumab (HUMIRA PEN) 40 mg/0.8 mL PnKt Inject 1 pen SQ qoweek    albuterol (PROVENTIL/VENTOLIN HFA) 90 mcg/actuation inhaler Inhale 1-2 puffs into the lungs every 6 (six) hours as needed for Wheezing. Rescue    amLODIPine (NORVASC) 5 MG tablet Take 5 mg by mouth once daily.    anastrozole (ARIMIDEX) 1 mg Tab TK 1 T PO QD    BD INTEGRA SYRINGE 3 mL 22 gauge x 1 1/2" Syrg USE TO DRAW UP TESTOSTERONE    BD PRECISIONGLIDE 25 gauge x 1" Ndle USE TO INJECT TESTOSERTONE    BD REGULAR BEVEL NEEDLES 25 gauge x 5/8" Ndle USE TO INJECT TESTOSTERONE    betamethasone valerate 0.1% (VALISONE) 0.1 % Crea betamethasone valerate 0.1 % topical cream as needed    butalbital-acetaminophen-caffeine -40 mg (FIORICET, ESGIC) -40 mg per tablet as needed    carvedilol (COREG) 12.5 MG tablet     chlorthalidone (HYGROTEN) 25 MG Tab TAKE 1 TABLET(25 MG) BY MOUTH EVERY DAY    ciclopirox (LOPROX) 0.77 % Crea Apply topically 2 (two) times daily. Pharmacist: mix 30 g of TAC 0.1% cream with 30 g of Loprox cream in 120 cc of milk of magnesia (Patient taking differently: Apply topically as needed. Pharmacist: mix 30 g of TAC 0.1% cream with 30 g of Loprox cream in 120 cc of milk of magnesia)    cloNIDine (CATAPRES) 0.1 MG tablet Take 0.1 mg by mouth as needed.     dicyclomine (BENTYL) 10 MG capsule Take 1 capsule (10 mg total) by mouth 3 (three) times daily as needed (abdominal cramping).    eletriptan (RELPAX) 40 MG tablet as needed    erenumab-aooe (AIMOVIG " AUTOINJECTOR) 70 mg/mL AtIn Inject 2 mLs (140 mg total) into the skin every 28 days.    fluocinolone acetonide oil (DERMOTIC OIL) 0.01 % Drop Place 3 drops in ear(s) 2 (two) times daily. (Patient taking differently: Place 3 drops in ear(s) as needed. )    fluocinonide (LIDEX) 0.05 % external solution AAA scalp qday - bid prn pruritus    fluticasone (FLONASE) 50 mcg/actuation nasal spray 1 spay each nasal as needed    frovatriptan (FROVA) 2.5 MG tablet as needed    hydrocortisone butyrate (LOCOID) 0.1 % Crea cream AAA bid to affected areas of groin/genitals    ketoconazole (NIZORAL) 2 % shampoo Wash hair with medicated shampoo at least 2x/week - let sit on scalp at least 5 minutes prior to rinsing    metronidazole 1% (METROGEL) 1 % Gel Apply at bedtime as directed as needed for skin infection    mometasone (ELOCON) 0.1 % lotion as needed    ondansetron (ZOFRAN-ODT) 8 MG TbDL Take 1 tablet (8 mg total) by mouth every 6 (six) hours as needed (nausea).    pantoprazole (PROTONIX) 40 MG tablet Take 1 tablet (40 mg total) by mouth before breakfast. (Patient taking differently: Take 40 mg by mouth as needed. )    pravastatin (PRAVACHOL) 80 MG tablet Take 1 tablet (80 mg total) by mouth once daily. (Patient taking differently: Take 40 mg by mouth once daily. )    sumatriptan (IMITREX STATDOSE) 6 mg/0.5 mL kit Inject 0.5 mLs (6 mg total) into the skin every 2 (two) hours as needed for Migraine (up to 2 doses daily. Hold for blood pressure 155/110).    testosterone cypionate (DEPOTESTOTERONE CYPIONATE) 200 mg/mL injection Inject into the muscle once a week.     triamcinolone acetonide 0.1% (KENALOG) 0.1 % cream AAA bid    TURMERIC ORAL Take by mouth once daily.    UNABLE TO FIND Take by mouth once daily. Healthy heart supplement    amLODIPine (NORVASC) 2.5 MG tablet Taking 1 tablet once a day    azithromycin (ZITHROMAX Z-MARIA ELENA) 250 MG tablet Take 2 tablets (500 mg) on  Day 1,  followed by 1 tablet (250 mg) once  "daily on Days 2 through 5.    benzonatate (TESSALON PERLES) 100 MG capsule Take 2 capsules (200 mg total) by mouth 3 (three) times daily as needed for Cough.    diclofenac (VOLTAREN) 75 MG EC tablet Take 1 tablet (75 mg total) by mouth 2 (two) times daily with meals.    hydrocortisone 2.5 % cream Apply topically 2 (two) times daily. To affected areas of face (Patient taking differently: Apply topically as needed. To affected areas of face)    linaclotide (LINZESS) 145 mcg Cap capsule Take 1 capsule (145 mcg total) by mouth once daily.    sucralfate (CARAFATE) 1 gram tablet Take 1 tablet (1 g total) by mouth 2 (two) times daily. Separate from other medicine by 1 hr    urea (CARMOL) 40 % Crea Apply topically 2 (two) times daily. To affected areas of elbows (Patient taking differently: Apply topically as needed. To affected areas of elbows)     No current facility-administered medications for this visit.        Objective:     Right Arm BP - Sittin/82 (19 1532)  Left Arm BP - Sittin/92 (19 1532)    BP (!) 139/92 (BP Location: Left arm, Patient Position: Sitting, BP Method: X-Large (Automatic))   Pulse 72   Ht 6' 1" (1.854 m)   Wt 114.3 kg (251 lb 15.8 oz)   SpO2 (!) 91%   BMI 33.25 kg/m²       Physical Exam   Constitutional: He is oriented to person, place, and time. Vital signs are normal. He appears well-developed and well-nourished. He is active. No distress.   HENT:   Head: Normocephalic and atraumatic.   Eyes: Conjunctivae and lids are normal. No scleral icterus.   Neck: Neck supple. Normal carotid pulses, no hepatojugular reflux and no JVD present. Carotid bruit is not present.   Cardiovascular: Normal rate, regular rhythm, S1 normal, S2 normal and intact distal pulses. PMI is not displaced. Exam reveals no gallop and no friction rub.   No murmur heard.  Pulses:       Carotid pulses are 2+ on the right side, and 2+ on the left side.       Radial pulses are 2+ on the right side, " and 2+ on the left side.        Dorsalis pedis pulses are 2+ on the right side, and 2+ on the left side.        Posterior tibial pulses are 1+ on the right side, and 1+ on the left side.   Pulmonary/Chest: Effort normal and breath sounds normal. No respiratory distress. He has no decreased breath sounds. He has no wheezes. He has no rhonchi. He has no rales. He exhibits no tenderness.   Abdominal: Soft. Normal appearance and bowel sounds are normal. He exhibits no distension, no fluid wave, no abdominal bruit, no ascites and no pulsatile midline mass. There is no hepatosplenomegaly. There is no tenderness.   Musculoskeletal: He exhibits no edema.   Neurological: He is alert and oriented to person, place, and time. Gait normal.   Skin: Skin is warm, dry and intact. No rash noted. He is not diaphoretic. Nails show no clubbing.   Psychiatric: He has a normal mood and affect. His speech is normal and behavior is normal. Judgment and thought content normal. Cognition and memory are normal.   Nursing note and vitals reviewed.      Chemistry        Component Value Date/Time     01/25/2019 0715    K 3.4 (L) 01/25/2019 0715    CL 96 01/25/2019 0715    CO2 33 (H) 01/25/2019 0715    BUN 21 (H) 01/25/2019 0715    CREATININE 1.1 01/25/2019 0715    GLU 88 01/25/2019 0715        Component Value Date/Time    CALCIUM 9.5 01/25/2019 0715    ALKPHOS 58 01/25/2019 0706    AST 35 01/25/2019 0706    ALT 50 (H) 01/25/2019 0706    BILITOT 0.9 01/25/2019 0706    ESTGFRAFRICA >60.0 01/25/2019 0715    EGFRNONAA >60.0 01/25/2019 0715        Lab Results   Component Value Date    HGBA1C 5.4 08/29/2014     Recent Labs   Lab 01/07/19  0736 01/25/19  0715   WHITE BLOOD CELL COUNT  --  8.75   HEMOGLOBIN  --  17.4   HEMATOCRIT  --  52.8   MCV  --  84   PLATELETS  --  258   TSH  --  2.212   CHOLESTEROL 157  --    HDL 33 L  --    LDL CHOLESTEROL 85.2  --    TRIGLYCERIDES 194 H  --    HDL/CHOLESTEROL RATIO 21.0  --        Recent Labs   Lab  02/10/17  1357 02/14/17  0830   INR 1.1 1.1        Test(s) Reviewed  I have reviewed the following in detail:  [] Stress test   [] Angiography   [] Echocardiogram   [x] Labs   [] Other:         Assessment/Plan:   1. Essential hypertension  BP elevated today in LUE. Home BPs running 120s/80s. Did not increase the amlodipine as instructed by Dr. Escalante.  Discussed with him and he will plan to start tomorrow.      2. Mixed hyperlipidemia  LDL at goal <100. No changes.       3. Abdominal aortic atherosclerosis    4. Obesity (BMI 30.0-34.9)  BMI 33.3 Encouraged increased CV exercise to 30 minutes a day for 5 days a week.       Patient was discussed with but not examined by Dr. Boateng    Follow-up in about 6 months (around 8/4/2019).

## 2019-02-08 NOTE — H&P
Short Stay Endoscopy History and Physical    PCP - Manish Joel MD  Referring Physician - Senait Hampton [facility]  [address] [place] [address]    Procedure - EGD   ASA - per anesthesia  Mallampati - per anesthesia  History of Anesthesia problems - no  Family history Anesthesia problems -  no   Plan of anesthesia - General    HPI  58 y.o. male with history of gastric ulcer, here with upper abdominal pain.    Reason for procedure: Abdominal pain.    ROS:  Constitutional: No fevers, chills, No weight loss  CV: No chest pain  Pulm: No cough, No shortness of breath  GI: see HPI    Medical History:  has a past medical history of Acute gastric ulcer with hemorrhage (2/15/2017), Bilateral occipital neuralgia, BPH with urinary obstruction (12/31/2015), Colitis ([id number]), Diverticulitis, Essential hypertension, Gastroesophageal reflux disease without esophagitis (2/11/2017), Migraine without aura and without status migrainosus, not intractable (1/31/2014), Obstructive sleep apnea syndrome (2/9/2015), and PUD (peptic ulcer disease).    Surgical History:  has a past surgical history that includes Leg Surgery; Nasal septum surgery; Cholecystectomy; Esophagogastroduodenoscopy; ESOPHAGOGASTRODUODENOSCOPY (EGD) (N/A, 2/17/2017); COLONOSCOPY (N/A, 2/17/2017); and ESOPHAGOGASTRODUODENOSCOPY (EGD) (N/A, 2/11/2017).    Family History: family history includes Crohn's disease in his brother, father, sister, and unknown relative; Cystic fibrosis in his maternal uncle; Heart disease in his father; Hypertension in his mother; Kidney disease in his mother; Thyroid disease in his mother., see HPI    Social History:  reports that  has never smoked. he has never used smokeless tobacco. He reports that he drinks alcohol. He reports that he does not use drugs.    Review of patient's allergies indicates:   Allergen Reactions    Triamterene      Back and lower abdominal pain       Medications:   Medications Prior to Admission    Medication Sig Dispense Refill Last Dose    adalimumab (HUMIRA PEN PSORIASIS-UVEITIS) 40 mg/0.8 mL PnKt Inject 2-40mg (80mg) SQ on day 0 then 1-40mg SQ on day 7 then 40mg SQ qoweek 4 pen 0 Past Month at Unknown time    adalimumab (HUMIRA PEN) 40 mg/0.8 mL PnKt Inject 1 pen SQ qoweek 2 pen 10 Past Month at Unknown time    amLODIPine (NORVASC) 2.5 MG tablet Taking 1 tablet once a day  11 12/13/2018 at Unknown time    anastrozole (ARIMIDEX) 1 mg Tab TK 1 T PO QD  6 12/13/2018 at Unknown time    chlorthalidone (HYGROTEN) 25 MG Tab TAKE 1 TABLET(25 MG) BY MOUTH EVERY DAY 30 tablet 11 12/14/2018 at Unknown time    cloNIDine (CATAPRES) 0.1 MG tablet Take 0.1 mg by mouth as needed.   3 Past Month at Unknown time    dicyclomine (BENTYL) 10 MG capsule Take 1 capsule (10 mg total) by mouth 3 (three) times daily as needed (abdominal cramping). 30 capsule 1 12/13/2018 at Unknown time    erenumab-aooe (AIMOVIG AUTOINJECTOR) 70 mg/mL AtIn Inject 2 mLs (140 mg total) into the skin every 28 days. 2 Syringe 5 Past Month at Unknown time    frovatriptan (FROVA) 2.5 MG tablet as needed   Past Month at Unknown time    hydrocortisone butyrate (LOCOID) 0.1 % Crea cream AAA bid to affected areas of groin/genitals 60 g 3 12/13/2018 at Unknown time    ketoconazole (NIZORAL) 2 % shampoo Wash hair with medicated shampoo at least 2x/week - let sit on scalp at least 5 minutes prior to rinsing 120 mL 5 12/14/2018 at Unknown time    pantoprazole (PROTONIX) 40 MG tablet Take 1 tablet (40 mg total) by mouth before breakfast. (Patient taking differently: Take 40 mg by mouth as needed. ) 90 tablet 3 Past Week at Unknown time    pravastatin (PRAVACHOL) 80 MG tablet Take 1 tablet (80 mg total) by mouth once daily. 90 tablet 3 12/13/2018 at Unknown time    TURMERIC ORAL Take by mouth once daily.   12/13/2018 at Unknown time    acetic acid-hydrocortisone (VOSOL-HC) otic solution Place 1-2 drops into both ears as needed.    More than a  "month at Unknown time    acyclovir (ZOVIRAX) 400 MG tablet Take 400 mg by mouth 2 (two) times daily as needed.   More than a month at Unknown time    albuterol 90 mcg/actuation inhaler Inhale 2 puffs into the lungs every 6 (six) hours as needed for Wheezing or Shortness of Breath. Rescue 1 Inhaler 0 More than a month at Unknown time    BD INTEGRA SYRINGE 3 mL 22 gauge x 1 1/2" Syrg USE TO DRAW UP TESTOSTERONE  6 Taking    BD PRECISIONGLIDE 25 gauge x 1" Ndle USE TO INJECT TESTOSERTONE  6 Taking    BD REGULAR BEVEL NEEDLES 25 gauge x 5/8" Ndle USE TO INJECT TESTOSTERONE  6 Taking    betamethasone valerate 0.1% (VALISONE) 0.1 % Crea betamethasone valerate 0.1 % topical cream as needed   More than a month at Unknown time    butalbital-acetaminophen-caffeine -40 mg (FIORICET, ESGIC) -40 mg per tablet as needed   More than a month at Unknown time    ciclopirox (LOPROX) 0.77 % Crea Apply topically 2 (two) times daily. Pharmacist: mix 30 g of TAC 0.1% cream with 30 g of Loprox cream in 120 cc of milk of magnesia (Patient taking differently: Apply topically as needed. Pharmacist: mix 30 g of TAC 0.1% cream with 30 g of Loprox cream in 120 cc of milk of magnesia) 30 g 3 More than a month at Unknown time    diclofenac (VOLTAREN) 75 MG EC tablet Take 1 tablet (75 mg total) by mouth 2 (two) times daily with meals. 60 tablet 2 More than a month at Unknown time    eletriptan (RELPAX) 40 MG tablet as needed   More than a month at Unknown time    fluocinolone acetonide oil (DERMOTIC OIL) 0.01 % Drop Place 3 drops in ear(s) 2 (two) times daily. (Patient taking differently: Place 3 drops in ear(s) as needed. ) 1 Bottle 3 More than a month at Unknown time    fluocinonide (LIDEX) 0.05 % external solution AAA scalp qday - bid prn pruritus 60 mL 3 More than a month at Unknown time    fluticasone (FLONASE) 50 mcg/actuation nasal spray 1 spay each nasal as needed   More than a month at Unknown time    " hydrocortisone 2.5 % cream Apply topically 2 (two) times daily. To affected areas of face (Patient taking differently: Apply topically as needed. To affected areas of face) 30 g 5 Taking    linaclotide (LINZESS) 145 mcg Cap capsule Take 1 capsule (145 mcg total) by mouth once daily. 90 capsule 3     metronidazole 1% (METROGEL) 1 % Gel Apply at bedtime as directed as needed for skin infection  0 More than a month at Unknown time    mometasone (ELOCON) 0.1 % lotion as needed   More than a month at Unknown time    ondansetron (ZOFRAN-ODT) 8 MG TbDL Take 1 tablet (8 mg total) by mouth every 6 (six) hours as needed (nausea). 20 tablet 3 More than a month at Unknown time    sumatriptan (IMITREX STATDOSE) 6 mg/0.5 mL kit Inject into the skin every 2 (two) hours as needed.   2 More than a month at Unknown time    sumatriptan (IMITREX) 100 MG tablet Take 1 tablet (100 mg total) by mouth every 2 (two) hours as needed. 9 tablet 3 More than a month at Unknown time    testosterone cypionate (DEPOTESTOTERONE CYPIONATE) 200 mg/mL injection Inject into the muscle once a week.    12/12/2018    triamcinolone acetonide 0.1% (KENALOG) 0.1 % cream AAA bid 454 g 3 Taking    UNABLE TO FIND Take by mouth once daily. Healthy heart supplement   Taking    urea (CARMOL) 40 % Crea Apply topically 2 (two) times daily. To affected areas of elbows (Patient taking differently: Apply topically as needed. To affected areas of elbows) 85 g 3 More than a month at Unknown time       Physical Exam:    Vital Signs:   Vitals:    12/14/18 1356   BP: (!) 140/91   Pulse: 63   Resp: 16   Temp: 97.5 °F (36.4 °C)       General Appearance: Well appearing in no acute distress  Abdomen: Soft, non tender, non distended with normal bowel sounds, no masses    Labs:  Lab Results   Component Value Date    WBC 5.50 12/13/2018    HGB 18.3 (H) 12/13/2018    HCT 55.2 (H) 12/13/2018     12/13/2018    CHOL 158 04/17/2018    TRIG 226 (H) 04/17/2018    HDL 31  (L) 04/17/2018    ALT 69 (H) 12/13/2018    AST 47 (H) 12/13/2018     10/18/2018    K 4.2 10/18/2018     10/18/2018    CREATININE 1.3 10/18/2018    BUN 23 (H) 10/18/2018    CO2 28 10/18/2018    TSH 2.398 04/17/2018    PSA 1.2 10/15/2018    INR 1.1 02/14/2017    HGBA1C 5.4 08/29/2014       I have explained the risks and benefits of this endoscopic procedure to the patient including but not limited to bleeding, inflammation, infection, perforation, and death.      Janes Luther MD     wine

## 2019-02-11 ENCOUNTER — TELEPHONE (OUTPATIENT)
Dept: CARDIOLOGY | Facility: CLINIC | Age: 59
End: 2019-02-11

## 2019-02-11 RX ORDER — POTASSIUM CHLORIDE 750 MG/1
10 TABLET, EXTENDED RELEASE ORAL ONCE
COMMUNITY
End: 2019-06-26 | Stop reason: SDUPTHER

## 2019-02-11 NOTE — TELEPHONE ENCOUNTER
----- Message from Karla Escalante MD sent at 2/10/2019  3:46 PM CST -----  Noted when I was reviewing Karie's note that his K was a bit low. Recommend that he either drink low sodium V8 3-4 times a week or take a prescription K supplement.

## 2019-02-14 ENCOUNTER — TELEPHONE (OUTPATIENT)
Dept: PHARMACY | Facility: CLINIC | Age: 59
End: 2019-02-14

## 2019-02-20 ENCOUNTER — TELEPHONE (OUTPATIENT)
Dept: NEUROLOGY | Facility: CLINIC | Age: 59
End: 2019-02-20

## 2019-02-20 DIAGNOSIS — G43.009 MIGRAINE WITHOUT AURA AND WITHOUT STATUS MIGRAINOSUS, NOT INTRACTABLE: Primary | ICD-10-CM

## 2019-02-20 NOTE — TELEPHONE ENCOUNTER
----- Message from Juan Chan PharmD sent at 2/20/2019  2:55 PM CST -----  Regarding: Aimovig Insurance Rejecting  Good afternoon,    Mr. Villela's insurance formulary has changed and is no longer covering Aimovig, but they do have options of Emgality or Ajovy. Could your office send [facility] a prescription for the medication you see as appropriate?  Please contact us with any questions or concerns.    Thanks,    Juan Chan, PharmD  Clinical Pharmacist  [facility]  P: [phone]

## 2019-02-21 ENCOUNTER — TELEPHONE (OUTPATIENT)
Dept: PHARMACY | Facility: CLINIC | Age: 59
End: 2019-02-21

## 2019-02-25 ENCOUNTER — TELEPHONE (OUTPATIENT)
Dept: PHARMACY | Facility: CLINIC | Age: 59
End: 2019-02-25

## 2019-02-25 NOTE — TELEPHONE ENCOUNTER
Call to complete initial consult for Emgality. No answer, left voicemail. Left MyChart message.    Juan Chan, PharmD  Clinical Pharmacist  [facility]  P: [phone]

## 2019-02-25 NOTE — TELEPHONE ENCOUNTER
Initial Emgality consult completed on . Emgality 240mg (loading dose) will be shipped on  to arrive at patient's home on  via [employer]. $ 0.00 copay. Patient intends to start Emgality on  . Address confirmed. Confirmed 2 patient identifiers - name and . Therapy Appropriate.    Patient declined full consult, as he has been on Aimovig (last injection last month around this time). Patient only asked to receive a new sharps container.     --Injection experience: Has had Aimovig in the past.  Informed patient on online injection video on  website.     Counseled patient on administration directions:  - Inject two injections (240mg) into the skin as a loading dose, followed by one injection (120mg) every 4 weeks thereafter.   - Store in refrigerator prior to use (do not freeze, do not shake, keep in original box until use).   - Take out of the refrigerator 30 minutes prior to injection to reach room temperature.  - Wash hands before and after injection.  - Monthly RX will come with gauze, band aids, and alcohol swabs.  - Patient may self-inject in either the front/top of the thighs, abdomen- but at least 2 inches away from belly button   - If someone else is giving the injection, they may also use the outer part of your upper arm or your buttocks.   - If injecting 2 pens for the loading dose, use 2 different injection sites.   - Patient was instructed to rotate injections sites monthly.  - Inspect medication - should be clear and colorless to slightly yellow to slightly brown.   - Patient is to wipe down the injection site with the alcohol pad, wait to dry.    - Remove white base cap from pen (twist to remove).  - Place the pen flat against the injection site and turn the lock ring to the unlocked position then push down and hold the teal button - there will be an initial click; in 10 seconds you will hear a second click.  - Remove the pen from skin and check to see if the gray plunger is  visible - this will indicate that the injection is complete.   - Patient will use sharps container; once full, per state law, she/he may securely lock the sharps container and place in trash. Pharmacy will replace the sharps at no additional charge.    Patient was counseled on possible side effects:  - Injection site reaction: redness, soreness, itching, bruising, which should resolve within 3-5 days.  - Allergic reactions: itching, rash, hives, swelling of face, mouth, tongue, throat, trouble breathing.   - Informed that there is no data in pregnancy/breastfeeding.     Advised patient to keep a calendar to stay compliant.   Consultation included: indication; goals of treatment; administration; storage and handling; side effects; how to handle side effects; the importance of compliance; the importance of keeping all follow up appointments.  Patient understands to report any medication changes to [facility] and provider. All questions answered and addressed to patients satisfaction. I will f/u with patient in 7-10 days from start, [facility] to contact patient in 3 weeks for refills.    Juan Chan, PharmD  Clinical Pharmacist  [facility]  P: [phone]    [facility] sent 2/25 @ 10:32AM

## 2019-02-25 NOTE — TELEPHONE ENCOUNTER
DOCUMENTATION ONLY:  Prior Authorization for Emgality approved from 02/22/19 to 02/22/20    Case Id: [id number]    Co-pay: $30 - $0 with Emgality E-Voucher applied.    Forwarded to the clinical pharmacist for consult and shipment.    -ARR

## 2019-03-06 ENCOUNTER — HOSPITAL ENCOUNTER (EMERGENCY)
Facility: HOSPITAL | Age: 59
Discharge: HOME | End: 2019-03-06
Attending: EMERGENCY MEDICINE
Payer: COMMERCIAL

## 2019-03-06 VITALS
RESPIRATION RATE: 16 BRPM | SYSTOLIC BLOOD PRESSURE: 142 MMHG | TEMPERATURE: 98 F | DIASTOLIC BLOOD PRESSURE: 84 MMHG | WEIGHT: 245 LBS | OXYGEN SATURATION: 96 % | HEART RATE: 69 BPM | BODY MASS INDEX: 32.47 KG/M2 | HEIGHT: 73 IN

## 2019-03-06 DIAGNOSIS — M79.10 MUSCLE PAIN: Primary | ICD-10-CM

## 2019-03-06 DIAGNOSIS — R05.9 COUGH: ICD-10-CM

## 2019-03-06 DIAGNOSIS — R07.9 CHEST PAIN: ICD-10-CM

## 2019-03-06 PROCEDURE — 99284 PR EMERGENCY DEPT VISIT,LEVEL IV: ICD-10-PCS | Mod: ,,, | Performed by: EMERGENCY MEDICINE

## 2019-03-06 PROCEDURE — 99284 EMERGENCY DEPT VISIT MOD MDM: CPT | Mod: ,,, | Performed by: EMERGENCY MEDICINE

## 2019-03-06 PROCEDURE — 93005 ELECTROCARDIOGRAM TRACING: CPT

## 2019-03-06 PROCEDURE — 99284 EMERGENCY DEPT VISIT MOD MDM: CPT

## 2019-03-06 PROCEDURE — 93010 ELECTROCARDIOGRAM REPORT: CPT | Mod: ,,, | Performed by: INTERNAL MEDICINE

## 2019-03-06 PROCEDURE — 93010 EKG 12-LEAD: ICD-10-PCS | Mod: ,,, | Performed by: INTERNAL MEDICINE

## 2019-03-06 PROCEDURE — 25000003 PHARM REV CODE 250: Performed by: EMERGENCY MEDICINE

## 2019-03-06 RX ORDER — METHOCARBAMOL 750 MG/1
1500 TABLET, FILM COATED ORAL 3 TIMES DAILY
Qty: 30 TABLET | Refills: 0 | Status: SHIPPED | OUTPATIENT
Start: 2019-03-06 | End: 2019-03-11

## 2019-03-06 RX ORDER — IBUPROFEN 600 MG/1
600 TABLET ORAL
Status: COMPLETED | OUTPATIENT
Start: 2019-03-06 | End: 2019-03-06

## 2019-03-06 RX ORDER — MELOXICAM 15 MG/1
15 TABLET ORAL DAILY
Qty: 10 TABLET | Refills: 0 | Status: SHIPPED | OUTPATIENT
Start: 2019-03-06 | End: 2019-04-16

## 2019-03-06 RX ORDER — METHOCARBAMOL 750 MG/1
1500 TABLET, FILM COATED ORAL
Status: COMPLETED | OUTPATIENT
Start: 2019-03-06 | End: 2019-03-06

## 2019-03-06 RX ADMIN — METHOCARBAMOL TABLETS 1500 MG: 750 TABLET, COATED ORAL at 02:03

## 2019-03-06 RX ADMIN — IBUPROFEN 600 MG: 600 TABLET, FILM COATED ORAL at 02:03

## 2019-03-06 NOTE — ED PROVIDER NOTES
Encounter Date: 3/6/2019    SCRIBE #1 NOTE: I, Zaid Parnell, am scribing for, and in the presence of,  Dr. Crockett. I have scribed the following portions of the note - Other sections scribed: HPI, ROS, PE.       History     Chief Complaint   Patient presents with    Back Pain     onset x10 days ago, denies injury     57 yo M with PMHx of IBS, diverticulitis, peptic ulcer disease with hemorrhage, hypertension, psoriasis and recent 60 day diagnosis of bronchitis with chronic dry cough presents with chief complaint of back pain.  Patient describes back pain as 8/10 in severity, sharp, and localized in his right lower mid back around the area of his spine.  He reports onset of back pain x8-10 days significantly worsening x2 days (since Monday) alleviated by standing but worsened by sitting.  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  Patient also endorses associated chest discomfort described as someone standing on his chest and chest pain on inspiration.  He denies recent fevers, chills, dysuria, hematuria.  Patient further denies recent travel or long periods of immobilization.  He has no history of cigarette smoking.  Patient has not taken any medications for pain.  Patient is on testosterone.        The history is provided by the patient and medical records.     Review of patient's allergies indicates:   Allergen Reactions    Triamterene      Back and lower abdominal pain     Past Medical History:   Diagnosis Date    Acute gastric ulcer with hemorrhage 2/15/2017    Bilateral occipital neuralgia     BPH with urinary obstruction 12/31/2015    Chronic constipation     Colitis [id number]    infectious?    Diverticulitis     Erectile dysfunction     Essential hypertension     Gastroesophageal reflux disease without esophagitis 2/11/2017    IBS (irritable bowel syndrome)     Migraine without aura and without status migrainosus, not intractable 1/31/2014    Obstructive sleep  apnea syndrome 2/9/2015    Psoriasis     PUD (peptic ulcer disease)      Past Surgical History:   Procedure Laterality Date    CHOLECYSTECTOMY      COLONOSCOPY N/A 2/17/2017    Performed by Yoshi [facility]razo MD at [facility] (2ND FLR)    EGD (ESOPHAGOGASTRODUODENOSCOPY) N/A 12/14/2018    Performed by Lexii Carlson MD at [facility] ENDO (4TH FLR)    EGD (ESOPHAGOGASTRODUODENOSCOPY) N/A 12/14/2018    Performed by Javon Maldonado MD at [facility] ENDO (4TH FLR)    ESOPHAGOGASTRODUODENOSCOPY      ESOPHAGOGASTRODUODENOSCOPY (EGD) N/A 2/17/2017    Performed by Yoshi Erazo MD at [facility] END[facility] (2ND FLR)    ESOPHAGOGASTRODUODENOSCOPY (EGD) N/A 2/11/2017    [facility] ([facility] F[facility])    LEG SURGERY      NASAL SEPTUM SURGERY      UPPER GASTROINTESTINAL ENDOSCOPY       Family History   Problem Relation Age of Onset    Crohn's disease Unknown         brother, sister, father, nephew    Heart disease Father     Crohn's disease Father     Crohn's disease Sister     Crohn's disease Brother     Kidney disease Mother     Hypertension Mother     Thyroid disease Mother     Cystic fibrosis Maternal Uncle     Prostate cancer Neg Hx     Melanoma Neg Hx     Colon cancer Neg Hx      Social History     Tobacco Use    Smoking status: Never Smoker    Smokeless tobacco: Never Used   Substance Use Topics    Alcohol use: Yes     Comment: ocasionally    Drug use: No     Review of Systems   Constitutional: Negative for chills and fever.   HENT: Negative for sore throat.    Respiratory: Negative for shortness of breath.    Cardiovascular: Positive for chest pain (Pressure.  Pain on inspiration).   Gastrointestinal: Negative for nausea.   Genitourinary: Negative for dysuria and hematuria.   Musculoskeletal: Positive for back pain.   Skin: Negative for rash.   Neurological: Negative for weakness.   Hematological: Does not bruise/bleed easily.   All other systems reviewed and are negative.      Physical Exam      Initial Vitals [03/06/19 1250]   BP Pulse Resp Temp SpO2   (!) 166/96 77 16 98 °F (36.7 °C) 95 %      MAP       --         Physical Exam    Nursing note and vitals reviewed.  Constitutional: He appears well-developed and well-nourished.   HENT:   Head: Normocephalic and atraumatic.   Eyes: Pupils are equal, round, and reactive to light.   Neck: Normal range of motion.   Cardiovascular: Normal rate and regular rhythm.   Pulmonary/Chest: Breath sounds normal. No respiratory distress. He exhibits tenderness (Tenderness to palpation across chest wall.  ).   Abdominal: Soft. There is no tenderness. There is no CVA tenderness.   Musculoskeletal: He exhibits tenderness.        Thoracic back: He exhibits tenderness (Right sided thoracic paraspinal tenderness).        Lumbar back: He exhibits tenderness (Right lumbar paraspinal tenderness.  ).   Neurological: He is alert and oriented to person, place, and time.   Skin: Skin is warm and dry.         ED Course   Procedures  Labs Reviewed - No data to display  EKG Readings: (Independently Interpreted)   Rhythm: Normal Sinus Rhythm. Heart Rate: 70.   2:35 PM   No evidence of ischemia.       Imaging Results          X-Ray Chest PA And Lateral (Final result)  Result time 03/06/19 14:49:14    Final result by Landry Durham MD (03/06/19 14:49:14)                 Impression:      1. Grossly stable chronic interstitial findings, no large focal consolidation.      Electronically signed by: Landry Durham MD  Date:    03/06/2019  Time:    14:49             Narrative:    EXAMINATION:  XR CHEST PA AND LATERAL    CLINICAL HISTORY:  Cough    TECHNIQUE:  PA and lateral views of the chest were performed.    COMPARISON:  12/24/2018    FINDINGS:  The cardiomediastinal silhouette is prominent, magnified by technique, similar to the previous exam..  There is no pleural effusion.  The trachea is midline.  The lungs are symmetrically expanded bilaterally with mildly coarse interstitial  attenuation and bilateral basilar subsegmental atelectasis..  No large focal consolidation seen.  There is no pneumothorax.  The osseous structures are remarkable for degenerative changes..                                 Medical Decision Making:   History:   Old Medical Records: I decided to obtain old medical records.  Initial Assessment:   57 yo M with history of hypertension, psoriasis and  Recent 60 day diagnosis of bronchitis with chronic dry cough presents with 8-10 days of back pain with associated chest wall pain with inspiration.  I believe the chest pain is noncardiac in nature but I will get a screening EKG given his age and risk factors.  Back pain appears musculoskeletal in nature.  Patient has not attempted to take any medications prior to arrival.    Independently Interpreted Test(s):   I have ordered and independently interpreted EKG Reading(s) - see prior notes  Clinical Tests:   Radiological Study: Ordered and Reviewed  Medical Tests: Ordered and Reviewed  ED Management:  2:30 PM - We will give NSAIDS and muscle relaxant given cough and back pain.  We will get screening EKG and CXR to look for pneumonia.      358pm  patient reassessed, reports pain is improved to a 5/10 from an 8/10.  Chest x-ray demonstrates no acute cardiopulmonary abnormality.  Chronic interstitial changes.  I believe this is musculoskeletal pain. Plan to discharge home.            Scribe Attestation:   Scribe #1: I performed the above scribed service and the documentation accurately describes the services I performed. I attest to the accuracy of the note.               Clinical Impression:       ICD-10-CM ICD-9-CM   1. Muscle pain M79.10 729.1   2. Cough R05 786.2   3. Chest pain R07.9 786.50                                Abe Crockett MD  03/06/19 3328

## 2019-03-06 NOTE — ED TRIAGE NOTES
Patient received from EMS with complaint of back pain that has been present for 10 days, starting in right flank, then moving to thoracic lumbar region.  Patient state increases with movement.  No changes in activity.  No trauma.  Recent URI with chest pain, recent cough.  Pain in chest occurs with deep breathing, only.       Family fiance present.     Pain:  Rated 8/10.     Psychosocial:  Patient is calm and cooperative.  Patients insight and judgement are appropriate to situation.  Appears clean, well maintained, with clothing appropriate to environment.  No evidence of delusions, hallucinations, or psychosis.     Neuro:  Eyes open spontaneously.  Awake, alert, oriented x 4.  Speech clear and appropriate.  Tolerating saliva secretions well.  Able to follow commands, demonstrating ability to actively and appropriately communicate within context of current conversation.  Symmetrical facial muscles.  Moving all extremities well with no noted weakness.  Adequate muscle tone present.    Movement is purposeful.  No evidence of impaired sensation.  Responds to external stimuli with appropriate reflexes.  Pupils equally round and reactive to light.  No noted drifts.       Airway:  Bilateral chest rise and fall.  RR regular and non-labored.  No crepitus or subcutaneous emphysema noted on palpation.       Circulatory:  Skin warm, dry, and pink.  Capillary refill/skin blanching less than 3 seconds to distal of 4 extremities.     Abdomen:  Abdomen soft and non-distended.  Positive normo-active bowel sounds x 4 quadrants.       Urinary:  Patient denies pain, frequency, or urgency.  Voids independently.  Reports urine appears ronaldo/yellow in color.     Extremities:  No redness, heat, swelling, deformity, or pain.     Skin:  Intact with no bruising/discolorations noted.

## 2019-03-11 ENCOUNTER — PATIENT MESSAGE (OUTPATIENT)
Dept: DERMATOLOGY | Facility: CLINIC | Age: 59
End: 2019-03-11

## 2019-03-19 ENCOUNTER — TELEPHONE (OUTPATIENT)
Dept: PHARMACY | Facility: CLINIC | Age: 59
End: 2019-03-19

## 2019-03-19 NOTE — TELEPHONE ENCOUNTER
Refill call for emgality.  Patient confirmed they are in need of a refill. Will prepare for  ship on 3/ 21 to arrive 3/22 with patient consent.

## 2019-03-21 ENCOUNTER — OFFICE VISIT (OUTPATIENT)
Dept: DERMATOLOGY | Facility: CLINIC | Age: 59
End: 2019-03-21
Payer: COMMERCIAL

## 2019-03-21 DIAGNOSIS — L40.9 PSORIASIS: Primary | ICD-10-CM

## 2019-03-21 DIAGNOSIS — Z79.899 ENCOUNTER FOR LONG-TERM (CURRENT) USE OF HIGH-RISK MEDICATION: ICD-10-CM

## 2019-03-21 PROCEDURE — 99214 OFFICE O/P EST MOD 30 MIN: CPT | Mod: S$GLB,,, | Performed by: PATHOLOGY

## 2019-03-21 PROCEDURE — 99999 PR PBB SHADOW E&M-EST. PATIENT-LVL II: ICD-10-PCS | Mod: PBBFAC,,, | Performed by: PATHOLOGY

## 2019-03-21 PROCEDURE — 99214 PR OFFICE/OUTPT VISIT, EST, LEVL IV, 30-39 MIN: ICD-10-PCS | Mod: S$GLB,,, | Performed by: PATHOLOGY

## 2019-03-21 PROCEDURE — 99999 PR PBB SHADOW E&M-EST. PATIENT-LVL II: CPT | Mod: PBBFAC,,, | Performed by: PATHOLOGY

## 2019-03-21 NOTE — PROGRESS NOTES
Subjective:       Patient ID:  Jonathan Villela is a 58 y.o. male who presents for   Chief Complaint   Patient presents with    Psoriasis     Pt here today for f/u, better,  joint pain in back, shoulder, knee, wrist, 2 weeks, Tx Humira     Psoriasis  - Follow-up  Symptom course: improving  Affected locations: currently clear  Signs / symptoms: asymptomatic  Severity: mild    Complicated pt here for f/u for focally psoriasiform, focally lichenoid and spongiotic dermatitis.  Face and neck now clear.  Genital involvement persists, but is much improved.  Feels that inguinal folds and penis become more red when he is constipated.  Has been on Humira since late November 2018, though he did not take a few doses due to several weeks of brochitis in late December and early January.  Using Shake lotion prn to groin and locoid cream to penile head.  Rash and xerosis to upper back improved but not entirely resolved - using TAC cream prn. GI symptoms persist (diarrhea intermittently but more frequently constipation) - scheduled to see GI in early April.  Has had mildly elevated LFTs, but this was attributed to mild to moderate alcohol consumption over the holidays.  Pt took Humira injection Feb. 22 and new migraine med (Emgality) loading dose injection on Feb. 26.  Had his first migraine in a few months within the next 2 days and then developed arthralgias to back, shoulders, hips, knees, hands that worsened until ER visit on 3/6/19 - CXR and EKG unrevealing.  Assumed to be musculoskeletal in origin - slowly improved over several days on NSAIDs.           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 At that time he was also on numerous herbal supplements, which he discontinued.  He was continued on several BP medications including valsartan, chlorthalidone, beta blockers (bystolic, carvedilol), and calcium channel  blockers throughout 2016 to 2018. He continued to have episodes of severe migraines. Now on Emgality - 2nd dose tomorrow.  He now is on amlodipine, carvedilol, and chlorthalidone with clonidine PRN for hypertension.  He gets testosterone injections and pairs that with anastrozole to prevent gynecomastia. The only supplements he takes now is tumeric, and heart healthy vitamin supplement.      Biopsy result 10/4/2018  FINAL PATHOLOGIC DIAGNOSIS  1. Skin, right chest, punch biopsy:  - CHANGES MOST COMPATIBLE WITH A DRUG ERUPTION.  MICROSCOPIC DESCRIPTION: The biopsy shows minimal focal parakeratosis alternating with compact  orthokeratosis. There is mild epidermal spongiosis and focal subtle vacuolar interface changes with rare apoptotic  keratinocytes that localize to multiple levels within the epidermis. The dermis is edematous. There is a superficial  and mid dermal perivascular and perifollicular lymphohistiocytic infiltrate with rarer neutrophils and mast cells. PAS  stain is negative for fungi. Appropriately reactive controls were reviewed. Multiple levels were examined.  2. Skin, right upper back, punch biopsy:  - SUBACUTE SPONGIOTIC DERMATITIS (See Comment).  MICROSCOPIC DESCRIPTION: The biopsy shows focal parakeratosis, epidermal acanthosis with elongation of  rete ridges, spongiosis with exocytosis of lymphocytes and a superficial perivascular and perifollicular dermal  lymphohistiocytic infiltrate with rare neutrophils. A majority of the superficial dermal and intraepidermal lymphocytes  stain positive for both CD3 and CD4, while CD8 stains a sparser population of cells. The approximate CD4 to CD8  ratio is 4:1. PAS stain is negative for fungi. Appropriately reactive controls were reviewed. Multiple levels were  examined.  COMMENT: These histologic features are compatible with an eczematous process such as atopic dermatitis, an  irritant or allergic contact dermatitis, or an id reaction. A drug eruption  cannot be excluded. Clinical correlation is  essential.  3. Skin, right groin, punch biopsy:  - SPARSE SUPERFICIAL PERIVASCULAR DERMATITIS (SEE COMMENT).  MICROSCOPIC DESCRIPTION: Sections show a punch biopsy of skin extending to the level of the subcutis. The  stratum corneum consists of compact orthokeratosis. The granular layer is intact. The epidermis is largely  unremarkable, however, apoptotic keratinocytes are noted at multiple levels within the epidermis. Vacuolar  interface alteration is not appreciated. Within the superficial dermis, there is a sparse perivascular lymphohistiocytic  infiltrate with scattered melanophages. PAS stain is negative for yeasts/fungi. Appropriately reactive controls were  reviewed. Multiple levels were examined.  COMMENT: These histologic features are mild and nonspecific. In the appropriate clinical context, they may  represent and resolving eczematous process with postinflammatory pigmentary alteration. Differential diagnosis  includes a viral exanthem or a morbilliform drug eruption. Clinical correlation is advised.  4. Skin, penile base, shave biopsy:  - LICHENOID AND SPONGIOTIC DERMATITIS (SEE COMMENT).  MICROSCOPIC DESCRIPTION: Sections show parakeratosis that is focally associated with neutrophils overlying  an area of epidermis exhibiting a diminished granular layer. There is mild epidermal spongiosis with exocytosis of  lymphocytes and Langerhans cells. Subtle vacuolar interface alteration is appreciated in association with rare  colloid bodies. The underlying superficial dermis contains a vaguely lichenoid and perivascular lymphohistiocytic  infiltrate with a few scattered neutrophils and a rare eosinophil. Erythrocyte extravasation is also noted within the  superficial dermis. PAS stain is negative for yeasts/fungi. A majority of the superficial dermal and intraepidermal  lymphocytes stain positive for both CD3 and CD4, while CD8 stains a sparser population of cells.  The approximate  CD4 to CD8 ratio is 4:1. CD20 is negative, and CD30 stains only a few rare, widely scattered cells. CD1a shows  an essentially normal distribution of Langerhans cells within the involved epidermis. Appropriately reactive controls  were reviewed. Multiple levels were examined.  COMMENT: The differential diagnosis includes pityriasis lichenoides chronica versus a lichenoid drug eruption .  Clinical correlation is advised.    Review of Systems   Constitutional: Negative for fever, chills, weight loss, weight gain, fatigue, night sweats and malaise.   Skin: Positive for itching, rash and dry skin. Negative for daily sunscreen use and activity-related sunscreen use.        Objective:    Physical Exam   Constitutional: He appears well-developed and well-nourished.   Genitourinary:         Neurological: He is alert and oriented to person, place, and time.   Psychiatric: He has a normal mood and affect.   Skin:   Areas Examined (abnormalities noted in diagram):   Scalp / Hair Palpated and Inspected  Head / Face Inspection Performed  Neck Inspection Performed  Chest / Axilla Inspection Performed  Abdomen Inspection Performed  Genitals / Buttocks / Groin Inspection Performed  Back Inspection Performed  RUE Inspected  LUE Inspection Performed  RLE Inspected  LLE Inspection Performed  Nails and Digits Inspection Performed              Diagram Legend     Erythematous scaling macule/papule c/w actinic keratosis       Vascular papule c/w angioma      Pigmented verrucoid papule/plaque c/w seborrheic keratosis      Yellow umbilicated papule c/w sebaceous hyperplasia      Irregularly shaped tan macule c/w lentigo     1-2 mm smooth white papules consistent with Milia      Movable subcutaneous cyst with punctum c/w epidermal inclusion cyst      Subcutaneous movable cyst c/w pilar cyst      Firm pink to brown papule c/w dermatofibroma      Pedunculated fleshy papule(s) c/w skin tag(s)      Evenly pigmented macule c/w  junctional nevus     Mildly variegated pigmented, slightly irregular-bordered macule c/w mildly atypical nevus      Flesh colored to evenly pigmented papule c/w intradermal nevus       Pink pearly papule/plaque c/w basal cell carcinoma      Erythematous hyperkeratotic cursted plaque c/w SCC      Surgical scar with no sign of skin cancer recurrence      Open and closed comedones      Inflammatory papules and pustules      Verrucoid papule consistent consistent with wart     Erythematous eczematous patches and plaques     Dystrophic onycholytic nail with subungual debris c/w onychomycosis     Umbilicated papule    Erythematous-base heme-crusted tan verrucoid plaque consistent with inflamed seborrheic keratosis     Erythematous Silvery Scaling Plaque c/w Psoriasis     See annotation      Assessment / Plan:        Psoriasis with overlap of lichenoid/spongiotic dermatitis - cutaneous findings improved but not resolved today.  Overlap of morphologies (clinical and histologic) remains suspicious for drug induced cause.  However, constellation of GI symptoms, migraines, intermittent joint pain, rash raise the possibility of an underlying unifying autoimmune/inflammatory etiology.  Pt to f/u with GI in early April and will place consult to rheum.     - Shake lotion to inguinal folds prn erythema, irritation  - locoid cream to face, genitals prn   - TAC cream to back/body  - continue Humira for now  - would consider adding or switching to MTX, but will await GI and rheum recs.  Will need labs to f/u prior mildly elevated LFTs.   -     Ambulatory consult to Rheumatology    Encounter for long-term (current) use of high-risk medication - will get repeat LFTs in conjunction with upcoming GI visit.  Quant gold negative 10/31/18.             No Follow-up on file.

## 2019-04-02 DIAGNOSIS — R60.0 EDEMA EXTREMITIES: Primary | ICD-10-CM

## 2019-04-02 DIAGNOSIS — R07.9 CHEST PAIN: Primary | ICD-10-CM

## 2019-04-02 DIAGNOSIS — R93.89 ABNORMAL CXR: ICD-10-CM

## 2019-04-02 DIAGNOSIS — M79.89 SWELLING OF EXTREMITY: ICD-10-CM

## 2019-04-12 ENCOUNTER — TELEPHONE (OUTPATIENT)
Dept: PHARMACY | Facility: CLINIC | Age: 59
End: 2019-04-12

## 2019-04-15 ENCOUNTER — HOSPITAL ENCOUNTER (OUTPATIENT)
Dept: RADIOLOGY | Facility: HOSPITAL | Age: 59
Discharge: HOME | End: 2019-04-15
Attending: INTERNAL MEDICINE
Payer: COMMERCIAL

## 2019-04-15 DIAGNOSIS — R60.0 EDEMA EXTREMITIES: ICD-10-CM

## 2019-04-15 DIAGNOSIS — R07.9 CHEST PAIN: ICD-10-CM

## 2019-04-15 DIAGNOSIS — R93.89 ABNORMAL CXR: ICD-10-CM

## 2019-04-15 DIAGNOSIS — M79.89 SWELLING OF EXTREMITY: ICD-10-CM

## 2019-04-15 PROCEDURE — 93970 EXTREMITY STUDY: CPT | Mod: TC

## 2019-04-15 PROCEDURE — 93970 US LOWER EXTREMITY VEINS BILATERAL: ICD-10-PCS | Mod: 26,,, | Performed by: RADIOLOGY

## 2019-04-15 PROCEDURE — 93970 EXTREMITY STUDY: CPT | Mod: 26,,, | Performed by: RADIOLOGY

## 2019-04-16 ENCOUNTER — HOSPITAL ENCOUNTER (OUTPATIENT)
Dept: RADIOLOGY | Facility: HOSPITAL | Age: 59
Discharge: HOME | End: 2019-04-16
Attending: INTERNAL MEDICINE
Payer: COMMERCIAL

## 2019-04-16 ENCOUNTER — OFFICE VISIT (OUTPATIENT)
Dept: GASTROENTEROLOGY | Facility: CLINIC | Age: 59
End: 2019-04-16
Payer: COMMERCIAL

## 2019-04-16 VITALS
HEART RATE: 73 BPM | DIASTOLIC BLOOD PRESSURE: 84 MMHG | SYSTOLIC BLOOD PRESSURE: 130 MMHG | BODY MASS INDEX: 33.48 KG/M2 | HEIGHT: 73 IN | WEIGHT: 252.63 LBS

## 2019-04-16 DIAGNOSIS — R93.89 ABNORMAL X-RAY: Primary | ICD-10-CM

## 2019-04-16 DIAGNOSIS — M79.89 SWELLING OF LIMB: ICD-10-CM

## 2019-04-16 DIAGNOSIS — Z51.81 ENCOUNTER FOR MONITORING LONG-TERM PROTON PUMP INHIBITOR THERAPY: ICD-10-CM

## 2019-04-16 DIAGNOSIS — Z79.899 ENCOUNTER FOR MONITORING LONG-TERM PROTON PUMP INHIBITOR THERAPY: ICD-10-CM

## 2019-04-16 DIAGNOSIS — K21.9 GASTROESOPHAGEAL REFLUX DISEASE, ESOPHAGITIS PRESENCE NOT SPECIFIED: ICD-10-CM

## 2019-04-16 DIAGNOSIS — R60.0 LEG EDEMA: ICD-10-CM

## 2019-04-16 DIAGNOSIS — Z12.11 SPECIAL SCREENING FOR MALIGNANT NEOPLASMS, COLON: Primary | ICD-10-CM

## 2019-04-16 DIAGNOSIS — R16.2 HEPATOSPLENOMEGALY: ICD-10-CM

## 2019-04-16 DIAGNOSIS — K22.10 EROSIVE ESOPHAGITIS: Primary | ICD-10-CM

## 2019-04-16 DIAGNOSIS — R79.89 ELEVATED LFTS: ICD-10-CM

## 2019-04-16 DIAGNOSIS — E61.1 IRON DEFICIENCY: ICD-10-CM

## 2019-04-16 DIAGNOSIS — Z79.899 LONG TERM CURRENT USE OF IMMUNOSUPPRESSIVE DRUG: ICD-10-CM

## 2019-04-16 DIAGNOSIS — K76.0 FATTY LIVER: ICD-10-CM

## 2019-04-16 DIAGNOSIS — K92.1 HEMATOCHEZIA: ICD-10-CM

## 2019-04-16 PROCEDURE — 99215 OFFICE O/P EST HI 40 MIN: CPT | Mod: S$GLB,,, | Performed by: INTERNAL MEDICINE

## 2019-04-16 PROCEDURE — 99215 PR OFFICE/OUTPT VISIT, EST, LEVL V, 40-54 MIN: ICD-10-PCS | Mod: S$GLB,,, | Performed by: INTERNAL MEDICINE

## 2019-04-16 PROCEDURE — 71046 XR CHEST PA AND LATERAL: ICD-10-PCS | Mod: 26,,, | Performed by: RADIOLOGY

## 2019-04-16 PROCEDURE — 99999 PR PBB SHADOW E&M-EST. PATIENT-LVL V: ICD-10-PCS | Mod: PBBFAC,,, | Performed by: INTERNAL MEDICINE

## 2019-04-16 PROCEDURE — 99999 PR PBB SHADOW E&M-EST. PATIENT-LVL V: CPT | Mod: PBBFAC,,, | Performed by: INTERNAL MEDICINE

## 2019-04-16 PROCEDURE — 71046 X-RAY EXAM CHEST 2 VIEWS: CPT | Mod: TC

## 2019-04-16 PROCEDURE — 71046 X-RAY EXAM CHEST 2 VIEWS: CPT | Mod: 26,,, | Performed by: RADIOLOGY

## 2019-04-16 RX ORDER — POLYETHYLENE GLYCOL 3350, SODIUM SULFATE ANHYDROUS, SODIUM BICARBONATE, SODIUM CHLORIDE, POTASSIUM CHLORIDE 236; 22.74; 6.74; 5.86; 2.97 G/4L; G/4L; G/4L; G/4L; G/4L
4 POWDER, FOR SOLUTION ORAL ONCE
Qty: 4000 ML | Refills: 0 | Status: SHIPPED | OUTPATIENT
Start: 2019-04-16 | End: 2019-04-16
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
## 2019-04-17 ENCOUNTER — HOSPITAL ENCOUNTER (OUTPATIENT)
Dept: RADIOLOGY | Facility: HOSPITAL | Age: 59
Discharge: HOME | End: 2019-04-17
Attending: INTERNAL MEDICINE
Payer: COMMERCIAL

## 2019-04-17 ENCOUNTER — PATIENT MESSAGE (OUTPATIENT)
Dept: NEUROLOGY | Facility: CLINIC | Age: 59
End: 2019-04-17

## 2019-04-17 DIAGNOSIS — R60.0 LEG EDEMA: ICD-10-CM

## 2019-04-17 DIAGNOSIS — M79.89 SWELLING OF LIMB: ICD-10-CM

## 2019-04-17 DIAGNOSIS — R93.89 ABNORMAL X-RAY: ICD-10-CM

## 2019-04-17 PROCEDURE — 71275 CTA CHEST NON CORONARY: ICD-10-PCS | Mod: 26,,, | Performed by: RADIOLOGY

## 2019-04-17 PROCEDURE — 71275 CT ANGIOGRAPHY CHEST: CPT | Mod: 26,,, | Performed by: RADIOLOGY

## 2019-04-17 PROCEDURE — 71275 CT ANGIOGRAPHY CHEST: CPT | Mod: TC

## 2019-04-17 PROCEDURE — 25500020 PHARM REV CODE 255: Performed by: INTERNAL MEDICINE

## 2019-04-17 RX ADMIN — IOHEXOL 100 ML: 350 INJECTION, SOLUTION INTRAVENOUS at 08:04

## 2019-04-18 ENCOUNTER — TELEPHONE (OUTPATIENT)
Dept: GASTROENTEROLOGY | Facility: CLINIC | Age: 59
End: 2019-04-18

## 2019-04-18 NOTE — TELEPHONE ENCOUNTER
----- Message from Eliza Mobley sent at 4/18/2019  2:06 PM CDT -----  Contact: Self- [phone]  Kacey- pt returning missed call regarding lab results- please contact pt at [phone]

## 2019-04-18 NOTE — TELEPHONE ENCOUNTER
"----- Message from Yoshi Erazo MD sent at 4/17/2019  7:50 AM CDT -----  Desi  please tell patient that their Hepatitis A, B and C labs are negative but they have "No" immunity to them either.      There is currently No vaccination yet for Hepatitis C.    There is vaccinations for Hepatitis A and B,  and Recommend the Hepatitis A and B vaccination series.        Its a three part vaccination series:   Day 1, then again in 1 month, and then again in 6 months from first one.      Orders were  Placed.      Desi - please tell patient that they are iron deficient and but not anaemic and recommend that they take ferrous sulfate one 325mg pill every 12 hours for next 4 months and repeat fasting hemoglobin and Ferritin in 8 weeks - Orders placed.  "

## 2019-04-22 ENCOUNTER — PATIENT MESSAGE (OUTPATIENT)
Dept: ENDOSCOPY | Facility: HOSPITAL | Age: 59
End: 2019-04-22

## 2019-04-23 ENCOUNTER — PATIENT MESSAGE (OUTPATIENT)
Dept: DERMATOLOGY | Facility: CLINIC | Age: 59
End: 2019-04-23

## 2019-04-25 ENCOUNTER — PATIENT MESSAGE (OUTPATIENT)
Dept: ADMINISTRATIVE | Facility: OTHER | Age: 59
End: 2019-04-25

## 2019-04-29 ENCOUNTER — HOSPITAL ENCOUNTER (OUTPATIENT)
Dept: RADIOLOGY | Facility: HOSPITAL | Age: 59
Discharge: HOME | End: 2019-04-29
Attending: INTERNAL MEDICINE
Payer: COMMERCIAL

## 2019-04-29 DIAGNOSIS — R16.2 HEPATOSPLENOMEGALY: ICD-10-CM

## 2019-04-29 DIAGNOSIS — R79.89 ELEVATED LFTS: ICD-10-CM

## 2019-04-29 DIAGNOSIS — K76.0 FATTY LIVER: ICD-10-CM

## 2019-04-29 PROCEDURE — 93975 VASCULAR STUDY: CPT | Mod: 26,,, | Performed by: INTERNAL MEDICINE

## 2019-04-29 PROCEDURE — 93975 VASCULAR STUDY: CPT | Mod: TC

## 2019-04-29 PROCEDURE — 93975 US ABDOMEN COMP WITH DOPPLER (XPD): ICD-10-PCS | Mod: 26,,, | Performed by: INTERNAL MEDICINE

## 2019-05-03 ENCOUNTER — CLINICAL SUPPORT (OUTPATIENT)
Dept: INFECTIOUS DISEASES | Facility: CLINIC | Age: 59
End: 2019-05-03
Payer: COMMERCIAL

## 2019-05-03 ENCOUNTER — INITIAL CONSULT (OUTPATIENT)
Dept: RHEUMATOLOGY | Facility: CLINIC | Age: 59
End: 2019-05-03
Payer: COMMERCIAL

## 2019-05-03 VITALS
BODY MASS INDEX: 33.89 KG/M2 | HEART RATE: 70 BPM | WEIGHT: 255.69 LBS | SYSTOLIC BLOOD PRESSURE: 124 MMHG | DIASTOLIC BLOOD PRESSURE: 81 MMHG | HEIGHT: 73 IN

## 2019-05-03 DIAGNOSIS — E61.1 IRON DEFICIENCY: ICD-10-CM

## 2019-05-03 DIAGNOSIS — M25.50 ARTHRALGIA, UNSPECIFIED JOINT: Primary | ICD-10-CM

## 2019-05-03 PROBLEM — J40 BRONCHITIS: Status: RESOLVED | Noted: 2018-07-25 | Resolved: 2019-05-03

## 2019-05-03 PROBLEM — K92.2 GI BLEED: Status: RESOLVED | Noted: 2017-02-17 | Resolved: 2019-05-03

## 2019-05-03 PROBLEM — J06.9 UPPER RESPIRATORY TRACT INFECTION: Status: RESOLVED | Noted: 2018-07-25 | Resolved: 2019-05-03

## 2019-05-03 PROCEDURE — 90636 HEPATITIS A HEPATITIS B COMBINED VACCINE IM: ICD-10-PCS | Mod: S$GLB,,, | Performed by: INTERNAL MEDICINE

## 2019-05-03 PROCEDURE — 99999 PR PBB SHADOW E&M-EST. PATIENT-LVL I: ICD-10-PCS | Mod: PBBFAC,,,

## 2019-05-03 PROCEDURE — 99204 OFFICE O/P NEW MOD 45 MIN: CPT | Mod: S$GLB,,, | Performed by: INTERNAL MEDICINE

## 2019-05-03 PROCEDURE — 99999 PR PBB SHADOW E&M-EST. PATIENT-LVL I: CPT | Mod: PBBFAC,,,

## 2019-05-03 PROCEDURE — 90636 HEP A/HEP B VACC ADULT IM: CPT | Mod: S$GLB,,, | Performed by: INTERNAL MEDICINE

## 2019-05-03 PROCEDURE — 90471 HEPATITIS A HEPATITIS B COMBINED VACCINE IM: ICD-10-PCS | Mod: S$GLB,,, | Performed by: INTERNAL MEDICINE

## 2019-05-03 PROCEDURE — 99204 PR OFFICE/OUTPT VISIT, NEW, LEVL IV, 45-59 MIN: ICD-10-PCS | Mod: S$GLB,,, | Performed by: INTERNAL MEDICINE

## 2019-05-03 PROCEDURE — 99999 PR PBB SHADOW E&M-EST. PATIENT-LVL V: ICD-10-PCS | Mod: PBBFAC,,, | Performed by: INTERNAL MEDICINE

## 2019-05-03 PROCEDURE — 99999 PR PBB SHADOW E&M-EST. PATIENT-LVL V: CPT | Mod: PBBFAC,,, | Performed by: INTERNAL MEDICINE

## 2019-05-03 PROCEDURE — 90471 IMMUNIZATION ADMIN: CPT | Mod: S$GLB,,, | Performed by: INTERNAL MEDICINE

## 2019-05-03 NOTE — LETTER
May 3, 2019      Jonnie Rivera MD  [address]  [facility] [address]           [facility] - Rheumatology  [address]  [facility] [address]  Phone: [phone]  Fax: [phone]          Patient: Jonathan Villela   MR Number: [id number]   YOB: 1960   Date of Visit: 5/3/2019       Dear Dr. Jonnie Rivera:    Thank you for referring Jonathan Villela to me for evaluation. Attached you will find relevant portions of my assessment and plan of care.    If you have questions, please do not hesitate to call me. I look forward to following Jonathan Villela along with you.    Sincerely,    Quang Ward MD    Enclosure  CC:  No Recipients    If you would like to receive this communication electronically, please contact [email or web address] or [phone] to request more information on [employer] access.    For providers and/or their staff who would like to refer a patient to [facility], please contact us through our one-stop-shop provider referral line, [facility], at [phone].    If you feel you have received this communication in error or would no longer like to receive these types of communications, please e-mail [email or web address]

## 2019-05-03 NOTE — PROGRESS NOTES
Answers for HPI/ROS submitted by the patient on 5/3/2019   fever: No  eye redness: No  headaches: No  shortness of breath: Yes  chest pain: No  trouble swallowing: No  diarrhea: Yes  constipation: Yes  unexpected weight change: No  genital sore: No  During the last 3 days, have you had a skin rash?: No  Bruises or bleeds easily: No  cough: No

## 2019-05-03 NOTE — PROGRESS NOTES
"Subjective:       Patient ID: Jonathan Villela is a 59 y.o. male.    Chief Complaint: No chief complaint on file.    HPI   59 year old CM with PMH of psoriasis and GI symptoms likely consistent with IBS  Patient has ache and pains in ankles and intermittently in knees  No swellings, no rashes other than his psoriasis on elbow flexor surfaces  Patient has consistent GI symptoms of diarrhea/constipation/normal BMs  No pitting nails, small spurs seen around upper/midback from CT images  No fevers, chills, no ulcers, no chest pain, or SOB      Review of Systems   Constitutional: Negative for fever and unexpected weight change.   HENT: Negative for mouth sores, nosebleeds and trouble swallowing.    Eyes: Negative for redness.   Respiratory: Negative for cough and shortness of breath.    Cardiovascular: Negative for chest pain.   Gastrointestinal: Positive for constipation and diarrhea.   Genitourinary: Negative for genital sores.   Musculoskeletal: Positive for arthralgias, back pain and neck stiffness. Negative for gait problem, joint swelling, myalgias and neck pain.   Skin: Negative for rash.   Neurological: Negative for headaches.   Hematological: Does not bruise/bleed easily.         Objective:   /81   Pulse 70   Ht 6' 1.2" (1.859 m)   Wt 116 kg (255 lb 11.2 oz)   BMI 33.55 kg/m²      Physical Exam   Constitutional: He is oriented to person, place, and time and well-developed, well-nourished, and in no distress. No distress.   HENT:   Head: Normocephalic and atraumatic.   Right Ear: External ear normal.   Left Ear: External ear normal.   Nose: Nose normal.   Mouth/Throat: Oropharynx is clear and moist. No oropharyngeal exudate.   Eyes: Conjunctivae and EOM are normal. Pupils are equal, round, and reactive to light. Right eye exhibits no discharge. Left eye exhibits no discharge.   Neck: Normal range of motion. Neck supple.   Cardiovascular: Normal rate and normal heart sounds.    No murmur " heard.  Pulmonary/Chest: Effort normal and breath sounds normal. No respiratory distress. He has no wheezes. He has no rales.   Abdominal: Soft. Bowel sounds are normal.       Right Side Rheumatological Exam     Examination finds the shoulder, elbow, wrist, knee, 1st PIP, 1st MCP, 2nd PIP, 2nd MCP, 3rd PIP, 3rd MCP, 4th PIP, 4th MCP, 5th PIP and 5th MCP normal.    Shoulder Exam   Tenderness Location: no tenderness    Range of Motion   The patient has normal right shoulder ROM.  Sensation: normal    Knee Exam     Range of Motion   The patient has normal right knee ROM.  Sensation: normal    Hip Exam   Sensation: normal    Elbow/Wrist Exam   Tenderness Location: no elbow tenderness and no wrist tenderness    Range of Motion   Elbow   The patient has normal right elbow ROM.    Range of Motion   Wrist   The patient has normal right wrist ROM.  Sensation: normal    Foot Exam     Range of Motion   The patient has normal right ankle ROM.    Muscle Strength (0-5 scale):  Neck Flexion:  4.8  Neck Extension: 4.8  Deltoid:  4.8  Biceps: 4.8/5   Triceps:  4.8  : 4.8/5   Iliopsoas: 4.8  Quadriceps:  4.8   Distal Lower Extremity: 4.8    Left Side Rheumatological Exam     Examination finds the shoulder, elbow, wrist, knee, 1st PIP, 1st MCP, 2nd PIP, 2nd MCP, 3rd PIP, 3rd MCP, 4th PIP, 4th MCP, 5th PIP and 5th MCP normal.    Shoulder Exam   Tenderness Location: no tenderness    Range of Motion   The patient has normal left shoulder ROM.  Sensation: normal    Knee Exam     Range of Motion   The patient has normal left knee ROM.  Sensation: normal    Hip Exam   Sensation: normal    Elbow/Wrist Exam   Tenderness Location: no elbow tenderness and no wrist tenderness    Range of Motion   Elbow   The patient has normal left elbow ROM.    Range of Motion   Wrist   The patient has normal left wrist ROM.  Sensation: normal    Foot Exam     Range of Motion   The patient has normal left ankle ROM.     Muscle Strength (0-5 scale):  Neck  Flexion:  4.8  Neck Extension: 4.8  Deltoid:  4.8  Biceps: 4.8/5   Triceps:  4.8  :  4.8/5   Iliopsoas: 4.8  Quadriceps:  4.8   Distal Lower Extremity: 4.8      Neurological: He is alert and oriented to person, place, and time. He has normal reflexes. Gait normal. GCS score is 15.   Reflex Scores:       Patellar reflexes are 2+ on the right side and 2+ on the left side.       Achilles reflexes are 2+ on the right side and 2+ on the left side.  Skin: Skin is warm and dry. Rash: psoriasis on elbows surfaces. He is not diaphoretic.     Psychiatric: Mood, memory, affect and judgment normal.   Musculoskeletal: Normal range of motion. He exhibits no edema, tenderness or deformity.           Assessment:       1. Arthralgia, unspecified joint            Plan:       Patient does not have any signs or symptoms of psoriatic arthritis  Aches and pains consistent with degenernative joint diease  Can try Glucosamine /chondroitin 1500/1200 - try it for a month  Check with Dr Erazo if he is ok with a trial of celebrex  Can discuss with dermatology about stopping humira if needed by patient    Ordering inflammatory lab today  If lab ok you can return as PRN

## 2019-05-06 ENCOUNTER — TELEPHONE (OUTPATIENT)
Dept: GASTROENTEROLOGY | Facility: CLINIC | Age: 59
End: 2019-05-06

## 2019-05-06 NOTE — TELEPHONE ENCOUNTER
----- Message from Yoshi Erazo MD sent at 5/6/2019 12:41 PM CDT -----  Xochilt -please tell patient that their liver imaging study shows Fatty Liver and recommend weight loss about 10% of their current body weight if they happen to be over weight (a Body Mass Index of over 26 is over weight)    Please tell patient that fatty liver in some patients can progress to liver cirrhosis, liver cancer, liver failure and death and need for liver transplant and best current treatment is gradual weight loss.    Jonathan your EXAMINATION: US ABDOMEN COMP WITH DOPPLER (XPD) was read as follows:    Impression:      Satisfactory Doppler evaluation of the liver.    Hepatomegaly with fatty infiltration of the liver.    Cholecystectomy

## 2019-05-07 ENCOUNTER — PATIENT MESSAGE (OUTPATIENT)
Dept: DERMATOLOGY | Facility: CLINIC | Age: 59
End: 2019-05-07

## 2019-05-15 ENCOUNTER — PATIENT MESSAGE (OUTPATIENT)
Dept: ADMINISTRATIVE | Facility: OTHER | Age: 59
End: 2019-05-15

## 2019-05-16 ENCOUNTER — OFFICE VISIT (OUTPATIENT)
Dept: FAMILY MEDICINE | Facility: CLINIC | Age: 59
End: 2019-05-16
Payer: COMMERCIAL

## 2019-05-16 VITALS
WEIGHT: 248.88 LBS | SYSTOLIC BLOOD PRESSURE: 130 MMHG | HEART RATE: 69 BPM | BODY MASS INDEX: 32.98 KG/M2 | TEMPERATURE: 98 F | HEIGHT: 73 IN | OXYGEN SATURATION: 96 % | DIASTOLIC BLOOD PRESSURE: 66 MMHG

## 2019-05-16 DIAGNOSIS — E29.1 HYPOGONADISM MALE: ICD-10-CM

## 2019-05-16 DIAGNOSIS — K76.0 FATTY LIVER: ICD-10-CM

## 2019-05-16 DIAGNOSIS — K58.9 IRRITABLE BOWEL SYNDROME, UNSPECIFIED TYPE: ICD-10-CM

## 2019-05-16 DIAGNOSIS — I10 HYPERTENSION, UNSPECIFIED TYPE: Primary | ICD-10-CM

## 2019-05-16 DIAGNOSIS — R74.01 TRANSAMINITIS: ICD-10-CM

## 2019-05-16 DIAGNOSIS — M25.50 ARTHRALGIA, UNSPECIFIED JOINT: ICD-10-CM

## 2019-05-16 PROCEDURE — 99999 PR PBB SHADOW E&M-EST. PATIENT-LVL V: CPT | Mod: PBBFAC,,, | Performed by: FAMILY MEDICINE

## 2019-05-16 PROCEDURE — 99215 OFFICE O/P EST HI 40 MIN: CPT | Mod: S$GLB,,, | Performed by: FAMILY MEDICINE

## 2019-05-16 PROCEDURE — 99999 PR PBB SHADOW E&M-EST. PATIENT-LVL V: ICD-10-PCS | Mod: PBBFAC,,, | Performed by: FAMILY MEDICINE

## 2019-05-16 PROCEDURE — 99215 PR OFFICE/OUTPT VISIT, EST, LEVL V, 40-54 MIN: ICD-10-PCS | Mod: S$GLB,,, | Performed by: FAMILY MEDICINE

## 2019-05-16 NOTE — PROGRESS NOTES
(Portions of this note were dictated using voice recognition software and may contain dictation related errors in spelling/grammar/syntax not found on text review)    CC:   Chief Complaint   Patient presents with    medication discussion       HPI: 59 y.o. male last seen August 2018.       Here to discuss some of his medical issues as noted below.    Chart review of multiple specialists visits since my last visit with him:    Follows with Dermatology for psoriasis.  Prescribed Humira but found that this was not really helping with his skin issues.  Also was hoping that this would help with some of his chronic joint pains but this has not helped.  His dermatologist did recommend if no effect, he should likely discontinue the Humira.    Saw Rheumatology on 05/03/2019 concerned about multiple joint pain. Rheumatology had determined that these pains were likely not related to his psoriasis or with significant inflammatory component.  Had some degenerative changes of his hands.  Inflammatory workup was negative with normal CRP and sed rate.  SPEP normal    Followed by GI, last seen 04/16/2019 for intermittent hematochezia, fatty liver and splenomegaly on CT scan.  Does have esophagitis noted on recent endoscopy 12/14/2018 along with a gastric polyp.  H pylori negative.  No malignancy on biopsy Was advised hepatology referral.  Plan on repeat EGD and colonoscopy given above symptoms.  There was a concern about possible Crohn's disease in the past although this has not been diagnosed on endoscopies.  States that he get BS like symptoms worry will have periodic constipation or loose stools intermixed with normal bowel movements.  States that he started taking some over-the-counter colostrum supplement recently and feels like his bowels have been great since.  He is set up to see hepatology for his fatty liver.    Saw endocrinology (LOV 01/23/2019) for hypogonadism on testosterone therapy.  Diagnosed in 2017, currently on  testosterone cypionate injections 200 mg weekly.  Managed [facility] at [facility].  There was concern about labs showing slightly increasing polycythemia.  Total testosterone level was 1167 in January 2019.  His endocrinologist had messaged him about decreasing testosterone to 150 mg weekly.  He states that he has been on testosterone for about a year or year and a half.  He states that his pre treatment levels of testosterone rectally stated to be within normal range, just low end of normal.  He thought that getting on testosterone would help with some symptoms like energy and joint pains but he feels no positive effects of the testosterone in fact he feels some negative effects like increased weight and joint swelling in his ankles.  He actually has been off testosterone altogether in the last 2 weeks but was wondering if this was safe for him to do or he needs to get back on and wean down.    Visited with Cardiology for his hypertension on 02/04/2019 currently on carvedilol 12.5 mg b.i.d., amlodipine 5 mg daily, chlorthalidone 25 mg daily.  Was on Tekturna for a while from his cardiologist but could not tolerate.  Has clonidine on hand if needed for acutely elevated blood pressure.  Had multiple physical symptoms described including fatigue and joint pain and migraines at that visit.  Had stopped atorvastatin secondary to many of the symptoms in 2018 although he is currently on pravastatin from his cardiologist.  he does have coexisting sleep apnea but was unable to tolerate CPAP.  His blood pressure today is actually controlled. Also had done aldosterone/renin profile secondary to hypertension hypokalemia to assess for hyperaldosteronism.  This was normal.   He is now taking potassium supplement        Past Medical History:   Diagnosis Date    Acute gastric ulcer with hemorrhage 2/15/2017    Bilateral occipital neuralgia     BPH with urinary obstruction 12/31/2015    Chronic constipation     Colitis  [id number]    infectious?    Diverticulitis     Erectile dysfunction     Essential hypertension     Gastroesophageal reflux disease without esophagitis 2/11/2017    IBS (irritable bowel syndrome)     Migraine without aura and without status migrainosus, not intractable 1/31/2014    Obstructive sleep apnea syndrome 2/9/2015    Psoriasis     PUD (peptic ulcer disease)        Past Surgical History:   Procedure Laterality Date    CHOLECYSTECTOMY      COLONOSCOPY N/A 2/17/2017    Performed by Yoshi [facility]razo MD at [facility] (2ND FLR)    EGD (ESOPHAGOGASTRODUODENOSCOPY) N/A 12/14/2018    Performed by Lexii Carlson MD at [facility] (4TH FLR)    EGD (ESOPHAGOGASTRODUODENOSCOPY) N/A 12/14/2018    Performed by Javon Maldonado MD at [facility] (4TH FLR)    ESOPHAGOGASTRODUODENOSCOPY      ESOPHAGOGASTRODUODENOSCOPY (EGD) N/A 2/17/2017    Performed by [facility]o MD at [facility] (2ND FLR)    ESOPHAGOGASTRODUODENOSCOPY (EGD) N/A 2/11/2017    Performed by Y[facility]o MD at [facility] (2ND FLR)    LEG SURGERY      NASAL SEPTUM SURGERY      UPPER GASTROINTESTINAL ENDOSCOPY         Family History   Problem Relation Age of Onset    Crohn's disease Unknown         brother, sister, father, nephew    Heart disease Father     Crohn's disease Father     Inflammatory bowel disease Father     Crohn's disease Sister     Inflammatory bowel disease Sister     Crohn's disease Brother     Inflammatory bowel disease Brother     Kidney disease Mother     Hypertension Mother     Thyroid disease Mother     Cystic fibrosis Maternal Uncle     Prostate cancer Neg Hx     Melanoma Neg Hx     Colon cancer Neg Hx     Celiac disease Neg Hx     Cirrhosis Neg Hx     Esophageal cancer Neg Hx     Liver cancer Neg Hx     Rectal cancer Neg Hx     Stomach cancer Neg Hx     Ulcerative colitis Neg Hx     Liver disease Neg Hx        Social History     Socioeconomic History    Marital status: Single     Spouse name:  Not on file    Number of children: Not on file    Years of education: Not on file    Highest education level: Not on file   Occupational History    Not on file   Social Needs    Financial resource strain: Not on file    Food insecurity:     Worry: Not on file     Inability: Not on file    Transportation needs:     Medical: Not on file     Non-medical: Not on file   Tobacco Use    Smoking status: Never Smoker    Smokeless tobacco: Never Used   Substance and Sexual Activity    Alcohol use: Yes     Comment: ocasionally    Drug use: No    Sexual activity: Yes     Partners: Female   Lifestyle    Physical activity:     Days per week: Not on file     Minutes per session: Not on file    Stress: Not on file   Relationships    Social connections:     Talks on phone: Not on file     Gets together: Not on file     Attends Rastafari service: Not on file     Active member of club or organization: Not on file     Attends meetings of clubs or organizations: Not on file     Relationship status: Not on file   Other Topics Concern    Not on file   Social History Narrative    Not on file     Lab Results   Component Value Date    WBC 8.75 01/25/2019    HGB 17.4 01/25/2019    HCT 52.8 01/25/2019    MCV 84 01/25/2019     01/25/2019    CHOL 157 01/07/2019    TRIG 194 (H) 01/07/2019    HDL 33 (L) 01/07/2019    ALT 75 (H) 04/16/2019    AST 49 (H) 04/16/2019    BILITOT 0.9 04/16/2019    ALKPHOS 61 04/16/2019     01/25/2019    K 3.4 (L) 01/25/2019    CL 96 01/25/2019    CREATININE 1.1 01/25/2019    CALCIUM 9.5 01/25/2019    ALBUMIN 4.3 04/16/2019    BUN 21 (H) 01/25/2019    CO2 33 (H) 01/25/2019    TSH 2.212 01/25/2019    PSA 1.2 10/15/2018    INR 1.2 04/16/2019    HGBA1C 5.4 08/29/2014    LDLCALC 85.2 01/07/2019    GLU 88 01/25/2019       AST   Date Value Ref Range Status   04/16/2019 49 (H) 10 - 40 U/L Final   01/25/2019 35 10 - 40 U/L Final   01/07/2019 44 (H) 10 - 40 U/L Final   12/13/2018 47 (H) 10 - 40 U/L  Final   10/18/2018 44 (H) 10 - 40 U/L Final     Comment:     *Result may be interfered by visible hemolysis   04/17/2018 38 10 - 40 U/L Final     ALT   Date Value Ref Range Status   04/16/2019 75 (H) 10 - 44 U/L Final   01/25/2019 50 (H) 10 - 44 U/L Final   01/07/2019 60 (H) 10 - 44 U/L Final   12/13/2018 69 (H) 10 - 44 U/L Final   10/18/2018 52 (H) 10 - 44 U/L Final   04/17/2018 50 (H) 10 - 44 U/L Final     Potassium   Date Value Ref Range Status   01/25/2019 3.4 (L) 3.5 - 5.1 mmol/L Final   01/25/2019 3.4 (L) 3.5 - 5.1 mmol/L Final   01/07/2019 3.4 (L) 3.5 - 5.1 mmol/L Final   10/18/2018 4.2 3.5 - 5.1 mmol/L Final   08/24/2018 3.7 3.5 - 5.1 mmol/L Final     Hemoglobin   Date Value Ref Range Status   01/25/2019 17.4 14.0 - 18.0 g/dL Final   12/13/2018 18.3 (H) 14.0 - 18.0 g/dL Final   10/18/2018 18.0 14.0 - 18.0 g/dL Final   03/19/2018 16.8 14.0 - 18.0 g/dL Final   06/13/2017 15.6 14.0 - 18.0 g/dL Final        Ref Range & Units 3mo ago 3yr ago 4yr ago   Testosterone, Total 304 - 1227 ng/dL 1167  386 R 428 R     Answers for HPI/ROS submitted by the patient on 5/16/2019   activity change: No  unexpected weight change: Yes  neck pain: No  hearing loss: No  rhinorrhea: No  trouble swallowing: No  eye discharge: No  visual disturbance: No  chest tightness: No  wheezing: No  chest pain: No  palpitations: No  blood in stool: Yes  constipation: Yes  vomiting: No  diarrhea: No  polydipsia: No  polyuria: No  difficulty urinating: No  urgency: No  hematuria: No  joint swelling: Yes  arthralgias: Yes  headaches: No  weakness: No  confusion: No  dysphoric mood: No         Vital signs reviewed  PE:   APPEARANCE: Well nourished, well developed, in no acute distress.    HEAD: Normocephalic, atraumatic.  EYES: PERRL. EOMI.   Conjunctivae noninjected.  EARS: TM's intact. Light reflex normal. No retraction or perforation  NOSE: Mucosa pink. Airway clear.  MOUTH & THROAT: No tonsillar enlargement. No pharyngeal erythema or exudate.    NECK: Supple with no cervical lymphadenopathy.  No carotid bruits.  No thyromegaly  CHEST: Good inspiratory effort. Lungs clear to auscultation with no wheezes or crackles.  CARDIOVASCULAR: Normal S1, S2. No rubs, murmurs, or gallops.  ABDOMEN: Bowel sounds normal. Not distended. Soft. No tenderness or masses. No organomegaly.  EXTREMITIES: No edema, cyanosis, or clubbing.      IMPRESSION  1. Hypertension, unspecified type    2. BMI 32.0-32.9,adult    3. Transaminitis    4. Fatty liver    5. Hypogonadism male    6. Arthralgia, unspecified joint    7. Irritable bowel syndrome, unspecified type            PLAN  Labs, imaging, specialist reports reviewed and summarized as above    Testosterone therapy:  Patient does not feel any significant benefit of being on therapy.  Has been off for 2 weeks and does not really feel any different either.  Since he is completely off he can remain off at this time.  Sounds like his pretreatment levels were within normal range.  If he starts to feel significant worsening fatigue or malaise or other symptoms in the next couple of weeks, could consider repeat morning testosterone level to see if he needs to be back on lower dose.    Hypertension:  Well controlled today.  He can continue current therapy    Continue dermatology follow-up for alternate recommendations for his psoriasis since instruction to stop the Humira    Arthralgias:  Reviewed Rheumatology documentation.  Normal inflammatory markers.  Discussed potential for osteoarthritic issues that could contribute to his symptoms.  Discussed lifestyle modifications such as healthy diet (literature provided) regular exercise which can help with multiple factors.  We discussed referral to medical fitness program through fitness center for nutrition counseling and exercise counseling.  He is interested in this referral, placed.    Abdom pain and symptoms with history of fatty liver disease:  Continue GI and hepatology follow-up as  planned.

## 2019-05-16 NOTE — PATIENT INSTRUCTIONS
.Nutrition: How to Make Healthier Food Choices     Nutrition: How to Make Healthier Food Choices     Why is healthy eating important?  When combined with exercise, a healthy diet can help you lose weight, lower your cholesterol level and improve the way your body functions on a daily basis.  The [place] Department of Agricultures (USDA) Food Guide Pyramid divides food into 6 basic food groups, consisting of 1) grains, 2) fruits, 3) vegetables, 4) meats and beans, 5) dairy and 6) fats.  The USDA recommends an adult daily diet include the following:  3 ounces of whole grains, and 6 ounces of grains total   2 cups of fruit   2 1/2 cups of vegetables   3 cups fat-free or low-fat dairy   The following are some ways to make healthier food choices and to get the recommended amounts of whole grains, fruits, vegetables and dairy.    Grains  Whole-grain breads are low in fat; they're also high in fiber and complex carbohydrates, which help you feel chung longer and prevent overeating. Choose breads whose first ingredient says whole in front of the grain, for example, whole wheat flour or whole white flour; enriched or other types of flour have the important fiber and nutrients removed. Choose whole grain breads for sandwiches and as additions to meals.  Avoid rich bakery foods such as donuts, sweet rolls and muffins. These foods can contain more than 50% fat calories. Snacks such as sruthi food cake and gingersnap cookies can satisfy your sweet tooth without adding fat to your diet.  Hot and cold cereals are usually low in fat. But instant cereals with cream may contain high-fat oils or butterfat. Granola cereals may also contain high-fat oils and extra sugars. Look for low-sugar options for both instant and granola cereals.  Avoid fried snacks such as potato chips and tortilla chips. Try the low-fat or baked versions instead.  Instead of this: Try this:   Croissants, biscuits, white breads and rolls Low-fat whole grain  "breads and rolls (wheat, rye and pumpernickel)   Doughnuts, pastries and scones English muffins and small whole grain bagels   Fried tortillas Soft tortillas (corn or whole wheat)   Sugar cereals and regular granola Oatmeal, low-fat granola and whole-grain cereal   Snack crackers Crackers (animal, dominga, rye, soda, saltine, oyster)   Potato or corn chips and buttered popcorn Pretzels (unsalted) and popcorn (unbuttered)   White pasta Whole-wheat pasta   White rice Brown rice   Fried rice, or pasta and rice mixes that contain high-fat sauces Rice or pasta (without egg yolk) with vegetable sauces   All-purpose white flour 100% whole-wheat flour     Fruits and Vegetables  Fruits and vegetables are naturally low in fat. They add flavor and variety to your diet. They also contain fiber, vitamins and minerals.  Margarine, butter, mayonnaise and sour cream add fat to vegetables and fruits. Try using nonfat or low-fat versions of these foods. You can also use nonfat or low-fat yogurt or herbs as seasonings instead.  Instead of this: Try this:   Fried vegetables or vegetables served with cream, cheese or butter sauces All vegetables raw, steamed, broiled, baked or tossed with a very small amount of olive oil and salt and pepper   Coconut Fruit (fresh)   French fries, hash browns and potato   chips Baked white or sweet potatoes     Meat, Poultry and Fish  Beef, Pork, Veal and Lamb  Baking, broiling and roasting are the healthiest ways to prepare meat. Lean cuts can be pan-broiled or stir-fried. Use either a nonstick pan or nonstick spray coating instead of butter or margarine.  Trim outside fat before cooking. Trim any inside, separable fat before eating. Select low-fat, lean cuts of meat. Lean beef and veal cuts have the word "loin" or "round" in their names. Lean pork cuts have the word "loin" or "leg" in their names.  Use herbs, spices, fresh vegetables and nonfat marinades to season meat. Avoid high-fat sauces and " "gravies.  Poultry  Baking, broiling and roasting are the healthiest ways to prepare poultry. Skinless poultry can be pan-broiled or stir-fried. Use either a nonstick pan or nonstick spray coating instead of butter or margarine.  Remove skin and visible fat before cooking. Chicken breasts are a good choice because they are low in fat and high in protein. Use domestic goose and duck only once in a while because both are high in fat.  Fish  Poaching, steaming, baking and broiling are the healthiest ways to prepare fish. Fresh fish should have a clear color, a moist look, a clean smell and firm, springy flesh. If good-quality fresh fish isn't available, buy frozen fish.  Most seafood is high in healthy polyunsaturated fat. Omega-3 fatty acids are also found in some fatty fish, such as salmon and cold-water trout. They may help lower the risk of heart disease in some people.  Cross-over Foods  Dry beans, peas and lentils offer protein and fiber without the cholesterol and fat of meats. Once in a while, try substituting beans for meat in a favorite recipe, such as lasagna or chili.  TVP, or textured vegetable protein, is widely available in many foods. Vegetarian "hot dogs," "hamburger" and "chicken nuggets" are low-fat, cholesterol-free alternatives to meat.  Instead of this: Try this:   Regular or breaded fish sticks or cakes, fish canned in oil, seafood prepared with butter or served in high-fat sauce Fish (fresh, frozen, canned in water), low-fat fish sticks or cakes and shellfish (such as shrimp)   Prime and marbled cuts Select-grade lean beef (round, sirloin and loin)   Pork spare ribs and polk Lean pork (tenderloin and loin chop) and turkey polk   Regular ground beef Lean or extra-lean ground beef, ground chicken and turkey breast   Lunch meats such as pepperoni, salami, bologna and liverwurst Lean lunch meats such as turkey, chicken and ham   Regular hot dogs or sausage Fat-free hot dogs and turkey dogs "     Dairy  Choose skim milk or low-fat buttermilk. Substitute evaporated skim milk for cream in recipes for soups, sauces and coffee.  Try low-fat cheeses. Skim ricotta can replace cream cheese on a bagel or in a vegetable dip. Use part-skim cheeses in recipes. Use 1% cottage cheese for salads and cooking. String cheese is a low-fat, high-calcium snack option.  Plain nonfat yogurt can replace sour cream in many recipes. (To maintain texture, stir 1 tablespoon of cornstarch into each cup of yogurt that you use in cooking.) Try mixing frozen nonfat or low-fat yogurt with fruit for dessert.  Skim sherbet is an alternative to ice cream. Soft-serve and regular ice creams are also lower in fat than premium styles.  Instead of this: Try this:   Whole or 2% milk Non-fat or 1% milk   Evaporated milk Evaporated non-fat milk   Regular buttermilk Buttermilk made from non-fat (or 1%) milk   Yogurt made with whole milk Nonfat or low-fat yogurt   Regular cheese (examples: American, blue, Brie, cheddar, Jose and Parmesan) Low-fat cheese with less than 3 grams of fat per serving (example: natural cheese, processed cheese and nondairy cheese such as soy cheese)   Regular cottage cheese Low-fat, nonfat, and dry-curd cottage cheese with less than 2% fat   Regular cream cheese Low-fat cream cheese (no more than 3 grams of fat per ounce)   Regular ice cream Sorbet, sherbet and nonfat or low-fat ice cream (no more than 3 grams of fat per 1/2 cup serving)     Fats, Oils and Sweets  Eating too many high-fat foods not only adds excess calories (which can lead to obesity and weight gain), but can increase your risk factor for several diseases. Heart disease, diabetes, certain types of cancer and osteoarthritis have all been linked to diets too high in fat. If you consume too much saturated and trans fats, you are more likely to develop high cholesterol and coronary artery disease.  Sugar-sweetened drinks, such as fruit juice, fruit drinks,  "regular soft drinks, sports drinks, energy drinks, sweetened or flavored milk and sweetened iced tea can add lots of sugar and calories to your diet. But staying hydrated is important for good health. Substitute water, zero-calorie flavored water, non-fat or reduced-fat milk, unsweetened tea or diet soda for sweetened drinks. Talk with your family doctor or a dietitian if you have questions about your diet or healthy eating for your family.  Instead of this: Try this:   Cookies Fig bars, gingersnaps and molasses cookies   Shortening, butter or margarine Olive, soybean and canola oils   Regular mayonnaise Nonfat or light mayonnaise   Regular salad dressing Nonfat or light salad dressing   Using fat (including butter) to grease pan Nonstick cooking spray       What it Takes to Lose Weight     What it Takes to Lose Weight     What does it take to lose weight?  To lose weight, you have to eat fewer calories than your body uses. Calories are the amount of energy in the food you eat. Some foods have more calories than others. For example, foods that are high in fat and sugar are also high in calories. If you eat more calories than your body uses, the extra calories will be stored as body excess fat.  A pound of fat is about 3,500 calories. To lose 1 pound of fat in a week, you have to eat 3,500 fewer calories (that is 500 fewer calories a day), or you have to "burn off" an extra 3,500 calories. You can burn off calories by exercising or just by being more active. (Talk to your family doctor before you begin any type of exercise program. Your doctor can help you determine what kind of exercise program is right for you.)  The best way to lose weight and keep it off is to eat fewer calories and be active. If you cut 250 calories from your diet each day and exercise enough to burn off 250 calories, that adds up to 500 fewer calories in one day. If you do this for 7 days, you can lose 1 pound of fat in a week.  Many experts " "believe you should not try to lose more than 2 pounds per week. Losing more than 2 pounds in a week usually means that you are losing water weight and lean muscle mass instead of losing excess fat. If you do this, you will have less energy, and you will most likely gain the weight back.    How often should I eat?  Most people can eat 3 regular meals and 1 snack every day. The 3 meals should be about the same size and should be low in fat. Try to eat 1 to 2 cups of fruits and vegetables, 2 to 3 ounces of whole grains and 1 to 2 ounces of meat (or a meat alternative) at most meals.  Some people benefit more if they eat 5 to 6 smaller meals throughout the day, about 2 to 3 hours apart. For example, their first meal of the day might be a cup of low-fat or nonfat yogurt and a banana. Three hours later they might eat a simple deli sandwich with whole-grain bread and fat-free mayonnaise.  Eat breakfast and don't skip meals. While skipping meals may help you lose weight in the beginning, it fails in the long run. Skipping meals may make you feel too hungry later in the day, causing you to overeat at your next meal.  After about a month of eating a normal breakfast, lunch and a light dinner, your body will adjust.    What is so bad about high-fat foods?  Foods high in fat are usually high in calories, which could lead to unwanted weight gain. Consuming too much saturated and trans fats may increase LDL cholesterol ("bad" cholesterol) level, and increase your risk of heart disease. The USDA suggests that you eat no more than 20 grams of saturated fat, and that you limit the amount of trans fat to as close to 0 grams as possible. Click here for more information on reading nutrition labels.  It is important to remember that some fats can be beneficial to your overall health. "Good" fat, such as polyunsaturated and monounsaturated fats are found in fish, nuts, and low- or nonfat dairy products.    What are empty calories?  Some " "foods are referred to as "empty calories" because they add lots of calories to your diet without providing much nutritional benefit. An example is sugar-sweetened drinks, such as fruit juice, fruit drinks, regular soft drinks, sports drinks, energy drinks, sweetened or flavored milk and sweetened iced tea. These drinks can add lots of sugar and calories to your diet.  But staying hydrated is important for good health. To reduce the calories and sugar in your diet, substitute water, zero-calorie flavored water, non-fat or reduced-fat milk, unsweetened tea or diet soda for sweetened drinks. Talk with your family doctor or a dietitian if you have questions about your diet or healthy eating for your family.    Can I trust nutrition information I get from newspapers and magazines?  Nutrition tips and diet information from different sources often conflict with each other. You should always check with your doctor first. Also, keep in mind the following advice:  There is no "magic bullet" when it comes to nutrition. There isn't one single diet that works for every person. You need to find an eating plan that works for you.   Good nutrition doesn't come in a vitamin supplement. Only take a vitamin with your doctor's recommendation, as your body benefits the most when you eat healthy foods.   Eating all different kinds of foods is best for your body, so learn to try new foods.   Fad diets offer short-term changes, but good health comes from long-term effort and commitment.   Stories from people who have used a diet program or product, especially in commercials and [employer], are advertisements. These people are usually paid to endorse what the ad is selling. Remember, regained weight or other problems that develop after someone has completed the diet program are never talked about in those ads.     Will diet drugs help?  Although diet drugs may help you lose weight at first, they usually don't help you keep the weight off " and may have damaging side effects. Most diet pills have not been tested by the Food and Drug Administration, which means you can't be sure if the drugs are safe. Taking drugs also does not help you learn how to change your eating and exercise habits. Making lasting changes in these habits is the way to lose weight and keep it off.

## 2019-05-17 ENCOUNTER — TELEPHONE (OUTPATIENT)
Dept: PHARMACY | Facility: CLINIC | Age: 59
End: 2019-05-17

## 2019-05-28 ENCOUNTER — OFFICE VISIT (OUTPATIENT)
Dept: DERMATOLOGY | Facility: CLINIC | Age: 59
End: 2019-05-28
Payer: COMMERCIAL

## 2019-05-28 ENCOUNTER — LAB VISIT (OUTPATIENT)
Dept: LAB | Facility: HOSPITAL | Age: 59
End: 2019-05-28
Payer: COMMERCIAL

## 2019-05-28 ENCOUNTER — OFFICE VISIT (OUTPATIENT)
Dept: INFECTIOUS DISEASES | Facility: CLINIC | Age: 59
End: 2019-05-28
Payer: COMMERCIAL

## 2019-05-28 VITALS
DIASTOLIC BLOOD PRESSURE: 76 MMHG | WEIGHT: 248 LBS | HEIGHT: 73 IN | HEART RATE: 65 BPM | SYSTOLIC BLOOD PRESSURE: 113 MMHG | TEMPERATURE: 98 F | BODY MASS INDEX: 32.87 KG/M2

## 2019-05-28 DIAGNOSIS — D84.9 IMMUNOCOMPROMISED, ACQUIRED: ICD-10-CM

## 2019-05-28 DIAGNOSIS — R21 RASH AND NONSPECIFIC SKIN ERUPTION: Primary | ICD-10-CM

## 2019-05-28 DIAGNOSIS — D84.9 IMMUNOCOMPROMISED, ACQUIRED: Primary | ICD-10-CM

## 2019-05-28 PROCEDURE — 99999 PR PBB SHADOW E&M-EST. PATIENT-LVL III: CPT | Mod: PBBFAC,,, | Performed by: PHYSICIAN ASSISTANT

## 2019-05-28 PROCEDURE — 99204 OFFICE O/P NEW MOD 45 MIN: CPT | Mod: S$GLB,,, | Performed by: PHYSICIAN ASSISTANT

## 2019-05-28 PROCEDURE — 86682 HELMINTH ANTIBODY: CPT

## 2019-05-28 PROCEDURE — 99213 OFFICE O/P EST LOW 20 MIN: CPT | Mod: S$GLB,,, | Performed by: PATHOLOGY

## 2019-05-28 PROCEDURE — 99999 PR PBB SHADOW E&M-EST. PATIENT-LVL III: CPT | Mod: PBBFAC,,, | Performed by: PATHOLOGY

## 2019-05-28 PROCEDURE — 99999 PR PBB SHADOW E&M-EST. PATIENT-LVL III: ICD-10-PCS | Mod: PBBFAC,,, | Performed by: PHYSICIAN ASSISTANT

## 2019-05-28 PROCEDURE — 99999 PR PBB SHADOW E&M-EST. PATIENT-LVL III: ICD-10-PCS | Mod: PBBFAC,,, | Performed by: PATHOLOGY

## 2019-05-28 PROCEDURE — 99213 PR OFFICE/OUTPT VISIT, EST, LEVL III, 20-29 MIN: ICD-10-PCS | Mod: S$GLB,,, | Performed by: PATHOLOGY

## 2019-05-28 PROCEDURE — 36415 COLL VENOUS BLD VENIPUNCTURE: CPT

## 2019-05-28 PROCEDURE — 99204 PR OFFICE/OUTPT VISIT, NEW, LEVL IV, 45-59 MIN: ICD-10-PCS | Mod: S$GLB,,, | Performed by: PHYSICIAN ASSISTANT

## 2019-05-28 NOTE — PROGRESS NOTES
Pre Biologic Response Modifier Therapy Consult  BMR Recipient Evaluation    Requesting Physician: Yoshi Ryan MD    Reason for Visit:    Chief Complaint   Patient presents with    Long term current use of immunosuppressive drug     History of Present Illness  Jonathan Villela is a 59 y.o. year old White male with psoriasis and possibly inflammatory bowel disease currently being evaluated for prior to starting biologic response modifer (BRM) therapy and vaccine updates.  He says he has been on Humira for his psoriasis but was stopped in the recent past.  He has had some blood in his stool and is to undergo EGD and colonoscopy with Dr. Erazo tomorrow. Patient denies any recent fever, chills, or infective infective illnesses.      1) Do you have a history of:    YES NO   Diabetes                 []       [x]   Wound/ Foot Infection/Bone Infection   []  [x]   Surgical Removal of Spleen    []  [x]       2) Have you had recurrent infections involving:             YES NO  Sinus infections  []  [x]  Sore Throat   []  [x]                             Prostate Infections  []  [x]  .                         Bladder Infections  []  [x]                                 Kidney Infections  []  [x]        Intestinal Infections  []  [x]   Skin Infections   []  [x]                   Reproductive Infections            n   Periodontal Disease             n      3)Have you ever had: YES     NO UNKNOWN      Chicken Pox   [x]  []  []                 Shingles   []  [x]  []                Orolabial Herpes             [x]  []  []       Genital Herpes  []  [x]  []           Cytomegalovirus  []  [x]  []               Annette-Barr Virus  []  [x]  []              Genital Warts   []  [x]  []                Hepatitis A   []  [x]  []             Hepatitis B   []  [x]  []                Hepatitis C   []  [x]  []                 Syphilis   []  [x]  []                Gonorrhea   []  [x]  []                 Pelvic Inflammatory  []  [x]  []    Disease   []  [x]  []                    Chlamydia Infection  []  [x]  []                Intestinal parasites        or worms   []  [x]  []                 Fungal Infections  []  [x]  []                Blood Infections  []  [x]  []     Comment:       4) Have you ever been exposed   YES NO  to someone with tuberculosis?  []  [x]   If yes, what treatment did you receive:     5) What states have you lived in? [place]    6) What countries have you visited for more than 2 weeks?    [place] 2013                      YES NO  7) Did you have any associated infections?     [x]  [] GI illness   8) Are you planning to travel outside the           [place] after starting BRMs?    [x]   []           Household                  YES NO   Do you have pets living in your house?   [x]   []             If yes, describe: dog    Do you spend time or live on a farm or                 have livestock or other farm animals?   []  [x]   If yes, which ones:    Do you have a fish tank?     []  [x]                       Do you have a litter box?     []  [x]                         Do you fish or hunt?      []  [x]                       Do you clean or skin fish or animals?   []  [x]                      Do you consume raw or undercooked                meat, fish, or shellfish?     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    []  [x]   13)Do you hike, camp, or spend   time in wooded areas?   []  [x]                           14) The patient's immunization history was reviewed.   Have you ever received:  YES NO UNKNOWN DATES  Routine Childhood vaccines  [x]  []  []                Influenza vaccine   [x]  []  []         Pneumonia    [x]  []  []      Tetanus-diptheria   [x]  []  []   Hepatitis A vaccine series       [x]  []  []       Hepatitis B vaccine series       [x]  []   []        Meningitis vaccine   []  [x]  []     Varicella vaccine   []  [x]  []                  <=">  Immunizations as of 5/16/2019 at 8:40 AM     Administered On    Hep A-Hep B (Hepatitis A / Hepatitis B) 5/3/2019             Tdap 8/11/2014             influenza, injectable, quadrivalent, preservative free (Influenza - Quadrivalent - PF) 12/31/2018             influenza, unspecified formulation (Influenza) 9/26/2013             pneumococcal polysaccharide PPV23 (Pneumococcal Polysaccharide - 23 Valent) 4/5/2018           Based on the patients immunization history and serologies, immunizations were ordered:         Ordered  Not Ordered  Influenza Vaccine     []    [x]   Hepatitis A series at 0, 6 months   []    [x]   Hepatitis B sries at 0, 1, and 6 months  []    [x]   Hepatitis B High Dose series 0,1, and 6 months []    [x]   Prevnar      [x]    []   Pneumovax      []    [x]   TDap       []    [x]   Shringrix      [x]    []   Menactra      []    [x]   HiB       []    [x]               The patient was encouraged to contact us about any problems that may develop after immunization and possible side effects were reviewed.       Allergies: Triamterene  Immunization History   Administered Date(s) Administered    Hepatitis A / Hepatitis B 05/03/2019, 06/03/2019    Influenza 09/26/2013    Influenza - Quadrivalent - PF 12/31/2018    Pneumococcal Conjugate - 13 Valent 05/31/2019    Pneumococcal Polysaccharide - 23 Valent 04/05/2018    Tdap 08/11/2014     Past Medical History:   Diagnosis Date    Acute gastric ulcer with hemorrhage 2/15/2017    Bilateral occipital neuralgia     BPH with urinary obstruction 12/31/2015    Chronic constipation     Colitis 1344-1933    infectious?    Diverticulitis     Erectile dysfunction     Essential hypertension     Gastroesophageal reflux disease without esophagitis 2/11/2017    IBS (irritable bowel syndrome)     Migraine without aura and without status migrainosus, not  intractable 1/31/2014    Obstructive sleep apnea syndrome 2/9/2015    Psoriasis     PUD (peptic ulcer disease)      Past Surgical History:   Procedure Laterality Date    CHOLECYSTECTOMY      COLONOSCOPY N/A 5/29/2019    Performed by [facility] at [facility] (2ND FLR)    COLONOSCOPY N/A 2/17/2017    [facility] by [facility] at [facility] END[facility] (2ND FLR)    EGD (ESOPHAGOGASTRODUODENOSCOPY) N/A 5/29/2019    [facility] at [facility] END[facility] (2ND FLR)    EGD (ESOPHAGOGASTRODUODENOSCOPY) N/A 12/14/2018    Performed by Lexii Carlson MD at [facility] END[facility] (4TH FLR)    EGD (ESOPHAGOGASTRODUODENOSCOPY) N/A 12/14/2018    Performed by J[facility]on [facility]donado MD at [facility] END[facility] (4TH FLR)    ESOPHAGOGASTRODUODENOSCOPY      ESOPHAGOGASTRODUODENOSCOPY (EGD) N/A 2/17/2017    Performed by [facility] at [facility] (2ND FLR)    ESOPHAGOGASTRODUODENOSCOPY (EGD) N/A 2/11/2017    Performed by [facility] at [facility] (2ND FLR)    LEG SURGERY      NASAL SEPTUM SURGERY      UPPER GASTROINTESTINAL ENDOSCOPY        Social History     Socioeconomic History    Marital status: Single     Spouse name: Not on file    Number of children: Not on file    Years of education: Not on file    Highest education level: Not on file   Occupational History    Not on file   Social Needs    Financial resource strain: Not hard at all    Food insecurity:     Worry: Never true     Inability: Never true    Transportation needs:     Medical: No     Non-medical: No   Tobacco Use    Smoking status: Never Smoker    Smokeless tobacco: Never Used   Substance and Sexual Activity    Alcohol use: Yes     Frequency: 2-3 times a week     Drinks per session: 1 or 2     Binge frequency: Never     Comment: ocasionally    Drug use: No    Sexual activity: Yes     Partners: Female   Lifestyle    Physical activity:     Days per week: 2 days     Minutes per session: 0 min    Stress: Only a little   Relationships    Social  connections:     Talks on phone: More than three times a week     Gets together: More than three times a week     Attends [employer] service: Not on file     Active member of club or organization: Yes     Attends meetings of clubs or organizations: More than 4 times per year     Relationship status: Living with partner   Other Topics Concern    Not on file   Social History Narrative    Not on file       ROS  Physical Exam   Constitutional: He is oriented to person, place, and time. He appears well-developed and well-nourished.       HENT:   Head: Normocephalic and atraumatic.   Mouth/Throat: Uvula is midline, oropharynx is clear and moist and mucous membranes are normal. He does not have dentures. No oral lesions. Normal dentition. No dental abscesses, lacerations or dental caries.   Eyes: Pupils are equal, round, and reactive to light. Conjunctivae and lids are normal. No scleral icterus.   Neck: Neck supple.   Cardiovascular: Normal rate and regular rhythm. Exam reveals no gallop and no friction rub.   No murmur heard.  Pulmonary/Chest: Effort normal and breath sounds normal. No respiratory distress. He has no decreased breath sounds. He has no wheezes. He has no rhonchi. He has no rales.   Abdominal: Soft. Normal appearance, normal aorta and bowel sounds are normal. He exhibits no distension and no mass. There is no hepatosplenomegaly. There is no tenderness. There is no rebound and no guarding.   Musculoskeletal: He exhibits no edema.   Lymphadenopathy:        Head (right side): No submental, no submandibular, no tonsillar, no preauricular, no posterior auricular and no occipital adenopathy present.        Head (left side): No submental, no submandibular, no tonsillar, no preauricular, no posterior auricular and no occipital adenopathy present.     He has cervical adenopathy.        Right cervical: No superficial cervical and no deep cervical adenopathy present.       Left cervical: No superficial cervical, no  deep cervical and no posterior cervical adenopathy present.     He has no axillary adenopathy.        Right: No inguinal, no supraclavicular and no epitrochlear adenopathy present.        Left: No inguinal, no supraclavicular and no epitrochlear adenopathy present.   Neurological: He is alert and oriented to person, place, and time. No cranial nerve deficit.   Skin: Skin is warm, dry and intact. No lesion and no rash noted. He is not diaphoretic. No erythema. No pallor.   Psychiatric: He has a normal mood and affect. His behavior is normal.     Diagnostics: No results found for: RPR  No results found for: CMVANTIBODIE  No results found for: HIV1X2  No results found for: HTLVIIIANTIB  Hepatitis B Surface Ag   Date Value Ref Range Status   10/18/2018 Negative  Final     No results found for: HEPBCAB  Hepatitis C Ab   Date Value Ref Range Status   10/18/2018 Negative  Final     No results found for: TOXOIGG  No components found for: TOXOIGGINTER  No results found for: EJQ0TEP  No results found for: ENE4WFJ  No results found for: VARICELLAZOS  No results found for: VARICELLAINT  Strongyloides Ab IgG   Date Value Ref Range Status   05/28/2019 Negative Negative Final     Comment:     No detectable levels of IgG antibodies to Strongyloides.  Repeat testing in 1-2 weeks if clinically indicated.  Test Performed by:  [facility][note 3]  [address] [id number]       No results found for: EPSTEINBARRV  Hep B S Ab   Date Value Ref Range Status   10/18/2018 Negative  Final             BRM - Candidacy    Biologic Response Modifier Candidacy: Based on available information, there are no identified significant barriers to BRMs from an infectious disease standpoint pending a negative Strongyloides IgG. He has a longstanding R posterior cervical LN.  His PCP is aware.  Patient may need aspiration for pathology. Will send PCP a note. Final determination of BRM candidacy will be made  once evaluation is complete and reviewed.    DAVID Goel  Vaccine and serology Needs:  1. PCV-13  2. Shingrix series  3. Strongyloides IgG      Counseling:I discussed with Jonathan the risk for increased susceptibility to infections following BRM therapy including increased risk for infection.  The patients has been counseled on the importance of vaccinations including but not limited to a yearly flu vaccine.Specific guidance has been provided to the patient regarding the patients occupation, hobbies and activities to avoid future infectious complications including but not limited to avoiding undercooked meats and seafood, proper hygiene, and contact with animals.

## 2019-05-28 NOTE — LETTER
Myrtle 3, 2019      Yoshi Erazo MD  [address]  [facility] [address]           [facility] Infectious [facility]  [address]  [facility] [address]  Phone: [phone]  Fax: [phone]          Patient: Jonathan Villela   MR Number: [id number]   YOB: 1960   Date of Visit: 5/28/2019       Dear Dr. Yoshi Erazo:    Thank you for referring Jonathan Villela to me for evaluation. Attached you will find relevant portions of my assessment and plan of care.    If you have questions, please do not hesitate to call me. I look forward to following Jonathan Villela along with you.    Sincerely,    Juan Acosta Jr., PA    Enclosure  CC:  No Recipients    If you would like to receive this communication electronically, please contact [email or web address] or [phone] to request more information on [employer] Link access.    For providers and/or their staff who would like to refer a patient to [facility], please contact us through our one-stop-shop provider referral line, [facility] [place], at [phone].    If you feel you have received this communication in error or would no longer like to receive these types of communications, please e-mail [email or web address]

## 2019-05-28 NOTE — PROGRESS NOTES
Subjective:       Patient ID:  Jonathan Villela is a 59 y.o. male who presents for   Chief Complaint   Patient presents with    Follow-up     HPI  Complicated pt here for f/u for focally psoriasiform, focally lichenoid and spongiotic dermatitis.  Face, neck, upper back and shoulders have intermittent mild erythema, scaling, minimal pruritus.  Genital involvement clear at this time.  Was on Humira since late November 2018 without improvement - it was recently discontinued.  Rheum (Dr. Snyder) showed unrevealing clinical or lab results for an inflammatory arthritis.    Using Shake lotion prn to groin and locoid cream to penile head.  Rash and xerosis to upper back improved but not entirely resolved - using TAC cream prn.     Has had mildly elevated LFTs - scheduled to see hepatology next month.     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 At that time he was also on numerous herbal supplements, which he discontinued.  He was continued on several BP medications including valsartan, chlorthalidone, beta blockers (bystolic, carvedilol), and calcium channel blockers throughout 2016 to 2018. He continued to have episodes of severe migraines. Now on Emgality with improvement.  He now is on amlodipine, carvedilol, and chlorthalidone with clonidine PRN for hypertension.  Stopped testosterone injections.        Biopsy result 10/4/2018  FINAL PATHOLOGIC DIAGNOSIS  1. Skin, right chest, punch biopsy:  - CHANGES MOST COMPATIBLE WITH A DRUG ERUPTION.  MICROSCOPIC DESCRIPTION: The biopsy shows minimal focal parakeratosis alternating with compact  orthokeratosis. There is mild epidermal spongiosis and focal subtle vacuolar interface changes with rare apoptotic  keratinocytes that localize to multiple levels within the epidermis. The dermis is edematous. There is a superficial  and mid dermal perivascular and  perifollicular lymphohistiocytic infiltrate with rarer neutrophils and mast cells. PAS  stain is negative for fungi. Appropriately reactive controls were reviewed. Multiple levels were examined.  2. Skin, right upper back, punch biopsy:  - SUBACUTE SPONGIOTIC DERMATITIS (See Comment).  MICROSCOPIC DESCRIPTION: The biopsy shows focal parakeratosis, epidermal acanthosis with elongation of  rete ridges, spongiosis with exocytosis of lymphocytes and a superficial perivascular and perifollicular dermal  lymphohistiocytic infiltrate with rare neutrophils. A majority of the superficial dermal and intraepidermal lymphocytes  stain positive for both CD3 and CD4, while CD8 stains a sparser population of cells. The approximate CD4 to CD8  ratio is 4:1. PAS stain is negative for fungi. Appropriately reactive controls were reviewed. Multiple levels were  examined.  COMMENT: These histologic features are compatible with an eczematous process such as atopic dermatitis, an  irritant or allergic contact dermatitis, or an id reaction. A drug eruption cannot be excluded. Clinical correlation is  essential.  3. Skin, right groin, punch biopsy:  - SPARSE SUPERFICIAL PERIVASCULAR DERMATITIS (SEE COMMENT).  MICROSCOPIC DESCRIPTION: Sections show a punch biopsy of skin extending to the level of the subcutis. The  stratum corneum consists of compact orthokeratosis. The granular layer is intact. The epidermis is largely  unremarkable, however, apoptotic keratinocytes are noted at multiple levels within the epidermis. Vacuolar  interface alteration is not appreciated. Within the superficial dermis, there is a sparse perivascular lymphohistiocytic  infiltrate with scattered melanophages. PAS stain is negative for yeasts/fungi. Appropriately reactive controls were  reviewed. Multiple levels were examined.  COMMENT: These histologic features are mild and nonspecific. In the appropriate clinical context, they may  represent and resolving  eczematous process with postinflammatory pigmentary alteration. Differential diagnosis  includes a viral exanthem or a morbilliform drug eruption. Clinical correlation is advised.  4. Skin, penile base, shave biopsy:  - LICHENOID AND SPONGIOTIC DERMATITIS (SEE COMMENT).  MICROSCOPIC DESCRIPTION: Sections show parakeratosis that is focally associated with neutrophils overlying  an area of epidermis exhibiting a diminished granular layer. There is mild epidermal spongiosis with exocytosis of  lymphocytes and Langerhans cells. Subtle vacuolar interface alteration is appreciated in association with rare  colloid bodies. The underlying superficial dermis contains a vaguely lichenoid and perivascular lymphohistiocytic  infiltrate with a few scattered neutrophils and a rare eosinophil. Erythrocyte extravasation is also noted within the  superficial dermis. PAS stain is negative for yeasts/fungi. A majority of the superficial dermal and intraepidermal  lymphocytes stain positive for both CD3 and CD4, while CD8 stains a sparser population of cells. The approximate  CD4 to CD8 ratio is 4:1. CD20 is negative, and CD30 stains only a few rare, widely scattered cells. CD1a shows  an essentially normal distribution of Langerhans cells within the involved epidermis. Appropriately reactive controls  were reviewed. Multiple levels were examined.  COMMENT: The differential diagnosis includes pityriasis lichenoides chronica versus a lichenoid drug eruption .  Clinical correlation is advised.    Review of Systems   Constitutional: Negative for fever, chills, weight loss, weight gain, fatigue, night sweats and malaise.   Gastrointestinal: Negative for nausea, vomiting, diarrhea and constipation.   Skin: Positive for itching, rash, dry skin and activity-related sunscreen use. Negative for daily sunscreen use.   Hematologic/Lymphatic: Does not bruise/bleed easily.        Objective:    Physical Exam   Constitutional: He appears  well-developed and well-nourished.   Genitourinary:         Neurological: He is alert and oriented to person, place, and time.   Psychiatric: He has a normal mood and affect.   Skin:   Areas Examined (abnormalities noted in diagram):   Scalp / Hair Palpated and Inspected  Head / Face Inspection Performed  Neck Inspection Performed  Chest / Axilla Inspection Performed  Abdomen Inspection Performed  Genitals / Buttocks / Groin Inspection Performed  Back Inspection Performed  RUE Inspected  LUE Inspection Performed  RLE Inspected  LLE Inspection Performed  Nails and Digits Inspection Performed                   Diagram Legend     Erythematous scaling macule/papule c/w actinic keratosis       Vascular papule c/w angioma      Pigmented verrucoid papule/plaque c/w seborrheic keratosis      Yellow umbilicated papule c/w sebaceous hyperplasia      Irregularly shaped tan macule c/w lentigo     1-2 mm smooth white papules consistent with Milia      Movable subcutaneous cyst with punctum c/w epidermal inclusion cyst      Subcutaneous movable cyst c/w pilar cyst      Firm pink to brown papule c/w dermatofibroma      Pedunculated fleshy papule(s) c/w skin tag(s)      Evenly pigmented macule c/w junctional nevus     Mildly variegated pigmented, slightly irregular-bordered macule c/w mildly atypical nevus      Flesh colored to evenly pigmented papule c/w intradermal nevus       Pink pearly papule/plaque c/w basal cell carcinoma      Erythematous hyperkeratotic cursted plaque c/w SCC      Surgical scar with no sign of skin cancer recurrence      Open and closed comedones      Inflammatory papules and pustules      Verrucoid papule consistent consistent with wart     Erythematous eczematous patches and plaques     Dystrophic onycholytic nail with subungual debris c/w onychomycosis     Umbilicated papule    Erythematous-base heme-crusted tan verrucoid plaque consistent with inflamed seborrheic keratosis     Erythematous Silvery  Scaling Plaque c/w Psoriasis     See annotation      Assessment / Plan:        Rash and nonspecific skin eruption - has had different and evolving clinical and histologic features over time ranging from spongiotic to lichenoid to psoriasiform.  Well controlled presently on topical steroids to face, neck, upper back and shoulders prn.  Groin rash well controlled on Shake lotion prn.    Agree with discontinuing Humira.  No evidence for inflammatory arthritis per rheum.  Pt to call if rash flares.             No follow-ups on file.

## 2019-05-29 ENCOUNTER — ANESTHESIA EVENT (OUTPATIENT)
Dept: ENDOSCOPY | Facility: HOSPITAL | Age: 59
End: 2019-05-29
Payer: COMMERCIAL

## 2019-05-29 ENCOUNTER — ANESTHESIA (OUTPATIENT)
Dept: ENDOSCOPY | Facility: HOSPITAL | Age: 59
End: 2019-05-29
Payer: COMMERCIAL

## 2019-05-29 ENCOUNTER — HOSPITAL ENCOUNTER (OUTPATIENT)
Facility: HOSPITAL | Age: 59
Discharge: HOME | End: 2019-05-29
Attending: INTERNAL MEDICINE | Admitting: INTERNAL MEDICINE
Payer: COMMERCIAL

## 2019-05-29 VITALS
WEIGHT: 245 LBS | OXYGEN SATURATION: 95 % | HEART RATE: 55 BPM | BODY MASS INDEX: 32.47 KG/M2 | HEIGHT: 73 IN | RESPIRATION RATE: 17 BRPM | SYSTOLIC BLOOD PRESSURE: 134 MMHG | TEMPERATURE: 98 F | DIASTOLIC BLOOD PRESSURE: 58 MMHG

## 2019-05-29 DIAGNOSIS — K92.1 HEMATOCHEZIA: ICD-10-CM

## 2019-05-29 PROCEDURE — 43239 EGD BIOPSY SINGLE/MULTIPLE: CPT | Mod: 51,,, | Performed by: INTERNAL MEDICINE

## 2019-05-29 PROCEDURE — 88305 TISSUE EXAM BY PATHOLOGIST: CPT | Performed by: PATHOLOGY

## 2019-05-29 PROCEDURE — 43239 EGD BIOPSY SINGLE/MULTIPLE: CPT | Performed by: INTERNAL MEDICINE

## 2019-05-29 PROCEDURE — 45378 PR COLONOSCOPY,DIAGNOSTIC: ICD-10-PCS | Mod: ,,, | Performed by: INTERNAL MEDICINE

## 2019-05-29 PROCEDURE — 88305 TISSUE SPECIMEN TO PATHOLOGY - SURGERY: ICD-10-PCS | Mod: 26,,, | Performed by: PATHOLOGY

## 2019-05-29 PROCEDURE — D9220A PRA ANESTHESIA: ICD-10-PCS | Mod: ,,, | Performed by: ANESTHESIOLOGY

## 2019-05-29 PROCEDURE — 43239 PR EGD, FLEX, W/BIOPSY, SGL/MULTI: ICD-10-PCS | Mod: 51,,, | Performed by: INTERNAL MEDICINE

## 2019-05-29 PROCEDURE — D9220A PRA ANESTHESIA: Mod: ,,, | Performed by: ANESTHESIOLOGY

## 2019-05-29 PROCEDURE — 25000003 PHARM REV CODE 250: Performed by: INTERNAL MEDICINE

## 2019-05-29 PROCEDURE — 25000003 PHARM REV CODE 250: Performed by: NURSE ANESTHETIST, CERTIFIED REGISTERED

## 2019-05-29 PROCEDURE — 37000009 HC ANESTHESIA EA ADD 15 MINS: Performed by: INTERNAL MEDICINE

## 2019-05-29 PROCEDURE — 37000008 HC ANESTHESIA 1ST 15 MINUTES: Performed by: INTERNAL MEDICINE

## 2019-05-29 PROCEDURE — 63600175 PHARM REV CODE 636 W HCPCS: Performed by: NURSE ANESTHETIST, CERTIFIED REGISTERED

## 2019-05-29 PROCEDURE — 88305 TISSUE EXAM BY PATHOLOGIST: CPT | Mod: 26,,, | Performed by: PATHOLOGY

## 2019-05-29 PROCEDURE — 45378 DIAGNOSTIC COLONOSCOPY: CPT | Performed by: INTERNAL MEDICINE

## 2019-05-29 PROCEDURE — 27201012 HC FORCEPS, HOT/COLD, DISP: Performed by: INTERNAL MEDICINE

## 2019-05-29 PROCEDURE — 45378 DIAGNOSTIC COLONOSCOPY: CPT | Mod: ,,, | Performed by: INTERNAL MEDICINE

## 2019-05-29 RX ORDER — SODIUM CHLORIDE 0.9 % (FLUSH) 0.9 %
10 SYRINGE (ML) INJECTION
Status: DISCONTINUED | OUTPATIENT
Start: 2019-05-29 | End: 2020-04-30

## 2019-05-29 RX ORDER — SODIUM CHLORIDE 0.9 % (FLUSH) 0.9 %
10 SYRINGE (ML) INJECTION
Status: DISCONTINUED | OUTPATIENT
Start: 2019-05-29 | End: 2019-05-29 | Stop reason: HOSPADM

## 2019-05-29 RX ORDER — PHENYLEPHRINE HCL IN 0.9% NACL 1 MG/10 ML
SYRINGE (ML) INTRAVENOUS
Status: DISCONTINUED | OUTPATIENT
Start: 2019-05-29 | End: 2019-05-29

## 2019-05-29 RX ORDER — PROPOFOL 10 MG/ML
VIAL (ML) INTRAVENOUS CONTINUOUS PRN
Status: DISCONTINUED | OUTPATIENT
Start: 2019-05-29 | End: 2019-05-29

## 2019-05-29 RX ORDER — SODIUM CHLORIDE 9 MG/ML
INJECTION, SOLUTION INTRAVENOUS CONTINUOUS
Status: DISCONTINUED | OUTPATIENT
Start: 2019-05-29 | End: 2022-04-26

## 2019-05-29 RX ORDER — PROPOFOL 10 MG/ML
VIAL (ML) INTRAVENOUS
Status: DISCONTINUED | OUTPATIENT
Start: 2019-05-29 | End: 2019-05-29

## 2019-05-29 RX ORDER — LIDOCAINE HYDROCHLORIDE 20 MG/ML
INJECTION, SOLUTION EPIDURAL; INFILTRATION; INTRACAUDAL; PERINEURAL
Status: DISCONTINUED | OUTPATIENT
Start: 2019-05-29 | End: 2019-05-29

## 2019-05-29 RX ADMIN — PROPOFOL 40 MG: 10 INJECTION, EMULSION INTRAVENOUS at 08:05

## 2019-05-29 RX ADMIN — PROPOFOL 150 MCG/KG/MIN: 10 INJECTION, EMULSION INTRAVENOUS at 08:05

## 2019-05-29 RX ADMIN — PROPOFOL 80 MG: 10 INJECTION, EMULSION INTRAVENOUS at 08:05

## 2019-05-29 RX ADMIN — SODIUM CHLORIDE: 0.9 INJECTION, SOLUTION INTRAVENOUS at 07:05

## 2019-05-29 RX ADMIN — Medication 200 MCG: at 08:05

## 2019-05-29 RX ADMIN — LIDOCAINE HYDROCHLORIDE 50 MG: 20 INJECTION, SOLUTION EPIDURAL; INFILTRATION; INTRACAUDAL; PERINEURAL at 08:05

## 2019-05-29 NOTE — DISCHARGE INSTRUCTIONS
Understanding Colon and Rectal Polyps    The colon (also called the large intestine) is a muscular tube that forms the last part of the digestive tract. It absorbs water and stores food waste. The colon is about 4 to 6 feet long. The rectum is the last 6 inches of the colon. The colon and rectum have a smooth lining composed of millions of cells. Changes in these cells can lead to growths in the colon that can become cancerous and should be removed. Multiple tests are available to screen for colon cancer, but the colonoscopy is the most recommended test. During colonoscopy, these polyps can be removed. How often you need this test depends on many things including your condition, your family history, symptoms, and what the findings were at the previous colonoscopy.   When the colon lining changes  Changes that happen in the cells that line the colon or rectum can lead to growths called polyps. Over a period of years, polyps can turn cancerous. Removing polyps early may prevent cancer from ever forming.  Polyps  Polyps are fleshy clumps of tissue that form on the lining of the colon or rectum. Small polyps are usually benign (not cancerous). However, over time, cells in a polyp can change and become cancerous. Certain types of polyps known as adenomatous polyps are premalignant. The risk for invasive cancer increases with the size of the polyp and certain cell and gene features. This means that they can become cancerous if they're not removed. Hyperplastic polyps are benign. They can grow quite large and not turn cancerous.   Cancer  Almost all colorectal cancers start when polyp cells begin growing abnormally. As a cancerous tumor grows, it may involve more and more of the colon or rectum. In time, cancer can also grow beyond the colon or rectum and spread to nearby organs or to glands called lymph nodes. The cells can also travel to other parts of the body. This is known as metastasis. The earlier a cancerous  tumor is removed, the better the chance of preventing its spread.    Date Last Reviewed: 8/1/2016  © 5119-7265 [employer]. [address] [place] [address], [place] [address]. All rights reserved. This information is not intended as a substitute for professional medical care. Always follow your healthcare professional's instructions.        Upper GI Endoscopy     During endoscopy, a long, flexible tube is used to view the inside of your upper GI tract.      Upper GI endoscopy allows your healthcare provider to look directly into the beginning of your gastrointestinal (GI) tract. The esophagus, stomach, and duodenum (the first part of the small intestine) make up the upper GI tract.   Before the exam  Follow these and any other instructions you are given before your endoscopy. If you dont follow the healthcare providers instructions carefully, the test may need to be canceled or done over:  · Don't eat or drink anything after midnight the night before your exam. If your exam is in the afternoon, drink only clear liquids in the morning. Don't eat or drink anything for 8 hours before the exam. In some cases, you may be able to take medicines with sips of water until 2 hours before the procedure. Speak with your healthcare provider about this.   · Bring your X-rays and any other test results you have.  · Because you will be sedated, arrange for an adult to drive you home after the exam.  · Tell your healthcare provider before the exam if you are taking any medicines or have any medical problems.  The procedure  Here is what to expect:  · You will lie on the endoscopy table. Usually patients lie on the left side.  · You will be monitored and given oxygen.  · Your throat may be numbed with a spray or gargle. You are given medicine through an intravenous (IV) line that will help you relax and remain comfortable. You may be awake or asleep during the procedure.  · The healthcare provider will put the endoscope in  your mouth and down your esophagus. It is thinner than most pieces of food that you swallow. It will not affect your breathing. The medicine helps keep you from gagging.  · Air is put into your GI tract to expand it. It can make you burp.  · During the procedure, the healthcare provider can take biopsies (tissue samples), remove abnormalities, such as polyps, or treat abnormalities through a variety of devices placed through the endoscope. You will not feel this.   · The endoscope carries images of your upper GI tract to a video screen. If you are awake, you may be able to look at the images.  · After the procedure is done, you will rest for a time. An adult must drive you home.  When to call your healthcare provider  Contact your healthcare provider if you have:  · Black or tarry stools, or blood in your stool  · Fever  · Pain in your belly that does not go away  · Nausea and vomiting, or vomiting blood   Date Last Reviewed: 7/1/2016  © 9822-7239 [employer]. [address] [place] [address]. All rights reserved. This information is not intended as a substitute for professional medical care. Always follow your healthcare professional's instructions.      PATIENT INSTRUCTIONS  POST-ANESTHESIA    IMMEDIATELY FOLLOWING SURGERY:  Do not drive or operate machinery for the first twenty four hours after surgery.  Do not make any important decisions for twenty four hours after surgery or while taking narcotic pain medications or sedatives.  If you develop intractable nausea and vomiting or a severe headache please notify your doctor immediately.    FOLLOW-UP:  Please make an appointment with your surgeon as instructed. You do not need to follow up with anesthesia unless specifically instructed to do so.    WOUND CARE INSTRUCTIONS (if applicable):  Keep a dry clean dressing on the anesthesia/puncture wound site if there is drainage.  Once the wound has quit draining you may leave it open to air.   Generally you should leave the bandage intact for twenty four hours unless there is drainage.  If the epidural site drains for more than 36-48 hours please call the anesthesia department.    QUESTIONS?:  Please feel free to call your physician or the hospital  if you have any questions, and they will be happy to assist you.       [facility] Anesthesia Department  [address] [facility]  [phone]

## 2019-05-29 NOTE — PLAN OF CARE
Discharge instructions given and explained to patient and significant other with verbalization of understanding all instructions. Explained next time and doses of each medication. Patients v/s stable, denies n/v and tolerating po, rates pain level tolerable, IV removed, and significant other at bedside for patient discharge home.

## 2019-05-29 NOTE — TRANSFER OF CARE
"Anesthesia Transfer of Care Note    Patient: Jonathan Villela    Procedure(s) Performed: Procedure(s) (LRB):  EGD (ESOPHAGOGASTRODUODENOSCOPY) (N/A)  COLONOSCOPY (N/A)    Patient location: PACU    Anesthesia Type: general    Transport from OR: Transported from OR on room air with adequate spontaneous ventilation    Post pain: adequate analgesia    Post assessment: no apparent anesthetic complications and tolerated procedure well    Post vital signs: stable    Level of consciousness: awake, alert and oriented    Nausea/Vomiting: no nausea/vomiting    Complications: none    Transfer of care protocol was followed      Last vitals:   Visit Vitals  BP (!) 104/58 (BP Location: Left arm, Patient Position: Lying)   Pulse 60   Temp 36.5 °C (97.7 °F) (Temporal)   Resp 15   Ht 6' 1" (1.854 m)   Wt 111.1 kg (245 lb)   SpO2 95%   BMI 32.32 kg/m²     "

## 2019-05-29 NOTE — H&P
[facility]  History & Physical    Subjective:      Chief Complaint/Reason for Admission:    EGD and colonoscopy    Jonathan Villela is a 59 y.o. male.    Past Medical History:   Diagnosis Date    Acute gastric ulcer with hemorrhage 2/15/2017    Bilateral occipital neuralgia     BPH with urinary obstruction 12/31/2015    Chronic constipation     Colitis [id number]    infectious?    Diverticulitis     Erectile dysfunction     Essential hypertension     Gastroesophageal reflux disease without esophagitis 2/11/2017    IBS (irritable bowel syndrome)     Migraine without aura and without status migrainosus, not intractable 1/31/2014    Obstructive sleep apnea syndrome 2/9/2015    Psoriasis     PUD (peptic ulcer disease)      Past Surgical History:   Procedure Laterality Date    CHOLECYSTECTOMY      COLONOSCOPY N/A 2/17/2017    Performed by [facility]o [facility] at [facility] ENDO (2ND FLR)    EGD (ESOPHAGOGASTRODUODENOSCOPY) N/A 12/14/2018    Performed by Lexii Carlson MD at [facility] ENDO (4TH FLR)    EGD (ESOPHAGOGASTRODUODENOSCOPY) N/A 12/14/2018    Performed by Javon Maldonado MD at [facility] ENDO (4TH FLR)    ESOPHAGOGASTRODUODENOSCOPY      ESOPHAGOGASTRODUODENOSCOPY (EGD) N/A 2/17/2017    Performed by [facility] MD at [facility] ENDO (2ND FLR)    ESOPHAGOGASTRODUODENOSCOPY (EGD) N/A 2/11/2017    Performed by [facility] at [facility] ENDO (2ND FLR)    LEG SURGERY      NASAL SEPTUM SURGERY      UPPER GASTROINTESTINAL ENDOSCOPY       Family History   Problem Relation Age of Onset    Crohn's disease Unknown         brother, sister, father, nephew    Heart disease Father     Crohn's disease Father     Inflammatory bowel disease Father     Crohn's disease Sister     Inflammatory bowel disease Sister     Crohn's disease Brother     Inflammatory bowel disease Brother     Kidney disease Mother     Hypertension Mother     Thyroid disease Mother     Cystic fibrosis Maternal Uncle      Prostate cancer Neg Hx     Melanoma Neg Hx     Colon cancer Neg Hx     Celiac disease Neg Hx     Cirrhosis Neg Hx     Esophageal cancer Neg Hx     Liver cancer Neg Hx     Rectal cancer Neg Hx     Stomach cancer Neg Hx     Ulcerative colitis Neg Hx     Liver disease Neg Hx      Social History     Tobacco Use    Smoking status: Never Smoker    Smokeless tobacco: Never Used   Substance Use Topics    Alcohol use: Yes     Frequency: 2-3 times a week     Drinks per session: 1 or 2     Binge frequency: Never     Comment: ocasionally    Drug use: No       PTA Medications   Medication Sig    acyclovir (ZOVIRAX) 400 MG tablet Take 400 mg by mouth 2 (two) times daily as needed.    amLODIPine (NORVASC) 5 MG tablet Take 5 mg by mouth once daily.    butalbital-acetaminophen-caffeine -40 mg (FIORICET, ESGIC) -40 mg per tablet as needed    carvedilol (COREG) 12.5 MG tablet     chlorthalidone (HYGROTEN) 25 MG Tab TAKE 1 TABLET(25 MG) BY MOUTH EVERY DAY    dicyclomine (BENTYL) 10 MG capsule Take 1 capsule (10 mg total) by mouth 3 (three) times daily as needed (abdominal cramping).    frovatriptan (FROVA) 2.5 MG tablet as needed    galcanezumab-gnlm 120 mg/mL PnIj Inject 120 mg into the skin every 28 days.    ondansetron (ZOFRAN-ODT) 8 MG TbDL Take 1 tablet (8 mg total) by mouth every 6 (six) hours as needed (nausea).    pantoprazole (PROTONIX) 40 MG tablet Take 1 tablet (40 mg total) by mouth before breakfast. (Patient taking differently: Take 40 mg by mouth as needed. )    potassium chloride (KLOR-CON) 10 MEQ TbSR Take 10 mEq by mouth once.    pravastatin (PRAVACHOL) 80 MG tablet Take 1 tablet (80 mg total) by mouth once daily. (Patient taking differently: Take 40 mg by mouth once daily. )    sumatriptan (IMITREX STATDOSE) 6 mg/0.5 mL kit Inject 0.5 mLs (6 mg total) into the skin every 2 (two) hours as needed for Migraine (up to 2 doses daily. Hold for blood pressure 155/110).    TURMERIC  "ORAL Take by mouth once daily.    acetic acid-hydrocortisone (VOSOL-HC) otic solution Place 1-2 drops into both ears as needed.     albuterol (PROVENTIL/VENTOLIN HFA) 90 mcg/actuation inhaler Inhale 1-2 puffs into the lungs every 6 (six) hours as needed for Wheezing. Rescue    anastrozole (ARIMIDEX) 1 mg Tab TK 1 T PO QD    BD INTEGRA SYRINGE 3 mL 22 gauge x 1 1/2" Syrg USE TO DRAW UP TESTOSTERONE    BD PRECISIONGLIDE 25 gauge x 1" Ndle USE TO INJECT TESTOSERTONE    BD REGULAR BEVEL NEEDLES 25 gauge x 5/8" Ndle USE TO INJECT TESTOSTERONE    betamethasone valerate 0.1% (VALISONE) 0.1 % Crea betamethasone valerate 0.1 % topical cream as needed    ciclopirox (LOPROX) 0.77 % Crea Apply topically 2 (two) times daily. Pharmacist: mix 30 g of TAC 0.1% cream with 30 g of Loprox cream in 120 cc of milk of magnesia (Patient taking differently: Apply topically as needed. Pharmacist: mix 30 g of TAC 0.1% cream with 30 g of Loprox cream in 120 cc of milk of magnesia)    cloNIDine (CATAPRES) 0.1 MG tablet Take 0.1 mg by mouth as needed.     eletriptan (RELPAX) 40 MG tablet as needed    fluocinolone acetonide oil (DERMOTIC OIL) 0.01 % Drop Place 3 drops in ear(s) 2 (two) times daily. (Patient taking differently: Place 3 drops in ear(s) as needed. )    fluocinonide (LIDEX) 0.05 % external solution AAA scalp qday - bid prn pruritus    fluticasone (FLONASE) 50 mcg/actuation nasal spray 1 spay each nasal as needed    hydrocortisone 2.5 % cream Apply topically 2 (two) times daily. To affected areas of face (Patient taking differently: Apply topically as needed. To affected areas of face)    hydrocortisone butyrate (LOCOID) 0.1 % Crea cream AAA bid to affected areas of groin/genitals    ketoconazole (NIZORAL) 2 % shampoo Wash hair with medicated shampoo at least 2x/week - let sit on scalp at least 5 minutes prior to rinsing    metronidazole 1% (METROGEL) 1 % Gel Apply at bedtime as directed as needed for skin infection "    mometasone (ELOCON) 0.1 % lotion as needed    triamcinolone acetonide 0.1% (KENALOG) 0.1 % cream AAA bid    UNABLE TO FIND Take by mouth once daily. Healthy heart supplement    urea (CARMOL) 40 % Crea Apply topically 2 (two) times daily. To affected areas of elbows (Patient taking differently: Apply topically as needed. To affected areas of elbows)     Review of patient's allergies indicates:   Allergen Reactions    Triamterene      Back and lower abdominal pain        Review of Systems   Constitutional: Negative for chills and fever.   Respiratory: Negative for shortness of breath and wheezing.    Gastrointestinal: Positive for abdominal pain and blood in stool. Negative for melena.       Objective:      Vital Signs (Most Recent)  Temp: 97.7 °F (36.5 °C) (05/29/19 0750)  Pulse: 60 (05/29/19 0750)  Resp: 17 (05/29/19 0750)  BP: (!) 140/99 (05/29/19 0750)  SpO2: 99 % (05/29/19 0750)    Vital Signs Range (Last 24H):  Temp:  [97.7 °F (36.5 °C)-98 °F (36.7 °C)]   Pulse:  [60-65]   Resp:  [17]   BP: (113-140)/(76-99)   SpO2:  [99 %]     Physical Exam   Constitutional: He is oriented to person, place, and time. He appears well-developed and well-nourished.   Cardiovascular: Normal rate.   Pulmonary/Chest: Effort normal.   Abdominal: Soft.   Neurological: He is alert and oriented to person, place, and time.   Skin: Skin is warm and dry.   Psychiatric: He has a normal mood and affect. His behavior is normal. Judgment and thought content normal.           Assessment:      Active Hospital Problems    Diagnosis  POA    Hematochezia [K92.1]  Yes      Resolved Hospital Problems   No resolved problems to display.       Plan:    EGD and colonoscopy for hematochezia and erosive esophagitis

## 2019-05-29 NOTE — [employer] PATIENT INSTRUCTIONS
Discharge Summary/Instructions after an Endoscopic Procedure  Patient Name: Jonathan Villela  Patient MRN: [id number]  Patient YOB: 1960  Wednesday, May 29, 2019  Yoshi Erazo MD  RESTRICTIONS:  During your procedure today, you received medications for sedation.  These   medications may affect your judgment, balance and coordination.  Therefore,   for 24 hours, you have the following restrictions:   - DO NOT drive a car, operate machinery, make legal/financial decisions,   sign important papers or drink alcohol.    ACTIVITY:  Today: no heavy lifting, straining or running due to procedural   sedation/anesthesia.  The following day: return to full activity including work.  DIET:  Eat and drink normally unless instructed otherwise.     TREATMENT FOR COMMON SIDE EFFECTS:  - Mild abdominal pain, nausea, belching, bloating or excessive gas:  rest,   eat lightly and use a heating pad.  - Sore Throat: treat with throat lozenges and/or gargle with warm salt   water.  - Because air was used during the procedure, expelling large amounts of air   from your rectum or belching is normal.  - If a bowel prep was taken, you may not have a bowel movement for 1-3 days.    This is normal.  SYMPTOMS TO WATCH FOR AND REPORT TO YOUR PHYSICIAN:  1. Abdominal pain or bloating, other than gas cramps.  2. Chest pain.  3. Back pain.  4. Signs of infection such as: chills or fever occurring within 24 hours   after the procedure.  5. Rectal bleeding, which would show as bright red, maroon, or black stools.   (A tablespoon of blood from the rectum is not serious, especially if   hemorrhoids are present.)  6. Vomiting.  7. Weakness or dizziness.  GO DIRECTLY TO THE NEAREST EMERGENCY ROOM IF YOU HAVE ANY OF THE FOLLOWING:      Difficulty breathing              Chills and/or fever over 101 F   Persistent vomiting and/or vomiting blood   Severe abdominal pain   Severe chest pain   Black, tarry stools   Bleeding- more than one  tablespoon   Any other symptom or condition that you feel may need urgent attention  Your doctor recommends these additional instructions:  If any biopsies were taken, your doctors clinic will contact you in 1 to 2   weeks with any results.  - Discharge patient to home.   - Repeat colonoscopy in 5 years for surveillance.   - The findings and recommendations were discussed with the patient.   - The findings and recommendations were discussed with the patient's family.     - Return to GI clinic at the next available appointment.  For questions, problems or results please call your physician - Yoshi Erazo MD at Work:  [phone].  [facility], EMERGENCY ROOM PHONE NUMBER: [phone]  IF A COMPLICATION OR EMERGENCY SITUATION ARISES AND YOU ARE UNABLE TO REACH   YOUR PHYSICIAN - GO DIRECTLY TO THE EMERGENCY ROOM.  Yoshi Erazo MD  5/29/2019 8:45:31 AM  This report has been verified and signed electronically.  [employer]

## 2019-05-29 NOTE — ANESTHESIA PREPROCEDURE EVALUATION
05/29/2019  Jonathan Villela is a 59 y.o., male.    Pre-op Assessment    I have reviewed the Patient Summary Reports.      I have reviewed the Medications.     Review of Systems  Anesthesia Hx:  No problems with previous Anesthesia Denies Hx of Anesthetic complications   Denies Personal Hx of Anesthesia complications.   Social:  Non-Smoker, No Alcohol Use    Hematology/Oncology:  Hematology Normal   Oncology Normal     EENT/Dental:EENT/Dental Normal   Cardiovascular:  Cardiovascular Normal Hypertension  DICKINSON ECG has been reviewed.    Pulmonary:   Shortness of breath Sleep Apnea    Renal/:  Renal/ Normal     Hepatic/GI:   PUD, GERD    Musculoskeletal:  Musculoskeletal Normal    Neurological:   Headaches    Endocrine:  Endocrine Normal    Dermatological:  Skin Normal    Psych:  Psychiatric Normal           Physical Exam  General:  Well nourished    Airway/Jaw/Neck:  Airway Findings: Mouth Opening: Normal Tongue: Normal  General Airway Assessment: Adult  Mallampati: II  TM Distance: Normal, at least 6 cm        Eyes/Ears/Nose:  EYES/EARS/NOSE FINDINGS: Normal   Dental:  Dental Findings: In tact   Chest/Lungs:  Chest/Lungs Clear    Heart/Vascular:  Heart Findings: Normal Heart murmur: negative Vascular Findings: Normal    Abdomen:  Abdomen Findings: Normal    Musculoskeletal:  Musculoskeletal Findings: Normal   Skin:  Skin Findings: Normal    Mental Status:  Mental Status Findings: Normal        Anesthesia Plan  Type of Anesthesia, risks & benefits discussed:  Anesthesia Type:  general  Patient's Preference: General  Intra-op Monitoring Plan: standard ASA monitors  Intra-op Monitoring Plan Comments:   Post Op Pain Control Plan: per primary service following discharge from PACU  Post Op Pain Control Plan Comments:   Induction:   IV  Beta Blocker:  Patient is on a Beta-Blocker and has received one dose within  the past 24 hours (No further documentation required).       Informed Consent: Patient understands risks and agrees with Anesthesia plan.  Questions answered. Anesthesia consent signed with patient.  ASA Score: 2     Day of Surgery Review of History & Physical:    H&P update referred to the surgeon.         Ready For Surgery From Anesthesia Perspective.

## 2019-05-29 NOTE — ANESTHESIA POSTPROCEDURE EVALUATION
Anesthesia Post Evaluation    Patient: Jonathan Villela    Procedure(s) Performed: Procedure(s) (LRB):  EGD (ESOPHAGOGASTRODUODENOSCOPY) (N/A)  COLONOSCOPY (N/A)    Final Anesthesia Type: general  Patient location during evaluation: PACU  Patient participation: Yes- Able to Participate  Level of consciousness: awake and alert  Post-procedure vital signs: reviewed and stable  Pain management: adequate  Airway patency: patent  PONV status at discharge: No PONV  Anesthetic complications: no      Cardiovascular status: blood pressure returned to baseline and stable  Respiratory status: unassisted, spontaneous ventilation and room air  Hydration status: euvolemic  Follow-up not needed.          Vitals Value Taken Time   /58 5/29/2019  9:46 AM   Temp 36.6 °C (97.8 °F) 5/29/2019  9:45 AM   Pulse 53 5/29/2019  9:53 AM   Resp 52 5/29/2019  9:53 AM   SpO2 94 % 5/29/2019  9:53 AM   Vitals shown include unvalidated device data.      No case tracking events are documented in the log.      Pain/Sara Score: Sara Score: 10 (5/29/2019  9:45 AM)

## 2019-05-29 NOTE — PROVATION PATIENT INSTRUCTIONS
Discharge Summary/Instructions after an Endoscopic Procedure  Patient Name: Jonathan Villela  Patient MRN: [id number]  Patient YOB: 1960  Wednesday, May 29, 2019  Yoshi Erazo MD  RESTRICTIONS:  During your procedure today, you received medications for sedation.  These   medications may affect your judgment, balance and coordination.  Therefore,   for 24 hours, you have the following restrictions:   - DO NOT drive a car, operate machinery, make legal/financial decisions,   sign important papers or drink alcohol.    ACTIVITY:  Today: no heavy lifting, straining or running due to procedural   sedation/anesthesia.  The following day: return to full activity including work.  DIET:  Eat and drink normally unless instructed otherwise.     TREATMENT FOR COMMON SIDE EFFECTS:  - Mild abdominal pain, nausea, belching, bloating or excessive gas:  rest,   eat lightly and use a heating pad.  - Sore Throat: treat with throat lozenges and/or gargle with warm salt   water.  - Because air was used during the procedure, expelling large amounts of air   from your rectum or belching is normal.  - If a bowel prep was taken, you may not have a bowel movement for 1-3 days.    This is normal.  SYMPTOMS TO WATCH FOR AND REPORT TO YOUR PHYSICIAN:  1. Abdominal pain or bloating, other than gas cramps.  2. Chest pain.  3. Back pain.  4. Signs of infection such as: chills or fever occurring within 24 hours   after the procedure.  5. Rectal bleeding, which would show as bright red, maroon, or black stools.   (A tablespoon of blood from the rectum is not serious, especially if   hemorrhoids are present.)  6. Vomiting.  7. Weakness or dizziness.  GO DIRECTLY TO THE NEAREST EMERGENCY ROOM IF YOU HAVE ANY OF THE FOLLOWING:      Difficulty breathing              Chills and/or fever over 101 F   Persistent vomiting and/or vomiting blood   Severe abdominal pain   Severe chest pain   Black, tarry stools   Bleeding- more than one  tablespoon   Any other symptom or condition that you feel may need urgent attention  Your doctor recommends these additional instructions:  If any biopsies were taken, your doctors clinic will contact you in 1 to 2   weeks with any results.  - Discharge patient to home.   - Await pathology results.   - Telephone endoscopist for pathology results in 2 weeks.   - Perform a colonoscopy today.   - Return to GI clinic at the next available appointment.  For questions, problems or results please call your physician - Yoshi Erazo MD at Work:  [phone].  [facility], EMERGENCY ROOM PHONE NUMBER: [phone]  IF A COMPLICATION OR EMERGENCY SITUATION ARISES AND YOU ARE UNABLE TO REACH   YOUR PHYSICIAN - GO DIRECTLY TO THE EMERGENCY ROOM.  Yoshi Erazo MD  5/29/2019 8:22:11 AM  This report has been verified and signed electronically.  [employer]ATION

## 2019-05-30 LAB — STRONGYLOIDES ANTIBODY IGG: NEGATIVE

## 2019-05-31 ENCOUNTER — CLINICAL SUPPORT (OUTPATIENT)
Dept: INFECTIOUS DISEASES | Facility: CLINIC | Age: 59
End: 2019-05-31
Payer: COMMERCIAL

## 2019-05-31 PROCEDURE — 90471 PNEUMOCOCCAL CONJUGATE VACCINE 13-VALENT LESS THAN 5YO & GREATER THAN: ICD-10-PCS | Mod: S$GLB,,, | Performed by: INTERNAL MEDICINE

## 2019-05-31 PROCEDURE — 90670 PNEUMOCOCCAL CONJUGATE VACCINE 13-VALENT LESS THAN 5YO & GREATER THAN: ICD-10-PCS | Mod: S$GLB,,, | Performed by: INTERNAL MEDICINE

## 2019-05-31 PROCEDURE — 90670 PCV13 VACCINE IM: CPT | Mod: S$GLB,,, | Performed by: INTERNAL MEDICINE

## 2019-05-31 PROCEDURE — 90471 IMMUNIZATION ADMIN: CPT | Mod: S$GLB,,, | Performed by: INTERNAL MEDICINE

## 2019-06-03 ENCOUNTER — CLINICAL SUPPORT (OUTPATIENT)
Dept: INFECTIOUS DISEASES | Facility: CLINIC | Age: 59
End: 2019-06-03
Payer: COMMERCIAL

## 2019-06-03 DIAGNOSIS — E61.1 IRON DEFICIENCY: ICD-10-CM

## 2019-06-03 PROCEDURE — 90636 HEP A/HEP B VACC ADULT IM: CPT | Mod: S$GLB,,, | Performed by: INTERNAL MEDICINE

## 2019-06-03 PROCEDURE — 90636 HEPATITIS A HEPATITIS B COMBINED VACCINE IM: ICD-10-PCS | Mod: S$GLB,,, | Performed by: INTERNAL MEDICINE

## 2019-06-03 PROCEDURE — 90471 IMMUNIZATION ADMIN: CPT | Mod: S$GLB,,, | Performed by: INTERNAL MEDICINE

## 2019-06-03 PROCEDURE — 90471 HEPATITIS A HEPATITIS B COMBINED VACCINE IM: ICD-10-PCS | Mod: S$GLB,,, | Performed by: INTERNAL MEDICINE

## 2019-06-04 ENCOUNTER — PATIENT MESSAGE (OUTPATIENT)
Dept: GASTROENTEROLOGY | Facility: CLINIC | Age: 59
End: 2019-06-04

## 2019-06-05 ENCOUNTER — PATIENT MESSAGE (OUTPATIENT)
Dept: FAMILY MEDICINE | Facility: CLINIC | Age: 59
End: 2019-06-05

## 2019-06-06 ENCOUNTER — TELEPHONE (OUTPATIENT)
Dept: HEPATOLOGY | Facility: CLINIC | Age: 59
End: 2019-06-06

## 2019-06-06 ENCOUNTER — PATIENT MESSAGE (OUTPATIENT)
Dept: HEPATOLOGY | Facility: CLINIC | Age: 59
End: 2019-06-06

## 2019-06-06 ENCOUNTER — PROCEDURE VISIT (OUTPATIENT)
Dept: HEPATOLOGY | Facility: CLINIC | Age: 59
End: 2019-06-06
Attending: INTERNAL MEDICINE
Payer: COMMERCIAL

## 2019-06-06 ENCOUNTER — OFFICE VISIT (OUTPATIENT)
Dept: HEPATOLOGY | Facility: CLINIC | Age: 59
End: 2019-06-06
Payer: COMMERCIAL

## 2019-06-06 VITALS
TEMPERATURE: 97 F | RESPIRATION RATE: 18 BRPM | BODY MASS INDEX: 33.02 KG/M2 | HEART RATE: 68 BPM | HEIGHT: 73 IN | SYSTOLIC BLOOD PRESSURE: 137 MMHG | WEIGHT: 249.13 LBS | DIASTOLIC BLOOD PRESSURE: 85 MMHG | OXYGEN SATURATION: 93 %

## 2019-06-06 DIAGNOSIS — K76.0 FATTY LIVER DISEASE, NONALCOHOLIC: ICD-10-CM

## 2019-06-06 DIAGNOSIS — K75.81 NASH (NONALCOHOLIC STEATOHEPATITIS): Primary | ICD-10-CM

## 2019-06-06 DIAGNOSIS — K75.81 NASH (NONALCOHOLIC STEATOHEPATITIS): ICD-10-CM

## 2019-06-06 PROCEDURE — 99204 PR OFFICE/OUTPT VISIT, NEW, LEVL IV, 45-59 MIN: ICD-10-PCS | Mod: S$GLB,,, | Performed by: INTERNAL MEDICINE

## 2019-06-06 PROCEDURE — 91200 LIVER ELASTOGRAPHY: CPT | Mod: S$GLB,,, | Performed by: INTERNAL MEDICINE

## 2019-06-06 PROCEDURE — 91200 PR LIVER ELASTOGRAPHY W/OUT IMAG W/INTERP & REPORT: ICD-10-PCS | Mod: S$GLB,,, | Performed by: INTERNAL MEDICINE

## 2019-06-06 PROCEDURE — 99204 OFFICE O/P NEW MOD 45 MIN: CPT | Mod: S$GLB,,, | Performed by: INTERNAL MEDICINE

## 2019-06-06 PROCEDURE — 99999 PR PBB SHADOW E&M-EST. PATIENT-LVL IV: ICD-10-PCS | Mod: PBBFAC,,, | Performed by: INTERNAL MEDICINE

## 2019-06-06 PROCEDURE — 99999 PR PBB SHADOW E&M-EST. PATIENT-LVL IV: CPT | Mod: PBBFAC,,, | Performed by: INTERNAL MEDICINE

## 2019-06-06 NOTE — PROCEDURES
Procedures   Vibration-controlled Transient Elastography Procedure     Name: Jonathan Villela  Date of Procedure : 2019   :: Tea Quan MD  Diagnosis: NAFLD    Probe: M    Universal Protocol: Patient's identity, procedure and site were verified, confirmatory pause was performed.  Discussed procedure including risks and potential complications.  Questions answered.  Patient verbalizes understanding and wishes to proceed with VCTE.     Procedure: After providing explanations of the procedure, patient was placed in the supine position with right arm in maximum abduction to allow optimal exposure of right lateral abdomen.  Patient was briefly assessed, Testing was performed in the mid-axillary location, 50Hz Shear Wave pulses were applied and the resulting Shear Wave and Propagation Speed detected with a 3.5 MHz ultrasonic signal, using the FibroScan probe, Skin to liver capsule distance and liver parenchyma were accessed during the entire examination with the FibroScan probe, Patient was instructed to breathe normally and to abstain from sudden movements during the procedure, allowing for random measurements of liver stiffness. At least 10 Shear Waves were produced, Individual measurements of each Shear Wave were calculated.  Patient tolerated the procedure well with no complications.  Meets discharge criteria as was dismissed.  Rates pain 0 out of 10.  Patient will follow up with ordering provider to review results.      Findings  Median liver stiffness score: 5.6 KPa  CAP readin dB/m    IQR/med: 9 %    Interpretation  Fibrosis interpretation is based on medial liver stiffness - Kilopascal (kPa).     Fibrosis stage: F 0-1     Steatosis interpretation is based on controlled attenuation parameter - (dB/m).    Steatosis grade: S3       Tea Quan MD  Staff Physician  Transplant Hepatology  [facility]

## 2019-06-06 NOTE — PROGRESS NOTES
Hepatology Consult Note    Referring provider: Dr. Yoshi Erazo    Chief complaint:   Chief Complaint   Patient presents with    Fatty Liver       HPI:  Jonathan Villela is a 59 y.o. male that presents to Transplant Hepatology Clinic for consultation of had concerns including Fatty Liver..        The patient's risk factors for NAFLD include:    · Obesity/overweight                     Yes Body mass index is 32.87 kg/m².  · Dyslipidemia                                yes  · Insulin resistance/Diabetes         no  · Family history of diabetes           no    As regards to liver disease,  - The patient reports no symptoms of hepatitis including malaise or flu-like symptoms to suggest a flare.  - The patient reports no new manifestations of portal hypertension including ascites, edema, GI bleeding, or hepatic encephalopathy to suggest liver decompensation.  - The patient reports no fevers/chills or pruritis to suggest biliary disease.      Patient Active Problem List   Diagnosis    Migraine without aura and without status migrainosus, not intractable    Essential hypertension    PUD (peptic ulcer disease)    Erectile dysfunction    Insomnia    Obstructive sleep apnea syndrome    Somnolence, daytime    Allergic rhinitis due to allergen    BPH with urinary obstruction    Gastroesophageal reflux disease without esophagitis    Acute post-hemorrhagic anemia    Acute gastric ulcer with hemorrhage    Bilateral occipital neuralgia    DICKINSON (dyspnea on exertion)    Chronic migraine without aura without status migrainosus, not intractable    Abdominal aortic atherosclerosis    Mixed hyperlipidemia    Iron deficiency    Obesity (BMI 30.0-34.9)    Urinary hesitancy    Urinary frequency    Urinary retention    Elevated ALT measurement    Abdominal pain    Hypogonadism male    Hematochezia    PAYNE (nonalcoholic steatohepatitis)    Fatty liver disease, nonalcoholic       Past Medical History:    Diagnosis Date    Acute gastric ulcer with hemorrhage 2/15/2017    Bilateral occipital neuralgia     BPH with urinary obstruction 12/31/2015    Chronic constipation     Colitis 3020-3347    infectious?    Diverticulitis     Erectile dysfunction     Essential hypertension     Gastroesophageal reflux disease without esophagitis 2/11/2017    IBS (irritable bowel syndrome)     Migraine without aura and without status migrainosus, not intractable 1/31/2014    Obstructive sleep apnea syndrome 2/9/2015    Psoriasis     PUD (peptic ulcer disease)        Past Surgical History:   Procedure Laterality Date    CHOLECYSTECTOMY      COLONOSCOPY N/A 5/29/2019    Performed by [facility] at [facility] (2ND F[facility])    COLONOSCOPY N/A 2/17/2017    Performed by [facility] MD at [facility] (2ND FLR)    EGD (ESOPHAGOGASTRODUODENOSCOPY) N/A 5/29/2019    Per[facility] by [facility] at [facility] (2ND FLR)    EGD (ESOPHAGOGASTRODUODENOSCOPY) N/A 12/14/2018    Performed by Lexii Carlson MD at [facility] (4TH FLR)    EGD (ESOPHAGOGASTRODUODENOSCOPY) N/A 12/14/2018    Performed by Javon Maldonado MD at [facility] (4TH FLR)    ESOPHAGOGASTRODUODENOSCOPY      ESOPHAGOGASTRODUODENOSCOPY (EGD) N/A 2/17/2017    Performed by [facility] at [facility] (2ND FLR)    ESOPHAGOGASTRODUODENOSCOPY (EGD) N/A 2/11/2017    Performed by [facility] at [facility] (2ND FLR)    LEG SURGERY      NASAL SEPTUM SURGERY      UPPER GASTROINTESTINAL ENDOSCOPY         Family History   Problem Relation Age of Onset    Crohn's disease Unknown         brother, sister, father, nephew    Heart disease Father     Crohn's disease Father     Inflammatory bowel disease Father     Crohn's disease Sister     Inflammatory bowel disease Sister     Crohn's disease Brother     Inflammatory bowel disease Brother     Kidney disease Mother     Hypertension Mother     Thyroid disease Mother     Cystic fibrosis Maternal  Uncle     Prostate cancer Neg Hx     Melanoma Neg Hx     Colon cancer Neg Hx     Celiac disease Neg Hx     Cirrhosis Neg Hx     Esophageal cancer Neg Hx     Liver cancer Neg Hx     Rectal cancer Neg Hx     Stomach cancer Neg Hx     Ulcerative colitis Neg Hx     Liver disease Neg Hx        Social History     Socioeconomic History    Marital status: Single     Spouse name: Not on file    Number of children: Not on file    Years of education: Not on file    Highest education level: Not on file   Occupational History    Not on file   Social Needs    Financial resource strain: Not hard at all    Food insecurity:     Worry: Never true     Inability: Never true    Transportation needs:     Medical: No     Non-medical: No   Tobacco Use    Smoking status: Never Smoker    Smokeless tobacco: Never Used   Substance and Sexual Activity    Alcohol use: Yes     Frequency: 2-3 times a week     Drinks per session: 1 or 2     Binge frequency: Never     Comment: ocasionally    Drug use: No    Sexual activity: Yes     Partners: Female   Lifestyle    Physical activity:     Days per week: 2 days     Minutes per session: 0 min    Stress: Only a little   Relationships    Social connections:     Talks on phone: More than three times a week     Gets together: More than three times a week     Attends Mosque service: Not on file     Active member of club or organization: Yes     Attends meetings of clubs or organizations: More than 4 times per year     Relationship status: Living with partner   Other Topics Concern    Not on file   Social History Narrative    Not on file       Current Outpatient Medications   Medication Sig Dispense Refill    acetic acid-hydrocortisone (VOSOL-HC) otic solution Place 1-2 drops into both ears as needed.       acyclovir (ZOVIRAX) 400 MG tablet Take 400 mg by mouth 2 (two) times daily as needed.      albuterol (PROVENTIL/VENTOLIN HFA) 90 mcg/actuation inhaler Inhale 1-2 puffs  into the lungs every 6 (six) hours as needed for Wheezing. Rescue 18 g 0    amLODIPine (NORVASC) 5 MG tablet Take 5 mg by mouth once daily.      butalbital-acetaminophen-caffeine -40 mg (FIORICET, ESGIC) -40 mg per tablet as needed      carvedilol (COREG) 12.5 MG tablet Take 12.5 mg by mouth 2 (two) times daily with meals.       chlorthalidone (HYGROTEN) 25 MG Tab TAKE 1 TABLET(25 MG) BY MOUTH EVERY DAY 30 tablet 11    cloNIDine (CATAPRES) 0.1 MG tablet Take 0.1 mg by mouth as needed.   3    dicyclomine (BENTYL) 10 MG capsule Take 1 capsule (10 mg total) by mouth 3 (three) times daily as needed (abdominal cramping). 30 capsule 1    eletriptan (RELPAX) 40 MG tablet as needed      fluocinolone acetonide oil (DERMOTIC OIL) 0.01 % Drop Place 3 drops in ear(s) 2 (two) times daily. (Patient taking differently: Place 3 drops in ear(s) as needed. ) 1 Bottle 3    fluocinonide (LIDEX) 0.05 % external solution AAA scalp qday - bid prn pruritus 60 mL 3    fluticasone (FLONASE) 50 mcg/actuation nasal spray 1 spay each nasal as needed      frovatriptan (FROVA) 2.5 MG tablet as needed      galcanezumab-gnlm 120 mg/mL PnIj Inject 120 mg into the skin every 28 days. 1 mL 6    hydrocortisone butyrate (LOCOID) 0.1 % Crea cream AAA bid to affected areas of groin/genitals 60 g 3    ketoconazole (NIZORAL) 2 % shampoo Wash hair with medicated shampoo at least 2x/week - let sit on scalp at least 5 minutes prior to rinsing 120 mL 5    metronidazole 1% (METROGEL) 1 % Gel Apply at bedtime as directed as needed for skin infection  0    mometasone (ELOCON) 0.1 % lotion as needed      ondansetron (ZOFRAN-ODT) 8 MG TbDL Take 1 tablet (8 mg total) by mouth every 6 (six) hours as needed (nausea). 20 tablet 6    pantoprazole (PROTONIX) 40 MG tablet Take 1 tablet (40 mg total) by mouth before breakfast. (Patient taking differently: Take 40 mg by mouth as needed. ) 90 tablet 3    potassium chloride (KLOR-CON) 10 MEQ TbSR  "Take 10 mEq by mouth once.      pravastatin (PRAVACHOL) 80 MG tablet Take 1 tablet (80 mg total) by mouth once daily. (Patient taking differently: Take 40 mg by mouth once daily. ) 90 tablet 3    sumatriptan (IMITREX STATDOSE) 6 mg/0.5 mL kit Inject 0.5 mLs (6 mg total) into the skin every 2 (two) hours as needed for Migraine (up to 2 doses daily. Hold for blood pressure 155/110). 1 kit 6    triamcinolone acetonide 0.1% (KENALOG) 0.1 % cream AAA bid 454 g 3    TURMERIC ORAL Take by mouth once daily.      UNABLE TO FIND Take by mouth once daily. Healthy heart supplement      urea (CARMOL) 40 % Crea Apply topically 2 (two) times daily. To affected areas of elbows (Patient taking differently: Apply topically as needed. To affected areas of elbows) 85 g 3    BD INTEGRA SYRINGE 3 mL 22 gauge x 1 1/2" Syrg USE TO DRAW UP TESTOSTERONE  6    BD PRECISIONGLIDE 25 gauge x 1" Ndle USE TO INJECT TESTOSERTONE  6    BD REGULAR BEVEL NEEDLES 25 gauge x 5/8" Ndle USE TO INJECT TESTOSTERONE  6    betamethasone valerate 0.1% (VALISONE) 0.1 % Crea betamethasone valerate 0.1 % topical cream as needed      ciclopirox (LOPROX) 0.77 % Crea Apply topically 2 (two) times daily. Pharmacist: mix 30 g of TAC 0.1% cream with 30 g of Loprox cream in 120 cc of milk of magnesia (Patient taking differently: Apply topically as needed. Pharmacist: mix 30 g of TAC 0.1% cream with 30 g of Loprox cream in 120 cc of milk of magnesia) 30 g 3    hydrocortisone 2.5 % cream Apply topically 2 (two) times daily. To affected areas of face (Patient taking differently: Apply topically as needed. To affected areas of face) 30 g 5     No current facility-administered medications for this visit.      Facility-Administered Medications Ordered in Other Visits   Medication Dose Route Frequency Provider Last Rate Last Dose    0.9%  NaCl infusion   Intravenous Continuous Yoshi Erazo MD 20 mL/hr at 05/29/19 0759      sodium chloride 0.9% flush 10 mL  " "10 mL Intravenous PRN Yoshi Erazo MD           Review of patient's allergies indicates:   Allergen Reactions    Triamterene      Back and lower abdominal pain       Review of Systems   Constitutional: Negative for chills, fever, malaise/fatigue and weight loss.   HENT: Negative for congestion, nosebleeds and sore throat.    Eyes: Negative for blurred vision, double vision and photophobia.   Respiratory: Negative for cough and shortness of breath.    Cardiovascular: Negative for chest pain, palpitations, orthopnea and leg swelling.   Gastrointestinal: Negative for abdominal pain, blood in stool, constipation, diarrhea, melena and vomiting.   Genitourinary: Negative for dysuria.   Musculoskeletal: Negative for falls and joint pain.   Skin: Negative for itching and rash.   Neurological: Negative for dizziness, tremors and weakness.   Endo/Heme/Allergies: Does not bruise/bleed easily.   Psychiatric/Behavioral: Negative for depression and substance abuse. The patient is not nervous/anxious and does not have insomnia.        Vitals:    06/06/19 1017   BP: 137/85   Pulse: 68   Resp: 18   Temp: 97.3 °F (36.3 °C)   TempSrc: Oral   SpO2: (!) 93%   Weight: 113 kg (249 lb 1.9 oz)   Height: 6' 1" (1.854 m)       Physical Exam   Constitutional: He is oriented to person, place, and time. He appears well-developed and well-nourished. No distress.   HENT:   Head: Normocephalic and atraumatic.   Mouth/Throat: Oropharynx is clear and moist.   Eyes: Conjunctivae are normal. No scleral icterus.   Neck: Normal range of motion.   Cardiovascular: Normal rate, regular rhythm, normal heart sounds and intact distal pulses.   Pulmonary/Chest: Effort normal and breath sounds normal. No respiratory distress. He has no wheezes. He has no rales.   Abdominal: Soft. Normal appearance and bowel sounds are normal. He exhibits no shifting dullness, no distension, no pulsatile liver, no fluid wave and no ascites. There is no splenomegaly or " hepatomegaly. No hernia.   Musculoskeletal: He exhibits no edema.   Neurological: He is alert and oriented to person, place, and time. He is not disoriented.   Skin: Skin is warm and dry. No rash noted. He is not diaphoretic.   Psychiatric: He has a normal mood and affect. His behavior is normal. His mood appears not anxious. His affect is not inappropriate. He is not agitated. He is communicative. He is attentive.   Nursing note and vitals reviewed.      LABS: I personally reviewed pertinent laboratory findings.    Lab Results   Component Value Date    ALT 75 (H) 04/16/2019    AST 49 (H) 04/16/2019    ALKPHOS 61 04/16/2019    BILITOT 0.9 04/16/2019       Lab Results   Component Value Date    WBC 8.75 01/25/2019    HGB 17.4 01/25/2019    HCT 52.8 01/25/2019    MCV 84 01/25/2019     01/25/2019       Lab Results   Component Value Date     01/25/2019    K 3.4 (L) 01/25/2019    CL 96 01/25/2019    CO2 33 (H) 01/25/2019    BUN 21 (H) 01/25/2019    CREATININE 1.1 01/25/2019    CALCIUM 9.5 01/25/2019    ANIONGAP 8 01/25/2019    ESTGFRAFRICA >60.0 01/25/2019    EGFRNONAA >60.0 01/25/2019       Lab Results   Component Value Date    INR 1.2 04/16/2019    INR 1.1 02/14/2017    INR 1.1 02/10/2017       Lab Results   Component Value Date    SMOOTHMUSCAB Positive 1:40 (A) 04/16/2019     Lab Results   Component Value Date    IRON 61 04/16/2019    TIBC 512 (H) 04/16/2019    FERRITIN 22 04/16/2019     Lab Results   Component Value Date    HEPCAB Negative 10/18/2018     Lab Results   Component Value Date    TSH 2.212 01/25/2019     No results found for: DANNY    No results found for: ABORH        Lab Results   Component Value Date    HGBA1C 5.4 08/29/2014     Lab Results   Component Value Date    CHOL 157 01/07/2019    CHOL 158 04/17/2018    CHOL 177 11/17/2017     Lab Results   Component Value Date    HDL 33 (L) 01/07/2019    HDL 31 (L) 04/17/2018    HDL 32 (L) 11/17/2017     Lab Results   Component Value Date    LDLCALC  85.2 01/07/2019    LDLCALC 81.8 04/17/2018    LDLCALC 85.2 11/17/2017     Lab Results   Component Value Date    TRIG 194 (H) 01/07/2019    TRIG 226 (H) 04/17/2018    TRIG 299 (H) 11/17/2017     Lab Results   Component Value Date    CHOLHDL 21.0 01/07/2019    CHOLHDL 19.6 (L) 04/17/2018    CHOLHDL 18.1 (L) 11/17/2017           Imaging:   No results found for this or any previous visit.    I personally reviewed recent cardiology studies available on the chart and from outside medical records.    I personally reviewed recent imaging studies available on the chart and from outside medical records.        Assessment:  59 y.o. male presenting with     1. PAYNE (nonalcoholic steatohepatitis)    2. Fatty liver disease, nonalcoholic        VCTE today : F0-1 (5.6 kPa) and ( dB/m)    PAYNE, stable, no cirrhosis or advanced fibrosis.      Recommendation(s):  - life-style modifications: weight loss, daily exercise (30 mins of HIIT or cardio), low carb/low fat diet  - control of diabetes or insulin resistance   - control of high cholesterol, if needed with statin drugs or other cholesterol-lowering agents  - vitamin E supplements (no more than 800 mg per day) may help reduce liver fat  - coffee consumption (low caloric): 2-3 cups per day may reduce liver fat    - RTC in 1 year with VCTE    A total of 45 minutes were spent face-to-face with the patient during this encounter and over half of that time was spent on counseling and coordination of care.  We discussed in depth the nature of the patient's liver disease, the management plan in details. I also educated the patient about lifestyle modifications which may improve hepatic steatosis, overweight/obesity, insulin resistance and high blood pressure issues. I have provided the patient with an opportunity to ask questions and have all questions answered to his satisfaction.       I have sent communication to the referring physician and/or primary care provider.      Tea LOPEZ  MD Avelina  Staff Physician  Hepatology and Liver Transplant  [facility]  [facility]

## 2019-06-06 NOTE — LETTER
June 6, 2019      Yoshi Erazo MD  [address]  [facility] [address]           [facility] - Hepatology  [address]  [facility] [address]  Phone: [phone]  Fax: [phone]          Patient: Jonathan Villela   MR Number: [id number]   YOB: 1960   Date of Visit: 6/6/2019       Dear Dr. Yoshi Erazo:    Thank you for referring Jonathan Villela to me for evaluation. Attached you will find relevant portions of my assessment and plan of care.    If you have questions, please do not hesitate to call me. I look forward to following Jonathan Villela along with you.    Sincerely,    Tea Quan MD    Enclosure  CC:  No Recipients    If you would like to receive this communication electronically, please contact [email or web address] or [phone] to request more information on [employer] Link access.    For providers and/or their staff who would like to refer a patient to [facility], please contact us through our one-stop-shop provider referral line, [facility], at [phone].    If you feel you have received this communication in error or would no longer like to receive these types of communications, please e-mail [email or web address]

## 2019-06-06 NOTE — PATIENT INSTRUCTIONS
- life-style modifications: weight loss, daily exercise (30 mins of HIIT or cardio), low carb/low fat diet  - control of diabetes or insulin resistance   - control of high cholesterol, if needed with statin drugs or other cholesterol-lowering agents  - vitamin E supplements (no more than 800 mg per day) may help reduce liver fat  - coffee consumption (low caloric): 2-3 cups per day may reduce liver fat

## 2019-06-06 NOTE — TELEPHONE ENCOUNTER
----- Message from Tea Quan MD sent at 6/6/2019 11:31 AM CDT -----  Recall to return in 1 year with Fibroscan

## 2019-06-07 ENCOUNTER — PATIENT MESSAGE (OUTPATIENT)
Dept: GASTROENTEROLOGY | Facility: CLINIC | Age: 59
End: 2019-06-07

## 2019-06-12 ENCOUNTER — TELEPHONE (OUTPATIENT)
Dept: FAMILY MEDICINE | Facility: CLINIC | Age: 59
End: 2019-06-12

## 2019-06-12 NOTE — TELEPHONE ENCOUNTER
Left message requesting a callback.  Need to schedule follow up appointment for eval from right posterior cervical LN.

## 2019-06-13 ENCOUNTER — OFFICE VISIT (OUTPATIENT)
Dept: FAMILY MEDICINE | Facility: CLINIC | Age: 59
End: 2019-06-13
Payer: COMMERCIAL

## 2019-06-13 ENCOUNTER — TELEPHONE (OUTPATIENT)
Dept: PHARMACY | Facility: CLINIC | Age: 59
End: 2019-06-13

## 2019-06-13 VITALS
SYSTOLIC BLOOD PRESSURE: 118 MMHG | OXYGEN SATURATION: 95 % | BODY MASS INDEX: 32.2 KG/M2 | DIASTOLIC BLOOD PRESSURE: 78 MMHG | HEIGHT: 73 IN | WEIGHT: 242.94 LBS | HEART RATE: 80 BPM

## 2019-06-13 DIAGNOSIS — I10 HYPERTENSION, UNSPECIFIED TYPE: ICD-10-CM

## 2019-06-13 DIAGNOSIS — M79.10 MYALGIA: ICD-10-CM

## 2019-06-13 DIAGNOSIS — R59.0 LAD (LYMPHADENOPATHY) OF RIGHT CERVICAL REGION: Primary | ICD-10-CM

## 2019-06-13 DIAGNOSIS — E78.5 DYSLIPIDEMIA: ICD-10-CM

## 2019-06-13 DIAGNOSIS — K75.81 NASH (NONALCOHOLIC STEATOHEPATITIS): ICD-10-CM

## 2019-06-13 DIAGNOSIS — K76.0 FATTY LIVER: ICD-10-CM

## 2019-06-13 PROCEDURE — 99215 PR OFFICE/OUTPT VISIT, EST, LEVL V, 40-54 MIN: ICD-10-PCS | Mod: S$GLB,,, | Performed by: FAMILY MEDICINE

## 2019-06-13 PROCEDURE — 99215 OFFICE O/P EST HI 40 MIN: CPT | Mod: S$GLB,,, | Performed by: FAMILY MEDICINE

## 2019-06-13 PROCEDURE — 99999 PR PBB SHADOW E&M-EST. PATIENT-LVL V: CPT | Mod: PBBFAC,,, | Performed by: FAMILY MEDICINE

## 2019-06-13 PROCEDURE — 99999 PR PBB SHADOW E&M-EST. PATIENT-LVL V: ICD-10-PCS | Mod: PBBFAC,,, | Performed by: FAMILY MEDICINE

## 2019-06-13 RX ORDER — MUPIROCIN 20 MG/G
OINTMENT TOPICAL 2 TIMES DAILY
Qty: 15 G | Refills: 0 | Status: SHIPPED | OUTPATIENT
Start: 2019-06-13 | End: 2020-08-03

## 2019-06-13 NOTE — PROGRESS NOTES
(Portions of this note were dictated using voice recognition software and may contain dictation related errors in spelling/grammar/syntax not found on text review)    CC:   Chief Complaint   Patient presents with    Thyroid Problem       HPI: 59 y.o. male Last seen May 16, 2019.  Medical history below.  Including hypertension, sleep apnea, migraines, peptic ulcer disease, IBS, osteoarthritis, psoriasis, fatty liver disease..  Went to Infectious Disease for consultation prior to starting biologic therapy.  Noted to have right cervical lymphadenopathy.  Was referred here for further discussion.    He is here with his wife today.  His wife had noticed the right posterior cervical lymph node about 2 months ago or so.  He was not painful but was prominent prior.  He noticed that the lymph node has decreased in size over time.  He was not aware of this existing prior to the 2 month time frame above.  Denies any current fever, chills, sweats.  States that he had some bronchitis issues last year which lasted a long time.  He does not recall any major upper respiratory infections in the last few months.  Does state that he gets some sores in the left nostril chronically and was unsure if this was related.  Denies any other skin infections.  He denies any other lymph node swelling elsewhere.  No sore throat, dysphagia, odynophagia.    He also was concerned about his statin medication.  Was on Lipitor in the past but could not tolerate.  Was switched over to pravastatin from his cardiologist.  He states that he gets a lot of joint pain and back issues.  He thinks that since starting the pravastatin he notices his back hurting more than it did in the past.  He was unsure if it is just age related or arthritis or related to the statin medication.  He had undergone a rheumatology workup in the past for his joint pains which was largely unremarkable.  It was felt that his joint pains had a lot to do with  osteoarthritis      Imaging review in  the chart demonstrates chest x-ray in April that showed some lung base opacities suggestive of scarring or atelectasis.  No focal lung consolidation.  Had a CTA of the chest with 0.4 cm right middle lobe solid pulmonary nodule, Fleischner Society guidelines calling for no routine follow-up for low risk patient or noncontrast chest CT follow-up in 12 months for high-risk patient.       Past Medical History:   Diagnosis Date    Acute gastric ulcer with hemorrhage 2/15/2017    Bilateral occipital neuralgia     BPH with urinary obstruction 12/31/2015    Chronic constipation     Colitis [id number]    infectious?    Diverticulitis     Erectile dysfunction     Essential hypertension     Gastroesophageal reflux disease without esophagitis 2/11/2017    IBS (irritable bowel syndrome)     Migraine without aura and without status migrainosus, not intractable 1/31/2014    Obstructive sleep apnea syndrome 2/9/2015    Psoriasis     PUD (peptic ulcer disease)        Past Surgical History:   Procedure Laterality Date    CHOLECYSTECTOMY      COLONOSCOPY N/A 5/29/2019    [facility]formed by [facility] (2ND FLR)    COLONOSCOPY N/A 2/17/2017    [facility] (2ND FLR)    EGD (ESOPHAGOGASTRODUODENOSCOPY) N/A 5/29/2019    [facility] (2ND FLR)    EGD (ESOPHAGOGASTRODUODENOSCOPY) N/A 12/14/2018    Performed by [facility]ii Carlson [facility] at [facility] END[facility] (4TH FLR)    EGD (ESOPHAGOGASTRODUODENOSCOPY) N/A 12/14/2018    Per[facility] by [facility]on [facility]donado [facility] at [facility] (4TH FLR)    ESOPHAGOGASTRODUODENOSCOPY      ESOPHAGOGASTRODUODENOSCOPY (EGD) N/A 2/17/2017    Per[facility] (2ND FLR)    ESOPHAGOGASTRODUODENOSCOPY (EGD) N/A 2/11/2017    Per[facility] (2ND FLR)    LEG SURGERY      NASAL SEPTUM SURGERY      UPPER GASTROINTESTINAL ENDOSCOPY         Family  History   Problem Relation Age of Onset    Crohn's disease Unknown         brother, sister, father, nephew    Heart disease Father     Crohn's disease Father     Inflammatory bowel disease Father     Crohn's disease Sister     Inflammatory bowel disease Sister     Crohn's disease Brother     Inflammatory bowel disease Brother     Kidney disease Mother     Hypertension Mother     Thyroid disease Mother     Cystic fibrosis Maternal Uncle     Prostate cancer Neg Hx     Melanoma Neg Hx     Colon cancer Neg Hx     Celiac disease Neg Hx     Cirrhosis Neg Hx     Esophageal cancer Neg Hx     Liver cancer Neg Hx     Rectal cancer Neg Hx     Stomach cancer Neg Hx     Ulcerative colitis Neg Hx     Liver disease Neg Hx        Social History     Socioeconomic History    Marital status: Single     Spouse name: Not on file    Number of children: Not on file    Years of education: Not on file    Highest education level: Not on file   Occupational History    Not on file   Social Needs    Financial resource strain: Not hard at all    Food insecurity:     Worry: Never true     Inability: Never true    Transportation needs:     Medical: No     Non-medical: No   Tobacco Use    Smoking status: Never Smoker    Smokeless tobacco: Never Used   Substance and Sexual Activity    Alcohol use: Yes     Frequency: 2-3 times a week     Drinks per session: 1 or 2     Binge frequency: Never     Comment: ocasionally    Drug use: No    Sexual activity: Yes     Partners: Female   Lifestyle    Physical activity:     Days per week: 2 days     Minutes per session: 0 min    Stress: Only a little   Relationships    Social connections:     Talks on phone: More than three times a week     Gets together: More than three times a week     Attends [employer] service: Not on file     Active member of club or organization: Yes     Attends meetings of clubs or organizations: More than 4 times per year     Relationship status:  Living with partner   Other Topics Concern    Not on file   Social History Narrative    Not on file     Lab Results   Component Value Date    WBC 8.75 01/25/2019    HGB 17.4 01/25/2019    HCT 52.8 01/25/2019    MCV 84 01/25/2019     01/25/2019    CHOL 157 01/07/2019    TRIG 194 (H) 01/07/2019    HDL 33 (L) 01/07/2019    ALT 75 (H) 04/16/2019    AST 49 (H) 04/16/2019    BILITOT 0.9 04/16/2019    ALKPHOS 61 04/16/2019     01/25/2019    K 3.4 (L) 01/25/2019    CL 96 01/25/2019    CREATININE 1.1 01/25/2019    CALCIUM 9.5 01/25/2019    ALBUMIN 4.3 04/16/2019    BUN 21 (H) 01/25/2019    CO2 33 (H) 01/25/2019    TSH 2.212 01/25/2019    PSA 1.2 10/15/2018    INR 1.2 04/16/2019    HGBA1C 5.4 08/29/2014    LDLCALC 85.2 01/07/2019    GLU 88 01/25/2019           ROS:  GENERAL: No fever, chills, fatigability or weight loss.  SKIN: No rashes, no itching.  HEAD: No headaches.  EYES: No visual changes  EARS: No ear pain or changes in hearing.  NOSE:  Above  MOUTH & THROAT: No hoarseness, change in voice, or sore throat.  NODES: Denies swollen glands.  CHEST: Denies DICKINSON, cyanosis, wheezing, cough and sputum production.  CARDIOVASCULAR: Denies chest pain, PND, orthopnea.  ABDOMEN:  Chronic bowel changes, follows with GI.  URINARY: No flank pain, dysuria or hematuria.  PERIPHERAL VASCULAR: No claudication or cyanosis.  MUSCULOSKELETAL:  Above  NEUROLOGIC: No weakness or numbness.    Vital signs reviewed  PE:   APPEARANCE: Well nourished, well developed, in no acute distress.    HEAD: Normocephalic, atraumatic.  EYES: PERRL. EOMI.   Conjunctivae noninjected.  EARS: TM's intact. Light reflex normal. No retraction or perforation  NOSE: Mucosa pink. Airway clear.  Does have some slight erythema/irritation of distal nasal mucosa including septum.  No discrete pustules.  No masses.  MOUTH & THROAT: No tonsillar enlargement. No pharyngeal erythema or exudate.   NECK: Supple with no cervical lymphadenopathy.  Does have a small  right posterior cervical lymph node which is mobile and nontender, estimated around 1 cm diameter.  No other lymphadenopathy identified in the cervical region  NODES:  Other than above, no axillary lymphadenopathy.  No supraclavicular lymphadenopathy.  No inguinal lymphadenopathy.  CHEST: Good inspiratory effort. Lungs clear to auscultation with no wheezes or crackles.  CARDIOVASCULAR: Normal S1, S2. No rubs, murmurs, or gallops.  ABDOMEN: Bowel sounds normal. Not distended. Soft. No tenderness or masses. No organomegaly.  EXTREMITIES: No edema, cyanosis, or clubbing.  SKIN:  No other skin rashes identified    IMPRESSION  1. LAD (lymphadenopathy) of right cervical region    2. Myalgia    3. Hypertension, unspecified type    4. Fatty liver    5. PAYNE (nonalcoholic steatohepatitis)    6. Dyslipidemia            PLAN  Reviewed documentation from his infectious disease consultation and had heard from his infectious disease provider regarding the above lymphadenopathy finding.  Reviewed above labs and reviewed his imaging studies as above.  Discussed this in detail.  Physical exam is relatively benign except for small prominent posterior cervical lymph node which patient states has shrunk over time.  Given the fact however that he is undergoing consideration for biologic medication management, will workup with soft tissue neck CT. Further management to follow results of this study.  I will communicate this back to his infectious disease provider.    Concerned about statin implication in some of his symptoms.  We discussed in detail the possible relationship of statin medication but also potentially other associations that are not a cause and effect relationship.  I stated that he does have significant osteoarthritis in that certainly could be a cause a lot of his pains.  However, out of his concern for assessing whether statins involved, I can get a CPK level with his upcoming lab work.  If significantly elevated we can  hold off on the statin and recheck a CPK.  The CPK level is mildly elevated, could consider a temporary 2 weeks cessation of statin and assessment if his symptoms completely improved at that point.  A subsequent rechallenge of statin followed by significant worsening again may indicate effect of this medication.  He was advised to make sure that he notifies us of any potential symptom improvement or worsening    Mupirocin ointment applied to the left nostril twice a day for the next 1-2 weeks    Continue workup with GI for his bowel issues    Orders Placed This Encounter   Procedures    CT Soft Tissue Neck With Contrast    CK    Basic metabolic panel

## 2019-06-14 ENCOUNTER — LAB VISIT (OUTPATIENT)
Dept: LAB | Facility: HOSPITAL | Age: 59
End: 2019-06-14
Attending: INTERNAL MEDICINE
Payer: COMMERCIAL

## 2019-06-14 DIAGNOSIS — E61.1 IRON DEFICIENCY: ICD-10-CM

## 2019-06-14 DIAGNOSIS — M79.10 MYALGIA: ICD-10-CM

## 2019-06-14 DIAGNOSIS — R59.0 LAD (LYMPHADENOPATHY) OF RIGHT CERVICAL REGION: ICD-10-CM

## 2019-06-14 DIAGNOSIS — Z11.59 NEED FOR HEPATITIS C SCREENING TEST: ICD-10-CM

## 2019-06-14 LAB
ANION GAP SERPL CALC-SCNC: 10 MMOL/L (ref 8–16)
BUN SERPL-MCNC: 16 MG/DL (ref 6–20)
CALCIUM SERPL-MCNC: 9.9 MG/DL (ref 8.7–10.5)
CHLORIDE SERPL-SCNC: 99 MMOL/L (ref 95–110)
CK SERPL-CCNC: 106 U/L (ref 20–200)
CO2 SERPL-SCNC: 31 MMOL/L (ref 23–29)
CREAT SERPL-MCNC: 1 MG/DL (ref 0.5–1.4)
EST. GFR  (AFRICAN AMERICAN): >60 ML/MIN/1.73 M^2
EST. GFR  (NON AFRICAN AMERICAN): >60 ML/MIN/1.73 M^2
FERRITIN SERPL-MCNC: 103 NG/ML (ref 20–300)
GLUCOSE SERPL-MCNC: 93 MG/DL (ref 70–110)
HCV AB SERPL QL IA: NEGATIVE
HGB BLD-MCNC: 18 G/DL (ref 14–18)
IRON SERPL-MCNC: 92 UG/DL (ref 45–160)
POTASSIUM SERPL-SCNC: 3.3 MMOL/L (ref 3.5–5.1)
SATURATED IRON: 25 % (ref 20–50)
SODIUM SERPL-SCNC: 140 MMOL/L (ref 136–145)
TOTAL IRON BINDING CAPACITY: 374 UG/DL (ref 250–450)
TRANSFERRIN SERPL-MCNC: 253 MG/DL (ref 200–375)

## 2019-06-14 PROCEDURE — 36415 COLL VENOUS BLD VENIPUNCTURE: CPT | Mod: PO

## 2019-06-14 PROCEDURE — 80048 BASIC METABOLIC PNL TOTAL CA: CPT

## 2019-06-14 PROCEDURE — 83540 ASSAY OF IRON: CPT

## 2019-06-14 PROCEDURE — 85018 HEMOGLOBIN: CPT

## 2019-06-14 PROCEDURE — 86803 HEPATITIS C AB TEST: CPT

## 2019-06-14 PROCEDURE — 82728 ASSAY OF FERRITIN: CPT

## 2019-06-14 PROCEDURE — 82550 ASSAY OF CK (CPK): CPT

## 2019-06-17 ENCOUNTER — PATIENT MESSAGE (OUTPATIENT)
Dept: NEUROLOGY | Facility: CLINIC | Age: 59
End: 2019-06-17

## 2019-06-18 ENCOUNTER — PATIENT MESSAGE (OUTPATIENT)
Dept: NEUROLOGY | Facility: CLINIC | Age: 59
End: 2019-06-18

## 2019-06-18 DIAGNOSIS — G43.709 CHRONIC MIGRAINE WITHOUT AURA WITHOUT STATUS MIGRAINOSUS, NOT INTRACTABLE: Primary | ICD-10-CM

## 2019-06-19 ENCOUNTER — TELEPHONE (OUTPATIENT)
Dept: PHARMACY | Facility: CLINIC | Age: 59
End: 2019-06-19

## 2019-06-19 NOTE — TELEPHONE ENCOUNTER
This patient notes that the Aimovig was helpful in reducing his headache.  He notes the Emgality is not stopping his headache as effectively as the Aimovig is.  Specifically he notes that he was able to get rid of the left occipital type headache that he was having during his time with Aimovig.  He notes that on the Emgality this headache that was suppressed on the prior agent is no longer being suppressed.  I will place a prior authorization request for Aimovig, citing that is clinically more effective than the Emgality for his headaches.

## 2019-06-19 NOTE — TELEPHONE ENCOUNTER
LVM for callback to inform patient that [facility] received prescription for Aimovig and prior authorization is required.  [facility] will be back in touch once insurance determination is received.

## 2019-06-21 NOTE — TELEPHONE ENCOUNTER
DOCUMENTATION ONLY  The prior authorization request for Aimovig was denied by the patient's insurance.     Forwarded to the clinical pharmacist for review. 6/21/2019 7:45 am FLC

## 2019-06-24 ENCOUNTER — HOSPITAL ENCOUNTER (OUTPATIENT)
Dept: RADIOLOGY | Facility: HOSPITAL | Age: 59
Discharge: HOME | End: 2019-06-24
Attending: FAMILY MEDICINE
Payer: COMMERCIAL

## 2019-06-24 DIAGNOSIS — R59.0 LAD (LYMPHADENOPATHY) OF RIGHT CERVICAL REGION: ICD-10-CM

## 2019-06-24 PROCEDURE — 70491 CT SOFT TISSUE NECK W/DYE: CPT | Mod: 26,,, | Performed by: RADIOLOGY

## 2019-06-24 PROCEDURE — 70491 CT SOFT TISSUE NECK W/DYE: CPT | Mod: TC

## 2019-06-24 PROCEDURE — 70491 CT SOFT TISSUE NECK WITH CONTRAST: ICD-10-PCS | Mod: 26,,, | Performed by: RADIOLOGY

## 2019-06-24 PROCEDURE — 25500020 PHARM REV CODE 255: Performed by: FAMILY MEDICINE

## 2019-06-24 RX ADMIN — IOHEXOL 75 ML: 350 INJECTION, SOLUTION INTRAVENOUS at 08:06

## 2019-06-26 ENCOUNTER — PATIENT MESSAGE (OUTPATIENT)
Dept: FAMILY MEDICINE | Facility: CLINIC | Age: 59
End: 2019-06-26

## 2019-06-26 RX ORDER — POTASSIUM CHLORIDE 750 MG/1
10 TABLET, EXTENDED RELEASE ORAL DAILY
Qty: 30 TABLET | Refills: 11 | Status: SHIPPED | OUTPATIENT
Start: 2019-06-26 | End: 2019-12-02

## 2019-06-26 NOTE — TELEPHONE ENCOUNTER
Dear Jonathan Villela    I reviewed your recent labs and CT scan of the neck    Your lab work show that your muscle enzyme tests, CPK level, was in normal range.  This tends to suggest against statin related muscle inflammation as we had discussed.  Therefore I do not think  the statin therapy is involved with muscle pains    The kidney function was normal.  However your potassium level was low.  This may be on account of the chlorthalidone blood pressure medication you're taking.  I would like to start you on a potassium supplement to maybe help stabilize the potassium level.  I will send this to your pharmacy, potassium chloride 10 mEq once daily.    The CT scan of the neck showed a normal lymph node in the right back of the neck region.  There is no abnormally enlarged lymph nodes noted or any other masses of concern.  You do have a little bit of plaque in your carotid arteries in the front of the neck but no significant blockage.  Actually, the statin may be helpful to stay on since there is some plaque in the arteries especially since you're not taking an aspirin because of your stomach issues.    Manish Joel MD

## 2019-07-15 ENCOUNTER — TELEPHONE (OUTPATIENT)
Dept: FAMILY MEDICINE | Facility: CLINIC | Age: 59
End: 2019-07-15

## 2019-07-15 NOTE — TELEPHONE ENCOUNTER
----- Message from Patricia Douglas sent at 7/15/2019  9:42 AM CDT -----  Contact: Vicki, [employer] [phone]  Vicki called in regards to faxed prior authorization on 7/9 for gamma core device.  Please advise     Fax [phone]

## 2019-07-15 NOTE — TELEPHONE ENCOUNTER
Advised that we did not receive any paperwork.  Verified this was for Dr Joel.  Debo advised that they were trying to contact Dr Reyes' office.  Advised of proper number to call.

## 2019-07-19 ENCOUNTER — PATIENT MESSAGE (OUTPATIENT)
Dept: FAMILY MEDICINE | Facility: CLINIC | Age: 59
End: 2019-07-19

## 2019-07-19 DIAGNOSIS — K76.0 FATTY LIVER: ICD-10-CM

## 2019-07-19 DIAGNOSIS — I10 HYPERTENSION, UNSPECIFIED TYPE: ICD-10-CM

## 2019-07-19 DIAGNOSIS — E87.6 HYPOKALEMIA: ICD-10-CM

## 2019-07-19 DIAGNOSIS — E78.5 DYSLIPIDEMIA: Primary | ICD-10-CM

## 2019-07-19 NOTE — TELEPHONE ENCOUNTER
Orders Placed This Encounter   Procedures    Basic metabolic panel    Lipid panel           Hi Dr. Joel,   I had my Cholesterol last checked in January 2019 when I was still on Provastatin. Is there any way that you can send me for labs to have my levels rechecked since I am no longer on Provastatin to see where I'm at now?  If so, can you please order labs for the [facility].  Thanks, Jonathan Villela

## 2019-07-23 ENCOUNTER — LAB VISIT (OUTPATIENT)
Dept: LAB | Facility: HOSPITAL | Age: 59
End: 2019-07-23
Attending: FAMILY MEDICINE
Payer: COMMERCIAL

## 2019-07-23 DIAGNOSIS — E87.6 HYPOKALEMIA: ICD-10-CM

## 2019-07-23 DIAGNOSIS — K76.0 FATTY LIVER: ICD-10-CM

## 2019-07-23 DIAGNOSIS — I10 HYPERTENSION, UNSPECIFIED TYPE: ICD-10-CM

## 2019-07-23 DIAGNOSIS — E78.5 DYSLIPIDEMIA: ICD-10-CM

## 2019-07-23 LAB
ANION GAP SERPL CALC-SCNC: 9 MMOL/L (ref 8–16)
BUN SERPL-MCNC: 17 MG/DL (ref 6–20)
CALCIUM SERPL-MCNC: 9.6 MG/DL (ref 8.7–10.5)
CHLORIDE SERPL-SCNC: 99 MMOL/L (ref 95–110)
CHOLEST SERPL-MCNC: 203 MG/DL (ref 120–199)
CHOLEST/HDLC SERPL: 6.5 {RATIO} (ref 2–5)
CO2 SERPL-SCNC: 30 MMOL/L (ref 23–29)
CREAT SERPL-MCNC: 0.9 MG/DL (ref 0.5–1.4)
EST. GFR  (AFRICAN AMERICAN): >60 ML/MIN/1.73 M^2
EST. GFR  (NON AFRICAN AMERICAN): >60 ML/MIN/1.73 M^2
GLUCOSE SERPL-MCNC: 88 MG/DL (ref 70–110)
HDLC SERPL-MCNC: 31 MG/DL (ref 40–75)
HDLC SERPL: 15.3 % (ref 20–50)
LDLC SERPL CALC-MCNC: 125.4 MG/DL (ref 63–159)
NONHDLC SERPL-MCNC: 172 MG/DL
POTASSIUM SERPL-SCNC: 3.4 MMOL/L (ref 3.5–5.1)
SODIUM SERPL-SCNC: 138 MMOL/L (ref 136–145)
TRIGL SERPL-MCNC: 233 MG/DL (ref 30–150)

## 2019-07-23 PROCEDURE — 80048 BASIC METABOLIC PNL TOTAL CA: CPT

## 2019-07-23 PROCEDURE — 36415 COLL VENOUS BLD VENIPUNCTURE: CPT | Mod: PO

## 2019-07-23 PROCEDURE — 80061 LIPID PANEL: CPT

## 2019-08-02 RX ORDER — CARVEDILOL 12.5 MG/1
TABLET ORAL
Qty: 180 TABLET | Refills: 0 | Status: SHIPPED | OUTPATIENT
Start: 2019-08-02 | End: 2019-10-28 | Stop reason: SDUPTHER

## 2019-08-07 ENCOUNTER — PATIENT MESSAGE (OUTPATIENT)
Dept: FAMILY MEDICINE | Facility: CLINIC | Age: 59
End: 2019-08-07

## 2019-08-07 NOTE — TELEPHONE ENCOUNTER
"Dear Jonathan Villela    Your recent labs were reviewed and released to your account. Our current guidelines regarding cholesterol evaluation and management is less now about the actual numbers of your cholesterol and more regarding how these numbers fit into a total risk profile, called the "atherosclerotic cardiovascular disease" (or ASCVD)  risk score.  This score basically estimates what the risk is of having a cardiovascular event like heart attack or stroke in the next 10 years.  This score takes into account your cholesterol numbers, blood pressure, age, gender, smoking history, or presence of diabetes.  Essentially a score of 10% or higher usually indicates that using a cholesterol medication called the statin may help significantly improve your risk of developing cardiovascular disease. Some guidelines such as those from American Heart Association call for statin treatment at a lower cutoff score of above 7.5% risk    Based on your current risk profile calculations, your score came out to be ___9.3%___    This suggests that you would likely benefit from starting a statin medication to help lower your overall heart disease risk.  I know you had some concerns about pravastatin but I recall from our discussion there was some consideration that some of your aches and pains could even be related to arthritis issues not necessarily related to the statin. Our muscle enzyme test we did was not elevated to suggest significant muscle inflammation that could be attributed to a statin med.     I see you have an appointment coming up with your cardiologist so you can discuss further the risk benefit value of being on the statin med.     Of course, healthy diet and regular exercise is also of utmost importance with preserving health and reducing heart disease risk        Manish Joel MD      "

## 2019-08-08 ENCOUNTER — TELEPHONE (OUTPATIENT)
Dept: PHARMACY | Facility: CLINIC | Age: 59
End: 2019-08-08

## 2019-08-08 NOTE — OR NURSING
Scope changed and patient repositioned for colonoscopy.     Initiate Treatment: Ketoconazole shampoo alternate with Head and shoulders with zinc wash affected area 3-4 times per week and shoulders. \\n\\nFluocinonide solution at flared areas. Detail Level: Zone

## 2019-08-20 ENCOUNTER — PATIENT MESSAGE (OUTPATIENT)
Dept: NEUROLOGY | Facility: CLINIC | Age: 59
End: 2019-08-20

## 2019-08-20 ENCOUNTER — TELEPHONE (OUTPATIENT)
Dept: NEUROLOGY | Facility: CLINIC | Age: 59
End: 2019-08-20

## 2019-08-20 NOTE — TELEPHONE ENCOUNTER
----- Message from Mariela Mendoza sent at 8/20/2019  9:12 AM CDT -----  Contact: Pt  Name of Who is Calling: MARY WORKMAN [[id number]]    What is the request in detail: Pt is requesting a call back regarding getting scheduled for his follow up visit the patient states he has been calling to get schedule and no one as called him back. Pt states its regarding medication..... Please contact to further discuss and advise      Can the clinic reply by [facility]: Yes     What Number to Call Back if not in [place]: [phone]

## 2019-08-22 ENCOUNTER — OFFICE VISIT (OUTPATIENT)
Dept: CARDIOLOGY | Facility: CLINIC | Age: 59
End: 2019-08-22
Payer: COMMERCIAL

## 2019-08-22 VITALS
HEIGHT: 73 IN | WEIGHT: 243.75 LBS | HEART RATE: 68 BPM | BODY MASS INDEX: 32.3 KG/M2 | DIASTOLIC BLOOD PRESSURE: 82 MMHG | SYSTOLIC BLOOD PRESSURE: 126 MMHG

## 2019-08-22 DIAGNOSIS — E78.2 MIXED HYPERLIPIDEMIA: ICD-10-CM

## 2019-08-22 DIAGNOSIS — I70.0 ATHEROSCLEROSIS OF AORTA: ICD-10-CM

## 2019-08-22 DIAGNOSIS — I10 ESSENTIAL HYPERTENSION: Primary | ICD-10-CM

## 2019-08-22 PROCEDURE — 99214 OFFICE O/P EST MOD 30 MIN: CPT | Mod: S$GLB,,, | Performed by: INTERNAL MEDICINE

## 2019-08-22 PROCEDURE — 99999 PR PBB SHADOW E&M-EST. PATIENT-LVL V: ICD-10-PCS | Mod: PBBFAC,,, | Performed by: INTERNAL MEDICINE

## 2019-08-22 PROCEDURE — 99999 PR PBB SHADOW E&M-EST. PATIENT-LVL V: CPT | Mod: PBBFAC,,, | Performed by: INTERNAL MEDICINE

## 2019-08-22 PROCEDURE — 99214 PR OFFICE/OUTPT VISIT, EST, LEVL IV, 30-39 MIN: ICD-10-PCS | Mod: S$GLB,,, | Performed by: INTERNAL MEDICINE

## 2019-08-22 RX ORDER — ROSUVASTATIN CALCIUM 10 MG/1
10 TABLET, COATED ORAL DAILY
Qty: 90 TABLET | Refills: 3 | Status: SHIPPED | OUTPATIENT
Start: 2019-08-22 | End: 2019-08-22 | Stop reason: SDUPTHER

## 2019-08-22 RX ORDER — LINACLOTIDE 145 UG/1
1 CAPSULE, GELATIN COATED ORAL
Refills: 3 | COMMUNITY
Start: 2019-07-15 | End: 2020-05-27 | Stop reason: SDUPTHER

## 2019-08-22 RX ORDER — ROSUVASTATIN CALCIUM 10 MG/1
10 TABLET, COATED ORAL EVERY OTHER DAY
Qty: 45 TABLET | Refills: 3 | Status: SHIPPED | OUTPATIENT
Start: 2019-08-22 | End: 2020-11-11 | Stop reason: SDUPTHER

## 2019-08-22 NOTE — PROGRESS NOTES
Subjective:   Patient ID:  Jonathan Villela is a 59 y.o. male who presents for follow-up of Hypertension      Problem List:  HTN  DELVIN unable to tolerate CPAP  Headaches  Atherosclerotic disease  - ulcerated plaque infrarenal abdominal aorta  CACS 60    HPI:   Jonathan Villela is here for fup of hypertension and subclinical atherosclerotic vascular disease.    Hypertension is treated with amlodipine (qhs), chlorthalidone (q am) and carvedilol bid.  He has tried several other medications but was unable to tolerate them. Feels generally well. Has occasional episodes of tiredness - 2-3 a month.   He stopped taking pravastatin. Muscle soreness and fatigue improved within 1-2 weeks.Most recent lipid panel is considerably worse than earlier this year.  Total cholesterol is increased from 157 to 203, LDL (c) from 85 to 125 and triglycerides from 194 to 233 mg/dl.    Migraine headaches are better controlled w Emgality (galcanezumab) injections.        Review of Systems   Constitution: Negative for malaise/fatigue, weight gain and weight loss.   Cardiovascular: Negative for chest pain, dyspnea on exertion, leg swelling and palpitations.   Respiratory: Negative for cough and hemoptysis.    Hematologic/Lymphatic: Does not bruise/bleed easily.   Musculoskeletal: Positive for back pain and joint pain. Negative for arthritis and muscle cramps.   Gastrointestinal: Positive for constipation. Negative for abdominal pain, heartburn and melena.   Genitourinary: Negative for hematuria and nocturia.   Neurological: Negative for headaches, light-headedness and seizures.   Psychiatric/Behavioral: Negative for depression.       Current Outpatient Medications   Medication Sig    acetic acid-hydrocortisone (VOSOL-HC) otic solution Place 1-2 drops into both ears as needed.     acyclovir (ZOVIRAX) 400 MG tablet Take 400 mg by mouth 2 (two) times daily as needed.    albuterol (PROVENTIL/VENTOLIN HFA) 90 mcg/actuation inhaler Inhale 1-2  puffs into the lungs every 6 (six) hours as needed for Wheezing. Rescue    amLODIPine (NORVASC) 5 MG tablet Take 5 mg by mouth once daily.    betamethasone valerate 0.1% (VALISONE) 0.1 % Crea betamethasone valerate 0.1 % topical cream as needed    butalbital-acetaminophen-caffeine -40 mg (FIORICET, ESGIC) -40 mg per tablet 1 tablet as needed    carvedilol (COREG) 12.5 MG tablet TAKE 1 TABLET BY MOUTH TWICE DAILY WITH MEALS    chlorthalidone (HYGROTEN) 25 MG Tab TAKE 1 TABLET(25 MG) BY MOUTH EVERY DAY    ciclopirox (LOPROX) 0.77 % Crea Apply topically 2 (two) times daily.    cloNIDine (CATAPRES) 0.1 MG tablet Take 0.1 mg by mouth as needed.     dicyclomine (BENTYL) 10 MG capsule Take 1 capsule (10 mg total) by mouth 3 (three) times daily as needed (abdominal cramping).    eletriptan (RELPAX) 40 MG tablet 1 tablet as needed    Emgality (galcanezumab)  Inject into the skin every 28 days.    fluocinolone acetonide oil (DERMOTIC OIL) 0.01 % Drop Place 3 drops in ear(s) 2 (two) times daily. (Patient taking differently: Place 3 drops in ear(s) as needed. )    fluocinonide (LIDEX) 0.05 % external solution AAA scalp qday - bid prn pruritus    fluticasone (FLONASE) 50 mcg/actuation nasal spray 1 spay each nasal as needed    frovatriptan (FROVA) 2.5 MG tablet 1 tablet as needed    hydrocortisone butyrate (LOCOID) 0.1 % Crea cream AAA bid to affected areas of groin/genitals    ketoconazole (NIZORAL) 2 % shampoo Wash hair with medicated shampoo at least 2x/week - let sit on scalp at least 5 minutes prior to rinsing    LINZESS 145 mcg Cap capsule Take 1 capsule by mouth as needed.    metronidazole 1% (METROGEL) 1 % Gel Apply at bedtime as directed as needed for skin infection    mupirocin (BACTROBAN) 2 % ointment by Nasal route 2 (two) times daily. (Patient taking differently: by Nasal route as needed. )    ondansetron (ZOFRAN-ODT) 8 MG TbDL Take 1 tablet (8 mg total) by mouth every 6 (six) hours  "as needed (nausea).    pantoprazole (PROTONIX) 40 MG tablet Take 1 tablet (40 mg total) by mouth before breakfast. (Patient taking differently: Take 40 mg by mouth once daily. )    potassium chloride (KLOR-CON) 10 MEQ TbSR Take 1 tablet (10 mEq total) by mouth once daily.    sumatriptan (IMITREX STATDOSE) 6 mg/0.5 mL kit Inject 0.5 mLs (6 mg total) into the skin every 2 (two) hours as needed for Migraine (up to 2 doses daily. Hold for blood pressure 155/110).    triamcinolone acetonide 0.1% (KENALOG) 0.1 % cream AAA bid (Patient taking differently: AAA bid as needed)    TURMERIC ORAL Take by mouth once daily.    UNABLE TO FIND Take 1 capsule by mouth once daily. Healthy heart supplement     urea (CARMOL) 40 % Crea Apply topically 2 (two) times daily. To affected areas of elbows (Patient taking differently: Apply topically as needed. To affected areas of elbows)    hydrocortisone 2.5 % cream Apply topically 2 (two) times daily. To affected areas of face (Patient taking differently: Apply topically as needed. To affected areas of face)            Social history:  Jonathan Villela  reports that he has never smoked. He has never used smokeless tobacco. He reports that he drinks alcohol. He reports that he does not use drugs.      Objective:     Physical Exam   Constitutional: He is oriented to person, place, and time. He appears well-developed and well-nourished.   /82   Pulse 68   Ht 6' 1" (1.854 m)   Wt 110.6 kg (243 lb 11.5 oz)   BMI 32.15 kg/m²      HENT:   Head: Normocephalic.   Neck: No JVD present. Carotid bruit is not present.   Cardiovascular: Normal rate, regular rhythm, S1 normal and S2 normal.   No murmur heard.  Pulses:       Radial pulses are 2+ on the right side, and 2+ on the left side.        Dorsalis pedis pulses are 2+ on the right side, and 2+ on the left side.   Pulmonary/Chest: Breath sounds normal. Chest wall is not dull to percussion.   Abdominal: Soft. He exhibits no mass. " There is no splenomegaly or hepatomegaly.   Musculoskeletal:        Right lower leg: He exhibits no edema.        Left lower leg: He exhibits no edema.   Neurological: He is alert and oriented to person, place, and time. Gait normal.   Skin: No bruising noted. Nails show no clubbing.   Psychiatric: He has a normal mood and affect. His speech is normal and behavior is normal.           Lab Results   Component Value Date    CHOL 203 (H) 07/23/2019    HDL 31 (L) 07/23/2019    LDLCALC 125.4 07/23/2019    TRIG 233 (H) 07/23/2019    CHOLHDL 15.3 (L) 07/23/2019     Lab Results   Component Value Date    GLU 88 07/23/2019    CREATININE 0.9 07/23/2019    BUN 17 07/23/2019     07/23/2019    K 3.4 (L) 07/23/2019    CL 99 07/23/2019    CO2 30 (H) 07/23/2019     Lab Results   Component Value Date    ALT 75 (H) 04/16/2019    AST 49 (H) 04/16/2019    ALKPHOS 61 04/16/2019    BILITOT 0.9 04/16/2019           Assessment and Plan:     Essential hypertension  BP reasonably well controlled on a combination of beta-blocker, dihydropyridine calcium-channel blocker and thiazide diuretic. Unable to tolerate inhibitors of the renin-angiotensin-aldosterone system.    Mixed hyperlipidemia  Lipids worse since discontinuing pravastatin.  Cholesterol education. Nutritional counseling.  -     Begin rosuvastatin (CRESTOR) 10 MG tablet; Take 1 tablet (10 mg total) by mouth every other day.  Dispense: 45 tablet; Refill: 3    Atherosclerosis of aorta  -     rosuvastatin (CRESTOR) 10 MG tablet; Take 1 tablet (10 mg total) by mouth every other day.  Dispense: 45 tablet; Refill: 3       Follow up in about 1 year (around 8/22/2020).

## 2019-08-22 NOTE — PATIENT INSTRUCTIONS
Triglycerides are made worse by the following  Foods containing fat and sugars: ice cream, desserts, cakes, chocolate  Beer, fruit juices  Starches: rice, potatoes, bread, pasta  Lack of exercise  Uncontrolled diabetes     ===============    Eat or drink one of these potassium containing foods:    OJ - 4oz (if not diabetic)  Bananas  Tomatoes  Fruit juices  (most fruit juices contain some potassium; check the label)  Vegetable juices:  low sodium V8* (6 oz = 2 potassium tablets)  Pedialyte  Sparkling Rush Powder Packs (but these also contain sodium)  Salt substitutes  OTC Potassium 3-4 tabs    ==============================      LDL - bad type - improves with diet and medications: typically statins; most other medications that lower LDL have not been proven to prevent heart attacks.  May not improve significantly with exercise alone.  Ideally less than 100 mg/dl.   But the lower the better. Statins (cholesterol lowering meds) lower LDL. If you have coronary artery disease or blockages anywhere else in the body, it should be well below 70 mg/dl.    HDL - good type - improves with exercise.  Ideally greater than 50 mg/dl. The proportion of HDL to the total cholesterol is more important than the absolute HDL.  This means a HDL of 45 out of a total cholesterol of 130 mg'dl is pretty good, but the same HDL out of a total of  200 mg/dl is not quite as good. A level of 30% or higher is ideal.    TGs (triglycerides) - also bad - can change very quickly and considerably with certain foods. Improve with diet, exercise and high dose fish oil.  In some cases a low carbohydrate diet will lower TGs better than a low fat diet.  Ideal range  mg/dl.    Sugar, fat and cholesterol in food:     A sensible diet that limits the intake of sugars, saturated (bad) fats and trans fats while increasing the intake of unsaturated (good) fats and plant proteins is the basis of the current dietary recommendations.      We now recommend  drastically reducing the intake of sugar. There is less emphasis on excluding all fat and more emphasis on the types of different fats.    Cholesterol in our food is generally present in relatively small amounts. New dietary guidelines are less obsessed with the amount of cholesterol. However please do not confuse this with the role of cholesterol in our blood and arteries. The liver converts certain foods into cholesterol.  It is this cholesterol and other fats that clogs up our arteries.       Most foods that are high in cholesterol are also high in saturated fat. But there is much more saturated fat than cholesterol in these foods. Researchers believe that the saturated fat content matters more than cholesterol. On the other hand, there are a handful of foods that are high in cholesterol but do not contain much saturated fat: eggs, shrimp, crab legs and crawfish are OK to eat in modest quantities as long as you do not deep nixon them. So a few eggs a week are fine the white and the yolk, but you can eat as many egg whites as you want.      Saturated fat is the bad fat - you should limit your intake of this. Deep fried foods, meats and other animal fats are high in saturated fat. Cookies, donuts and most dessert and cakes are usually high in both saturated fat and sugar.       Unsaturated fat is the good fat. It contains the same number of calories as saturated fat but these fats do not get deposited in our arteries. The Mediterranean style diet encourages the intake of unsaturated fat - olive oil, avocado and unsalted nuts. So instead of baking a piece of fish, consider pan-frying it using olive oil.     You should eat a few servings of vegetables (and fruit as long as you are not diabetic) everyday. Substitute some plant proteins in place of meat: beans, lentils, quinoa and oatmeal. They are lean proteins.     Do not use stick butter or stick margarine. Butter that comes in a tub is soft butter. It consists of 1/2  butter and 1/2 vegetable oil., either canola or olive oil. It is fine to use that.       Trans fats should definitely be avoided. Most foods that are labelled as containing 0 gms of trans fat may still contain several hundred milligrams of trans fat: creamer, margarine, dough, deep fried foods, ready made frosting, potato, corn and torilla chips, cakes, cookies, pie crusts and crackers containing shortening made with hydrogenated vegetable oil.

## 2019-09-01 RX ORDER — AMLODIPINE BESYLATE 5 MG/1
TABLET ORAL
Qty: 90 TABLET | Refills: 3 | Status: SHIPPED | OUTPATIENT
Start: 2019-09-01 | End: 2020-09-17 | Stop reason: SDUPTHER

## 2019-09-04 ENCOUNTER — PATIENT MESSAGE (OUTPATIENT)
Dept: NEUROLOGY | Facility: CLINIC | Age: 59
End: 2019-09-04

## 2019-09-05 ENCOUNTER — TELEPHONE (OUTPATIENT)
Dept: PHARMACY | Facility: CLINIC | Age: 59
End: 2019-09-05

## 2019-09-05 ENCOUNTER — OFFICE VISIT (OUTPATIENT)
Dept: NEUROLOGY | Facility: CLINIC | Age: 59
End: 2019-09-05
Payer: COMMERCIAL

## 2019-09-05 ENCOUNTER — LAB VISIT (OUTPATIENT)
Dept: LAB | Facility: HOSPITAL | Age: 59
End: 2019-09-05
Attending: NURSE PRACTITIONER
Payer: COMMERCIAL

## 2019-09-05 VITALS
SYSTOLIC BLOOD PRESSURE: 113 MMHG | WEIGHT: 245.56 LBS | HEART RATE: 62 BPM | DIASTOLIC BLOOD PRESSURE: 79 MMHG | BODY MASS INDEX: 32.54 KG/M2 | HEIGHT: 73 IN

## 2019-09-05 DIAGNOSIS — E78.2 MIXED HYPERLIPIDEMIA: ICD-10-CM

## 2019-09-05 DIAGNOSIS — I10 ESSENTIAL HYPERTENSION: ICD-10-CM

## 2019-09-05 DIAGNOSIS — G43.709 CHRONIC MIGRAINE WITHOUT AURA WITHOUT STATUS MIGRAINOSUS, NOT INTRACTABLE: Primary | ICD-10-CM

## 2019-09-05 DIAGNOSIS — M54.81 BILATERAL OCCIPITAL NEURALGIA: ICD-10-CM

## 2019-09-05 LAB
ALBUMIN SERPL BCP-MCNC: 4.1 G/DL (ref 3.5–5.2)
ALP SERPL-CCNC: 40 U/L (ref 55–135)
ALT SERPL W/O P-5'-P-CCNC: 44 U/L (ref 10–44)
ANION GAP SERPL CALC-SCNC: 8 MMOL/L (ref 8–16)
AST SERPL-CCNC: 38 U/L (ref 10–40)
BILIRUB SERPL-MCNC: 0.9 MG/DL (ref 0.1–1)
BUN SERPL-MCNC: 18 MG/DL (ref 6–20)
CALCIUM SERPL-MCNC: 9.6 MG/DL (ref 8.7–10.5)
CHLORIDE SERPL-SCNC: 98 MMOL/L (ref 95–110)
CHOLEST SERPL-MCNC: 199 MG/DL (ref 120–199)
CHOLEST/HDLC SERPL: 6.4 {RATIO} (ref 2–5)
CO2 SERPL-SCNC: 31 MMOL/L (ref 23–29)
CREAT SERPL-MCNC: 1.1 MG/DL (ref 0.5–1.4)
EST. GFR  (AFRICAN AMERICAN): >60 ML/MIN/1.73 M^2
EST. GFR  (NON AFRICAN AMERICAN): >60 ML/MIN/1.73 M^2
GLUCOSE SERPL-MCNC: 100 MG/DL (ref 70–110)
HDLC SERPL-MCNC: 31 MG/DL (ref 40–75)
HDLC SERPL: 15.6 % (ref 20–50)
LDLC SERPL CALC-MCNC: 117.4 MG/DL (ref 63–159)
MAGNESIUM SERPL-MCNC: 1.6 MG/DL (ref 1.6–2.6)
NONHDLC SERPL-MCNC: 168 MG/DL
POTASSIUM SERPL-SCNC: 3.4 MMOL/L (ref 3.5–5.1)
PROT SERPL-MCNC: 7.5 G/DL (ref 6–8.4)
SODIUM SERPL-SCNC: 137 MMOL/L (ref 136–145)
TRIGL SERPL-MCNC: 253 MG/DL (ref 30–150)

## 2019-09-05 PROCEDURE — 99214 PR OFFICE/OUTPT VISIT, EST, LEVL IV, 30-39 MIN: ICD-10-PCS | Mod: S$GLB,,, | Performed by: PSYCHIATRY & NEUROLOGY

## 2019-09-05 PROCEDURE — 83735 ASSAY OF MAGNESIUM: CPT

## 2019-09-05 PROCEDURE — 99999 PR PBB SHADOW E&M-EST. PATIENT-LVL III: CPT | Mod: PBBFAC,,, | Performed by: PSYCHIATRY & NEUROLOGY

## 2019-09-05 PROCEDURE — 99999 PR PBB SHADOW E&M-EST. PATIENT-LVL III: ICD-10-PCS | Mod: PBBFAC,,, | Performed by: PSYCHIATRY & NEUROLOGY

## 2019-09-05 PROCEDURE — 80061 LIPID PANEL: CPT

## 2019-09-05 PROCEDURE — 99214 OFFICE O/P EST MOD 30 MIN: CPT | Mod: S$GLB,,, | Performed by: PSYCHIATRY & NEUROLOGY

## 2019-09-05 PROCEDURE — 80053 COMPREHEN METABOLIC PANEL: CPT

## 2019-09-05 PROCEDURE — 36415 COLL VENOUS BLD VENIPUNCTURE: CPT | Mod: PO

## 2019-09-05 NOTE — PROGRESS NOTES
Subjective:       Patient ID: Jonathan Villela is a 59 y.o. male.    Reason for Consult: Migraine and Headache      Interval History:  Jonathan Villela is here for follow up. Their condition is clinically stable.  He is still complaining of hatchet like pain behind his left ear, he notes that this is less right now.  We have talked about the cyclical nature of this particular type of pain that affect him.  He notes his trigger seems to be looking at screens.  He has taken steps to reduce the blue light exposure from his computer screens including using glasses and changing the type of screens that he is using at work.  His insurance has not approved Aimovig for him even though he notes that the Aimovig seem to help him with his behind the ear left-sided pain. He notes that the Emgality is not help him at all.  He did get a letter from his insurance stating that he needs to try Ajovy before they would go back and approved the Aimovig.    Objective:     Vitals:    09/05/19 0949   BP: 113/79   Pulse: 62     Tinel sign equivocal at left greater and lesser occipital nerve.  Tinel sign negative at right greater and lesser occipital nerve.  Patient is awake alert oriented to person place and time.  Moves all 4 extremities against gravity.  Gait and station within normal limits.  Cranial nerves 2-12 were without focal deficits.  Focused examination was undertaken today. Over 50% of face to face time of 25 minute visit time was in giving guidance, counseling and discussing treatment options.    Results for orders placed or performed during the hospital encounter of 03/19/18   CT Head Without Contrast    Narrative    EXAMINATION:  CT HEAD WITHOUT CONTRAST    CLINICAL HISTORY:  headache not c/w typical migraine;    TECHNIQUE:  Low dose axial CT images obtained throughout the head without intravenous contrast. Sagittal and coronal reconstructions were performed.    COMPARISON:  MRI brain 06/26/2017 and head CT  01/15/2014.    FINDINGS:  Intracranial compartment: Age-appropriate mild generalized cerebral volume loss, unchanged. Ventricles and sulci are normal in size for age and midline without evidence of hydrocephalus. No extra-axial blood or fluid collections.    The brain parenchyma appears normal. No parenchymal mass, hemorrhage, edema or major vascular distribution infarct.  Partial empty sella configuration.    Skull/extracranial contents (limited evaluation): No fracture.  Mild mucosal thickening within the bilateral maxillary sinuses.  Mastoid air cells and remaining imaged paranasal sinuses are essentially clear.  Visualized portions of the orbits are within normal limits.      Impression    No acute large vascular territory infarct or intracranial hemorrhage identified.    Partial empty sella configuration, nonspecific but can be seen with benign intracranial hypertension.    Bilateral maxillary sinus disease as above.      Electronically signed by: Pernell Camarena MD  Date:    03/19/2018  Time:    20:48   Results for orders placed or performed during the hospital encounter of 06/26/17   MRI Brain Without Contrast    Narrative    Procedure: MRI the brain without contrast.    Technique: Sagittal and axial T1, axial T2, axial FLAIR, axial gradient, and axial diffusion imaging of the whole brain.    Comparison: None    Findings: Mild degree of generalized cerebral volume loss.  There is ill-defined small punctate size region of T2 flair signal hyperintensity supratentorially matter which are nonspecific and may represent mild chronic ischemic change, differential to include sequela of migraine headaches..  There is no restricted diffusion to suggest acute infarction.  Ventricles normal without hydrocephalus.  No midline shift or mass effect.  No abnormal parenchymal susceptibility.  The major intracranial T2 flow voids are present.  No diffusion signal abnormality.    Impression     Mild generalized volume loss with  few scatter foci of T2/FLAIR signal abnormality throughout the supratentorial white matter these are nonspecific with differential to include mild chronic ischemic change with differential to include sequela of migraine headaches.    Otherwise unremarkable noncontrast MRI brain specifically without evidence for hydrocephalus or acute infarction..      Electronically signed by: WILY GILES DO  Date:     06/26/17  Time:    16:32        Assessment/Plan:     Problem List Items Addressed This Visit        Neuro    Chronic migraine without aura without status migrainosus, not intractable - Primary    Relevant Medications    fremanezumab-vfrm (AJOVY) 225 mg/1.5 mL injection       Orthopedic    Bilateral occipital neuralgia    Overview     S/p cryoneurolysis of bilateral greater and lesser occipital nerves in 2/2017    Repeat nerve block with a thought of resumption of headaches due to ON.              59-year-old male presents for evaluation follow-up of complex multifactorial headaches.  At this time the patient defers repeat nerve block for occipital neuralgia.  We have discussed switching his agent to Ajovy.  If this works for him that we will stay on this agent.  If this is not refer him we have discussed trying to get him back on the Aimovig which seemed to help him better.  Depending on that change of anti C Grp antibody therapy then we would consider further adjustment and titration of his medication or considering treatment for occipital neuralgia.  I will follow up with them in 2 month(s).  The patient verbalizes understanding and agreement with the treatment plan. I have discussed risks, benefits and alternatives to the treatment plan. Questions were sought and answered to his stated verbal satisfaction.        Negrito Reyes MD    This note is dictated on M*Modal Fluency Direct word recognition program. There are word recognition mistakes that are occasionally missed on review.

## 2019-09-05 NOTE — TELEPHONE ENCOUNTER
No answer/VM to inform patient that [facility] received prescription for Ajovy and prior authorization is required.  [facility] will be back in touch once insurance determination is received.

## 2019-09-07 DIAGNOSIS — K22.10 ESOPHAGITIS, EROSIVE: ICD-10-CM

## 2019-09-08 ENCOUNTER — PATIENT MESSAGE (OUTPATIENT)
Dept: DERMATOLOGY | Facility: CLINIC | Age: 59
End: 2019-09-08

## 2019-09-08 DIAGNOSIS — K22.10 ESOPHAGITIS, EROSIVE: ICD-10-CM

## 2019-09-08 RX ORDER — PANTOPRAZOLE SODIUM 40 MG/1
TABLET, DELAYED RELEASE ORAL
Qty: 90 TABLET | Refills: 0 | OUTPATIENT
Start: 2019-09-08

## 2019-09-08 RX ORDER — PANTOPRAZOLE SODIUM 40 MG/1
40 TABLET, DELAYED RELEASE ORAL
Qty: 90 TABLET | Refills: 3 | Status: SHIPPED | OUTPATIENT
Start: 2019-09-08 | End: 2020-10-20

## 2019-09-09 DIAGNOSIS — L30.9 ECZEMA, UNSPECIFIED TYPE: Primary | ICD-10-CM

## 2019-09-09 RX ORDER — CRISABOROLE 20 MG/G
1 OINTMENT TOPICAL 2 TIMES DAILY
Qty: 60 G | Refills: 5 | Status: SHIPPED | OUTPATIENT
Start: 2019-09-09 | End: 2020-08-03

## 2019-09-11 ENCOUNTER — TELEPHONE (OUTPATIENT)
Dept: PHARMACY | Facility: CLINIC | Age: 59
End: 2019-09-11

## 2019-09-11 NOTE — TELEPHONE ENCOUNTER
DOCUMENTATION ONLY:   Prior authorization for Ajovy 225 mg/1.5 ml approved from 9/11/2019 to 12/11/2019.      Case ID# [id number]    Co-pay: $0    Patient Assistance IS NOT required.     Forward to clinical pharmacist for consult & shipment. FLC

## 2019-09-11 NOTE — TELEPHONE ENCOUNTER
Initial Ajovy consult and injection training completed on  with patient's wife. Ajovy 225 mg will be shipped on  to arrive at patient's home on  via [employer]. $0.00 copay and permission to charge CC on file. Patient intends to start Ajovy on . Address confirmed. Confirmed 2 patient identifiers - name and . Therapy Appropriate.    Patient's wife completed consult. Mr. Villela has been suffering the same frequency of migraines as before any CGRP therapy, wife was not able to give approximate number of migraines. His pain level reaches 10/10 and he loses his eyesight during migraines. Patient's wife reports his QoL a 7/10.    Indication: Migraine prophylaxis   Goals of treatment: Reduction in migraine frequency, duration, and/or intensity (may take 3 months to reach full efficacy)    --Injection experience: Has tried Emgality and Aimovig     Counseled patient on administration directions:  - Inject 225 mg (1 syringe) into the skin every 28 days.   - Take out of the refrigerator 30-60 minutes prior to injection.  - Wash hands before and after injection.  - Monthly RX will come with gauze, bandaids, and alcohol swabs.  - Patient may inject in either the tops of the thighs (2 inches above knee and 2 inches below groin), abdomen (at least 2 inches away from belly button), or the outer part of her upper arm with assistance. If injecting 3 syringes, use 3 different injection sites.   - Patient is to wipe down the injection site with the alcohol pad, wait to dry.    - Hold the prefilled syringe with one hand.   - Using the other hand, pull the needle cap straight off. Do not twist the cap.  - Throw the needle cap away immediately.   - Gently pinch up at least 1 inch of the cleaned skin. Insert the needle into the pinched skin at a 45 to 90 degree angle. When the needle is all the way into the skin, slowly press down on the plunger until it is as far as it will go.  - After all of the medication has been  injected, pull the needle straight out.  - Do not recap the needle when the injection is complete.   - Patient should rotate injection sites.   - Patient will use sharps container; once full, per [place] law, she/ he may lock the sharps container and place in trash. Pharmacy will replace the sharps at no additional charge.    Patient was counseled on possible side effects:  - Injection site reaction including: redness, soreness, itching, bruising, which should resolve within 3-5 days.  - Allergic reactions including: itching, rash, hives, that can occur within hours and up to 1 month after taking Ajovy  - Signs and symptoms of a severe allergic reaction: swelling of face, mouth, tongue or throat, and trouble breathing. Informed patient to get medical help immediately if these occur.     Advised to keep a calendar to stay compliant. Consultation included: the importance of compliance and of keeping all follow up appointments. Patient understands to report any medication changes to [facility] and provider. All questions answered and addressed to patients satisfaction. [facility] to contact patient in 3 weeks for refills.     Juan Chan, PharmD  Clinical Pharmacist  [facility]  P: [phone]    Merged with [facility] sent 9/11 @ 3:54PM

## 2019-10-01 ENCOUNTER — TELEPHONE (OUTPATIENT)
Dept: PHARMACY | Facility: CLINIC | Age: 59
End: 2019-10-01

## 2019-10-04 ENCOUNTER — TELEPHONE (OUTPATIENT)
Dept: CARDIOLOGY | Facility: CLINIC | Age: 59
End: 2019-10-04

## 2019-10-04 NOTE — TELEPHONE ENCOUNTER
----- Message from Karla Escalante MD sent at 10/4/2019  4:49 PM CDT -----  Blood work last month showed that potassium remains a bit low. He takes 10 meq of K once a day. Recommend 2 tab once a day. Also tell him that I sent a message re foods/drinks containing potassium. He should eat one from that list every day.

## 2019-10-15 ENCOUNTER — TELEPHONE (OUTPATIENT)
Dept: NEUROLOGY | Facility: CLINIC | Age: 59
End: 2019-10-15

## 2019-10-15 NOTE — TELEPHONE ENCOUNTER
----- Message from April Portillo sent at 10/15/2019 10:51 AM CDT -----  Contact: Florinda    Name of Who is Calling:Florinda    What is the request in detail: Florinda  [facility] would like a call back regarding a request that was sent over for new enrollment form     Can the clinic reply by [facility]: no    [facility]: [phone]

## 2019-10-22 ENCOUNTER — TELEPHONE (OUTPATIENT)
Dept: NEUROLOGY | Facility: CLINIC | Age: 59
End: 2019-10-22

## 2019-10-28 RX ORDER — CARVEDILOL 12.5 MG/1
TABLET ORAL
Qty: 180 TABLET | Refills: 3 | Status: SHIPPED | OUTPATIENT
Start: 2019-10-28 | End: 2020-10-26

## 2019-11-01 ENCOUNTER — CLINICAL SUPPORT (OUTPATIENT)
Dept: INFECTIOUS DISEASES | Facility: CLINIC | Age: 59
End: 2019-11-01
Payer: COMMERCIAL

## 2019-11-01 ENCOUNTER — TELEPHONE (OUTPATIENT)
Dept: PHARMACY | Facility: CLINIC | Age: 59
End: 2019-11-01

## 2019-11-01 DIAGNOSIS — E61.1 IRON DEFICIENCY: ICD-10-CM

## 2019-11-01 PROCEDURE — 90636 PR HEPA/HEPB VACCINE ADULT IM: ICD-10-PCS | Mod: S$GLB,,, | Performed by: INTERNAL MEDICINE

## 2019-11-01 PROCEDURE — 90636 HEP A/HEP B VACC ADULT IM: CPT | Mod: S$GLB,,, | Performed by: INTERNAL MEDICINE

## 2019-11-01 PROCEDURE — 90472 IMMUNIZATION ADMIN EACH ADD: CPT | Mod: S$GLB,,, | Performed by: INTERNAL MEDICINE

## 2019-11-01 PROCEDURE — 90686 FLU VACCINE (QUAD) GREATER THAN OR EQUAL TO 3YO PRESERVATIVE FREE IM: ICD-10-PCS | Mod: S$GLB,,, | Performed by: INTERNAL MEDICINE

## 2019-11-01 PROCEDURE — 90686 IIV4 VACC NO PRSV 0.5 ML IM: CPT | Mod: S$GLB,,, | Performed by: INTERNAL MEDICINE

## 2019-11-01 PROCEDURE — 90472 PR IMMUNIZ,ADMIN,EACH ADDL: ICD-10-PCS | Mod: S$GLB,,, | Performed by: INTERNAL MEDICINE

## 2019-11-01 PROCEDURE — 90471 FLU VACCINE (QUAD) GREATER THAN OR EQUAL TO 3YO PRESERVATIVE FREE IM: ICD-10-PCS | Mod: S$GLB,,, | Performed by: INTERNAL MEDICINE

## 2019-11-01 PROCEDURE — 90471 IMMUNIZATION ADMIN: CPT | Mod: S$GLB,,, | Performed by: INTERNAL MEDICINE

## 2019-11-13 ENCOUNTER — TELEPHONE (OUTPATIENT)
Dept: NEUROLOGY | Facility: CLINIC | Age: 59
End: 2019-11-13

## 2019-11-14 ENCOUNTER — OFFICE VISIT (OUTPATIENT)
Dept: NEUROLOGY | Facility: CLINIC | Age: 59
End: 2019-11-14
Payer: COMMERCIAL

## 2019-11-14 ENCOUNTER — TELEPHONE (OUTPATIENT)
Dept: PHARMACY | Facility: CLINIC | Age: 59
End: 2019-11-14

## 2019-11-14 VITALS
HEIGHT: 73 IN | DIASTOLIC BLOOD PRESSURE: 84 MMHG | TEMPERATURE: 98 F | SYSTOLIC BLOOD PRESSURE: 125 MMHG | BODY MASS INDEX: 34.39 KG/M2 | HEART RATE: 63 BPM | WEIGHT: 259.5 LBS

## 2019-11-14 DIAGNOSIS — G43.709 CHRONIC MIGRAINE WITHOUT AURA WITHOUT STATUS MIGRAINOSUS, NOT INTRACTABLE: Primary | ICD-10-CM

## 2019-11-14 PROCEDURE — 99999 PR PBB SHADOW E&M-EST. PATIENT-LVL III: ICD-10-PCS | Mod: PBBFAC,,, | Performed by: PSYCHIATRY & NEUROLOGY

## 2019-11-14 PROCEDURE — 99214 OFFICE O/P EST MOD 30 MIN: CPT | Mod: S$GLB,,, | Performed by: PSYCHIATRY & NEUROLOGY

## 2019-11-14 PROCEDURE — 99214 PR OFFICE/OUTPT VISIT, EST, LEVL IV, 30-39 MIN: ICD-10-PCS | Mod: S$GLB,,, | Performed by: PSYCHIATRY & NEUROLOGY

## 2019-11-14 PROCEDURE — 99999 PR PBB SHADOW E&M-EST. PATIENT-LVL III: CPT | Mod: PBBFAC,,, | Performed by: PSYCHIATRY & NEUROLOGY

## 2019-11-14 NOTE — TELEPHONE ENCOUNTER
Informed Patient  that [facility] received prescription for AIMOVIG and prior authorization is required.  [facility] will be back in touch once insurance determination is received.

## 2019-11-14 NOTE — PROGRESS NOTES
Subjective:       Patient ID: Jonathan Villela is a 59 y.o. male.    Reason for Consult: Follow-up      Interval History:  Jonathan Villela is here for follow up. Their condition is relatively clinically stable.  He notes the Aimovig is just as bad at controlling his headaches as the Emgality was.  He is still having a high frequency of headaches and while the severity is not as bad as being nothing at all he notes that the coverage and benefit is inferior compared to the Aimovig that he previously had.  We have discussed that it is medically necessary that he have that agent for control of his headaches.      Objective:     Vitals:    11/14/19 0819   BP: 125/84   Pulse: 63   Temp: 98.3 °F (36.8 °C)     Patient is awake alert oriented to person place and time.  Moves all 4 extremities against gravity.  Gait and station within normal limits.  Cranial nerves 2-12 were without focal deficits.  Focused examination was undertaken today. Over 50% of face to face time of 25 minute visit time was in giving guidance, counseling and discussing treatment options.    Results for orders placed or performed during the hospital encounter of 03/19/18   CT Head Without Contrast    Narrative    EXAMINATION:  CT HEAD WITHOUT CONTRAST    CLINICAL HISTORY:  headache not c/w typical migraine;    TECHNIQUE:  Low dose axial CT images obtained throughout the head without intravenous contrast. Sagittal and coronal reconstructions were performed.    COMPARISON:  MRI brain 06/26/2017 and head CT 01/15/2014.    FINDINGS:  Intracranial compartment: Age-appropriate mild generalized cerebral volume loss, unchanged. Ventricles and sulci are normal in size for age and midline without evidence of hydrocephalus. No extra-axial blood or fluid collections.    The brain parenchyma appears normal. No parenchymal mass, hemorrhage, edema or major vascular distribution infarct.  Partial empty sella configuration.    Skull/extracranial contents (limited  evaluation): No fracture.  Mild mucosal thickening within the bilateral maxillary sinuses.  Mastoid air cells and remaining imaged paranasal sinuses are essentially clear.  Visualized portions of the orbits are within normal limits.      Impression    No acute large vascular territory infarct or intracranial hemorrhage identified.    Partial empty sella configuration, nonspecific but can be seen with benign intracranial hypertension.    Bilateral maxillary sinus disease as above.      Electronically signed by: Pernell Camarena MD  Date:    03/19/2018  Time:    20:48   Results for orders placed or performed during the hospital encounter of 06/26/17   MRI Brain Without Contrast    Narrative    Procedure: MRI the brain without contrast.    Technique: Sagittal and axial T1, axial T2, axial FLAIR, axial gradient, and axial diffusion imaging of the whole brain.    Comparison: None    Findings: Mild degree of generalized cerebral volume loss.  There is ill-defined small punctate size region of T2 flair signal hyperintensity supratentorially matter which are nonspecific and may represent mild chronic ischemic change, differential to include sequela of migraine headaches..  There is no restricted diffusion to suggest acute infarction.  Ventricles normal without hydrocephalus.  No midline shift or mass effect.  No abnormal parenchymal susceptibility.  The major intracranial T2 flow voids are present.  No diffusion signal abnormality.    Impression     Mild generalized volume loss with few scatter foci of T2/FLAIR signal abnormality throughout the supratentorial white matter these are nonspecific with differential to include mild chronic ischemic change with differential to include sequela of migraine headaches.    Otherwise unremarkable noncontrast MRI brain specifically without evidence for hydrocephalus or acute infarction..      Electronically signed by: WILY GILES DO  Date:     06/26/17  Time:    16:32         Assessment/Plan:     Problem List Items Addressed This Visit        Neuro    Chronic migraine without aura without status migrainosus, not intractable - Primary    Overview     Has more than 15 headaches per month, last more than 4 hours at a time, visual sxs, including, loss and blurring, nausea, no vomiting    Has tried and failed anti-epileptics, anti-depressants, anti-hypertensives, triptans and anti-inflammatories. Has tried and failed Emgality and Ajovy for at least 3 rounds each. The best agent in the anti-CGRP class has been Aimovig.     It is medically necessary that he have this agent.          Relevant Medications    erenumab-aooe 140 mg/mL AtIn        59-year-old male with complex multifactorial headaches including a cluster headache subtype and migraine headaches with occipital neuralgia is here for primarily migrainous type headaches currently.  We have discussed that he has tried and failed multiple agents as outlined above.  He has tried and failed Emgality and Aimovig.  We both believe that is medically necessary that he have the Aimovig as this has been the most significant benefit that he has had from the anti C Grp class of antibody therapies for migraine.  With these agents he is able to achieve more than 75% reduction of frequency severity and duration of his headaches.  I will send the prior 3 crest to the [facility] to be processed.  I will follow up with them in 3 month(s).  The patient verbalizes understanding and agreement with the treatment plan. I have discussed risks, benefits and alternatives to the treatment plan. Questions were sought and answered to his stated verbal satisfaction.        Negrito Reyes MD    This note is dictated on M*Modal Fluency Direct word recognition program. There are word recognition mistakes that are occasionally missed on review.

## 2019-11-22 RX ORDER — CHLORTHALIDONE 25 MG/1
TABLET ORAL
Qty: 30 TABLET | Refills: 11 | Status: SHIPPED | OUTPATIENT
Start: 2019-11-22 | End: 2020-04-30 | Stop reason: DRUGHIGH

## 2019-12-02 ENCOUNTER — PATIENT MESSAGE (OUTPATIENT)
Dept: CARDIOLOGY | Facility: CLINIC | Age: 59
End: 2019-12-02

## 2019-12-02 ENCOUNTER — TELEPHONE (OUTPATIENT)
Dept: PHARMACY | Facility: CLINIC | Age: 59
End: 2019-12-02

## 2019-12-02 DIAGNOSIS — I10 ESSENTIAL HYPERTENSION: Primary | ICD-10-CM

## 2019-12-02 RX ORDER — POTASSIUM CHLORIDE 1500 MG/1
20 TABLET, EXTENDED RELEASE ORAL DAILY
Qty: 30 TABLET | Refills: 11 | Status: SHIPPED | OUTPATIENT
Start: 2019-12-02 | End: 2020-12-30 | Stop reason: SDUPTHER

## 2019-12-02 NOTE — TELEPHONE ENCOUNTER
Spoke to patient in regards to change back to Aimovig. He declined consultation since he was previously on Aimovig and is comfortable restarting. He did not have any questions or concerns. $0.00 copay at 004. Encouraged to reach out to [facility] should he have any future questions. Verified two patient identifiers - name and .     Esau Lugo, PharmD  Clinical Pharmacist  [facility]  P: [phone]

## 2019-12-13 ENCOUNTER — LAB VISIT (OUTPATIENT)
Dept: LAB | Facility: HOSPITAL | Age: 59
End: 2019-12-13
Attending: INTERNAL MEDICINE
Payer: COMMERCIAL

## 2019-12-13 DIAGNOSIS — I10 ESSENTIAL HYPERTENSION: ICD-10-CM

## 2019-12-13 LAB
ANION GAP SERPL CALC-SCNC: 11 MMOL/L (ref 8–16)
BUN SERPL-MCNC: 17 MG/DL (ref 6–20)
CALCIUM SERPL-MCNC: 9.6 MG/DL (ref 8.7–10.5)
CHLORIDE SERPL-SCNC: 100 MMOL/L (ref 95–110)
CO2 SERPL-SCNC: 29 MMOL/L (ref 23–29)
CREAT SERPL-MCNC: 1 MG/DL (ref 0.5–1.4)
EST. GFR  (AFRICAN AMERICAN): >60 ML/MIN/1.73 M^2
EST. GFR  (NON AFRICAN AMERICAN): >60 ML/MIN/1.73 M^2
GLUCOSE SERPL-MCNC: 98 MG/DL (ref 70–110)
POTASSIUM SERPL-SCNC: 3.7 MMOL/L (ref 3.5–5.1)
SODIUM SERPL-SCNC: 140 MMOL/L (ref 136–145)

## 2019-12-13 PROCEDURE — 36415 COLL VENOUS BLD VENIPUNCTURE: CPT | Mod: PO

## 2019-12-13 PROCEDURE — 80048 BASIC METABOLIC PNL TOTAL CA: CPT

## 2019-12-31 ENCOUNTER — TELEPHONE (OUTPATIENT)
Dept: PHARMACY | Facility: CLINIC | Age: 59
End: 2019-12-31

## 2019-12-31 NOTE — TELEPHONE ENCOUNTER
Refill call regarding Aimovig from [facility]. Shipping out Aimovig on  for 1/3 arrival with patients consent. Copay of $0 @ 004. Address and  confirmed.

## 2020-01-21 ENCOUNTER — OFFICE VISIT (OUTPATIENT)
Dept: FAMILY MEDICINE | Facility: CLINIC | Age: 60
End: 2020-01-21
Payer: COMMERCIAL

## 2020-01-21 VITALS
SYSTOLIC BLOOD PRESSURE: 136 MMHG | HEART RATE: 63 BPM | OXYGEN SATURATION: 94 % | TEMPERATURE: 98 F | HEIGHT: 73 IN | DIASTOLIC BLOOD PRESSURE: 82 MMHG | WEIGHT: 253.31 LBS | BODY MASS INDEX: 33.57 KG/M2

## 2020-01-21 DIAGNOSIS — E29.1 HYPOGONADISM MALE: ICD-10-CM

## 2020-01-21 DIAGNOSIS — K75.81 NASH (NONALCOHOLIC STEATOHEPATITIS): ICD-10-CM

## 2020-01-21 DIAGNOSIS — N52.9 ERECTILE DYSFUNCTION, UNSPECIFIED ERECTILE DYSFUNCTION TYPE: ICD-10-CM

## 2020-01-21 DIAGNOSIS — G43.709 CHRONIC MIGRAINE WITHOUT AURA WITHOUT STATUS MIGRAINOSUS, NOT INTRACTABLE: ICD-10-CM

## 2020-01-21 DIAGNOSIS — G47.33 OSA (OBSTRUCTIVE SLEEP APNEA): Primary | ICD-10-CM

## 2020-01-21 DIAGNOSIS — L40.9 PSORIASIS: ICD-10-CM

## 2020-01-21 DIAGNOSIS — R53.83 FATIGUE, UNSPECIFIED TYPE: ICD-10-CM

## 2020-01-21 PROCEDURE — 99215 PR OFFICE/OUTPT VISIT, EST, LEVL V, 40-54 MIN: ICD-10-PCS | Mod: S$GLB,,, | Performed by: FAMILY MEDICINE

## 2020-01-21 PROCEDURE — 99215 OFFICE O/P EST HI 40 MIN: CPT | Mod: S$GLB,,, | Performed by: FAMILY MEDICINE

## 2020-01-21 PROCEDURE — 99999 PR PBB SHADOW E&M-EST. PATIENT-LVL IV: CPT | Mod: PBBFAC,,, | Performed by: FAMILY MEDICINE

## 2020-01-21 PROCEDURE — 99999 PR PBB SHADOW E&M-EST. PATIENT-LVL IV: ICD-10-PCS | Mod: PBBFAC,,, | Performed by: FAMILY MEDICINE

## 2020-01-21 RX ORDER — PENICILLIN V POTASSIUM 500 MG/1
TABLET, FILM COATED ORAL
COMMUNITY
End: 2020-08-03

## 2020-01-21 RX ORDER — PREDNISONE 20 MG/1
TABLET ORAL
COMMUNITY
End: 2020-08-03

## 2020-01-21 RX ORDER — HYDROCODONE BITARTRATE AND ACETAMINOPHEN 5; 325 MG/1; MG/1
TABLET ORAL
COMMUNITY
End: 2020-04-30

## 2020-01-21 RX ORDER — DIVALPROEX SODIUM 500 MG/1
TABLET, DELAYED RELEASE ORAL
COMMUNITY
End: 2020-02-20

## 2020-01-21 RX ORDER — MOMETASONE FUROATE 1 MG/ML
SOLUTION TOPICAL
COMMUNITY
End: 2021-07-20

## 2020-01-21 RX ORDER — TRIAMTERENE AND HYDROCHLOROTHIAZIDE 37.5; 25 MG/1; MG/1
CAPSULE ORAL
Refills: 3 | COMMUNITY
Start: 2019-11-21 | End: 2020-04-30

## 2020-01-21 RX ORDER — BENZONATATE 100 MG/1
CAPSULE ORAL
COMMUNITY
Start: 2019-12-26 | End: 2020-08-03

## 2020-01-21 RX ORDER — VERAPAMIL HYDROCHLORIDE 120 MG/1
CAPSULE, EXTENDED RELEASE ORAL
COMMUNITY
End: 2020-02-20 | Stop reason: ALTCHOICE

## 2020-01-21 RX ORDER — GABAPENTIN 300 MG/1
CAPSULE ORAL
COMMUNITY
End: 2020-04-30 | Stop reason: ALTCHOICE

## 2020-01-21 RX ORDER — AZITHROMYCIN 250 MG/1
TABLET, FILM COATED ORAL
COMMUNITY
Start: 2019-12-26 | End: 2020-04-30 | Stop reason: ALTCHOICE

## 2020-01-21 RX ORDER — HYDROCODONE BITARTRATE AND ACETAMINOPHEN 7.5; 325 MG/1; MG/1
TABLET ORAL
COMMUNITY
End: 2020-04-30

## 2020-01-21 RX ORDER — RIZATRIPTAN BENZOATE 10 MG/1
TABLET ORAL
COMMUNITY
End: 2020-01-30

## 2020-01-21 RX ORDER — DOXYCYCLINE 100 MG/1
CAPSULE ORAL
COMMUNITY
End: 2020-04-30 | Stop reason: ALTCHOICE

## 2020-01-21 RX ORDER — METHYLPREDNISOLONE 4 MG/1
TABLET ORAL
COMMUNITY
Start: 2019-12-26 | End: 2020-04-30 | Stop reason: ALTCHOICE

## 2020-01-21 RX ORDER — DIVALPROEX SODIUM 125 MG/1
TABLET, DELAYED RELEASE ORAL
COMMUNITY
End: 2020-02-20

## 2020-01-21 RX ORDER — AMOXICILLIN 500 MG/1
CAPSULE ORAL
COMMUNITY
Start: 2020-01-08 | End: 2020-04-30 | Stop reason: ALTCHOICE

## 2020-01-21 RX ORDER — TOPIRAMATE 25 MG/1
TABLET ORAL
COMMUNITY
End: 2020-02-20

## 2020-01-21 RX ORDER — SULFAMETHOXAZOLE AND TRIMETHOPRIM 800; 160 MG/1; MG/1
TABLET ORAL
COMMUNITY
End: 2020-04-30

## 2020-01-21 RX ORDER — CEFUROXIME AXETIL 250 MG/1
TABLET ORAL
COMMUNITY
End: 2020-02-20 | Stop reason: ALTCHOICE

## 2020-01-21 RX ORDER — HYDROCHLOROTHIAZIDE 25 MG/1
TABLET ORAL
COMMUNITY
Start: 2019-11-25 | End: 2020-04-30

## 2020-01-21 RX ORDER — VERAPAMIL HYDROCHLORIDE 80 MG/1
TABLET ORAL
COMMUNITY
End: 2020-02-20 | Stop reason: ALTCHOICE

## 2020-01-21 RX ORDER — PROMETHAZINE HYDROCHLORIDE 25 MG/1
25 TABLET ORAL
COMMUNITY

## 2020-01-21 RX ORDER — POTASSIUM CHLORIDE 750 MG/1
TABLET, EXTENDED RELEASE ORAL
COMMUNITY
Start: 2019-12-02 | End: 2020-04-30 | Stop reason: DRUGHIGH

## 2020-01-21 RX ORDER — FLUOCINOLONE ACETONIDE 0.11 MG/ML
OIL TOPICAL 3 TIMES DAILY
Qty: 118 ML | Refills: 3 | Status: SHIPPED | OUTPATIENT
Start: 2020-01-21 | End: 2020-08-03

## 2020-01-21 RX ORDER — SUMATRIPTAN SUCCINATE 100 MG/1
TABLET ORAL
COMMUNITY
End: 2020-01-30 | Stop reason: SDUPTHER

## 2020-01-21 NOTE — PATIENT INSTRUCTIONS
Nutrition: How to Make Healthier Food Choices     Nutrition: How to Make Healthier Food Choices     Why is healthy eating important?  When combined with exercise, a healthy diet can help you lose weight, lower your cholesterol level and improve the way your body functions on a daily basis.  The [place] Department of Agricultures (USDA) Food Guide Pyramid divides food into 6 basic food groups, consisting of 1) grains, 2) fruits, 3) vegetables, 4) meats and beans, 5) dairy and 6) fats.  The USDA recommends an adult daily diet include the following:  3 ounces of whole grains, and 6 ounces of grains total   2 cups of fruit   2 1/2 cups of vegetables   3 cups fat-free or low-fat dairy   The following are some ways to make healthier food choices and to get the recommended amounts of whole grains, fruits, vegetables and dairy.    Grains  Whole-grain breads are low in fat; they're also high in fiber and complex carbohydrates, which help you feel chung longer and prevent overeating. Choose breads whose first ingredient says whole in front of the grain, for example, whole wheat flour or whole white flour; enriched or other types of flour have the important fiber and nutrients removed. Choose whole grain breads for sandwiches and as additions to meals.  Avoid rich bakery foods such as donuts, sweet rolls and muffins. These foods can contain more than 50% fat calories. Snacks such as sruthi food cake and gingersnap cookies can satisfy your sweet tooth without adding fat to your diet.  Hot and cold cereals are usually low in fat. But instant cereals with cream may contain high-fat oils or butterfat. Granola cereals may also contain high-fat oils and extra sugars. Look for low-sugar options for both instant and granola cereals.  Avoid fried snacks such as potato chips and tortilla chips. Try the low-fat or baked versions instead.  Instead of this: Try this:   Croissants, biscuits, white breads and rolls Low-fat whole grain  "breads and rolls (wheat, rye and pumpernickel)   Doughnuts, pastries and scones English muffins and small whole grain bagels   Fried tortillas Soft tortillas (corn or whole wheat)   Sugar cereals and regular granola Oatmeal, low-fat granola and whole-grain cereal   Snack crackers Crackers (animal, dominga, rye, soda, saltine, oyster)   Potato or corn chips and buttered popcorn Pretzels (unsalted) and popcorn (unbuttered)   White pasta Whole-wheat pasta   White rice Brown rice   Fried rice, or pasta and rice mixes that contain high-fat sauces Rice or pasta (without egg yolk) with vegetable sauces   All-purpose white flour 100% whole-wheat flour     Fruits and Vegetables  Fruits and vegetables are naturally low in fat. They add flavor and variety to your diet. They also contain fiber, vitamins and minerals.  Margarine, butter, mayonnaise and sour cream add fat to vegetables and fruits. Try using nonfat or low-fat versions of these foods. You can also use nonfat or low-fat yogurt or herbs as seasonings instead.  Instead of this: Try this:   Fried vegetables or vegetables served with cream, cheese or butter sauces All vegetables raw, steamed, broiled, baked or tossed with a very small amount of olive oil and salt and pepper   Coconut Fruit (fresh)   French fries, hash browns and potato   chips Baked white or sweet potatoes     Meat, Poultry and Fish  Beef, Pork, Veal and Lamb  Baking, broiling and roasting are the healthiest ways to prepare meat. Lean cuts can be pan-broiled or stir-fried. Use either a nonstick pan or nonstick spray coating instead of butter or margarine.  Trim outside fat before cooking. Trim any inside, separable fat before eating. Select low-fat, lean cuts of meat. Lean beef and veal cuts have the word "loin" or "round" in their names. Lean pork cuts have the word "loin" or "leg" in their names.  Use herbs, spices, fresh vegetables and nonfat marinades to season meat. Avoid high-fat sauces and " "gravies.  Poultry  Baking, broiling and roasting are the healthiest ways to prepare poultry. Skinless poultry can be pan-broiled or stir-fried. Use either a nonstick pan or nonstick spray coating instead of butter or margarine.  Remove skin and visible fat before cooking. Chicken breasts are a good choice because they are low in fat and high in protein. Use domestic goose and duck only once in a while because both are high in fat.  Fish  Poaching, steaming, baking and broiling are the healthiest ways to prepare fish. Fresh fish should have a clear color, a moist look, a clean smell and firm, springy flesh. If good-quality fresh fish isn't available, buy frozen fish.  Most seafood is high in healthy polyunsaturated fat. Omega-3 fatty acids are also found in some fatty fish, such as salmon and cold-water trout. They may help lower the risk of heart disease in some people.  Cross-over Foods  Dry beans, peas and lentils offer protein and fiber without the cholesterol and fat of meats. Once in a while, try substituting beans for meat in a favorite recipe, such as lasagna or chili.  TVP, or textured vegetable protein, is widely available in many foods. Vegetarian "hot dogs," "hamburger" and "chicken nuggets" are low-fat, cholesterol-free alternatives to meat.  Instead of this: Try this:   Regular or breaded fish sticks or cakes, fish canned in oil, seafood prepared with butter or served in high-fat sauce Fish (fresh, frozen, canned in water), low-fat fish sticks or cakes and shellfish (such as shrimp)   Prime and marbled cuts Select-grade lean beef (round, sirloin and loin)   Pork spare ribs and polk Lean pork (tenderloin and loin chop) and turkey polk   Regular ground beef Lean or extra-lean ground beef, ground chicken and turkey breast   Lunch meats such as pepperoni, salami, bologna and liverwurst Lean lunch meats such as turkey, chicken and ham   Regular hot dogs or sausage Fat-free hot dogs and turkey dogs "     Dairy  Choose skim milk or low-fat buttermilk. Substitute evaporated skim milk for cream in recipes for soups, sauces and coffee.  Try low-fat cheeses. Skim ricotta can replace cream cheese on a bagel or in a vegetable dip. Use part-skim cheeses in recipes. Use 1% cottage cheese for salads and cooking. String cheese is a low-fat, high-calcium snack option.  Plain nonfat yogurt can replace sour cream in many recipes. (To maintain texture, stir 1 tablespoon of cornstarch into each cup of yogurt that you use in cooking.) Try mixing frozen nonfat or low-fat yogurt with fruit for dessert.  Skim sherbet is an alternative to ice cream. Soft-serve and regular ice creams are also lower in fat than premium styles.  Instead of this: Try this:   Whole or 2% milk Non-fat or 1% milk   Evaporated milk Evaporated non-fat milk   Regular buttermilk Buttermilk made from non-fat (or 1%) milk   Yogurt made with whole milk Nonfat or low-fat yogurt   Regular cheese (examples: American, blue, Brie, cheddar, Jose and Parmesan) Low-fat cheese with less than 3 grams of fat per serving (example: natural cheese, processed cheese and nondairy cheese such as soy cheese)   Regular cottage cheese Low-fat, nonfat, and dry-curd cottage cheese with less than 2% fat   Regular cream cheese Low-fat cream cheese (no more than 3 grams of fat per ounce)   Regular ice cream Sorbet, sherbet and nonfat or low-fat ice cream (no more than 3 grams of fat per 1/2 cup serving)     Fats, Oils and Sweets  Eating too many high-fat foods not only adds excess calories (which can lead to obesity and weight gain), but can increase your risk factor for several diseases. Heart disease, diabetes, certain types of cancer and osteoarthritis have all been linked to diets too high in fat. If you consume too much saturated and trans fats, you are more likely to develop high cholesterol and coronary artery disease.  Sugar-sweetened drinks, such as fruit juice, fruit drinks,  "regular soft drinks, sports drinks, energy drinks, sweetened or flavored milk and sweetened iced tea can add lots of sugar and calories to your diet. But staying hydrated is important for good health. Substitute water, zero-calorie flavored water, non-fat or reduced-fat milk, unsweetened tea or diet soda for sweetened drinks. Talk with your family doctor or a dietitian if you have questions about your diet or healthy eating for your family.  Instead of this: Try this:   Cookies Fig bars, gingersnaps and molasses cookies   Shortening, butter or margarine Olive, soybean and canola oils   Regular mayonnaise Nonfat or light mayonnaise   Regular salad dressing Nonfat or light salad dressing   Using fat (including butter) to grease pan Nonstick cooking spray         What it Takes to Lose Weight     What it Takes to Lose Weight     What does it take to lose weight?  To lose weight, you have to eat fewer calories than your body uses. Calories are the amount of energy in the food you eat. Some foods have more calories than others. For example, foods that are high in fat and sugar are also high in calories. If you eat more calories than your body uses, the extra calories will be stored as body excess fat.  A pound of fat is about 3,500 calories. To lose 1 pound of fat in a week, you have to eat 3,500 fewer calories (that is 500 fewer calories a day), or you have to "burn off" an extra 3,500 calories. You can burn off calories by exercising or just by being more active. (Talk to your family doctor before you begin any type of exercise program. Your doctor can help you determine what kind of exercise program is right for you.)  The best way to lose weight and keep it off is to eat fewer calories and be active. If you cut 250 calories from your diet each day and exercise enough to burn off 250 calories, that adds up to 500 fewer calories in one day. If you do this for 7 days, you can lose 1 pound of fat in a week.  Many experts " "believe you should not try to lose more than 2 pounds per week. Losing more than 2 pounds in a week usually means that you are losing water weight and lean muscle mass instead of losing excess fat. If you do this, you will have less energy, and you will most likely gain the weight back.    How often should I eat?  Most people can eat 3 regular meals and 1 snack every day. The 3 meals should be about the same size and should be low in fat. Try to eat 1 to 2 cups of fruits and vegetables, 2 to 3 ounces of whole grains and 1 to 2 ounces of meat (or a meat alternative) at most meals.  Some people benefit more if they eat 5 to 6 smaller meals throughout the day, about 2 to 3 hours apart. For example, their first meal of the day might be a cup of low-fat or nonfat yogurt and a banana. Three hours later they might eat a simple deli sandwich with whole-grain bread and fat-free mayonnaise.  Eat breakfast and don't skip meals. While skipping meals may help you lose weight in the beginning, it fails in the long run. Skipping meals may make you feel too hungry later in the day, causing you to overeat at your next meal.  After about a month of eating a normal breakfast, lunch and a light dinner, your body will adjust.    What is so bad about high-fat foods?  Foods high in fat are usually high in calories, which could lead to unwanted weight gain. Consuming too much saturated and trans fats may increase LDL cholesterol ("bad" cholesterol) level, and increase your risk of heart disease. The USDA suggests that you eat no more than 20 grams of saturated fat, and that you limit the amount of trans fat to as close to 0 grams as possible. Click here for more information on reading nutrition labels.  It is important to remember that some fats can be beneficial to your overall health. "Good" fat, such as polyunsaturated and monounsaturated fats are found in fish, nuts, and low- or nonfat dairy products.    What are empty calories?  Some " "foods are referred to as "empty calories" because they add lots of calories to your diet without providing much nutritional benefit. An example is sugar-sweetened drinks, such as fruit juice, fruit drinks, regular soft drinks, sports drinks, energy drinks, sweetened or flavored milk and sweetened iced tea. These drinks can add lots of sugar and calories to your diet.  But staying hydrated is important for good health. To reduce the calories and sugar in your diet, substitute water, zero-calorie flavored water, non-fat or reduced-fat milk, unsweetened tea or diet soda for sweetened drinks. Talk with your family doctor or a dietitian if you have questions about your diet or healthy eating for your family.    Can I trust nutrition information I get from newspapers and magazines?  Nutrition tips and diet information from different sources often conflict with each other. You should always check with your doctor first. Also, keep in mind the following advice:  There is no "magic bullet" when it comes to nutrition. There isn't one single diet that works for every person. You need to find an eating plan that works for you.   Good nutrition doesn't come in a vitamin supplement. Only take a vitamin with your doctor's recommendation, as your body benefits the most when you eat healthy foods.   Eating all different kinds of foods is best for your body, so learn to try new foods.   Fad diets offer short-term changes, but good health comes from long-term effort and commitment.   Stories from people who have used a diet program or product, especially in commercials and [employer], are advertisements. These people are usually paid to endorse what the ad is selling. Remember, regained weight or other problems that develop after someone has completed the diet program are never talked about in those ads.     Will diet drugs help?  Although diet drugs may help you lose weight at first, they usually don't help you keep the weight off " and may have damaging side effects. Most diet pills have not been tested by the Food and Drug Administration, which means you can't be sure if the drugs are safe. Taking drugs also does not help you learn how to change your eating and exercise habits. Making lasting changes in these habits is the way to lose weight and keep it off.

## 2020-01-21 NOTE — PROGRESS NOTES
(Portions of this note were dictated using voice recognition software and may contain dictation related errors in spelling/grammar/syntax not found on text review)    CC:   Chief Complaint   Patient presents with    Fatigue    Dry Skin       HPI: 59 y.o. male last seen June 2019      Here to discuss some of his medical issues as noted below.    Follows with Dermatology for psoriasis.  Prescribed Humira but found that this was not really helping with his skin issues.  Also was hoping that this would help with some of his chronic joint pains but this has not helped.  His dermatologist did recommend if no effect, he should likely discontinue the Humira.  Having a lot of scalp flaking and irritation.  Has tried multiple medications and topical treatment including T/Gel, also some prescribed shampoo, ( unknown whether steroid based) from his dermatologist.  Not supposed to dermatologist again until March.  Was wondering if he can get something prescribed for this symptom.    Saw Rheumatology on 05/03/2019 concerned about multiple joint pain. Rheumatology had determined that these pains were likely not related to his psoriasis or with significant inflammatory component.  Had some degenerative changes of his hands.  Inflammatory workup was negative with normal CRP and sed rate.  SPEP normal.    Followed by GI, last seen 04/16/2019 for intermittent hematochezia, fatty liver and splenomegaly on CT scan.  Does have esophagitis noted on recent endoscopy May 2019 showed duodenum, erythematous mucosa in stomach, 1 cm hiatal hernia along with a gastric polyp.  H pylori negative.  Biopsies negative    There was a prior concern about possible Crohn's disease in the past although this has not been diagnosed on endoscopies.  States that he get BS like symptoms worry will have periodic constipation or loose stools intermixed with normal bowel movements.  Saw hepatology for fatty liver.  Advise lifestyle modification    hypogonadism.   Diagnosed in 2017, currently on testosterone enanthate injections,.managed by Urology at [facility].  He has had to be taken off of testosterone in the past because of polycythemia.  He was switched over injection formulations to current formulation from testosterone cypionate prior.  His lowest testosterone file in recent check went off testosterone was 124 in June of 2019.  He has elevated his levels up into the 700 in even prior 1100 range on testosterone treatment but states that he has never felt much improvement even when his levels are up.  He does feel overall low energy and decreased sex drive and erectile dysfunction.  He does not get much exercise as he has difficulty with motivation.  He does not feel overly anxious or depressed states that he has normal stresses.  He does have sleep apnea but has been intolerant of CPAP in the past so does not use this.  On further discussion he is willing to seek referral to Sleep Clinic for further evaluation into this.    Visited with Cardiology for his hypertension on 08/22/2019 currently on carvedilol 12.5 mg b.i.d., amlodipine 5 mg daily, chlorthalidone 25 mg daily. he does have coexisting sleep apnea but was unable to tolerate CPAP.  His blood pressure today is controlled. Also had done aldosterone/renin profile secondary to hypertension hypokalemia to assess for hyperaldosteronism.  This was normal.   He is now taking potassium supplement.  Also started on Crestor for his lipids      Saw Neurology in September and November for complex headache syndrome and occipital neuralgia, most recent plan is for Aimovig    Past Medical History:   Diagnosis Date    Acute gastric ulcer with hemorrhage 2/15/2017    Bilateral occipital neuralgia     BPH with urinary obstruction 12/31/2015    Chronic constipation     Colitis [id number]    infectious?    Diverticulitis     Erectile dysfunction     Essential hypertension     Gastroesophageal reflux disease without  esophagitis 2/11/2017    Hypogonadism in male     IBS (irritable bowel syndrome)     Migraine without aura and without status migrainosus, not intractable 1/31/2014    Obstructive sleep apnea syndrome 2/9/2015    Psoriasis     PUD (peptic ulcer disease)        Past Surgical History:   Procedure Laterality Date    CHOLECYSTECTOMY      COLONOSCOPY N/A 2/17/2017    Procedure: COLONOSCOPY;  Surgeon: Yoshi Erazo MD;  Location: [facility] (2ND FLR);  Service: Endoscopy;  Laterality: N/A;    COLONOSCOPY N/A 5/29/2019    Procedure: COLONOSCOPY;  Surgeon: Yoshi Erazo MD;  Location: [facility] ENDO (2ND FLR);  Service: Endoscopy;  Laterality: N/A;    ESOPHAGOGASTRODUODENOSCOPY      ESOPHAGOGASTRODUODENOSCOPY N/A 12/14/2018    Procedure: EGD (ESOPHAGOGASTRODUODENOSCOPY);  Surgeon: Lexii Carlson MD;  Location: [facility] (4TH FLR);  Service: Endoscopy;  Laterality: N/A;    ESOPHAGOGASTRODUODENOSCOPY N/A 5/29/2019    Procedure: EGD (ESOPHAGOGASTRODUODENOSCOPY);  Surgeon: Yoshi Erazo MD;  Location: [facility] (2ND FLR);  Service: Endoscopy;  Laterality: N/A;  per Dr Erazo-schedule on 2nd floor    LEG SURGERY      NASAL SEPTUM SURGERY      UPPER GASTROINTESTINAL ENDOSCOPY         Family History   Problem Relation Age of Onset    Crohn's disease Unknown         brother, sister, father, nephew    Heart disease Father     Crohn's disease Father     Inflammatory bowel disease Father     Crohn's disease Sister     Inflammatory bowel disease Sister     Crohn's disease Brother     Inflammatory bowel disease Brother     Kidney disease Mother     Hypertension Mother     Thyroid disease Mother     Cystic fibrosis Maternal Uncle     Prostate cancer Neg Hx     Melanoma Neg Hx     Colon cancer Neg Hx     Celiac disease Neg Hx     Cirrhosis Neg Hx     Esophageal cancer Neg Hx     Liver cancer Neg Hx     Rectal cancer Neg Hx     Stomach cancer Neg Hx     Ulcerative colitis Neg Hx     Liver  disease Neg Hx        Social History     Socioeconomic History    Marital status: Single     Spouse name: Not on file    Number of children: Not on file    Years of education: Not on file    Highest education level: Not on file   Occupational History    Not on file   Social Needs    Financial resource strain: Not hard at all    Food insecurity:     Worry: Never true     Inability: Never true    Transportation needs:     Medical: No     Non-medical: No   Tobacco Use    Smoking status: Never Smoker    Smokeless tobacco: Never Used   Substance and Sexual Activity    Alcohol use: Yes     Frequency: 2-3 times a week     Drinks per session: 1 or 2     Binge frequency: Never     Comment: ocasionally    Drug use: No    Sexual activity: Yes     Partners: Female   Lifestyle    Physical activity:     Days per week: 2 days     Minutes per session: 0 min    Stress: Only a little   Relationships    Social connections:     Talks on phone: More than three times a week     Gets together: More than three times a week     Attends Shinto service: Not on file     Active member of club or organization: Yes     Attends meetings of clubs or organizations: More than 4 times per year     Relationship status: Living with partner   Other Topics Concern    Not on file   Social History Narrative    Not on file     Lab Results   Component Value Date    WBC 8.75 01/25/2019    HGB 18.0 06/14/2019    HCT 52.8 01/25/2019    MCV 84 01/25/2019     01/25/2019    CHOL 199 09/05/2019    TRIG 253 (H) 09/05/2019    HDL 31 (L) 09/05/2019    ALT 44 09/05/2019    AST 38 09/05/2019    BILITOT 0.9 09/05/2019    ALKPHOS 40 (L) 09/05/2019     12/13/2019    K 3.7 12/13/2019     12/13/2019    CREATININE 1.0 12/13/2019    CALCIUM 9.6 12/13/2019    ALBUMIN 4.1 09/05/2019    BUN 17 12/13/2019    CO2 29 12/13/2019    TSH 2.212 01/25/2019    PSA 1.2 10/15/2018    INR 1.2 04/16/2019    HGBA1C 5.4 08/29/2014    LDLCALC 117.4  09/05/2019    GLU 98 12/13/2019       AST   Date Value Ref Range Status   09/05/2019 38 10 - 40 U/L Final   04/16/2019 49 (H) 10 - 40 U/L Final   01/25/2019 35 10 - 40 U/L Final   01/07/2019 44 (H) 10 - 40 U/L Final   12/13/2018 47 (H) 10 - 40 U/L Final   10/18/2018 44 (H) 10 - 40 U/L Final     Comment:     *Result may be interfered by visible hemolysis     ALT   Date Value Ref Range Status   09/05/2019 44 10 - 44 U/L Final   04/16/2019 75 (H) 10 - 44 U/L Final   01/25/2019 50 (H) 10 - 44 U/L Final   01/07/2019 60 (H) 10 - 44 U/L Final   12/13/2018 69 (H) 10 - 44 U/L Final   10/18/2018 52 (H) 10 - 44 U/L Final     Potassium   Date Value Ref Range Status   12/13/2019 3.7 3.5 - 5.1 mmol/L Final   09/05/2019 3.4 (L) 3.5 - 5.1 mmol/L Final   07/23/2019 3.4 (L) 3.5 - 5.1 mmol/L Final   06/14/2019 3.3 (L) 3.5 - 5.1 mmol/L Final   01/25/2019 3.4 (L) 3.5 - 5.1 mmol/L Final     Hemoglobin   Date Value Ref Range Status   06/14/2019 18.0 14.0 - 18.0 g/dL Final   01/25/2019 17.4 14.0 - 18.0 g/dL Final   12/13/2018 18.3 (H) 14.0 - 18.0 g/dL Final   10/18/2018 18.0 14.0 - 18.0 g/dL Final   03/19/2018 16.8 14.0 - 18.0 g/dL Final        Ref Range & Units   3yr ago 4yr ago   Testosterone, Total 304 - 1227 ng/dL 1167  386 R 428 R   Taken 01/25/2019      Answers for HPI/ROS submitted by the patient on 1/20/2020   activity change: No  unexpected weight change: No  neck pain: No  hearing loss: No  rhinorrhea: No  trouble swallowing: No  eye discharge: No  visual disturbance: No  chest tightness: No  wheezing: No  chest pain: No  palpitations: No  blood in stool: No  constipation: No  vomiting: No  diarrhea: No  polydipsia: No  polyuria: No  difficulty urinating: No  urgency: No  hematuria: No  joint swelling: No  arthralgias: No  headaches: No  weakness: No  confusion: No  dysphoric mood: No       Vital signs reviewed  PE:   APPEARANCE: Well nourished, well developed, in no acute distress.    HEAD: Normocephalic, atraumatic.  EYES:      Conjunctivae noninjected.  SKIN:  Psoriatic plaques noted on elbows.  Does have some red scaly patches noted on posterior hairline and anterior lateral hairline with some flaking involved.  No greasy scale noted.      IMPRESSION  1. DELVIN (obstructive sleep apnea)    2. PAYNE (nonalcoholic steatohepatitis)    3. Hypogonadism male    4. Fatigue, unspecified type    5. Erectile dysfunction, unspecified erectile dysfunction type    6. Psoriasis    7. Chronic migraine without aura without status migrainosus, not intractable            PLAN  Labs, imaging, specialist reports reviewed and summarized as above.  Will scan results from recent testosterone testing from his urologist into the medical record system.    Testosterone therapy:  Patient does not feel any significant benefit of being on therapy.  Has been off for several weeks now and does not really feel any different either.  He was wondering if a different formulation may be helpful and would like to have an opinion on this.  My thought is that if he has not experience benefit of symptom control even with objective improvement in his numbers, his symptoms may not really be related to low testosterone.  He is willing to talk to his urologist about a different formulation such as gel which I think is reasonable although if this is not effective, he may need to investigate other reasons for his low energy issues.  Specifically I am concerned about uncontrolled sleep apnea playing a big role.  He is willing to be referred to Sleep Medicine Clinic.  Also discussed importance of regular exercise and weight loss on overall energy levels and even improvement of testosterone profile.    Hypertension:  Well controlled today.  He can continue current therapy    Continue dermatology follow-up for alternate recommendations for his psoriasis since instruction to stop the Humira.  Will try derma smoothe oil to scalp in case this provide some benefit of symptom flare up recently.   If no benefit, would advised following back up with the dermatologist for other options.  Could consider clobetasol shampoo although it seems like he has tried several prescriptions including ischium Smith from his dermatologist to no avail and this may likely have included a corticosteroid she and post such as clobetasol.    Fatty Liver disease:  Hepatology notes reviewed.  Emphasizing importance of weight loss and exercise    Migraines:  Controlled with Aimovig through his neurologist

## 2020-01-22 ENCOUNTER — TELEPHONE (OUTPATIENT)
Dept: FAMILY MEDICINE | Facility: CLINIC | Age: 60
End: 2020-01-22

## 2020-01-24 ENCOUNTER — PATIENT OUTREACH (OUTPATIENT)
Dept: ADMINISTRATIVE | Facility: HOSPITAL | Age: 60
End: 2020-01-24

## 2020-01-25 ENCOUNTER — PATIENT OUTREACH (OUTPATIENT)
Dept: ADMINISTRATIVE | Facility: HOSPITAL | Age: 60
End: 2020-01-25

## 2020-01-29 ENCOUNTER — TELEPHONE (OUTPATIENT)
Dept: PHARMACY | Facility: CLINIC | Age: 60
End: 2020-01-29

## 2020-01-30 ENCOUNTER — PATIENT MESSAGE (OUTPATIENT)
Dept: FAMILY MEDICINE | Facility: CLINIC | Age: 60
End: 2020-01-30

## 2020-01-30 ENCOUNTER — PATIENT MESSAGE (OUTPATIENT)
Dept: NEUROLOGY | Facility: CLINIC | Age: 60
End: 2020-01-30

## 2020-01-30 RX ORDER — SUMATRIPTAN SUCCINATE 100 MG/1
TABLET ORAL
Qty: 9 TABLET | Refills: 1 | Status: SHIPPED | OUTPATIENT
Start: 2020-01-30 | End: 2020-02-20 | Stop reason: ALTCHOICE

## 2020-01-30 NOTE — TELEPHONE ENCOUNTER
Hi Mr. Villela,    Sorry for the confusion; I believe the request had gone to your neurologist, Dr. Reyes, as he has been ordering this but I'll go ahead and send you a prescription if you are out and need this on hand. Just need to make sure some other meds listed on your list including Maxalt and Frova are not current since these are similar to imitrex.     Manish Joel MD    ===View-only below this line===      ----- Message -----     From: Jonathan TAYLOR Manohar     Sent: 1/30/2020  1:32 PM CST       To: Manish Joel MD  Subject: RE: Prescription    thanks  ----- Message -----  From: Nurse Peña  Sent: 1/30/2020  1:30 PM CST  To: Jonathan TAYLOR Manohar  Subject: RE: Prescription  I will forward this to him.  He his not in this afternoon and will have to address once he is back in clinic.    ----- Message -----     From: Jonathan Villela     Sent: 1/30/2020  1:27 PM CST       To: Manish Joel MD  Subject: RE: Prescription    Thanks, I checked with him also. He said they were probably sending to Dr. Joel since he prescribed it to me.  ----- Message -----  From: Nurse Peña  Sent: 1/30/2020  1:24 PM CST  To: Jonathan Villela  Subject: RE: Prescription  We haven't received any request from your pharmacy.  They may be sending this to your neurologist as this is who is following your migraines.      ----- Message -----     From: Jonathan Villela     Sent: 1/30/2020 12:35 PM CST       To: Manish Joel MD  Subject: Prescription    Hello Dr Joel,  I understand Trav has requested  a refill request for Sumitriptan 100MG Tablets with [facility] and I think they are waiting on your response. I am still using Aimovig and it is working well, but I am out of those pills, I like to have them on hand in case I get a migraine in between shots.  Thanks again and have a nice day,  Jonathan Villela

## 2020-01-31 RX ORDER — SUMATRIPTAN SUCCINATE 100 MG/1
TABLET ORAL
Qty: 9 TABLET | Refills: 6 | Status: SHIPPED | OUTPATIENT
Start: 2020-01-31 | End: 2020-02-20 | Stop reason: SDUPTHER

## 2020-02-03 NOTE — TELEPHONE ENCOUNTER
Refill readiness for Aimovig confirmed with patient; name/ confirmed; no missed doses; no new medications; no side effects noted; address confirmed for 2/3 shipment and  delivery. $5 copay. CCOF 191. No sharps container needed. Patient states he has 0 doses remaining and was due to inject  but was awaiting financial assistance. He will inject when he receives it on .    Edward Regalado, Pharm.D.  Pharmacy Resident, PGY-1   [facility]

## 2020-02-20 ENCOUNTER — OFFICE VISIT (OUTPATIENT)
Dept: NEUROLOGY | Facility: CLINIC | Age: 60
End: 2020-02-20
Payer: COMMERCIAL

## 2020-02-20 VITALS
WEIGHT: 261.44 LBS | HEIGHT: 73 IN | SYSTOLIC BLOOD PRESSURE: 123 MMHG | TEMPERATURE: 98 F | HEART RATE: 77 BPM | BODY MASS INDEX: 34.65 KG/M2 | DIASTOLIC BLOOD PRESSURE: 76 MMHG

## 2020-02-20 DIAGNOSIS — G43.709 CHRONIC MIGRAINE WITHOUT AURA WITHOUT STATUS MIGRAINOSUS, NOT INTRACTABLE: Primary | ICD-10-CM

## 2020-02-20 PROCEDURE — 99214 PR OFFICE/OUTPT VISIT, EST, LEVL IV, 30-39 MIN: ICD-10-PCS | Mod: S$GLB,,, | Performed by: PSYCHIATRY & NEUROLOGY

## 2020-02-20 PROCEDURE — 99999 PR PBB SHADOW E&M-EST. PATIENT-LVL III: ICD-10-PCS | Mod: PBBFAC,,, | Performed by: PSYCHIATRY & NEUROLOGY

## 2020-02-20 PROCEDURE — 99999 PR PBB SHADOW E&M-EST. PATIENT-LVL III: CPT | Mod: PBBFAC,,, | Performed by: PSYCHIATRY & NEUROLOGY

## 2020-02-20 PROCEDURE — 99214 OFFICE O/P EST MOD 30 MIN: CPT | Mod: S$GLB,,, | Performed by: PSYCHIATRY & NEUROLOGY

## 2020-02-20 RX ORDER — SUMATRIPTAN SUCCINATE 100 MG/1
TABLET ORAL
Qty: 9 TABLET | Refills: 12 | Status: SHIPPED | OUTPATIENT
Start: 2020-02-20 | End: 2020-08-03

## 2020-02-20 RX ORDER — CEFUROXIME AXETIL 250 MG/1
6 TABLET ORAL
Qty: 1 KIT | Refills: 12 | Status: SHIPPED | OUTPATIENT
Start: 2020-02-20 | End: 2020-08-03

## 2020-02-20 NOTE — PROGRESS NOTES
Subjective:       Patient ID: Jonathan Villela is a 59 y.o. male.    Reason for Consult: Neurologic Problem (f/u)      Interval History:  Jonathan Villela is here for follow up. Their condition had clinically worsened after he skipped a dose for 2 days and had a new Tull for his coffee which contained high oleic so I would be an oral in it.  Unfortunately he had headaches for almost 2 weeks before the regulated again.  Otherwise he has been having a tremendous amount of benefit on the Aimovig.  He notes with regards to his occipital neuralgia he has been doing quite well in this perspective as well.      Objective:     Vitals:    02/20/20 1014   BP: 123/76   Pulse: 77   Temp: 98.2 °F (36.8 °C)     Patient is awake alert oriented to person place and time.  Moves all 4 extremities against gravity.  Gait and station within normal limits.  Cranial nerves 2-12 were without focal deficits.  Tinel sign negative bilateral greater and lesser occipital nerve.  Focused examination was undertaken today. Over 50% of face to face time of 25 minute visit time was in giving guidance, counseling and discussing treatment options.    Results for orders placed or performed during the hospital encounter of 03/19/18   CT Head Without Contrast    Narrative    EXAMINATION:  CT HEAD WITHOUT CONTRAST    CLINICAL HISTORY:  headache not c/w typical migraine;    TECHNIQUE:  Low dose axial CT images obtained throughout the head without intravenous contrast. Sagittal and coronal reconstructions were performed.    COMPARISON:  MRI brain 06/26/2017 and head CT 01/15/2014.    FINDINGS:  Intracranial compartment: Age-appropriate mild generalized cerebral volume loss, unchanged. Ventricles and sulci are normal in size for age and midline without evidence of hydrocephalus. No extra-axial blood or fluid collections.    The brain parenchyma appears normal. No parenchymal mass, hemorrhage, edema or major vascular distribution infarct.  Partial  empty sella configuration.    Skull/extracranial contents (limited evaluation): No fracture.  Mild mucosal thickening within the bilateral maxillary sinuses.  Mastoid air cells and remaining imaged paranasal sinuses are essentially clear.  Visualized portions of the orbits are within normal limits.      Impression    No acute large vascular territory infarct or intracranial hemorrhage identified.    Partial empty sella configuration, nonspecific but can be seen with benign intracranial hypertension.    Bilateral maxillary sinus disease as above.      Electronically signed by: Pernell Camarena MD  Date:    03/19/2018  Time:    20:48   Results for orders placed or performed during the hospital encounter of 06/26/17   MRI Brain Without Contrast    Narrative    Procedure: MRI the brain without contrast.    Technique: Sagittal and axial T1, axial T2, axial FLAIR, axial gradient, and axial diffusion imaging of the whole brain.    Comparison: None    Findings: Mild degree of generalized cerebral volume loss.  There is ill-defined small punctate size region of T2 flair signal hyperintensity supratentorially matter which are nonspecific and may represent mild chronic ischemic change, differential to include sequela of migraine headaches..  There is no restricted diffusion to suggest acute infarction.  Ventricles normal without hydrocephalus.  No midline shift or mass effect.  No abnormal parenchymal susceptibility.  The major intracranial T2 flow voids are present.  No diffusion signal abnormality.    Impression     Mild generalized volume loss with few scatter foci of T2/FLAIR signal abnormality throughout the supratentorial white matter these are nonspecific with differential to include mild chronic ischemic change with differential to include sequela of migraine headaches.    Otherwise unremarkable noncontrast MRI brain specifically without evidence for hydrocephalus or acute infarction..      Electronically signed by: WILY  CHAN DO  Date:     06/26/17  Time:    16:32        Assessment/Plan:     Problem List Items Addressed This Visit        Neuro    Chronic migraine without aura without status migrainosus, not intractable - Primary    Overview     Has more than 15 headaches per month, last more than 4 hours at a time, visual sxs, including, loss and blurring, nausea, no vomiting - reduction of symptoms by 75% in frequency and severity in Aimovig    Has tried and failed anti-epileptics, anti-depressants, anti-hypertensives, triptans and anti-inflammatories. Has tried and failed Emgality and Ajovy for at least 3 rounds each. The best agent in the anti-CGRP class has been Aimovig.     It is medically necessary that he have this agent.          Relevant Medications    sumatriptan (IMITREX) 100 MG tablet    sumatriptan (IMITREX STATDOSE) 6 mg/0.5 mL kit    erenumab-aooe 140 mg/mL AtIn          59-year-old male presents for evaluation and follow-up of complex multifactorial headache.  At this point in time we have excellent control of his headaches.  He sparingly uses his oral sumatriptan and if that does not work then he will use his injectable sumatriptan.  He notes he uses this less than 4 or 5 times a month.  He notes the Aimovig continues to give him excellent control of his headaches.  For now since his control of his headaches is stable I will see him back in a year's time.  If he has any change or worsening of his headaches between now and then he may call for a sooner follow-up.  We have identified a trigger in the new coffee cream or that he has been using and he will try to avoid it.    The patient verbalizes understanding and agreement with the treatment plan. I have discussed risks, benefits and alternatives to the treatment plan. Questions were sought and answered to his stated verbal satisfaction.        Negrito Reyes MD    This note is dictated on M*Modal Fluency Direct word recognition program. There are word recognition  mistakes that are occasionally missed on review.

## 2020-02-21 ENCOUNTER — TELEPHONE (OUTPATIENT)
Dept: PHARMACY | Facility: CLINIC | Age: 60
End: 2020-02-21

## 2020-03-05 ENCOUNTER — LAB VISIT (OUTPATIENT)
Dept: LAB | Facility: HOSPITAL | Age: 60
End: 2020-03-05
Attending: INTERNAL MEDICINE
Payer: COMMERCIAL

## 2020-03-05 ENCOUNTER — OFFICE VISIT (OUTPATIENT)
Dept: DERMATOLOGY | Facility: CLINIC | Age: 60
End: 2020-03-05
Payer: COMMERCIAL

## 2020-03-05 DIAGNOSIS — Z79.899 ENCOUNTER FOR LONG-TERM (CURRENT) USE OF HIGH-RISK MEDICATION: ICD-10-CM

## 2020-03-05 DIAGNOSIS — L30.9 ECZEMA, UNSPECIFIED TYPE: ICD-10-CM

## 2020-03-05 DIAGNOSIS — K25.4 GASTROINTESTINAL HEMORRHAGE ASSOCIATED WITH GASTRIC ULCER: ICD-10-CM

## 2020-03-05 DIAGNOSIS — L30.4 INTERTRIGO: ICD-10-CM

## 2020-03-05 DIAGNOSIS — L82.0 INFLAMED SEBORRHEIC KERATOSIS: ICD-10-CM

## 2020-03-05 DIAGNOSIS — L40.9 PSORIASIS: Primary | ICD-10-CM

## 2020-03-05 DIAGNOSIS — K25.9 MULTIPLE GASTRIC ULCERS: ICD-10-CM

## 2020-03-05 LAB — HGB BLD-MCNC: 16.7 G/DL (ref 14–18)

## 2020-03-05 PROCEDURE — 99999 PR PBB SHADOW E&M-EST. PATIENT-LVL I: ICD-10-PCS | Mod: PBBFAC,,, | Performed by: PATHOLOGY

## 2020-03-05 PROCEDURE — 99214 PR OFFICE/OUTPT VISIT, EST, LEVL IV, 30-39 MIN: ICD-10-PCS | Mod: S$GLB,,, | Performed by: PATHOLOGY

## 2020-03-05 PROCEDURE — 85018 HEMOGLOBIN: CPT

## 2020-03-05 PROCEDURE — 99214 OFFICE O/P EST MOD 30 MIN: CPT | Mod: S$GLB,,, | Performed by: PATHOLOGY

## 2020-03-05 PROCEDURE — 99999 PR PBB SHADOW E&M-EST. PATIENT-LVL I: CPT | Mod: PBBFAC,,, | Performed by: PATHOLOGY

## 2020-03-05 RX ORDER — TRIAMCINOLONE ACETONIDE 1 MG/G
CREAM TOPICAL
Qty: 1 BOTTLE | Refills: 3 | Status: SHIPPED | OUTPATIENT
Start: 2020-03-05 | End: 2020-11-06 | Stop reason: SDUPTHER

## 2020-03-05 RX ORDER — SALICYLIC ACID 6 MG/100ML
SHAMPOO TOPICAL
Qty: 1 BOTTLE | Refills: 5 | Status: SHIPPED | OUTPATIENT
Start: 2020-03-05 | End: 2020-08-03

## 2020-03-05 RX ORDER — FLUOCINOLONE ACETONIDE 0.11 MG/ML
OIL TOPICAL
Qty: 1 BOTTLE | Refills: 3 | Status: SHIPPED | OUTPATIENT
Start: 2020-03-05 | End: 2020-08-03

## 2020-03-05 RX ORDER — CLOBETASOL PROPIONATE 0.5 MG/G
OINTMENT TOPICAL
Qty: 60 G | Refills: 3 | Status: SHIPPED | OUTPATIENT
Start: 2020-03-05 | End: 2020-12-01 | Stop reason: SDUPTHER

## 2020-03-05 NOTE — PROGRESS NOTES
Subjective:       Patient ID:  Jonathan Villela is a 59 y.o. male who presents for   Chief Complaint   Patient presents with    Follow-up     Pt presents today for Itchy dry, elbows, has been dry and cracking for about a year.   He also c/o of flaky scalp has been using  T-Gel and Tea tree shampoo     Tea tree, T-gel &  with 3% coal tar; synalar soln    Complicated pt here for f/u for focally psoriasiform, focally lichenoid and spongiotic dermatitis.  Face, neck, upper back and shoulders have intermittent mild erythema, scaling, minimal pruritus.  Currently complains of large amounts of loose scaling to scalp with some associated pruritus. Also with persistent thick scaly plaques to elbows and extensor forearms.  Genital involvement clear at this time.  Was on Humira late November 2018 - May 2019 without improvement - it was discontinued.  Rheum (Dr. Snyder) showed unrevealing clinical or lab results for an inflammatory arthritis.  Continues to have some joint pain.  Uses Shake lotion prn to groin and locoid cream to penile head for inverse psoriasis +/- intertrigo not flaring currently.  Rash and xerosis to upper back improved but not entirely resolved - using TAC cream prn.     Still having Intermittent GI symptoms, occasional constipation and diarrhea.  Per PCP note:  intermittent hematochezia, fatty liver and splenomegaly on CT scan.  Does have esophagitis noted on recent endoscopy May 2019 showed duodenum, erythematous mucosa in stomach, 1 cm hiatal hernia along with a gastric polyp.  H pylori negative.  Biopsies negative    There was a prior concern about possible Crohn's disease in the past although this has not been diagnosed on endoscopies.  States that he get BS like symptoms worry will have periodic constipation or loose stools intermixed with normal bowel movements.  Saw hepatology for fatty liver.  Advise lifestyle modification     Has had mildly elevated LFTs.     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 At that time he was also on numerous herbal supplements, which he discontinued.  He was continued on several BP medications including valsartan, chlorthalidone, beta blockers (bystolic, carvedilol), and calcium channel blockers throughout 2016 to 2018. He continued to have episodes of severe migraines. Now on Emgality with improvement.  He now is on amlodipine, carvedilol, and chlorthalidone with clonidine PRN for hypertension.  Stopped testosterone injections.        Biopsy result 10/4/2018  FINAL PATHOLOGIC DIAGNOSIS  1. Skin, right chest, punch biopsy:  - CHANGES MOST COMPATIBLE WITH A DRUG ERUPTION.  MICROSCOPIC DESCRIPTION: The biopsy shows minimal focal parakeratosis alternating with compact  orthokeratosis. There is mild epidermal spongiosis and focal subtle vacuolar interface changes with rare apoptotic  keratinocytes that localize to multiple levels within the epidermis. The dermis is edematous. There is a superficial  and mid dermal perivascular and perifollicular lymphohistiocytic infiltrate with rarer neutrophils and mast cells. PAS  stain is negative for fungi. Appropriately reactive controls were reviewed. Multiple levels were examined.  2. Skin, right upper back, punch biopsy:  - SUBACUTE SPONGIOTIC DERMATITIS (See Comment).  MICROSCOPIC DESCRIPTION: The biopsy shows focal parakeratosis, epidermal acanthosis with elongation of  rete ridges, spongiosis with exocytosis of lymphocytes and a superficial perivascular and perifollicular dermal  lymphohistiocytic infiltrate with rare neutrophils. A majority of the superficial dermal and intraepidermal lymphocytes  stain positive for both CD3 and CD4, while CD8 stains a sparser population of cells. The approximate CD4 to CD8  ratio is 4:1. PAS stain is negative for fungi. Appropriately reactive controls were reviewed. Multiple levels  were  examined.  COMMENT: These histologic features are compatible with an eczematous process such as atopic dermatitis, an  irritant or allergic contact dermatitis, or an id reaction. A drug eruption cannot be excluded. Clinical correlation is  essential.  3. Skin, right groin, punch biopsy:  - SPARSE SUPERFICIAL PERIVASCULAR DERMATITIS (SEE COMMENT).  MICROSCOPIC DESCRIPTION: Sections show a punch biopsy of skin extending to the level of the subcutis. The  stratum corneum consists of compact orthokeratosis. The granular layer is intact. The epidermis is largely  unremarkable, however, apoptotic keratinocytes are noted at multiple levels within the epidermis. Vacuolar  interface alteration is not appreciated. Within the superficial dermis, there is a sparse perivascular lymphohistiocytic  infiltrate with scattered melanophages. PAS stain is negative for yeasts/fungi. Appropriately reactive controls were  reviewed. Multiple levels were examined.  COMMENT: These histologic features are mild and nonspecific. In the appropriate clinical context, they may  represent and resolving eczematous process with postinflammatory pigmentary alteration. Differential diagnosis  includes a viral exanthem or a morbilliform drug eruption. Clinical correlation is advised.  4. Skin, penile base, shave biopsy:  - LICHENOID AND SPONGIOTIC DERMATITIS (SEE COMMENT).  MICROSCOPIC DESCRIPTION: Sections show parakeratosis that is focally associated with neutrophils overlying  an area of epidermis exhibiting a diminished granular layer. There is mild epidermal spongiosis with exocytosis of  lymphocytes and Langerhans cells. Subtle vacuolar interface alteration is appreciated in association with rare  colloid bodies. The underlying superficial dermis contains a vaguely lichenoid and perivascular lymphohistiocytic  infiltrate with a few scattered neutrophils and a rare eosinophil. Erythrocyte extravasation is also noted within the  superficial  dermis. PAS stain is negative for yeasts/fungi. A majority of the superficial dermal and intraepidermal  lymphocytes stain positive for both CD3 and CD4, while CD8 stains a sparser population of cells. The approximate  CD4 to CD8 ratio is 4:1. CD20 is negative, and CD30 stains only a few rare, widely scattered cells. CD1a shows  an essentially normal distribution of Langerhans cells within the involved epidermis. Appropriately reactive controls  were reviewed. Multiple levels were examined.  COMMENT: The differential diagnosis includes pityriasis lichenoides chronica versus a lichenoid drug eruption .  Clinical correlation is advised.    Review of Systems   Constitutional: Negative for fever, chills, weight loss, weight gain, fatigue, night sweats and malaise.   Gastrointestinal: Positive for nausea, abdominal pain, diarrhea and constipation. Negative for vomiting.   Skin: Positive for itching, rash, dry skin and activity-related sunscreen use. Negative for daily sunscreen use, recent sunburn and wears hat.   Hematologic/Lymphatic: Does not bruise/bleed easily.        Objective:    Physical Exam   Constitutional: He appears well-developed and well-nourished.   Neurological: He is alert and oriented to person, place, and time.   Psychiatric: He has a normal mood and affect.   Skin:   Areas Examined (abnormalities noted in diagram):   Scalp / Hair Palpated and Inspected  Head / Face Inspection Performed  Neck Inspection Performed  Chest / Axilla Inspection Performed  Abdomen Inspection Performed  Back Inspection Performed  RUE Inspected  LUE Inspection Performed  RLE Inspected  LLE Inspection Performed  Nails and Digits Inspection Performed                   Diagram Legend     Erythematous scaling macule/papule c/w actinic keratosis       Vascular papule c/w angioma      Pigmented verrucoid papule/plaque c/w seborrheic keratosis      Yellow umbilicated papule c/w sebaceous hyperplasia      Irregularly shaped tan  macule c/w lentigo     1-2 mm smooth white papules consistent with Milia      Movable subcutaneous cyst with punctum c/w epidermal inclusion cyst      Subcutaneous movable cyst c/w pilar cyst      Firm pink to brown papule c/w dermatofibroma      Pedunculated fleshy papule(s) c/w skin tag(s)      Evenly pigmented macule c/w junctional nevus     Mildly variegated pigmented, slightly irregular-bordered macule c/w mildly atypical nevus      Flesh colored to evenly pigmented papule c/w intradermal nevus       Pink pearly papule/plaque c/w basal cell carcinoma      Erythematous hyperkeratotic cursted plaque c/w SCC      Surgical scar with no sign of skin cancer recurrence      Open and closed comedones      Inflammatory papules and pustules      Verrucoid papule consistent consistent with wart     Erythematous eczematous patches and plaques     Dystrophic onycholytic nail with subungual debris c/w onychomycosis     Umbilicated papule    Erythematous-base heme-crusted tan verrucoid plaque consistent with inflamed seborrheic keratosis     Erythematous Silvery Scaling Plaque c/w Psoriasis     See annotation      Assessment / Plan:        Psoriasis - scalp and extensor elbows/forearms currently flaring.  Has intermittent inverse component with genital involvement - temporarily improves with topical steroids and compounded loprox/TAC.  Failed Humira in the past.  Has mildly elevated LFTs - fatty liver and splenomegaly on CT scan.  Continued intermittent joint pain, GI symptoms but no diagnosis of inflammatory arthritis or IBD.  Interested in trying a new biologic, as he feels he cannot stay clear on topicals.  Discussed skyrizi if labs stable.  -     clobetasol 0.05% (TEMOVATE) 0.05 % Oint; AAA bid  Dispense: 60 g; Refill: 3  -     salicylic acid (SALEX) 6 % Sham; Use on scalp qoday - qday. Let sit on scalp x 5 minutes prior to rinsing.  Dispense: 1 Bottle; Refill: 5  -     fluocinolone and shower cap (DERMA-SMOOTHE/FS SCALP  OIL) 0.01 % Oil; Apply oil to damp scalp nightly and cover with shower cap.  Dispense: 1 Bottle; Refill: 3    Discussed  benefits and risks of Skyrizi including but not limited to injection site reactions, increased risk of infection, and decreased tumor surveillance. Patient counseled to avoid live vaccines.    Need baseline CBC, CMP, Hepatitis B, Hepatitis C, and quantiferon gold.     Start Skyrizi at 150mg SQ on week 0 then week 4 then every 12 weeks.    Will need CBC q 6 months. Will need Quantiferon gold annually.            Encounter for long-term (current) use of high-risk medication  -     CBC auto differential; Future; Expected date: 03/05/2020  -     Hepatic function panel; Future; Expected date: 03/05/2020  -     Quantiferon Gold TB; Future; Expected date: 03/05/2020  -     Hepatitis B surface antibody; Future; Expected date: 03/05/2020  -     Hepatitis B surface antibody; Future; Expected date: 03/05/2020  -     Hepatitis C antibody; Future; Expected date: 03/05/2020  -     Hepatitis B core antibody, IgM; Future; Expected date: 03/05/2020  -     Hepatitis B core antibody, total; Future; Expected date: 03/05/2020    Intertrigo with component of inverse psoriasis - Shake lotion (loprox, TAC cream, milk of magnesia)  -     triamcinolone acetonide 0.1% (KENALOG) 0.1 % cream; AAA bid after cool blow dry  Dispense: 1 Bottle; Refill: 3    Eczema, unspecified type - TAC cream or locoid cream prn to jawline, neck, upper back and shoulders    Inflamed seborrheic keratosis - These are benign inherited growths without a malignant potential. Reassurance given to patient. No treatment is necessary.                No follow-ups on file.

## 2020-03-10 ENCOUNTER — PATIENT MESSAGE (OUTPATIENT)
Dept: DERMATOLOGY | Facility: CLINIC | Age: 60
End: 2020-03-10

## 2020-03-10 ENCOUNTER — TELEPHONE (OUTPATIENT)
Dept: PHARMACY | Facility: CLINIC | Age: 60
End: 2020-03-10

## 2020-03-10 NOTE — TELEPHONE ENCOUNTER
Informed Patient  that [facility] received prescription for SKYRIZI and prior authorization is required.  [facility] will be back in touch once insurance determination is received.

## 2020-03-17 ENCOUNTER — TELEPHONE (OUTPATIENT)
Dept: DERMATOLOGY | Facility: CLINIC | Age: 60
End: 2020-03-17

## 2020-03-17 NOTE — TELEPHONE ENCOUNTER
Called and left vm let pt know his liver enzymes were slightly elevated, as they have been in the past.  Everything else was good.  He is cleared to start Skyrizi pending insurance approval.      ----- Message from Farshad Flores MD sent at 3/13/2020  3:32 PM CDT -----  Please let pt know his liver enzymes were slightly elevated, as they have been in the past.  Everything else was good.  He is cleared to start Skyrizi pending insurance approval.  Thanks,  bv

## 2020-03-24 ENCOUNTER — TELEPHONE (OUTPATIENT)
Dept: PHARMACY | Facility: CLINIC | Age: 60
End: 2020-03-24

## 2020-03-26 NOTE — TELEPHONE ENCOUNTER
MARY WORKMAN (Key: [id number]) - [id number] Skyrizi (150 MG Dose) 75MG/0.83ML syringe kit submitted

## 2020-03-27 ENCOUNTER — DOCUMENTATION ONLY (OUTPATIENT)
Dept: DERMATOLOGY | Facility: CLINIC | Age: 60
End: 2020-03-27

## 2020-03-27 NOTE — PROGRESS NOTES
[facility] denied Salicylic acid 6% shampoo. This med is not covered/plan exclusion.  PA #  [id number].

## 2020-03-27 NOTE — TELEPHONE ENCOUNTER
DOCUMENTATION ONLY:   Dominique prior authorization approved until 3/26/21  CASE ID # [id number]  Forwarded to Financial Assistance for co-pay card   [facility] is OON. Patient must fill with [facility] PH: [phone].

## 2020-04-01 NOTE — TELEPHONE ENCOUNTER
Called patient to explain that he is out of network and has to fill with [facility] but offered to enroll him in a copay card in case there were to be a cost for the medication. Patient was understanding and gave consent to get him a copay card. I informed the patient that once obtained I would send him the information via My Chart for him to Provide to [facility] Patient agreed @1:00PM. -TOVA

## 2020-04-02 ENCOUNTER — PATIENT MESSAGE (OUTPATIENT)
Dept: DERMATOLOGY | Facility: CLINIC | Age: 60
End: 2020-04-02

## 2020-04-06 ENCOUNTER — PATIENT MESSAGE (OUTPATIENT)
Dept: DERMATOLOGY | Facility: CLINIC | Age: 60
End: 2020-04-06

## 2020-04-23 ENCOUNTER — TELEPHONE (OUTPATIENT)
Dept: PHARMACY | Facility: CLINIC | Age: 60
End: 2020-04-23

## 2020-04-29 ENCOUNTER — TELEPHONE (OUTPATIENT)
Dept: SLEEP MEDICINE | Facility: CLINIC | Age: 60
End: 2020-04-29

## 2020-04-30 ENCOUNTER — OFFICE VISIT (OUTPATIENT)
Dept: SLEEP MEDICINE | Facility: CLINIC | Age: 60
End: 2020-04-30
Payer: COMMERCIAL

## 2020-04-30 VITALS
WEIGHT: 255 LBS | BODY MASS INDEX: 33.8 KG/M2 | DIASTOLIC BLOOD PRESSURE: 82 MMHG | SYSTOLIC BLOOD PRESSURE: 135 MMHG | HEIGHT: 73 IN

## 2020-04-30 DIAGNOSIS — G47.33 OBSTRUCTIVE SLEEP APNEA: Primary | ICD-10-CM

## 2020-04-30 PROCEDURE — 99213 OFFICE O/P EST LOW 20 MIN: CPT | Mod: 95,CR,, | Performed by: NURSE PRACTITIONER

## 2020-04-30 PROCEDURE — 99213 PR OFFICE/OUTPT VISIT, EST, LEVL III, 20-29 MIN: ICD-10-PCS | Mod: 95,CR,, | Performed by: NURSE PRACTITIONER

## 2020-04-30 NOTE — PROGRESS NOTES
The patient location is: home  The chief complaint leading to consultation is: DELVIN mgt  Visit type: TELE AUDIOVISUAL:74635  Each patient to whom he or she provides medical services by telemedicine is:  (1) informed of the relationship between the physician and patient and the respective role of any other health care provider with respect to management of the patient; and (2) notified that he or she may decline to receive medical services by telemedicine and may withdraw from such care at any time.COVID-19    REFERRED by Dr. Joel    Notes: Last seen 2015. He stopped using apap machine after having titration study due to machine being covered in white dust from home asha and mold. He is mostly SIDE sleeper. Ready to resume trial of PAP, so is his wife. +disrupted sleep. Denies nocturia, just arouses looks at clock and returns to sleep. +daytime fatigue/tiredness. ESS is 13. +snoring. Taking several bp meds. Gained ~ 10% wgt since initial PSG. He had tried full and nose mask and had suffocating sensation, maybe some claustrophobia    EPWORTH SLEEPINESS SCALE 4/30/2020   Sitting and reading 2   Watching TV 2   Sitting, inactive in a public place (e.g. a theatre or a meeting) 2   As a passenger in a car for an hour without a break 2   Lying down to rest in the afternoon when circumstances permit 3   Sitting and talking to someone 1   Sitting quietly after a lunch without alcohol 1   In a car, while stopped for a few minutes in traffic 0   Total score 13     Sleep Clinic New Patient 4/30/2020   What time do you go to bed on a week day? (Give a range) 8:30-10:00   What time do you go to bed on a day off? (Give a range) 8:30-10:00   How long does it take you to fall asleep? (Give a range) 20-30 min   On average, how many times per night do you wake up? 2-3 times   How long does it take you to fall back into sleep? (Give a range) 10-20 min   What time do you wake up to start your day on a week day? (Give a range) 03:45 when  "working. I work 7 days on , then off for 7 days   What time do you wake up to start your day on a day off? (Give a range) 4:30-6:00   What time do you get out of bed? (Give a range) immediatley 6-7 hours   On average, how many hours do you sleep? 6-7 hours   On average, how many naps do you take per day? 1   Rate your sleep quality from 0 to 5 (0-poor, 5-great). 3   Have you experienced:  Weight gain?   How much weight have you lost or gained (in lbs.) in the last year? 10-12 lbs.   On average, how many times per night do you go to the bathroom?  0   Have you ever had a sleep study/CPAP machine/surgery for sleep apnea? Yes   Have you ever had a CPAP machine for sleep apnea? Yes   Have you ever had surgery for sleep apnea? No     Sleep Clinic ROS  4/30/2020   Difficulty breathing through the nose?  Sometimes   Sore throat or dry mouth in the morning? Sometimes   Irregular or very fast heart beat?  No   Shortness of breath?  Sometimes   Acid reflux? Sometimes   Body aches and pains?  Sometimes   Morning headaches? Sometimes   Dizziness? No   Mood changes?  No   Do you exercise?  No   Do you feel like moving your legs a lot?  No     PHYSICAL EXAM:   /82   Ht 6' 1" (1.854 m)   Wt 115.7 kg (255 lb)   BMI 33.64 kg/m²   GENERAL: Obese body habitus, well groomed   HEENT: Conjunctivae are non-erythematous; Nose is symmetrical  SKIN: On face and neck: No abrasions, no rashes, no lesions  RESPIRATORY:  Normal chest expansion and non-labored breathing at rest.   NEURO/PSYCH: Oriented to time, place and person. Normal attention span and concentration. Affect is full. Mood is normal.     PSG 3/26/15: +DELVIN, AHI 12.9, low sat 86%, (239#)  Titration study: 12/2015 Good control of sleep disordered breathing was seen during side position NREM and REM stage sleep at a pressure of 11 cm of water. Pressures up to 12 cm were tolerated, but an effective pressure could not be verified for supine sleep.       ASSESSMENT:   DELVIN, " mild. Had cpap intolerability and then machine damaged sheetrock dust/mold. Ready to resume trial of PAP, 10% wgt gain since study. Side sleeper.   He has medical comorbidities of obesity, hypertension, psoriasis on immunosuppressant medication.      PLAN:   [facility], discussed plan of care (plan apap 10-20cm with FFM with adherence monitoring 4 wks after setup)   2. Discussed etiology of DELVIN and potential ramifications of untreated DELVIN, including stroke, heart disease, HTN.  We discussed potential treatment options, which could include weight loss (10-15%), continuous positive airway pressure (CPAP-definitive), mandibular advancement splint by dentist, or referral for surgical consideration.   3. The patient was advised to abstain from driving should he feel sleepy or drowsy.       Thank you for allowing me the opportunity to participate in the care of your patient

## 2020-04-30 NOTE — LETTER
April 30, 2020      Manish Joel MD  [address]  [address]  [place] [address]           [facility]  [address] [facility] [facility] [address]  Phone: [phone]  Fax: [phone]          Patient: Jonathan Villela   MR Number: [id number]   YOB: 1960   Date of Visit: 4/30/2020       Dear Dr. Manish Joel:    Thank you for referring Jonathan Villela to me for evaluation. Attached you will find relevant portions of my assessment and plan of care.    If you have questions, please do not hesitate to call me. I look forward to following Jonathan Villela along with you.    Sincerely,    Prachi Olivarez, ALEENA    Enclosure  CC:  No Recipients    If you would like to receive this communication electronically, please contact [email or web address] or [phone] to request more information on [employer] access.    For providers and/or their staff who would like to refer a patient to [facility], please contact us through our one-stop-shop provider referral line, [facility], at [phone].    If you feel you have received this communication in error or would no longer like to receive these types of communications, please e-mail [email or web address]

## 2020-05-07 ENCOUNTER — TELEPHONE (OUTPATIENT)
Dept: SLEEP MEDICINE | Facility: OTHER | Age: 60
End: 2020-05-07

## 2020-05-11 ENCOUNTER — TELEPHONE (OUTPATIENT)
Dept: SLEEP MEDICINE | Facility: OTHER | Age: 60
End: 2020-05-11

## 2020-05-21 ENCOUNTER — TELEPHONE (OUTPATIENT)
Dept: PHARMACY | Facility: CLINIC | Age: 60
End: 2020-05-21

## 2020-05-27 DIAGNOSIS — K59.09 CONSTIPATION, CHRONIC: Primary | ICD-10-CM

## 2020-05-27 RX ORDER — LINACLOTIDE 145 UG/1
145 CAPSULE, GELATIN COATED ORAL
Qty: 90 CAPSULE | Refills: 3 | Status: SHIPPED | OUTPATIENT
Start: 2020-05-27 | End: 2020-05-28 | Stop reason: SDUPTHER

## 2020-05-28 DIAGNOSIS — K59.09 CONSTIPATION, CHRONIC: Primary | ICD-10-CM

## 2020-05-28 DIAGNOSIS — K59.00 CONSTIPATION, UNSPECIFIED CONSTIPATION TYPE: Primary | ICD-10-CM

## 2020-05-28 RX ORDER — LINACLOTIDE 145 UG/1
145 CAPSULE, GELATIN COATED ORAL
Qty: 90 CAPSULE | Refills: 3 | Status: SHIPPED | OUTPATIENT
Start: 2020-05-28 | End: 2021-06-10 | Stop reason: SDUPTHER

## 2020-05-28 NOTE — TELEPHONE ENCOUNTER
Called and spoke to pt.  Pt scheduled for labs on 05/29 and asked if Covid-19 antibody testing can be added to his labs.  Informed pt message will be sent and pt appreciated the call.

## 2020-05-29 ENCOUNTER — LAB VISIT (OUTPATIENT)
Dept: LAB | Facility: HOSPITAL | Age: 60
End: 2020-05-29
Attending: INTERNAL MEDICINE
Payer: COMMERCIAL

## 2020-05-29 DIAGNOSIS — K59.09 CONSTIPATION, CHRONIC: ICD-10-CM

## 2020-05-29 LAB
ALBUMIN SERPL BCP-MCNC: 4.3 G/DL (ref 3.5–5.2)
ALP SERPL-CCNC: 57 U/L (ref 55–135)
ALT SERPL W/O P-5'-P-CCNC: 56 U/L (ref 10–44)
ANION GAP SERPL CALC-SCNC: 8 MMOL/L (ref 8–16)
AST SERPL-CCNC: 39 U/L (ref 10–40)
BILIRUB SERPL-MCNC: 0.8 MG/DL (ref 0.1–1)
BUN SERPL-MCNC: 13 MG/DL (ref 6–20)
CALCIUM SERPL-MCNC: 9.9 MG/DL (ref 8.7–10.5)
CHLORIDE SERPL-SCNC: 101 MMOL/L (ref 95–110)
CO2 SERPL-SCNC: 30 MMOL/L (ref 23–29)
CREAT SERPL-MCNC: 1.3 MG/DL (ref 0.5–1.4)
EST. GFR  (AFRICAN AMERICAN): >60 ML/MIN/1.73 M^2
EST. GFR  (NON AFRICAN AMERICAN): 59.7 ML/MIN/1.73 M^2
GLUCOSE SERPL-MCNC: 103 MG/DL (ref 70–110)
POTASSIUM SERPL-SCNC: 4.3 MMOL/L (ref 3.5–5.1)
PROT SERPL-MCNC: 7.6 G/DL (ref 6–8.4)
SARS-COV-2 IGG SERPLBLD QL IA.RAPID: NEGATIVE
SODIUM SERPL-SCNC: 139 MMOL/L (ref 136–145)

## 2020-05-29 PROCEDURE — 80053 COMPREHEN METABOLIC PANEL: CPT

## 2020-05-29 PROCEDURE — 86769 SARS-COV-2 COVID-19 ANTIBODY: CPT

## 2020-05-29 PROCEDURE — 36415 COLL VENOUS BLD VENIPUNCTURE: CPT

## 2020-06-16 ENCOUNTER — TELEPHONE (OUTPATIENT)
Dept: SLEEP MEDICINE | Facility: OTHER | Age: 60
End: 2020-06-16

## 2020-06-16 ENCOUNTER — TELEPHONE (OUTPATIENT)
Dept: PHARMACY | Facility: CLINIC | Age: 60
End: 2020-06-16

## 2020-06-23 ENCOUNTER — TELEPHONE (OUTPATIENT)
Dept: SLEEP MEDICINE | Facility: OTHER | Age: 60
End: 2020-06-23

## 2020-06-23 NOTE — TELEPHONE ENCOUNTER
Talked to patient a couple times about rescheduling the home sleep study,no response.  Sent out a message through [employer] to schedule.

## 2020-07-01 DIAGNOSIS — K76.0 FATTY LIVER DISEASE, NONALCOHOLIC: Primary | ICD-10-CM

## 2020-07-14 ENCOUNTER — TELEPHONE (OUTPATIENT)
Dept: FAMILY MEDICINE | Facility: CLINIC | Age: 60
End: 2020-07-14

## 2020-07-14 NOTE — TELEPHONE ENCOUNTER
----- Message from Padmini Hampton sent at 7/14/2020  4:23 PM CDT -----  Contact: Antonieta Moore [phone]  Type:  Patient Returning Call    Who Called: Antonieta Moore    Who Left Message for Patient: Mariana   Does the patient know what this is regarding?:schedule pre op clearance   Would the patient rather a call back or a response via [facility]? Call back   Best Call Back Number:[phone]  Additional Information: Pt is currently at work

## 2020-07-14 NOTE — TELEPHONE ENCOUNTER
----- Message from Padmini Hampton sent at 7/14/2020 12:08 PM CDT -----  Contact: Sourav Moore [phone]  Patient's sourav is requesting to speak with nurse lab work and EKG orders. Pt is having surgery on 08-21-20.  Please advise.

## 2020-07-15 ENCOUNTER — TELEPHONE (OUTPATIENT)
Dept: PHARMACY | Facility: CLINIC | Age: 60
End: 2020-07-15

## 2020-07-16 ENCOUNTER — TELEPHONE (OUTPATIENT)
Dept: DERMATOLOGY | Facility: CLINIC | Age: 60
End: 2020-07-16

## 2020-07-16 NOTE — TELEPHONE ENCOUNTER
----- Message from Farshad Flores MD sent at 7/16/2020 11:10 AM CDT -----  Can we schedule a f/u appt for this patient in about 1 month?  Thanks,  bv

## 2020-07-21 ENCOUNTER — TELEPHONE (OUTPATIENT)
Dept: SLEEP MEDICINE | Facility: OTHER | Age: 60
End: 2020-07-21

## 2020-07-21 NOTE — TELEPHONE ENCOUNTER
Talked to patient a couple times and sent out a message through [employer] to schedule the home sleep study.  No response.

## 2020-08-03 ENCOUNTER — OFFICE VISIT (OUTPATIENT)
Dept: HEPATOLOGY | Facility: CLINIC | Age: 60
End: 2020-08-03
Payer: COMMERCIAL

## 2020-08-03 ENCOUNTER — PROCEDURE VISIT (OUTPATIENT)
Dept: HEPATOLOGY | Facility: CLINIC | Age: 60
End: 2020-08-03
Payer: COMMERCIAL

## 2020-08-03 ENCOUNTER — TELEPHONE (OUTPATIENT)
Dept: HEPATOLOGY | Facility: CLINIC | Age: 60
End: 2020-08-03

## 2020-08-03 VITALS
SYSTOLIC BLOOD PRESSURE: 114 MMHG | RESPIRATION RATE: 16 BRPM | WEIGHT: 255.5 LBS | BODY MASS INDEX: 33.86 KG/M2 | OXYGEN SATURATION: 91 % | DIASTOLIC BLOOD PRESSURE: 75 MMHG | HEART RATE: 72 BPM | HEIGHT: 73 IN

## 2020-08-03 DIAGNOSIS — R74.01 ELEVATED ALT MEASUREMENT: ICD-10-CM

## 2020-08-03 DIAGNOSIS — K76.0 FATTY LIVER DISEASE, NONALCOHOLIC: Primary | ICD-10-CM

## 2020-08-03 PROCEDURE — 91200 LIVER ELASTOGRAPHY: CPT | Mod: S$GLB,,, | Performed by: NURSE PRACTITIONER

## 2020-08-03 PROCEDURE — 99214 PR OFFICE/OUTPT VISIT, EST, LEVL IV, 30-39 MIN: ICD-10-PCS | Mod: S$GLB,,, | Performed by: NURSE PRACTITIONER

## 2020-08-03 PROCEDURE — 99999 PR PBB SHADOW E&M-EST. PATIENT-LVL V: CPT | Mod: PBBFAC,,, | Performed by: NURSE PRACTITIONER

## 2020-08-03 PROCEDURE — 99999 PR PBB SHADOW E&M-EST. PATIENT-LVL V: ICD-10-PCS | Mod: PBBFAC,,, | Performed by: NURSE PRACTITIONER

## 2020-08-03 PROCEDURE — 99214 OFFICE O/P EST MOD 30 MIN: CPT | Mod: S$GLB,,, | Performed by: NURSE PRACTITIONER

## 2020-08-03 PROCEDURE — 91200 FIBROSCAN (VIBRATION CONTROLLED TRANSIENT ELASTOGRAPHY): ICD-10-PCS | Mod: S$GLB,,, | Performed by: NURSE PRACTITIONER

## 2020-08-03 NOTE — PATIENT INSTRUCTIONS
1. Recommend repeat liver function tests every 3 months - next due 8/2020.  2. Recommend weight loss 25 pounds - referral placed to nutrition services.  3. Recommend to continue to limit alcohol intake.   4. Recommend low carb, low calorie, high fiber diet.  5. Return to clinic in 1 year with [facility] before visit.

## 2020-08-03 NOTE — PROGRESS NOTES
[facility] VISIT ESTABLISHED PT NOTE    REFERRING PROVIDER:  No ref. provider found    CHIEF COMPLAINT: Fatty Liver    HPI: Mr. Villela is a 60 y.o. White male with PMH noted below, presenting for follow up in the management of fatty liver disease. Fibroscan last year suggested minimal fibrosis, but significant hepatic steatosis. Serological work up was negative with the exception of a mildly positive ASMA (1:40). He has a normal Alpha1 antitrypsin level with a SS phenotype. He was last seen in clinic in 6/2019 and was advised to initiate lifestyle modifications and follow up in clinic in 1 year with repeat Fibroscan. Last LFT's in May showed improvement. Unfortunately, he has been unable to successfully lose and maintain weight loss. He has a bike, and plans to start riding again.  Today in clinic he is well appearing and denies jaundice, dark urine, abdominal distention, hematemesis, melena, slowed mentation.     Review of patient's allergies indicates:   Allergen Reactions    Triamterene      Back and lower abdominal pain       Current Outpatient Medications on File Prior to Visit   Medication Sig Dispense Refill    acyclovir (ZOVIRAX) 400 MG tablet Take 400 mg by mouth 2 (two) times daily as needed.      amLODIPine (NORVASC) 5 MG tablet TAKE 1 TABLET(5 MG) BY MOUTH EVERY DAY 90 tablet 3    carvedilol (COREG) 12.5 MG tablet TAKE 1 TABLET BY MOUTH TWICE DAILY WITH MEALS 180 tablet 3    chlorthalidone (HYGROTEN) 12.5 mg Tab tablet Take 12.5 mg by mouth once daily.      clobetasol 0.05% (TEMOVATE) 0.05 % Oint AAA bid 60 g 3    cloNIDine (CATAPRES) 0.1 MG tablet Take 0.1 mg by mouth as needed.   3    dicyclomine (BENTYL) 10 MG capsule Take 1 capsule (10 mg total) by mouth 3 (three) times daily as needed (abdominal cramping). 30 capsule 1    hydrocortisone butyrate (LOCOID) 0.1 % Crea cream AAA bid to affected areas of groin/genitals 60 g 3    LINZESS 145 mcg Cap capsule Take 1 capsule  (145 mcg total) by mouth as needed. 90 capsule 3    ondansetron (ZOFRAN-ODT) 8 MG TbDL Take 1 tablet (8 mg total) by mouth every 6 (six) hours as needed (nausea). 20 tablet 6    pantoprazole (PROTONIX) 40 MG tablet Take 1 tablet (40 mg total) by mouth before breakfast. 90 tablet 3    potassium chloride (K-TAB) 20 mEq Take 1 tablet (20 mEq total) by mouth once daily. 30 tablet 11    promethazine (PHENERGAN) 25 MG tablet promethazine 25 mg tablet      risankizumab-rzaa (SKYRIZI) 150mg/1.66mL(75 mg/0.83 mL x2) subcutaneous injection Inject 150 mg under the skin at week 0, week 4, and every 12 weeks thereafter 150 mg 4    rosuvastatin (CRESTOR) 10 MG tablet Take 1 tablet (10 mg total) by mouth every other day. 45 tablet 3    triamcinolone acetonide 0.1% (KENALOG) 0.1 % cream AAA bid after cool blow dry 1 Bottle 3    TURMERIC ORAL Take by mouth once daily.      urea (CARMOL) 40 % Crea Apply topically 2 (two) times daily. To affected areas of elbows 85 g 3    [DISCONTINUED] acetic acid-hydrocortisone (VOSOL-HC) otic solution Place 1-2 drops into both ears as needed.       [DISCONTINUED] betamethasone valerate 0.1% (VALISONE) 0.1 % Crea betamethasone valerate 0.1 % topical cream as needed      [DISCONTINUED] ciclopirox (LOPROX) 0.77 % Crea Apply topically 2 (two) times daily. Pharmacist: mix 30 g of TAC 0.1% cream with 30 g of Loprox cream in 120 cc of milk of magnesia (Patient taking differently: Apply topically as needed. Pharmacist: mix 30 g of TAC 0.1% cream with 30 g of Loprox cream in 120 cc of milk of magnesia) 30 g 3    [DISCONTINUED] ketoconazole (NIZORAL) 2 % shampoo Wash hair with medicated shampoo at least 2x/week - let sit on scalp at least 5 minutes prior to rinsing 120 mL 5    [DISCONTINUED] metronidazole 1% (METROGEL) 1 % Gel Apply at bedtime as directed as needed for skin infection  0    [DISCONTINUED] triamcinolone acetonide 0.1% (KENALOG) 0.1 % cream AAA bid 454 g 3    [DISCONTINUED]  UNABLE TO FIND Take 1 capsule by mouth once daily. Healthy heart supplement       erenumab-aooe (AIMOVIG) 140 mg/mL autoinjector Inject 140 mg into the skin every 28 days. (Patient not taking: Reported on 8/3/2020) 1 mL 12    mometasone (ELOCON) 0.1 % solution mometasone 0.1 % topical solution      testosterone enanthate (XYOSTED) 100 mg/0.5 mL AtIn Inject 100 mg into the skin every 7 days.      [DISCONTINUED] albuterol (PROVENTIL/VENTOLIN HFA) 90 mcg/actuation inhaler Inhale 1-2 puffs into the lungs every 6 (six) hours as needed for Wheezing. Rescue 18 g 0    [DISCONTINUED] benzonatate (TESSALON) 100 MG capsule       [DISCONTINUED] EUCRISA 2 % Oint Apply 1 application topically 2 (two) times daily. (Patient not taking: Reported on 8/3/2020) 60 g 5    [DISCONTINUED] fluocinolone (DERMA-SMOOTHE) 0.01 % external oil Apply topically 3 (three) times daily. (Patient not taking: Reported on 8/3/2020) 118 mL 3    [DISCONTINUED] fluocinolone acetonide oil (DERMOTIC OIL) 0.01 % Drop Place 3 drops in ear(s) 2 (two) times daily. (Patient not taking: Reported on 8/3/2020) 1 Bottle 3    [DISCONTINUED] fluocinolone and shower cap (DERMA-SMOOTHE/FS SCALP OIL) 0.01 % Oil Apply oil to damp scalp nightly and cover with shower cap. (Patient not taking: Reported on 8/3/2020) 1 Bottle 3    [DISCONTINUED] fluocinonide (LIDEX) 0.05 % external solution AAA scalp qday - bid prn pruritus (Patient not taking: Reported on 8/3/2020) 60 mL 3    [DISCONTINUED] fluticasone (FLONASE) 50 mcg/actuation nasal spray 1 spay each nasal as needed      [DISCONTINUED] hydrocortisone 2.5 % cream Apply topically 2 (two) times daily. To affected areas of face (Patient not taking: Reported on 2/20/2020) 30 g 5    [DISCONTINUED] mupirocin (BACTROBAN) 2 % ointment by Nasal route 2 (two) times daily. (Patient not taking: Reported on 8/3/2020) 15 g 0    [DISCONTINUED] penicillin v potassium (VEETID) 500 MG tablet penicillin V potassium 500 mg tablet       [DISCONTINUED] predniSONE (DELTASONE) 20 MG tablet prednisone 20 mg tablet      [DISCONTINUED] salicylic acid (SALEX) 6 % Sham Use on scalp qoday - qday. Let sit on scalp x 5 minutes prior to rinsing. (Patient not taking: Reported on 8/3/2020) 1 Bottle 5    [DISCONTINUED] sumatriptan (IMITREX STATDOSE) 6 mg/0.5 mL kit Inject 0.5 mLs (6 mg total) into the skin every 2 (two) hours as needed for Migraine (up to 2 doses daily. Hold for blood pressure 155/110). (Patient not taking: Reported on 8/3/2020) 1 kit 12    [DISCONTINUED] sumatriptan (IMITREX) 100 MG tablet TAKE 1 TABLET BY MOUTH EVERY 2 HOURS AS NEEDED (Patient not taking: Reported on 8/3/2020) 9 tablet 12     Current Facility-Administered Medications on File Prior to Visit   Medication Dose Route Frequency Provider Last Rate Last Dose    0.9%  NaCl infusion   Intravenous Continuous Yoshi Erazo MD 20 mL/hr at 05/29/19 0759         PMHX:  has a past medical history of Acute gastric ulcer with hemorrhage (2/15/2017), Bilateral occipital neuralgia, BPH with urinary obstruction (12/31/2015), Chronic constipation, Colitis (5755-2382), Diverticulitis, Erectile dysfunction, Essential hypertension, Gastroesophageal reflux disease without esophagitis (2/11/2017), HLD (hyperlipidemia), Hypogonadism in male, IBS (irritable bowel syndrome), Migraine without aura and without status migrainosus, not intractable (1/31/2014), Obstructive sleep apnea syndrome (2/9/2015), Psoriasis, and PUD (peptic ulcer disease).    PSHX:  has a past surgical history that includes Leg Surgery; Nasal septum surgery; Cholecystectomy; Esophagogastroduodenoscopy; Colonoscopy (N/A, 2/17/2017); Upper gastrointestinal endoscopy; Esophagogastroduodenoscopy (N/A, 12/14/2018); Esophagogastroduodenoscopy (N/A, 5/29/2019); and Colonoscopy (N/A, 5/29/2019).    FAMILY HISTORY: Negative for liver disease, reviewed in [facility]    SOCIAL HISTORY:   Social History     Tobacco Use   Smoking Status Never  Smoker   Smokeless Tobacco Never Used     Social History     Substance and Sexual Activity   Alcohol Use Yes    Frequency: 2-3 times a week    Drinks per session: 1 or 2    Binge frequency: Never    Comment: ocasionally     Social History     Substance and Sexual Activity   Drug Use No       ROS:   GENERAL: Denies fever, chills, weight loss/gain, fatigue  HEENT: Denies headaches, dizziness, vision/hearing changes  CARDIOVASCULAR: Denies chest pain, palpitations, or edema  RESPIRATORY: Denies dyspnea, cough  GI: + abdominal pain - will discuss with Dr. Erazo at upcoming appointment; denies rectal bleeding, nausea, vomiting. No change in bowel pattern or color  : Denies dysuria, hematuria   SKIN: Denies rash, itching   NEURO: Denies confusion, memory loss, or mood changes  PSYCH: Denies depression or anxiety  HEME/LYMPH: Denies easy bruising or bleeding    PHYSICAL EXAM:   Friendly White male, in no acute distress; alert and oriented to person, place and time  VITALS: There were no vitals taken for this visit.  HENT: Normocephalic, without obvious abnormality. Oral mucosa pink and moist. Dentition good.  EYES: Sclerae anicteric. No conjunctival pallor.   NECK: Supple. No masses or cervical adenopathy.  CARDIOVASCULAR: Regular rate and rhythm. No murmurs.  RESPIRATORY: Normal respiratory effort. BBS CTA. No wheezes or crackles.  GI: Soft, non-tender, non-distended. No hepatosplenomegaly. No masses palpable. No ascites.  EXTREMITIES:  No clubbing, cyanosis or edema.  SKIN: Warm and dry. No jaundice. No rashes noted to exposed skin. No telangectasias noted. No palmar erythema.  NEURO:  Normal gait. No asterixis.  PSYCH:  Memory intact. Thought and speech pattern appropriate. Behavior normal. No depression or anxiety noted.    DIAGNOSTIC STUDIES:    EGD 5/29/2019:    Impression:   - Normal examined duodenum. Biopsied.                         - Erythematous mucosa in the stomach. Biopsied.                         -  1 cm hiatal hernia.     COLONOSCOPY 2019:    Impression:  - The examined portion of the ileum was normal.                         - The examined portion of the ileum was normal.                         - Non-bleeding internal hemorrhoids.                         - Diverticulosis in the recto-sigmoid colon, in the                         sigmoid colon, in the descending colon and in the                         transverse colon.                         - The entire examined colon is normal.                         - No specimens collected.     ABD. U/S 2019:       FINDINGS:  The visualized pancreas is within normal limits.  The aorta is not aneurysmal.  There is normal caliber common duct, 2 mm.  The gallbladder has been removed.     Liver size is enlarged, 17.4 cm.  There is diffuse fatty infiltration.     The kidneys are normal in size and appearance.  Right kidney measures 13 cm and left kidney measures 13.3 cm.  Spleen is 13 cm, upper normal size.     Splenic vein in the midline is patent with appropriate flow.  Superior mesenteric vein is patent.  Celiac artery is visualized with no elevation in velocity to indicate stenosis or median arcuate ligament syndrome.  Hepatic veins, portal veins and hepatic arteries are patent with appropriate flow.  Main portal vein caliber is normal.  Inferior vena cava is patent.     IMPRESSION:      Satisfactory Doppler evaluation of the liver.     Hepatomegaly with fatty infiltration of the liver.     Cholecystectomy    FIBROSCAN 2019:    Findings  Median liver stiffness score: 5.6 kPa  CAP readin dB/m     IQR/med: 9 %     Interpretation  Fibrosis interpretation is based on medial liver stiffness - Kilopascal (kPa).      Fibrosis stage: F 0-1      Steatosis interpretation is based on controlled attenuation parameter - (dB/m).     Steatosis grade: S3       EDUCATION:  We discussed the manifestations of non-alcoholic fatty liver disease. At this time, there are  no FDA approved therapy for non-alcoholic fatty liver disease.  The patient and I discussed the importance of diet, exercise, and weight loss for management of NAFLD. We discussed a low fat, low carb/sugar, high fiber diet and a goal of 30 minutes of exercise 5 times per week.  Discussed that fatty liver can progress to steatohepatitis and possibly to cirrhosis, putting one at increased risk for liver cancer, liver failure, and death  Recommend to avoid alcohol consumption. It is known that heavy alcohol use is associated with disease progression among patients with fatty liver disease. Information is unknown at this time of the effects on the liver with alcohol use in moderation.      ASSESSMENT/EDUCATION  60 y.o. White male with elevated ALT and fatty liver disease. Reassuringly, Fibroscan today suggested mild fibrosis with improvement in steatosis (now S2). We discussed the manifestations of non-alcoholic fatty liver disease. At this time, there are no FDA approved therapy for non-alcoholic fatty liver disease. The patient and I discussed the importance of diet, exercise, and weight loss for management of NAFLD. We discussed a low fat, low carb/sugar, high fiber diet and a goal of 30 minutes of exercise 5 times per week. I recommend to avoid alcohol consumption. It is known that heavy alcohol use is associated with disease progression among patients with fatty liver disease. Information is unknown at this time of the effects on the liver with alcohol use in moderation.    PLAN:    1. Recommend repeat liver function tests every 3 months - next due 8/2020.  2. Recommend weight loss 25 pounds - referral placed to nutrition services.  3. Recommend to continue to limit alcohol intake.   4. Recommend low carb, low calorie, high fiber diet, with 30 minutes of aerobic exercise most days of the week.  5. Return to clinic in 1 year with Fibroscan before visit.        Follow up in about 1 year (around 8/3/2021).    Thank  you for allowing me to participate in the care of Jonathan Villela       Hepatology Nurse Practitioner  [facility]  8/3/2020 @ 0935      CC'ed note to:   No ref. provider found  Manish Joel MD

## 2020-08-03 NOTE — PROCEDURES
FibroScan (Vibration Controlled Transient Elastography)    Date/Time: 8/3/2020 8:45 AM  Performed by: Lilly Gustafson NP  Authorized by: Lilly Gustafson NP     Diagnosis:  NAFLD    Probe:     Universal Protocol: Patient's identity, procedure and site were verified, confirmatory pause was performed. Discussed procedure including risks and potential complications.  Questions answered.  Patient verbalizes understanding and wishes to proceed with VCTE.     Procedure: After providing explanations of the procedure, patient was placed in the supine position with right arm in maximum abduction to allow optimal exposure of right lateral abdomen.  Patient was briefly assessed, Testing was performed in the mid-axillary location, 50Hz Shear Wave pulses were applied and the resulting Shear Wave and Propagation Speed detected with a 3.5 MHz ultrasonic signal, using the FibroScan probe, Skin to liver capsule distance and liver parenchyma were accessed during the entire examination with the FibroScan probe, Patient was instructed to breathe normally and to abstain from sudden movements during the procedure, allowing for random measurements of liver stiffness. At least 10 Shear Waves were produced, Individual measurements of each Shear Wave were calculated.  Patient tolerated the procedure well with no complications.  Meets discharge criteria as was dismissed.  Rates pain 0 out of 10.  Patient will follow up with ordering provider to review results.      Findings  Median liver stiffness score:  7  CAP Reading: dB/m:  278    IQR/med %:  19  Interpretation  Fibrosis interpretation is based on medial liver stiffness - Kilopascal (kPa).    Fibrosis Stage:  F 0-1  Steatosis interpretation is based on controlled attenuation parameter - (dB/m).    Steatosis Grade:  S2

## 2020-08-05 ENCOUNTER — OFFICE VISIT (OUTPATIENT)
Dept: GASTROENTEROLOGY | Facility: CLINIC | Age: 60
End: 2020-08-05
Payer: COMMERCIAL

## 2020-08-05 VITALS — BODY MASS INDEX: 33.53 KG/M2 | HEIGHT: 73 IN | WEIGHT: 253 LBS

## 2020-08-05 DIAGNOSIS — K21.9 GASTROESOPHAGEAL REFLUX DISEASE, ESOPHAGITIS PRESENCE NOT SPECIFIED: ICD-10-CM

## 2020-08-05 DIAGNOSIS — K92.1 HEMATOCHEZIA: Primary | ICD-10-CM

## 2020-08-05 DIAGNOSIS — K76.0 FATTY LIVER: ICD-10-CM

## 2020-08-05 DIAGNOSIS — K59.09 CONSTIPATION, CHRONIC: ICD-10-CM

## 2020-08-05 PROCEDURE — 99214 PR OFFICE/OUTPT VISIT, EST, LEVL IV, 30-39 MIN: ICD-10-PCS | Mod: 95,,, | Performed by: INTERNAL MEDICINE

## 2020-08-05 PROCEDURE — 99214 OFFICE O/P EST MOD 30 MIN: CPT | Mod: 95,,, | Performed by: INTERNAL MEDICINE

## 2020-08-05 NOTE — Clinical Note
Crystal please schedule patient telemedicine video visit with me in 3 months for follow-up blood in stool and constipation

## 2020-08-05 NOTE — Clinical Note
Crystal please call jaundice schedule his fasting blood work for this Friday    EGD and colonoscopy ordered
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
## 2020-08-05 NOTE — Clinical Note
Crystal please set up Jonathan for 12 hours fasting blood work At [facility] his Friday August 7, 2020 at at 8:00 a.m.

## 2020-08-07 ENCOUNTER — LAB VISIT (OUTPATIENT)
Dept: LAB | Facility: HOSPITAL | Age: 60
End: 2020-08-07
Attending: INTERNAL MEDICINE
Payer: COMMERCIAL

## 2020-08-07 DIAGNOSIS — K92.1 HEMATOCHEZIA: ICD-10-CM

## 2020-08-07 DIAGNOSIS — K21.9 GASTROESOPHAGEAL REFLUX DISEASE, ESOPHAGITIS PRESENCE NOT SPECIFIED: ICD-10-CM

## 2020-08-07 DIAGNOSIS — K59.09 CONSTIPATION, CHRONIC: ICD-10-CM

## 2020-08-07 DIAGNOSIS — K76.0 FATTY LIVER: ICD-10-CM

## 2020-08-07 LAB
25(OH)D3+25(OH)D2 SERPL-MCNC: 31 NG/ML (ref 30–96)
ALBUMIN SERPL BCP-MCNC: 4.3 G/DL (ref 3.5–5.2)
ALP SERPL-CCNC: 54 U/L (ref 55–135)
ALT SERPL W/O P-5'-P-CCNC: 52 U/L (ref 10–44)
ANION GAP SERPL CALC-SCNC: 8 MMOL/L (ref 8–16)
AST SERPL-CCNC: 42 U/L (ref 10–40)
BASOPHILS # BLD AUTO: 0.04 K/UL (ref 0–0.2)
BASOPHILS NFR BLD: 0.6 % (ref 0–1.9)
BILIRUB SERPL-MCNC: 1.2 MG/DL (ref 0.1–1)
BUN SERPL-MCNC: 11 MG/DL (ref 6–20)
CALCIUM SERPL-MCNC: 9.6 MG/DL (ref 8.7–10.5)
CHLORIDE SERPL-SCNC: 100 MMOL/L (ref 95–110)
CO2 SERPL-SCNC: 31 MMOL/L (ref 23–29)
CREAT SERPL-MCNC: 1.1 MG/DL (ref 0.5–1.4)
DIFFERENTIAL METHOD: ABNORMAL
EOSINOPHIL # BLD AUTO: 0.2 K/UL (ref 0–0.5)
EOSINOPHIL NFR BLD: 3.8 % (ref 0–8)
ERYTHROCYTE [DISTWIDTH] IN BLOOD BY AUTOMATED COUNT: 13.4 % (ref 11.5–14.5)
EST. GFR  (AFRICAN AMERICAN): >60 ML/MIN/1.73 M^2
EST. GFR  (NON AFRICAN AMERICAN): >60 ML/MIN/1.73 M^2
FERRITIN SERPL-MCNC: 20 NG/ML (ref 20–300)
GLUCOSE SERPL-MCNC: 94 MG/DL (ref 70–110)
HCT VFR BLD AUTO: 54.2 % (ref 40–54)
HEPATITIS A ANTIBODY, IGG: POSITIVE
HGB BLD-MCNC: 17.4 G/DL (ref 14–18)
IGA SERPL-MCNC: 249 MG/DL (ref 40–350)
IMM GRANULOCYTES # BLD AUTO: 0.03 K/UL (ref 0–0.04)
IMM GRANULOCYTES NFR BLD AUTO: 0.5 % (ref 0–0.5)
IRON SERPL-MCNC: 74 UG/DL (ref 45–160)
LIPASE SERPL-CCNC: 33 U/L (ref 4–60)
LYMPHOCYTES # BLD AUTO: 1.6 K/UL (ref 1–4.8)
LYMPHOCYTES NFR BLD: 25.2 % (ref 18–48)
MAGNESIUM SERPL-MCNC: 1.6 MG/DL (ref 1.6–2.6)
MCH RBC QN AUTO: 27.2 PG (ref 27–31)
MCHC RBC AUTO-ENTMCNC: 32.1 G/DL (ref 32–36)
MCV RBC AUTO: 85 FL (ref 82–98)
MONOCYTES # BLD AUTO: 0.7 K/UL (ref 0.3–1)
MONOCYTES NFR BLD: 11.5 % (ref 4–15)
NEUTROPHILS # BLD AUTO: 3.7 K/UL (ref 1.8–7.7)
NEUTROPHILS NFR BLD: 58.4 % (ref 38–73)
NRBC BLD-RTO: 0 /100 WBC
PLATELET # BLD AUTO: 291 K/UL (ref 150–350)
PMV BLD AUTO: 9.3 FL (ref 9.2–12.9)
POTASSIUM SERPL-SCNC: 3.4 MMOL/L (ref 3.5–5.1)
PROT SERPL-MCNC: 7.5 G/DL (ref 6–8.4)
RBC # BLD AUTO: 6.39 M/UL (ref 4.6–6.2)
SATURATED IRON: 16 % (ref 20–50)
SODIUM SERPL-SCNC: 139 MMOL/L (ref 136–145)
TOTAL IRON BINDING CAPACITY: 472 UG/DL (ref 250–450)
TRANSFERRIN SERPL-MCNC: 319 MG/DL (ref 200–375)
VIT B12 SERPL-MCNC: 661 PG/ML (ref 210–950)
WBC # BLD AUTO: 6.34 K/UL (ref 3.9–12.7)

## 2020-08-07 PROCEDURE — 82784 ASSAY IGA/IGD/IGG/IGM EACH: CPT

## 2020-08-07 PROCEDURE — 83690 ASSAY OF LIPASE: CPT

## 2020-08-07 PROCEDURE — 82607 VITAMIN B-12: CPT

## 2020-08-07 PROCEDURE — 83540 ASSAY OF IRON: CPT

## 2020-08-07 PROCEDURE — 82728 ASSAY OF FERRITIN: CPT

## 2020-08-07 PROCEDURE — 86706 HEP B SURFACE ANTIBODY: CPT

## 2020-08-07 PROCEDURE — 80053 COMPREHEN METABOLIC PANEL: CPT

## 2020-08-07 PROCEDURE — 83516 IMMUNOASSAY NONANTIBODY: CPT

## 2020-08-07 PROCEDURE — 36415 COLL VENOUS BLD VENIPUNCTURE: CPT | Mod: PO

## 2020-08-07 PROCEDURE — 83735 ASSAY OF MAGNESIUM: CPT

## 2020-08-07 PROCEDURE — 82306 VITAMIN D 25 HYDROXY: CPT

## 2020-08-07 PROCEDURE — 85025 COMPLETE CBC W/AUTO DIFF WBC: CPT

## 2020-08-07 PROCEDURE — 86790 VIRUS ANTIBODY NOS: CPT

## 2020-08-09 ENCOUNTER — TELEPHONE (OUTPATIENT)
Dept: GASTROENTEROLOGY | Facility: CLINIC | Age: 60
End: 2020-08-09

## 2020-08-09 DIAGNOSIS — R79.89 ELEVATED LFTS: Primary | ICD-10-CM

## 2020-08-10 LAB
HBV SURFACE AB SER QL IA: NEGATIVE
HBV SURFACE AB SERPL IA-ACNC: <3 MIU/ML
TTG IGA SER-ACNC: 6 UNITS

## 2020-08-10 NOTE — TELEPHONE ENCOUNTER
Called and spoke to pt.  Pt verbalized understanding of results.  Pt scheduled for repeat labs and appreciated the call.

## 2020-08-11 ENCOUNTER — TELEPHONE (OUTPATIENT)
Dept: ENDOSCOPY | Facility: HOSPITAL | Age: 60
End: 2020-08-11

## 2020-08-11 DIAGNOSIS — Z12.11 SPECIAL SCREENING FOR MALIGNANT NEOPLASMS, COLON: Primary | ICD-10-CM

## 2020-08-11 DIAGNOSIS — Z01.818 PRE-OP TESTING: ICD-10-CM

## 2020-08-11 RX ORDER — POLYETHYLENE GLYCOL 3350, SODIUM SULFATE ANHYDROUS, SODIUM BICARBONATE, SODIUM CHLORIDE, POTASSIUM CHLORIDE 236; 22.74; 6.74; 5.86; 2.97 G/4L; G/4L; G/4L; G/4L; G/4L
4 POWDER, FOR SOLUTION ORAL ONCE
Qty: 4000 ML | Refills: 0 | Status: SHIPPED | OUTPATIENT
Start: 2020-08-11 | End: 2020-08-11

## 2020-08-14 ENCOUNTER — TELEPHONE (OUTPATIENT)
Dept: PHARMACY | Facility: CLINIC | Age: 60
End: 2020-08-14

## 2020-08-17 ENCOUNTER — OFFICE VISIT (OUTPATIENT)
Dept: FAMILY MEDICINE | Facility: CLINIC | Age: 60
End: 2020-08-17
Payer: COMMERCIAL

## 2020-08-17 ENCOUNTER — HOSPITAL ENCOUNTER (OUTPATIENT)
Dept: RADIOLOGY | Facility: HOSPITAL | Age: 60
Discharge: HOME | End: 2020-08-17
Attending: FAMILY MEDICINE
Payer: COMMERCIAL

## 2020-08-17 ENCOUNTER — PATIENT MESSAGE (OUTPATIENT)
Dept: FAMILY MEDICINE | Facility: CLINIC | Age: 60
End: 2020-08-17

## 2020-08-17 VITALS
TEMPERATURE: 98 F | HEIGHT: 73 IN | BODY MASS INDEX: 34.3 KG/M2 | DIASTOLIC BLOOD PRESSURE: 82 MMHG | SYSTOLIC BLOOD PRESSURE: 126 MMHG | WEIGHT: 258.81 LBS | HEART RATE: 74 BPM | OXYGEN SATURATION: 92 %

## 2020-08-17 DIAGNOSIS — Z01.818 PREOPERATIVE EXAMINATION: Primary | ICD-10-CM

## 2020-08-17 DIAGNOSIS — I10 HYPERTENSION, UNSPECIFIED TYPE: ICD-10-CM

## 2020-08-17 DIAGNOSIS — K75.81 NASH (NONALCOHOLIC STEATOHEPATITIS): ICD-10-CM

## 2020-08-17 DIAGNOSIS — Z01.818 PREOPERATIVE EXAMINATION: ICD-10-CM

## 2020-08-17 DIAGNOSIS — R91.8 LUNG FIELD ABNORMAL FINDING ON EXAMINATION: ICD-10-CM

## 2020-08-17 DIAGNOSIS — M79.671 FOOT PAIN, RIGHT: ICD-10-CM

## 2020-08-17 PROCEDURE — 71046 XR CHEST PA AND LATERAL: ICD-10-PCS | Mod: 26,,, | Performed by: RADIOLOGY

## 2020-08-17 PROCEDURE — 71046 X-RAY EXAM CHEST 2 VIEWS: CPT | Mod: TC,FY

## 2020-08-17 PROCEDURE — 71046 X-RAY EXAM CHEST 2 VIEWS: CPT | Mod: 26,,, | Performed by: RADIOLOGY

## 2020-08-17 PROCEDURE — 93010 EKG 12-LEAD: ICD-10-PCS | Mod: S$GLB,,, | Performed by: INTERNAL MEDICINE

## 2020-08-17 PROCEDURE — 99214 PR OFFICE/OUTPT VISIT, EST, LEVL IV, 30-39 MIN: ICD-10-PCS | Mod: S$GLB,,, | Performed by: FAMILY MEDICINE

## 2020-08-17 PROCEDURE — 93010 ELECTROCARDIOGRAM REPORT: CPT | Mod: S$GLB,,, | Performed by: INTERNAL MEDICINE

## 2020-08-17 PROCEDURE — 99999 PR PBB SHADOW E&M-EST. PATIENT-LVL V: ICD-10-PCS | Mod: PBBFAC,,, | Performed by: FAMILY MEDICINE

## 2020-08-17 PROCEDURE — 93005 ELECTROCARDIOGRAM TRACING: CPT | Mod: S$GLB,,, | Performed by: FAMILY MEDICINE

## 2020-08-17 PROCEDURE — 99214 OFFICE O/P EST MOD 30 MIN: CPT | Mod: S$GLB,,, | Performed by: FAMILY MEDICINE

## 2020-08-17 PROCEDURE — 93005 EKG 12-LEAD: ICD-10-PCS | Mod: S$GLB,,, | Performed by: FAMILY MEDICINE

## 2020-08-17 PROCEDURE — 99999 PR PBB SHADOW E&M-EST. PATIENT-LVL V: CPT | Mod: PBBFAC,,, | Performed by: FAMILY MEDICINE

## 2020-08-17 NOTE — PROGRESS NOTES
(Portions of this note were dictated using voice recognition software and may contain dictation related errors in spelling/grammar/syntax not found on text review)    CC:   Chief Complaint   Patient presents with    Pre-op Exam       HPI: 60 y.o. male preop visit for right foot surgery upcoming with Dr. Carmona podiatry.  Spurring lateral foot.  Feels well overall    Medical history below    No chest pain or shortness of breath.  Pulse ox is low normal at 92%, has been chronically.  Had a bad cough December or January went to urgent care and had a chest x-ray which was normal.  Flu negative.  No current respiratory symptoms.    Past Medical History:   Diagnosis Date    Acute gastric ulcer with hemorrhage 2/15/2017    Bilateral occipital neuralgia     BPH with urinary obstruction 12/31/2015    Chronic constipation     Colitis [id number]    infectious?    Diverticulitis     Erectile dysfunction     Essential hypertension     Gastroesophageal reflux disease without esophagitis 2/11/2017    HLD (hyperlipidemia)     Hypogonadism in male     IBS (irritable bowel syndrome)     Migraine without aura and without status migrainosus, not intractable 1/31/2014    Obstructive sleep apnea syndrome 2/9/2015    Psoriasis     PUD (peptic ulcer disease)        Past Surgical History:   Procedure Laterality Date    CHOLECYSTECTOMY      COLONOSCOPY N/A 2/17/2017    Procedure: COLONOSCOPY;  Surgeon: Yoshi Erazo MD;  Location: [facility] ([address] [place] [address]);  Service: Endoscopy;  Laterality: N/A;    COLONOSCOPY N/A 5/29/2019    Procedure: COLONOSCOPY;  Surgeon: Yoshi Erazo MD;  Location: [facility] ([address] [place] [address]);  Service: Endoscopy;  Laterality: N/A;    ESOPHAGOGASTRODUODENOSCOPY      ESOPHAGOGASTRODUODENOSCOPY N/A 12/14/2018    Procedure: EGD (ESOPHAGOGASTRODUODENOSCOPY);  Surgeon: Lexii Carlson MD;  [facility]  Service: Endoscopy;  Laterality: N/A;    ESOPHAGOGASTRODUODENOSCOPY N/A 5/29/2019     Procedure: EGD (ESOPHAGOGASTRODUODENOSCOPY);  Surgeon: Yoshi Erazo MD;  Location: [facility] ([address] [place] [address]);  Service: Endoscopy;  Laterality: N/A;  per Dr Erazo-schedule on 2nd floor    LEG SURGERY      NASAL SEPTUM SURGERY      UPPER GASTROINTESTINAL ENDOSCOPY         Family History   Problem Relation Age of Onset    Crohn's disease Unknown         brother, sister, father, nephew    Heart disease Father     Crohn's disease Father     Inflammatory bowel disease Father     Crohn's disease Sister     Inflammatory bowel disease Sister     Crohn's disease Brother     Inflammatory bowel disease Brother     Kidney disease Mother     Hypertension Mother     Thyroid disease Mother     Cystic fibrosis Maternal Uncle     Prostate cancer Neg Hx     Melanoma Neg Hx     Colon cancer Neg Hx     Celiac disease Neg Hx     Cirrhosis Neg Hx     Esophageal cancer Neg Hx     Liver cancer Neg Hx     Rectal cancer Neg Hx     Stomach cancer Neg Hx     Ulcerative colitis Neg Hx     Liver disease Neg Hx        Social History     Tobacco Use    Smoking status: Never Smoker    Smokeless tobacco: Never Used   Substance Use Topics    Alcohol use: Yes     Frequency: 2-3 times a week     Drinks per session: 1 or 2     Binge frequency: Never     Comment: ocasionally    Drug use: No       Lab Results   Component Value Date    WBC 6.34 08/07/2020    HGB 17.4 08/07/2020    HCT 54.2 (H) 08/07/2020    MCV 85 08/07/2020     08/07/2020    CHOL 199 09/05/2019    TRIG 253 (H) 09/05/2019    HDL 31 (L) 09/05/2019    ALT 52 (H) 08/07/2020    AST 42 (H) 08/07/2020    BILITOT 1.2 (H) 08/07/2020    ALKPHOS 54 (L) 08/07/2020     08/07/2020    K 3.4 (L) 08/07/2020     08/07/2020    CREATININE 1.1 08/07/2020    ESTGFRAFRICA >60.0 08/07/2020    EGFRNONAA >60.0 08/07/2020    CALCIUM 9.6 08/07/2020    ALBUMIN 4.3 08/07/2020    BUN 11 08/07/2020    CO2 31 (H) 08/07/2020    TSH 2.212 01/25/2019    PSA 1.2 10/15/2018     PSADIAG 0.95 01/06/2016    INR 1.2 04/16/2019    HGBA1C 5.4 08/29/2014    LDLCALC 117.4 09/05/2019    GLU 94 08/07/2020         AST (U/L)   Date Value   08/07/2020 42 (H)   05/29/2020 39   03/05/2020 44 (H)   09/05/2019 38   04/16/2019 49 (H)   01/25/2019 35   01/07/2019 44 (H)     ALT (U/L)   Date Value   08/07/2020 52 (H)   05/29/2020 56 (H)   03/05/2020 69 (H)   09/05/2019 44   04/16/2019 75 (H)   01/25/2019 50 (H)               ROS:  GENERAL: No fever, chills, fatigability or weight loss.  SKIN: No rashes, no itching.  HEAD: No headaches.  EYES: No visual changes  EARS: No ear pain or changes in hearing.  NOSE: No congestion or rhinorrhea.  MOUTH & THROAT: No hoarseness, change in voice, or sore throat.  NODES: Denies swollen glands.  CHEST: Denies DICKINSON, cyanosis, wheezing, cough and sputum production.  CARDIOVASCULAR: Denies chest pain, PND, orthopnea.  ABDOMEN: No nausea, vomiting, or changes in bowel function.  URINARY: No flank pain, dysuria or hematuria.  PERIPHERAL VASCULAR: No claudication or cyanosis.  MUSCULOSKELETAL:  Above  NEUROLOGIC: No weakness or numbness.    Vital signs reviewed  PE:   APPEARANCE: Well nourished, well developed, in no acute distress.    HEAD: Normocephalic, atraumatic.  EYES: PERRL. EOMI.   Conjunctivae noninjected.  EARS: TM's intact. Light reflex normal. No retraction or perforation  NECK: Supple with no cervical lymphadenopathy.    CHEST: Good inspiratory effort. Lungs clear to auscultation overall but with some slight bibasilar crackles  CARDIOVASCULAR: Normal S1, S2. No rubs, murmurs, or gallops.  ABDOMEN: Bowel sounds normal. Not distended. Soft. No tenderness or masses. No organomegaly.  EXTREMITIES: No edema, cyanosis, or clubbing.      IMPRESSION  1. Preoperative examination    2. Lung field abnormal finding on examination    3. Hypertension, unspecified type    4. PAYNE (nonalcoholic steatohepatitis)    5. Foot pain, right            PLAN  Overall medically stable for  upcoming surgery.  Form filled out.  Will chest x-ray secondary to prior history of cough now resolved, some bibasilar crackles noted along with pulse ox 92%.  He has no significant respiratory symptoms at this time.    EKG and labs needed for preop.  EKG was done today, shows normal sinus rhythm.  Labs supposed to be scheduled [facility] in the next couple of days.  Will add UA per his surgery requirement

## 2020-08-17 NOTE — TELEPHONE ENCOUNTER
Dear Jonathan Villela    Your recent chest x-ray looks clear.  Please let me know if you develop any issues of feeling short of breath      Manish Joel MD

## 2020-08-18 ENCOUNTER — OFFICE VISIT (OUTPATIENT)
Dept: DERMATOLOGY | Facility: CLINIC | Age: 60
End: 2020-08-18
Payer: COMMERCIAL

## 2020-08-18 DIAGNOSIS — Z79.899 ENCOUNTER FOR LONG-TERM (CURRENT) USE OF HIGH-RISK MEDICATION: ICD-10-CM

## 2020-08-18 DIAGNOSIS — L40.9 PSORIASIS: Primary | ICD-10-CM

## 2020-08-18 PROCEDURE — 99999 PR PBB SHADOW E&M-EST. PATIENT-LVL III: ICD-10-PCS | Mod: PBBFAC,,, | Performed by: PATHOLOGY

## 2020-08-18 PROCEDURE — 99213 PR OFFICE/OUTPT VISIT, EST, LEVL III, 20-29 MIN: ICD-10-PCS | Mod: S$GLB,,, | Performed by: PATHOLOGY

## 2020-08-18 PROCEDURE — 99213 OFFICE O/P EST LOW 20 MIN: CPT | Mod: S$GLB,,, | Performed by: PATHOLOGY

## 2020-08-18 PROCEDURE — 99999 PR PBB SHADOW E&M-EST. PATIENT-LVL III: CPT | Mod: PBBFAC,,, | Performed by: PATHOLOGY

## 2020-08-18 NOTE — PROGRESS NOTES
Subjective:       Patient ID:  Jonathan Villela is a 60 y.o. male who presents for   Chief Complaint   Patient presents with    Psoriasis     Pt presents today for f/u, elbows, dry,  Tx Skyrizi     HPI  presents today for Skyrizi f/u.  Has persistent dry elbows, non particularly itchy and no associated erythema - improved.  Scalp also with much improved redness, scaling, itching to scalp.  On Skyrizi, and skin has been clearer  using  T-Gel and Tea tree shampoo      Tea tree, T-gel &  with 3% coal tar; synalar soln     Complicated pt here for f/u for focally psoriasiform, focally lichenoid and spongiotic dermatitis.  Face, neck, upper back and shoulders have improved dramatically on Skyrizi.  Genital involvement clear at this time.  Was on Humira late November 2018 - May 2019 without improvement - it was discontinued.  Rheum (Dr. Snyder) showed unrevealing clinical or lab results for an inflammatory arthritis.  Joint pain has improved.  Uses Shake lotion prn to groin and locoid cream to penile head for inverse psoriasis +/- intertrigo - has not needed it in past few months.  Rash and xerosis to upper back also improved.  Uses TAC cream infrequently.   Still having Intermittent GI symptoms, mainly constipation and diarrhea.  Has EGD and colonoscopy in the next few weeks. There was a prior concern about possible Crohn's disease in the past although this has not been diagnosed on endoscopies.     Has had mildly elevated LFTs.     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 At that time he was also on numerous herbal supplements, which he discontinued.  He was continued on several BP medications including valsartan, chlorthalidone, beta blockers (bystolic, carvedilol), and calcium channel blockers throughout 2016 to 2018. He continued to have episodes of severe migraines. Now on Emgality with  improvement.  He now is on amlodipine, carvedilol, and chlorthalidone with clonidine PRN for hypertension.        Biopsy result 10/4/2018  FINAL PATHOLOGIC DIAGNOSIS  1. Skin, right chest, punch biopsy:  - CHANGES MOST COMPATIBLE WITH A DRUG ERUPTION.  MICROSCOPIC DESCRIPTION: The biopsy shows minimal focal parakeratosis alternating with compact  orthokeratosis. There is mild epidermal spongiosis and focal subtle vacuolar interface changes with rare apoptotic  keratinocytes that localize to multiple levels within the epidermis. The dermis is edematous. There is a superficial  and mid dermal perivascular and perifollicular lymphohistiocytic infiltrate with rarer neutrophils and mast cells. PAS  stain is negative for fungi. Appropriately reactive controls were reviewed. Multiple levels were examined.  2. Skin, right upper back, punch biopsy:  - SUBACUTE SPONGIOTIC DERMATITIS (See Comment).  MICROSCOPIC DESCRIPTION: The biopsy shows focal parakeratosis, epidermal acanthosis with elongation of  rete ridges, spongiosis with exocytosis of lymphocytes and a superficial perivascular and perifollicular dermal  lymphohistiocytic infiltrate with rare neutrophils. A majority of the superficial dermal and intraepidermal lymphocytes  stain positive for both CD3 and CD4, while CD8 stains a sparser population of cells. The approximate CD4 to CD8  ratio is 4:1. PAS stain is negative for fungi. Appropriately reactive controls were reviewed. Multiple levels were  examined.  COMMENT: These histologic features are compatible with an eczematous process such as atopic dermatitis, an  irritant or allergic contact dermatitis, or an id reaction. A drug eruption cannot be excluded. Clinical correlation is  essential.  3. Skin, right groin, punch biopsy:  - SPARSE SUPERFICIAL PERIVASCULAR DERMATITIS (SEE COMMENT).  MICROSCOPIC DESCRIPTION: Sections show a punch biopsy of skin extending to the level of the subcutis. The  stratum corneum  consists of compact orthokeratosis. The granular layer is intact. The epidermis is largely  unremarkable, however, apoptotic keratinocytes are noted at multiple levels within the epidermis. Vacuolar  interface alteration is not appreciated. Within the superficial dermis, there is a sparse perivascular lymphohistiocytic  infiltrate with scattered melanophages. PAS stain is negative for yeasts/fungi. Appropriately reactive controls were  reviewed. Multiple levels were examined.  COMMENT: These histologic features are mild and nonspecific. In the appropriate clinical context, they may  represent and resolving eczematous process with postinflammatory pigmentary alteration. Differential diagnosis  includes a viral exanthem or a morbilliform drug eruption. Clinical correlation is advised.  4. Skin, penile base, shave biopsy:  - LICHENOID AND SPONGIOTIC DERMATITIS (SEE COMMENT).  MICROSCOPIC DESCRIPTION: Sections show parakeratosis that is focally associated with neutrophils overlying  an area of epidermis exhibiting a diminished granular layer. There is mild epidermal spongiosis with exocytosis of  lymphocytes and Langerhans cells. Subtle vacuolar interface alteration is appreciated in association with rare  colloid bodies. The underlying superficial dermis contains a vaguely lichenoid and perivascular lymphohistiocytic  infiltrate with a few scattered neutrophils and a rare eosinophil. Erythrocyte extravasation is also noted within the  superficial dermis. PAS stain is negative for yeasts/fungi. A majority of the superficial dermal and intraepidermal  lymphocytes stain positive for both CD3 and CD4, while CD8 stains a sparser population of cells. The approximate  CD4 to CD8 ratio is 4:1. CD20 is negative, and CD30 stains only a few rare, widely scattered cells. CD1a shows  an essentially normal distribution of Langerhans cells within the involved epidermis. Appropriately reactive controls  were reviewed. Multiple levels  were examined.  COMMENT: The differential diagnosis includes pityriasis lichenoides chronica versus a lichenoid drug eruption .  Clinical correlation is advised.    Review of Systems   Constitutional: Negative for fever, chills, weight loss, weight gain, fatigue, night sweats and malaise.   HENT: Positive for headaches. Negative for mouth sores (no tongue whiteness).    Respiratory: Negative for shortness of breath.    Gastrointestinal: Negative for nausea, vomiting, abdominal pain and diarrhea.   Musculoskeletal: Positive for arthralgias.   Skin: Positive for dry skin. Negative for itching, rash, daily sunscreen use and activity-related sunscreen use.        Injection site reaction - no   yes   Neurological: Positive for headaches.   Psychiatric/Behavioral: Negative for depressed mood.   Hematologic/Lymphatic: Does not bruise/bleed easily.        Objective:    Physical Exam   Constitutional: He appears well-developed and well-nourished.   Neurological: He is alert and oriented to person, place, and time.   Psychiatric: He has a normal mood and affect.   Skin:   Areas Examined (abnormalities noted in diagram):   Scalp / Hair Palpated and Inspected  Head / Face Inspection Performed  Neck Inspection Performed  Chest / Axilla Inspection Performed  Abdomen Inspection Performed  Back Inspection Performed  RUE Inspected  LUE Inspection Performed  RLE Inspected  LLE Inspection Performed  Nails and Digits Inspection Performed                   Diagram Legend     Erythematous scaling macule/papule c/w actinic keratosis       Vascular papule c/w angioma      Pigmented verrucoid papule/plaque c/w seborrheic keratosis      Yellow umbilicated papule c/w sebaceous hyperplasia      Irregularly shaped tan macule c/w lentigo     1-2 mm smooth white papules consistent with Milia      Movable subcutaneous cyst with punctum c/w epidermal inclusion cyst      Subcutaneous movable cyst c/w pilar cyst      Firm pink to brown papule c/w  dermatofibroma      Pedunculated fleshy papule(s) c/w skin tag(s)      Evenly pigmented macule c/w junctional nevus     Mildly variegated pigmented, slightly irregular-bordered macule c/w mildly atypical nevus      Flesh colored to evenly pigmented papule c/w intradermal nevus       Pink pearly papule/plaque c/w basal cell carcinoma      Erythematous hyperkeratotic cursted plaque c/w SCC      Surgical scar with no sign of skin cancer recurrence      Open and closed comedones      Inflammatory papules and pustules      Verrucoid papule consistent consistent with wart     Erythematous eczematous patches and plaques     Dystrophic onycholytic nail with subungual debris c/w onychomycosis     Umbilicated papule    Erythematous-base heme-crusted tan verrucoid plaque consistent with inflamed seborrheic keratosis     Erythematous Silvery Scaling Plaque c/w Psoriasis     See annotation      Assessment / Plan:        Psoriasis/ psoriasiform dermatitis - essentially clear on Skyrizi, started April 2020; joint pain also improved.  Continue Skyrizi 150 mg q 12 weeks.  Topical sal acid or coal tar shampoo if needed.  Topical TAC cream prn.  Recommend 40% urea cream for elbows.    Encounter for long-term (current) use of high-risk medication - quant gold negative March 2020; Reviewed 8/7/2020 labs.  CBC WNL, LFTs mildly elevated but stable.  Repeat labs in 6 months.               No follow-ups on file.

## 2020-08-20 ENCOUNTER — LAB VISIT (OUTPATIENT)
Dept: LAB | Facility: HOSPITAL | Age: 60
End: 2020-08-20
Attending: INTERNAL MEDICINE
Payer: COMMERCIAL

## 2020-08-20 DIAGNOSIS — R79.89 ELEVATED LFTS: ICD-10-CM

## 2020-08-20 LAB
ALBUMIN SERPL BCP-MCNC: 4.3 G/DL (ref 3.5–5.2)
ALP SERPL-CCNC: 56 U/L (ref 55–135)
ALT SERPL W/O P-5'-P-CCNC: 40 U/L (ref 10–44)
ANION GAP SERPL CALC-SCNC: 7 MMOL/L (ref 8–16)
AST SERPL-CCNC: 38 U/L (ref 10–40)
BILIRUB SERPL-MCNC: 0.8 MG/DL (ref 0.1–1)
BUN SERPL-MCNC: 19 MG/DL (ref 6–20)
CALCIUM SERPL-MCNC: 9.6 MG/DL (ref 8.7–10.5)
CHLORIDE SERPL-SCNC: 100 MMOL/L (ref 95–110)
CO2 SERPL-SCNC: 32 MMOL/L (ref 23–29)
CREAT SERPL-MCNC: 1.2 MG/DL (ref 0.5–1.4)
EST. GFR  (AFRICAN AMERICAN): >60 ML/MIN/1.73 M^2
EST. GFR  (NON AFRICAN AMERICAN): >60 ML/MIN/1.73 M^2
FERRITIN SERPL-MCNC: 30 NG/ML (ref 20–300)
FOLATE SERPL-MCNC: 14.4 NG/ML (ref 4–24)
GLUCOSE SERPL-MCNC: 92 MG/DL (ref 70–110)
HGB BLD-MCNC: 17.9 G/DL (ref 14–18)
IRON SERPL-MCNC: 63 UG/DL (ref 45–160)
POTASSIUM SERPL-SCNC: 3.7 MMOL/L (ref 3.5–5.1)
PROT SERPL-MCNC: 7.4 G/DL (ref 6–8.4)
SATURATED IRON: 14 % (ref 20–50)
SODIUM SERPL-SCNC: 139 MMOL/L (ref 136–145)
TOTAL IRON BINDING CAPACITY: 447 UG/DL (ref 250–450)
TRANSFERRIN SERPL-MCNC: 302 MG/DL (ref 200–375)
VIT B12 SERPL-MCNC: 420 PG/ML (ref 210–950)

## 2020-08-20 PROCEDURE — 82746 ASSAY OF FOLIC ACID SERUM: CPT

## 2020-08-20 PROCEDURE — 82728 ASSAY OF FERRITIN: CPT

## 2020-08-20 PROCEDURE — 80053 COMPREHEN METABOLIC PANEL: CPT

## 2020-08-20 PROCEDURE — 82607 VITAMIN B-12: CPT

## 2020-08-20 PROCEDURE — 83540 ASSAY OF IRON: CPT

## 2020-08-20 PROCEDURE — 85018 HEMOGLOBIN: CPT

## 2020-08-20 PROCEDURE — 36415 COLL VENOUS BLD VENIPUNCTURE: CPT | Mod: PO

## 2020-08-21 DIAGNOSIS — R79.89 LFTS ABNORMAL: ICD-10-CM

## 2020-08-21 DIAGNOSIS — E61.1 IRON DEFICIENCY: Primary | ICD-10-CM

## 2020-08-21 RX ORDER — FERROUS SULFATE 325(65) MG
325 TABLET ORAL EVERY 12 HOURS
Qty: 120 TABLET | Refills: 0 | Status: SHIPPED | OUTPATIENT
Start: 2020-08-21 | End: 2020-10-20

## 2020-08-24 ENCOUNTER — PATIENT MESSAGE (OUTPATIENT)
Dept: ENDOSCOPY | Facility: HOSPITAL | Age: 60
End: 2020-08-24

## 2020-08-26 ENCOUNTER — TELEPHONE (OUTPATIENT)
Dept: GASTROENTEROLOGY | Facility: CLINIC | Age: 60
End: 2020-08-26

## 2020-08-26 ENCOUNTER — PATIENT MESSAGE (OUTPATIENT)
Dept: GASTROENTEROLOGY | Facility: CLINIC | Age: 60
End: 2020-08-26

## 2020-08-26 NOTE — TELEPHONE ENCOUNTER
"----- Message from Yoshi Erazo MD sent at 8/21/2020  8:59 AM CDT -----  Crystal- please tell patient that they are iron deficient but not anaemic and recommend that they take ferrous sulfate one 325mg pill every 12 hours for next 1 to 2 months and repeat fasting hemoglobin and Ferritin in 8 weeks - Orders placed.    Crystal  please tell patient that their Hepatitis A, B and C labs are negative but they have "No" immunity to Hepatitis B and C.    He has immunity to hepatitis A which is great.     There is currently No vaccination yet for Hepatitis C.    There is vaccinations for Hepatitis B,  and Recommend the Hepatitis B vaccination series.     Hepatitis B vaccination series is a 2 part vaccine series - Day 1, and then again in 1-month from the first one.      Orders were  placed.  "

## 2020-08-28 ENCOUNTER — LAB VISIT (OUTPATIENT)
Dept: URGENT CARE | Facility: CLINIC | Age: 60
End: 2020-08-28
Payer: COMMERCIAL

## 2020-08-28 DIAGNOSIS — Z01.818 PRE-OP TESTING: ICD-10-CM

## 2020-08-28 PROCEDURE — U0003 INFECTIOUS AGENT DETECTION BY NUCLEIC ACID (DNA OR RNA); SEVERE ACUTE RESPIRATORY SYNDROME CORONAVIRUS 2 (SARS-COV-2) (CORONAVIRUS DISEASE [COVID-19]), AMPLIFIED PROBE TECHNIQUE, MAKING USE OF HIGH THROUGHPUT TECHNOLOGIES AS DESCRIBED BY CMS-2020-01-R: HCPCS

## 2020-08-29 LAB — SARS-COV-2 RNA RESP QL NAA+PROBE: NOT DETECTED

## 2020-08-31 ENCOUNTER — ANESTHESIA (OUTPATIENT)
Dept: ENDOSCOPY | Facility: HOSPITAL | Age: 60
End: 2020-08-31
Payer: COMMERCIAL

## 2020-08-31 ENCOUNTER — ANESTHESIA EVENT (OUTPATIENT)
Dept: ENDOSCOPY | Facility: HOSPITAL | Age: 60
End: 2020-08-31
Payer: COMMERCIAL

## 2020-08-31 ENCOUNTER — HOSPITAL ENCOUNTER (OUTPATIENT)
Facility: HOSPITAL | Age: 60
Discharge: HOME | End: 2020-08-31
Attending: INTERNAL MEDICINE | Admitting: INTERNAL MEDICINE
Payer: COMMERCIAL

## 2020-08-31 VITALS
SYSTOLIC BLOOD PRESSURE: 158 MMHG | OXYGEN SATURATION: 98 % | BODY MASS INDEX: 33.13 KG/M2 | WEIGHT: 250 LBS | DIASTOLIC BLOOD PRESSURE: 89 MMHG | HEIGHT: 73 IN | RESPIRATION RATE: 16 BRPM | HEART RATE: 75 BPM | TEMPERATURE: 98 F

## 2020-08-31 DIAGNOSIS — K92.1 HEMATOCHEZIA: ICD-10-CM

## 2020-08-31 PROCEDURE — 27201089 HC SNARE, DISP (ANY): Performed by: INTERNAL MEDICINE

## 2020-08-31 PROCEDURE — E9220 PRA ENDO ANESTHESIA: HCPCS | Mod: ,,, | Performed by: NURSE ANESTHETIST, CERTIFIED REGISTERED

## 2020-08-31 PROCEDURE — 88305 TISSUE EXAM BY PATHOLOGIST: ICD-10-PCS | Mod: 26,,, | Performed by: PATHOLOGY

## 2020-08-31 PROCEDURE — 63600175 PHARM REV CODE 636 W HCPCS: Performed by: NURSE ANESTHETIST, CERTIFIED REGISTERED

## 2020-08-31 PROCEDURE — 37000009 HC ANESTHESIA EA ADD 15 MINS: Performed by: INTERNAL MEDICINE

## 2020-08-31 PROCEDURE — 25000003 PHARM REV CODE 250: Performed by: INTERNAL MEDICINE

## 2020-08-31 PROCEDURE — 43239 EGD BIOPSY SINGLE/MULTIPLE: CPT | Performed by: INTERNAL MEDICINE

## 2020-08-31 PROCEDURE — 45385 COLONOSCOPY W/LESION REMOVAL: CPT | Mod: ,,, | Performed by: INTERNAL MEDICINE

## 2020-08-31 PROCEDURE — 43239 EGD BIOPSY SINGLE/MULTIPLE: CPT | Mod: 51,,, | Performed by: INTERNAL MEDICINE

## 2020-08-31 PROCEDURE — 43239 PR EGD, FLEX, W/BIOPSY, SGL/MULTI: ICD-10-PCS | Mod: 51,,, | Performed by: INTERNAL MEDICINE

## 2020-08-31 PROCEDURE — 88342 IMHCHEM/IMCYTCHM 1ST ANTB: CPT | Performed by: PATHOLOGY

## 2020-08-31 PROCEDURE — 37000008 HC ANESTHESIA 1ST 15 MINUTES: Performed by: INTERNAL MEDICINE

## 2020-08-31 PROCEDURE — 88305 TISSUE EXAM BY PATHOLOGIST: CPT | Mod: 26,,, | Performed by: PATHOLOGY

## 2020-08-31 PROCEDURE — 45385 COLONOSCOPY W/LESION REMOVAL: CPT | Performed by: INTERNAL MEDICINE

## 2020-08-31 PROCEDURE — E9220 PRA ENDO ANESTHESIA: ICD-10-PCS | Mod: ,,, | Performed by: NURSE ANESTHETIST, CERTIFIED REGISTERED

## 2020-08-31 PROCEDURE — 45385 PR COLONOSCOPY,REMV LESN,SNARE: ICD-10-PCS | Mod: ,,, | Performed by: INTERNAL MEDICINE

## 2020-08-31 PROCEDURE — 88342 IMHCHEM/IMCYTCHM 1ST ANTB: CPT | Mod: 26,,, | Performed by: PATHOLOGY

## 2020-08-31 PROCEDURE — 25000003 PHARM REV CODE 250: Performed by: NURSE ANESTHETIST, CERTIFIED REGISTERED

## 2020-08-31 PROCEDURE — 27201012 HC FORCEPS, HOT/COLD, DISP: Performed by: INTERNAL MEDICINE

## 2020-08-31 PROCEDURE — 88305 TISSUE EXAM BY PATHOLOGIST: CPT | Mod: 59 | Performed by: PATHOLOGY

## 2020-08-31 PROCEDURE — 88342 CHG IMMUNOCYTOCHEMISTRY: ICD-10-PCS | Mod: 26,,, | Performed by: PATHOLOGY

## 2020-08-31 RX ORDER — LIDOCAINE HYDROCHLORIDE 20 MG/ML
INJECTION INTRAVENOUS
Status: DISCONTINUED | OUTPATIENT
Start: 2020-08-31 | End: 2020-08-31

## 2020-08-31 RX ORDER — SODIUM CHLORIDE 9 MG/ML
INJECTION, SOLUTION INTRAVENOUS CONTINUOUS
Status: DISCONTINUED | OUTPATIENT
Start: 2020-08-31 | End: 2020-08-31 | Stop reason: HOSPADM

## 2020-08-31 RX ORDER — PROPOFOL 10 MG/ML
VIAL (ML) INTRAVENOUS CONTINUOUS PRN
Status: DISCONTINUED | OUTPATIENT
Start: 2020-08-31 | End: 2020-08-31

## 2020-08-31 RX ORDER — PROPOFOL 10 MG/ML
VIAL (ML) INTRAVENOUS
Status: DISCONTINUED | OUTPATIENT
Start: 2020-08-31 | End: 2020-08-31

## 2020-08-31 RX ORDER — GLYCOPYRROLATE 0.2 MG/ML
INJECTION INTRAMUSCULAR; INTRAVENOUS
Status: DISCONTINUED | OUTPATIENT
Start: 2020-08-31 | End: 2020-08-31

## 2020-08-31 RX ADMIN — PROPOFOL 250 MCG/KG/MIN: 10 INJECTION, EMULSION INTRAVENOUS at 01:08

## 2020-08-31 RX ADMIN — SODIUM CHLORIDE: 0.9 INJECTION, SOLUTION INTRAVENOUS at 01:08

## 2020-08-31 RX ADMIN — PROPOFOL 40 MG: 10 INJECTION, EMULSION INTRAVENOUS at 01:08

## 2020-08-31 RX ADMIN — GLYCOPYRROLATE 0.2 MG: 0.2 INJECTION, SOLUTION INTRAMUSCULAR; INTRAVENOUS at 01:08

## 2020-08-31 RX ADMIN — PROPOFOL 100 MG: 10 INJECTION, EMULSION INTRAVENOUS at 01:08

## 2020-08-31 RX ADMIN — LIDOCAINE HYDROCHLORIDE 100 MG: 20 INJECTION, SOLUTION INTRAVENOUS at 01:08

## 2020-08-31 NOTE — [employer] PATIENT INSTRUCTIONS
Discharge Summary/Instructions after an Endoscopic Procedure  Patient Name: Jonathan Villela  Patient MRN: [id number]  Patient YOB: 1960 Monday, August 31, 2020  Yoshi Erazo MD  RESTRICTIONS:  During your procedure today, you received medications for sedation.  These   medications may affect your judgment, balance and coordination.  Therefore,   for 24 hours, you have the following restrictions:   - DO NOT drive a car, operate machinery, make legal/financial decisions,   sign important papers or drink alcohol.    ACTIVITY:  Today: no heavy lifting, straining or running due to procedural   sedation/anesthesia.  The following day: return to full activity including work.  DIET:  Eat and drink normally unless instructed otherwise.     TREATMENT FOR COMMON SIDE EFFECTS:  - Mild abdominal pain, nausea, belching, bloating or excessive gas:  rest,   eat lightly and use a heating pad.  - Sore Throat: treat with throat lozenges and/or gargle with warm salt   water.  - Because air was used during the procedure, expelling large amounts of air   from your rectum or belching is normal.  - If a bowel prep was taken, you may not have a bowel movement for 1-3 days.    This is normal.  SYMPTOMS TO WATCH FOR AND REPORT TO YOUR PHYSICIAN:  1. Abdominal pain or bloating, other than gas cramps.  2. Chest pain.  3. Back pain.  4. Signs of infection such as: chills or fever occurring within 24 hours   after the procedure.  5. Rectal bleeding, which would show as bright red, maroon, or black stools.   (A tablespoon of blood from the rectum is not serious, especially if   hemorrhoids are present.)  6. Vomiting.  7. Weakness or dizziness.  GO DIRECTLY TO THE NEAREST EMERGENCY ROOM IF YOU HAVE ANY OF THE FOLLOWING:      Difficulty breathing              Chills and/or fever over 101 F   Persistent vomiting and/or vomiting blood   Severe abdominal pain   Severe chest pain   Black, tarry stools   Bleeding- more than one  tablespoon   Any other symptom or condition that you feel may need urgent attention  Your doctor recommends these additional instructions:  If any biopsies were taken, your doctors clinic will contact you in 1 to 2   weeks with any results.  - Discharge patient to home.   - Follow an antireflux regimen.   - Telephone endoscopist for pathology results in 2 weeks.   - Follow an antireflux regimen indefinitely.   - Repeat upper endoscopy in 3 years for surveillance based on pathology   results.   - The findings and recommendations were discussed with the patient.   - Return to GI clinic.  For questions, problems or results please call your physician - Yoshi Erazo MD at Work:  [phone].  [facility], EMERGENCY ROOM PHONE NUMBER: [phone]  IF A COMPLICATION OR EMERGENCY SITUATION ARISES AND YOU ARE UNABLE TO REACH   YOUR PHYSICIAN - GO DIRECTLY TO THE EMERGENCY ROOM.  Yoshi Erazo MD  8/31/2020 2:23:55 PM  This report has been verified and signed electronically.  [employer]

## 2020-08-31 NOTE — DISCHARGE INSTRUCTIONS

## 2020-08-31 NOTE — H&P
[facility] 4th [facility]  History & Physical    Subjective:      Chief Complaint/Reason for Admission:    EGD and colonoscopy    Jonathan Villela is a 60 y.o. male.    Past Medical History:   Diagnosis Date    Acute gastric ulcer with hemorrhage 2/15/2017    Bilateral occipital neuralgia     BPH with urinary obstruction 12/31/2015    Chronic constipation     Colitis [id number]    infectious?    Diverticulitis     Erectile dysfunction     Essential hypertension     Gastroesophageal reflux disease without esophagitis 2/11/2017    HLD (hyperlipidemia)     Hypogonadism in male     IBS (irritable bowel syndrome)     Migraine without aura and without status migrainosus, not intractable 1/31/2014    Obstructive sleep apnea syndrome 2/9/2015    Psoriasis     PUD (peptic ulcer disease)      Past Surgical History:   Procedure Laterality Date    CHOLECYSTECTOMY      COLONOSCOPY N/A 2/17/2017    Procedure: COLONOSCOPY;  Surgeon: Yoshi Erazo MD;  Location: [facility] ([address] [place] [address]);  Service: Endoscopy;  Laterality: N/A;    COLONOSCOPY N/A 5/29/2019    Procedure: COLONOSCOPY;  Surgeon: Yoshi Erazo MD;  Location: [facility] (2ND FLR);  Service: Endoscopy;  Laterality: N/A;    ESOPHAGOGASTRODUODENOSCOPY      ESOPHAGOGASTRODUODENOSCOPY N/A 12/14/2018    Procedure: EGD (ESOPHAGOGASTRODUODENOSCOPY);  Surgeon: Lexii Carlson MD;  [facility]  Service: Endoscopy;  Laterality: N/A;    ESOPHAGOGASTRODUODENOSCOPY N/A 5/29/2019    Procedure: EGD (ESOPHAGOGASTRODUODENOSCOPY);  Surgeon: Yoshi Erazo MD;  [facility]  Service: Endoscopy;  Laterality: N/A;  per Dr Erazo-schedule on 2nd floor    LEG SURGERY      NASAL SEPTUM SURGERY      UPPER GASTROINTESTINAL ENDOSCOPY       Family History   Problem Relation Age of Onset    Crohn's disease Unknown         brother, sister, father, nephew    Heart disease Father     Crohn's disease Father      Inflammatory bowel disease Father     Crohn's disease Sister     Inflammatory bowel disease Sister     Crohn's disease Brother     Inflammatory bowel disease Brother     Kidney disease Mother     Hypertension Mother     Thyroid disease Mother     Cystic fibrosis Maternal Uncle     Crohn's disease Paternal Grandmother     Prostate cancer Neg Hx     Melanoma Neg Hx     Colon cancer Neg Hx     Celiac disease Neg Hx     Cirrhosis Neg Hx     Esophageal cancer Neg Hx     Liver cancer Neg Hx     Rectal cancer Neg Hx     Stomach cancer Neg Hx     Ulcerative colitis Neg Hx     Liver disease Neg Hx      Social History     Tobacco Use    Smoking status: Never Smoker    Smokeless tobacco: Never Used   Substance Use Topics    Alcohol use: Yes     Frequency: 2-3 times a week     Drinks per session: 1 or 2     Binge frequency: Never     Comment: ocasionally    Drug use: No       PTA Medications   Medication Sig    acyclovir (ZOVIRAX) 400 MG tablet Take 400 mg by mouth 2 (two) times daily as needed.    amLODIPine (NORVASC) 5 MG tablet TAKE 1 TABLET(5 MG) BY MOUTH EVERY DAY    carvedilol (COREG) 12.5 MG tablet TAKE 1 TABLET BY MOUTH TWICE DAILY WITH MEALS    chlorthalidone (HYGROTEN) 12.5 mg Tab tablet Take 12.5 mg by mouth once daily.    dicyclomine (BENTYL) 10 MG capsule Take 1 capsule (10 mg total) by mouth 3 (three) times daily as needed (abdominal cramping).    erenumab-aooe (AIMOVIG) 140 mg/mL autoinjector Inject 140 mg into the skin every 28 days.    ondansetron (ZOFRAN-ODT) 8 MG TbDL Take 1 tablet (8 mg total) by mouth every 6 (six) hours as needed (nausea).    pantoprazole (PROTONIX) 40 MG tablet Take 1 tablet (40 mg total) by mouth before breakfast.    potassium chloride (K-TAB) 20 mEq Take 1 tablet (20 mEq total) by mouth once daily.    promethazine (PHENERGAN) 25 MG tablet promethazine 25 mg tablet    risankizumab-rzaa (SKYRIZI) 150mg/1.66mL(75 mg/0.83 mL x2) subcutaneous injection  Inject 150 mg under the skin at week 0, week 4, and every 12 weeks thereafter    testosterone enanthate (XYOSTED) 100 mg/0.5 mL AtIn Inject 100 mg into the skin every 7 days.    TURMERIC ORAL Take by mouth once daily.    clobetasol 0.05% (TEMOVATE) 0.05 % Oint AAA bid    cloNIDine (CATAPRES) 0.1 MG tablet Take 0.1 mg by mouth as needed.     ferrous sulfate (FEOSOL) 325 mg (65 mg iron) Tab tablet Take 1 tablet (325 mg total) by mouth every 12 (twelve) hours.    hydrocortisone butyrate (LOCOID) 0.1 % Crea cream AAA bid to affected areas of groin/genitals    LINZESS 145 mcg Cap capsule Take 1 capsule (145 mcg total) by mouth as needed.    mometasone (ELOCON) 0.1 % solution mometasone 0.1 % topical solution    rosuvastatin (CRESTOR) 10 MG tablet Take 1 tablet (10 mg total) by mouth every other day.    triamcinolone acetonide 0.1% (KENALOG) 0.1 % cream AAA bid after cool blow dry    urea (CARMOL) 40 % Crea Apply topically 2 (two) times daily. To affected areas of elbows     Review of patient's allergies indicates:   Allergen Reactions    Triamterene      Back and lower abdominal pain        Review of Systems   Constitutional: Negative for chills, fever and weight loss.   Respiratory: Negative for shortness of breath and wheezing.    Cardiovascular: Negative for chest pain.   Gastrointestinal: Positive for blood in stool and heartburn. Negative for abdominal pain and melena.       Objective:      Vital Signs (Most Recent)  Temp: 97.5 °F (36.4 °C) (08/31/20 1257)  Pulse: 70 (08/31/20 1257)  Resp: 16 (08/31/20 1257)  BP: (!) 140/86 (08/31/20 1257)  SpO2: 96 % (08/31/20 1257)    Vital Signs Range (Last 24H):  Temp:  [97.5 °F (36.4 °C)]   Pulse:  [70]   Resp:  [16]   BP: (140)/(86)   SpO2:  [96 %]     Physical Exam  Cardiovascular:      Rate and Rhythm: Normal rate.   Pulmonary:      Effort: Pulmonary effort is normal.   Abdominal:      General: Abdomen is flat.   Neurological:      Mental Status: He is alert and  oriented to person, place, and time.   Psychiatric:         Mood and Affect: Mood normal.         Behavior: Behavior normal.         Thought Content: Thought content normal.           Assessment:      Active Hospital Problems    Diagnosis  POA    Hematochezia [K92.1]  Yes      Resolved Hospital Problems   No resolved problems to display.       Plan:    EGD and colonoscopy for GERD and hematochezia

## 2020-08-31 NOTE — ANESTHESIA PREPROCEDURE EVALUATION
08/31/2020  Jonathan Villela is a 60 y.o., male.    Past Medical History:   Diagnosis Date    Acute gastric ulcer with hemorrhage 2/15/2017    Bilateral occipital neuralgia     BPH with urinary obstruction 12/31/2015    Chronic constipation     Colitis [id number]    infectious?    Diverticulitis     Erectile dysfunction     Essential hypertension     Gastroesophageal reflux disease without esophagitis 2/11/2017    HLD (hyperlipidemia)     Hypogonadism in male     IBS (irritable bowel syndrome)     Migraine without aura and without status migrainosus, not intractable 1/31/2014    Obstructive sleep apnea syndrome 2/9/2015    Psoriasis     PUD (peptic ulcer disease)      Past Surgical History:   Procedure Laterality Date    CHOLECYSTECTOMY      COLONOSCOPY N/A 2/17/2017    Procedure: COLONOSCOPY;  Surgeon: Yoshi Erazo MD;  Location: [facility] (2ND FLR);  Service: Endoscopy;  Laterality: N/A;    COLONOSCOPY N/A 5/29/2019    Procedure: COLONOSCOPY;  Surgeon: Yoshi Erazo MD;  Location: [facility] (2ND FLR);  Service: Endoscopy;  Laterality: N/A;    ESOPHAGOGASTRODUODENOSCOPY      ESOPHAGOGASTRODUODENOSCOPY N/A 12/14/2018    Procedure: EGD (ESOPHAGOGASTRODUODENOSCOPY);  Surgeon: Lexii Carlson MD;  Location: [facility] (4TH FLR);  Service: Endoscopy;  Laterality: N/A;    ESOPHAGOGASTRODUODENOSCOPY N/A 5/29/2019    Procedure: EGD (ESOPHAGOGASTRODUODENOSCOPY);  Surgeon: Yoshi Erazo MD;  Location: [facility] (2ND FLR);  Service: Endoscopy;  Laterality: N/A;  per Dr Erazo-schedule on 2nd floor    LEG SURGERY      NASAL SEPTUM SURGERY      UPPER GASTROINTESTINAL ENDOSCOPY         Anesthesia Evaluation    I have reviewed the Patient Summary Reports.      I have reviewed the Medications.     Review of Systems  Cardiovascular:   Hypertension DICKINSON  Hypertension    Pulmonary:    Shortness of breath Sleep Apnea  Obstructive Sleep Apnea (DELVIN).   Hepatic/GI:   PUD, GERD Liver Disease, Hepatitis    Neurological:   Headaches        Physical Exam  General:  Well nourished    Airway/Jaw/Neck:  Airway Findings: Mouth Opening: Normal Tongue: Normal  General Airway Assessment: Adult  Mallampati: II  Improves to I with phonation.  TM Distance: Normal, at least 6 cm  Jaw/Neck Findings:  Neck ROM: Normal ROM      Dental:  Dental Findings: In tact   Chest/Lungs:  Chest/Lungs Findings: Clear to auscultation, Normal Respiratory Rate         Mental Status:  Mental Status Findings:  Cooperative, Alert and Oriented         Anesthesia Plan  Type of Anesthesia, risks & benefits discussed:  Anesthesia Type:  general  Patient's Preference: general  Intra-op Monitoring Plan: standard ASA monitors  Intra-op Monitoring Plan Comments:   Post Op Pain Control Plan:   Post Op Pain Control Plan Comments:   Induction:   IV  Beta Blocker:  Patient is not currently on a Beta-Blocker (No further documentation required).       Informed Consent: Patient understands risks and agrees with Anesthesia plan.  Questions answered. Anesthesia consent signed with patient.  ASA Score: 3     Day of Surgery Review of History & Physical: I have interviewed and examined the patient. I have reviewed the patient's H&P dated:  There are no significant changes.          Ready For Surgery From Anesthesia Perspective.

## 2020-08-31 NOTE — [employer] PATIENT INSTRUCTIONS
Discharge Summary/Instructions after an Endoscopic Procedure  Patient Name: Jonathan Villela  Patient MRN: [id number]  Patient YOB: 1960 Monday, August 31, 2020  Yoshi Erazo MD  RESTRICTIONS:  During your procedure today, you received medications for sedation.  These   medications may affect your judgment, balance and coordination.  Therefore,   for 24 hours, you have the following restrictions:   - DO NOT drive a car, operate machinery, make legal/financial decisions,   sign important papers or drink alcohol.    ACTIVITY:  Today: no heavy lifting, straining or running due to procedural   sedation/anesthesia.  The following day: return to full activity including work.  DIET:  Eat and drink normally unless instructed otherwise.     TREATMENT FOR COMMON SIDE EFFECTS:  - Mild abdominal pain, nausea, belching, bloating or excessive gas:  rest,   eat lightly and use a heating pad.  - Sore Throat: treat with throat lozenges and/or gargle with warm salt   water.  - Because air was used during the procedure, expelling large amounts of air   from your rectum or belching is normal.  - If a bowel prep was taken, you may not have a bowel movement for 1-3 days.    This is normal.  SYMPTOMS TO WATCH FOR AND REPORT TO YOUR PHYSICIAN:  1. Abdominal pain or bloating, other than gas cramps.  2. Chest pain.  3. Back pain.  4. Signs of infection such as: chills or fever occurring within 24 hours   after the procedure.  5. Rectal bleeding, which would show as bright red, maroon, or black stools.   (A tablespoon of blood from the rectum is not serious, especially if   hemorrhoids are present.)  6. Vomiting.  7. Weakness or dizziness.  GO DIRECTLY TO THE NEAREST EMERGENCY ROOM IF YOU HAVE ANY OF THE FOLLOWING:      Difficulty breathing              Chills and/or fever over 101 F   Persistent vomiting and/or vomiting blood   Severe abdominal pain   Severe chest pain   Black, tarry stools   Bleeding- more than one  tablespoon   Any other symptom or condition that you feel may need urgent attention  Your doctor recommends these additional instructions:  If any biopsies were taken, your doctors clinic will contact you in 1 to 2   weeks with any results.  - Discharge patient to home.   - Await pathology results.   - Telephone endoscopist for pathology results in 2 weeks.   - Repeat colonoscopy in 5 years for surveillance based on pathology results.     - The findings and recommendations were discussed with the patient.   - Return to GI clinic.   - The findings and recommendations were discussed with the patient.  For questions, problems or results please call your physician - Yoshi Erazo MD at Work:  [phone].  [facility], EMERGENCY ROOM PHONE NUMBER: [phone]  IF A COMPLICATION OR EMERGENCY SITUATION ARISES AND YOU ARE UNABLE TO REACH   YOUR PHYSICIAN - GO DIRECTLY TO THE EMERGENCY ROOM.  Yoshi Erazo MD  8/31/2020 2:23:33 PM  This report has been verified and signed electronically.  [employer]

## 2020-08-31 NOTE — TRANSFER OF CARE
"Anesthesia Transfer of Care Note    Patient: Jonathan Villela    Procedure(s) Performed: Procedure(s) (LRB):  EGD (ESOPHAGOGASTRODUODENOSCOPY) (N/A)  COLONOSCOPY (N/A)    Patient location: PACU    Anesthesia Type: general    Transport from OR: Transported from OR on 6-10 L/min O2 by face mask with adequate spontaneous ventilation    Post pain: adequate analgesia    Post assessment: no apparent anesthetic complications and tolerated procedure well    Post vital signs: stable    Level of consciousness: awake, oriented and alert    Nausea/Vomiting: no nausea/vomiting    Complications: none    Transfer of care protocol was followed      Last vitals:   Visit Vitals  BP (!) 96/54 (BP Location: Left arm, Patient Position: Lying)   Pulse (!) 58   Temp 36.7 °C (98.1 °F) (Oral)   Resp 16   Ht 6' 1" (1.854 m)   Wt 113.4 kg (250 lb)   SpO2 (!) 93%   BMI 32.98 kg/m²     "

## 2020-09-01 ENCOUNTER — PATIENT OUTREACH (OUTPATIENT)
Dept: ADMINISTRATIVE | Facility: OTHER | Age: 60
End: 2020-09-01

## 2020-09-03 ENCOUNTER — OFFICE VISIT (OUTPATIENT)
Dept: DERMATOLOGY | Facility: CLINIC | Age: 60
End: 2020-09-03
Payer: COMMERCIAL

## 2020-09-03 DIAGNOSIS — L98.9 DISEASE OF SKIN AND SUBCUTANEOUS TISSUE: Primary | ICD-10-CM

## 2020-09-03 LAB
FINAL PATHOLOGIC DIAGNOSIS: NORMAL
GROSS: NORMAL

## 2020-09-03 PROCEDURE — 99999 PR PBB SHADOW E&M-EST. PATIENT-LVL III: CPT | Mod: PBBFAC,,, | Performed by: PHYSICIAN ASSISTANT

## 2020-09-03 PROCEDURE — 99201 PR OFFICE/OUTPT VISIT,NEW,LEVL I: CPT | Mod: S$GLB,,, | Performed by: PHYSICIAN ASSISTANT

## 2020-09-03 PROCEDURE — 99999 PR PBB SHADOW E&M-EST. PATIENT-LVL III: ICD-10-PCS | Mod: PBBFAC,,, | Performed by: PHYSICIAN ASSISTANT

## 2020-09-03 PROCEDURE — 99201 PR OFFICE/OUTPT VISIT,NEW,LEVL I: ICD-10-PCS | Mod: S$GLB,,, | Performed by: PHYSICIAN ASSISTANT

## 2020-09-03 NOTE — PROGRESS NOTES
"  Subjective:       Patient ID:  Jonathan Villela is a 60 y.o. male who presents for   Chief Complaint   Patient presents with    Spot     nose     History of Present Illness: The patient presents with chief complaint of lesion.  Location: nose  Duration: 8-9 months intermittently  Signs/Symptoms: none currently but was oozing/ sticky and leaving residue in his mask; does not form a scab or feel rough; he is able to just "wipe it off" but it recurs about a week or so later  Prior treatments: none    Denies hx of mm or nmsc.  Pt typically sees Dr. Flores for psoriasis. Currently on Skyrizi. Last appt 8/18/20 but forgot to mention this.        Review of Systems   Constitutional: Negative for fever and chills.   Skin: Positive for activity-related sunscreen use. Negative for daily sunscreen use and recent sunburn.   Hematologic/Lymphatic: Does not bruise/bleed easily.        Objective:    Physical Exam   Constitutional: He appears well-developed and well-nourished. No distress.   Neurological: He is alert and oriented to person, place, and time. He is not disoriented.   Psychiatric: He has a normal mood and affect.   Skin:   Areas Examined (abnormalities noted in diagram):   Head / Face Inspection Performed              Diagram Legend     Erythematous scaling macule/papule c/w actinic keratosis       Vascular papule c/w angioma      Pigmented verrucoid papule/plaque c/w seborrheic keratosis      Yellow umbilicated papule c/w sebaceous hyperplasia      Irregularly shaped tan macule c/w lentigo     1-2 mm smooth white papules consistent with Milia      Movable subcutaneous cyst with punctum c/w epidermal inclusion cyst      Subcutaneous movable cyst c/w pilar cyst      Firm pink to brown papule c/w dermatofibroma      Pedunculated fleshy papule(s) c/w skin tag(s)      Evenly pigmented macule c/w junctional nevus     Mildly variegated pigmented, slightly irregular-bordered macule c/w mildly atypical nevus      Flesh " colored to evenly pigmented papule c/w intradermal nevus       Pink pearly papule/plaque c/w basal cell carcinoma      Erythematous hyperkeratotic cursted plaque c/w SCC      Surgical scar with no sign of skin cancer recurrence      Open and closed comedones      Inflammatory papules and pustules      Verrucoid papule consistent consistent with wart     Erythematous eczematous patches and plaques     Dystrophic onycholytic nail with subungual debris c/w onychomycosis     Umbilicated papule    Erythematous-base heme-crusted tan verrucoid plaque consistent with inflamed seborrheic keratosis     Erythematous Silvery Scaling Plaque c/w Psoriasis     See annotation      Assessment / Plan:      Disease of skin and subcutaneous tissue  Reassurance provided to pt.   Lesion is not present today and skin exam of the nose is normal. Pt has photos of the previous drainage on his phone. Advised him to try to send via [employer], and I will discuss with Dr. Flores. He will also send a message with photo if it recurs.         Follow up if symptoms worsen or fail to improve.

## 2020-09-04 ENCOUNTER — HOSPITAL ENCOUNTER (EMERGENCY)
Facility: HOSPITAL | Age: 60
Discharge: HOME | End: 2020-09-04
Attending: EMERGENCY MEDICINE
Payer: COMMERCIAL

## 2020-09-04 VITALS
WEIGHT: 250 LBS | DIASTOLIC BLOOD PRESSURE: 91 MMHG | TEMPERATURE: 99 F | OXYGEN SATURATION: 92 % | BODY MASS INDEX: 33.13 KG/M2 | SYSTOLIC BLOOD PRESSURE: 154 MMHG | RESPIRATION RATE: 14 BRPM | HEIGHT: 73 IN | HEART RATE: 73 BPM

## 2020-09-04 DIAGNOSIS — R10.13 EPIGASTRIC PAIN: Primary | ICD-10-CM

## 2020-09-04 DIAGNOSIS — T39.1X1A ACCIDENTAL ACETAMINOPHEN OVERDOSE, INITIAL ENCOUNTER: ICD-10-CM

## 2020-09-04 DIAGNOSIS — T40.2X1A OPIOID OVERDOSE, ACCIDENTAL OR UNINTENTIONAL, INITIAL ENCOUNTER: ICD-10-CM

## 2020-09-04 LAB
ALBUMIN SERPL BCP-MCNC: 4.4 G/DL (ref 3.5–5.2)
ALP SERPL-CCNC: 55 U/L (ref 55–135)
ALT SERPL W/O P-5'-P-CCNC: 49 U/L (ref 10–44)
ANION GAP SERPL CALC-SCNC: 10 MMOL/L (ref 8–16)
APAP SERPL-MCNC: 6 UG/ML (ref 10–20)
APAP SERPL-MCNC: 7 UG/ML (ref 10–20)
AST SERPL-CCNC: 36 U/L (ref 10–40)
BASOPHILS # BLD AUTO: 0.04 K/UL (ref 0–0.2)
BASOPHILS NFR BLD: 0.7 % (ref 0–1.9)
BILIRUB SERPL-MCNC: 0.6 MG/DL (ref 0.1–1)
BUN SERPL-MCNC: 14 MG/DL (ref 6–20)
CALCIUM SERPL-MCNC: 9.4 MG/DL (ref 8.7–10.5)
CHLORIDE SERPL-SCNC: 100 MMOL/L (ref 95–110)
CO2 SERPL-SCNC: 25 MMOL/L (ref 23–29)
CREAT SERPL-MCNC: 1.2 MG/DL (ref 0.5–1.4)
DIFFERENTIAL METHOD: ABNORMAL
EOSINOPHIL # BLD AUTO: 0 K/UL (ref 0–0.5)
EOSINOPHIL NFR BLD: 0.7 % (ref 0–8)
ERYTHROCYTE [DISTWIDTH] IN BLOOD BY AUTOMATED COUNT: 13.2 % (ref 11.5–14.5)
EST. GFR  (AFRICAN AMERICAN): >60 ML/MIN/1.73 M^2
EST. GFR  (NON AFRICAN AMERICAN): >60 ML/MIN/1.73 M^2
GLUCOSE SERPL-MCNC: 126 MG/DL (ref 70–110)
HCT VFR BLD AUTO: 52.1 % (ref 40–54)
HGB BLD-MCNC: 17.4 G/DL (ref 14–18)
IMM GRANULOCYTES # BLD AUTO: 0.02 K/UL (ref 0–0.04)
IMM GRANULOCYTES NFR BLD AUTO: 0.3 % (ref 0–0.5)
LYMPHOCYTES # BLD AUTO: 0.9 K/UL (ref 1–4.8)
LYMPHOCYTES NFR BLD: 15.2 % (ref 18–48)
MCH RBC QN AUTO: 27.4 PG (ref 27–31)
MCHC RBC AUTO-ENTMCNC: 33.4 G/DL (ref 32–36)
MCV RBC AUTO: 82 FL (ref 82–98)
MONOCYTES # BLD AUTO: 0.2 K/UL (ref 0.3–1)
MONOCYTES NFR BLD: 3.5 % (ref 4–15)
NEUTROPHILS # BLD AUTO: 4.8 K/UL (ref 1.8–7.7)
NEUTROPHILS NFR BLD: 79.6 % (ref 38–73)
NRBC BLD-RTO: 0 /100 WBC
PLATELET # BLD AUTO: 232 K/UL (ref 150–350)
PMV BLD AUTO: 9.3 FL (ref 9.2–12.9)
POTASSIUM SERPL-SCNC: 4.1 MMOL/L (ref 3.5–5.1)
PROT SERPL-MCNC: 7.5 G/DL (ref 6–8.4)
RBC # BLD AUTO: 6.36 M/UL (ref 4.6–6.2)
SODIUM SERPL-SCNC: 135 MMOL/L (ref 136–145)
WBC # BLD AUTO: 5.97 K/UL (ref 3.9–12.7)

## 2020-09-04 PROCEDURE — 96374 THER/PROPH/DIAG INJ IV PUSH: CPT

## 2020-09-04 PROCEDURE — S0028 INJECTION, FAMOTIDINE, 20 MG: HCPCS | Performed by: STUDENT IN AN ORGANIZED HEALTH CARE EDUCATION/TRAINING PROGRAM

## 2020-09-04 PROCEDURE — 85025 COMPLETE CBC W/AUTO DIFF WBC: CPT

## 2020-09-04 PROCEDURE — 99284 EMERGENCY DEPT VISIT MOD MDM: CPT | Mod: 25

## 2020-09-04 PROCEDURE — 93010 EKG 12-LEAD: ICD-10-PCS | Mod: ,,, | Performed by: INTERNAL MEDICINE

## 2020-09-04 PROCEDURE — 93010 ELECTROCARDIOGRAM REPORT: CPT | Mod: ,,, | Performed by: INTERNAL MEDICINE

## 2020-09-04 PROCEDURE — 93005 ELECTROCARDIOGRAM TRACING: CPT

## 2020-09-04 PROCEDURE — 80329 ANALGESICS NON-OPIOID 1 OR 2: CPT

## 2020-09-04 PROCEDURE — 80053 COMPREHEN METABOLIC PANEL: CPT

## 2020-09-04 PROCEDURE — 25000003 PHARM REV CODE 250: Performed by: STUDENT IN AN ORGANIZED HEALTH CARE EDUCATION/TRAINING PROGRAM

## 2020-09-04 RX ORDER — ONDANSETRON 4 MG/1
4 TABLET, ORALLY DISINTEGRATING ORAL
Status: COMPLETED | OUTPATIENT
Start: 2020-09-04 | End: 2020-09-04

## 2020-09-04 RX ORDER — OXYCODONE HYDROCHLORIDE 5 MG/1
5 TABLET ORAL EVERY 4 HOURS PRN
Qty: 18 TABLET | Refills: 0 | Status: SHIPPED | OUTPATIENT
Start: 2020-09-04 | End: 2020-09-07

## 2020-09-04 RX ORDER — FAMOTIDINE 10 MG/ML
20 INJECTION INTRAVENOUS 2 TIMES DAILY
Status: DISCONTINUED | OUTPATIENT
Start: 2020-09-04 | End: 2020-09-04 | Stop reason: HOSPADM

## 2020-09-04 RX ORDER — NALOXONE HCL 0.4 MG/ML
0.4 VIAL (ML) INJECTION
Status: DISCONTINUED | OUTPATIENT
Start: 2020-09-04 | End: 2020-09-04 | Stop reason: HOSPADM

## 2020-09-04 RX ADMIN — FAMOTIDINE 20 MG: 10 INJECTION, SOLUTION INTRAVENOUS at 12:09

## 2020-09-04 RX ADMIN — ACTIVATED CHARCOAL 25 G: 208 SUSPENSION ORAL at 12:09

## 2020-09-04 RX ADMIN — ONDANSETRON 4 MG: 4 TABLET, ORALLY DISINTEGRATING ORAL at 12:09

## 2020-09-04 NOTE — ED NOTES
"Pt presents to the ED w/ c/ of accidental overdose. Pt reports taking (10) norcos 7.5mg-325mg pta. Pt states "thought it was my vitamins." pt reports recent foot surgery at [facility] this AM. Pt arrives with boot and dressing on right foot. Pt arrives awake, alert, orientedx4. Pt reports mild nausea and epigastric heartburn that started today. Pt is connected to cardiac monitor, BP cuff, and pulse ox. Will continue to monitor.   "

## 2020-09-04 NOTE — ED NOTES
"Pt is NAD. Pt states "feels tired but fine." pt resting comfortably on stretcher. Will continue to monitor.   "

## 2020-09-04 NOTE — ED NOTES
Spoke to poison control: watch for dec O2 sat, dec RR, and dec LOC. If patient becomes unresponsive give narcan.

## 2020-09-04 NOTE — ED PROVIDER NOTES
Encounter Date: 9/4/2020       History     Chief Complaint   Patient presents with    Drug Overdose     States accidentally took 10 Norco 7.5/325 about 20 min PTA. Thought he was taking vitamins. s/p right foot surgery at [facility] this morning.      59yo M with PUD, GERD, HTN presents for accidental ingestion of his prescribed 10x 7.5-325 Norco about 40 mins prior to arrival. He combines his morning medicines and vitamins into one container to take them, and accidentally added the norco thinking they were vitamins. He had bunionectomy this morning and was discharged for recovery with this new prescription. Patient and wife counted 10 pills missing from the norco bottle. Patient denies SI. Wife was present throughout and confirms story. He has some substernal burning for the past 20 mins but no dizziness, lightheadedness, shortness of breath, chest pain, palpitations, nausea, or vomiting. He had a meal prior to taking the medication        Review of patient's allergies indicates:   Allergen Reactions    Triamterene      Back and lower abdominal pain     Past Medical History:   Diagnosis Date    Acute gastric ulcer with hemorrhage 2/15/2017    Bilateral occipital neuralgia     BPH with urinary obstruction 12/31/2015    Chronic constipation     Colitis [id number]    infectious?    Diverticulitis     Erectile dysfunction     Essential hypertension     Gastroesophageal reflux disease without esophagitis 2/11/2017    HLD (hyperlipidemia)     Hypogonadism in male     IBS (irritable bowel syndrome)     Migraine without aura and without status migrainosus, not intractable 1/31/2014    Obstructive sleep apnea syndrome 2/9/2015    Psoriasis     PUD (peptic ulcer disease)      Past Surgical History:   Procedure Laterality Date    CHOLECYSTECTOMY      COLONOSCOPY N/A 2/17/2017    Procedure: COLONOSCOPY;  Surgeon: Yoshi Erazo MD;  Location: [facility] ([address] [place] [address]);  Service: Endoscopy;  Laterality: N/A;     COLONOSCOPY N/A 5/29/2019    Procedure: COLONOSCOPY;  Surgeon: Yoshi Erazo MD;  Location: [facility] (2ND FLR);  Service: Endoscopy;  Laterality: N/A;    COLONOSCOPY N/A 8/31/2020    Procedure: COLONOSCOPY;  Surgeon: Yoshi Erazo MD;  Location: [facility] (4TH FLR);  Service: Endoscopy;  Laterality: N/A;    ESOPHAGOGASTRODUODENOSCOPY      ESOPHAGOGASTRODUODENOSCOPY N/A 12/14/2018    Procedure: EGD (ESOPHAGOGASTRODUODENOSCOPY);  Surgeon: Lexii Carlson MD;  Location: [facility] (4TH FLR);  Service: Endoscopy;  Laterality: N/A;    ESOPHAGOGASTRODUODENOSCOPY N/A 5/29/2019    Procedure: EGD (ESOPHAGOGASTRODUODENOSCOPY);  Surgeon: Yoshi Erazo MD;  Location: [facility] (2ND FLR);  Service: Endoscopy;  Laterality: N/A;  per Dr Erazo-schedule on 2nd floor    ESOPHAGOGASTRODUODENOSCOPY N/A 8/31/2020    Procedure: EGD (ESOPHAGOGASTRODUODENOSCOPY);  Surgeon: Yoshi Erazo MD;  Location: [facility] (4TH FLR);  Service: Endoscopy;  Laterality: N/A;  covid test 8/28-[facility]    LEG SURGERY      NASAL SEPTUM SURGERY      UPPER GASTROINTESTINAL ENDOSCOPY       Family History   Problem Relation Age of Onset    Crohn's disease Unknown         brother, sister, father, nephew    Heart disease Father     Crohn's disease Father     Inflammatory bowel disease Father     Crohn's disease Sister     Inflammatory bowel disease Sister     Crohn's disease Brother     Inflammatory bowel disease Brother     Kidney disease Mother     Hypertension Mother     Thyroid disease Mother     Cystic fibrosis Maternal Uncle     Crohn's disease Paternal Grandmother     Prostate cancer Neg Hx     Melanoma Neg Hx     Colon cancer Neg Hx     Celiac disease Neg Hx     Cirrhosis Neg Hx     Esophageal cancer Neg Hx     Liver cancer Neg Hx     Rectal cancer Neg Hx     Stomach cancer Neg Hx     Ulcerative colitis Neg Hx     Liver disease Neg Hx      Social History     Tobacco Use    Smoking status:  Never Smoker    Smokeless tobacco: Never Used   Substance Use Topics    Alcohol use: Yes     Frequency: 2-3 times a week     Drinks per session: 1 or 2     Binge frequency: Never     Comment: ocasionally    Drug use: No     Review of Systems   Constitutional: Negative for fatigue and fever.   HENT: Negative for rhinorrhea and sore throat.    Eyes: Negative for discharge and redness.   Respiratory: Negative for cough and shortness of breath.    Cardiovascular: Positive for chest pain. Negative for palpitations.   Gastrointestinal: Negative for diarrhea, nausea and vomiting.   Endocrine: Negative for cold intolerance and heat intolerance.   Genitourinary: Negative for dysuria and frequency.   Musculoskeletal: Negative for myalgias and neck stiffness.   Skin: Negative for pallor and rash.   Neurological: Negative for dizziness and headaches.   Hematological: Does not bruise/bleed easily.   Psychiatric/Behavioral: Negative for agitation and confusion.       Physical Exam     Initial Vitals [09/04/20 1119]   BP Pulse Resp Temp SpO2   (!) 190/97 82 16 97.8 °F (36.6 °C) 100 %      MAP       --         Physical Exam    Nursing note and vitals reviewed.  Constitutional:   Alert and oriented, well appearing, no respiratory distress   HENT:   Head: Normocephalic and atraumatic.   Mouth/Throat: Oropharynx is clear and moist.   Eyes: Conjunctivae are normal. No scleral icterus.   Pupils small but not pinpoint. Reactive and equal   Neck: Normal range of motion. Neck supple.   Cardiovascular: Normal rate, regular rhythm and normal heart sounds.   Pulmonary/Chest: Breath sounds normal. No respiratory distress.   Abdominal: Soft. Bowel sounds are normal. He exhibits no distension. There is no abdominal tenderness.   Musculoskeletal: No tenderness or edema.   Lymphadenopathy:     He has no cervical adenopathy.   Neurological: He is alert and oriented to person, place, and time.   Skin: Skin is warm and dry. Capillary refill takes  less than 2 seconds. No rash noted.   Psychiatric: He has a normal mood and affect. Thought content normal.         ED Course   Procedures  Labs Reviewed   ACETAMINOPHEN LEVEL - Abnormal; Notable for the following components:       Result Value    Acetaminophen (Tylenol), Serum 6.0 (*)     All other components within normal limits   CBC W/ AUTO DIFFERENTIAL - Abnormal; Notable for the following components:    RBC 6.36 (*)     Lymph # 0.9 (*)     Mono # 0.2 (*)     Gran% 79.6 (*)     Lymph% 15.2 (*)     Mono% 3.5 (*)     All other components within normal limits   COMPREHENSIVE METABOLIC PANEL - Abnormal; Notable for the following components:    Sodium 135 (*)     Glucose 126 (*)     ALT 49 (*)     All other components within normal limits   ACETAMINOPHEN LEVEL - Abnormal; Notable for the following components:    Acetaminophen (Tylenol), Serum 7.0 (*)     All other components within normal limits        ECG Results          EKG 12-lead (Final result)  Result time 09/04/20 16:04:01    Final result by Interface, Lab In [facility] (09/04/20 16:04:01)                 Narrative:    Test Reason : R10.13,    Vent. Rate : 078 BPM     Atrial Rate : 078 BPM     P-R Int : 192 ms          QRS Dur : 096 ms      QT Int : 372 ms       P-R-T Axes : 062 037 069 degrees     QTc Int : 424 ms    Normal sinus rhythm  Possible Inferior infarct ,age undetermined  Abnormal ECG  When compared with ECG of 17-AUG-2020 11:41,  No significant change was found ; small  Q wave AVF is more apparent  Confirmed by Miguel Ángel Sánchez MD ([id number]) on 9/4/2020 4:03:48 PM    Referred By: [email or web address]   SELF           Confirmed By:Miguel Ángel Sánchez MD                            Imaging Results    None          Medical Decision Making:   Initial Assessment:   Well-appearing 59yo M presents for accidental overdose on 10x 7.5-325 Norco with no SI suspected. Asymptomatic apart from reflux on presentation  Differential Diagnosis:   Opioid overdose, acetaminophen toxicity,  unlikely suicide attempt  ED Management:  Famotidine  Charcoal  Acetaminophen level initial + after 4 hours  Cardiac and oxygen monitoring  Zofran given  Acetaminophen level 6.0 at initial presentation, 7.0 at 4 hours  No complaints throughout stay, no respiratory suppression, hypoxia, altered mental status  Patient discharged with Oxycodone x3days with no tylenol  Patient has close followup. Return precautions given                                 Clinical Impression:       ICD-10-CM ICD-9-CM   1. Epigastric pain  R10.13 789.06   2. Opioid overdose, accidental or unintentional, initial encounter  T40.2X1A 965.00     E850.2   3. Accidental acetaminophen overdose, initial encounter  T39.1X1A 965.4     E850.4             ED Disposition Condition    Discharge Stable        ED Prescriptions     Medication Sig Dispense Start Date End Date Auth. Provider    oxyCODONE (ROXICODONE) 5 MG immediate release tablet Take 1 tablet (5 mg total) by mouth every 4 (four) hours as needed for Pain. 18 tablet 9/4/2020 9/7/2020 Sarath Arthur MD        Follow-up Information    None                                    Sarath Arthur MD  Resident  09/04/20 [id number]

## 2020-09-06 NOTE — ANESTHESIA POSTPROCEDURE EVALUATION
Anesthesia Post Evaluation    Patient: Jonathan Villela    Procedure(s) Performed: Procedure(s) (LRB):  EGD (ESOPHAGOGASTRODUODENOSCOPY) (N/A)  COLONOSCOPY (N/A)    Final Anesthesia Type: general    Patient location during evaluation: PACU  Patient participation: Yes- Able to Participate  Level of consciousness: awake and alert  Post-procedure vital signs: reviewed and stable  Pain management: adequate  Airway patency: patent    PONV status at discharge: No PONV  Anesthetic complications: no      Cardiovascular status: blood pressure returned to baseline  Respiratory status: spontaneous ventilation and room air  Hydration status: euvolemic  Follow-up not needed.          Vitals Value Taken Time   /89 08/31/20 1430   Temp 36.7 °C (98.1 °F) 08/31/20 1407   Pulse 75 08/31/20 1430   Resp 16 08/31/20 1430   SpO2 98 % 08/31/20 1430         No case tracking events are documented in the log.      Pain/Sara Score: Sara Score: 10 (8/31/2020  2:30 PM)        
Adult

## 2020-09-12 ENCOUNTER — TELEPHONE (OUTPATIENT)
Dept: PHARMACY | Facility: CLINIC | Age: 60
End: 2020-09-12

## 2020-09-12 DIAGNOSIS — E61.1 IRON DEFICIENCY: Primary | ICD-10-CM

## 2020-09-18 RX ORDER — AMLODIPINE BESYLATE 5 MG/1
5 TABLET ORAL DAILY
Qty: 90 TABLET | Refills: 3 | Status: SHIPPED | OUTPATIENT
Start: 2020-09-18 | End: 2021-10-04

## 2020-09-23 ENCOUNTER — LAB VISIT (OUTPATIENT)
Dept: LAB | Facility: HOSPITAL | Age: 60
End: 2020-09-23
Attending: INTERNAL MEDICINE
Payer: COMMERCIAL

## 2020-09-23 ENCOUNTER — TELEPHONE (OUTPATIENT)
Dept: GASTROENTEROLOGY | Facility: CLINIC | Age: 60
End: 2020-09-23

## 2020-09-23 DIAGNOSIS — R79.89 LFTS ABNORMAL: ICD-10-CM

## 2020-09-23 DIAGNOSIS — E61.1 IRON DEFICIENCY: ICD-10-CM

## 2020-09-23 DIAGNOSIS — E78.2 MIXED HYPERLIPIDEMIA: ICD-10-CM

## 2020-09-23 DIAGNOSIS — I10 ESSENTIAL HYPERTENSION: ICD-10-CM

## 2020-09-23 LAB
ALT SERPL W/O P-5'-P-CCNC: 53 U/L (ref 10–44)
ANION GAP SERPL CALC-SCNC: 11 MMOL/L (ref 8–16)
AST SERPL-CCNC: 37 U/L (ref 10–40)
BUN SERPL-MCNC: 16 MG/DL (ref 6–20)
CALCIUM SERPL-MCNC: 9.8 MG/DL (ref 8.7–10.5)
CHLORIDE SERPL-SCNC: 100 MMOL/L (ref 95–110)
CHOLEST SERPL-MCNC: 138 MG/DL (ref 120–199)
CHOLEST/HDLC SERPL: 3.9 {RATIO} (ref 2–5)
CO2 SERPL-SCNC: 29 MMOL/L (ref 23–29)
CREAT SERPL-MCNC: 1 MG/DL (ref 0.5–1.4)
EST. GFR  (AFRICAN AMERICAN): >60 ML/MIN/1.73 M^2
EST. GFR  (NON AFRICAN AMERICAN): >60 ML/MIN/1.73 M^2
FERRITIN SERPL-MCNC: 52 NG/ML (ref 20–300)
GLUCOSE SERPL-MCNC: 98 MG/DL (ref 70–110)
HDLC SERPL-MCNC: 35 MG/DL (ref 40–75)
HDLC SERPL: 25.4 % (ref 20–50)
HGB BLD-MCNC: 17.9 G/DL (ref 14–18)
IRON SERPL-MCNC: 63 UG/DL (ref 45–160)
LDLC SERPL CALC-MCNC: 67 MG/DL (ref 63–159)
NONHDLC SERPL-MCNC: 103 MG/DL
POTASSIUM SERPL-SCNC: 3.7 MMOL/L (ref 3.5–5.1)
SATURATED IRON: 14 % (ref 20–50)
SODIUM SERPL-SCNC: 140 MMOL/L (ref 136–145)
TOTAL IRON BINDING CAPACITY: 437 UG/DL (ref 250–450)
TRANSFERRIN SERPL-MCNC: 295 MG/DL (ref 200–375)
TRIGL SERPL-MCNC: 180 MG/DL (ref 30–150)

## 2020-09-23 PROCEDURE — 36415 COLL VENOUS BLD VENIPUNCTURE: CPT | Mod: PO

## 2020-09-23 PROCEDURE — 82728 ASSAY OF FERRITIN: CPT

## 2020-09-23 PROCEDURE — 85018 HEMOGLOBIN: CPT

## 2020-09-23 PROCEDURE — 84450 TRANSFERASE (AST) (SGOT): CPT

## 2020-09-23 PROCEDURE — 84460 ALANINE AMINO (ALT) (SGPT): CPT

## 2020-09-23 PROCEDURE — 80048 BASIC METABOLIC PNL TOTAL CA: CPT

## 2020-09-23 PROCEDURE — 83540 ASSAY OF IRON: CPT

## 2020-09-23 PROCEDURE — 80061 LIPID PANEL: CPT

## 2020-09-23 NOTE — TELEPHONE ENCOUNTER
----- Message from Crystal Romero sent at 9/22/2020  4:32 PM CDT -----  Contact: cell:  [phone] /Toribio [phone]    ----- Message -----  From: Guillermina Castellanos  Sent: 9/22/2020  12:13 PM CDT  To: Kacey AMAYA Staff    Caller: Randi   / Teresa/ tel:  [phone]  has some questions about moving the upcoming appt. To an OFFICE visit instead to discuss procedures . Pls call to change this.  (no access).

## 2020-09-23 NOTE — TELEPHONE ENCOUNTER
Called and spoke to pt.  Pt asked to switch appt on 11/10 to in person visit.  Notified pt message will be sent to switch the appt.  After looking over schedule, called pt, no answer.  LM informing pt the appt can not be changed and needs to be rescheduled being provider is not in clinic on 11/10.  Provided dept main line for pt to call back to reschedule.

## 2020-09-24 ENCOUNTER — TELEPHONE (OUTPATIENT)
Dept: GASTROENTEROLOGY | Facility: CLINIC | Age: 60
End: 2020-09-24

## 2020-09-24 NOTE — TELEPHONE ENCOUNTER
----- Message from Crystal Romero sent at 9/23/2020  4:29 PM CDT -----  Contact: [phone]    ----- Message -----  From: Lyndsey Glaser MA  Sent: 9/23/2020  11:41 AM CDT  To: Kacey [facility]    ret a call to Crystal re: upcoming appt and lab work needed :  [phone]

## 2020-09-29 DIAGNOSIS — R79.89 LFTS ABNORMAL: ICD-10-CM

## 2020-09-29 DIAGNOSIS — E61.1 IRON DEFICIENCY: ICD-10-CM

## 2020-10-02 ENCOUNTER — OFFICE VISIT (OUTPATIENT)
Dept: CARDIOLOGY | Facility: CLINIC | Age: 60
End: 2020-10-02
Payer: COMMERCIAL

## 2020-10-02 ENCOUNTER — PATIENT OUTREACH (OUTPATIENT)
Dept: ADMINISTRATIVE | Facility: OTHER | Age: 60
End: 2020-10-02

## 2020-10-02 ENCOUNTER — TELEPHONE (OUTPATIENT)
Dept: GASTROENTEROLOGY | Facility: CLINIC | Age: 60
End: 2020-10-02

## 2020-10-02 VITALS
WEIGHT: 258.5 LBS | SYSTOLIC BLOOD PRESSURE: 130 MMHG | BODY MASS INDEX: 34.26 KG/M2 | DIASTOLIC BLOOD PRESSURE: 84 MMHG | HEART RATE: 68 BPM | HEIGHT: 73 IN

## 2020-10-02 DIAGNOSIS — I70.0 ABDOMINAL AORTIC ATHEROSCLEROSIS: ICD-10-CM

## 2020-10-02 DIAGNOSIS — I10 ESSENTIAL HYPERTENSION: Primary | ICD-10-CM

## 2020-10-02 DIAGNOSIS — E78.2 MIXED HYPERLIPIDEMIA: ICD-10-CM

## 2020-10-02 PROCEDURE — 99999 PR PBB SHADOW E&M-EST. PATIENT-LVL V: ICD-10-PCS | Mod: PBBFAC,,, | Performed by: INTERNAL MEDICINE

## 2020-10-02 PROCEDURE — 99214 OFFICE O/P EST MOD 30 MIN: CPT | Mod: S$GLB,,, | Performed by: INTERNAL MEDICINE

## 2020-10-02 PROCEDURE — 99214 PR OFFICE/OUTPT VISIT, EST, LEVL IV, 30-39 MIN: ICD-10-PCS | Mod: S$GLB,,, | Performed by: INTERNAL MEDICINE

## 2020-10-02 PROCEDURE — 99999 PR PBB SHADOW E&M-EST. PATIENT-LVL V: CPT | Mod: PBBFAC,,, | Performed by: INTERNAL MEDICINE

## 2020-10-02 RX ORDER — CYANOCOBALAMIN 1000 UG/ML
INJECTION, SOLUTION INTRAMUSCULAR; SUBCUTANEOUS
COMMUNITY
Start: 2020-06-23

## 2020-10-02 RX ORDER — SUMATRIPTAN SUCCINATE 100 MG/1
1 TABLET ORAL
COMMUNITY
Start: 2020-09-08 | End: 2021-03-01

## 2020-10-02 RX ORDER — ERGOCALCIFEROL 1.25 MG/1
1 CAPSULE ORAL
COMMUNITY
Start: 2020-07-02

## 2020-10-02 NOTE — TELEPHONE ENCOUNTER
----- Message from Yoshi Erazo MD sent at 9/29/2020  1:06 PM CDT -----  Crystal- please tell patient that they are just slightly iron deficient but not anemic.      Please order repeat fasting Hemoglobin, Iron/TIBC, and Ferritin in 12 weeks - Orders placed.

## 2020-10-02 NOTE — PROGRESS NOTES
Subjective:     Problem List:  HTN  DELVIN unable to tolerate CPAP  Headaches  Atherosclerotic disease  - ulcerated plaque infrarenal abdominal aorta  CACS 60    HPI:   Jonathan Villela is a 60 y.o. male who presents for follow-up of Hypertension  Checking BPs 3-4 times a week. Well controlled. He has not required PRN clonidine.  Total cholesterol and LDL (c) have improved significantly.  Triglycerides are now < 200.  ALT is mildly elevated.  He does not report angina or shortness of breath with exertion.     He stopped taking aspirin.      Review of Systems   Constitution: Negative for fever, malaise/fatigue, weight gain and weight loss.   HENT: Negative for hearing loss and nosebleeds.    Eyes: Negative for vision loss in left eye and vision loss in right eye.   Cardiovascular: Negative for chest pain, claudication, dyspnea on exertion, irregular heartbeat, leg swelling, palpitations and syncope.   Respiratory: Negative for cough, hemoptysis, sputum production and wheezing.    Hematologic/Lymphatic: Negative for bleeding problem. Does not bruise/bleed easily.   Musculoskeletal: Negative for arthritis, back pain, falls, joint pain, muscle cramps, muscle weakness and neck pain.   Gastrointestinal: Positive for constipation. Negative for abdominal pain, diarrhea, heartburn, hematochezia and melena.   Genitourinary: Negative for frequency, hematuria and nocturia.   Neurological: Negative for excessive daytime sleepiness, dizziness, focal weakness, headaches, light-headedness, loss of balance, numbness, paresthesias, seizures and weakness.   Psychiatric/Behavioral: Negative for depression. The patient is not nervous/anxious.        Review of patient's allergies indicates:   Allergen Reactions    Triamterene      Back and lower abdominal pain        Current Outpatient Medications   Medication Sig    acyclovir (ZOVIRAX) 400 MG tablet Take 400 mg by mouth 2 (two) times daily as needed.    amLODIPine (NORVASC) 5 MG  tablet Take 1 tablet (5 mg total) by mouth once daily.    carvedilol (COREG) 12.5 MG tablet TAKE 1 TABLET BY MOUTH TWICE DAILY WITH MEALS    chlorthalidone (HYGROTEN) 12.5 mg Tab tablet Take 12.5 mg by mouth once daily.    clobetasol 0.05% (TEMOVATE) 0.05 % Oint AAA bid    cloNIDine (CATAPRES) 0.1 MG tablet Take 0.1 mg by mouth as needed.     cyanocobalamin 1,000 mcg/mL injection every 30 days.    dicyclomine (BENTYL) 10 MG capsule Take 1 capsule (10 mg total) by mouth 3 (three) times daily as needed (abdominal cramping).    erenumab-aooe (AIMOVIG) 140 mg/mL autoinjector Inject 140 mg into the skin every 28 days.    ergocalciferol (ERGOCALCIFEROL) 50,000 unit Cap Take 1 capsule by mouth twice a week.    ferrous sulfate (FEOSOL) 325 mg (65 mg iron) Tab tablet Take 1 tablet (325 mg total) by mouth every 12 (twelve) hours.    hydrocortisone butyrate (LOCOID) 0.1 % Crea cream AAA bid to affected areas of groin/genitals    LINZESS 145 mcg Cap capsule Take 1 capsule (145 mcg total) by mouth as needed.    mometasone (ELOCON) 0.1 % solution as needed.     ondansetron (ZOFRAN-ODT) 8 MG TbDL Take 1 tablet (8 mg total) by mouth every 6 (six) hours as needed (nausea).    pantoprazole (PROTONIX) 40 MG tablet Take 1 tablet (40 mg total) by mouth before breakfast.    potassium chloride (K-TAB) 20 mEq Take 1 tablet (20 mEq total) by mouth once daily.    promethazine (PHENERGAN) 25 MG tablet Take 25 mg by mouth as needed.     risankizumab-rzaa (SKYRIZI) 150mg/1.66mL(75 mg/0.83 mL x2) subcutaneous injection INJECT 2 SYRINGES UNDER THE SKIN AT WEEK 4, AND EVERY 12 WEEKS THEREAFTER. (Patient taking differently: EVERY 12 WEEKS)    rosuvastatin (CRESTOR) 10 MG tablet Take 1 tablet (10 mg total) by mouth every other day. (Patient taking differently: Take 10 mg by mouth once daily. )    sumatriptan (IMITREX) 100 MG tablet Take 1 tablet by mouth as needed.    testosterone enanthate (XYOSTED) 100 mg/0.5 mL AtIn Inject 100  "mg into the skin every 7 days.    triamcinolone acetonide 0.1% (KENALOG) 0.1 % cream AAA bid after cool blow dry    TURMERIC ORAL Take by mouth once daily.    urea (CARMOL) 40 % Crea Apply topically 2 (two) times daily. To affected areas of elbows         Social history:  Jonathan Villela  reports that he has never smoked. He has never used smokeless tobacco. He reports current alcohol use. He reports that he does not use drugs.      Objective:   /84   Pulse 68   Ht 6' 1" (1.854 m)   Wt 117.3 kg (258 lb 7.8 oz)   BMI 34.10 kg/m²      Physical Exam   Constitutional: He is oriented to person, place, and time. He appears well-developed and well-nourished.   /84   Pulse 68   Ht 6' 1" (1.854 m)   Wt 117.3 kg (258 lb 7.8 oz)   BMI 34.10 kg/m²      HENT:   Head: Normocephalic.   Neck: No JVD present. Carotid bruit is not present.   Cardiovascular: Normal rate, regular rhythm, S1 normal and S2 normal.   No murmur heard.  Pulses:       Radial pulses are 2+ on the right side and 2+ on the left side.        Posterior tibial pulses are 2+ on the right side and 2+ on the left side.   Pulmonary/Chest: Breath sounds normal. Chest wall is not dull to percussion.   Abdominal: Soft. He exhibits no mass. There is no splenomegaly or hepatomegaly.   Musculoskeletal:      Right lower leg: No edema.      Left lower leg: No edema.   Neurological: He is alert and oriented to person, place, and time. Gait normal.   Skin: No bruising noted. Nails show no clubbing.   Psychiatric: He has a normal mood and affect. His speech is normal and behavior is normal.           Lab Results   Component Value Date    CHOL 138 09/23/2020    HDL 35 (L) 09/23/2020    LDLCALC 67.0 09/23/2020    TRIG 180 (H) 09/23/2020    CHOLHDL 25.4 09/23/2020     Lab Results   Component Value Date    GLU 98 09/23/2020    CREATININE 1.0 09/23/2020    BUN 16 09/23/2020     09/23/2020    K 3.7 09/23/2020     09/23/2020    CO2 29 09/23/2020 "     Lab Results   Component Value Date    ALT 53 (H) 09/23/2020    AST 37 09/23/2020    ALKPHOS 55 09/04/2020    BILITOT 0.6 09/04/2020           Assessment and Plan:       ICD-10-CM ICD-9-CM   1. Essential hypertension  I10 401.9   2. Mixed hyperlipidemia  E78.2 272.2   3. Abdominal aortic atherosclerosis  I70.0 440.0        Blood pressure stable.  Continue same meds.  Patient asked about the hypertension studies at [facility].  I described to him the ongoing carotid body and renal denervation studies.  Lipid profile better.  Continue same dose of rosuvastatin.  Mild elevation in ALT  Resume aspirin 81 mg daily    Orders placed during this encounter:     Essential hypertension  -     Comprehensive metabolic panel; Future; Expected date: 07/02/2021    Mixed hyperlipidemia  -     Lipid Panel; Future; Expected date: 07/02/2021  -     Comprehensive metabolic panel; Future; Expected date: 07/02/2021    Abdominal aortic atherosclerosis         Follow up in about 9 months (around 7/2/2021).

## 2020-10-02 NOTE — PATIENT INSTRUCTIONS
LDL - bad type - improves with diet and medications: typically statins; most other medications that lower LDL have not been proven to prevent heart attacks.  May not improve significantly with exercise alone.  But the lower the better. Statins (cholesterol lowering meds) lower LDL. If you have coronary artery disease or blockages anywhere else in the body, it should be well below 70 mg/dl.    HDL - good type - improves with exercise.  Ideally greater than 50 mg/dl. The proportion of HDL to the total cholesterol is more important than the absolute HDL.  This means a HDL of 45 out of a total cholesterol of 130 mg'dl is pretty good, but the same HDL out of a total of  200 mg/dl is not quite as good. A level of 30% or higher is ideal.    TGs (triglycerides) - also bad - can change very quickly and considerably with certain foods. Improve with diet, exercise and high dose fish oil.  In some cases a low carbohydrate diet will lower TGs better than a low fat diet.  Ideal range  mg/dl.    Sugar, fat and cholesterol in food:     A sensible diet that limits the intake of sugars, saturated (bad) fats and trans fats while increasing the intake of unsaturated (good) fats and plant proteins is the basis of the current dietary recommendations.      We now recommend drastically reducing the intake of sugar. There is less emphasis on excluding all fat and more emphasis on the types of different fats.    Cholesterol in our food is generally present in relatively small amounts. New dietary guidelines are less obsessed with the amount of cholesterol. However please do not confuse this with the role of cholesterol in our blood and arteries. The liver converts certain foods into cholesterol.  It is this cholesterol and other fats that clogs up our arteries.      Most foods that are high in cholesterol are also high in saturated fat. But there is much more saturated fat than cholesterol in these foods. Researchers believe that the  "saturated fat content matters more than cholesterol. On the other hand, there are a handful of foods that are high in cholesterol but do not contain much saturated fat: eggs, shrimp, crab legs and crawfish are OK to eat in modest quantities as long as you do not deep nixon them. So a few eggs a week are fine (both the white and the yolk), but you can eat as many egg whites as you want. Also, some of these same foods irritate the finding of blood vessels by inducing inflammation.  This occurs even if your blood cholesterol levels are "normal". So you should avoid foods that are high in saturated fat and sugar even if your blood cholesterol levels are normal.      Saturated fat is the bad fat - you should limit your intake of this. Deep fried foods, meats and other animal fats are high in saturated fat. Cookies, donuts and most dessert and cakes are usually high in both saturated fat and sugar.       Unsaturated fat is the good fat. It contains the same number of calories as saturated fat but these fats do not get deposited in our arteries. The Mediterranean style diet encourages the intake of unsaturated fat - olive oil, avocado and unsalted nuts. So instead of baking a piece of fish, consider pan-frying it using olive oil.     You should eat a few servings of vegetables (and fruit as long as you are not diabetic) everyday. Substitute some plant proteins in place of meat: beans, lentils, quinoa and oatmeal. They are lean proteins.     Do not use stick butter or stick margarine. Butter that comes in a tub is soft butter and consists of 1/2 butter and 1/2 vegetable oil (either canola or olive oil). It is preferable to use soft butter in small quantities.      Trans fats should definitely be avoided. Most foods that are labelled as containing 0 gms of trans fat may still contain several hundred milligrams of trans fat: creamer, margarine, dough, deep fried foods, ready made frosting, potato, corn and torilla chips, cakes, " cookies, pie crusts and crackers containing shortening made with hydrogenated vegetable oil.            I am not an infectious disease expert, so my  knowledge of COVID-19 is limited, but after talking to some real experts, I recommend the following if you get exposed or contract the novel corona virus infection:    Claritin 10 mg once a day (or most H1 blockers)  Pepcid 20 mg once a day (or another H2 blocker)  L-Arginine (available in the vitamin section of your pharmacy) 500 mg twice a day (a nitric acid donor)  A blood thinner such as aspirin. Hospitalized patients get a stronger blood thinner.  You do not need a prescription for the above meds. If you are already taking a blood thinner such as Coumadin, Eliquis or Xarelto, you do not need to add an aspirin.  These are not anti-viral medications, ie they do not kill the virus the way antibiotics kill bacteria. Instead they interfere with the ability of the virus to enter respiratory cells.   Steroids have been shown to be beneficial and have been prescribed for both hospitalized and non hospitalized patients.  Hydroxychloroquine is extremely controversial and most physicians think there is no benefit, but I think there is enough data that it should be given consideration if you common contact with a person who is COVID+ or you test +ve for it.  You should discuss this with your doctor.  All of this information is subject to change based on new scientific evidence.    Take care, practice social distancing and wear a mask. Insist that other people wear a mask and limit close interaction with other people so that you are more than 6 feet apart and spend less than 15 minutes in duration.

## 2020-10-08 ENCOUNTER — TELEPHONE (OUTPATIENT)
Dept: PHARMACY | Facility: CLINIC | Age: 60
End: 2020-10-08

## 2020-10-12 ENCOUNTER — PATIENT MESSAGE (OUTPATIENT)
Dept: GASTROENTEROLOGY | Facility: CLINIC | Age: 60
End: 2020-10-12

## 2020-10-16 ENCOUNTER — PATIENT MESSAGE (OUTPATIENT)
Dept: GASTROENTEROLOGY | Facility: CLINIC | Age: 60
End: 2020-10-16

## 2020-11-06 ENCOUNTER — PATIENT MESSAGE (OUTPATIENT)
Dept: DERMATOLOGY | Facility: CLINIC | Age: 60
End: 2020-11-06

## 2020-11-06 DIAGNOSIS — L30.4 INTERTRIGO: ICD-10-CM

## 2020-11-06 RX ORDER — TRIAMCINOLONE ACETONIDE 1 MG/G
CREAM TOPICAL
Qty: 1 BOTTLE | Refills: 3 | Status: SHIPPED | OUTPATIENT
Start: 2020-11-06 | End: 2020-11-10 | Stop reason: SDUPTHER

## 2020-11-11 DIAGNOSIS — E78.2 MIXED HYPERLIPIDEMIA: ICD-10-CM

## 2020-11-11 DIAGNOSIS — I70.0 ATHEROSCLEROSIS OF AORTA: ICD-10-CM

## 2020-11-12 RX ORDER — ROSUVASTATIN CALCIUM 10 MG/1
10 TABLET, COATED ORAL EVERY OTHER DAY
Qty: 45 TABLET | Refills: 3 | OUTPATIENT
Start: 2020-11-12 | End: 2020-11-18 | Stop reason: SDUPTHER

## 2020-11-13 ENCOUNTER — LAB VISIT (OUTPATIENT)
Dept: LAB | Facility: HOSPITAL | Age: 60
End: 2020-11-13
Attending: INTERNAL MEDICINE
Payer: COMMERCIAL

## 2020-11-13 ENCOUNTER — TELEPHONE (OUTPATIENT)
Dept: GASTROENTEROLOGY | Facility: CLINIC | Age: 60
End: 2020-11-13

## 2020-11-13 ENCOUNTER — OFFICE VISIT (OUTPATIENT)
Dept: GASTROENTEROLOGY | Facility: CLINIC | Age: 60
End: 2020-11-13
Payer: COMMERCIAL

## 2020-11-13 VITALS
HEART RATE: 69 BPM | HEIGHT: 73 IN | SYSTOLIC BLOOD PRESSURE: 137 MMHG | BODY MASS INDEX: 34.51 KG/M2 | WEIGHT: 260.38 LBS | DIASTOLIC BLOOD PRESSURE: 87 MMHG

## 2020-11-13 DIAGNOSIS — R16.0 HEPATOMEGALY: ICD-10-CM

## 2020-11-13 DIAGNOSIS — R79.89 ELEVATED LFTS: ICD-10-CM

## 2020-11-13 DIAGNOSIS — D50.9 IRON DEFICIENCY ANEMIA, UNSPECIFIED IRON DEFICIENCY ANEMIA TYPE: ICD-10-CM

## 2020-11-13 DIAGNOSIS — R10.10 UPPER ABDOMINAL PAIN: ICD-10-CM

## 2020-11-13 DIAGNOSIS — Z51.81 ENCOUNTER FOR MONITORING LONG-TERM PROTON PUMP INHIBITOR THERAPY: ICD-10-CM

## 2020-11-13 DIAGNOSIS — K92.1 HEMATOCHEZIA: ICD-10-CM

## 2020-11-13 DIAGNOSIS — Z83.79 FAMILY HISTORY OF CROHN'S DISEASE: ICD-10-CM

## 2020-11-13 DIAGNOSIS — Z79.899 ENCOUNTER FOR MONITORING LONG-TERM PROTON PUMP INHIBITOR THERAPY: ICD-10-CM

## 2020-11-13 DIAGNOSIS — K21.9 GASTROESOPHAGEAL REFLUX DISEASE WITHOUT ESOPHAGITIS: ICD-10-CM

## 2020-11-13 DIAGNOSIS — K76.0 FATTY LIVER: Primary | ICD-10-CM

## 2020-11-13 DIAGNOSIS — K63.5 POLYP OF COLON, UNSPECIFIED PART OF COLON, UNSPECIFIED TYPE: ICD-10-CM

## 2020-11-13 LAB
ALBUMIN SERPL BCP-MCNC: 4.3 G/DL (ref 3.5–5.2)
ALP SERPL-CCNC: 48 U/L (ref 55–135)
ALT SERPL W/O P-5'-P-CCNC: 56 U/L (ref 10–44)
ANION GAP SERPL CALC-SCNC: 11 MMOL/L (ref 8–16)
AST SERPL-CCNC: 36 U/L (ref 10–40)
BILIRUB SERPL-MCNC: 0.8 MG/DL (ref 0.1–1)
BUN SERPL-MCNC: 21 MG/DL (ref 6–20)
CALCIUM SERPL-MCNC: 9.5 MG/DL (ref 8.7–10.5)
CHLORIDE SERPL-SCNC: 97 MMOL/L (ref 95–110)
CO2 SERPL-SCNC: 29 MMOL/L (ref 23–29)
CORTIS SERPL-MCNC: 7.7 UG/DL
CREAT SERPL-MCNC: 1.1 MG/DL (ref 0.5–1.4)
EST. GFR  (AFRICAN AMERICAN): >60 ML/MIN/1.73 M^2
EST. GFR  (NON AFRICAN AMERICAN): >60 ML/MIN/1.73 M^2
GLUCOSE SERPL-MCNC: 86 MG/DL (ref 70–110)
LIPASE SERPL-CCNC: 35 U/L (ref 4–60)
POTASSIUM SERPL-SCNC: 3.8 MMOL/L (ref 3.5–5.1)
PROT SERPL-MCNC: 7.4 G/DL (ref 6–8.4)
SODIUM SERPL-SCNC: 137 MMOL/L (ref 136–145)

## 2020-11-13 PROCEDURE — 80053 COMPREHEN METABOLIC PANEL: CPT

## 2020-11-13 PROCEDURE — 99999 PR PBB SHADOW E&M-EST. PATIENT-LVL III: CPT | Mod: PBBFAC,,, | Performed by: INTERNAL MEDICINE

## 2020-11-13 PROCEDURE — 82533 TOTAL CORTISOL: CPT

## 2020-11-13 PROCEDURE — 36415 COLL VENOUS BLD VENIPUNCTURE: CPT

## 2020-11-13 PROCEDURE — 99999 PR PBB SHADOW E&M-EST. PATIENT-LVL III: ICD-10-PCS | Mod: PBBFAC,,, | Performed by: INTERNAL MEDICINE

## 2020-11-13 PROCEDURE — 83690 ASSAY OF LIPASE: CPT

## 2020-11-13 PROCEDURE — 86706 HEP B SURFACE ANTIBODY: CPT

## 2020-11-13 PROCEDURE — 99214 PR OFFICE/OUTPT VISIT, EST, LEVL IV, 30-39 MIN: ICD-10-PCS | Mod: S$GLB,,, | Performed by: INTERNAL MEDICINE

## 2020-11-13 PROCEDURE — 99214 OFFICE O/P EST MOD 30 MIN: CPT | Mod: S$GLB,,, | Performed by: INTERNAL MEDICINE

## 2020-11-13 RX ORDER — CICLOPIROX OLAMINE 7.7 MG/G
CREAM TOPICAL
COMMUNITY
Start: 2020-11-10

## 2020-11-13 RX ORDER — MESALAMINE 1000 MG/1
1000 SUPPOSITORY RECTAL NIGHTLY
Qty: 90 SUPPOSITORY | Refills: 1 | Status: SHIPPED | OUTPATIENT
Start: 2020-11-13 | End: 2021-07-07 | Stop reason: ALTCHOICE

## 2020-11-13 NOTE — Clinical Note
"Livier- please tell patient that their Hepatitis A, B and C labs are negative but they have "No" immunity to Hepatitis B and C.     He has immunity to hepatitis A which is great.     There is currently No vaccination yet for Hepatitis C.     There is vaccinations for Hepatitis B,  and Recommend the Hepatitis B vaccination series.      Hepatitis B vaccination series is a 2 part vaccine series - Day 1, and then again in 1-month from the first one.      Orders were  placed."

## 2020-11-13 NOTE — PATIENT INSTRUCTIONS
Recommendations from your hepatology clinic visit.     Follow up in about 1 year (around 8/3/2021).  1. Recommend repeat liver function tests every 3 months - next due 8/2020.  2. Recommend weight loss 25 pounds - referral placed to nutrition services.  3. Recommend to continue to limit alcohol intake.   4. Recommend low carb, low calorie, high fiber diet.  5. Return to clinic in 1 year with [facility] before visit

## 2020-11-13 NOTE — TELEPHONE ENCOUNTER
MA scheduled pt for labs , MRI and recall letter was made for 6 month f/u       ----- Message from Yoshi Erazo MD sent at 11/13/2020 10:28 AM CST -----  Please have patient return to GI clinic with me in 6 months for follow-upLab work today orders placedPlease order MR enterography of the small bowel orders placed

## 2020-11-13 NOTE — Clinical Note
Please have patient return to GI clinic with me in 6 months for follow-up    Lab work today orders placed    Please order MR enterography of the small bowel orders placed

## 2020-11-13 NOTE — TELEPHONE ENCOUNTER
" spoke to pt on the phone in regards to vaccinations, when MA was in the room scheduling lab and MRI .        ----- Message from Yoshi Erazo MD sent at 11/13/2020  9:58 AM CST -----  Livier- please tell patient that their Hepatitis A, B and C labs are negative but they have "No" immunity to Hepatitis B and C. He has immunity to hepatitis A which is great. There is currently No vaccination yet for Hepatitis C. There is vaccinations for Hepatitis B,  and Recommend the Hepatitis B vaccination series.  Hepatitis B vaccination series is a 2 part vaccine series - Day 1, and then again in 1-month from the first one.  Orders were  placed.    "

## 2020-11-13 NOTE — PROGRESS NOTES
[facility] Consultation Note    Reason for Consult:  The primary encounter diagnosis was Fatty liver. Diagnoses of Elevated LFTs, Polyp of colon, unspecified part of colon, unspecified type, Family history of Crohn's disease, Hepatomegaly, Iron deficiency anemia, unspecified iron deficiency anemia type, Gastroesophageal reflux disease without esophagitis, Encounter for monitoring long-term proton pump inhibitor therapy, and Upper abdominal pain were also pertinent to this visit.    PCP:   Manish Joel       Referring MD:  No referring provider defined for this encounter.    Initial History of Present Illness (HPI):  This is a 60 y.o. male  white male who has had an NSAID-related ulcer in the past.  He occasionally takes NSAIDs.  He has been   counseled on it.  He knows to be on a PPI if he ever takes an NSAID.  He also is   on Humira by his dermatologist for psoriasis started in October 2018.  She is   referring him to a rheumatologist because he has some joint pains too.  He has   never had a diagnosis of Crohn's or ulcerative colitis.  He does have a family   history of his dad and a brother and a sister with Crohn disease.  He has had   EGDs by [facility], [facility] Epstein and also Dr. Berrios at [facility] and they have   all said including with blood work said he does not have inflammatory bowel   disease.  He has been suffering from chronic right upper and left upper quadrant   pain for over 10 years.  He has had a CT scan of his abdomen and pelvis back on   09/05/2018.  Slightly enlarged liver.  A fatty liver and is followed by hepatology for fatty  He knows to avoid alcohol with fatty liver and gradual weight loss.   Gallbladder surgically   absent.  Spleen is mildly enlarged.  Pancreas is normal.  Some calcified   atherosclerotic of abdominal aorta without aneurysm.   patient says that often on  She has been having little bit of bright red blood painless in his stool he is concerned about  it would like to have a colon on  And an EGD he does have strong family history of inflammatory bowel disease.  He also feels like he is constipated and has to strain to have a bowel movement he does have a prescription for Linzess  But he really never takes this cousin when he does he has too much of a loose stool.  He is not taking Metamucil or Benefiber on a regular basis he occasionally takes MiraLax but not regular.  EGD and colonoscopy look good  No evidence of celiac sprue no evidence of H pylori no intestinal metaplasia no significant findings small internal hemorrhoids.  Patient is still complaining of upper abdominal symptoms worse with eating he is trying to quit alcohol completely he is followed by hepatology strong family history of Crohn's disease will do an MR enterography of his small-bowel repeat some labs and give him a trial of Canasa suppositories once nightly for his little bit of blood that he continues to see with his bowel movements.  Linzess does work for his constipation but it is too strong MiraLax does not were quite as well.     REVIEW OF SYSTEMS:  CONSTITUTIONAL:  No fever, fatigue or weight loss.  EYES:  No visual disturbances.  ENT:  No difficulty swallowing, no sore throat, no odynophagia, no dysphagia.  CARDIOVASCULAR:  No chest pain or palpitations.  RESPIRATORY:  No shortness of breath or cough.  GENITOURINARY:  No dysuria, urgency or frequency.  MUSCULOSKELETAL:  He has got some chronic arthritis.  SKIN:  No itching.  He does have psoriasis.  NEUROLOGIC:  No headache, syncope or stroke.  PSYCHIATRIC:  No uncontrolled depression or anxiety.  ENDOCRINE:  No cold or heat intolerance.  LYMPHATICS:  No lymphadenopathy.       Interval HPI 11/13/2020:  The patient's last visit with me was on 8/5/2020.      Medical History:  has a past medical history of Acute gastric ulcer with hemorrhage (2/15/2017), Bilateral occipital neuralgia, BPH with urinary obstruction (12/31/2015), Chronic  constipation, Colitis ([id number]), Diverticulitis, Erectile dysfunction, Essential hypertension, Gastroesophageal reflux disease without esophagitis (2/11/2017), HLD (hyperlipidemia), Hypogonadism in male, IBS (irritable bowel syndrome), Migraine without aura and without status migrainosus, not intractable (1/31/2014), Obstructive sleep apnea syndrome (2/9/2015), Psoriasis, and PUD (peptic ulcer disease).    Surgical History:  has a past surgical history that includes Leg Surgery; Nasal septum surgery; Cholecystectomy; Esophagogastroduodenoscopy; Colonoscopy (N/A, 2/17/2017); Upper gastrointestinal endoscopy; Esophagogastroduodenoscopy (N/A, 12/14/2018); Esophagogastroduodenoscopy (N/A, 5/29/2019); Colonoscopy (N/A, 5/29/2019); Esophagogastroduodenoscopy (N/A, 8/31/2020); and Colonoscopy (N/A, 8/31/2020).    Family History: family history includes Crohn's disease in his brother, father, paternal grandmother, sister, and unknown relative; Cystic fibrosis in his maternal uncle; Heart disease in his father; Hypertension in his mother; Inflammatory bowel disease in his brother, father, and sister; Kidney disease in his mother; Thyroid disease in his mother..     Social History:  reports that he has never smoked. He has never used smokeless tobacco. He reports current alcohol use. He reports that he does not use drugs.    Review of patient's allergies indicates:   Allergen Reactions    Triamterene      Back and lower abdominal pain       Medication List with Changes/Refills   Current Medications    ACYCLOVIR (ZOVIRAX) 400 MG TABLET    Take 400 mg by mouth 2 (two) times daily as needed.    AMLODIPINE (NORVASC) 5 MG TABLET    Take 1 tablet (5 mg total) by mouth once daily.    CARVEDILOL (COREG) 12.5 MG TABLET    TAKE 1 TABLET BY MOUTH TWICE DAILY WITH MEALS    CHLORTHALIDONE (HYGROTEN) 12.5 MG TAB TABLET    Take 12.5 mg by mouth once daily.    CICLOPIROX (LOPROX) 0.77 % CREA        CLOBETASOL 0.05% (TEMOVATE) 0.05 % OINT   "  AAA bid    CLONIDINE (CATAPRES) 0.1 MG TABLET    Take 0.1 mg by mouth as needed.     CYANOCOBALAMIN 1,000 MCG/ML INJECTION    every 30 days.    DICYCLOMINE (BENTYL) 10 MG CAPSULE    Take 1 capsule (10 mg total) by mouth 3 (three) times daily as needed (abdominal cramping).    ERENUMAB-AOOE (AIMOVIG) 140 MG/ML AUTOINJECTOR    Inject 140 mg into the skin every 28 days.    ERGOCALCIFEROL (ERGOCALCIFEROL) 50,000 UNIT CAP    Take 1 capsule by mouth twice a week.    HYDROCORTISONE BUTYRATE (LOCOID) 0.1 % CREA CREAM    AAA bid to affected areas of groin/genitals    LINZESS 145 MCG CAP CAPSULE    Take 1 capsule (145 mcg total) by mouth as needed.    MOMETASONE (ELOCON) 0.1 % SOLUTION    as needed.     ONDANSETRON (ZOFRAN-ODT) 8 MG TBDL    Take 1 tablet (8 mg total) by mouth every 6 (six) hours as needed (nausea).    PANTOPRAZOLE (PROTONIX) 40 MG TABLET    TAKE 1 TABLET(40 MG) BY MOUTH BEFORE BREAKFAST    POTASSIUM CHLORIDE (K-TAB) 20 MEQ    Take 1 tablet (20 mEq total) by mouth once daily.    PROMETHAZINE (PHENERGAN) 25 MG TABLET    Take 25 mg by mouth as needed.     RISANKIZUMAB-RZAA (SKYRIZI) 150MG/1.66ML(75 MG/0.83 ML X2) SUBCUTANEOUS INJECTION    INJECT 2 SYRINGES UNDER THE SKIN AT WEEK 4, AND EVERY 12 WEEKS THEREAFTER.    ROSUVASTATIN (CRESTOR) 10 MG TABLET    Take 1 tablet (10 mg total) by mouth every other day.    SUMATRIPTAN (IMITREX) 100 MG TABLET    Take 1 tablet by mouth as needed.    TESTOSTERONE ENANTHATE (XYOSTED) 100 MG/0.5 ML ATIN    Inject 100 mg into the skin every 7 days.    TRIAMCINOLONE ACETONIDE 0.1% (KENALOG) 0.1 % CREAM    AAA bid after cool blow dry    TURMERIC ORAL    Take by mouth once daily.    UREA (CARMOL) 40 % CREA    Apply topically 2 (two) times daily. To affected areas of elbows         Objective Findings:    Vital Signs:  /87 (BP Location: Right arm)   Pulse 69   Ht 6' 1" (1.854 m)   Wt 118.1 kg (260 lb 5.8 oz)   BMI 34.35 kg/m²   Body mass index is 34.35 kg/m².    Physical " Exam:  General Appearance: Well appearing in no acute distress  Eyes:    No scleral icterus  ENT:  No lesions or masses   Lungs: CTA bilaterally, no wheezes, no rhonchi, no rales  Heart:  S1, S2 normal, no murmurs heard  Abdomen:  Obese, Non distended, soft, no guarding, no rebound, mild epigastric tenderness, no appreciated ascites, no bruits, mild hepatosplenomegaly,  No CVA tenderness, no appreciated hernias  Musculoskeletal:  No major joint deformities  Skin: No petechiae or rash on exposed skin areas  Neurologic:  Alert and oriented x4  Psychiatric:  Normal speech mentation and affect    Labs:  Lab Results   Component Value Date    WBC 5.97 09/04/2020    HGB 17.9 09/23/2020    HCT 52.1 09/04/2020     09/04/2020    CHOL 138 09/23/2020    TRIG 180 (H) 09/23/2020    HDL 35 (L) 09/23/2020    ALT 53 (H) 09/23/2020    AST 37 09/23/2020     09/23/2020    K 3.7 09/23/2020     09/23/2020    CREATININE 1.0 09/23/2020    BUN 16 09/23/2020    CO2 29 09/23/2020    TSH 2.212 01/25/2019    PSA 1.2 10/15/2018    INR 1.2 04/16/2019    HGBA1C 5.4 08/29/2014               Medical Decision Making:  EGD and colonoscopy images and path personally reviewed by myself  Prior CT in the past personally reviewed images by myself no acute finding  Patient says he got repeat hepatitis-B vaccination but Infectious Disease does not have a record of this  Will recheck immunity today  Fatty liver talk given patient knows a fatty liver and some patients can progress to liver cirrhosis death a need for liver transplant liver cancer recommend gradual weight loss about 26 lb over the next 12-18 months.  Avoid alcohol completely  He has immunity to hepatitis a hep C is negative he has follow-up evaluation hepatology  1. DUODENUM, BIOPSY:   Duodenal mucosa with no significant pathologic abnormality   Villous architecture is maintained   2. STOMACH, BIOPSY:   Gastric body and antral mucosa with mild chronic gastritis and reactive    changes   No evidence of intestinal metaplasia, dysplasia or malignancy   No evidence of Helicobacter pylori organisms on H&E stain   Immunostain for Helicobacter pylori organisms is negative   3. ESOPHAGOGASTRIC JUNCTION, BIOPSY:   Squamous and glandular mucosa (esophagogastric junctional) with moderate   chronic, mild active inflammation with reactive changes   No evidence of intestinal metaplasia, dysplasia or malignancy   No evidence of significant eosinophils present   4. TRANSVERSE COLON, POLYPECTOMY:   Hyperplastic polyp with superficial ulceration        Assessment:  1. Fatty liver    2. Elevated LFTs    3. Polyp of colon, unspecified part of colon, unspecified type    4. Family history of Crohn's disease    5. Hepatomegaly    6. Iron deficiency anemia, unspecified iron deficiency anemia type    7. Gastroesophageal reflux disease without esophagitis    8. Encounter for monitoring long-term proton pump inhibitor therapy    9. Upper abdominal pain         Recommendations:  1.  Upper abdominal pain family history of Crohn's disease in many family members will do lab work today will do MR enterography of the small bowel  To rule out small bowel Crohn's.  2.  Occasional blood in stool hemorrhoids are not significant colonoscopy up-to-date will do a trial of Canasa suppositories once nightly patient will let me know if that helps or does not.  If it does not help will refer to colon rectal surgery for anoscopy.  3.  Fatty liver with hepatosplenomegaly a followed by hepatology will check immunity to hepatitis B if not immune will recommend read back sedation for hepatitis-B.  Patient does have immunity to hepatitis A hepatitis C is negative.  Recommend gradual weight loss about 26 lb over the next 12-18 months continue follow-up with hepatology.  Per their recommendations.  4.  GERD long-term PPI small hiatal hernia recommend PPI labs once yearly creatinine vitamin B12 vitamin-D magnesium.  These are  up-to-date.  5.  Return GI clinic in 6 months for follow-up.    Follow up in about 6 months (around 5/13/2021).      Order summary:  Orders Placed This Encounter    MRI ENTEROGRAPHY    Lipase    CORTISOL, RANDOM    Comprehensive Metabolic Panel    Hepatitis B Surface Antibody, Qual/Quant         Thank you so much for allowing me to participate in the care of Jonathan Erazo MD

## 2020-11-16 LAB
HBV SURFACE AB SER QL IA: NEGATIVE
HBV SURFACE AB SERPL IA-ACNC: <3 MIU/ML

## 2020-11-17 ENCOUNTER — PATIENT MESSAGE (OUTPATIENT)
Dept: GASTROENTEROLOGY | Facility: CLINIC | Age: 60
End: 2020-11-17

## 2020-11-22 ENCOUNTER — PATIENT MESSAGE (OUTPATIENT)
Dept: GASTROENTEROLOGY | Facility: CLINIC | Age: 60
End: 2020-11-22

## 2020-11-22 DIAGNOSIS — K76.0 FATTY LIVER: Primary | ICD-10-CM

## 2020-11-24 ENCOUNTER — HOSPITAL ENCOUNTER (OUTPATIENT)
Dept: RADIOLOGY | Facility: HOSPITAL | Age: 60
Discharge: HOME | End: 2020-11-24
Attending: INTERNAL MEDICINE
Payer: COMMERCIAL

## 2020-11-24 ENCOUNTER — TELEPHONE (OUTPATIENT)
Dept: ENDOSCOPY | Facility: HOSPITAL | Age: 60
End: 2020-11-24

## 2020-11-24 ENCOUNTER — CLINICAL SUPPORT (OUTPATIENT)
Dept: INFECTIOUS DISEASES | Facility: CLINIC | Age: 60
End: 2020-11-24
Payer: COMMERCIAL

## 2020-11-24 DIAGNOSIS — Z83.79 FAMILY HISTORY OF CROHN'S DISEASE: ICD-10-CM

## 2020-11-24 DIAGNOSIS — R10.10 UPPER ABDOMINAL PAIN: ICD-10-CM

## 2020-11-24 DIAGNOSIS — K76.0 FATTY LIVER: ICD-10-CM

## 2020-11-24 PROCEDURE — 90739 HEPATITIS B (RECOMBINANT) ADJUVANTED, 2 DOSE: ICD-10-PCS | Mod: S$GLB,,, | Performed by: INTERNAL MEDICINE

## 2020-11-24 PROCEDURE — 74183 MRI ENTEROGRAPHY: ICD-10-PCS | Mod: 26,,, | Performed by: RADIOLOGY

## 2020-11-24 PROCEDURE — 72197 MRI ENTEROGRAPHY: ICD-10-PCS | Mod: 26,,, | Performed by: RADIOLOGY

## 2020-11-24 PROCEDURE — A9585 GADOBUTROL INJECTION: HCPCS | Performed by: INTERNAL MEDICINE

## 2020-11-24 PROCEDURE — 90471 HEPATITIS B (RECOMBINANT) ADJUVANTED, 2 DOSE: ICD-10-PCS | Mod: S$GLB,,, | Performed by: INTERNAL MEDICINE

## 2020-11-24 PROCEDURE — 72197 MRI PELVIS W/O & W/DYE: CPT | Mod: 26,,, | Performed by: RADIOLOGY

## 2020-11-24 PROCEDURE — 25500020 PHARM REV CODE 255: Performed by: INTERNAL MEDICINE

## 2020-11-24 PROCEDURE — 90471 IMMUNIZATION ADMIN: CPT | Mod: S$GLB,,, | Performed by: INTERNAL MEDICINE

## 2020-11-24 PROCEDURE — 72197 MRI PELVIS W/O & W/DYE: CPT | Mod: TC

## 2020-11-24 PROCEDURE — 74183 MRI ABD W/O CNTR FLWD CNTR: CPT | Mod: 26,,, | Performed by: RADIOLOGY

## 2020-11-24 PROCEDURE — 90739 HEPB VACC 2/4 DOSE ADULT IM: CPT | Mod: S$GLB,,, | Performed by: INTERNAL MEDICINE

## 2020-11-24 RX ORDER — GADOBUTROL 604.72 MG/ML
10 INJECTION INTRAVENOUS
Status: COMPLETED | OUTPATIENT
Start: 2020-11-24 | End: 2020-11-24

## 2020-11-24 RX ADMIN — GADOBUTROL 10 ML: 604.72 INJECTION INTRAVENOUS at 01:11

## 2020-11-24 NOTE — TELEPHONE ENCOUNTER
"----- Message from Yoshi Erazo MD sent at 11/22/2020  8:28 PM CST -----  Diana  please tell patient that their Hepatitis A, B and C labs are negative but they have "No" immunity to hepatitis B and C.    He does have immunity to hepatitis A which is good thing       There is currently No vaccination yet for Hepatitis C.    There is vaccinations for Hepatitis B,  and Recommend repeating the Hepatitis B vaccination series.     Hepatitis B vaccination series is a 2 part vaccine series - Day 1, and then again in 1-month from the first one.      Orders were  placed.  "

## 2020-12-01 ENCOUNTER — OFFICE VISIT (OUTPATIENT)
Dept: DERMATOLOGY | Facility: CLINIC | Age: 60
End: 2020-12-01
Payer: COMMERCIAL

## 2020-12-01 DIAGNOSIS — L28.0 LICHENOID DERMATITIS: ICD-10-CM

## 2020-12-01 DIAGNOSIS — Z79.899 ENCOUNTER FOR LONG-TERM (CURRENT) USE OF HIGH-RISK MEDICATION: ICD-10-CM

## 2020-12-01 DIAGNOSIS — L30.4 INTERTRIGO: Primary | ICD-10-CM

## 2020-12-01 DIAGNOSIS — L40.9 PSORIASIS: ICD-10-CM

## 2020-12-01 PROCEDURE — 99214 PR OFFICE/OUTPT VISIT, EST, LEVL IV, 30-39 MIN: ICD-10-PCS | Mod: 25,S$GLB,, | Performed by: PATHOLOGY

## 2020-12-01 PROCEDURE — 87220 TISSUE EXAM FOR FUNGI: CPT | Mod: S$GLB,,, | Performed by: PATHOLOGY

## 2020-12-01 PROCEDURE — 87220 PR  TISSUE EXAM BY KOH: ICD-10-PCS | Mod: S$GLB,,, | Performed by: PATHOLOGY

## 2020-12-01 PROCEDURE — 99999 PR PBB SHADOW E&M-EST. PATIENT-LVL IV: ICD-10-PCS | Mod: PBBFAC,,, | Performed by: PATHOLOGY

## 2020-12-01 PROCEDURE — 99214 OFFICE O/P EST MOD 30 MIN: CPT | Mod: 25,S$GLB,, | Performed by: PATHOLOGY

## 2020-12-01 PROCEDURE — 99999 PR PBB SHADOW E&M-EST. PATIENT-LVL IV: CPT | Mod: PBBFAC,,, | Performed by: PATHOLOGY

## 2020-12-01 RX ORDER — FLUCONAZOLE 200 MG/1
TABLET ORAL
Qty: 4 TABLET | Refills: 0 | Status: SHIPPED | OUTPATIENT
Start: 2020-12-01 | End: 2021-07-20 | Stop reason: ALTCHOICE

## 2020-12-01 RX ORDER — HYDROXYCHLOROQUINE SULFATE 200 MG/1
200 TABLET, FILM COATED ORAL 2 TIMES DAILY
Qty: 60 TABLET | Refills: 3 | Status: SHIPPED | OUTPATIENT
Start: 2020-12-01 | End: 2021-04-23

## 2020-12-01 RX ORDER — CLOBETASOL PROPIONATE 0.5 MG/G
OINTMENT TOPICAL
Qty: 60 G | Refills: 3 | Status: SHIPPED | OUTPATIENT
Start: 2020-12-01 | End: 2021-11-08

## 2020-12-01 RX ORDER — KETOCONAZOLE 20 MG/G
CREAM TOPICAL
Qty: 60 G | Refills: 3 | Status: SHIPPED | OUTPATIENT
Start: 2020-12-01 | End: 2021-10-25

## 2020-12-01 NOTE — PATIENT INSTRUCTIONS
Recommend white vinegar: water 1:1 compresses 2x/day followed by cool blow dry and then application of prescription medication (ketoconazole cream).     Fluconazole (diflucan) 200 mg tab by mouth once weekly for 4 weeks.    Cool blow dry after showering. Once clear, use Zeasorb AF powder for maintenance.    For penis, clobetasol ointment twice daily (only to rashy areas).    Start plaquenil 200 mg twice daily.

## 2020-12-01 NOTE — PROGRESS NOTES
Subjective:       Patient ID:  Jonathan Villela is a 60 y.o. male who presents for   Chief Complaint   Patient presents with    Psoriasis     Pt presents today for f/u     HPI  presents today for Skyrizi f/u.  Has persistent dry elbows, non particularly itchy and no associated erythema - improved with urea cream (40%) but recurred when he stopped using it.  Scalp also with much improved - clear.  Joint pain is resolved.  On Skyrizi, and skin has been clearer.  Flaring to inguinal folds - irritated, cracking, red, painful.  Also with persistent, extremely pruritic reddish papules to penis - shaft and head.     Tea tree, T-gel &  with 3% coal tar; synalar soln - using these rarely for scalp due to it being essentially clear.     Complicated pt here for f/u for focally psoriasiform, focally lichenoid and spongiotic dermatitis.  Face, neck, upper back and shoulders have improved dramatically on Skyrizi.  Genital involvement flaring.  Was on Humira late November 2018 - May 2019 without improvement - it was discontinued.  Rheum (Dr. Snyder) showed unrevealing clinical or lab results for an inflammatory arthritis.  Joint pain has improved.  Using TAC cream to inguinal folds and penis with minimal improvement.  Previously used Shake lotion with clearance.  Thinks the TAC cream alone works better than compounded cream with ketoconazole or loprox.      Has had mildly elevated LFTs - chronic.     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 At that time he was also on numerous herbal supplements, which he discontinued.  He was continued on several BP medications including valsartan, chlorthalidone, beta blockers (bystolic, carvedilol), and calcium channel blockers throughout 2016 to 2018. He continued to have episodes of severe migraines. Now on Emgality with improvement.  He now is on amlodipine, carvedilol,  and chlorthalidone with clonidine PRN for hypertension.        Biopsy result 10/4/2018  FINAL PATHOLOGIC DIAGNOSIS  1. Skin, right chest, punch biopsy:  - CHANGES MOST COMPATIBLE WITH A DRUG ERUPTION.  MICROSCOPIC DESCRIPTION: The biopsy shows minimal focal parakeratosis alternating with compact  orthokeratosis. There is mild epidermal spongiosis and focal subtle vacuolar interface changes with rare apoptotic  keratinocytes that localize to multiple levels within the epidermis. The dermis is edematous. There is a superficial  and mid dermal perivascular and perifollicular lymphohistiocytic infiltrate with rarer neutrophils and mast cells. PAS  stain is negative for fungi. Appropriately reactive controls were reviewed. Multiple levels were examined.  2. Skin, right upper back, punch biopsy:  - SUBACUTE SPONGIOTIC DERMATITIS (See Comment).  MICROSCOPIC DESCRIPTION: The biopsy shows focal parakeratosis, epidermal acanthosis with elongation of  rete ridges, spongiosis with exocytosis of lymphocytes and a superficial perivascular and perifollicular dermal  lymphohistiocytic infiltrate with rare neutrophils. A majority of the superficial dermal and intraepidermal lymphocytes  stain positive for both CD3 and CD4, while CD8 stains a sparser population of cells. The approximate CD4 to CD8  ratio is 4:1. PAS stain is negative for fungi. Appropriately reactive controls were reviewed. Multiple levels were  examined.  COMMENT: These histologic features are compatible with an eczematous process such as atopic dermatitis, an  irritant or allergic contact dermatitis, or an id reaction. A drug eruption cannot be excluded. Clinical correlation is  essential.  3. Skin, right groin, punch biopsy:  - SPARSE SUPERFICIAL PERIVASCULAR DERMATITIS (SEE COMMENT).  MICROSCOPIC DESCRIPTION: Sections show a punch biopsy of skin extending to the level of the subcutis. The  stratum corneum consists of compact orthokeratosis. The granular layer is  intact. The epidermis is largely  unremarkable, however, apoptotic keratinocytes are noted at multiple levels within the epidermis. Vacuolar  interface alteration is not appreciated. Within the superficial dermis, there is a sparse perivascular lymphohistiocytic  infiltrate with scattered melanophages. PAS stain is negative for yeasts/fungi. Appropriately reactive controls were  reviewed. Multiple levels were examined.  COMMENT: These histologic features are mild and nonspecific. In the appropriate clinical context, they may  represent and resolving eczematous process with postinflammatory pigmentary alteration. Differential diagnosis  includes a viral exanthem or a morbilliform drug eruption. Clinical correlation is advised.  4. Skin, penile base, shave biopsy:  - LICHENOID AND SPONGIOTIC DERMATITIS (SEE COMMENT).  MICROSCOPIC DESCRIPTION: Sections show parakeratosis that is focally associated with neutrophils overlying  an area of epidermis exhibiting a diminished granular layer. There is mild epidermal spongiosis with exocytosis of  lymphocytes and Langerhans cells. Subtle vacuolar interface alteration is appreciated in association with rare  colloid bodies. The underlying superficial dermis contains a vaguely lichenoid and perivascular lymphohistiocytic  infiltrate with a few scattered neutrophils and a rare eosinophil. Erythrocyte extravasation is also noted within the  superficial dermis. PAS stain is negative for yeasts/fungi. A majority of the superficial dermal and intraepidermal  lymphocytes stain positive for both CD3 and CD4, while CD8 stains a sparser population of cells. The approximate  CD4 to CD8 ratio is 4:1. CD20 is negative, and CD30 stains only a few rare, widely scattered cells. CD1a shows  an essentially normal distribution of Langerhans cells within the involved epidermis. Appropriately reactive controls  were reviewed. Multiple levels were examined.  COMMENT: The differential diagnosis  includes pityriasis lichenoides chronica versus a lichenoid drug eruption .  Clinical correlation is advised.    Review of Systems   Constitutional: Negative for fever, chills, weight loss, weight gain, fatigue, night sweats and malaise.   Respiratory: Negative for cough and shortness of breath.    Musculoskeletal: Negative for joint swelling and arthralgias.   Skin: Positive for itching, rash and dry skin. Negative for sun sensitivity, daily sunscreen use and wears hat.   Hematologic/Lymphatic: Negative for adenopathy. Does not bruise/bleed easily.        Objective:    Physical Exam   Constitutional: He appears well-developed and well-nourished.   Genitourinary:         Neurological: He is alert and oriented to person, place, and time.   Psychiatric: He has a normal mood and affect.   Skin:   Areas Examined (abnormalities noted in diagram):   Scalp / Hair Palpated and Inspected  Head / Face Inspection Performed  Neck Inspection Performed  Chest / Axilla Inspection Performed  Abdomen Inspection Performed  Genitals / Buttocks / Groin Inspection Performed  Back Inspection Performed  RUE Inspected  LUE Inspection Performed  RLE Inspected  LLE Inspection Performed  Nails and Digits Inspection Performed                   Diagram Legend     Erythematous scaling macule/papule c/w actinic keratosis       Vascular papule c/w angioma      Pigmented verrucoid papule/plaque c/w seborrheic keratosis      Yellow umbilicated papule c/w sebaceous hyperplasia      Irregularly shaped tan macule c/w lentigo     1-2 mm smooth white papules consistent with Milia      Movable subcutaneous cyst with punctum c/w epidermal inclusion cyst      Subcutaneous movable cyst c/w pilar cyst      Firm pink to brown papule c/w dermatofibroma      Pedunculated fleshy papule(s) c/w skin tag(s)      Evenly pigmented macule c/w junctional nevus     Mildly variegated pigmented, slightly irregular-bordered macule c/w mildly atypical nevus      Flesh  colored to evenly pigmented papule c/w intradermal nevus       Pink pearly papule/plaque c/w basal cell carcinoma      Erythematous hyperkeratotic cursted plaque c/w SCC      Surgical scar with no sign of skin cancer recurrence      Open and closed comedones      Inflammatory papules and pustules      Verrucoid papule consistent consistent with wart     Erythematous eczematous patches and plaques     Dystrophic onycholytic nail with subungual debris c/w onychomycosis     Umbilicated papule    Erythematous-base heme-crusted tan verrucoid plaque consistent with inflamed seborrheic keratosis     Erythematous Silvery Scaling Plaque c/w Psoriasis     See annotation      Assessment / Plan:        Intertrigo - KOH+  -     fluconazole (DIFLUCAN) 200 MG Tab; Take 1 pill po weekly for 4 weeks  Dispense: 4 tablet; Refill: 0  -     ketoconazole (NIZORAL) 2 % cream; AAA bid  Dispense: 60 g; Refill: 3    Recommend white vinegar: water 1:1 compresses 2x/day followed by cool blow dry and then application of prescription medication (ketoconazole cream).     Fluconazole (diflucan) 200 mg tab by mouth once weekly for 4 weeks.    Cool blow dry after showering. Once clear, use Zeasorb AF powder for maintenance.        Psoriasis - scalp, elbows, extremities much improved and joint pain resolved on Skyrizi.  Continue as prescribed.    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  Increase potency of topical steroid to clobetasol and start plaquenil.  -     hydrOXYchloroQUINE (PLAQUENIL) 200 mg tablet; Take 1 tablet (200 mg total) by mouth 2 (two) times daily.  Dispense: 60 tablet; Refill: 3  -     clobetasol 0.05% (TEMOVATE) 0.05 % Oint; AAA bid to affected area of penis  Dispense: 60 g; Refill: 3    Encounter for long-term (current) use of high-risk medication - Reviewed labs from 11/13 - ALT  minimally elevated (chronic).  Hep A, B, C negative.  Hb upper normal range. Last quant gold negative 3/5/20.               Follow up in about 4 weeks (around 12/29/2020).

## 2020-12-02 ENCOUNTER — PATIENT MESSAGE (OUTPATIENT)
Dept: DERMATOLOGY | Facility: CLINIC | Age: 60
End: 2020-12-02

## 2020-12-09 PROBLEM — J34.3 HYPERTROPHY OF INFERIOR NASAL TURBINATE: Status: ACTIVE | Noted: 2020-12-09

## 2020-12-09 PROBLEM — J34.2 DEVIATED NASAL SEPTUM: Status: ACTIVE | Noted: 2020-12-09

## 2020-12-09 PROBLEM — R09.81 CHRONIC NASAL CONGESTION: Status: ACTIVE | Noted: 2020-12-09

## 2020-12-09 PROBLEM — G47.33 OBSTRUCTIVE SLEEP APNEA (ADULT) (PEDIATRIC): Status: ACTIVE | Noted: 2020-12-09

## 2020-12-09 PROBLEM — G47.00 INSOMNIA WITH SLEEP APNEA: Status: ACTIVE | Noted: 2020-12-09

## 2020-12-09 PROBLEM — R06.83 SNORING: Status: ACTIVE | Noted: 2020-12-09

## 2020-12-09 PROBLEM — G47.30 INSOMNIA WITH SLEEP APNEA: Status: ACTIVE | Noted: 2020-12-09

## 2020-12-15 ENCOUNTER — PATIENT MESSAGE (OUTPATIENT)
Dept: DERMATOLOGY | Facility: CLINIC | Age: 60
End: 2020-12-15

## 2020-12-23 ENCOUNTER — CLINICAL SUPPORT (OUTPATIENT)
Dept: INFECTIOUS DISEASES | Facility: CLINIC | Age: 60
End: 2020-12-23
Payer: COMMERCIAL

## 2020-12-23 ENCOUNTER — LAB VISIT (OUTPATIENT)
Dept: LAB | Facility: HOSPITAL | Age: 60
End: 2020-12-23
Attending: INTERNAL MEDICINE
Payer: COMMERCIAL

## 2020-12-23 DIAGNOSIS — E61.1 IRON DEFICIENCY: ICD-10-CM

## 2020-12-23 DIAGNOSIS — R79.89 LFTS ABNORMAL: ICD-10-CM

## 2020-12-23 LAB
FERRITIN SERPL-MCNC: 177 NG/ML (ref 20–300)
HGB BLD-MCNC: 15.1 G/DL (ref 14–18)
IRON SERPL-MCNC: 92 UG/DL (ref 45–160)
SATURATED IRON: 26 % (ref 20–50)
TOTAL IRON BINDING CAPACITY: 357 UG/DL (ref 250–450)
TRANSFERRIN SERPL-MCNC: 241 MG/DL (ref 200–375)

## 2020-12-23 PROCEDURE — 36415 COLL VENOUS BLD VENIPUNCTURE: CPT

## 2020-12-23 PROCEDURE — 90471 IMMUNIZATION ADMIN: CPT | Mod: S$GLB,,, | Performed by: INTERNAL MEDICINE

## 2020-12-23 PROCEDURE — 83540 ASSAY OF IRON: CPT

## 2020-12-23 PROCEDURE — 90739 HEPATITIS B (RECOMBINANT) ADJUVANTED, 2 DOSE: ICD-10-PCS | Mod: S$GLB,,, | Performed by: INTERNAL MEDICINE

## 2020-12-23 PROCEDURE — 85018 HEMOGLOBIN: CPT

## 2020-12-23 PROCEDURE — 82728 ASSAY OF FERRITIN: CPT

## 2020-12-23 PROCEDURE — 90739 HEPB VACC 2/4 DOSE ADULT IM: CPT | Mod: S$GLB,,, | Performed by: INTERNAL MEDICINE

## 2020-12-23 PROCEDURE — 90471 HEPATITIS B (RECOMBINANT) ADJUVANTED, 2 DOSE: ICD-10-PCS | Mod: S$GLB,,, | Performed by: INTERNAL MEDICINE

## 2020-12-30 RX ORDER — POTASSIUM CHLORIDE 1500 MG/1
20 TABLET, EXTENDED RELEASE ORAL DAILY
Qty: 30 TABLET | Refills: 11 | Status: SHIPPED | OUTPATIENT
Start: 2020-12-30 | End: 2022-01-03

## 2020-12-30 RX ORDER — POTASSIUM CHLORIDE 1500 MG/1
20 TABLET, EXTENDED RELEASE ORAL DAILY
Qty: 30 TABLET | Refills: 11 | Status: CANCELLED | OUTPATIENT
Start: 2020-12-30

## 2021-02-04 ENCOUNTER — OFFICE VISIT (OUTPATIENT)
Dept: DERMATOLOGY | Facility: CLINIC | Age: 61
End: 2021-02-04
Payer: COMMERCIAL

## 2021-02-04 DIAGNOSIS — Z79.899 ENCOUNTER FOR LONG-TERM (CURRENT) USE OF HIGH-RISK MEDICATION: Primary | ICD-10-CM

## 2021-02-04 DIAGNOSIS — L40.8 INVERSE PSORIASIS: ICD-10-CM

## 2021-02-04 PROCEDURE — 99214 PR OFFICE/OUTPT VISIT, EST, LEVL IV, 30-39 MIN: ICD-10-PCS | Mod: S$GLB,,, | Performed by: PATHOLOGY

## 2021-02-04 PROCEDURE — 99999 PR PBB SHADOW E&M-EST. PATIENT-LVL III: CPT | Mod: PBBFAC,,, | Performed by: PATHOLOGY

## 2021-02-04 PROCEDURE — 99214 OFFICE O/P EST MOD 30 MIN: CPT | Mod: S$GLB,,, | Performed by: PATHOLOGY

## 2021-02-04 PROCEDURE — 99999 PR PBB SHADOW E&M-EST. PATIENT-LVL III: ICD-10-PCS | Mod: PBBFAC,,, | Performed by: PATHOLOGY

## 2021-02-04 RX ORDER — HYDROCORTISONE BUTYRATE 1 MG/G
CREAM TOPICAL
Qty: 60 G | Refills: 3 | Status: SHIPPED | OUTPATIENT
Start: 2021-02-04 | End: 2021-03-30 | Stop reason: SDUPTHER

## 2021-02-04 RX ORDER — DOXYCYCLINE HYCLATE 100 MG
TABLET ORAL
COMMUNITY
Start: 2020-12-16 | End: 2021-07-20 | Stop reason: ALTCHOICE

## 2021-02-04 RX ORDER — CHLORHEXIDINE GLUCONATE ORAL RINSE 1.2 MG/ML
SOLUTION DENTAL
COMMUNITY
Start: 2020-12-07 | End: 2021-07-07 | Stop reason: ALTCHOICE

## 2021-02-04 RX ORDER — CYCLOBENZAPRINE HCL 10 MG
10 TABLET ORAL
COMMUNITY

## 2021-02-04 RX ORDER — HYDROCODONE BITARTRATE AND ACETAMINOPHEN 7.5; 325 MG/1; MG/1
1 TABLET ORAL EVERY 6 HOURS PRN
COMMUNITY
Start: 2020-12-16 | End: 2021-07-20 | Stop reason: ALTCHOICE

## 2021-02-17 ENCOUNTER — TELEPHONE (OUTPATIENT)
Dept: PHARMACY | Facility: CLINIC | Age: 61
End: 2021-02-17

## 2021-03-01 DIAGNOSIS — G43.709 CHRONIC MIGRAINE WITHOUT AURA WITHOUT STATUS MIGRAINOSUS, NOT INTRACTABLE: Primary | ICD-10-CM

## 2021-03-01 RX ORDER — SUMATRIPTAN SUCCINATE 100 MG/1
TABLET ORAL
Qty: 9 TABLET | Refills: 0 | Status: SHIPPED | OUTPATIENT
Start: 2021-03-01 | End: 2021-04-27

## 2021-03-30 ENCOUNTER — LAB VISIT (OUTPATIENT)
Dept: LAB | Facility: HOSPITAL | Age: 61
End: 2021-03-30
Payer: COMMERCIAL

## 2021-03-30 ENCOUNTER — OFFICE VISIT (OUTPATIENT)
Dept: DERMATOLOGY | Facility: CLINIC | Age: 61
End: 2021-03-30
Payer: COMMERCIAL

## 2021-03-30 DIAGNOSIS — Z79.899 ENCOUNTER FOR LONG-TERM (CURRENT) USE OF HIGH-RISK MEDICATION: ICD-10-CM

## 2021-03-30 DIAGNOSIS — Z79.899 ENCOUNTER FOR LONG-TERM (CURRENT) USE OF HIGH-RISK MEDICATION: Primary | ICD-10-CM

## 2021-03-30 DIAGNOSIS — L40.8 INVERSE PSORIASIS: ICD-10-CM

## 2021-03-30 PROCEDURE — 86480 TB TEST CELL IMMUN MEASURE: CPT | Performed by: PATHOLOGY

## 2021-03-30 PROCEDURE — 99999 PR PBB SHADOW E&M-EST. PATIENT-LVL III: ICD-10-PCS | Mod: PBBFAC,,, | Performed by: PATHOLOGY

## 2021-03-30 PROCEDURE — 99999 PR PBB SHADOW E&M-EST. PATIENT-LVL III: CPT | Mod: PBBFAC,,, | Performed by: PATHOLOGY

## 2021-03-30 PROCEDURE — 36415 COLL VENOUS BLD VENIPUNCTURE: CPT | Performed by: PATHOLOGY

## 2021-03-30 PROCEDURE — 99213 OFFICE O/P EST LOW 20 MIN: CPT | Mod: S$GLB,,, | Performed by: PATHOLOGY

## 2021-03-30 PROCEDURE — 99213 PR OFFICE/OUTPT VISIT, EST, LEVL III, 20-29 MIN: ICD-10-PCS | Mod: S$GLB,,, | Performed by: PATHOLOGY

## 2021-03-30 RX ORDER — HYDROCORTISONE BUTYRATE 1 MG/G
CREAM TOPICAL
Qty: 120 G | Refills: 5 | Status: SHIPPED | OUTPATIENT
Start: 2021-03-30 | End: 2021-11-08

## 2021-03-30 RX ORDER — APREMILAST 30 MG/1
TABLET, FILM COATED ORAL
Qty: 60 TABLET | Refills: 2 | OUTPATIENT
Start: 2021-03-30 | End: 2021-04-30

## 2021-03-30 RX ORDER — APREMILAST 10-20-30MG
KIT ORAL
Qty: 56 TABLET | Refills: 0 | OUTPATIENT
Start: 2021-03-30 | End: 2021-04-30

## 2021-03-31 LAB
GAMMA INTERFERON BACKGROUND BLD IA-ACNC: 0.03 IU/ML
M TB IFN-G CD4+ BCKGRND COR BLD-ACNC: 0.04 IU/ML
MITOGEN IGNF BCKGRD COR BLD-ACNC: >10 IU/ML
TB GOLD PLUS: NEGATIVE
TB2 - NIL: 0.03 IU/ML

## 2021-04-14 ENCOUNTER — PATIENT MESSAGE (OUTPATIENT)
Dept: NEUROLOGY | Facility: CLINIC | Age: 61
End: 2021-04-14

## 2021-04-14 DIAGNOSIS — G43.709 CHRONIC MIGRAINE WITHOUT AURA WITHOUT STATUS MIGRAINOSUS, NOT INTRACTABLE: ICD-10-CM

## 2021-04-14 RX ORDER — ERENUMAB-AOOE 140 MG/ML
140 INJECTION, SOLUTION SUBCUTANEOUS
Qty: 1 ML | Refills: 0 | OUTPATIENT
Start: 2021-04-14

## 2021-04-15 ENCOUNTER — PATIENT MESSAGE (OUTPATIENT)
Dept: RESEARCH | Facility: HOSPITAL | Age: 61
End: 2021-04-15

## 2021-04-20 ENCOUNTER — SPECIALTY PHARMACY (OUTPATIENT)
Dept: PHARMACY | Facility: CLINIC | Age: 61
End: 2021-04-20

## 2021-04-20 ENCOUNTER — PATIENT MESSAGE (OUTPATIENT)
Dept: DERMATOLOGY | Facility: CLINIC | Age: 61
End: 2021-04-20

## 2021-04-20 DIAGNOSIS — L40.9 PSORIASIS: Primary | ICD-10-CM

## 2021-04-20 RX ORDER — RISANKIZUMAB-RZAA 75 MG/0.83
KIT SUBCUTANEOUS
Qty: 2 SYRINGE | Refills: 3 | OUTPATIENT
Start: 2021-04-20 | End: 2021-04-30 | Stop reason: SDUPTHER

## 2021-04-27 DIAGNOSIS — G43.709 CHRONIC MIGRAINE WITHOUT AURA WITHOUT STATUS MIGRAINOSUS, NOT INTRACTABLE: ICD-10-CM

## 2021-04-27 RX ORDER — ERENUMAB-AOOE 140 MG/ML
140 INJECTION, SOLUTION SUBCUTANEOUS
Qty: 1 ML | Refills: 4 | Status: SHIPPED | OUTPATIENT
Start: 2021-04-27 | End: 2022-04-26

## 2021-04-29 RX ORDER — CHLORTHALIDONE 25 MG/1
12.5 TABLET ORAL DAILY
COMMUNITY
End: 2021-04-29 | Stop reason: SDUPTHER

## 2021-04-30 ENCOUNTER — PATIENT MESSAGE (OUTPATIENT)
Dept: NEUROLOGY | Facility: CLINIC | Age: 61
End: 2021-04-30

## 2021-04-30 ENCOUNTER — OFFICE VISIT (OUTPATIENT)
Dept: DERMATOLOGY | Facility: CLINIC | Age: 61
End: 2021-04-30
Payer: COMMERCIAL

## 2021-04-30 DIAGNOSIS — Z79.899 ENCOUNTER FOR LONG-TERM (CURRENT) USE OF HIGH-RISK MEDICATION: ICD-10-CM

## 2021-04-30 DIAGNOSIS — L40.9 PSORIASIS: Primary | ICD-10-CM

## 2021-04-30 PROCEDURE — 99999 PR PBB SHADOW E&M-EST. PATIENT-LVL III: CPT | Mod: PBBFAC,,, | Performed by: PATHOLOGY

## 2021-04-30 PROCEDURE — 99999 PR PBB SHADOW E&M-EST. PATIENT-LVL III: ICD-10-PCS | Mod: PBBFAC,,, | Performed by: PATHOLOGY

## 2021-04-30 PROCEDURE — 99213 PR OFFICE/OUTPT VISIT, EST, LEVL III, 20-29 MIN: ICD-10-PCS | Mod: S$GLB,,, | Performed by: PATHOLOGY

## 2021-04-30 PROCEDURE — 99213 OFFICE O/P EST LOW 20 MIN: CPT | Mod: S$GLB,,, | Performed by: PATHOLOGY

## 2021-04-30 RX ORDER — RISANKIZUMAB-RZAA 75 MG/0.83
KIT SUBCUTANEOUS
Qty: 2 SYRINGE | Refills: 3 | Status: SHIPPED | OUTPATIENT
Start: 2021-04-30 | End: 2021-10-11

## 2021-05-02 RX ORDER — CHLORTHALIDONE 25 MG/1
12.5 TABLET ORAL DAILY
Qty: 30 TABLET | Refills: 6 | Status: SHIPPED | OUTPATIENT
Start: 2021-05-02 | End: 2022-04-13

## 2021-05-03 ENCOUNTER — CLINICAL SUPPORT (OUTPATIENT)
Dept: URGENT CARE | Facility: CLINIC | Age: 61
End: 2021-05-03
Payer: COMMERCIAL

## 2021-05-03 DIAGNOSIS — Z11.52 ENCOUNTER FOR SCREENING FOR COVID-19: Primary | ICD-10-CM

## 2021-05-03 LAB
CTP QC/QA: YES
SARS-COV-2 RDRP RESP QL NAA+PROBE: NEGATIVE

## 2021-05-03 PROCEDURE — U0002 COVID-19 LAB TEST NON-CDC: HCPCS | Mod: QW,S$GLB,, | Performed by: FAMILY MEDICINE

## 2021-05-03 PROCEDURE — U0002: ICD-10-PCS | Mod: QW,S$GLB,, | Performed by: FAMILY MEDICINE

## 2021-05-14 ENCOUNTER — CLINICAL SUPPORT (OUTPATIENT)
Dept: URGENT CARE | Facility: CLINIC | Age: 61
End: 2021-05-14
Payer: COMMERCIAL

## 2021-05-14 DIAGNOSIS — Z11.59 ENCOUNTER FOR SCREENING FOR OTHER VIRAL DISEASES: Primary | ICD-10-CM

## 2021-05-14 LAB
CTP QC/QA: YES
SARS-COV-2 RDRP RESP QL NAA+PROBE: NEGATIVE

## 2021-05-14 PROCEDURE — U0002: ICD-10-PCS | Mod: QW,S$GLB,, | Performed by: STUDENT IN AN ORGANIZED HEALTH CARE EDUCATION/TRAINING PROGRAM

## 2021-05-14 PROCEDURE — 99211 OFF/OP EST MAY X REQ PHY/QHP: CPT | Mod: S$GLB,,, | Performed by: STUDENT IN AN ORGANIZED HEALTH CARE EDUCATION/TRAINING PROGRAM

## 2021-05-14 PROCEDURE — 99211 PR OFFICE/OUTPT VISIT, EST, LEVL I: ICD-10-PCS | Mod: S$GLB,,, | Performed by: STUDENT IN AN ORGANIZED HEALTH CARE EDUCATION/TRAINING PROGRAM

## 2021-05-14 PROCEDURE — U0002 COVID-19 LAB TEST NON-CDC: HCPCS | Mod: QW,S$GLB,, | Performed by: STUDENT IN AN ORGANIZED HEALTH CARE EDUCATION/TRAINING PROGRAM

## 2021-05-17 ENCOUNTER — PATIENT MESSAGE (OUTPATIENT)
Dept: NEUROLOGY | Facility: CLINIC | Age: 61
End: 2021-05-17

## 2021-05-27 ENCOUNTER — TELEPHONE (OUTPATIENT)
Dept: DERMATOLOGY | Facility: CLINIC | Age: 61
End: 2021-05-27

## 2021-06-10 DIAGNOSIS — K59.00 CONSTIPATION, UNSPECIFIED CONSTIPATION TYPE: ICD-10-CM

## 2021-06-10 RX ORDER — LINACLOTIDE 145 UG/1
145 CAPSULE, GELATIN COATED ORAL
Qty: 90 CAPSULE | Refills: 3 | Status: SHIPPED | OUTPATIENT
Start: 2021-06-10

## 2021-06-24 ENCOUNTER — PATIENT OUTREACH (OUTPATIENT)
Dept: ADMINISTRATIVE | Facility: OTHER | Age: 61
End: 2021-06-24

## 2021-06-27 ENCOUNTER — OFFICE VISIT (OUTPATIENT)
Dept: URGENT CARE | Facility: CLINIC | Age: 61
End: 2021-06-27
Payer: COMMERCIAL

## 2021-06-27 VITALS
HEIGHT: 73 IN | WEIGHT: 250 LBS | BODY MASS INDEX: 33.13 KG/M2 | HEART RATE: 64 BPM | TEMPERATURE: 98 F | OXYGEN SATURATION: 97 % | RESPIRATION RATE: 16 BRPM | SYSTOLIC BLOOD PRESSURE: 146 MMHG | DIASTOLIC BLOOD PRESSURE: 94 MMHG

## 2021-06-27 DIAGNOSIS — R05.9 COUGH: ICD-10-CM

## 2021-06-27 DIAGNOSIS — R09.82 POST-NASAL DRIP: Primary | ICD-10-CM

## 2021-06-27 LAB
CTP QC/QA: YES
SARS-COV-2 RDRP RESP QL NAA+PROBE: NEGATIVE

## 2021-06-27 PROCEDURE — 99213 PR OFFICE/OUTPT VISIT, EST, LEVL III, 20-29 MIN: ICD-10-PCS | Mod: S$GLB,,, | Performed by: NURSE PRACTITIONER

## 2021-06-27 PROCEDURE — U0002: ICD-10-PCS | Mod: QW,S$GLB,, | Performed by: NURSE PRACTITIONER

## 2021-06-27 PROCEDURE — U0002 COVID-19 LAB TEST NON-CDC: HCPCS | Mod: QW,S$GLB,, | Performed by: NURSE PRACTITIONER

## 2021-06-27 PROCEDURE — 99213 OFFICE O/P EST LOW 20 MIN: CPT | Mod: S$GLB,,, | Performed by: NURSE PRACTITIONER

## 2021-07-07 ENCOUNTER — OFFICE VISIT (OUTPATIENT)
Dept: FAMILY MEDICINE | Facility: CLINIC | Age: 61
End: 2021-07-07
Payer: COMMERCIAL

## 2021-07-07 VITALS
OXYGEN SATURATION: 97 % | HEART RATE: 83 BPM | WEIGHT: 260.13 LBS | HEIGHT: 73 IN | SYSTOLIC BLOOD PRESSURE: 114 MMHG | TEMPERATURE: 98 F | DIASTOLIC BLOOD PRESSURE: 66 MMHG | BODY MASS INDEX: 34.47 KG/M2

## 2021-07-07 DIAGNOSIS — G89.29 CHRONIC NECK PAIN: ICD-10-CM

## 2021-07-07 DIAGNOSIS — B96.89 ACUTE BACTERIAL RHINOSINUSITIS: Primary | ICD-10-CM

## 2021-07-07 DIAGNOSIS — I70.0 ABDOMINAL AORTIC ATHEROSCLEROSIS: ICD-10-CM

## 2021-07-07 DIAGNOSIS — J01.90 ACUTE BACTERIAL RHINOSINUSITIS: Primary | ICD-10-CM

## 2021-07-07 DIAGNOSIS — G43.709 CHRONIC MIGRAINE WITHOUT AURA WITHOUT STATUS MIGRAINOSUS, NOT INTRACTABLE: ICD-10-CM

## 2021-07-07 DIAGNOSIS — R06.09 DOE (DYSPNEA ON EXERTION): ICD-10-CM

## 2021-07-07 DIAGNOSIS — M54.2 CHRONIC NECK PAIN: ICD-10-CM

## 2021-07-07 DIAGNOSIS — J98.01 BRONCHOSPASM: ICD-10-CM

## 2021-07-07 DIAGNOSIS — I10 ESSENTIAL HYPERTENSION: ICD-10-CM

## 2021-07-07 DIAGNOSIS — R05.9 COUGH: ICD-10-CM

## 2021-07-07 PROCEDURE — 99214 PR OFFICE/OUTPT VISIT, EST, LEVL IV, 30-39 MIN: ICD-10-PCS | Mod: S$GLB,,, | Performed by: NURSE PRACTITIONER

## 2021-07-07 PROCEDURE — 99999 PR PBB SHADOW E&M-EST. PATIENT-LVL V: ICD-10-PCS | Mod: PBBFAC,,, | Performed by: NURSE PRACTITIONER

## 2021-07-07 PROCEDURE — 99214 OFFICE O/P EST MOD 30 MIN: CPT | Mod: S$GLB,,, | Performed by: NURSE PRACTITIONER

## 2021-07-07 PROCEDURE — 99999 PR PBB SHADOW E&M-EST. PATIENT-LVL V: CPT | Mod: PBBFAC,,, | Performed by: NURSE PRACTITIONER

## 2021-07-07 RX ORDER — PROMETHAZINE HYDROCHLORIDE AND DEXTROMETHORPHAN HYDROBROMIDE 6.25; 15 MG/5ML; MG/5ML
5 SYRUP ORAL EVERY 4 HOURS PRN
Qty: 120 ML | Refills: 0 | Status: SHIPPED | OUTPATIENT
Start: 2021-07-07 | End: 2021-07-17

## 2021-07-07 RX ORDER — AMOXICILLIN AND CLAVULANATE POTASSIUM 875; 125 MG/1; MG/1
1 TABLET, FILM COATED ORAL EVERY 12 HOURS
Qty: 14 TABLET | Refills: 0 | Status: SHIPPED | OUTPATIENT
Start: 2021-07-07 | End: 2021-07-14

## 2021-07-07 RX ORDER — METHYLPREDNISOLONE 4 MG/1
TABLET ORAL
Qty: 1 PACKAGE | Refills: 0 | Status: SHIPPED | OUTPATIENT
Start: 2021-07-07 | End: 2021-10-25

## 2021-07-07 RX ORDER — ALBUTEROL SULFATE 90 UG/1
2 AEROSOL, METERED RESPIRATORY (INHALATION) EVERY 6 HOURS PRN
Qty: 6.7 G | Refills: 0 | Status: SHIPPED | OUTPATIENT
Start: 2021-07-07

## 2021-07-09 ENCOUNTER — OFFICE VISIT (OUTPATIENT)
Dept: FAMILY MEDICINE | Facility: CLINIC | Age: 61
End: 2021-07-09
Payer: COMMERCIAL

## 2021-07-09 VITALS
WEIGHT: 261.44 LBS | SYSTOLIC BLOOD PRESSURE: 122 MMHG | TEMPERATURE: 98 F | BODY MASS INDEX: 34.65 KG/M2 | DIASTOLIC BLOOD PRESSURE: 76 MMHG | HEART RATE: 70 BPM | OXYGEN SATURATION: 95 % | HEIGHT: 73 IN

## 2021-07-09 DIAGNOSIS — M54.2 CERVICALGIA: Primary | ICD-10-CM

## 2021-07-09 DIAGNOSIS — I10 HYPERTENSION, UNSPECIFIED TYPE: ICD-10-CM

## 2021-07-09 PROCEDURE — 99214 OFFICE O/P EST MOD 30 MIN: CPT | Mod: S$GLB,,, | Performed by: FAMILY MEDICINE

## 2021-07-09 PROCEDURE — 99214 PR OFFICE/OUTPT VISIT, EST, LEVL IV, 30-39 MIN: ICD-10-PCS | Mod: S$GLB,,, | Performed by: FAMILY MEDICINE

## 2021-07-09 PROCEDURE — 99999 PR PBB SHADOW E&M-EST. PATIENT-LVL V: ICD-10-PCS | Mod: PBBFAC,,, | Performed by: FAMILY MEDICINE

## 2021-07-09 PROCEDURE — 99999 PR PBB SHADOW E&M-EST. PATIENT-LVL V: CPT | Mod: PBBFAC,,, | Performed by: FAMILY MEDICINE

## 2021-07-19 ENCOUNTER — LAB VISIT (OUTPATIENT)
Dept: LAB | Facility: HOSPITAL | Age: 61
End: 2021-07-19
Attending: INTERNAL MEDICINE
Payer: COMMERCIAL

## 2021-07-19 DIAGNOSIS — E78.2 MIXED HYPERLIPIDEMIA: ICD-10-CM

## 2021-07-19 DIAGNOSIS — I10 ESSENTIAL HYPERTENSION: ICD-10-CM

## 2021-07-19 LAB
ALBUMIN SERPL BCP-MCNC: 4.1 G/DL (ref 3.5–5.2)
ALP SERPL-CCNC: 41 U/L (ref 55–135)
ALT SERPL W/O P-5'-P-CCNC: 63 U/L (ref 10–44)
ANION GAP SERPL CALC-SCNC: 9 MMOL/L (ref 8–16)
AST SERPL-CCNC: 46 U/L (ref 10–40)
BILIRUB SERPL-MCNC: 0.8 MG/DL (ref 0.1–1)
BUN SERPL-MCNC: 20 MG/DL (ref 6–20)
CALCIUM SERPL-MCNC: 9.6 MG/DL (ref 8.7–10.5)
CHLORIDE SERPL-SCNC: 102 MMOL/L (ref 95–110)
CHOLEST SERPL-MCNC: 169 MG/DL (ref 120–199)
CHOLEST/HDLC SERPL: 5.5 {RATIO} (ref 2–5)
CO2 SERPL-SCNC: 29 MMOL/L (ref 23–29)
CREAT SERPL-MCNC: 1.1 MG/DL (ref 0.5–1.4)
EST. GFR  (AFRICAN AMERICAN): >60 ML/MIN/1.73 M^2
EST. GFR  (NON AFRICAN AMERICAN): >60 ML/MIN/1.73 M^2
GLUCOSE SERPL-MCNC: 91 MG/DL (ref 70–110)
HDLC SERPL-MCNC: 31 MG/DL (ref 40–75)
HDLC SERPL: 18.3 % (ref 20–50)
LDLC SERPL CALC-MCNC: 90.4 MG/DL (ref 63–159)
NONHDLC SERPL-MCNC: 138 MG/DL
POTASSIUM SERPL-SCNC: 3.9 MMOL/L (ref 3.5–5.1)
PROT SERPL-MCNC: 7.4 G/DL (ref 6–8.4)
SODIUM SERPL-SCNC: 140 MMOL/L (ref 136–145)
TRIGL SERPL-MCNC: 238 MG/DL (ref 30–150)

## 2021-07-19 PROCEDURE — 80053 COMPREHEN METABOLIC PANEL: CPT | Performed by: INTERNAL MEDICINE

## 2021-07-19 PROCEDURE — 80061 LIPID PANEL: CPT | Performed by: INTERNAL MEDICINE

## 2021-07-19 PROCEDURE — 36415 COLL VENOUS BLD VENIPUNCTURE: CPT | Mod: PO | Performed by: INTERNAL MEDICINE

## 2021-07-20 ENCOUNTER — OFFICE VISIT (OUTPATIENT)
Dept: CARDIOLOGY | Facility: CLINIC | Age: 61
End: 2021-07-20
Payer: COMMERCIAL

## 2021-07-20 VITALS
HEIGHT: 73 IN | DIASTOLIC BLOOD PRESSURE: 70 MMHG | HEART RATE: 64 BPM | BODY MASS INDEX: 34.17 KG/M2 | WEIGHT: 257.81 LBS | SYSTOLIC BLOOD PRESSURE: 124 MMHG

## 2021-07-20 DIAGNOSIS — E78.2 MIXED HYPERLIPIDEMIA: Primary | ICD-10-CM

## 2021-07-20 DIAGNOSIS — G47.33 OBSTRUCTIVE SLEEP APNEA SYNDROME: ICD-10-CM

## 2021-07-20 DIAGNOSIS — I10 ESSENTIAL HYPERTENSION: ICD-10-CM

## 2021-07-20 DIAGNOSIS — I70.0 ATHEROSCLEROSIS OF AORTA: ICD-10-CM

## 2021-07-20 PROCEDURE — 99999 PR PBB SHADOW E&M-EST. PATIENT-LVL III: CPT | Mod: PBBFAC,,, | Performed by: INTERNAL MEDICINE

## 2021-07-20 PROCEDURE — 99999 PR PBB SHADOW E&M-EST. PATIENT-LVL III: ICD-10-PCS | Mod: PBBFAC,,, | Performed by: INTERNAL MEDICINE

## 2021-07-20 PROCEDURE — 99214 PR OFFICE/OUTPT VISIT, EST, LEVL IV, 30-39 MIN: ICD-10-PCS | Mod: S$GLB,,, | Performed by: INTERNAL MEDICINE

## 2021-07-20 PROCEDURE — 99214 OFFICE O/P EST MOD 30 MIN: CPT | Mod: S$GLB,,, | Performed by: INTERNAL MEDICINE

## 2021-07-20 RX ORDER — FENOFIBRATE 160 MG/1
160 TABLET ORAL DAILY
Qty: 90 TABLET | Refills: 3 | Status: SHIPPED | OUTPATIENT
Start: 2021-07-20 | End: 2022-04-26

## 2021-07-20 RX ORDER — CLOMIPHENE CITRATE 50 MG/1
25 TABLET ORAL DAILY
COMMUNITY
End: 2022-04-26

## 2021-08-11 ENCOUNTER — PATIENT MESSAGE (OUTPATIENT)
Dept: CARDIOLOGY | Facility: CLINIC | Age: 61
End: 2021-08-11

## 2021-08-19 ENCOUNTER — LAB VISIT (OUTPATIENT)
Dept: PRIMARY CARE CLINIC | Facility: OTHER | Age: 61
End: 2021-08-19
Payer: COMMERCIAL

## 2021-08-19 ENCOUNTER — TELEPHONE (OUTPATIENT)
Dept: FAMILY MEDICINE | Facility: CLINIC | Age: 61
End: 2021-08-19

## 2021-08-19 DIAGNOSIS — Z20.822 ENCOUNTER FOR LABORATORY TESTING FOR COVID-19 VIRUS: ICD-10-CM

## 2021-08-19 DIAGNOSIS — R05.9 COUGH: ICD-10-CM

## 2021-08-19 PROCEDURE — U0003 INFECTIOUS AGENT DETECTION BY NUCLEIC ACID (DNA OR RNA); SEVERE ACUTE RESPIRATORY SYNDROME CORONAVIRUS 2 (SARS-COV-2) (CORONAVIRUS DISEASE [COVID-19]), AMPLIFIED PROBE TECHNIQUE, MAKING USE OF HIGH THROUGHPUT TECHNOLOGIES AS DESCRIBED BY CMS-2020-01-R: HCPCS | Performed by: INTERNAL MEDICINE

## 2021-08-19 NOTE — TELEPHONE ENCOUNTER
Eleazar has covid, he has sim sx but took rapid antigen and is neg. Will order PCR, as maybe candidate for mAB given his medical hx and meds.

## 2021-08-20 ENCOUNTER — PATIENT MESSAGE (OUTPATIENT)
Dept: FAMILY MEDICINE | Facility: CLINIC | Age: 61
End: 2021-08-20

## 2021-08-22 DIAGNOSIS — U07.1 COVID-19 VIRUS DETECTED: ICD-10-CM

## 2021-08-22 LAB
SARS-COV-2 RNA RESP QL NAA+PROBE: DETECTED
SARS-COV-2- CYCLE NUMBER: 16.75

## 2021-08-23 ENCOUNTER — TELEPHONE (OUTPATIENT)
Dept: FAMILY MEDICINE | Facility: CLINIC | Age: 61
End: 2021-08-23

## 2021-08-23 DIAGNOSIS — U07.1 COVID-19 VIRUS DETECTED: Primary | ICD-10-CM

## 2021-08-26 ENCOUNTER — PATIENT OUTREACH (OUTPATIENT)
Dept: ADMINISTRATIVE | Facility: OTHER | Age: 61
End: 2021-08-26

## 2021-09-14 ENCOUNTER — OFFICE VISIT (OUTPATIENT)
Dept: HEPATOLOGY | Facility: CLINIC | Age: 61
End: 2021-09-14
Payer: COMMERCIAL

## 2021-09-14 ENCOUNTER — PROCEDURE VISIT (OUTPATIENT)
Dept: HEPATOLOGY | Facility: CLINIC | Age: 61
End: 2021-09-14
Payer: COMMERCIAL

## 2021-09-14 VITALS
HEART RATE: 73 BPM | WEIGHT: 259.5 LBS | DIASTOLIC BLOOD PRESSURE: 75 MMHG | RESPIRATION RATE: 18 BRPM | TEMPERATURE: 98 F | SYSTOLIC BLOOD PRESSURE: 125 MMHG | BODY MASS INDEX: 34.39 KG/M2 | HEIGHT: 73 IN | OXYGEN SATURATION: 92 %

## 2021-09-14 DIAGNOSIS — R74.8 ELEVATED LIVER ENZYMES: ICD-10-CM

## 2021-09-14 DIAGNOSIS — K74.01 EARLY HEPATIC FIBROSIS: ICD-10-CM

## 2021-09-14 DIAGNOSIS — K76.0 FATTY LIVER DISEASE, NONALCOHOLIC: Primary | ICD-10-CM

## 2021-09-14 DIAGNOSIS — R74.01 ELEVATED ALT MEASUREMENT: ICD-10-CM

## 2021-09-14 PROCEDURE — 99214 PR OFFICE/OUTPT VISIT, EST, LEVL IV, 30-39 MIN: ICD-10-PCS | Mod: S$GLB,,, | Performed by: NURSE PRACTITIONER

## 2021-09-14 PROCEDURE — 91200 LIVER ELASTOGRAPHY: CPT | Mod: S$GLB,,, | Performed by: NURSE PRACTITIONER

## 2021-09-14 PROCEDURE — 99999 PR PBB SHADOW E&M-EST. PATIENT-LVL V: ICD-10-PCS | Mod: PBBFAC,,, | Performed by: NURSE PRACTITIONER

## 2021-09-14 PROCEDURE — 99999 PR PBB SHADOW E&M-EST. PATIENT-LVL V: CPT | Mod: PBBFAC,,, | Performed by: NURSE PRACTITIONER

## 2021-09-14 PROCEDURE — 99214 OFFICE O/P EST MOD 30 MIN: CPT | Mod: S$GLB,,, | Performed by: NURSE PRACTITIONER

## 2021-09-14 PROCEDURE — 91200 FIBROSCAN (VIBRATION CONTROLLED TRANSIENT ELASTOGRAPHY): ICD-10-PCS | Mod: S$GLB,,, | Performed by: NURSE PRACTITIONER

## 2021-09-14 RX ORDER — COLISTIN SULFATE, NEOMYCIN SULFATE, THONZONIUM BROMIDE AND HYDROCORTISONE ACETATE 3; 3.3; .5; 1 MG/ML; MG/ML; MG/ML; MG/ML
4 SUSPENSION AURICULAR (OTIC) 2 TIMES DAILY
COMMUNITY
Start: 2021-08-19

## 2021-10-11 DIAGNOSIS — L40.9 PSORIASIS: ICD-10-CM

## 2021-10-25 DIAGNOSIS — K22.10 ESOPHAGITIS, EROSIVE: ICD-10-CM

## 2021-10-25 RX ORDER — PANTOPRAZOLE SODIUM 40 MG/1
40 TABLET, DELAYED RELEASE ORAL
Qty: 90 TABLET | Refills: 3 | Status: SHIPPED | OUTPATIENT
Start: 2021-10-25 | End: 2022-10-22

## 2021-11-08 DIAGNOSIS — K21.9 GASTROESOPHAGEAL REFLUX DISEASE, UNSPECIFIED WHETHER ESOPHAGITIS PRESENT: Primary | ICD-10-CM

## 2021-11-08 DIAGNOSIS — Z86.010 HISTORY OF COLON POLYPS: ICD-10-CM

## 2021-11-10 ENCOUNTER — PATIENT MESSAGE (OUTPATIENT)
Dept: MEDSURG UNIT | Facility: HOSPITAL | Age: 61
End: 2021-11-10
Payer: COMMERCIAL

## 2021-11-10 ENCOUNTER — PATIENT MESSAGE (OUTPATIENT)
Dept: ENDOSCOPY | Facility: HOSPITAL | Age: 61
End: 2021-11-10
Payer: COMMERCIAL

## 2021-11-10 ENCOUNTER — TELEPHONE (OUTPATIENT)
Dept: ENDOSCOPY | Facility: HOSPITAL | Age: 61
End: 2021-11-10
Payer: COMMERCIAL

## 2021-11-10 DIAGNOSIS — Z12.11 SPECIAL SCREENING FOR MALIGNANT NEOPLASMS, COLON: Primary | ICD-10-CM

## 2021-11-10 DIAGNOSIS — Z01.812 PRE-PROCEDURE LAB EXAM: Primary | ICD-10-CM

## 2021-11-10 RX ORDER — POLYETHYLENE GLYCOL 3350, SODIUM SULFATE ANHYDROUS, SODIUM BICARBONATE, SODIUM CHLORIDE, POTASSIUM CHLORIDE 236; 22.74; 6.74; 5.86; 2.97 G/4L; G/4L; G/4L; G/4L; G/4L
4 POWDER, FOR SOLUTION ORAL ONCE
Qty: 4000 ML | Refills: 0 | Status: SHIPPED | OUTPATIENT
Start: 2021-11-10 | End: 2021-11-10

## 2021-11-11 ENCOUNTER — PATIENT MESSAGE (OUTPATIENT)
Dept: ENDOSCOPY | Facility: HOSPITAL | Age: 61
End: 2021-11-11
Payer: COMMERCIAL

## 2021-12-03 ENCOUNTER — LAB VISIT (OUTPATIENT)
Dept: SPORTS MEDICINE | Facility: CLINIC | Age: 61
End: 2021-12-03
Payer: COMMERCIAL

## 2021-12-03 DIAGNOSIS — Z01.812 PRE-PROCEDURE LAB EXAM: ICD-10-CM

## 2021-12-03 PROCEDURE — U0005 INFEC AGEN DETEC AMPLI PROBE: HCPCS | Performed by: FAMILY MEDICINE

## 2021-12-03 PROCEDURE — U0003 INFECTIOUS AGENT DETECTION BY NUCLEIC ACID (DNA OR RNA); SEVERE ACUTE RESPIRATORY SYNDROME CORONAVIRUS 2 (SARS-COV-2) (CORONAVIRUS DISEASE [COVID-19]), AMPLIFIED PROBE TECHNIQUE, MAKING USE OF HIGH THROUGHPUT TECHNOLOGIES AS DESCRIBED BY CMS-2020-01-R: HCPCS | Performed by: FAMILY MEDICINE

## 2021-12-04 LAB
SARS-COV-2 RNA RESP QL NAA+PROBE: NOT DETECTED
SARS-COV-2- CYCLE NUMBER: NORMAL

## 2021-12-06 ENCOUNTER — ANESTHESIA (OUTPATIENT)
Dept: ENDOSCOPY | Facility: HOSPITAL | Age: 61
End: 2021-12-06
Payer: COMMERCIAL

## 2021-12-06 ENCOUNTER — HOSPITAL ENCOUNTER (OUTPATIENT)
Facility: HOSPITAL | Age: 61
Discharge: HOME | End: 2021-12-06
Attending: INTERNAL MEDICINE | Admitting: INTERNAL MEDICINE
Payer: COMMERCIAL

## 2021-12-06 ENCOUNTER — ANESTHESIA EVENT (OUTPATIENT)
Dept: ENDOSCOPY | Facility: HOSPITAL | Age: 61
End: 2021-12-06
Payer: COMMERCIAL

## 2021-12-06 VITALS
SYSTOLIC BLOOD PRESSURE: 128 MMHG | RESPIRATION RATE: 18 BRPM | HEART RATE: 64 BPM | DIASTOLIC BLOOD PRESSURE: 81 MMHG | OXYGEN SATURATION: 93 % | TEMPERATURE: 98 F

## 2021-12-06 DIAGNOSIS — K21.9 GERD (GASTROESOPHAGEAL REFLUX DISEASE): ICD-10-CM

## 2021-12-06 DIAGNOSIS — K21.9 GASTROESOPHAGEAL REFLUX DISEASE WITHOUT ESOPHAGITIS: Primary | ICD-10-CM

## 2021-12-06 PROCEDURE — 63600175 PHARM REV CODE 636 W HCPCS: Performed by: NURSE ANESTHETIST, CERTIFIED REGISTERED

## 2021-12-06 PROCEDURE — 94761 N-INVAS EAR/PLS OXIMETRY MLT: CPT

## 2021-12-06 PROCEDURE — 37000009 HC ANESTHESIA EA ADD 15 MINS: Performed by: INTERNAL MEDICINE

## 2021-12-06 PROCEDURE — D9220A PRA ANESTHESIA: Mod: ,,, | Performed by: ANESTHESIOLOGY

## 2021-12-06 PROCEDURE — G0105 COLORECTAL SCRN; HI RISK IND: HCPCS | Mod: ,,, | Performed by: INTERNAL MEDICINE

## 2021-12-06 PROCEDURE — 27201012 HC FORCEPS, HOT/COLD, DISP: Performed by: INTERNAL MEDICINE

## 2021-12-06 PROCEDURE — 43239 EGD BIOPSY SINGLE/MULTIPLE: CPT | Mod: 51,,, | Performed by: INTERNAL MEDICINE

## 2021-12-06 PROCEDURE — 25000003 PHARM REV CODE 250: Performed by: NURSE ANESTHETIST, CERTIFIED REGISTERED

## 2021-12-06 PROCEDURE — 88305 TISSUE EXAM BY PATHOLOGIST: CPT | Mod: 26,,, | Performed by: PATHOLOGY

## 2021-12-06 PROCEDURE — 25000003 PHARM REV CODE 250: Performed by: ANESTHESIOLOGY

## 2021-12-06 PROCEDURE — D9220A PRA ANESTHESIA: ICD-10-PCS | Mod: ,,, | Performed by: ANESTHESIOLOGY

## 2021-12-06 PROCEDURE — 43239 EGD BIOPSY SINGLE/MULTIPLE: CPT | Performed by: INTERNAL MEDICINE

## 2021-12-06 PROCEDURE — 88305 TISSUE EXAM BY PATHOLOGIST: CPT | Performed by: PATHOLOGY

## 2021-12-06 PROCEDURE — G0105 COLORECTAL SCRN; HI RISK IND: ICD-10-PCS | Mod: ,,, | Performed by: INTERNAL MEDICINE

## 2021-12-06 PROCEDURE — 25000242 PHARM REV CODE 250 ALT 637 W/ HCPCS: Performed by: INTERNAL MEDICINE

## 2021-12-06 PROCEDURE — 88305 TISSUE EXAM BY PATHOLOGIST: ICD-10-PCS | Mod: 26,,, | Performed by: PATHOLOGY

## 2021-12-06 PROCEDURE — 43239 PR EGD, FLEX, W/BIOPSY, SGL/MULTI: ICD-10-PCS | Mod: 51,,, | Performed by: INTERNAL MEDICINE

## 2021-12-06 PROCEDURE — 94640 AIRWAY INHALATION TREATMENT: CPT

## 2021-12-06 PROCEDURE — G0105 COLORECTAL SCRN; HI RISK IND: HCPCS | Performed by: INTERNAL MEDICINE

## 2021-12-06 PROCEDURE — 37000008 HC ANESTHESIA 1ST 15 MINUTES: Performed by: INTERNAL MEDICINE

## 2021-12-06 RX ORDER — LIDOCAINE HYDROCHLORIDE 10 MG/ML
1 INJECTION, SOLUTION EPIDURAL; INFILTRATION; INTRACAUDAL; PERINEURAL ONCE
Status: DISCONTINUED | OUTPATIENT
Start: 2021-12-06 | End: 2021-12-06 | Stop reason: HOSPADM

## 2021-12-06 RX ORDER — IPRATROPIUM BROMIDE AND ALBUTEROL SULFATE 2.5; .5 MG/3ML; MG/3ML
3 SOLUTION RESPIRATORY (INHALATION) ONCE
Status: COMPLETED | OUTPATIENT
Start: 2021-12-06 | End: 2021-12-06

## 2021-12-06 RX ORDER — LIDOCAINE HYDROCHLORIDE 20 MG/ML
INJECTION INTRAVENOUS
Status: DISCONTINUED | OUTPATIENT
Start: 2021-12-06 | End: 2021-12-06

## 2021-12-06 RX ORDER — PROPOFOL 10 MG/ML
VIAL (ML) INTRAVENOUS
Status: DISCONTINUED | OUTPATIENT
Start: 2021-12-06 | End: 2021-12-06

## 2021-12-06 RX ORDER — LIDOCAINE HYDROCHLORIDE 20 MG/ML
INJECTION, SOLUTION EPIDURAL; INFILTRATION; INTRACAUDAL; PERINEURAL
Status: DISCONTINUED
Start: 2021-12-06 | End: 2021-12-06 | Stop reason: HOSPADM

## 2021-12-06 RX ORDER — SODIUM CHLORIDE 9 MG/ML
INJECTION, SOLUTION INTRAVENOUS CONTINUOUS
Status: DISCONTINUED | OUTPATIENT
Start: 2021-12-06 | End: 2021-12-06 | Stop reason: HOSPADM

## 2021-12-06 RX ORDER — PROPOFOL 10 MG/ML
INJECTION, EMULSION INTRAVENOUS
Status: DISCONTINUED
Start: 2021-12-06 | End: 2021-12-06 | Stop reason: HOSPADM

## 2021-12-06 RX ORDER — ALBUTEROL SULFATE 2.5 MG/.5ML
SOLUTION RESPIRATORY (INHALATION)
Status: DISCONTINUED
Start: 2021-12-06 | End: 2021-12-06 | Stop reason: HOSPADM

## 2021-12-06 RX ADMIN — LIDOCAINE HYDROCHLORIDE 100 MG: 20 INJECTION, SOLUTION INTRAVENOUS at 02:12

## 2021-12-06 RX ADMIN — PROPOFOL 80 MG: 10 INJECTION, EMULSION INTRAVENOUS at 02:12

## 2021-12-06 RX ADMIN — PROPOFOL 20 MG: 10 INJECTION, EMULSION INTRAVENOUS at 02:12

## 2021-12-06 RX ADMIN — PROPOFOL 40 MG: 10 INJECTION, EMULSION INTRAVENOUS at 02:12

## 2021-12-06 RX ADMIN — SODIUM CHLORIDE: 0.9 INJECTION, SOLUTION INTRAVENOUS at 01:12

## 2021-12-06 RX ADMIN — IPRATROPIUM BROMIDE AND ALBUTEROL SULFATE 3 ML: .5; 3 SOLUTION RESPIRATORY (INHALATION) at 01:12

## 2021-12-07 ENCOUNTER — TELEPHONE (OUTPATIENT)
Dept: GASTROENTEROLOGY | Facility: CLINIC | Age: 61
End: 2021-12-07
Payer: COMMERCIAL

## 2021-12-07 LAB
FINAL PATHOLOGIC DIAGNOSIS: NORMAL
GROSS: NORMAL
Lab: NORMAL

## 2022-01-06 ENCOUNTER — LAB VISIT (OUTPATIENT)
Dept: PRIMARY CARE CLINIC | Facility: CLINIC | Age: 62
End: 2022-01-06
Payer: COMMERCIAL

## 2022-01-06 DIAGNOSIS — Z20.822 CONTACT WITH AND (SUSPECTED) EXPOSURE TO COVID-19: ICD-10-CM

## 2022-01-06 LAB
CTP QC/QA: YES
SARS-COV-2 AG RESP QL IA.RAPID: NEGATIVE

## 2022-01-06 PROCEDURE — 87811 SARS-COV-2 COVID19 W/OPTIC: CPT

## 2022-03-03 ENCOUNTER — PATIENT MESSAGE (OUTPATIENT)
Dept: DERMATOLOGY | Facility: CLINIC | Age: 62
End: 2022-03-03
Payer: COMMERCIAL

## 2022-03-07 DIAGNOSIS — L40.8 INVERSE PSORIASIS: Primary | ICD-10-CM

## 2022-03-07 RX ORDER — TRIAMCINOLONE ACETONIDE 1 MG/G
CREAM TOPICAL
Qty: 454 G | Refills: 3 | Status: SHIPPED | OUTPATIENT
Start: 2022-03-07

## 2022-03-14 ENCOUNTER — PATIENT OUTREACH (OUTPATIENT)
Dept: ADMINISTRATIVE | Facility: OTHER | Age: 62
End: 2022-03-14
Payer: COMMERCIAL

## 2022-03-14 NOTE — PROGRESS NOTES
Health Maintenance Due   Topic Date Due    COVID-19 Vaccine (1) Never done    HIV Screening  Never done    Shingles Vaccine (1 of 2) Never done    Influenza Vaccine (1) 09/01/2021     Updates were requested from care everywhere.  Chart was reviewed for overdue Proactive [facility] Encounters ([facility]) topics (CRS, Breast Cancer Screening, Eye exam)  Health Maintenance has been updated.  [facility] immunization registry triggered.  Immunizations were reconciled.

## 2022-03-15 ENCOUNTER — TELEPHONE (OUTPATIENT)
Dept: HEPATOLOGY | Facility: CLINIC | Age: 62
End: 2022-03-15
Payer: COMMERCIAL

## 2022-03-15 ENCOUNTER — PATIENT MESSAGE (OUTPATIENT)
Dept: HEPATOLOGY | Facility: CLINIC | Age: 62
End: 2022-03-15
Payer: COMMERCIAL

## 2022-03-18 ENCOUNTER — DOCUMENTATION ONLY (OUTPATIENT)
Dept: OTOLARYNGOLOGY | Facility: CLINIC | Age: 62
End: 2022-03-18

## 2022-03-18 ENCOUNTER — OFFICE VISIT (OUTPATIENT)
Dept: OTOLARYNGOLOGY | Facility: CLINIC | Age: 62
End: 2022-03-18
Payer: COMMERCIAL

## 2022-03-18 VITALS
WEIGHT: 265.56 LBS | SYSTOLIC BLOOD PRESSURE: 118 MMHG | HEIGHT: 73 IN | BODY MASS INDEX: 35.2 KG/M2 | DIASTOLIC BLOOD PRESSURE: 86 MMHG

## 2022-03-18 DIAGNOSIS — H91.91 HEARING LOSS OF RIGHT EAR, UNSPECIFIED HEARING LOSS TYPE: Primary | ICD-10-CM

## 2022-03-18 DIAGNOSIS — H69.91 DYSFUNCTION OF RIGHT EUSTACHIAN TUBE: ICD-10-CM

## 2022-03-18 DIAGNOSIS — J34.3 HYPERTROPHY OF INFERIOR NASAL TURBINATE: ICD-10-CM

## 2022-03-18 DIAGNOSIS — H90.71 MIXED CONDUCTIVE AND SENSORINEURAL HEARING LOSS OF RIGHT EAR, UNSPECIFIED HEARING STATUS ON CONTRALATERAL SIDE: ICD-10-CM

## 2022-03-18 DIAGNOSIS — H92.11 OTORRHEA OF RIGHT EAR: ICD-10-CM

## 2022-03-18 DIAGNOSIS — R51.9 NONINTRACTABLE EPISODIC HEADACHE, UNSPECIFIED HEADACHE TYPE: ICD-10-CM

## 2022-03-18 PROCEDURE — 3079F PR MOST RECENT DIASTOLIC BLOOD PRESSURE 80-89 MM HG: ICD-10-PCS | Mod: CPTII,S$GLB,, | Performed by: OTOLARYNGOLOGY

## 2022-03-18 PROCEDURE — 99204 OFFICE O/P NEW MOD 45 MIN: CPT | Mod: 25,S$GLB,, | Performed by: OTOLARYNGOLOGY

## 2022-03-18 PROCEDURE — 1159F PR MEDICATION LIST DOCUMENTED IN MEDICAL RECORD: ICD-10-PCS | Mod: CPTII,S$GLB,, | Performed by: OTOLARYNGOLOGY

## 2022-03-18 PROCEDURE — 3008F PR BODY MASS INDEX (BMI) DOCUMENTED: ICD-10-PCS | Mod: CPTII,S$GLB,, | Performed by: OTOLARYNGOLOGY

## 2022-03-18 PROCEDURE — 3008F BODY MASS INDEX DOCD: CPT | Mod: CPTII,S$GLB,, | Performed by: OTOLARYNGOLOGY

## 2022-03-18 PROCEDURE — 3079F DIAST BP 80-89 MM HG: CPT | Mod: CPTII,S$GLB,, | Performed by: OTOLARYNGOLOGY

## 2022-03-18 PROCEDURE — 3074F SYST BP LT 130 MM HG: CPT | Mod: CPTII,S$GLB,, | Performed by: OTOLARYNGOLOGY

## 2022-03-18 PROCEDURE — 3074F PR MOST RECENT SYSTOLIC BLOOD PRESSURE < 130 MM HG: ICD-10-PCS | Mod: CPTII,S$GLB,, | Performed by: OTOLARYNGOLOGY

## 2022-03-18 PROCEDURE — 92504 EAR MICROSCOPY EXAMINATION: CPT | Mod: S$GLB,,, | Performed by: OTOLARYNGOLOGY

## 2022-03-18 PROCEDURE — 99204 PR OFFICE/OUTPT VISIT, NEW, LEVL IV, 45-59 MIN: ICD-10-PCS | Mod: 25,S$GLB,, | Performed by: OTOLARYNGOLOGY

## 2022-03-18 PROCEDURE — 1159F MED LIST DOCD IN RCRD: CPT | Mod: CPTII,S$GLB,, | Performed by: OTOLARYNGOLOGY

## 2022-03-18 PROCEDURE — 92504 PR EAR MICROSCOPY EXAMINATION: ICD-10-PCS | Mod: S$GLB,,, | Performed by: OTOLARYNGOLOGY

## 2022-03-18 RX ORDER — CIPROFLOXACIN AND DEXAMETHASONE 3; 1 MG/ML; MG/ML
4 SUSPENSION/ DROPS AURICULAR (OTIC)
COMMUNITY
Start: 2022-03-09

## 2022-03-18 RX ORDER — OFLOXACIN 3 MG/ML
SOLUTION AURICULAR (OTIC)
COMMUNITY
Start: 2021-11-24

## 2022-03-18 RX ORDER — BETAMETHASONE DIPROPIONATE 0.5 MG/G
OINTMENT TOPICAL 2 TIMES DAILY
COMMUNITY
Start: 2021-11-10

## 2022-03-18 RX ORDER — HYDROCODONE BITARTRATE AND ACETAMINOPHEN 5; 325 MG/1; MG/1
1 TABLET ORAL
COMMUNITY
Start: 2022-01-18 | End: 2022-04-26

## 2022-03-18 NOTE — PROGRESS NOTES
OTOLARYNGOLOGY CLINIC NOTE  Date:  03/18/2022     Chief complaint:  Chief Complaint   Patient presents with    Ear Drainage     Patient presents right ear drainage/popping, only when lying down. headaches.tubes right ear.       History of Present Illness  Jonathan Villela is a 61 y.o. male  presenting today for a new evaluation and treatment of ear issues.   No issue with gait, no urinary problems, no double vision . No pulsatile tinnitus   Has always had migraines but headache , headache when laying down . Dull headache , right side   When gets migraine it is more on the left side- has not had issue with that in over a year would lose eyesight when he would have migraine  Drainage is kind of yellow; has been nightly for past week or so  Got tube about 3-4 months ago    Right ear hearing not as good as left , seemed to get worse after tube placed.   Drainage happens when lays down as well.   Done by outside ent dr. serna  Tube is in right ear only  Had imaging prior to tube being put in     Right ear popping also. Has some seasonal allergy has not noticed increased pooping with flare up. no heartburn . Popping happens when laying down as well. Usually not issue during day.   Mri brain 2017 no hydrocephalus    Past Medical History  Past Medical History:   Diagnosis Date    Acute gastric ulcer with hemorrhage 2/15/2017    Bilateral occipital neuralgia     BPH with urinary obstruction 12/31/2015    Chronic constipation     Colitis [id number]    infectious?    Diverticulitis     Erectile dysfunction     Essential hypertension     Fatty liver     Gastroesophageal reflux disease without esophagitis 2/11/2017    HLD (hyperlipidemia)     Hypogonadism in male     IBS (irritable bowel syndrome)     Migraine without aura and without status migrainosus, not intractable 1/31/2014    Obstructive sleep apnea syndrome 2/9/2015    Psoriasis     PUD (peptic ulcer disease)         Past Surgical History  Past  Surgical History:   Procedure Laterality Date    CHOLECYSTECTOMY      COLONOSCOPY N/A 2/17/2017    Procedure: COLONOSCOPY;  Surgeon: Yoshi Erazo MD;  Location: [facility] (2ND FLR);  Service: Endoscopy;  Laterality: N/A;    COLONOSCOPY N/A 5/29/2019    Procedure: COLONOSCOPY;  Surgeon: Yoshi Erazo MD;  Location: [facility] (2ND FLR);  Service: Endoscopy;  Laterality: N/A;    COLONOSCOPY N/A 8/31/2020    Procedure: COLONOSCOPY;  Surgeon: Yoshi Erazo MD;  Location: [facility] (4TH FLR);  Service: Endoscopy;  Laterality: N/A;    COLONOSCOPY N/A 12/6/2021    Procedure: COLONOSCOPY;  Surgeon: Adiel Chan MD;  Location: [facility];  Service: Endoscopy;  Laterality: N/A;  positive covid 8/19/21-[facility]  covid 12/3/21-[facility]-    ESOPHAGOGASTRODUODENOSCOPY      ESOPHAGOGASTRODUODENOSCOPY N/A 12/14/2018    Procedure: EGD (ESOPHAGOGASTRODUODENOSCOPY);  Surgeon: Lexii Carlson MD;  Location: [facility] (4TH FLR);  Service: Endoscopy;  Laterality: N/A;    ESOPHAGOGASTRODUODENOSCOPY N/A 5/29/2019    Procedure: EGD (ESOPHAGOGASTRODUODENOSCOPY);  Surgeon: Yoshi Erazo MD;  Location: [facility] (2ND FLR);  Service: Endoscopy;  Laterality: N/A;  per Dr Erazo-schedule on 2nd floor    ESOPHAGOGASTRODUODENOSCOPY N/A 8/31/2020    Procedure: EGD (ESOPHAGOGASTRODUODENOSCOPY);  Surgeon: Yoshi Erazo MD;  Location: [facility] (4TH FLR);  Service: Endoscopy;  Laterality: N/A;  covid test 8/28-[facility]    ESOPHAGOGASTRODUODENOSCOPY N/A 12/6/2021    Procedure: EGD (ESOPHAGOGASTRODUODENOSCOPY);  Surgeon: Adiel Chan MD;  Location: [facility];  Service: Endoscopy;  Laterality: N/A;  positive covid 8/19/21-[facility]  covid 12/3/21-[facility]    FLEXIBLE LARYNGOSCOPY WITH DRUG-INDUCED SEDATION FOR EVALUATION OF SLEEP APNEA N/A 12/9/2020    Procedure: LARYNGOSCOPY, FLEXIBLE, WITH DRUG-INDUCED SEDATION, FOR SLEEP APNEA ASSESSMENT;  Surgeon: Wilfrido Erazo MD;  Location: [facility];  Service: ENT;   Laterality: N/A;    LEG SURGERY      NASAL SEPTUM SURGERY      UPPER GASTROINTESTINAL ENDOSCOPY          Medications  Current Outpatient Medications on File Prior to Visit   Medication Sig Dispense Refill    acyclovir (ZOVIRAX) 400 MG tablet Take 400 mg by mouth 2 (two) times daily as needed.      albuterol (VENTOLIN HFA) 90 mcg/actuation inhaler Inhale 2 puffs into the lungs every 6 (six) hours as needed for Wheezing or Shortness of Breath. Rescue 6.7 g 0    amLODIPine (NORVASC) 5 MG tablet TAKE 1 TABLET(5 MG) BY MOUTH EVERY DAY 90 tablet 3    betamethasone dipropionate (DIPROLENE) 0.05 % ointment Apply topically 2 (two) times daily.      carvediloL (COREG) 12.5 MG tablet TAKE 1 TABLET BY MOUTH TWICE DAILY WITH MEALS 180 tablet 3    chlorthalidone (HYGROTEN) 25 MG Tab Take 0.5 tablets (12.5 mg total) by mouth once daily. 30 tablet 6    ciclopirox (LOPROX) 0.77 % Crea Apply topically as needed.       ciprofloxacin-dexamethasone 0.3-0.1% (CIPRODEX) 0.3-0.1 % DrpS       clomiPHENE (CLOMID) 50 mg tablet Take 25 mg by mouth once daily.      cloNIDine (CATAPRES) 0.1 MG tablet Take 0.1 mg by mouth as needed.   3    CORTISPORIN-TC 3.3-3-10-0.5 mg/mL DrpS Place 4 drops into both ears 2 (two) times daily.      cyanocobalamin 1,000 mcg/mL injection every 30 days.      cyclobenzaprine (FLEXERIL) 10 MG tablet Take 10 mg by mouth as needed.       dicyclomine (BENTYL) 10 MG capsule Take 1 capsule (10 mg total) by mouth 3 (three) times daily as needed (abdominal cramping). 30 capsule 1    erenumab-aooe (AIMOVIG AUTOINJECTOR) 140 mg/mL autoinjector Inject 140 mg into the skin every 28 days. 1 mL 4    ergocalciferol (ERGOCALCIFEROL) 50,000 unit Cap Take 1 capsule by mouth twice a week.      fenofibrate 160 MG Tab Take 1 tablet (160 mg total) by mouth once daily. 90 tablet 3    HYDROcodone-acetaminophen (NORCO) 5-325 mg per tablet Take 1 tablet by mouth.      LINZESS 145 mcg Cap capsule Take 1 capsule (145 mcg  total) by mouth as needed. 90 capsule 3    ofloxacin (FLOXIN) 0.3 % otic solution Place into the right ear.      ondansetron (ZOFRAN-ODT) 8 MG TbDL Take 1 tablet (8 mg total) by mouth every 6 (six) hours as needed (nausea). 20 tablet 6    pantoprazole (PROTONIX) 40 MG tablet Take 1 tablet (40 mg total) by mouth before breakfast. 90 tablet 3    potassium chloride (K-TAB) 20 mEq TAKE 1 TABLET(20 MEQ) BY MOUTH EVERY DAY 30 tablet 11    promethazine (PHENERGAN) 25 MG tablet Take 25 mg by mouth as needed.       risankizumab-rzaa 150 mg/mL PnIj Inject 150 mg subcutaneously every 12 weeks 150 mL 4    rosuvastatin (CRESTOR) 10 MG tablet TAKE 1 TABLET(10 MG) BY MOUTH EVERY OTHER DAY 45 tablet 3    sumatriptan (IMITREX STATDOSE) 6 mg/0.5 mL kit INJECT 0.5 MLS INTO THE SKIN EVERY 2 HOURS AS NEEDED FOR MIGRAINE(UP TO 2 DOSES DAILY. HOLD FOR BLOOD PRESSURE 155/110) 1 mL 6    sumatriptan (IMITREX) 100 MG tablet TAKE 1 TABLET BY MOUTH EVERY 2 HOURS AS NEEDED 9 tablet 4    triamcinolone acetonide 0.1% (KENALOG) 0.1 % cream AAA bid 454 g 3    TURMERIC ORAL Take by mouth once daily.      urea (CARMOL) 40 % Crea Apply topically 2 (two) times daily. To affected areas of elbows 85 g 3     Current Facility-Administered Medications on File Prior to Visit   Medication Dose Route Frequency Provider Last Rate Last Admin    0.9%  NaCl infusion   Intravenous Continuous Yoshi Erazo MD 20 mL/hr at 05/29/19 0759 New Bag at 08/31/20 1310    acetaminophen tablet 650 mg  650 mg Oral Once PRN Prachi Ansari MD        albuterol inhaler 2 puff  2 puff Inhalation Q20 Min PRN Prachi Ansari MD        diphenhydrAMINE injection 25 mg  25 mg Intravenous Once PRN Prachi Ansari MD        EPINEPHrine (EPIPEN) 0.3 mg/0.3 mL pen injection 0.3 mg  0.3 mg Intramuscular PRN Prachi Ansari MD        methylPREDNISolone sodium succinate injection 40 mg  40 mg Intravenous Once PRN Prachi Ansari MD         "ondansetron disintegrating tablet 4 mg  4 mg Oral Once PRN Prachi Ansari MD        sodium chloride 0.9% 500 mL flush bag   Intravenous PRN Prachi Ansari MD        sodium chloride 0.9% flush 10 mL  10 mL Intravenous PRN Prachi Ansari MD           Review of Systems  Review of Systems   Constitutional: Negative.    HENT: Positive for ear discharge.    Eyes: Negative.    Respiratory: Negative.    Cardiovascular: Negative.    Gastrointestinal: Positive for constipation.   Genitourinary: Negative.    Musculoskeletal: Positive for neck pain.   Skin: Negative.    Neurological: Negative.    Psychiatric/Behavioral: Negative.       Answers for HPI/ROS submitted by the patient on 3/18/2022  Ear infection(s)?: Yes  sinus pressure : Yes    Social History   reports that he has never smoked. He has never used smokeless tobacco. He reports current alcohol use. He reports that he does not use drugs.     Family History  Family History   Problem Relation Age of Onset    Crohn's disease Unknown         brother, sister, father, nephew    Heart disease Father     Crohn's disease Father     Inflammatory bowel disease Father     Crohn's disease Sister     Inflammatory bowel disease Sister     Crohn's disease Brother     Inflammatory bowel disease Brother     Kidney disease Mother     Hypertension Mother     Thyroid disease Mother     Cystic fibrosis Maternal Uncle     Crohn's disease Paternal Grandmother     Prostate cancer Neg Hx     Melanoma Neg Hx     Colon cancer Neg Hx     Celiac disease Neg Hx     Cirrhosis Neg Hx     Esophageal cancer Neg Hx     Liver cancer Neg Hx     Rectal cancer Neg Hx     Stomach cancer Neg Hx     Ulcerative colitis Neg Hx     Liver disease Neg Hx         Physical Exam   Vitals:    03/18/22 0809   BP: 118/86    Body mass index is 35.03 kg/m².  Weight: 120.5 kg (265 lb 8.7 oz)   Height: 6' 1" (185.4 cm)     GENERAL: no acute distress.  HEAD: normocephalic.   EYES: " lids and lashes normal. No scleral icterus  EARS: external ear without lesion, normal pinna shape and position.  External auditory canal with minimal cerumen, tympanic membrane fully visible, no perforation , no retraction. No middle ear effusion. Ossicles intact. Tube in place right ear- laid flat and ear examined under microscope  NOSE: external nose without significant bony abnormality, turbinate hypertrophy on anterior rhinoscopy  ORAL CAVITY/OROPHARYNX: tongue  mobile.   NECK: trachea midline.   RESPIRATORY: no stridor, no stertor. Voice normal. Respirations nonlabored.  NEURO: alert, responds to questions appropriately.   PSYCH:mood appropriate    Procedure Note   Procedure performed: microscopic examination of ear    Indication for procedure: otologic complaints with normal otoscopic exam , need for improved visualization/magnified exam to rule out ear pathology     Description of procedure:  After verbal consent was obtained, the patient was positioned in semi recumbent position and speculum was placed in the right ear and microscope brought into the field.   The microscope was positioned and magnification adjusted for appropriate visualization.  visualization was performed and at various levels of higher magnification to optimize views of the ear canal, tympanic membrane, ossicles and middle ear space. Same procedure repeated for opposite ear for comparison Findings as indicated below. All portions of the procedure and examination by otomicroscopy were tolerated well without complication.     Findings:  Minimal cerumen bilaterally,  tm looked thickened b/l  tube in place right ear, patent no drainage even after laid flat for a few minutes     Imaging:  The patient does have  pertinent but not recent imaging of the head and neck. Ct neck 2019, mri 2017     Ct neck images personally reviewed to evaluated temporal bone, no overt skull base dehiscence    Labs:  CBC  Recent Labs   Lab 09/04/20  1154  09/23/20  0705 12/09/20  0845 12/23/20  0741 03/30/21  1312   WBC 5.97  --  7.33  --  6.23   Hemoglobin 17.4   < > 17.0 15.1 16.0   Hematocrit 52.1  --  48.7  --  45.0   MCV 82  --  81 L  --  83   Platelets 232  --  252  --  246    < > = values in this interval not displayed.     BMP  Recent Labs   Lab 04/16/19  1145 06/14/19  0713 09/05/19  0705 12/13/19  0726 08/07/20  0814 08/20/20  0740 11/13/20  1047 12/09/20  0845 07/19/21  0845   Glucose  --    < > 100   < > 94   < > 86 102 91   Sodium  --    < > 137   < > 139   < > 137 141 140   Potassium  --    < > 3.4 L   < > 3.4 L   < > 3.8 3.7 3.9   Chloride  --    < > 98   < > 100   < > 97 97 102   CO2  --    < > 31 H   < > 31 H   < > 29 30 29   BUN  --    < > 18   < > 11   < > 21 H 18 20   Creatinine  --    < > 1.1   < > 1.1   < > 1.1 0.90 1.1   Calcium  --    < > 9.6   < > 9.6   < > 9.5 9.4 9.6   Magnesium 1.9  --  1.6  --  1.6  --   --   --   --     < > = values in this interval not displayed.     COAGS  Recent Labs   Lab 04/16/19  1145 12/09/20  0845   INR 1.2 1.2       Assessment  1. Hearing loss of right ear, unspecified hearing loss type  - Ambulatory referral/consult to Audiology; Future    2. Otorrhea of right ear  - CT Temporal Bone without contrast; Future  - Beta-2 Transferrin, Body Fluid Other (Specify) (ear fluid rule out csf otorrhea); Future    3. Nonintractable episodic headache, unspecified headache type  - CT Temporal Bone without contrast; Future    4. Mixed conductive and sensorineural hearing loss of right ear, unspecified hearing status on contralateral side  - CT Temporal Bone without contrast; Future    5. Dysfunction of right eustachian tube    6. Hypertrophy of inferior nasal turbinate       Plan:  Discussed plan of care with patient in detail and all questions answered. Patient reported understanding of plan of care.   Add saline to flonase and increase flonase to 2 pumps per nostril . If after 1 week no improvement can add astelin  Unclear  if popping from fluid movement of residual ETD s/p tube  Laid patient down for over 5 minutes and could not elicit otorrhea but suspect need longer period ( usually takes about an hour to start when at home) , gave sterile cup to collect for beta 2 transferrin  meche to get records from dr. serna  Needs updated audio s/p tube placement  Ct temporal bone- can cancel if able to get me a disc with images printed on it.   May need mri/mrv as well as neurosurg/neurology and otology consult   Discussed differential of tube otorrhea however concerned for increased intracranial pressure given headaches with laying flat and drainage with laying flat and both resolving in upright position     F/u after imaging and hearing testing    I spent a total of 45 minutes on the day of the visit.  This includes face to face time and non-face to face time preparing to see the patient (eg, review of tests), obtaining and/or reviewing separately obtained history, documenting clinical information in the electronic or other health record, independently interpreting results and communicating results to the patient/family/caregiver, or care coordinator.  Please be aware that this note has been generated with the assistance of MModal voice-to-text.  Please excuse any spelling or grammatical errors.

## 2022-03-18 NOTE — PROGRESS NOTES
"  [facility] faxed to [facility] Dr. Erazo/ ENT office for entire record. I spoke with Melodie to confirm fax number [phone].    Your fax has been successfully sent to [phone] at [phone].  ------------------------------------------------------------  From: [id number]  ------------------------------------------------------------  3/18/2022 4:05:59 PM Transmission Record   Sent to [phone] with remote ID "unknown"   Result: (0/339;0/0) Success   Page record: 1 - 2   Elapsed time: 01:06 on channel 4    "

## 2022-03-21 ENCOUNTER — PATIENT MESSAGE (OUTPATIENT)
Dept: DERMATOLOGY | Facility: CLINIC | Age: 62
End: 2022-03-21
Payer: COMMERCIAL

## 2022-03-21 ENCOUNTER — PATIENT MESSAGE (OUTPATIENT)
Dept: OTOLARYNGOLOGY | Facility: CLINIC | Age: 62
End: 2022-03-21
Payer: COMMERCIAL

## 2022-03-30 ENCOUNTER — HOSPITAL ENCOUNTER (OUTPATIENT)
Dept: RADIOLOGY | Facility: HOSPITAL | Age: 62
Discharge: HOME | End: 2022-03-30
Attending: OTOLARYNGOLOGY
Payer: COMMERCIAL

## 2022-03-30 DIAGNOSIS — H92.11 OTORRHEA OF RIGHT EAR: ICD-10-CM

## 2022-03-30 DIAGNOSIS — R51.9 NONINTRACTABLE EPISODIC HEADACHE, UNSPECIFIED HEADACHE TYPE: ICD-10-CM

## 2022-03-30 DIAGNOSIS — H90.71 MIXED CONDUCTIVE AND SENSORINEURAL HEARING LOSS OF RIGHT EAR, UNSPECIFIED HEARING STATUS ON CONTRALATERAL SIDE: ICD-10-CM

## 2022-03-30 PROCEDURE — 70480 CT TEMPORAL BONE WITHOUT CONTRAST: ICD-10-PCS | Mod: 26,,, | Performed by: RADIOLOGY

## 2022-03-30 PROCEDURE — 70480 CT ORBIT/EAR/FOSSA W/O DYE: CPT | Mod: TC

## 2022-03-30 PROCEDURE — 70480 CT ORBIT/EAR/FOSSA W/O DYE: CPT | Mod: 26,,, | Performed by: RADIOLOGY

## 2022-04-06 ENCOUNTER — TELEPHONE (OUTPATIENT)
Dept: HEPATOLOGY | Facility: CLINIC | Age: 62
End: 2022-04-06
Payer: COMMERCIAL

## 2022-04-06 NOTE — TELEPHONE ENCOUNTER
----- Message from Smita Saeed MA sent at 4/4/2022  2:07 PM CDT -----  Regarding: FW: Appt R/S  Contact: Jonathan    ----- Message -----  From: Lorne Driscoll  Sent: 4/4/2022   1:45 PM CDT  To: [facility] Hep[facility]ology Scheduling  Subject: Appt R/S                                         Pt's sourav is calling to have appts r/s that are on 4/14/22 as they will be out of town to possibly the week of May 9th as he is free any time that whole week or the week of May 23rd on Monday or Tuesday anytime those days.          [phone]

## 2022-04-12 ENCOUNTER — HOSPITAL ENCOUNTER (OUTPATIENT)
Dept: CARDIOLOGY | Facility: HOSPITAL | Age: 62
Discharge: HOME | End: 2022-04-12
Attending: INTERNAL MEDICINE
Payer: COMMERCIAL

## 2022-04-12 DIAGNOSIS — I70.0 ATHEROSCLEROSIS OF AORTA: ICD-10-CM

## 2022-04-12 LAB
ABDOMINAL IMA AP: 2.07 CM
ABDOMINAL IMA ED VEL: 20 CM/S
ABDOMINAL IMA PS VEL: 101 CM/S
ABDOMINAL IMA TRANS: 2.04 CM
ABDOMINAL INFRARENAL AORTA AP: 2.12 CM
ABDOMINAL INFRARENAL AORTA ED VEL: 15 CM/S
ABDOMINAL INFRARENAL AORTA PS VEL: 102 CM/S
ABDOMINAL INFRARENAL AORTA TRANS: 2.1 CM
ABDOMINAL JUXTARENAL AORTA AP: 2.1 CM
ABDOMINAL JUXTARENAL AORTA ED VEL: 11 CM/S
ABDOMINAL JUXTARENAL AORTA PS VEL: 88 CM/S
ABDOMINAL JUXTARENAL AORTA TRANS: 2.08 CM
ABDOMINAL LT COM ILIAC AP: 1.3 CM
ABDOMINAL LT COM ILIAC TRANS: 1.28 CM
ABDOMINAL LT COM ILIAC VEL: 94 CM/S
ABDOMINAL LT COM ILLIAC ED VEL: 16 CM/S
ABDOMINAL RT COM ILIAC AP: 1.54 CM
ABDOMINAL RT COM ILIAC TRANS: 1.52 CM
ABDOMINAL RT COM ILIAC VEL: 115 CM/S
ABDOMINAL RT COM ILLIAC ED VEL: 9 CM/S
ABDOMINAL SUPRARENAL AORTA AP: 2.89 CM
ABDOMINAL SUPRARENAL AORTA ED VEL: 12 CM/S
ABDOMINAL SUPRARENAL AORTA PS VEL: 85 CM/S
ABDOMINAL SUPRARENAL AORTA TRANS: 2.89 CM

## 2022-04-12 PROCEDURE — 93978 VASCULAR STUDY: CPT

## 2022-04-12 PROCEDURE — 93978 VASCULAR STUDY: CPT | Mod: 26,,, | Performed by: INTERNAL MEDICINE

## 2022-04-12 PROCEDURE — 93978 CV US ABDOMINAL AORTA EVALUATION (CUPID ONLY): ICD-10-PCS | Mod: 26,,, | Performed by: INTERNAL MEDICINE

## 2022-04-13 RX ORDER — CHLORTHALIDONE 25 MG/1
TABLET ORAL
Qty: 45 TABLET | Refills: 3 | Status: SHIPPED | OUTPATIENT
Start: 2022-04-13 | End: 2023-02-01

## 2022-04-25 ENCOUNTER — PATIENT OUTREACH (OUTPATIENT)
Dept: ADMINISTRATIVE | Facility: OTHER | Age: 62
End: 2022-04-25
Payer: COMMERCIAL

## 2022-04-26 ENCOUNTER — OFFICE VISIT (OUTPATIENT)
Dept: CARDIOLOGY | Facility: CLINIC | Age: 62
End: 2022-04-26
Payer: COMMERCIAL

## 2022-04-26 VITALS
HEIGHT: 73 IN | HEART RATE: 73 BPM | DIASTOLIC BLOOD PRESSURE: 88 MMHG | SYSTOLIC BLOOD PRESSURE: 124 MMHG | WEIGHT: 268.06 LBS | BODY MASS INDEX: 35.53 KG/M2 | OXYGEN SATURATION: 98 %

## 2022-04-26 DIAGNOSIS — E66.9 OBESITY (BMI 30.0-34.9): ICD-10-CM

## 2022-04-26 DIAGNOSIS — I70.0 ABDOMINAL AORTIC ATHEROSCLEROSIS: ICD-10-CM

## 2022-04-26 DIAGNOSIS — I10 ESSENTIAL HYPERTENSION: Primary | ICD-10-CM

## 2022-04-26 DIAGNOSIS — K76.0 FATTY LIVER DISEASE, NONALCOHOLIC: ICD-10-CM

## 2022-04-26 DIAGNOSIS — E78.2 MIXED HYPERLIPIDEMIA: ICD-10-CM

## 2022-04-26 PROBLEM — R74.8 ELEVATED LIVER ENZYMES: Status: RESOLVED | Noted: 2021-09-14 | Resolved: 2022-04-26

## 2022-04-26 PROBLEM — R74.01 ELEVATED ALT MEASUREMENT: Status: RESOLVED | Noted: 2018-04-19 | Resolved: 2022-04-26

## 2022-04-26 PROCEDURE — 3074F PR MOST RECENT SYSTOLIC BLOOD PRESSURE < 130 MM HG: ICD-10-PCS | Mod: CPTII,S$GLB,, | Performed by: NURSE PRACTITIONER

## 2022-04-26 PROCEDURE — 93010 EKG 12-LEAD: ICD-10-PCS | Mod: S$GLB,,, | Performed by: INTERNAL MEDICINE

## 2022-04-26 PROCEDURE — 99999 PR PBB SHADOW E&M-EST. PATIENT-LVL V: CPT | Mod: PBBFAC,,, | Performed by: NURSE PRACTITIONER

## 2022-04-26 PROCEDURE — 1160F PR REVIEW ALL MEDS BY PRESCRIBER/CLIN PHARMACIST DOCUMENTED: ICD-10-PCS | Mod: CPTII,S$GLB,, | Performed by: NURSE PRACTITIONER

## 2022-04-26 PROCEDURE — 3008F PR BODY MASS INDEX (BMI) DOCUMENTED: ICD-10-PCS | Mod: CPTII,S$GLB,, | Performed by: NURSE PRACTITIONER

## 2022-04-26 PROCEDURE — 3079F PR MOST RECENT DIASTOLIC BLOOD PRESSURE 80-89 MM HG: ICD-10-PCS | Mod: CPTII,S$GLB,, | Performed by: NURSE PRACTITIONER

## 2022-04-26 PROCEDURE — 3079F DIAST BP 80-89 MM HG: CPT | Mod: CPTII,S$GLB,, | Performed by: NURSE PRACTITIONER

## 2022-04-26 PROCEDURE — 3008F BODY MASS INDEX DOCD: CPT | Mod: CPTII,S$GLB,, | Performed by: NURSE PRACTITIONER

## 2022-04-26 PROCEDURE — 1159F PR MEDICATION LIST DOCUMENTED IN MEDICAL RECORD: ICD-10-PCS | Mod: CPTII,S$GLB,, | Performed by: NURSE PRACTITIONER

## 2022-04-26 PROCEDURE — 3074F SYST BP LT 130 MM HG: CPT | Mod: CPTII,S$GLB,, | Performed by: NURSE PRACTITIONER

## 2022-04-26 PROCEDURE — 1160F RVW MEDS BY RX/DR IN RCRD: CPT | Mod: CPTII,S$GLB,, | Performed by: NURSE PRACTITIONER

## 2022-04-26 PROCEDURE — 99214 PR OFFICE/OUTPT VISIT, EST, LEVL IV, 30-39 MIN: ICD-10-PCS | Mod: S$GLB,,, | Performed by: NURSE PRACTITIONER

## 2022-04-26 PROCEDURE — 93005 EKG 12-LEAD: ICD-10-PCS | Mod: S$GLB,,, | Performed by: NURSE PRACTITIONER

## 2022-04-26 PROCEDURE — 93005 ELECTROCARDIOGRAM TRACING: CPT | Mod: S$GLB,,, | Performed by: NURSE PRACTITIONER

## 2022-04-26 PROCEDURE — 93010 ELECTROCARDIOGRAM REPORT: CPT | Mod: S$GLB,,, | Performed by: INTERNAL MEDICINE

## 2022-04-26 PROCEDURE — 99999 PR PBB SHADOW E&M-EST. PATIENT-LVL V: ICD-10-PCS | Mod: PBBFAC,,, | Performed by: NURSE PRACTITIONER

## 2022-04-26 PROCEDURE — 1159F MED LIST DOCD IN RCRD: CPT | Mod: CPTII,S$GLB,, | Performed by: NURSE PRACTITIONER

## 2022-04-26 PROCEDURE — 99214 OFFICE O/P EST MOD 30 MIN: CPT | Mod: S$GLB,,, | Performed by: NURSE PRACTITIONER
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
## 2022-04-26 NOTE — PATIENT INSTRUCTIONS
Ways to lower triglycerides  Cut simple sugars out of your diet (white breads, candies, cookies, cakes, etc.)  Reduce or eliminate your intake of vegetable fats and highly processed trans fatty acids.   Reduce or eliminate alcohol  Exercise 30 minutes a day for 4-5 days a week.   Consider taking the Omega 3 supplement.  Eat more fiber.

## 2022-04-27 ENCOUNTER — DOCUMENTATION ONLY (OUTPATIENT)
Dept: OTOLARYNGOLOGY | Facility: CLINIC | Age: 62
End: 2022-04-27
Payer: COMMERCIAL

## 2022-04-27 ENCOUNTER — OFFICE VISIT (OUTPATIENT)
Dept: OTOLARYNGOLOGY | Facility: CLINIC | Age: 62
End: 2022-04-27
Payer: COMMERCIAL

## 2022-04-27 ENCOUNTER — CLINICAL SUPPORT (OUTPATIENT)
Dept: AUDIOLOGY | Facility: CLINIC | Age: 62
End: 2022-04-27
Payer: COMMERCIAL

## 2022-04-27 VITALS
HEIGHT: 73 IN | WEIGHT: 265 LBS | DIASTOLIC BLOOD PRESSURE: 86 MMHG | SYSTOLIC BLOOD PRESSURE: 120 MMHG | BODY MASS INDEX: 35.12 KG/M2

## 2022-04-27 DIAGNOSIS — J32.0 CHRONIC MAXILLARY SINUSITIS: ICD-10-CM

## 2022-04-27 DIAGNOSIS — J30.2 SEASONAL ALLERGIC RHINITIS, UNSPECIFIED TRIGGER: ICD-10-CM

## 2022-04-27 DIAGNOSIS — J34.3 HYPERTROPHY OF INFERIOR NASAL TURBINATE: ICD-10-CM

## 2022-04-27 DIAGNOSIS — H90.A31 MIXED CONDUCTIVE AND SENSORINEURAL HEARING LOSS OF RIGHT EAR WITH RESTRICTED HEARING OF LEFT EAR: ICD-10-CM

## 2022-04-27 DIAGNOSIS — H90.A22 SENSORINEURAL HEARING LOSS (SNHL) OF LEFT EAR WITH RESTRICTED HEARING OF RIGHT EAR: Primary | ICD-10-CM

## 2022-04-27 DIAGNOSIS — H70.11 CHRONIC MASTOIDITIS OF RIGHT SIDE: Primary | ICD-10-CM

## 2022-04-27 DIAGNOSIS — H90.71 MIXED CONDUCTIVE AND SENSORINEURAL HEARING LOSS OF RIGHT EAR, UNSPECIFIED HEARING STATUS ON CONTRALATERAL SIDE: ICD-10-CM

## 2022-04-27 PROCEDURE — 3079F DIAST BP 80-89 MM HG: CPT | Mod: CPTII,S$GLB,, | Performed by: OTOLARYNGOLOGY

## 2022-04-27 PROCEDURE — 3074F PR MOST RECENT SYSTOLIC BLOOD PRESSURE < 130 MM HG: ICD-10-PCS | Mod: CPTII,S$GLB,, | Performed by: OTOLARYNGOLOGY

## 2022-04-27 PROCEDURE — 3079F PR MOST RECENT DIASTOLIC BLOOD PRESSURE 80-89 MM HG: ICD-10-PCS | Mod: CPTII,S$GLB,, | Performed by: OTOLARYNGOLOGY

## 2022-04-27 PROCEDURE — 3008F PR BODY MASS INDEX (BMI) DOCUMENTED: ICD-10-PCS | Mod: CPTII,S$GLB,, | Performed by: OTOLARYNGOLOGY

## 2022-04-27 PROCEDURE — 92550 TYMPANOMETRY & REFLEX THRESH: CPT | Mod: S$GLB,,, | Performed by: AUDIOLOGIST

## 2022-04-27 PROCEDURE — 92550 PR TYMPANOMETRY AND REFLEX THRESHOLD MEASUREMENTS: ICD-10-PCS | Mod: S$GLB,,, | Performed by: AUDIOLOGIST

## 2022-04-27 PROCEDURE — 3074F SYST BP LT 130 MM HG: CPT | Mod: CPTII,S$GLB,, | Performed by: OTOLARYNGOLOGY

## 2022-04-27 PROCEDURE — 92557 COMPREHENSIVE HEARING TEST: CPT | Mod: S$GLB,,, | Performed by: AUDIOLOGIST

## 2022-04-27 PROCEDURE — 99214 PR OFFICE/OUTPT VISIT, EST, LEVL IV, 30-39 MIN: ICD-10-PCS | Mod: S$GLB,,, | Performed by: OTOLARYNGOLOGY

## 2022-04-27 PROCEDURE — 3008F BODY MASS INDEX DOCD: CPT | Mod: CPTII,S$GLB,, | Performed by: OTOLARYNGOLOGY

## 2022-04-27 PROCEDURE — 99214 OFFICE O/P EST MOD 30 MIN: CPT | Mod: S$GLB,,, | Performed by: OTOLARYNGOLOGY

## 2022-04-27 PROCEDURE — 92557 PR COMPREHENSIVE HEARING TEST: ICD-10-PCS | Mod: S$GLB,,, | Performed by: AUDIOLOGIST

## 2022-04-27 RX ORDER — FLUTICASONE PROPIONATE 50 MCG
1 SPRAY, SUSPENSION (ML) NASAL DAILY
COMMUNITY

## 2022-04-27 NOTE — PROGRESS NOTES
Mr. Jonathan Villela was seen in the clinic today for an audiological evaluation.  Mr. Villela reported a history of ear drainage and a PE tube of the right ear.    Audiological testing revealed a moderate rising to mild sloping to moderately-severe mixed hearing loss for the right ear and normal hearing sloping to a mild-moderate sensorineural hearing loss for the left ear.  A speech reception threshold was obtained at 45 dBHL for the right ear and at 25 dBHL for the left ear.  Speech discrimination was 100% for the right ear and 100% for the left ear.      Tympanometry testing revealed a Type B (large ear canal volume) tympanogram for the right ear and a Type As tympanogram for the left ear.  Ipsilateral acoustic reflexes were present at 500 Hz, 1000 Hz, 2000 Hz, and 4000 Hz for the right ear and were present at 2000 Hz for the left ear.    Recommendations:  1. Otologic evaluation  2. Annual audiological evaluation  3. Hearing protection when in noise   4. Hearing loss consultation following medical clearance    Please click on link to view Audiogram:  Document on 4/27/2022  9:44 AM by CHAVO StubbsD: Audiogram

## 2022-04-27 NOTE — PROGRESS NOTES
OTOLARYNGOLOGY CLINIC NOTE  Date:  04/27/2022     Chief complaint:  Chief Complaint   Patient presents with    Hearing Loss     Right ear drainage, hearing loss, tubes in 4 months ago.       History of Present Illness  Jonathan Villela is a 62 y.o. male  presenting today for a followup of right ear problems.  Has not had any drainage since last visit  Has not had any pain in the ear  Headache is better as well    Doing saline and flonase which has been helping a lot. He has had sinus surgery in the past     I initially saw the patient on 3-18-22 . History at that time is as noted below (below HPI copied from previous note on date  of initial visit  describing history at presentation)  No issue with gait, no urinary problems, no double vision . No pulsatile tinnitus   Has always had migraines but headache , headache when laying down . Dull headache , right side   When gets migraine it is more on the left side- has not had issue with that in over a year would lose eyesight when he would have migraine  Drainage is kind of yellow; has been nightly for past week or so  Got tube about 3-4 months ago    Right ear hearing not as good as left , seemed to get worse after tube placed.   Drainage happens when lays down as well.   Done by outside ent dr. serna  Tube is in right ear only  Had imaging prior to tube being put in      Right ear popping also. Has some seasonal allergy has not noticed increased pooping with flare up. no heartburn . Popping happens when laying down as well. Usually not issue during day.   Mri brain 2017 no hydrocephalus  Past Medical History  Past Medical History:   Diagnosis Date    Acute gastric ulcer with hemorrhage 2/15/2017    Bilateral occipital neuralgia     BPH with urinary obstruction 12/31/2015    Chronic constipation     Colitis [id number]    infectious?    Diverticulitis     Erectile dysfunction     Essential hypertension     Fatty liver     Gastroesophageal reflux disease  without esophagitis 2/11/2017    HLD (hyperlipidemia)     Hypogonadism in male     IBS (irritable bowel syndrome)     Migraine without aura and without status migrainosus, not intractable 1/31/2014    Obstructive sleep apnea syndrome 2/9/2015    Psoriasis     PUD (peptic ulcer disease)         Past Surgical History  Past Surgical History:   Procedure Laterality Date    CHOLECYSTECTOMY      COLONOSCOPY N/A 2/17/2017    Procedure: COLONOSCOPY;  Surgeon: Yoshi Erazo MD;  Location: [facility] (2ND FLR);  Service: Endoscopy;  Laterality: N/A;    COLONOSCOPY N/A 5/29/2019    Procedure: COLONOSCOPY;  Surgeon: Yoshi Erazo MD;  Location: [facility] (2ND FLR);  Service: Endoscopy;  Laterality: N/A;    COLONOSCOPY N/A 8/31/2020    Procedure: COLONOSCOPY;  Surgeon: Yoshi Erazo MD;  Location: [facility] (4TH FLR);  Service: Endoscopy;  Laterality: N/A;    COLONOSCOPY N/A 12/6/2021    Procedure: COLONOSCOPY;  Surgeon: Adiel Chan MD;  Location: [facility];  Service: Endoscopy;  Laterality: N/A;  positive covid 8/19/21-[facility]  covid 12/3/21-[facility]-    ESOPHAGOGASTRODUODENOSCOPY      ESOPHAGOGASTRODUODENOSCOPY N/A 12/14/2018    Procedure: EGD (ESOPHAGOGASTRODUODENOSCOPY);  Surgeon: Lexii Carlson MD;  Location: [facility] (4TH FLR);  Service: Endoscopy;  Laterality: N/A;    ESOPHAGOGASTRODUODENOSCOPY N/A 5/29/2019    Procedure: EGD (ESOPHAGOGASTRODUODENOSCOPY);  Surgeon: Yoshi Erazo MD;  Location: [facility] (2ND FLR);  Service: Endoscopy;  Laterality: N/A;  per Dr Erazo-schedule on 2nd floor    ESOPHAGOGASTRODUODENOSCOPY N/A 8/31/2020    Procedure: EGD (ESOPHAGOGASTRODUODENOSCOPY);  Surgeon: Yoshi Erazo MD;  Location: [facility] (4TH FLR);  Service: Endoscopy;  Laterality: N/A;  covid test 8/28-[facility] care    ESOPHAGOGASTRODUODENOSCOPY N/A 12/6/2021    Procedure: EGD (ESOPHAGOGASTRODUODENOSCOPY);  Surgeon: Adiel Chan MD;  Location: [facility];  Service: Endoscopy;   Laterality: N/A;  positive covid 8/19/21-[facility]  covid 12/3/21-[facility]    FLEXIBLE LARYNGOSCOPY WITH DRUG-INDUCED SEDATION FOR EVALUATION OF SLEEP APNEA N/A 12/9/2020    Procedure: LARYNGOSCOPY, FLEXIBLE, WITH DRUG-INDUCED SEDATION, FOR SLEEP APNEA ASSESSMENT;  Surgeon: Wilfrido Erazo MD;  Location: [facility];  Service: ENT;  Laterality: N/A;    LEG SURGERY      NASAL SEPTUM SURGERY      UPPER GASTROINTESTINAL ENDOSCOPY          Medications  Current Outpatient Medications on File Prior to Visit   Medication Sig Dispense Refill    acyclovir (ZOVIRAX) 400 MG tablet Take 400 mg by mouth 2 (two) times daily as needed.      albuterol (VENTOLIN HFA) 90 mcg/actuation inhaler Inhale 2 puffs into the lungs every 6 (six) hours as needed for Wheezing or Shortness of Breath. Rescue 6.7 g 0    amLODIPine (NORVASC) 5 MG tablet TAKE 1 TABLET(5 MG) BY MOUTH EVERY DAY 90 tablet 3    betamethasone dipropionate (DIPROLENE) 0.05 % ointment Apply topically 2 (two) times daily.      carvediloL (COREG) 12.5 MG tablet TAKE 1 TABLET BY MOUTH TWICE DAILY WITH MEALS 180 tablet 3    chlorthalidone (HYGROTEN) 25 MG Tab TAKE 1/2 TABLET(12.5 MG) BY MOUTH EVERY DAY 45 tablet 3    ciclopirox (LOPROX) 0.77 % Crea Apply topically as needed.       ciprofloxacin-dexamethasone 0.3-0.1% (CIPRODEX) 0.3-0.1 % DrpS Place 4 drops into both ears as needed.      cloNIDine (CATAPRES) 0.1 MG tablet Take 0.1 mg by mouth as needed.   3    CORTISPORIN-TC 3.3-3-10-0.5 mg/mL DrpS Place 4 drops into both ears 2 (two) times daily.      cyanocobalamin 1,000 mcg/mL injection every 30 days.      cyclobenzaprine (FLEXERIL) 10 MG tablet Take 10 mg by mouth as needed.       dicyclomine (BENTYL) 10 MG capsule Take 1 capsule (10 mg total) by mouth 3 (three) times daily as needed (abdominal cramping). 30 capsule 1    ergocalciferol (ERGOCALCIFEROL) 50,000 unit Cap Take 1 capsule by mouth twice a week.      fluticasone propionate (FLONASE) 50 mcg/actuation  nasal spray 1 spray by Each Nostril route once daily.      LINZESS 145 mcg Cap capsule Take 1 capsule (145 mcg total) by mouth as needed. 90 capsule 3    ofloxacin (FLOXIN) 0.3 % otic solution Place into the right ear.      ondansetron (ZOFRAN-ODT) 8 MG TbDL Take 1 tablet (8 mg total) by mouth every 6 (six) hours as needed (nausea). 20 tablet 6    pantoprazole (PROTONIX) 40 MG tablet Take 1 tablet (40 mg total) by mouth before breakfast. 90 tablet 3    potassium chloride (K-TAB) 20 mEq TAKE 1 TABLET(20 MEQ) BY MOUTH EVERY DAY 30 tablet 11    promethazine (PHENERGAN) 25 MG tablet Take 25 mg by mouth as needed.       rosuvastatin (CRESTOR) 10 MG tablet TAKE 1 TABLET(10 MG) BY MOUTH EVERY OTHER DAY 45 tablet 3    sumatriptan (IMITREX STATDOSE) 6 mg/0.5 mL kit INJECT 0.5 MLS INTO THE SKIN EVERY 2 HOURS AS NEEDED FOR MIGRAINE(UP TO 2 DOSES DAILY. HOLD FOR BLOOD PRESSURE 155/110) 1 mL 6    sumatriptan (IMITREX) 100 MG tablet TAKE 1 TABLET BY MOUTH EVERY 2 HOURS AS NEEDED 9 tablet 4    triamcinolone acetonide 0.1% (KENALOG) 0.1 % cream AAA bid 454 g 3    TURMERIC ORAL Take by mouth once daily.      urea (CARMOL) 40 % Crea Apply topically 2 (two) times daily. To affected areas of elbows 85 g 3     Current Facility-Administered Medications on File Prior to Visit   Medication Dose Route Frequency Provider Last Rate Last Admin    [DISCONTINUED] 0.9%  NaCl infusion   Intravenous Continuous Yoshi Erazo MD 20 mL/hr at 05/29/19 0759 New Bag at 08/31/20 1310    [DISCONTINUED] acetaminophen tablet 650 mg  650 mg Oral Once PRN Prachi Ansari MD        [DISCONTINUED] albuterol inhaler 2 puff  2 puff Inhalation Q20 Min PRN Prachi Ansari MD        [DISCONTINUED] diphenhydrAMINE injection 25 mg  25 mg Intravenous Once PRN Prachi Ansari MD        [DISCONTINUED] EPINEPHrine (EPIPEN) 0.3 mg/0.3 mL pen injection 0.3 mg  0.3 mg Intramuscular PRN Prachi Ansari MD        [DISCONTINUED]  "methylPREDNISolone sodium succinate injection 40 mg  40 mg Intravenous Once PRN Prachi Ansari MD        [DISCONTINUED] ondansetron disintegrating tablet 4 mg  4 mg Oral Once PRN Prachi Ansari MD        [DISCONTINUED] sodium chloride 0.9% 500 mL flush bag   Intravenous PRN Prachi Ansari MD        [DISCONTINUED] sodium chloride 0.9% flush 10 mL  10 mL Intravenous PRN Prachi Ansari MD           Review of Systems  Review of Systems   Constitutional: Negative.    HENT: Negative.    Eyes: Negative.    Respiratory: Negative.    Cardiovascular: Negative.    Gastrointestinal: Negative.    Genitourinary: Negative.    Musculoskeletal: Positive for neck pain.   Skin: Negative.    Neurological: Negative.    Psychiatric/Behavioral: Negative.         Social History   reports that he has never smoked. He has never used smokeless tobacco. He reports current alcohol use. He reports that he does not use drugs.     Family History  Family History   Problem Relation Age of Onset    Crohn's disease Unknown         brother, sister, father, nephew    Heart disease Father     Crohn's disease Father     Inflammatory bowel disease Father     Crohn's disease Sister     Inflammatory bowel disease Sister     Crohn's disease Brother     Inflammatory bowel disease Brother     Kidney disease Mother     Hypertension Mother     Thyroid disease Mother     Cystic fibrosis Maternal Uncle     Crohn's disease Paternal Grandmother     Prostate cancer Neg Hx     Melanoma Neg Hx     Colon cancer Neg Hx     Celiac disease Neg Hx     Cirrhosis Neg Hx     Esophageal cancer Neg Hx     Liver cancer Neg Hx     Rectal cancer Neg Hx     Stomach cancer Neg Hx     Ulcerative colitis Neg Hx     Liver disease Neg Hx         Physical Exam   Vitals:    04/27/22 0837   BP: 120/86    Body mass index is 34.96 kg/m².  Weight: 120.2 kg (265 lb)   Height: 6' 1" (185.4 cm)     GENERAL: no acute distress.  HEAD: normocephalic. "   EYES: lids and lashes normal. Pupils equal . No proptosis  EARS: external ear without lesion, normal pinna shape and position.  External auditory canal with normal cerumen, tympanic membrane fully visible, no perforation , no retraction. No middle ear effusion. Ossicles intact. Tube in place in right ear drum , patent , dry    NOSE: external nose without significant bony abnormality  ORAL CAVITY/OROPHARYNX: tongue  mobile.   NECK: trachea midline.   LYMPH NODES:No cervical lymphadenopathy.  RESPIRATORY: no stridor, no stertor. Voice normal. Respirations nonlabored.  NEURO: alert, responds to questions appropriately.  Facial movement symmetric at rest   Tuning fork with 512 hz , no lateralization, AC>BC  PSYCH:mood appropriate    AUDIOLOGY RESULTS  Audiometric evaluation including audiogram, tympanometry, acoustic reflexes, and speech discrimination which was performed 4-27-22 was personally reviewed and interpreted.  Notable findings on the audiogram were conductive hearing loss sloping to moderate/mode severe mixed loss on right side and normal low frequency hearing on left sloping yo mild and then moderate sensorineural hearing loss (SNHL) returning to mild snhl at 8 khz.  Tympanometry revealed Type As/B tympanogram on the left and Type B tympanogram with large canal volume on the right. Speech discrimination was 100% b/l.       Report of the audiologist performing this audiometric testing was also reviewed   Imaging:  The patient does have any new imaging of the head and neck since last visit.   Thickening of right maxillary sinus mucosa    Labs:  CBC  Recent Labs   Lab 09/04/20  1154 09/23/20  0705 12/09/20  0845 12/23/20  0741 03/30/21  1312   WBC 5.97  --  7.33  --  6.23   Hemoglobin 17.4   < > 17.0 15.1 16.0   Hematocrit 52.1  --  48.7  --  45.0   MCV 82  --  81 L  --  83   Platelets 232  --  252  --  246    < > = values in this interval not displayed.     BMP  Recent Labs   Lab 09/05/19  0705  12/13/19  0726 08/07/20  0814 08/20/20  0740 12/09/20  0845 07/19/21  0845 04/12/22  0758   Glucose 100   < > 94   < > 102 91 87   Sodium 137   < > 139   < > 141 140 138   Potassium 3.4 L   < > 3.4 L   < > 3.7 3.9 3.5   Chloride 98   < > 100   < > 97 102 98   CO2 31 H   < > 31 H   < > 30 29 32 H   BUN 18   < > 11   < > 18 20 14   Creatinine 1.1   < > 1.1   < > 0.90 1.1 1.2   Calcium 9.6   < > 9.6   < > 9.4 9.6 9.7   Magnesium 1.6  --  1.6  --   --   --   --     < > = values in this interval not displayed.     COAGS  Recent Labs   Lab 12/09/20  0845   INR 1.2       Assessment  1. Chronic mastoiditis of right side  - Ambulatory referral/consult to ENT; Future    2. Mixed conductive and sensorineural hearing loss of right ear, unspecified hearing status on contralateral side  - Ambulatory referral/consult to ENT; Future    3. Seasonal allergic rhinitis, unspecified trigger    4. Hypertrophy of inferior nasal turbinate    5. Chronic maxillary sinusitis       Plan:  Discussed plan of care with patient in detail and all questions answered. Patient reported understanding of plan of care.     Can follow up with me if desires for sinus issues, currently asymptomatic  Requested again records from dr. erazo office to evaluate history regarding sinonasal issues. Had not been on nasal sprays for long period prior to imaging- may have resolved now that on meds which suspect given no symptoms.     I will refer him to my colleague Dr. Moreau for evaluation.  I did not see any evidence of overt skull base thinning and symptoms he had at initial presentation concerning for this have resolved.  I would think if this was due to middle ear effusion alone that air bone gap would have resolved.  Additionally would like evaluated to ensure no additional intervention would be needed for findings and mastoid.    She can follow up with myself or Dr. Erazo for sinus issues.  Currently asymptomatic as mentioned above. Will scope at follow  up visit if develops symptoms or if outside records indicate history of AFS or polyps    Reviewed imaging and audio results with patient    Please be aware that this note has been generated with the assistance of [employer] voice-to-text.  Please excuse any spelling or grammatical errors.

## 2022-04-28 ENCOUNTER — DOCUMENTATION ONLY (OUTPATIENT)
Dept: OTOLARYNGOLOGY | Facility: CLINIC | Age: 62
End: 2022-04-28
Payer: COMMERCIAL

## 2022-04-28 ENCOUNTER — OFFICE VISIT (OUTPATIENT)
Dept: ENDOCRINOLOGY | Facility: CLINIC | Age: 62
End: 2022-04-28
Payer: COMMERCIAL

## 2022-04-28 ENCOUNTER — TELEPHONE (OUTPATIENT)
Dept: OTOLARYNGOLOGY | Facility: CLINIC | Age: 62
End: 2022-04-28
Payer: COMMERCIAL

## 2022-04-28 VITALS
DIASTOLIC BLOOD PRESSURE: 74 MMHG | HEART RATE: 70 BPM | BODY MASS INDEX: 35.16 KG/M2 | WEIGHT: 265.31 LBS | OXYGEN SATURATION: 96 % | HEIGHT: 73 IN | SYSTOLIC BLOOD PRESSURE: 124 MMHG

## 2022-04-28 DIAGNOSIS — I10 ESSENTIAL HYPERTENSION: ICD-10-CM

## 2022-04-28 DIAGNOSIS — E29.1 HYPOGONADISM MALE: Primary | ICD-10-CM

## 2022-04-28 DIAGNOSIS — E78.2 MIXED HYPERLIPIDEMIA: ICD-10-CM

## 2022-04-28 PROCEDURE — 3078F DIAST BP <80 MM HG: CPT | Mod: CPTII,S$GLB,, | Performed by: INTERNAL MEDICINE

## 2022-04-28 PROCEDURE — 3074F SYST BP LT 130 MM HG: CPT | Mod: CPTII,S$GLB,, | Performed by: INTERNAL MEDICINE

## 2022-04-28 PROCEDURE — 1160F RVW MEDS BY RX/DR IN RCRD: CPT | Mod: CPTII,S$GLB,, | Performed by: INTERNAL MEDICINE

## 2022-04-28 PROCEDURE — 1159F PR MEDICATION LIST DOCUMENTED IN MEDICAL RECORD: ICD-10-PCS | Mod: CPTII,S$GLB,, | Performed by: INTERNAL MEDICINE

## 2022-04-28 PROCEDURE — 99999 PR PBB SHADOW E&M-EST. PATIENT-LVL V: ICD-10-PCS | Mod: PBBFAC,,, | Performed by: INTERNAL MEDICINE

## 2022-04-28 PROCEDURE — 99214 PR OFFICE/OUTPT VISIT, EST, LEVL IV, 30-39 MIN: ICD-10-PCS | Mod: S$GLB,,, | Performed by: INTERNAL MEDICINE

## 2022-04-28 PROCEDURE — 3078F PR MOST RECENT DIASTOLIC BLOOD PRESSURE < 80 MM HG: ICD-10-PCS | Mod: CPTII,S$GLB,, | Performed by: INTERNAL MEDICINE

## 2022-04-28 PROCEDURE — 1160F PR REVIEW ALL MEDS BY PRESCRIBER/CLIN PHARMACIST DOCUMENTED: ICD-10-PCS | Mod: CPTII,S$GLB,, | Performed by: INTERNAL MEDICINE

## 2022-04-28 PROCEDURE — 1159F MED LIST DOCD IN RCRD: CPT | Mod: CPTII,S$GLB,, | Performed by: INTERNAL MEDICINE

## 2022-04-28 PROCEDURE — 3008F BODY MASS INDEX DOCD: CPT | Mod: CPTII,S$GLB,, | Performed by: INTERNAL MEDICINE

## 2022-04-28 PROCEDURE — 3008F PR BODY MASS INDEX (BMI) DOCUMENTED: ICD-10-PCS | Mod: CPTII,S$GLB,, | Performed by: INTERNAL MEDICINE

## 2022-04-28 PROCEDURE — 3074F PR MOST RECENT SYSTOLIC BLOOD PRESSURE < 130 MM HG: ICD-10-PCS | Mod: CPTII,S$GLB,, | Performed by: INTERNAL MEDICINE

## 2022-04-28 PROCEDURE — 99214 OFFICE O/P EST MOD 30 MIN: CPT | Mod: S$GLB,,, | Performed by: INTERNAL MEDICINE

## 2022-04-28 PROCEDURE — 99999 PR PBB SHADOW E&M-EST. PATIENT-LVL V: CPT | Mod: PBBFAC,,, | Performed by: INTERNAL MEDICINE

## 2022-04-28 RX ORDER — TESTOSTERONE CYPIONATE 200 MG/ML
100 INJECTION, SOLUTION INTRAMUSCULAR
Qty: 2 ML | Refills: 2
Start: 2022-04-28 | End: 2022-10-27

## 2022-04-28 NOTE — PROGRESS NOTES
Subjective:      Patient ID: Jonathan Villela is a 62 y.o. male.    Chief Complaint:  Hypogonadism      History of Present Illness  Mr. Momin presents for follow up of hypogonadism.       Has active history of hypogonadism on T replacement, psoriasis, HTN, migraines, DLEVIN and IBS.     Diagnosed with hypogonadism in late 2017 and had been on replacement since that time.   Currently taking 80 mg twice weekly. Currently managed by Dr. Tsai but would like to move care to [facility]  Recent labs show Hct of 55 and he is will be giving blood today.     No LUTS.    Has had HTN for about 15 years. Had one episode of hypokalemia previously. Deepak/renin was not suggestive of primary hyperaldosteronism.   Had normal plasma metanephrines in the past.    Review of Systems   Constitutional: Negative for chills and fever.   Gastrointestinal: Negative for nausea and vomiting.       Objective:   Physical Exam  Vitals and nursing note reviewed.       BP Readings from Last 3 Encounters:   04/28/22 124/74   04/27/22 120/86   04/26/22 124/88     Wt Readings from Last 1 Encounters:   04/28/22 0831 120.4 kg (265 lb 5.2 oz)       Body mass index is 35.01 kg/m².    Lab Review:   Lab Results   Component Value Date    HGBA1C 5.4 08/29/2014     Lab Results   Component Value Date    CHOL 145 04/12/2022    HDL 31 (L) 04/12/2022    LDLCALC 82.0 04/12/2022    TRIG 160 (H) 04/12/2022    CHOLHDL 21.4 04/12/2022     Lab Results   Component Value Date     04/12/2022    K 3.5 04/12/2022    CL 98 04/12/2022    CO2 32 (H) 04/12/2022    GLU 87 04/12/2022    BUN 14 04/12/2022    CREATININE 1.2 04/12/2022    CALCIUM 9.7 04/12/2022    PROT 7.4 04/12/2022    PROT 7.4 04/12/2022    ALBUMIN 4.2 04/12/2022    ALBUMIN 4.2 04/12/2022    BILITOT 1.0 04/12/2022    BILITOT 1.0 04/12/2022    ALKPHOS 50 (L) 04/12/2022    ALKPHOS 50 (L) 04/12/2022    AST 42 (H) 04/12/2022    AST 42 (H) 04/12/2022    ALT 50 (H) 04/12/2022    ALT 50 (H) 04/12/2022     ANIONGAP 8 04/12/2022    ESTGFRAFRICA >60.0 04/12/2022    EGFRNONAA >60.0 04/12/2022    TSH 2.212 01/25/2019         Assessment and Plan     Hypogonadism male  --Patient with hypogonadism  --Recent labs show increasing polycythemia and he will be giving blood today  --I have advised him to reduce testosterone dose from 80 mg twice weekly to 100 mg once weekly  --Will repeat CBC in 3 months and check A1c and TSH at that time  --Follow up in 6 months with CBC, PSA and testosterone levels    Essential hypertension  --Bp at goal on current regimen    Mixed hyperlipidemia  --On statin      Needs CBC, TSH, and A1c in 3 months, needs follow up in 6 months with testosterone, PSA and CBC prior to appt      Jonnie Rivera M.D. Staff Endocrinology

## 2022-04-28 NOTE — TELEPHONE ENCOUNTER
----- Message from Meseret Milan sent at 4/28/2022  3:44 PM CDT -----  Contact: Ashleigh gibson/Dr Hurt's Office [phone] ext [phone]  Type: Patient Call Back    Who called: Ashleigh gibson/Dr Hurt's Office     What is the request in detail: Received a request for medical records. Ashleigh is calling to let Dr Lo know that she had to fax over the medical records in batches, due to the amount of records they had on the patient. Ashleigh states she's done with faxing the records.     Would the patient rather a call back or a response via [facility]? Call back if need to     Best call back number: [phone] ext [phone]

## 2022-04-29 NOTE — ASSESSMENT & PLAN NOTE
--Patient with hypogonadism  --Recent labs show increasing polycythemia and he will be giving blood today  --I have advised him to reduce testosterone dose from 80 mg twice weekly to 100 mg once weekly  --Will repeat CBC in 3 months and check A1c and TSH at that time  --Follow up in 6 months with CBC, PSA and testosterone levels

## 2022-05-09 ENCOUNTER — OFFICE VISIT (OUTPATIENT)
Dept: HEPATOLOGY | Facility: CLINIC | Age: 62
End: 2022-05-09
Payer: COMMERCIAL

## 2022-05-09 ENCOUNTER — PROCEDURE VISIT (OUTPATIENT)
Dept: HEPATOLOGY | Facility: CLINIC | Age: 62
End: 2022-05-09
Payer: COMMERCIAL

## 2022-05-09 VITALS
SYSTOLIC BLOOD PRESSURE: 129 MMHG | OXYGEN SATURATION: 93 % | HEART RATE: 76 BPM | RESPIRATION RATE: 18 BRPM | HEIGHT: 73 IN | TEMPERATURE: 98 F | DIASTOLIC BLOOD PRESSURE: 76 MMHG | BODY MASS INDEX: 34.77 KG/M2 | WEIGHT: 262.38 LBS

## 2022-05-09 DIAGNOSIS — E66.9 OBESITY (BMI 30.0-34.9): ICD-10-CM

## 2022-05-09 DIAGNOSIS — K76.0 FATTY LIVER DISEASE, NONALCOHOLIC: Primary | ICD-10-CM

## 2022-05-09 DIAGNOSIS — K74.01 EARLY HEPATIC FIBROSIS: ICD-10-CM

## 2022-05-09 DIAGNOSIS — R74.8 ELEVATED LIVER ENZYMES: ICD-10-CM

## 2022-05-09 PROCEDURE — 3008F BODY MASS INDEX DOCD: CPT | Mod: CPTII,S$GLB,, | Performed by: NURSE PRACTITIONER

## 2022-05-09 PROCEDURE — 1160F RVW MEDS BY RX/DR IN RCRD: CPT | Mod: CPTII,S$GLB,, | Performed by: NURSE PRACTITIONER

## 2022-05-09 PROCEDURE — 1159F MED LIST DOCD IN RCRD: CPT | Mod: CPTII,S$GLB,, | Performed by: NURSE PRACTITIONER

## 2022-05-09 PROCEDURE — 99999 PR PBB SHADOW E&M-EST. PATIENT-LVL IV: CPT | Mod: PBBFAC,,, | Performed by: NURSE PRACTITIONER

## 2022-05-09 PROCEDURE — 99214 PR OFFICE/OUTPT VISIT, EST, LEVL IV, 30-39 MIN: ICD-10-PCS | Mod: S$GLB,,, | Performed by: NURSE PRACTITIONER

## 2022-05-09 PROCEDURE — 3074F SYST BP LT 130 MM HG: CPT | Mod: CPTII,S$GLB,, | Performed by: NURSE PRACTITIONER

## 2022-05-09 PROCEDURE — 1160F PR REVIEW ALL MEDS BY PRESCRIBER/CLIN PHARMACIST DOCUMENTED: ICD-10-PCS | Mod: CPTII,S$GLB,, | Performed by: NURSE PRACTITIONER

## 2022-05-09 PROCEDURE — 99999 PR PBB SHADOW E&M-EST. PATIENT-LVL IV: ICD-10-PCS | Mod: PBBFAC,,, | Performed by: NURSE PRACTITIONER

## 2022-05-09 PROCEDURE — 99214 OFFICE O/P EST MOD 30 MIN: CPT | Mod: S$GLB,,, | Performed by: NURSE PRACTITIONER

## 2022-05-09 PROCEDURE — 3078F PR MOST RECENT DIASTOLIC BLOOD PRESSURE < 80 MM HG: ICD-10-PCS | Mod: CPTII,S$GLB,, | Performed by: NURSE PRACTITIONER

## 2022-05-09 PROCEDURE — 91200 LIVER ELASTOGRAPHY: CPT | Mod: S$GLB,,, | Performed by: NURSE PRACTITIONER

## 2022-05-09 PROCEDURE — 1159F PR MEDICATION LIST DOCUMENTED IN MEDICAL RECORD: ICD-10-PCS | Mod: CPTII,S$GLB,, | Performed by: NURSE PRACTITIONER

## 2022-05-09 PROCEDURE — 3008F PR BODY MASS INDEX (BMI) DOCUMENTED: ICD-10-PCS | Mod: CPTII,S$GLB,, | Performed by: NURSE PRACTITIONER

## 2022-05-09 PROCEDURE — 3078F DIAST BP <80 MM HG: CPT | Mod: CPTII,S$GLB,, | Performed by: NURSE PRACTITIONER

## 2022-05-09 PROCEDURE — 3074F PR MOST RECENT SYSTOLIC BLOOD PRESSURE < 130 MM HG: ICD-10-PCS | Mod: CPTII,S$GLB,, | Performed by: NURSE PRACTITIONER

## 2022-05-09 PROCEDURE — 91200 FIBROSCAN (VIBRATION CONTROLLED TRANSIENT ELASTOGRAPHY): ICD-10-PCS | Mod: S$GLB,,, | Performed by: NURSE PRACTITIONER

## 2022-05-09 RX ORDER — PHENTERMINE HYDROCHLORIDE 37.5 MG/1
37.5 TABLET ORAL DAILY
COMMUNITY
Start: 2022-04-27

## 2022-05-09 NOTE — PATIENT INSTRUCTIONS
1. Fibroscan today to assess for fat and scar tissue in the liver  2. Repeat liver function tests in July.   3. Avoid alcohol and herbal supplements/alternative remedies.  4. Recommend a referral to a nutritionist to discuss dietary changes. Please call [phone] or email [email or web address] to get scheduled.   5. Return to clinic in 2 years, sooner if needed.    There is no FDA approved therapy for non-alcoholic fatty liver disease. Therefore, these things are important:  1. Weight loss goal of 25-30 lbs.  2. Low carb/sugar, high fiber and protein diet.Try to limit your carb intake to LESS than 30-45 grams of carbs with a meal or LESS than 5-10 grams with any snack (total of any snack foods eaten during that time). Use MyFitness Pal em to add up your carbs through the day. Do NOT drink any beverages with calories or carbs (this can lead to high blood sugar and weight gain). Also, some of our patients have been very successful with weight loss using the pre made/planned meal planning services that limit calories and portion size (one example is Sensible Meals but there are many out there).  3. Exercise, 5 days per week, 30 minutes per day, as tolerated.  4. Recommend good control of cholesterol, blood pressure, & blood sugar levels.    In some people, fatty liver can progress to steatohepatitis (inflamatory fatty liver) and possibly to cirrhosis, putting one at increased risk for liver cancer, liver failure, and death. Therefore, the lifestyle changes are very important to decrease this risk.     Website with information about fatty liver and inflammation related to fatty liver (PAYNE) = www.nashtruth.com  AND www.NASHactually.com

## 2022-05-09 NOTE — PROCEDURES
FibroScan (Vibration Controlled Transient Elastography)    Date/Time: 5/9/2022 1:45 PM  Performed by: Lilly Gustafson NP  Authorized by: Lilly Gustafson NP     Diagnosis:  NAFLD    Probe:  XL    Universal Protocol: Patient's identity, procedure and site were verified, confirmatory pause was performed.  Discussed procedure including risks and potential complications.  Questions answered.  Patient verbalizes understanding and wishes to proceed with VCTE.     Procedure: After providing explanations of the procedure, patient was placed in the supine position with right arm in maximum abduction to allow optimal exposure of right lateral abdomen.  Patient was briefly assessed, Testing was performed in the mid-axillary location, 50Hz Shear Wave pulses were applied and the resulting Shear Wave and Propagation Speed detected with a 3.5 MHz ultrasonic signal, using the FibroScan probe, Skin to liver capsule distance and liver parenchyma were accessed during the entire examination with the FibroScan probe, Patient was instructed to breathe normally and to abstain from sudden movements during the procedure, allowing for random measurements of liver stiffness. At least 10 Shear Waves were produced, Individual measurements of each Shear Wave were calculated.  Patient tolerated the procedure well with no complications.  Meets discharge criteria as was dismissed.  Rates pain 0 out of 10.  Patient will follow up with ordering provider to review results.      Findings  Median liver stiffness score:  5.6  CAP Reading: dB/m:  338    IQR/med %:  9  Interpretation  Fibrosis interpretation is based on medial liver stiffness - Kilopascal (kPa).    Fibrosis Stage:  F 0-1  Steatosis interpretation is based on controlled attenuation parameter - (dB/m).    Steatosis Grade:  S3

## 2022-05-09 NOTE — PROGRESS NOTES
[facility] VISIT ESTABLISHED PT NOTE    REFERRING PROVIDER:  No ref. provider found    CHIEF COMPLAINT: Fatty Liver    HPI: Mr. Villela is a 62 y.o. White male with PMH noted below, presenting for follow up in the management of fatty liver disease. He was last seen by myself in clinic in 9/2021. Serological work up was negative with the exception of a mildly positive ASMA (1:40). He has a normal Alpha1 antitrypsin level with a SS phenotype. Initial Fibroscan in 2019 suggested minimal fibrosis, but significant hepatic steatosis. Repeat Fibroscan in 8/2020 was suggestive of moderate hepatic steatosis, with F1 fibrosis. Follow up Fibroscan today in 9/2021 was suggestive of possible progression in fibrosis (F2), with significant hepatic steatosis. Last LFT's in April showed mild improvement with an ALT of 50, AST of 42; his synthetic function remains normal. He has gained 2 kgs since last office visit. Repeat Fibroscan today is suggestive of severe fatty infiltration of the liver (S3), with F0-F1 fibrosis, and a low likelihood of cirrhosis. He is well appearing and denies any signs or symptoms of advanced liver disease including jaundice, dark urine, pruritus, abdominal distention, lower extremity edema, hematemesis, melena, or periods of confusion suggestive of hepatic encephalopathy.      Review of patient's allergies indicates:   Allergen Reactions    Triamterene      Back and lower abdominal pain     Current Outpatient Medications on File Prior to Visit   Medication Sig Dispense Refill    acyclovir (ZOVIRAX) 400 MG tablet Take 400 mg by mouth 2 (two) times daily as needed.      albuterol (VENTOLIN HFA) 90 mcg/actuation inhaler Inhale 2 puffs into the lungs every 6 (six) hours as needed for Wheezing or Shortness of Breath. Rescue 6.7 g 0    amLODIPine (NORVASC) 5 MG tablet TAKE 1 TABLET(5 MG) BY MOUTH EVERY DAY 90 tablet 3    betamethasone dipropionate (DIPROLENE) 0.05 % ointment Apply  topically 2 (two) times daily.      carvediloL (COREG) 12.5 MG tablet TAKE 1 TABLET BY MOUTH TWICE DAILY WITH MEALS 180 tablet 3    chlorthalidone (HYGROTEN) 25 MG Tab TAKE 1/2 TABLET(12.5 MG) BY MOUTH EVERY DAY 45 tablet 3    ciclopirox (LOPROX) 0.77 % Crea Apply topically as needed.       ciprofloxacin-dexamethasone 0.3-0.1% (CIPRODEX) 0.3-0.1 % DrpS Place 4 drops into both ears as needed.      cloNIDine (CATAPRES) 0.1 MG tablet Take 0.1 mg by mouth as needed.   3    CORTISPORIN-TC 3.3-3-10-0.5 mg/mL DrpS Place 4 drops into both ears 2 (two) times daily.      cyanocobalamin 1,000 mcg/mL injection every 30 days.      cyclobenzaprine (FLEXERIL) 10 MG tablet Take 10 mg by mouth as needed.       dicyclomine (BENTYL) 10 MG capsule Take 1 capsule (10 mg total) by mouth 3 (three) times daily as needed (abdominal cramping). 30 capsule 1    ergocalciferol (ERGOCALCIFEROL) 50,000 unit Cap Take 1 capsule by mouth twice a week.      fluticasone propionate (FLONASE) 50 mcg/actuation nasal spray 1 spray by Each Nostril route once daily.      LINZESS 145 mcg Cap capsule Take 1 capsule (145 mcg total) by mouth as needed. 90 capsule 3    ofloxacin (FLOXIN) 0.3 % otic solution Place into the right ear.      ondansetron (ZOFRAN-ODT) 8 MG TbDL Take 1 tablet (8 mg total) by mouth every 6 (six) hours as needed (nausea). 20 tablet 6    pantoprazole (PROTONIX) 40 MG tablet Take 1 tablet (40 mg total) by mouth before breakfast. 90 tablet 3    phentermine (ADIPEX-P) 37.5 mg tablet Take 37.5 mg by mouth once daily.      potassium chloride (K-TAB) 20 mEq TAKE 1 TABLET(20 MEQ) BY MOUTH EVERY DAY 30 tablet 11    promethazine (PHENERGAN) 25 MG tablet Take 25 mg by mouth as needed.       rosuvastatin (CRESTOR) 10 MG tablet TAKE 1 TABLET(10 MG) BY MOUTH EVERY OTHER DAY 45 tablet 3    sumatriptan (IMITREX STATDOSE) 6 mg/0.5 mL kit INJECT 0.5 MLS INTO THE SKIN EVERY 2 HOURS AS NEEDED FOR MIGRAINE(UP TO 2 DOSES DAILY. HOLD FOR  BLOOD PRESSURE 155/110) 1 mL 6    sumatriptan (IMITREX) 100 MG tablet TAKE 1 TABLET BY MOUTH EVERY 2 HOURS AS NEEDED 9 tablet 4    testosterone cypionate (DEPOTESTOTERONE CYPIONATE) 200 mg/mL injection Inject 0.5 mLs (100 mg total) into the muscle every 7 days. 3 mL syringe, 18 gauge needle to draw up, 1 1/2 inch 21 gauge needle to inject 2 mL 2    triamcinolone acetonide 0.1% (KENALOG) 0.1 % cream AAA bid 454 g 3    TURMERIC ORAL Take by mouth once daily.      urea (CARMOL) 40 % Crea Apply topically 2 (two) times daily. To affected areas of elbows 85 g 3     No current facility-administered medications on file prior to visit.     PMHX:  has a past medical history of Acute gastric ulcer with hemorrhage (2/15/2017), Bilateral occipital neuralgia, BPH with urinary obstruction (12/31/2015), Chronic constipation, Colitis (8988-0655), Diverticulitis, Erectile dysfunction, Essential hypertension, Fatty liver, Gastroesophageal reflux disease without esophagitis (2/11/2017), HLD (hyperlipidemia), Hypogonadism in male, IBS (irritable bowel syndrome), Migraine without aura and without status migrainosus, not intractable (1/31/2014), Obstructive sleep apnea syndrome (2/9/2015), Psoriasis, and PUD (peptic ulcer disease).    PSHX:  has a past surgical history that includes Leg Surgery; Nasal septum surgery; Cholecystectomy; Esophagogastroduodenoscopy; Colonoscopy (N/A, 2/17/2017); Upper gastrointestinal endoscopy; Esophagogastroduodenoscopy (N/A, 12/14/2018); Esophagogastroduodenoscopy (N/A, 5/29/2019); Colonoscopy (N/A, 5/29/2019); Esophagogastroduodenoscopy (N/A, 8/31/2020); Colonoscopy (N/A, 8/31/2020); Flexible laryngoscopy with drug-induced sedation for evaluation of sleep apnea (N/A, 12/9/2020); Esophagogastroduodenoscopy (N/A, 12/6/2021); and Colonoscopy (N/A, 12/6/2021).    FAMILY HISTORY: Negative for liver disease, reviewed in EPIC    SOCIAL HISTORY:   Social History     Tobacco Use   Smoking Status Never Smoker  "  Smokeless Tobacco Never Used     Social History     Substance and Sexual Activity   Alcohol Use Yes    Comment: ocasionally     Social History     Substance and Sexual Activity   Drug Use No       ROS:   GENERAL: Denies fever, chills, weight loss/gain, fatigue  HEENT: Denies headaches, dizziness, vision/hearing changes  CARDIOVASCULAR: Denies chest pain, palpitations, or edema  RESPIRATORY: Denies dyspnea, cough  GI: + abdominal pain - will discuss with Dr. Erazo at upcoming appointment; denies rectal bleeding, nausea, vomiting. No change in bowel pattern or color  : Denies dysuria, hematuria   SKIN: Denies rash, itching   NEURO: Denies confusion, memory loss, or mood changes  PSYCH: Denies depression or anxiety  HEME/LYMPH: Denies easy bruising or bleeding    PHYSICAL EXAM:   Friendly White male, in no acute distress; alert and oriented to person, place and time.  VITALS: /76 (BP Location: Right arm, Patient Position: Sitting, BP Method: Large (Automatic))   Pulse 76   Temp 97.8 °F (36.6 °C) (Oral)   Resp 18   Ht 6' 1" (1.854 m)   Wt 119 kg (262 lb 5.6 oz)   SpO2 (!) 93%   BMI 34.61 kg/m²   HENT: Normocephalic, without obvious abnormality.   EYES: Sclerae anicteric.   NECK: No obvious masses.  CARDIOVASCULAR: No peripheral edema.  RESPIRATORY: Normal respiratory effort.   GI: Soft, non-tender, non-distended.   EXTREMITIES:  No clubbing, cyanosis or edema.  SKIN: Warm and dry. No jaundice. No rashes noted to exposed skin.  NEURO:  Normal gait. No asterixis.  PSYCH:  Memory intact. Thought and speech pattern appropriate. Behavior normal. No depression or anxiety noted.    DIAGNOSTIC STUDIES:    EGD 5/29/2019:    Impression:   - Normal examined duodenum. Biopsied.                         - Erythematous mucosa in the stomach. Biopsied.                         - 1 cm hiatal hernia.     COLONOSCOPY 5/29/2019:    Impression:  - The examined portion of the ileum was normal.                         - The " examined portion of the ileum was normal.                         - Non-bleeding internal hemorrhoids.                         - Diverticulosis in the recto-sigmoid colon, in the                         sigmoid colon, in the descending colon and in the                         transverse colon.                         - The entire examined colon is normal.                         - No specimens collected.     ABD. U/S 2019:    FINDINGS:    The visualized pancreas is within normal limits.  The aorta is not aneurysmal.  There is normal caliber common duct, 2 mm.  The gallbladder has been removed.     Liver size is enlarged, 17.4 cm.  There is diffuse fatty infiltration.     The kidneys are normal in size and appearance.  Right kidney measures 13 cm and left kidney measures 13.3 cm.  Spleen is 13 cm, upper normal size.     Splenic vein in the midline is patent with appropriate flow.  Superior mesenteric vein is patent.  Celiac artery is visualized with no elevation in velocity to indicate stenosis or median arcuate ligament syndrome.  Hepatic veins, portal veins and hepatic arteries are patent with appropriate flow.  Main portal vein caliber is normal.  Inferior vena cava is patent.     IMPRESSION:      Satisfactory Doppler evaluation of the liver.     Hepatomegaly with fatty infiltration of the liver.     Cholecystectomy    FIBROSCAN 2019:    Findings  Median liver stiffness score: 5.6 kPa  CAP readin dB/m     IQR/med: 9 %     Interpretation  Fibrosis interpretation is based on medial liver stiffness - Kilopascal (kPa).      Fibrosis stage: F 0-1      Steatosis interpretation is based on controlled attenuation parameter - (dB/m).     Steatosis grade: S3     FIBROSCAN 8/3/2020:    Findings  Median liver stiffness score:  7  CAP Reading: dB/m:  278     IQR/med %:  19  Interpretation  Fibrosis interpretation is based on medial liver stiffness - Kilopascal (kPa).     Fibrosis Stage:  F 0-1  Steatosis  interpretation is based on controlled attenuation parameter - (dB/m).     Steatosis Grade:  S2     FIBROSCAN 9/14/2021:    Findings  Median liver stiffness score:  9.5  CAP Reading: dB/m:  311     IQR/med %:  7  Interpretation  Fibrosis interpretation is based on medial liver stiffness - Kilopascal (kPa).     Fibrosis Stage:  F2  Steatosis interpretation is based on controlled attenuation parameter - (dB/m).     Steatosis Grade:  S3     FIBROSCAN 5/9/2022:    Findings  Median liver stiffness score:  5.6  CAP Reading: dB/m:  338     IQR/med %:  9  Interpretation  Fibrosis interpretation is based on medial liver stiffness - Kilopascal (kPa).     Fibrosis Stage:  F 0-1  Steatosis interpretation is based on controlled attenuation parameter - (dB/m).     Steatosis Grade:  S3    ASSESSMENT/PLAN:  62 y.o. White male with:    1. Fatty liver disease, nonalcoholic  Hepatic Function Panel    Ambulatory Referral/Consult to Nutrition Services - [facility]   2. Elevated liver enzymes  Hepatic Function Panel    Ambulatory Referral/Consult to Nutrition Services - [facility]   3. Obesity (BMI 30.0-34.9)  Hepatic Function Panel    Ambulatory Referral/Consult to Nutrition Services - [facility]     - Fibroscan today to restage liver disease.  - Repeat liver function tests in July.   - Recommend a referral to a nutritionist to discuss dietary changes.   - Avoid alcohol and herbal supplements/alternative remedies.  - Recommend weight loss of 25-30 lbs, through diet and exercise.  - Recommend good control of cholesterol, blood pressure, & blood sugar levels.  - Return to clinic in 2 years, sooner if needed.    Thank you for allowing me to participate in the care of Jonathan Villela       Hepatology Nurse Practitioner  [facility]  5/9/2022 @ [id number]    CC'ed note to:   No ref. provider found  Manish Joel MD

## 2022-05-10 ENCOUNTER — TELEPHONE (OUTPATIENT)
Dept: HEPATOLOGY | Facility: CLINIC | Age: 62
End: 2022-05-10
Payer: COMMERCIAL

## 2022-05-10 NOTE — TELEPHONE ENCOUNTER
----- Message from Lilly Gustafson NP sent at 5/9/2022  4:20 PM CDT -----  Please place recall for RTC in 2 years. Thanks!

## 2022-05-13 ENCOUNTER — DOCUMENTATION ONLY (OUTPATIENT)
Dept: OTOLARYNGOLOGY | Facility: CLINIC | Age: 62
End: 2022-05-13
Payer: COMMERCIAL

## 2022-05-24 ENCOUNTER — OFFICE VISIT (OUTPATIENT)
Dept: OTOLARYNGOLOGY | Facility: CLINIC | Age: 62
End: 2022-05-24
Payer: COMMERCIAL

## 2022-05-24 VITALS — WEIGHT: 262 LBS | BODY MASS INDEX: 34.57 KG/M2

## 2022-05-24 DIAGNOSIS — H70.11 CHRONIC MASTOIDITIS OF RIGHT SIDE: ICD-10-CM

## 2022-05-24 DIAGNOSIS — H90.71 MIXED CONDUCTIVE AND SENSORINEURAL HEARING LOSS OF RIGHT EAR, UNSPECIFIED HEARING STATUS ON CONTRALATERAL SIDE: ICD-10-CM

## 2022-05-24 PROCEDURE — 99214 PR OFFICE/OUTPT VISIT, EST, LEVL IV, 30-39 MIN: ICD-10-PCS | Mod: S$GLB,,, | Performed by: OTOLARYNGOLOGY

## 2022-05-24 PROCEDURE — 99999 PR PBB SHADOW E&M-EST. PATIENT-LVL II: CPT | Mod: PBBFAC,,, | Performed by: OTOLARYNGOLOGY

## 2022-05-24 PROCEDURE — 1159F MED LIST DOCD IN RCRD: CPT | Mod: CPTII,S$GLB,, | Performed by: OTOLARYNGOLOGY

## 2022-05-24 PROCEDURE — 99214 OFFICE O/P EST MOD 30 MIN: CPT | Mod: S$GLB,,, | Performed by: OTOLARYNGOLOGY

## 2022-05-24 PROCEDURE — 3008F BODY MASS INDEX DOCD: CPT | Mod: CPTII,S$GLB,, | Performed by: OTOLARYNGOLOGY

## 2022-05-24 PROCEDURE — 1159F PR MEDICATION LIST DOCUMENTED IN MEDICAL RECORD: ICD-10-PCS | Mod: CPTII,S$GLB,, | Performed by: OTOLARYNGOLOGY

## 2022-05-24 PROCEDURE — 99999 PR PBB SHADOW E&M-EST. PATIENT-LVL II: ICD-10-PCS | Mod: PBBFAC,,, | Performed by: OTOLARYNGOLOGY

## 2022-05-24 PROCEDURE — 3008F PR BODY MASS INDEX (BMI) DOCUMENTED: ICD-10-PCS | Mod: CPTII,S$GLB,, | Performed by: OTOLARYNGOLOGY

## 2022-05-24 NOTE — PROGRESS NOTES
Answers for HPI/ROS submitted by the patient on 5/24/2022  None of these: Yes  ear discharge: Yes  postnasal drip: Yes  None of these : Yes  None of these: Yes  None of these : Yes  None of these: Yes  None of these: Yes  neck pain: Yes  None of these : Yes  Seasonal Allergies?: Yes  None of these : Yes  headaches: Yes  None of these: Yes  None of these: Yes

## 2022-05-24 NOTE — PROGRESS NOTES
Subjective:       Patient ID: Jonathan Villela is a 62 y.o. male.    Chief Comp  aint: Hearing Loss    HPI     Jonathan Villela is a 62 y.o. male presents for evaluation of right ear fullness.  He reports a history of ear problems throughout his life however he has never had any ear surgeries.  About 8 months ago he was noted to have fluid in his right ear had a right PE tube placed.  He reports mild benefit from this.  He has had issues with drainage occurring about once a week usually at nighttime.  He states that it it is clear.  He also reports continued right ear fullness.  He feels it has been getting better with time.    Review of Systems   Constitutional: Negative.    HENT: Positive for ear discharge and postnasal drip.    Eyes: Negative.    Respiratory: Negative.    Cardiovascular: Negative.    Gastrointestinal: Negative.    Endocrine: Negative.    Genitourinary: Negative.    Musculoskeletal: Positive for neck pain.   Integumentary:  Negative.   Neurological: Positive for headaches.   Hematological: Negative.    Psychiatric/Behavioral: Negative.          Objective:      Physical Exam  Vitals and nursing note reviewed.   Constitutional:       Appearance: Normal appearance.   HENT:      Head: Normocephalic and atraumatic.      Right Ear: Tympanic membrane, ear canal and external ear normal. There is no impacted cerumen. A PE tube (dry, TM normal) is present.      Left Ear: Tympanic membrane, ear canal and external ear normal. There is no impacted cerumen.      Ears:      Mccollum exam findings: lateralizes right.     Right Rinne: BC > AC.     Left Rinne: AC > BC.  Neurological:      Mental Status: He is alert.       Data Reviewed:      Audiogram tracings independently reviewed and discussed with patient shows right sided conductive hearing loss with present stapedial reflexes      CT temporal bones image independently reviewed by me and show: partial opacification of right mastoid with clear middle ear, no  ossicular abnormality, no obvious tegmen defect.      Assessment:       Problem List Items Addressed This Visit    None     Visit Diagnoses     Chronic mastoiditis of right side        Mixed conductive and sensorineural hearing loss of right ear, unspecified hearing status on contralateral side              Plan:         In summary this is a pleasant 62 y.o. with PE tube and intermittent otorrhea and persistent CHL.  Exam shows no abnormalities. Scan shows scattered opacification of mastoid.    Unclear etiology of patient's intermittent otorrhea and CHL.  Low suspicion for CSF leak but could be possible despite lack of CT evidence.  Other possibilities include opacification is not clinically significant and CHL is due to an ossicular problem. However stapes reflexes are present on most recent test.     Since problem seems to be improving and PE is benign recommend repeat audiogram in 3 mo.  Further options include removal of PE tube, and consideration of middle ear exploration possible mastoid.      Answers for HPI/ROS submitted by the patient on 5/24/2022  Seasonal Allergies?: Yes

## 2022-08-01 ENCOUNTER — LAB VISIT (OUTPATIENT)
Dept: LAB | Facility: HOSPITAL | Age: 62
End: 2022-08-01
Attending: INTERNAL MEDICINE
Payer: COMMERCIAL

## 2022-08-01 DIAGNOSIS — R74.8 ELEVATED LIVER ENZYMES: ICD-10-CM

## 2022-08-01 DIAGNOSIS — E66.9 OBESITY (BMI 30.0-34.9): ICD-10-CM

## 2022-08-01 DIAGNOSIS — K76.0 FATTY LIVER DISEASE, NONALCOHOLIC: ICD-10-CM

## 2022-08-01 DIAGNOSIS — E29.1 HYPOGONADISM MALE: ICD-10-CM

## 2022-08-01 DIAGNOSIS — I10 ESSENTIAL HYPERTENSION: ICD-10-CM

## 2022-08-01 DIAGNOSIS — E78.2 MIXED HYPERLIPIDEMIA: ICD-10-CM

## 2022-08-01 LAB
ALBUMIN SERPL BCP-MCNC: 4.5 G/DL (ref 3.5–5.2)
ALP SERPL-CCNC: 59 U/L (ref 55–135)
ALT SERPL W/O P-5'-P-CCNC: 53 U/L (ref 10–44)
AST SERPL-CCNC: 41 U/L (ref 10–40)
BASOPHILS # BLD AUTO: 0.08 K/UL (ref 0–0.2)
BASOPHILS NFR BLD: 1 % (ref 0–1.9)
BILIRUB DIRECT SERPL-MCNC: 0.4 MG/DL (ref 0.1–0.3)
BILIRUB SERPL-MCNC: 0.9 MG/DL (ref 0.1–1)
DIFFERENTIAL METHOD: ABNORMAL
EOSINOPHIL # BLD AUTO: 0.2 K/UL (ref 0–0.5)
EOSINOPHIL NFR BLD: 1.9 % (ref 0–8)
ERYTHROCYTE [DISTWIDTH] IN BLOOD BY AUTOMATED COUNT: 18.1 % (ref 11.5–14.5)
ESTIMATED AVG GLUCOSE: 123 MG/DL (ref 68–131)
HBA1C MFR BLD: 5.9 % (ref 4–5.6)
HCT VFR BLD AUTO: 53.1 % (ref 40–54)
HGB BLD-MCNC: 17.2 G/DL (ref 14–18)
IMM GRANULOCYTES # BLD AUTO: 0.05 K/UL (ref 0–0.04)
IMM GRANULOCYTES NFR BLD AUTO: 0.6 % (ref 0–0.5)
LYMPHOCYTES # BLD AUTO: 2 K/UL (ref 1–4.8)
LYMPHOCYTES NFR BLD: 24.4 % (ref 18–48)
MCH RBC QN AUTO: 26.1 PG (ref 27–31)
MCHC RBC AUTO-ENTMCNC: 32.4 G/DL (ref 32–36)
MCV RBC AUTO: 81 FL (ref 82–98)
MONOCYTES # BLD AUTO: 0.7 K/UL (ref 0.3–1)
MONOCYTES NFR BLD: 9 % (ref 4–15)
NEUTROPHILS # BLD AUTO: 5.2 K/UL (ref 1.8–7.7)
NEUTROPHILS NFR BLD: 63.1 % (ref 38–73)
NRBC BLD-RTO: 0 /100 WBC
PLATELET # BLD AUTO: 304 K/UL (ref 150–450)
PMV BLD AUTO: 10 FL (ref 9.2–12.9)
PROT SERPL-MCNC: 7.6 G/DL (ref 6–8.4)
RBC # BLD AUTO: 6.6 M/UL (ref 4.6–6.2)
TSH SERPL DL<=0.005 MIU/L-ACNC: 2.24 UIU/ML (ref 0.4–4)
WBC # BLD AUTO: 8.21 K/UL (ref 3.9–12.7)

## 2022-08-01 PROCEDURE — 80076 HEPATIC FUNCTION PANEL: CPT | Performed by: NURSE PRACTITIONER

## 2022-08-01 PROCEDURE — 85025 COMPLETE CBC W/AUTO DIFF WBC: CPT | Performed by: INTERNAL MEDICINE

## 2022-08-01 PROCEDURE — 36415 COLL VENOUS BLD VENIPUNCTURE: CPT | Mod: PO | Performed by: INTERNAL MEDICINE

## 2022-08-01 PROCEDURE — 84443 ASSAY THYROID STIM HORMONE: CPT | Performed by: INTERNAL MEDICINE

## 2022-08-01 PROCEDURE — 83036 HEMOGLOBIN GLYCOSYLATED A1C: CPT | Performed by: INTERNAL MEDICINE

## 2022-08-02 ENCOUNTER — PATIENT MESSAGE (OUTPATIENT)
Dept: ENDOCRINOLOGY | Facility: CLINIC | Age: 62
End: 2022-08-02
Payer: COMMERCIAL

## 2022-08-04 ENCOUNTER — PATIENT MESSAGE (OUTPATIENT)
Dept: DERMATOLOGY | Facility: CLINIC | Age: 62
End: 2022-08-04
Payer: COMMERCIAL

## 2022-09-14 NOTE — ED NOTES
Dr. Arthur and Dr. Herrera at bedside giving test results and plan of care     ,DirectAddress_Unknown,[email or web address],DirectAddress_Unknown

## 2022-09-27 RX ORDER — AMLODIPINE BESYLATE 5 MG/1
TABLET ORAL
Qty: 90 TABLET | Refills: 3 | Status: SHIPPED | OUTPATIENT
Start: 2022-09-27 | End: 2023-10-12

## 2022-10-24 ENCOUNTER — PATIENT MESSAGE (OUTPATIENT)
Dept: GASTROENTEROLOGY | Facility: CLINIC | Age: 62
End: 2022-10-24
Payer: COMMERCIAL

## 2022-11-08 DIAGNOSIS — K22.10 ESOPHAGITIS, EROSIVE: ICD-10-CM

## 2022-11-08 RX ORDER — PANTOPRAZOLE SODIUM 40 MG/1
TABLET, DELAYED RELEASE ORAL
Qty: 90 TABLET | Refills: 0 | Status: SHIPPED | OUTPATIENT
Start: 2022-11-08 | End: 2023-02-01

## 2022-11-09 ENCOUNTER — LAB VISIT (OUTPATIENT)
Dept: LAB | Facility: HOSPITAL | Age: 62
End: 2022-11-09
Attending: INTERNAL MEDICINE
Payer: COMMERCIAL

## 2022-11-09 DIAGNOSIS — Z79.899 ENCOUNTER FOR MONITORING LONG-TERM PROTON PUMP INHIBITOR THERAPY: ICD-10-CM

## 2022-11-09 DIAGNOSIS — Z51.81 ENCOUNTER FOR MONITORING LONG-TERM PROTON PUMP INHIBITOR THERAPY: ICD-10-CM

## 2022-11-09 DIAGNOSIS — K22.10 ESOPHAGITIS, EROSIVE: ICD-10-CM

## 2022-11-09 LAB
25(OH)D3+25(OH)D2 SERPL-MCNC: 46 NG/ML (ref 30–96)
ANION GAP SERPL CALC-SCNC: 13 MMOL/L (ref 8–16)
BUN SERPL-MCNC: 16 MG/DL (ref 8–23)
CALCIUM SERPL-MCNC: 9.8 MG/DL (ref 8.7–10.5)
CHLORIDE SERPL-SCNC: 100 MMOL/L (ref 95–110)
CO2 SERPL-SCNC: 27 MMOL/L (ref 23–29)
CREAT SERPL-MCNC: 1.1 MG/DL (ref 0.5–1.4)
EST. GFR  (NO RACE VARIABLE): >60 ML/MIN/1.73 M^2
GLUCOSE SERPL-MCNC: 95 MG/DL (ref 70–110)
MAGNESIUM SERPL-MCNC: 1.7 MG/DL (ref 1.6–2.6)
POTASSIUM SERPL-SCNC: 3.6 MMOL/L (ref 3.5–5.1)
SODIUM SERPL-SCNC: 140 MMOL/L (ref 136–145)
VIT B12 SERPL-MCNC: 867 PG/ML (ref 210–950)

## 2022-11-09 PROCEDURE — 83735 ASSAY OF MAGNESIUM: CPT | Performed by: INTERNAL MEDICINE

## 2022-11-09 PROCEDURE — 82306 VITAMIN D 25 HYDROXY: CPT | Performed by: INTERNAL MEDICINE

## 2022-11-09 PROCEDURE — 80048 BASIC METABOLIC PNL TOTAL CA: CPT | Performed by: INTERNAL MEDICINE

## 2022-11-09 PROCEDURE — 36415 COLL VENOUS BLD VENIPUNCTURE: CPT | Performed by: INTERNAL MEDICINE

## 2022-11-09 PROCEDURE — 82607 VITAMIN B-12: CPT | Performed by: INTERNAL MEDICINE

## 2022-11-10 ENCOUNTER — OFFICE VISIT (OUTPATIENT)
Dept: DERMATOLOGY | Facility: CLINIC | Age: 62
End: 2022-11-10
Payer: COMMERCIAL

## 2022-11-10 ENCOUNTER — LAB VISIT (OUTPATIENT)
Dept: LAB | Facility: HOSPITAL | Age: 62
End: 2022-11-10
Payer: COMMERCIAL

## 2022-11-10 DIAGNOSIS — L40.9 PSORIASIS: ICD-10-CM

## 2022-11-10 DIAGNOSIS — Z79.899 ENCOUNTER FOR LONG-TERM (CURRENT) USE OF HIGH-RISK MEDICATION: Primary | ICD-10-CM

## 2022-11-10 DIAGNOSIS — Z79.899 ENCOUNTER FOR LONG-TERM (CURRENT) USE OF HIGH-RISK MEDICATION: ICD-10-CM

## 2022-11-10 LAB
ALBUMIN SERPL BCP-MCNC: 4.3 G/DL (ref 3.5–5.2)
ALP SERPL-CCNC: 56 U/L (ref 55–135)
ALT SERPL W/O P-5'-P-CCNC: 60 U/L (ref 10–44)
ANION GAP SERPL CALC-SCNC: 10 MMOL/L (ref 8–16)
AST SERPL-CCNC: 40 U/L (ref 10–40)
BASOPHILS # BLD AUTO: 0.05 K/UL (ref 0–0.2)
BASOPHILS NFR BLD: 0.7 % (ref 0–1.9)
BILIRUB SERPL-MCNC: 0.7 MG/DL (ref 0.1–1)
BUN SERPL-MCNC: 20 MG/DL (ref 8–23)
CALCIUM SERPL-MCNC: 9.8 MG/DL (ref 8.7–10.5)
CHLORIDE SERPL-SCNC: 102 MMOL/L (ref 95–110)
CO2 SERPL-SCNC: 25 MMOL/L (ref 23–29)
CREAT SERPL-MCNC: 1.1 MG/DL (ref 0.5–1.4)
DIFFERENTIAL METHOD: ABNORMAL
EOSINOPHIL # BLD AUTO: 0.2 K/UL (ref 0–0.5)
EOSINOPHIL NFR BLD: 2.1 % (ref 0–8)
ERYTHROCYTE [DISTWIDTH] IN BLOOD BY AUTOMATED COUNT: 14.2 % (ref 11.5–14.5)
EST. GFR  (NO RACE VARIABLE): >60 ML/MIN/1.73 M^2
GLUCOSE SERPL-MCNC: 93 MG/DL (ref 70–110)
HBV CORE AB SERPL QL IA: NORMAL
HBV CORE IGM SERPL QL IA: NORMAL
HBV SURFACE AB SER-ACNC: 37.53 MIU/ML
HBV SURFACE AB SER-ACNC: REACTIVE M[IU]/ML
HCT VFR BLD AUTO: 46.8 % (ref 40–54)
HCV AB SERPL QL IA: NORMAL
HGB BLD-MCNC: 16.4 G/DL (ref 14–18)
IMM GRANULOCYTES # BLD AUTO: 0.04 K/UL (ref 0–0.04)
IMM GRANULOCYTES NFR BLD AUTO: 0.6 % (ref 0–0.5)
LYMPHOCYTES # BLD AUTO: 1.6 K/UL (ref 1–4.8)
LYMPHOCYTES NFR BLD: 23.2 % (ref 18–48)
MCH RBC QN AUTO: 28.3 PG (ref 27–31)
MCHC RBC AUTO-ENTMCNC: 35 G/DL (ref 32–36)
MCV RBC AUTO: 81 FL (ref 82–98)
MONOCYTES # BLD AUTO: 0.6 K/UL (ref 0.3–1)
MONOCYTES NFR BLD: 8 % (ref 4–15)
NEUTROPHILS # BLD AUTO: 4.6 K/UL (ref 1.8–7.7)
NEUTROPHILS NFR BLD: 65.4 % (ref 38–73)
NRBC BLD-RTO: 0 /100 WBC
PLATELET # BLD AUTO: 249 K/UL (ref 150–450)
PMV BLD AUTO: 9.2 FL (ref 9.2–12.9)
POTASSIUM SERPL-SCNC: 3.4 MMOL/L (ref 3.5–5.1)
PROT SERPL-MCNC: 7.6 G/DL (ref 6–8.4)
RBC # BLD AUTO: 5.79 M/UL (ref 4.6–6.2)
SODIUM SERPL-SCNC: 137 MMOL/L (ref 136–145)
WBC # BLD AUTO: 7.04 K/UL (ref 3.9–12.7)

## 2022-11-10 PROCEDURE — 80053 COMPREHEN METABOLIC PANEL: CPT | Performed by: PATHOLOGY

## 2022-11-10 PROCEDURE — 86706 HEP B SURFACE ANTIBODY: CPT | Mod: 91 | Performed by: PATHOLOGY

## 2022-11-10 PROCEDURE — 1160F RVW MEDS BY RX/DR IN RCRD: CPT | Mod: CPTII,S$GLB,, | Performed by: PATHOLOGY

## 2022-11-10 PROCEDURE — 86803 HEPATITIS C AB TEST: CPT | Performed by: PATHOLOGY

## 2022-11-10 PROCEDURE — 86705 HEP B CORE ANTIBODY IGM: CPT | Performed by: PATHOLOGY

## 2022-11-10 PROCEDURE — 3044F PR MOST RECENT HEMOGLOBIN A1C LEVEL <7.0%: ICD-10-PCS | Mod: CPTII,S$GLB,, | Performed by: PATHOLOGY

## 2022-11-10 PROCEDURE — 85025 COMPLETE CBC W/AUTO DIFF WBC: CPT | Performed by: PATHOLOGY

## 2022-11-10 PROCEDURE — 1159F PR MEDICATION LIST DOCUMENTED IN MEDICAL RECORD: ICD-10-PCS | Mod: CPTII,S$GLB,, | Performed by: PATHOLOGY

## 2022-11-10 PROCEDURE — 99999 PR PBB SHADOW E&M-EST. PATIENT-LVL II: ICD-10-PCS | Mod: PBBFAC,,, | Performed by: PATHOLOGY

## 2022-11-10 PROCEDURE — 99999 PR PBB SHADOW E&M-EST. PATIENT-LVL II: CPT | Mod: PBBFAC,,, | Performed by: PATHOLOGY

## 2022-11-10 PROCEDURE — 1160F PR REVIEW ALL MEDS BY PRESCRIBER/CLIN PHARMACIST DOCUMENTED: ICD-10-PCS | Mod: CPTII,S$GLB,, | Performed by: PATHOLOGY

## 2022-11-10 PROCEDURE — 1159F MED LIST DOCD IN RCRD: CPT | Mod: CPTII,S$GLB,, | Performed by: PATHOLOGY

## 2022-11-10 PROCEDURE — 86704 HEP B CORE ANTIBODY TOTAL: CPT | Performed by: PATHOLOGY

## 2022-11-10 PROCEDURE — 36415 COLL VENOUS BLD VENIPUNCTURE: CPT | Performed by: PATHOLOGY

## 2022-11-10 PROCEDURE — 99214 OFFICE O/P EST MOD 30 MIN: CPT | Mod: S$GLB,,, | Performed by: PATHOLOGY

## 2022-11-10 PROCEDURE — 99214 PR OFFICE/OUTPT VISIT, EST, LEVL IV, 30-39 MIN: ICD-10-PCS | Mod: S$GLB,,, | Performed by: PATHOLOGY

## 2022-11-10 PROCEDURE — 3044F HG A1C LEVEL LT 7.0%: CPT | Mod: CPTII,S$GLB,, | Performed by: PATHOLOGY

## 2022-11-10 RX ORDER — CLOBETASOL PROPIONATE 0.5 MG/G
CREAM TOPICAL
Qty: 60 G | Refills: 3 | Status: SHIPPED | OUTPATIENT
Start: 2022-11-10 | End: 2022-11-11 | Stop reason: SDUPTHER

## 2022-11-10 RX ORDER — CLOBETASOL PROPIONATE 0.46 MG/ML
SOLUTION TOPICAL
Qty: 50 ML | Refills: 3 | Status: SHIPPED | OUTPATIENT
Start: 2022-11-10 | End: 2022-11-11 | Stop reason: SDUPTHER

## 2022-11-10 NOTE — PROGRESS NOTES
Subjective:       Patient ID:  Jonathan Villela is a 62 y.o. male who presents for   Chief Complaint   Patient presents with    Psoriasis     F/u     HPI  Presents today for psoriasis f/u.  Has had intermittent flares to elbows, groin, penis, now legs, back, hands since last visit April 2021.  Newer thick scaly papules and small plaques to legs.  Scalp and ears with intermittent erythema, scaling - some improvement with also with coal tar shampoo and keto shampoo intermittently.  Joint pain is resolved.  Has not taken Syrizi in > 1 year.  Also with persistent, pruritic reddish papules to penis - shaft and head. Treated with ketoconazole cream, diflucan without improvement.  Topical steroids seem to help the most.       Tea tree, T-gel &  with 3% coal tar occasionally.     Complicated pt here for f/u for focally psoriasiform, focally lichenoid and spongiotic dermatitis.  Face, neck, upper back and shoulders improved dramatically on Skyrizi.  Genital involvement never resolved.  Was on Humira late November 2018 - May 2019 without improvement - it was discontinued.  Rheum (Dr. Snyder) showed unrevealing clinical or lab results for an inflammatory arthritis.  Joint pain has improved.      Using TAC cream to inguinal folds and penis with minimal improvement.   Has had mildly elevated LFTs - chronic.     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 At that time he was also on numerous herbal supplements, which he discontinued.  He was continued on several BP medications including valsartan, chlorthalidone, beta blockers (bystolic, carvedilol), and calcium channel blockers throughout 2016 to 2018.      Biopsy result 10/4/2018  FINAL PATHOLOGIC DIAGNOSIS  1. Skin, right chest, punch biopsy:  - CHANGES MOST COMPATIBLE WITH A DRUG ERUPTION.  MICROSCOPIC DESCRIPTION: The biopsy shows minimal focal parakeratosis  alternating with compact  orthokeratosis. There is mild epidermal spongiosis and focal subtle vacuolar interface changes with rare apoptotic  keratinocytes that localize to multiple levels within the epidermis. The dermis is edematous. There is a superficial  and mid dermal perivascular and perifollicular lymphohistiocytic infiltrate with rarer neutrophils and mast cells. PAS  stain is negative for fungi. Appropriately reactive controls were reviewed. Multiple levels were examined.  2. Skin, right upper back, punch biopsy:  - SUBACUTE SPONGIOTIC DERMATITIS (See Comment).  MICROSCOPIC DESCRIPTION: The biopsy shows focal parakeratosis, epidermal acanthosis with elongation of  rete ridges, spongiosis with exocytosis of lymphocytes and a superficial perivascular and perifollicular dermal  lymphohistiocytic infiltrate with rare neutrophils. A majority of the superficial dermal and intraepidermal lymphocytes  stain positive for both CD3 and CD4, while CD8 stains a sparser population of cells. The approximate CD4 to CD8  ratio is 4:1. PAS stain is negative for fungi. Appropriately reactive controls were reviewed. Multiple levels were  examined.  COMMENT: These histologic features are compatible with an eczematous process such as atopic dermatitis, an  irritant or allergic contact dermatitis, or an id reaction. A drug eruption cannot be excluded. Clinical correlation is  essential.  3. Skin, right groin, punch biopsy:  - SPARSE SUPERFICIAL PERIVASCULAR DERMATITIS (SEE COMMENT).  MICROSCOPIC DESCRIPTION: Sections show a punch biopsy of skin extending to the level of the subcutis. The  stratum corneum consists of compact orthokeratosis. The granular layer is intact. The epidermis is largely  unremarkable, however, apoptotic keratinocytes are noted at multiple levels within the epidermis. Vacuolar  interface alteration is not appreciated. Within the superficial dermis, there is a sparse perivascular  lymphohistiocytic  infiltrate with scattered melanophages. PAS stain is negative for yeasts/fungi. Appropriately reactive controls were  reviewed. Multiple levels were examined.  COMMENT: These histologic features are mild and nonspecific. In the appropriate clinical context, they may  represent and resolving eczematous process with postinflammatory pigmentary alteration. Differential diagnosis  includes a viral exanthem or a morbilliform drug eruption. Clinical correlation is advised.  4. Skin, penile base, shave biopsy:  - LICHENOID AND SPONGIOTIC DERMATITIS (SEE COMMENT).  MICROSCOPIC DESCRIPTION: Sections show parakeratosis that is focally associated with neutrophils overlying  an area of epidermis exhibiting a diminished granular layer. There is mild epidermal spongiosis with exocytosis of  lymphocytes and Langerhans cells. Subtle vacuolar interface alteration is appreciated in association with rare  colloid bodies. The underlying superficial dermis contains a vaguely lichenoid and perivascular lymphohistiocytic  infiltrate with a few scattered neutrophils and a rare eosinophil. Erythrocyte extravasation is also noted within the  superficial dermis. PAS stain is negative for yeasts/fungi. A majority of the superficial dermal and intraepidermal  lymphocytes stain positive for both CD3 and CD4, while CD8 stains a sparser population of cells. The approximate  CD4 to CD8 ratio is 4:1. CD20 is negative, and CD30 stains only a few rare, widely scattered cells. CD1a shows  an essentially normal distribution of Langerhans cells within the involved epidermis. Appropriately reactive controls  were reviewed. Multiple levels were examined.  COMMENT: The differential diagnosis includes pityriasis lichenoides chronica versus a lichenoid drug eruption .  Clinical correlation is advised.    Review of Systems   Constitutional:  Negative for fever, chills, fatigue and malaise.   Skin:  Positive for itching, rash and dry skin.       Objective:    Physical Exam   Constitutional: He appears well-developed and well-nourished.   Genitourinary:         Neurological: He is alert and oriented to person, place, and time.   Psychiatric: He has a normal mood and affect.   Skin:   Areas Examined (abnormalities noted in diagram):   Scalp / Hair Palpated and Inspected  Head / Face Inspection Performed  Neck Inspection Performed  Chest / Axilla Inspection Performed  Abdomen Inspection Performed  Genitals / Buttocks / Groin Inspection Performed  Back Inspection Performed  RUE Inspected  LUE Inspection Performed  RLE Inspected  LLE Inspection Performed  Nails and Digits Inspection Performed                 Diagram Legend     Erythematous scaling macule/papule c/w actinic keratosis       Vascular papule c/w angioma      Pigmented verrucoid papule/plaque c/w seborrheic keratosis      Yellow umbilicated papule c/w sebaceous hyperplasia      Irregularly shaped tan macule c/w lentigo     1-2 mm smooth white papules consistent with Milia      Movable subcutaneous cyst with punctum c/w epidermal inclusion cyst      Subcutaneous movable cyst c/w pilar cyst      Firm pink to brown papule c/w dermatofibroma      Pedunculated fleshy papule(s) c/w skin tag(s)      Evenly pigmented macule c/w junctional nevus     Mildly variegated pigmented, slightly irregular-bordered macule c/w mildly atypical nevus      Flesh colored to evenly pigmented papule c/w intradermal nevus       Pink pearly papule/plaque c/w basal cell carcinoma      Erythematous hyperkeratotic cursted plaque c/w SCC      Surgical scar with no sign of skin cancer recurrence      Open and closed comedones      Inflammatory papules and pustules      Verrucoid papule consistent consistent with wart     Erythematous eczematous patches and plaques     Dystrophic onycholytic nail with subungual debris c/w onychomycosis     Umbilicated papule    Erythematous-base heme-crusted tan verrucoid plaque consistent with  inflamed seborrheic keratosis     Erythematous Silvery Scaling Plaque c/w Psoriasis     See annotation      Assessment / Plan:        Encounter for long-term (current) use of high-risk medication  -     CBC Auto Differential; Future; Expected date: 11/10/2022  -     Comprehensive Metabolic Panel; Future; Expected date: 11/10/2022  -     Quantiferon Gold TB; Future; Expected date: 11/10/2022  -     Hepatitis B Surface Ab, Qualitative; Future; Expected date: 11/10/2022  -     Hepatitis C Antibody; Future; Expected date: 11/10/2022  -     Hepatitis B Core Antibody, IgM; Future; Expected date: 11/10/2022  -     Hepatitis B Core Antibody, Total; Future; Expected date: 11/10/2022    Psoriasis - classic plaque psoriasis today to legs, arms, upper back.  Will increase potency of topical steroids for thicker papules and plaques.  Would benefit from re-starting biologic agent.  Genital involvement formerly not resolved on Skyrizi.  Would consider switcing to anti-[place]-17 target.  -     clobetasoL (TEMOVATE) 0.05 % cream; AAA bid  Dispense: 60 g; Refill: 3  -     clobetasoL (TEMOVATE) 0.05 % external solution; Use on scalp one - two times daily as needed for scaling or itching  Dispense: 50 mL; Refill: 3    Discussed  benefits and risks of Cosentyx including but not limited to injection site reactions, increased risk of infection, new onset/flare of inflammatory bowel disease and decreased tumor surveillance. Patient informed to discontinue Cosentyx and notify our office if an infection develops.  Patient counseled to avoid live vaccines.    Check baseline CBC, CMP, Hep B, Hep C, and Quantiferon gold    If labs are WNL, start Cosentyx at 300mg SQ qweek x 5 then 300mg SQ qmonth    Will need CBC q 6 months. Will need Quantiferon gold annually.           No follow-ups on file.

## 2022-11-11 ENCOUNTER — PATIENT MESSAGE (OUTPATIENT)
Dept: DERMATOLOGY | Facility: CLINIC | Age: 62
End: 2022-11-11
Payer: COMMERCIAL

## 2022-11-11 RX ORDER — CLOBETASOL PROPIONATE 0.46 MG/ML
SOLUTION TOPICAL
Qty: 50 ML | Refills: 3 | Status: SHIPPED | OUTPATIENT
Start: 2022-11-11

## 2022-11-11 RX ORDER — CLOBETASOL PROPIONATE 0.5 MG/G
CREAM TOPICAL
Qty: 60 G | Refills: 3 | Status: SHIPPED | OUTPATIENT
Start: 2022-11-11 | End: 2022-11-15 | Stop reason: SDUPTHER

## 2022-11-14 DIAGNOSIS — L40.9 PSORIASIS: Primary | ICD-10-CM

## 2022-11-14 RX ORDER — SECUKINUMAB 150 MG/ML
INJECTION SUBCUTANEOUS
Qty: 2 ML | Refills: 11 | Status: SHIPPED | OUTPATIENT
Start: 2022-11-14 | End: 2022-11-23

## 2022-11-14 RX ORDER — SECUKINUMAB 150 MG/ML
INJECTION SUBCUTANEOUS
Qty: 10 ML | Refills: 0 | Status: SHIPPED | OUTPATIENT
Start: 2022-11-14 | End: 2022-11-23

## 2022-11-15 DIAGNOSIS — L40.9 PSORIASIS: ICD-10-CM

## 2022-11-15 RX ORDER — CLOBETASOL PROPIONATE 0.5 MG/G
CREAM TOPICAL
Qty: 60 G | Refills: 3 | Status: SHIPPED | OUTPATIENT
Start: 2022-11-15

## 2022-11-18 ENCOUNTER — SPECIALTY PHARMACY (OUTPATIENT)
Dept: PHARMACY | Facility: CLINIC | Age: 62
End: 2022-11-18
Payer: COMMERCIAL

## 2022-11-23 ENCOUNTER — SPECIALTY PHARMACY (OUTPATIENT)
Dept: PHARMACY | Facility: CLINIC | Age: 62
End: 2022-11-23
Payer: COMMERCIAL

## 2022-11-23 DIAGNOSIS — L40.9 PSORIASIS: Primary | ICD-10-CM

## 2022-11-23 RX ORDER — IXEKIZUMAB 80 MG/ML
INJECTION, SOLUTION SUBCUTANEOUS
Qty: 8 ML | Refills: 0 | Status: SHIPPED | OUTPATIENT
Start: 2022-11-23 | End: 2022-12-12 | Stop reason: SDUPTHER

## 2022-11-23 RX ORDER — IXEKIZUMAB 80 MG/ML
INJECTION, SOLUTION SUBCUTANEOUS
Qty: 3 ML | Refills: 1 | Status: SHIPPED | OUTPATIENT
Start: 2022-11-23 | End: 2022-12-12 | Stop reason: SDUPTHER

## 2022-11-23 NOTE — TELEPHONE ENCOUNTER
Kesha Hartmna, this is Binta Arcos with [facility].  We are working on your prescription that your doctor has sent us. We will be working with your insurance to get this approved for you. We will be calling you along the way with updates on your medication.  If you have any questions, you can reach us at [phone].    Welcome call outcome: Patient/caregiver reached

## 2022-11-23 NOTE — TELEPHONE ENCOUNTER
Daniel Hartman, this is Binta Arcos with [facility].  We are working on your prescription that your doctor has sent us. We will be working with your insurance to get this approved for you. We will be calling you along the way with updates on your medication.  If you have any questions, you can reach us at [phone].    Welcome call outcome: Patient/caregiver reached

## 2022-11-23 NOTE — TELEPHONE ENCOUNTER
PA denied due to not preferred on insurance. Preferred medications are Humira, Otezla, Skyrizi, Stelara, Taltz and Tremfya. Will message provider, patient aware.

## 2022-11-30 NOTE — TELEPHONE ENCOUNTER
-PA approved from 11/28/2022 to 11/28/2023      [facility] O[facility]. Patient must fill at [facility] ([phone])    -Patient informed via voicemail  -Will transfer Rx and close out at [facility].      Patient is eligible for  copay card and may also be eligable for prudentrx through his insurance for additional discounts for medication.

## 2022-12-12 DIAGNOSIS — L40.9 PSORIASIS: ICD-10-CM

## 2022-12-12 RX ORDER — IXEKIZUMAB 80 MG/ML
INJECTION, SOLUTION SUBCUTANEOUS
Qty: 8 ML | Refills: 0 | Status: SHIPPED | OUTPATIENT
Start: 2022-12-12 | End: 2023-02-01

## 2022-12-12 RX ORDER — IXEKIZUMAB 80 MG/ML
INJECTION, SOLUTION SUBCUTANEOUS
Qty: 3 ML | Refills: 1 | Status: SHIPPED | OUTPATIENT
Start: 2022-12-12 | End: 2023-02-01

## 2023-01-03 ENCOUNTER — OFFICE VISIT (OUTPATIENT)
Dept: URGENT CARE | Facility: CLINIC | Age: 63
End: 2023-01-03
Payer: COMMERCIAL

## 2023-01-03 VITALS
HEIGHT: 73 IN | DIASTOLIC BLOOD PRESSURE: 71 MMHG | RESPIRATION RATE: 18 BRPM | OXYGEN SATURATION: 97 % | BODY MASS INDEX: 33.8 KG/M2 | WEIGHT: 255 LBS | HEART RATE: 63 BPM | TEMPERATURE: 99 F | SYSTOLIC BLOOD PRESSURE: 118 MMHG

## 2023-01-03 DIAGNOSIS — J10.1 INFLUENZA A: Primary | ICD-10-CM

## 2023-01-03 LAB
CTP QC/QA: YES
CTP QC/QA: YES
POC MOLECULAR INFLUENZA A AGN: POSITIVE
POC MOLECULAR INFLUENZA B AGN: NEGATIVE
SARS-COV-2 AG RESP QL IA.RAPID: NEGATIVE

## 2023-01-03 PROCEDURE — 99214 PR OFFICE/OUTPT VISIT, EST, LEVL IV, 30-39 MIN: ICD-10-PCS | Mod: S$GLB,,, | Performed by: FAMILY MEDICINE

## 2023-01-03 PROCEDURE — 87811 SARS-COV-2 COVID19 W/OPTIC: CPT | Mod: QW,S$GLB,, | Performed by: FAMILY MEDICINE

## 2023-01-03 PROCEDURE — 87502 POCT INFLUENZA A/B MOLECULAR: ICD-10-PCS | Mod: QW,S$GLB,, | Performed by: FAMILY MEDICINE

## 2023-01-03 PROCEDURE — 1159F MED LIST DOCD IN RCRD: CPT | Mod: CPTII,S$GLB,, | Performed by: FAMILY MEDICINE

## 2023-01-03 PROCEDURE — 3008F PR BODY MASS INDEX (BMI) DOCUMENTED: ICD-10-PCS | Mod: CPTII,S$GLB,, | Performed by: FAMILY MEDICINE

## 2023-01-03 PROCEDURE — 1160F PR REVIEW ALL MEDS BY PRESCRIBER/CLIN PHARMACIST DOCUMENTED: ICD-10-PCS | Mod: CPTII,S$GLB,, | Performed by: FAMILY MEDICINE

## 2023-01-03 PROCEDURE — 1160F RVW MEDS BY RX/DR IN RCRD: CPT | Mod: CPTII,S$GLB,, | Performed by: FAMILY MEDICINE

## 2023-01-03 PROCEDURE — 87811 SARS CORONAVIRUS 2 ANTIGEN POCT, MANUAL READ: ICD-10-PCS | Mod: QW,S$GLB,, | Performed by: FAMILY MEDICINE

## 2023-01-03 PROCEDURE — 1159F PR MEDICATION LIST DOCUMENTED IN MEDICAL RECORD: ICD-10-PCS | Mod: CPTII,S$GLB,, | Performed by: FAMILY MEDICINE

## 2023-01-03 PROCEDURE — 87502 INFLUENZA DNA AMP PROBE: CPT | Mod: QW,S$GLB,, | Performed by: FAMILY MEDICINE

## 2023-01-03 PROCEDURE — 3078F DIAST BP <80 MM HG: CPT | Mod: CPTII,S$GLB,, | Performed by: FAMILY MEDICINE

## 2023-01-03 PROCEDURE — 3074F PR MOST RECENT SYSTOLIC BLOOD PRESSURE < 130 MM HG: ICD-10-PCS | Mod: CPTII,S$GLB,, | Performed by: FAMILY MEDICINE

## 2023-01-03 PROCEDURE — 99214 OFFICE O/P EST MOD 30 MIN: CPT | Mod: S$GLB,,, | Performed by: FAMILY MEDICINE

## 2023-01-03 PROCEDURE — 3074F SYST BP LT 130 MM HG: CPT | Mod: CPTII,S$GLB,, | Performed by: FAMILY MEDICINE

## 2023-01-03 PROCEDURE — 3008F BODY MASS INDEX DOCD: CPT | Mod: CPTII,S$GLB,, | Performed by: FAMILY MEDICINE

## 2023-01-03 PROCEDURE — 3078F PR MOST RECENT DIASTOLIC BLOOD PRESSURE < 80 MM HG: ICD-10-PCS | Mod: CPTII,S$GLB,, | Performed by: FAMILY MEDICINE

## 2023-01-03 RX ORDER — ALBUTEROL SULFATE 90 UG/1
2 AEROSOL, METERED RESPIRATORY (INHALATION) EVERY 6 HOURS PRN
Qty: 18 G | Refills: 0 | Status: SHIPPED | OUTPATIENT
Start: 2023-01-03 | End: 2024-01-03

## 2023-01-03 RX ORDER — PROMETHAZINE HYDROCHLORIDE AND DEXTROMETHORPHAN HYDROBROMIDE 6.25; 15 MG/5ML; MG/5ML
5 SYRUP ORAL NIGHTLY PRN
Qty: 118 ML | Refills: 0 | Status: SHIPPED | OUTPATIENT
Start: 2023-01-03 | End: 2023-01-13

## 2023-01-03 RX ORDER — BENZONATATE 100 MG/1
100 CAPSULE ORAL EVERY 6 HOURS PRN
Qty: 30 CAPSULE | Refills: 1 | Status: SHIPPED | OUTPATIENT
Start: 2023-01-03 | End: 2023-02-01

## 2023-01-03 NOTE — PROGRESS NOTES
"Subjective:       Patient ID: Jonathan Villela is a 62 y.o. male.    Vitals:  height is 6' 1" (1.854 m) and weight is 115.7 kg (255 lb). His oral temperature is 98.8 °F (37.1 °C). His blood pressure is 118/71 and his pulse is 63. His respiration is 18 and oxygen saturation is 97%.     Chief Complaint: Cough (Entered by patient)    This is a 62 y.o. male who presents today with a chief complaint of  sweats, cough, chills, skin on back, arms, & scalp was burning  - Sx started Wednesday  Home Tx: mucinex, dayquil, Nyquil, Advil, tylenol     Cough  Associated symptoms include chills, headaches, nasal congestion and sweats. Pertinent negatives include no postnasal drip. The treatment provided mild relief.     Constitution: Positive for chills.   HENT:  Negative for postnasal drip.    Respiratory:  Positive for cough.    Neurological:  Positive for headaches.     Objective:      Physical Exam   Constitutional: He does not appear ill. No distress. normal  HENT:   Head: Normocephalic and atraumatic.   Nose: Congestion present.   Mouth/Throat: Mucous membranes are moist. Posterior oropharyngeal erythema present.   Eyes: Pupils are equal, round, and reactive to light. Extraocular movement intact   Neck: Neck supple.   Cardiovascular: Normal rate, regular rhythm, normal heart sounds and normal pulses.   Pulmonary/Chest: Effort normal and breath sounds normal.   Abdominal: Normal appearance and bowel sounds are normal. Soft.   Lymphadenopathy:     He has cervical adenopathy.   Neurological: He is alert.   Nursing note and vitals reviewed.      Results for orders placed or performed in visit on 01/03/23   SARS Coronavirus 2 Antigen, POCT Manual Read   Result Value Ref Range    SARS Coronavirus 2 Antigen Negative Negative     Acceptable Yes    POCT Influenza A/B MOLECULAR   Result Value Ref Range    POC Molecular Influenza A Ag Positive (A) Negative, Not Reported    POC Molecular Influenza B Ag Negative " Negative, Not Reported     Acceptable Yes      *Note: Due to a large number of results and/or encounters for the requested time period, some results have not been displayed. A complete set of results can be found in Results Review.      Assessment:       1. Influenza A          Plan:         Influenza A  -     SARS Coronavirus 2 Antigen, POCT Manual Read  -     POCT Influenza A/B MOLECULAR  -     benzonatate (TESSALON PERLES) 100 MG capsule; Take 1 capsule (100 mg total) by mouth every 6 (six) hours as needed for Cough.  Dispense: 30 capsule; Refill: 1  -     promethazine-dextromethorphan (PROMETHAZINE-DM) 6.25-15 mg/5 mL Syrp; Take 5 mLs by mouth nightly as needed (cough).  Dispense: 118 mL; Refill: 0  -     albuterol (PROAIR HFA) 90 mcg/actuation inhaler; Inhale 2 puffs into the lungs every 6 (six) hours as needed for Wheezing. Rescue  Dispense: 18 g; Refill: 0    RTC prn worsening symptoms

## 2023-01-04 DIAGNOSIS — E78.2 MIXED HYPERLIPIDEMIA: Primary | ICD-10-CM

## 2023-01-04 DIAGNOSIS — K76.0 FATTY LIVER DISEASE, NONALCOHOLIC: ICD-10-CM

## 2023-01-04 DIAGNOSIS — I10 ESSENTIAL HYPERTENSION: ICD-10-CM

## 2023-01-18 ENCOUNTER — LAB VISIT (OUTPATIENT)
Dept: LAB | Facility: HOSPITAL | Age: 63
End: 2023-01-18
Payer: COMMERCIAL

## 2023-01-18 DIAGNOSIS — I10 ESSENTIAL HYPERTENSION: ICD-10-CM

## 2023-01-18 DIAGNOSIS — E78.2 MIXED HYPERLIPIDEMIA: ICD-10-CM

## 2023-01-18 DIAGNOSIS — K76.0 FATTY LIVER DISEASE, NONALCOHOLIC: ICD-10-CM

## 2023-01-18 LAB
ALBUMIN SERPL BCP-MCNC: 4 G/DL (ref 3.5–5.2)
ALP SERPL-CCNC: 55 U/L (ref 55–135)
ALT SERPL W/O P-5'-P-CCNC: 49 U/L (ref 10–44)
ANION GAP SERPL CALC-SCNC: 7 MMOL/L (ref 8–16)
AST SERPL-CCNC: 31 U/L (ref 10–40)
BILIRUB SERPL-MCNC: 0.7 MG/DL (ref 0.1–1)
BUN SERPL-MCNC: 12 MG/DL (ref 8–23)
CALCIUM SERPL-MCNC: 9.7 MG/DL (ref 8.7–10.5)
CHLORIDE SERPL-SCNC: 103 MMOL/L (ref 95–110)
CHOLEST SERPL-MCNC: 211 MG/DL (ref 120–199)
CHOLEST/HDLC SERPL: 6.2 {RATIO} (ref 2–5)
CO2 SERPL-SCNC: 28 MMOL/L (ref 23–29)
CREAT SERPL-MCNC: 0.9 MG/DL (ref 0.5–1.4)
EST. GFR  (NO RACE VARIABLE): >60 ML/MIN/1.73 M^2
GLUCOSE SERPL-MCNC: 99 MG/DL (ref 70–110)
HDLC SERPL-MCNC: 34 MG/DL (ref 40–75)
HDLC SERPL: 16.1 % (ref 20–50)
LDLC SERPL CALC-MCNC: 135.6 MG/DL (ref 63–159)
NONHDLC SERPL-MCNC: 177 MG/DL
POTASSIUM SERPL-SCNC: 3.9 MMOL/L (ref 3.5–5.1)
PROT SERPL-MCNC: 7.7 G/DL (ref 6–8.4)
SODIUM SERPL-SCNC: 138 MMOL/L (ref 136–145)
TRIGL SERPL-MCNC: 207 MG/DL (ref 30–150)

## 2023-01-18 PROCEDURE — 80061 LIPID PANEL: CPT | Performed by: INTERNAL MEDICINE

## 2023-01-18 PROCEDURE — 80053 COMPREHEN METABOLIC PANEL: CPT | Performed by: INTERNAL MEDICINE

## 2023-01-18 PROCEDURE — 36415 COLL VENOUS BLD VENIPUNCTURE: CPT | Performed by: INTERNAL MEDICINE

## 2023-02-01 ENCOUNTER — OFFICE VISIT (OUTPATIENT)
Dept: CARDIOLOGY | Facility: CLINIC | Age: 63
End: 2023-02-01
Payer: COMMERCIAL

## 2023-02-01 VITALS
HEIGHT: 73 IN | DIASTOLIC BLOOD PRESSURE: 79 MMHG | WEIGHT: 258.63 LBS | HEART RATE: 75 BPM | SYSTOLIC BLOOD PRESSURE: 134 MMHG | BODY MASS INDEX: 34.28 KG/M2

## 2023-02-01 DIAGNOSIS — I70.0 ABDOMINAL AORTIC ATHEROSCLEROSIS: ICD-10-CM

## 2023-02-01 DIAGNOSIS — R93.1 AGATSTON CAC SCORE, <100: ICD-10-CM

## 2023-02-01 DIAGNOSIS — E66.9 OBESITY (BMI 30.0-34.9): ICD-10-CM

## 2023-02-01 DIAGNOSIS — G47.33 OBSTRUCTIVE SLEEP APNEA SYNDROME: ICD-10-CM

## 2023-02-01 DIAGNOSIS — E78.2 MIXED HYPERLIPIDEMIA: ICD-10-CM

## 2023-02-01 DIAGNOSIS — I10 ESSENTIAL HYPERTENSION: Primary | ICD-10-CM

## 2023-02-01 PROBLEM — R06.09 DOE (DYSPNEA ON EXERTION): Status: RESOLVED | Noted: 2017-02-15 | Resolved: 2023-02-01

## 2023-02-01 PROCEDURE — 3078F PR MOST RECENT DIASTOLIC BLOOD PRESSURE < 80 MM HG: ICD-10-PCS | Mod: CPTII,S$GLB,, | Performed by: PHYSICIAN ASSISTANT

## 2023-02-01 PROCEDURE — 1159F MED LIST DOCD IN RCRD: CPT | Mod: CPTII,S$GLB,, | Performed by: PHYSICIAN ASSISTANT

## 2023-02-01 PROCEDURE — 1160F PR REVIEW ALL MEDS BY PRESCRIBER/CLIN PHARMACIST DOCUMENTED: ICD-10-PCS | Mod: CPTII,S$GLB,, | Performed by: PHYSICIAN ASSISTANT

## 2023-02-01 PROCEDURE — 99999 PR PBB SHADOW E&M-EST. PATIENT-LVL III: ICD-10-PCS | Mod: PBBFAC,,, | Performed by: PHYSICIAN ASSISTANT

## 2023-02-01 PROCEDURE — 3008F PR BODY MASS INDEX (BMI) DOCUMENTED: ICD-10-PCS | Mod: CPTII,S$GLB,, | Performed by: PHYSICIAN ASSISTANT

## 2023-02-01 PROCEDURE — 3008F BODY MASS INDEX DOCD: CPT | Mod: CPTII,S$GLB,, | Performed by: PHYSICIAN ASSISTANT

## 2023-02-01 PROCEDURE — 3075F SYST BP GE 130 - 139MM HG: CPT | Mod: CPTII,S$GLB,, | Performed by: PHYSICIAN ASSISTANT

## 2023-02-01 PROCEDURE — 99999 PR PBB SHADOW E&M-EST. PATIENT-LVL III: CPT | Mod: PBBFAC,,, | Performed by: PHYSICIAN ASSISTANT

## 2023-02-01 PROCEDURE — 1159F PR MEDICATION LIST DOCUMENTED IN MEDICAL RECORD: ICD-10-PCS | Mod: CPTII,S$GLB,, | Performed by: PHYSICIAN ASSISTANT

## 2023-02-01 PROCEDURE — 1160F RVW MEDS BY RX/DR IN RCRD: CPT | Mod: CPTII,S$GLB,, | Performed by: PHYSICIAN ASSISTANT

## 2023-02-01 PROCEDURE — 3075F PR MOST RECENT SYSTOLIC BLOOD PRESS GE 130-139MM HG: ICD-10-PCS | Mod: CPTII,S$GLB,, | Performed by: PHYSICIAN ASSISTANT

## 2023-02-01 PROCEDURE — 99214 OFFICE O/P EST MOD 30 MIN: CPT | Mod: S$GLB,,, | Performed by: PHYSICIAN ASSISTANT

## 2023-02-01 PROCEDURE — 3078F DIAST BP <80 MM HG: CPT | Mod: CPTII,S$GLB,, | Performed by: PHYSICIAN ASSISTANT

## 2023-02-01 PROCEDURE — 99214 PR OFFICE/OUTPT VISIT, EST, LEVL IV, 30-39 MIN: ICD-10-PCS | Mod: S$GLB,,, | Performed by: PHYSICIAN ASSISTANT

## 2023-02-01 NOTE — PATIENT INSTRUCTIONS
Increase aerobic exercise with a goal of at least 30 minutes, 5 days a week.    Eventually you should resume statin therapy. Atorvastatin is a good alternative to Rosuvastatin, but there are a lot of options. Let me know when you are ready to start back taking a statin.     Continue to monitor blood pressure at home. Let me know if it starts to stay higher than 130/80 consistently.
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
## 2023-02-07 RX ORDER — POTASSIUM CHLORIDE 1500 MG/1
TABLET, EXTENDED RELEASE ORAL
Qty: 30 TABLET | Refills: 11 | Status: SHIPPED | OUTPATIENT
Start: 2023-02-07

## 2023-04-13 ENCOUNTER — OFFICE VISIT (OUTPATIENT)
Dept: FAMILY MEDICINE | Facility: CLINIC | Age: 63
End: 2023-04-13
Payer: COMMERCIAL

## 2023-04-13 VITALS
SYSTOLIC BLOOD PRESSURE: 148 MMHG | OXYGEN SATURATION: 95 % | WEIGHT: 262.38 LBS | HEIGHT: 73 IN | BODY MASS INDEX: 34.77 KG/M2 | DIASTOLIC BLOOD PRESSURE: 88 MMHG | TEMPERATURE: 98 F | HEART RATE: 67 BPM

## 2023-04-13 DIAGNOSIS — E29.1 HYPOGONADISM MALE: ICD-10-CM

## 2023-04-13 DIAGNOSIS — E78.2 MIXED HYPERLIPIDEMIA: ICD-10-CM

## 2023-04-13 DIAGNOSIS — I10 HYPERTENSION, UNSPECIFIED TYPE: ICD-10-CM

## 2023-04-13 DIAGNOSIS — L40.9 PSORIASIS: ICD-10-CM

## 2023-04-13 DIAGNOSIS — Z00.00 ROUTINE GENERAL MEDICAL EXAMINATION AT A HEALTH CARE FACILITY: Primary | ICD-10-CM

## 2023-04-13 DIAGNOSIS — G47.33 OSA (OBSTRUCTIVE SLEEP APNEA): ICD-10-CM

## 2023-04-13 DIAGNOSIS — K21.9 GASTROESOPHAGEAL REFLUX DISEASE WITHOUT ESOPHAGITIS: ICD-10-CM

## 2023-04-13 PROCEDURE — 3008F PR BODY MASS INDEX (BMI) DOCUMENTED: ICD-10-PCS | Mod: CPTII,S$GLB,, | Performed by: FAMILY MEDICINE

## 2023-04-13 PROCEDURE — 99999 PR PBB SHADOW E&M-EST. PATIENT-LVL III: ICD-10-PCS | Mod: PBBFAC,,, | Performed by: FAMILY MEDICINE

## 2023-04-13 PROCEDURE — 3077F SYST BP >= 140 MM HG: CPT | Mod: CPTII,S$GLB,, | Performed by: FAMILY MEDICINE

## 2023-04-13 PROCEDURE — 99396 PR PREVENTIVE VISIT,EST,40-64: ICD-10-PCS | Mod: S$GLB,,, | Performed by: FAMILY MEDICINE

## 2023-04-13 PROCEDURE — 99396 PREV VISIT EST AGE 40-64: CPT | Mod: S$GLB,,, | Performed by: FAMILY MEDICINE

## 2023-04-13 PROCEDURE — 3077F PR MOST RECENT SYSTOLIC BLOOD PRESSURE >= 140 MM HG: ICD-10-PCS | Mod: CPTII,S$GLB,, | Performed by: FAMILY MEDICINE

## 2023-04-13 PROCEDURE — 3079F DIAST BP 80-89 MM HG: CPT | Mod: CPTII,S$GLB,, | Performed by: FAMILY MEDICINE

## 2023-04-13 PROCEDURE — 1159F MED LIST DOCD IN RCRD: CPT | Mod: CPTII,S$GLB,, | Performed by: FAMILY MEDICINE

## 2023-04-13 PROCEDURE — 1159F PR MEDICATION LIST DOCUMENTED IN MEDICAL RECORD: ICD-10-PCS | Mod: CPTII,S$GLB,, | Performed by: FAMILY MEDICINE

## 2023-04-13 PROCEDURE — 3008F BODY MASS INDEX DOCD: CPT | Mod: CPTII,S$GLB,, | Performed by: FAMILY MEDICINE

## 2023-04-13 PROCEDURE — 3079F PR MOST RECENT DIASTOLIC BLOOD PRESSURE 80-89 MM HG: ICD-10-PCS | Mod: CPTII,S$GLB,, | Performed by: FAMILY MEDICINE

## 2023-04-13 PROCEDURE — 99999 PR PBB SHADOW E&M-EST. PATIENT-LVL III: CPT | Mod: PBBFAC,,, | Performed by: FAMILY MEDICINE

## 2023-04-13 RX ORDER — PANTOPRAZOLE SODIUM 40 MG/1
40 TABLET, DELAYED RELEASE ORAL DAILY
COMMUNITY
End: 2023-08-08 | Stop reason: SDUPTHER
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
## 2023-04-18 NOTE — PATIENT INSTRUCTIONS
Glucosamine /chondroitin 1500/1200 - try it for a month    I will ask Dr Erazo if he is ok with a trial of celebrex    I will check inflammatory lab today    If lab ok you can return as needed in the future   Yes

## 2023-04-27 ENCOUNTER — CLINICAL SUPPORT (OUTPATIENT)
Dept: FAMILY MEDICINE | Facility: CLINIC | Age: 63
End: 2023-04-27
Payer: COMMERCIAL

## 2023-04-27 ENCOUNTER — TELEPHONE (OUTPATIENT)
Dept: FAMILY MEDICINE | Facility: CLINIC | Age: 63
End: 2023-04-27

## 2023-04-27 VITALS — DIASTOLIC BLOOD PRESSURE: 86 MMHG | SYSTOLIC BLOOD PRESSURE: 134 MMHG

## 2023-04-27 PROCEDURE — 99999 PR PBB SHADOW E&M-EST. PATIENT-LVL I: CPT | Mod: PBBFAC,,,

## 2023-04-27 PROCEDURE — 99999 PR PBB SHADOW E&M-EST. PATIENT-LVL I: ICD-10-PCS | Mod: PBBFAC,,,

## 2023-04-27 NOTE — PROGRESS NOTES
Pt came to office for BP check. Pt's BP was 134/86 Left arm, sitting manually. Pt is compliant with his BP medication. Pt brought in his BP machine and took his BP that was 152/98 left arm, sitting, digitally. Pt states that his machine is 10 yrs. Pt retook his BP in left arm and it was 142/86

## 2023-04-28 NOTE — TELEPHONE ENCOUNTER
Okay, looks like office monitor was at least stable.  If continuing to get unusually elevated readings with home, could check it with a separate monitor at home see a new monitor since it is old.

## 2023-05-01 ENCOUNTER — PATIENT MESSAGE (OUTPATIENT)
Dept: FAMILY MEDICINE | Facility: CLINIC | Age: 63
End: 2023-05-01
Payer: COMMERCIAL

## 2023-05-03 NOTE — Clinical Note
I suspect you are aware of his R post cervical LN but wanted to make you aware just in case. If not then please eval..  May need aspiration for pathology 03-May-2023 23:48

## 2023-08-08 RX ORDER — PANTOPRAZOLE SODIUM 40 MG/1
40 TABLET, DELAYED RELEASE ORAL DAILY
Qty: 90 TABLET | Refills: 3 | Status: SHIPPED | OUTPATIENT
Start: 2023-08-08

## 2023-08-08 NOTE — TELEPHONE ENCOUNTER
No care due was identified.  [facility] Embedded Care Due Messages. Reference number: [id number].   8/08/2023 12:52:10 PM CDT

## 2023-08-09 NOTE — TELEPHONE ENCOUNTER
Spoke with pt and gave blood test results.    Cryotherapy Text: The wound bed was treated with cryotherapy after the biopsy was performed.

## 2023-09-14 ENCOUNTER — PATIENT MESSAGE (OUTPATIENT)
Dept: CARDIOLOGY | Facility: CLINIC | Age: 63
End: 2023-09-14
Payer: COMMERCIAL

## 2023-09-15 RX ORDER — CHLORTHALIDONE 25 MG/1
12.5 TABLET ORAL DAILY
COMMUNITY
End: 2023-09-15 | Stop reason: SDUPTHER

## 2023-09-15 RX ORDER — CHLORTHALIDONE 25 MG/1
12.5 TABLET ORAL DAILY
Qty: 45 TABLET | Refills: 3 | Status: SHIPPED | OUTPATIENT
Start: 2023-09-15

## 2023-10-12 RX ORDER — AMLODIPINE BESYLATE 5 MG/1
TABLET ORAL
Qty: 90 TABLET | Refills: 3 | Status: SHIPPED | OUTPATIENT
Start: 2023-10-12

## 2023-12-12 ENCOUNTER — HOSPITAL ENCOUNTER (OUTPATIENT)
Dept: RADIOLOGY | Facility: HOSPITAL | Age: 63
Discharge: HOME | End: 2023-12-12
Attending: FAMILY MEDICINE
Payer: COMMERCIAL

## 2023-12-12 ENCOUNTER — OFFICE VISIT (OUTPATIENT)
Dept: FAMILY MEDICINE | Facility: CLINIC | Age: 63
End: 2023-12-12
Payer: COMMERCIAL

## 2023-12-12 VITALS
DIASTOLIC BLOOD PRESSURE: 70 MMHG | HEIGHT: 73 IN | HEART RATE: 69 BPM | WEIGHT: 264.56 LBS | OXYGEN SATURATION: 93 % | BODY MASS INDEX: 35.06 KG/M2 | SYSTOLIC BLOOD PRESSURE: 108 MMHG

## 2023-12-12 DIAGNOSIS — R91.8 LUNG FIELD ABNORMAL FINDING ON EXAMINATION: ICD-10-CM

## 2023-12-12 DIAGNOSIS — I10 HYPERTENSION, UNSPECIFIED TYPE: ICD-10-CM

## 2023-12-12 DIAGNOSIS — E78.2 MIXED HYPERLIPIDEMIA: ICD-10-CM

## 2023-12-12 DIAGNOSIS — Z12.5 SCREENING FOR MALIGNANT NEOPLASM OF PROSTATE: ICD-10-CM

## 2023-12-12 DIAGNOSIS — G47.33 OSA (OBSTRUCTIVE SLEEP APNEA): ICD-10-CM

## 2023-12-12 DIAGNOSIS — Z00.00 ROUTINE GENERAL MEDICAL EXAMINATION AT A HEALTH CARE FACILITY: Primary | ICD-10-CM

## 2023-12-12 DIAGNOSIS — Z01.818 PREOP EXAMINATION: ICD-10-CM

## 2023-12-12 PROCEDURE — 1159F MED LIST DOCD IN RCRD: CPT | Mod: CPTII,S$GLB,, | Performed by: FAMILY MEDICINE

## 2023-12-12 PROCEDURE — 99396 PREV VISIT EST AGE 40-64: CPT | Mod: S$GLB,,, | Performed by: FAMILY MEDICINE

## 2023-12-12 PROCEDURE — 1159F PR MEDICATION LIST DOCUMENTED IN MEDICAL RECORD: ICD-10-PCS | Mod: CPTII,S$GLB,, | Performed by: FAMILY MEDICINE

## 2023-12-12 PROCEDURE — 71046 X-RAY EXAM CHEST 2 VIEWS: CPT | Mod: TC,FY

## 2023-12-12 PROCEDURE — 99999 PR PBB SHADOW E&M-EST. PATIENT-LVL V: ICD-10-PCS | Mod: PBBFAC,,, | Performed by: FAMILY MEDICINE

## 2023-12-12 PROCEDURE — 3078F DIAST BP <80 MM HG: CPT | Mod: CPTII,S$GLB,, | Performed by: FAMILY MEDICINE

## 2023-12-12 PROCEDURE — 99999 PR PBB SHADOW E&M-EST. PATIENT-LVL V: CPT | Mod: PBBFAC,,, | Performed by: FAMILY MEDICINE

## 2023-12-12 PROCEDURE — 3078F PR MOST RECENT DIASTOLIC BLOOD PRESSURE < 80 MM HG: ICD-10-PCS | Mod: CPTII,S$GLB,, | Performed by: FAMILY MEDICINE

## 2023-12-12 PROCEDURE — 99396 PR PREVENTIVE VISIT,EST,40-64: ICD-10-PCS | Mod: S$GLB,,, | Performed by: FAMILY MEDICINE

## 2023-12-12 PROCEDURE — 3074F PR MOST RECENT SYSTOLIC BLOOD PRESSURE < 130 MM HG: ICD-10-PCS | Mod: CPTII,S$GLB,, | Performed by: FAMILY MEDICINE

## 2023-12-12 PROCEDURE — 71046 X-RAY EXAM CHEST 2 VIEWS: CPT | Mod: 26,,, | Performed by: INTERNAL MEDICINE

## 2023-12-12 PROCEDURE — 3074F SYST BP LT 130 MM HG: CPT | Mod: CPTII,S$GLB,, | Performed by: FAMILY MEDICINE

## 2023-12-12 PROCEDURE — 71046 XR CHEST PA AND LATERAL: ICD-10-PCS | Mod: 26,,, | Performed by: INTERNAL MEDICINE

## 2023-12-12 PROCEDURE — 3008F PR BODY MASS INDEX (BMI) DOCUMENTED: ICD-10-PCS | Mod: CPTII,S$GLB,, | Performed by: FAMILY MEDICINE

## 2023-12-12 PROCEDURE — 3008F BODY MASS INDEX DOCD: CPT | Mod: CPTII,S$GLB,, | Performed by: FAMILY MEDICINE

## 2023-12-12 RX ORDER — CARVEDILOL 12.5 MG/1
12.5 TABLET ORAL 2 TIMES DAILY WITH MEALS
Qty: 180 TABLET | Refills: 3 | Status: SHIPPED | OUTPATIENT
Start: 2023-12-12

## 2023-12-12 RX ORDER — ACYCLOVIR 400 MG/1
400 TABLET ORAL 2 TIMES DAILY PRN
Qty: 30 TABLET | Refills: 6 | Status: SHIPPED | OUTPATIENT
Start: 2023-12-12

## 2023-12-12 RX ORDER — ROSUVASTATIN CALCIUM 10 MG/1
10 TABLET, COATED ORAL DAILY
Qty: 90 TABLET | Refills: 3 | Status: SHIPPED | OUTPATIENT
Start: 2023-12-12 | End: 2024-12-11

## 2023-12-12 RX ORDER — DICYCLOMINE HYDROCHLORIDE 10 MG/1
10 CAPSULE ORAL 3 TIMES DAILY PRN
Qty: 30 CAPSULE | Refills: 1 | Status: SHIPPED | OUTPATIENT
Start: 2023-12-12

## 2023-12-12 NOTE — LETTER
December 12, 2023        Bonifacio Tellez MD  [address] [place] [address] [facility]  [facility] [address]             [facility]  [address]  [facility] [address]  [facility] [address]  Phone: [phone]  Fax: [phone]   Patient: Jonathan Villela   MR Number: [id number]   YOB: 1960   Date of Visit: 12/12/2023       Dear Dr. Tellez:    Thank you for referring Jonathan Villela to me for evaluation. He is clinically stable for upcoming surgery.    If you have questions, please do not hesitate to call me. I look forward to following Jonathan Villela along with you.    Sincerely,           Manish Joel MD
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
## 2023-12-13 DIAGNOSIS — L85.9 HYPERKERATOSIS: ICD-10-CM

## 2023-12-13 RX ORDER — UREA 40 %
CREAM (GRAM) TOPICAL 2 TIMES DAILY
Qty: 85 G | Refills: 3 | Status: SHIPPED | OUTPATIENT
Start: 2023-12-13

## 2024-02-29 ENCOUNTER — OFFICE VISIT (OUTPATIENT)
Dept: URGENT CARE | Facility: CLINIC | Age: 64
End: 2024-02-29
Payer: COMMERCIAL

## 2024-02-29 VITALS
OXYGEN SATURATION: 95 % | TEMPERATURE: 98 F | SYSTOLIC BLOOD PRESSURE: 139 MMHG | WEIGHT: 264 LBS | BODY MASS INDEX: 34.99 KG/M2 | HEART RATE: 65 BPM | HEIGHT: 73 IN | RESPIRATION RATE: 18 BRPM | DIASTOLIC BLOOD PRESSURE: 87 MMHG

## 2024-02-29 DIAGNOSIS — R05.9 COUGH, UNSPECIFIED TYPE: ICD-10-CM

## 2024-02-29 DIAGNOSIS — B96.89 ACUTE BRONCHITIS, BACTERIAL: ICD-10-CM

## 2024-02-29 DIAGNOSIS — H66.003 ACUTE SUPPURATIVE OTITIS MEDIA OF BOTH EARS WITHOUT SPONTANEOUS RUPTURE OF TYMPANIC MEMBRANES, RECURRENCE NOT SPECIFIED: Primary | ICD-10-CM

## 2024-02-29 DIAGNOSIS — J20.8 ACUTE BRONCHITIS, BACTERIAL: ICD-10-CM

## 2024-02-29 LAB
CTP QC/QA: YES
SARS-COV-2 AG RESP QL IA.RAPID: NEGATIVE

## 2024-02-29 PROCEDURE — 71046 X-RAY EXAM CHEST 2 VIEWS: CPT | Mod: FY,S$GLB,, | Performed by: RADIOLOGY

## 2024-02-29 PROCEDURE — 87811 SARS-COV-2 COVID19 W/OPTIC: CPT | Mod: QW,S$GLB,, | Performed by: NURSE PRACTITIONER

## 2024-02-29 PROCEDURE — 99213 OFFICE O/P EST LOW 20 MIN: CPT | Mod: S$GLB,,, | Performed by: NURSE PRACTITIONER

## 2024-02-29 RX ORDER — AMOXICILLIN AND CLAVULANATE POTASSIUM 875; 125 MG/1; MG/1
1 TABLET, FILM COATED ORAL 2 TIMES DAILY
Qty: 20 TABLET | Refills: 0 | Status: SHIPPED | OUTPATIENT
Start: 2024-02-29 | End: 2024-03-10

## 2024-02-29 RX ORDER — PROMETHAZINE HYDROCHLORIDE AND DEXTROMETHORPHAN HYDROBROMIDE 6.25; 15 MG/5ML; MG/5ML
5 SYRUP ORAL NIGHTLY PRN
Qty: 120 ML | Refills: 0 | Status: SHIPPED | OUTPATIENT
Start: 2024-02-29 | End: 2024-03-10

## 2024-02-29 NOTE — PROGRESS NOTES
"Subjective:      Patient ID: Jonathan Villela is a 63 y.o. male.    Vitals:  height is 6' 1" (1.854 m) and weight is 119.7 kg (264 lb). His temperature is 97.8 °F (36.6 °C). His blood pressure is 139/87 and his pulse is 65. His respiration is 18 and oxygen saturation is 95%.     Chief Complaint: Cough    Patient presents to the clinic with coughing, wheezing, chest congestion x 8-10 days.     Cough  The current episode started in the past 7 days. The problem has been gradually worsening. The cough is Productive of sputum. Associated symptoms include ear congestion, headaches, nasal congestion, postnasal drip and wheezing. Pertinent negatives include no chest pain, chills, ear pain, fever, heartburn, hemoptysis, myalgias, rash, rhinorrhea, sore throat, shortness of breath, sweats or weight loss. Associated symptoms comments: Chest tightness. Treatments tried: sumatriptian. The treatment provided no relief.       Constitution: Negative for chills, fatigue and fever.   HENT:  Positive for ear discharge and postnasal drip. Negative for ear pain, congestion, sinus pain, sinus pressure and sore throat.    Cardiovascular:  Negative for chest pain, leg swelling and sob on exertion.   Respiratory:  Positive for cough, sputum production and wheezing. Negative for chest tightness, bloody sputum and shortness of breath.    Gastrointestinal:  Negative for heartburn.   Musculoskeletal:  Negative for muscle ache.   Skin:  Negative for rash.   Neurological:  Positive for headaches.      Objective:     Physical Exam   Constitutional: He is oriented to person, place, and time. He appears well-developed. He is cooperative.  Non-toxic appearance. He does not appear ill. No distress.   HENT:   Head: Normocephalic and atraumatic.   Ears:   Right Ear: Hearing, external ear and ear canal normal. There is drainage. Tympanic membrane is not erythematous. A middle ear effusion (suppurative) is present.   Left Ear: Hearing, external ear and " ear canal normal. Tympanic membrane is erythematous. A middle ear effusion is present.   Nose: Nose normal. No mucosal edema, rhinorrhea or nasal deformity. No epistaxis. Right sinus exhibits no maxillary sinus tenderness and no frontal sinus tenderness. Left sinus exhibits no maxillary sinus tenderness and no frontal sinus tenderness.   Mouth/Throat: Uvula is midline and mucous membranes are normal. No trismus in the jaw. Normal dentition. No uvula swelling. Posterior oropharyngeal erythema present. No oropharyngeal exudate or posterior oropharyngeal edema.   Eyes: Conjunctivae and lids are normal. No scleral icterus.   Neck: Trachea normal and phonation normal. Neck supple. No edema present. No erythema present. No neck rigidity present.   Cardiovascular: Normal rate, regular rhythm, normal heart sounds and normal pulses.   Pulmonary/Chest: Effort normal and breath sounds normal. No respiratory distress. He has no decreased breath sounds. He has no wheezes. He has no rhonchi.   Abdominal: Normal appearance.   Musculoskeletal: Normal range of motion.         General: No deformity. Normal range of motion.   Neurological: He is alert and oriented to person, place, and time. He exhibits normal muscle tone. Coordination normal.   Skin: Skin is warm, dry, intact, not diaphoretic and not pale.   Psychiatric: His speech is normal and behavior is normal. Judgment and thought content normal.   Nursing note and vitals reviewed.      Assessment:     1. Acute suppurative otitis media of both ears without spontaneous rupture of tympanic membranes, recurrence not specified    2. Cough, unspecified type    3. Acute bronchitis, bacterial        Plan:     Results for orders placed or performed in visit on 02/29/24   SARS Coronavirus 2 Antigen, POCT Manual Read   Result Value Ref Range    SARS Coronavirus 2 Antigen Negative Negative     Acceptable Yes      *Note: Due to a large number of results and/or encounters for  the requested time period, some results have not been displayed. A complete set of results can be found in Results Review.     X-Ray Chest PA And Lateral    Result Date: 2/29/2024  EXAMINATION: XR CHEST PA AND LATERAL CLINICAL HISTORY: Cough, unspecified TECHNIQUE: PA and lateral views of the chest were performed. COMPARISON: 12/12/2023. FINDINGS: The trachea is unremarkable.  The cardiomediastinal silhouette is within normal limits.  The hilar structures are unremarkable.  There is no evidence of free air beneath the hemidiaphragms.  There are no pleural effusions.  There is no evidence of a pneumothorax.  There is no evidence of pneumomediastinum.  No airspace opacity is present. There are degenerative changes in the osseous structures.  There are postoperative changes in the upper abdomen.     No acute intrathoracic process. Electronically signed by: Graham Santoyo MD Date:    02/29/2024 Time:    16:30     Patient is going to get tubes next month for ears.  Acute suppurative otitis media of both ears without spontaneous rupture of tympanic membranes, recurrence not specified  -     amoxicillin-clavulanate 875-125mg (AUGMENTIN) 875-125 mg per tablet; Take 1 tablet by mouth 2 (two) times daily. for 10 days  Dispense: 20 tablet; Refill: 0    Cough, unspecified type  -     SARS Coronavirus 2 Antigen, POCT Manual Read  -     X-Ray Chest PA And Lateral; Future; Expected date: 02/29/2024  -     promethazine-dextromethorphan (PROMETHAZINE-DM) 6.25-15 mg/5 mL Syrp; Take 5 mLs by mouth nightly as needed (cough).  Dispense: 120 mL; Refill: 0    Acute bronchitis, bacterial      Patient Instructions   Please drink plenty of fluids.  Please get plenty of rest.  Please return here or go to the Emergency Department for any concerns or worsening of condition.  If you were prescribed antibiotics, please take them to completion.  If you were prescribed a narcotic medication, do not drive or operate heavy equipment or machinery while  taking these medications.  If you do not have Hypertension or any history of palpitations, it is ok to take over the counter Sudafed or Mucinex D or Allegra-D or Claritin-D or Zyrtec-D.  If you do take one of the above, it is ok to combine that with plain over the counter Mucinex or Allegra or Claritin or Zyrtec.  If for example you are taking Zyrtec -D, you can combine that with Mucinex, but not Mucinex-D.  If you are taking Mucinex-D, you can combine that with plain Allegra or Claritin or Zyrtec.   If you do have Hypertension or palpitations, it is safe to take Coricidin HBP for relief of sinus symptoms.  If not allergic, please take over the counter Tylenol (Acetaminophen) and/or Motrin (Ibuprofen) as directed for control of pain and/or fever.  Please follow up with your primary care doctor or specialist as needed.    If you  smoke, please stop smoking.

## 2024-03-26 ENCOUNTER — OFFICE VISIT (OUTPATIENT)
Dept: PULMONOLOGY | Facility: CLINIC | Age: 64
End: 2024-03-26
Payer: COMMERCIAL

## 2024-03-26 VITALS
OXYGEN SATURATION: 92 % | HEART RATE: 69 BPM | WEIGHT: 261 LBS | BODY MASS INDEX: 34.59 KG/M2 | DIASTOLIC BLOOD PRESSURE: 86 MMHG | SYSTOLIC BLOOD PRESSURE: 132 MMHG | HEIGHT: 73 IN

## 2024-03-26 DIAGNOSIS — J45.909 CHILDHOOD ASTHMA WITHOUT COMPLICATION, UNSPECIFIED ASTHMA SEVERITY, UNSPECIFIED WHETHER PERSISTENT: Primary | ICD-10-CM

## 2024-03-26 DIAGNOSIS — I10 ESSENTIAL HYPERTENSION: ICD-10-CM

## 2024-03-26 PROCEDURE — 3075F SYST BP GE 130 - 139MM HG: CPT | Mod: CPTII,S$GLB,, | Performed by: INTERNAL MEDICINE

## 2024-03-26 PROCEDURE — 99204 OFFICE O/P NEW MOD 45 MIN: CPT | Mod: S$GLB,,, | Performed by: INTERNAL MEDICINE

## 2024-03-26 PROCEDURE — 99999 PR PBB SHADOW E&M-EST. PATIENT-LVL IV: CPT | Mod: PBBFAC,,, | Performed by: INTERNAL MEDICINE

## 2024-03-26 PROCEDURE — 1159F MED LIST DOCD IN RCRD: CPT | Mod: CPTII,S$GLB,, | Performed by: INTERNAL MEDICINE

## 2024-03-26 PROCEDURE — 3008F BODY MASS INDEX DOCD: CPT | Mod: CPTII,S$GLB,, | Performed by: INTERNAL MEDICINE

## 2024-03-26 PROCEDURE — 3079F DIAST BP 80-89 MM HG: CPT | Mod: CPTII,S$GLB,, | Performed by: INTERNAL MEDICINE

## 2024-03-26 NOTE — PROGRESS NOTES
Subjective:       Patient ID: Jonathan Villela is a 63 y.o. male.    Chief Complaint: No chief complaint on file.    62 yo male with h/o childhood asthma resolved at age 13, no hospitalizations. He has noted intermittent DICKINSON over last few years. Has good days and bad days. Recent used an albuterol inhaler without relief. Has been on Coreg close to 10 years. He has chronic sinusitis and is undergoing ENT surgery for mastoiditis and eardrum repair. He had a stress test over 1 year ago which was normal.    He has a history of chest trauma x2 leading to a pneumothorax that was watched and no chest tube or surgery done (2006).     Had URI associated with ear infection with increased dry cough worse at night x 1 month.    Wheezing   This is a new problem. The current episode started more than 1 month ago. The problem occurs every several days. The problem has been waxing and waning. Associated symptoms include coughing, ear pain, rhinorrhea and shortness of breath. The symptoms are aggravated by URIs and lying flat. He has tried beta agonist inhalers for the symptoms. The treatment provided mild relief. His past medical history is significant for asthma.   Review of Systems   HENT:  Positive for rhinorrhea and ear pain.    Respiratory:  Positive for cough, shortness of breath and wheezing.    All other systems reviewed and are negative.      Past Medical History:   Diagnosis Date    Acute gastric ulcer with hemorrhage 02/15/2017    Bilateral occipital neuralgia     BPH with urinary obstruction 12/31/2015    Chronic constipation     Colitis [id number]    infectious?    Diverticulitis     Erectile dysfunction     Essential hypertension     Fatty liver     Gastroesophageal reflux disease without esophagitis 02/11/2017    HLD (hyperlipidemia)     Hypogonadism in male     IBS (irritable bowel syndrome)     Migraine without aura and without status migrainosus, not intractable 01/31/2014    Obstructive sleep  apnea syndrome 02/09/2015    DELVIN (obstructive sleep apnea)     Psoriasis     PUD (peptic ulcer disease)         Family History   Problem Relation Age of Onset    Crohn's disease Unknown         brother, sister, father, nephew    Heart disease Father     Crohn's disease Father     Inflammatory bowel disease Father     Crohn's disease Sister     Inflammatory bowel disease Sister     Crohn's disease Brother     Inflammatory bowel disease Brother     Kidney disease Mother     Hypertension Mother     Thyroid disease Mother     Cystic fibrosis Maternal Uncle     Crohn's disease Paternal Grandmother     Prostate cancer Neg Hx     Melanoma Neg Hx     Colon cancer Neg Hx     Celiac disease Neg Hx     Cirrhosis Neg Hx     Esophageal cancer Neg Hx     Liver cancer Neg Hx     Rectal cancer Neg Hx     Stomach cancer Neg Hx     Ulcerative colitis Neg Hx     Liver disease Neg Hx       If not mentioned in HPI, Family history is reviewed and not contributory    Social History     Tobacco Use    Smoking status: Never    Smokeless tobacco: Never   Substance Use Topics    Alcohol use: Yes     Comment: ocasionally    Drug use: No        Objective:        Vitals:    03/26/24 0805   BP: 132/86   Pulse: 69     Wt Readings from Last 3 Encounters:   03/26/24 118.4 kg (261 lb 0.4 oz)   02/29/24 119.7 kg (264 lb)   12/12/23 120 kg (264 lb 8.8 oz)     Temp Readings from Last 3 Encounters:   02/29/24 97.8 °F (36.6 °C)   04/13/23 98.3 °F (36.8 °C)   01/03/23 98.8 °F (37.1 °C) (Oral)     BP Readings from Last 3 Encounters:   03/26/24 132/86   02/29/24 139/87   12/12/23 108/70     Pulse Readings from Last 3 Encounters:   03/26/24 69   02/29/24 65   12/12/23 69       Physical Exam   Constitutional: He is oriented to person, place, and time. He appears well-developed and well-nourished.   HENT:   Head: Normocephalic.   Mouth/Throat: Oropharynx is clear and moist.   Cardiovascular: Normal rate and regular rhythm.    Pulmonary/Chest: Normal expansion, symmetric chest wall expansion, effort normal and breath sounds normal. He has no wheezes.   Abdominal: Soft. He exhibits no distension.   Musculoskeletal:         General: No edema. Normal range of motion.   Lymphadenopathy: No supraclavicular adenopathy is present.     He has no cervical adenopathy.   Neurological: He is alert and oriented to person, place, and time. Gait normal.   Skin: Skin is warm and dry. No rash noted.   Psychiatric: He has a normal mood and affect. His behavior is normal. Thought content normal.   Vitals reviewed.    CBC  Lab Results   Component Value Date    WBC 7.42 12/12/2023    HGB 16.8 12/12/2023    HCT 48.8 12/12/2023    MCV 80 (L) 12/12/2023     12/12/2023         CMP  Sodium   Date Value Ref Range Status   12/12/2023 140 136 - 145 mmol/L Final     Potassium   Date Value Ref Range Status   12/12/2023 4.0 3.5 - 5.1 mmol/L Final     Chloride   Date Value Ref Range Status   12/12/2023 101 95 - 110 mmol/L Final     CO2   Date Value Ref Range Status   12/12/2023 29 23 - 29 mmol/L Final     Glucose   Date Value Ref Range Status   12/12/2023 98 70 - 110 mg/dL Final     BUN   Date Value Ref Range Status   12/12/2023 22 8 - 23 mg/dL Final     Creatinine   Date Value Ref Range Status   12/12/2023 1.2 0.5 - 1.4 mg/dL Final     Calcium   Date Value Ref Range Status   12/12/2023 9.9 8.7 - 10.5 mg/dL Final     Total Protein   Date Value Ref Range Status   12/12/2023 8.3 6.0 - 8.4 g/dL Final     Albumin   Date Value Ref Range Status   12/12/2023 4.4 3.5 - 5.2 g/dL Final   04/30/2014 4.8 3.6 - 5.1 g/dL Final     Comment:     @ Test Performed By:  [facility]  Richi Ferreira M.D., FCAP.,   [address] [place] [id number]  [facility] #[id number]     Total Bilirubin   Date Value Ref Range Status   12/12/2023 0.6 0.1 - 1.0 mg/dL Final     Comment:     For infants and newborns, interpretation of results should  "be based  on gestational age, weight and in agreement with clinical  observations.    Premature Infant recommended reference ranges:  Up to 24 hours.............<8.0 mg/dL  Up to 48 hours............<12.0 mg/dL  3-5 days..................<15.0 mg/dL  6-29 days.................<15.0 mg/dL       Alkaline Phosphatase   Date Value Ref Range Status   12/12/2023 52 (L) 55 - 135 U/L Final     AST   Date Value Ref Range Status   12/12/2023 42 (H) 10 - 40 U/L Final     ALT   Date Value Ref Range Status   12/12/2023 60 (H) 10 - 44 U/L Final     Anion Gap   Date Value Ref Range Status   12/12/2023 10 8 - 16 mmol/L Final     eGFR   Date Value Ref Range Status   12/12/2023 >60 >60 mL/min/1.73 m^2 Final       ABG  No results found for: "PH", "PO2", "PCO2"        Personal Diagnostic Review  I have personally reviewed the following data and added my own interpretation as below:  Chest x-ray: 2/29/24- normal      3/26/2024     8:05 AM 2/29/2024     1:18 PM 12/12/2023     8:21 AM 4/13/2023     7:46 AM 2/1/2023    10:36 AM 1/3/2023     1:21 PM 5/24/2022     8:31 AM   Pulmonary Function Tests   SpO2 92 % 95 % 93 % 95 %  97 %    Height 6' 1" (1.854 m) 6' 1" (1.854 m) 6' 1" (1.854 m) 6' 1" (1.854 m) 6' 1" (1.854 m) 6' 1" (1.854 m)    Weight 118.4 kg (261 lb 0.4 oz) 119.7 kg (264 lb) 120 kg (264 lb 8.8 oz) 119 kg (262 lb 5.6 oz) 117.3 kg (258 lb 9.6 oz) 115.7 kg (255 lb) 118.8 kg (262 lb)   BMI (Calculated) 34.4 34.8 34.9 34.6 34.1 33.7          Assessment:       1. Childhood asthma without complication, unspecified asthma severity, unspecified whether persistent    2. Essential hypertension        Outpatient Encounter Medications as of 3/26/2024   Medication Sig Dispense Refill    acyclovir (ZOVIRAX) 400 MG tablet Take 1 tablet (400 mg total) by mouth 2 (two) times daily as needed. 30 tablet 6    albuterol (VENTOLIN HFA) 90 mcg/actuation inhaler Inhale 2 puffs into the lungs every 6 (six) hours as needed for Wheezing or Shortness of " Breath. Rescue 6.7 g 0    amLODIPine (NORVASC) 5 MG tablet TAKE 1 TABLET(5 MG) BY MOUTH EVERY DAY 90 tablet 3    betamethasone dipropionate (DIPROLENE) 0.05 % ointment Apply topically 2 (two) times daily.      carvediloL (COREG) 12.5 MG tablet Take 1 tablet (12.5 mg total) by mouth 2 (two) times daily with meals. 180 tablet 3    chlorthalidone (HYGROTEN) 25 MG Tab Take 0.5 tablets (12.5 mg total) by mouth once daily. 45 tablet 3    ciclopirox (LOPROX) 0.77 % Crea Apply topically as needed.       ciprofloxacin-dexamethasone 0.3-0.1% (CIPRODEX) 0.3-0.1 % DrpS Place 4 drops into both ears as needed.      clobetasoL (TEMOVATE) 0.05 % external solution Use on scalp one - two times daily as needed for scaling or itching 50 mL 3    CORTISPORIN-TC 3.3-3-10-0.5 mg/mL DrpS Place 4 drops into both ears 2 (two) times daily.      cyanocobalamin 1,000 mcg/mL injection every 30 days.      cyclobenzaprine (FLEXERIL) 10 MG tablet Take 10 mg by mouth as needed.       dicyclomine (BENTYL) 10 MG capsule Take 1 capsule (10 mg total) by mouth 3 (three) times daily as needed (abdominal cramping). 30 capsule 1    ergocalciferol (ERGOCALCIFEROL) 50,000 unit Cap Take 1 capsule by mouth twice a week.      fluticasone propionate (FLONASE) 50 mcg/actuation nasal spray 1 spray by Each Nostril route once daily.      LINZESS 145 mcg Cap capsule Take 1 capsule (145 mcg total) by mouth as needed. 90 capsule 3    ofloxacin (FLOXIN) 0.3 % otic solution Place into the right ear.      ondansetron (ZOFRAN-ODT) 8 MG TbDL Take 1 tablet (8 mg total) by mouth every 6 (six) hours as needed (nausea). 20 tablet 6    pantoprazole (PROTONIX) 40 MG tablet Take 1 tablet (40 mg total) by mouth once daily. 90 tablet 3    potassium chloride (K-TAB) 20 mEq TAKE 1 TABLET(20 MEQ) BY MOUTH EVERY DAY 30 tablet 11    promethazine (PHENERGAN) 25 MG tablet Take 25 mg by mouth as needed.       rosuvastatin (CRESTOR) 10 MG tablet Take 1 tablet (10 mg total)  by mouth once daily. 90 tablet 3    sumatriptan (IMITREX STATDOSE) 6 mg/0.5 mL kit INJECT 0.5 MLS INTO THE SKIN EVERY 2 HOURS AS NEEDED FOR MIGRAINE(UP TO 2 DOSES DAILY. HOLD FOR BLOOD PRESSURE 155/110) 1 mL 6    sumatriptan (IMITREX) 100 MG tablet TAKE 1 TABLET BY MOUTH EVERY 2 HOURS AS NEEDED 9 tablet 4    triamcinolone acetonide 0.1% (KENALOG) 0.1 % cream AAA bid 454 g 3    TURMERIC ORAL Take by mouth once daily.      urea (CARMOL) 40 % Crea Apply topically 2 (two) times daily. To affected areas of elbows 85 g 3    clobetasoL (TEMOVATE) 0.05 % cream AAA bid 60 g 3    phentermine (ADIPEX-P) 37.5 mg tablet Take 37.5 mg by mouth once daily.      testosterone cypionate (DEPOTESTOTERONE CYPIONATE) 200 mg/mL injection Inject 0.5 mLs (100 mg total) into the muscle every 7 days. 3 mL syringe, 18 gauge needle to draw up, 1 1/2 inch 21 gauge needle to inject 2 mL 2     No facility-administered encounter medications on file as of 3/26/2024.     1. Childhood asthma without complication, unspecified asthma severity, unspecified whether persistent  - Complete PFT with bronchodilator; Future    2. Essential hypertension    Plan:     Problem List Items Addressed This Visit          Pulmonary    Childhood asthma without complication - Primary    Current Assessment & Plan     Unclear if recurrence in adulthood. Check PFTs to start.  If confirmed recurrence, will need to stop coreg.  Has eczema and sinusitis which have worsened in recent years-> significant atopy         Relevant Orders    Complete PFT with bronchodilator       Cardiac/Vascular    Essential hypertension    Current Assessment & Plan     Currently on Coreg. If asthma confirmed, need to avoid non-selective beta blockers              No follow-ups on file.    Future Appointments   Date Time Provider Department Center   5/23/2024  8:00 AM Manish Joel MD [facility] Marylin Valadez MD

## 2024-03-26 NOTE — ASSESSMENT & PLAN NOTE
Unclear if recurrence in adulthood. Check PFTs to start.  If confirmed recurrence, will need to stop coreg.  Has eczema and sinusitis which have worsened in recent years-> significant atopy
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
## 2024-04-09 ENCOUNTER — OFFICE VISIT (OUTPATIENT)
Dept: CARDIOLOGY | Facility: CLINIC | Age: 64
End: 2024-04-09
Payer: COMMERCIAL

## 2024-04-09 VITALS
SYSTOLIC BLOOD PRESSURE: 100 MMHG | OXYGEN SATURATION: 92 % | BODY MASS INDEX: 35.01 KG/M2 | DIASTOLIC BLOOD PRESSURE: 68 MMHG | HEART RATE: 76 BPM | WEIGHT: 264.13 LBS | HEIGHT: 73 IN

## 2024-04-09 DIAGNOSIS — E66.9 OBESITY (BMI 30.0-34.9): ICD-10-CM

## 2024-04-09 DIAGNOSIS — G47.33 OSA (OBSTRUCTIVE SLEEP APNEA): ICD-10-CM

## 2024-04-09 DIAGNOSIS — E78.2 MIXED HYPERLIPIDEMIA: ICD-10-CM

## 2024-04-09 DIAGNOSIS — R93.1 AGATSTON CAC SCORE, <100: ICD-10-CM

## 2024-04-09 DIAGNOSIS — I10 ESSENTIAL HYPERTENSION: ICD-10-CM

## 2024-04-09 DIAGNOSIS — I70.0 ABDOMINAL AORTIC ATHEROSCLEROSIS: ICD-10-CM

## 2024-04-09 DIAGNOSIS — R06.09 DYSPNEA ON EXERTION: Primary | ICD-10-CM

## 2024-04-09 PROCEDURE — 1160F RVW MEDS BY RX/DR IN RCRD: CPT | Mod: CPTII,S$GLB,, | Performed by: PHYSICIAN ASSISTANT

## 2024-04-09 PROCEDURE — 99999 PR PBB SHADOW E&M-EST. PATIENT-LVL V: CPT | Mod: PBBFAC,,, | Performed by: PHYSICIAN ASSISTANT

## 2024-04-09 PROCEDURE — 1159F MED LIST DOCD IN RCRD: CPT | Mod: CPTII,S$GLB,, | Performed by: PHYSICIAN ASSISTANT

## 2024-04-09 PROCEDURE — 99214 OFFICE O/P EST MOD 30 MIN: CPT | Mod: S$GLB,,, | Performed by: PHYSICIAN ASSISTANT

## 2024-04-09 PROCEDURE — 3078F DIAST BP <80 MM HG: CPT | Mod: CPTII,S$GLB,, | Performed by: PHYSICIAN ASSISTANT

## 2024-04-09 PROCEDURE — 3074F SYST BP LT 130 MM HG: CPT | Mod: CPTII,S$GLB,, | Performed by: PHYSICIAN ASSISTANT

## 2024-04-09 PROCEDURE — 3008F BODY MASS INDEX DOCD: CPT | Mod: CPTII,S$GLB,, | Performed by: PHYSICIAN ASSISTANT

## 2024-04-09 NOTE — PATIENT INSTRUCTIONS
Take 1/2 tablet of Carvedilol for a few days and then stop completely    Continue to monitor blood pressure at home. If you pressure is staying higher than 130/80, you can increase Amlodipine to 10 mg daily.

## 2024-04-18 ENCOUNTER — TELEPHONE (OUTPATIENT)
Dept: GASTROENTEROLOGY | Facility: CLINIC | Age: 64
End: 2024-04-18
Payer: COMMERCIAL

## 2024-04-18 NOTE — TELEPHONE ENCOUNTER
----- Message from Shirley Julito sent at 4/18/2024 11:47 AM CDT -----  Regarding: APPT  Contact: [phone]  Pt is calling to speak to someone in the office to schedule an appt. Pt is willing to see any provider BUT WANTS TO BE SEEN BY Yoshi Erazo MD that is available to see them sooner. Please call to advise. [phone] Thanks.

## 2024-04-18 NOTE — TELEPHONE ENCOUNTER
Patient wants to get a colonoscopy, please enter order.  He says his insurance will pay for a colonoscopy every year.

## 2024-04-22 ENCOUNTER — TELEPHONE (OUTPATIENT)
Dept: ENDOSCOPY | Facility: HOSPITAL | Age: 64
End: 2024-04-22
Payer: COMMERCIAL

## 2024-04-23 ENCOUNTER — PATIENT MESSAGE (OUTPATIENT)
Dept: GASTROENTEROLOGY | Facility: CLINIC | Age: 64
End: 2024-04-23
Payer: COMMERCIAL

## 2024-04-23 NOTE — TELEPHONE ENCOUNTER
"----- Message from Radha Mora sent at 2024  9:09 AM CDT -----    ----- Message -----  From: Yoshi Erazo MD  Sent: 2024   4:43 PM CDT  To: [facility] Patients    Procedure: Colonoscopy    Diagnosis: Surveillance colonoscopy - Hx of colon polyps    Procedure Timin-12 weeks    #If within 4 weeks selected, please kahlil as high priority#    #If greater than 12 weeks, please select "5-12 weeks" and delay sending until 3 months prior to requested date#     Provider: Myself    Location: No Preference    Additional Scheduling Information:     Prep Specifications:Extended/Constipation prep    Is the patient taking a GLP-1 Agonist:no    Have you attached a patient to this message: yes  "

## 2024-04-25 ENCOUNTER — TELEPHONE (OUTPATIENT)
Dept: ENDOSCOPY | Facility: HOSPITAL | Age: 64
End: 2024-04-25
Payer: COMMERCIAL

## 2024-04-25 NOTE — TELEPHONE ENCOUNTER
Contacted the patient to schedule an endoscopy procedure(s) 4/25/24. The patient did not answer the call and left a voice message requesting a call back.

## 2024-04-26 ENCOUNTER — TELEPHONE (OUTPATIENT)
Dept: ENDOSCOPY | Facility: HOSPITAL | Age: 64
End: 2024-04-26
Payer: COMMERCIAL

## 2024-04-26 DIAGNOSIS — Z12.11 COLON CANCER SCREENING: Primary | ICD-10-CM

## 2024-04-26 DIAGNOSIS — Z86.010 HISTORY OF COLON POLYPS: ICD-10-CM

## 2024-04-26 NOTE — TELEPHONE ENCOUNTER
"Pt's sourav Shipley called to schedule pt's colonoscopy.  Unable to schedule pt at this time due to MD availability through the middle of July schedule that is open. Violetta will call back at the end of next week to help get pt scheduled within open dates.    Orders as follows:     ----- Message -----  From: Yoshi Erazo MD  Sent: 2024   4:43 PM CDT  To: [facility] Patients     Procedure: Colonoscopy     Diagnosis: Surveillance colonoscopy - Hx of colon polyps     Procedure Timin-12 weeks     #If within 4 weeks selected, please kahlil as high priority#     #If greater than 12 weeks, please select "5-12 weeks" and delay sending until 3 months prior to requested date#      Provider: Myself     Location: No Preference     Additional Scheduling Information:      Prep Specifications:Extended/Constipation prep     Is the patient taking a GLP-1 Agonist:no     Have you attached a patient to this message: yes       "

## 2024-04-29 ENCOUNTER — HOSPITAL ENCOUNTER (OUTPATIENT)
Dept: CARDIOLOGY | Facility: HOSPITAL | Age: 64
Discharge: HOME | End: 2024-04-29
Attending: PHYSICIAN ASSISTANT
Payer: COMMERCIAL

## 2024-04-29 VITALS — WEIGHT: 260 LBS | BODY MASS INDEX: 34.46 KG/M2 | HEIGHT: 73 IN

## 2024-04-29 DIAGNOSIS — R06.09 DYSPNEA ON EXERTION: ICD-10-CM

## 2024-04-29 LAB
BSA FOR ECHO PROCEDURE: 2.46 M2
CV ECHO LV RWT: 0.4 CM
CV STRESS BASE HR: 75 BPM
DIASTOLIC BLOOD PRESSURE: 84 MMHG
DOP CALC LVOT AREA: 4.2 CM2
DOP CALC LVOT DIAMETER: 2.3 CM
ECHO LV POSTERIOR WALL: 0.9 CM (ref 0.6–1.1)
EJECTION FRACTION: 60 %
FRACTIONAL SHORTENING: 29 % (ref 28–44)
INTERVENTRICULAR SEPTUM: 1.2 CM (ref 0.6–1.1)
LEFT ATRIUM SIZE: 4.9 CM
LEFT INTERNAL DIMENSION IN SYSTOLE: 3.2 CM (ref 2.1–4)
LEFT VENTRICLE MASS INDEX: 68 G/M2
LEFT VENTRICULAR INTERNAL DIMENSION IN DIASTOLE: 4.5 CM (ref 3.5–6)
LEFT VENTRICULAR MASS: 163.98 G
OHS CV CPX 1 MINUTE RECOVERY HEART RATE: 123 BPM
OHS CV CPX 85 PERCENT MAX PREDICTED HEART RATE MALE: 133
OHS CV CPX ESTIMATED METS: 9
OHS CV CPX MAX PREDICTED HEART RATE: 157
OHS CV CPX PATIENT IS FEMALE: 0
OHS CV CPX PATIENT IS MALE: 1
OHS CV CPX PEAK DIASTOLIC BLOOD PRESSURE: 88 MMHG
OHS CV CPX PEAK HEAR RATE: 141 BPM
OHS CV CPX PEAK RATE PRESSURE PRODUCT: ABNORMAL
OHS CV CPX PEAK SYSTOLIC BLOOD PRESSURE: 203 MMHG
OHS CV CPX PERCENT MAX PREDICTED HEART RATE ACHIEVED: 90
OHS CV CPX RATE PRESSURE PRODUCT PRESENTING: ABNORMAL
PISA TR MAX VEL: 2.5 M/S
RA PRESSURE ESTIMATED: 3 MMHG
RV TB RVSP: 6 MMHG
SINUS: 3.6 CM
STRESS ECHO POST EXERCISE DUR MIN: 5 MINUTES
STRESS ECHO POST EXERCISE DUR SEC: 50 SECONDS
STRESS ST DEPRESSION: 0.5 MM
SYSTOLIC BLOOD PRESSURE: 138 MMHG
TR MAX PG: 25 MMHG
TV REST PULMONARY ARTERY PRESSURE: 28 MMHG
Z-SCORE OF LEFT VENTRICULAR DIMENSION IN END DIASTOLE: -9.1
Z-SCORE OF LEFT VENTRICULAR DIMENSION IN END SYSTOLE: -5.83

## 2024-04-29 PROCEDURE — 93351 STRESS TTE COMPLETE: CPT

## 2024-04-29 PROCEDURE — 93351 STRESS TTE COMPLETE: CPT | Mod: 26,,, | Performed by: INTERNAL MEDICINE

## 2024-05-02 ENCOUNTER — TELEPHONE (OUTPATIENT)
Dept: CARDIOLOGY | Facility: CLINIC | Age: 64
End: 2024-05-02
Payer: COMMERCIAL

## 2024-05-02 DIAGNOSIS — I20.89 ANGINA OF EFFORT: ICD-10-CM

## 2024-05-02 DIAGNOSIS — Z13.6 ENCOUNTER FOR SCREENING FOR CARDIOVASCULAR DISORDERS: Primary | ICD-10-CM

## 2024-05-02 RX ORDER — METOPROLOL TARTRATE 50 MG/1
50 TABLET ORAL ONCE
Qty: 1 TABLET | Refills: 0 | Status: SHIPPED | OUTPATIENT
Start: 2024-05-02 | End: 2024-05-29 | Stop reason: SDUPTHER

## 2024-05-02 NOTE — TELEPHONE ENCOUNTER
Discussed stress test with Dr. Escalante and with patient. Stress test was negative; however, he had more plaque seen in his aorta than what would be expected for his age. Will order cardiac CTA for measurement of noncalcified coronary plaque.

## 2024-05-07 ENCOUNTER — TELEPHONE (OUTPATIENT)
Dept: ENDOSCOPY | Facility: HOSPITAL | Age: 64
End: 2024-05-07
Payer: COMMERCIAL

## 2024-05-07 VITALS — WEIGHT: 260 LBS | HEIGHT: 73 IN | BODY MASS INDEX: 34.46 KG/M2

## 2024-05-07 NOTE — TELEPHONE ENCOUNTER
"----- Message from Idalmis Way MA sent at 2024 10:01 AM CDT -----    ----- Message -----  From: Radha Mora  Sent: 2024   9:09 AM CDT  To: Idalmis Way MA      ----- Message -----  From: Yoshi Erazo MD  Sent: 2024   4:43 PM CDT  To: [facility]oscop[facility] Patients    Procedure: Colonoscopy    Diagnosis: Surveillance colonoscopy - Hx of colon polyps    Procedure Timin-12 weeks    #If within 4 weeks selected, please kahlil as high priority#    #If greater than 12 weeks, please select "5-12 weeks" and delay sending until 3 months prior to requested date#     Provider: Myself    Location: No Preference    Additional Scheduling Information:     Prep Specifications:Extended/Constipation prep    Is the patient taking a GLP-1 Agonist:no    Have you attached a patient to this message: yes  "

## 2024-05-07 NOTE — PLAN OF CARE
Spoke to pt to schedule procedure(s) Colonoscopy       Physician to perform procedure(s) Dr. DUNCAN Erazo  Date of Procedure (s) 7/23/24  Arrival Time 8:00 AM  Time of Procedure(s) 9:00 AM   Location of Procedure(s) [facility] Floor  Type of Rx Prep sent to patient: ExtendedPEG  Instructions provided to patient via [facility]    Patient was informed on the following information and verbalized understanding. Screening questionnaire reviewed with patient and complete. If procedure requires anesthesia, a responsible adult needs to be present to accompany the patient home, patient cannot drive after receiving anesthesia. Appointment details are tentative, especially check-in time. Patient will receive a prep-op call 7 days prior to confirm check-in time for procedure. If applicable the patient should contact their pharmacy to verify Rx for procedure prep is ready for pick-up. Patient was advised to call the scheduling department at [phone] if pharmacy states no Rx is available. Patient was advised to call the endoscopy scheduling department if any questions or concerns arise.      [facility] Scheduling Department

## 2024-05-07 NOTE — TELEPHONE ENCOUNTER
Message below forwarded to me. Pt has already been scheduled for his colonoscopy as noted in chart. This encounter closed.

## 2024-05-07 NOTE — TELEPHONE ENCOUNTER
Spoke to pt to schedule procedure(s) Colonoscopy       Physician to perform procedure(s) Dr. DUNCAN Erazo  Date of Procedure (s) 7/23/24  Arrival Time 8:00 AM  Time of Procedure(s) 9:00 AM   Location of Procedure(s) [facility] 4th Floor  Type of Rx Prep sent to patient: ExtendedPEG  Instructions provided to patient via [facility]    Patient was informed on the following information and verbalized understanding. Screening questionnaire reviewed with patient and complete. If procedure requires anesthesia, a responsible adult needs to be present to accompany the patient home, patient cannot drive after receiving anesthesia. Appointment details are tentative, especially check-in time. Patient will receive a prep-op call 7 days prior to confirm check-in time for procedure. If applicable the patient should contact their pharmacy to verify Rx for procedure prep is ready for pick-up. Patient was advised to call the scheduling department at [phone] if pharmacy states no Rx is available. Patient was advised to call the endoscopy scheduling department if any questions or concerns arise.      [facility] Scheduling Department

## 2024-05-10 NOTE — TELEPHONE ENCOUNTER
"Yoshi oliver MD  P [facility] Endoscopist Clinic Patients  Caller: Unspecified (Yesterday,  4:48 PM)  Procedure: EGD    Diagnosis: GERD, dyspepsia and bloating    Procedure Timin-12 weeks    *If within 4 weeks selected, please kahlil as high priority*    *If greater than 12 weeks, please select "5-12 weeks" and delay sending until 3 months prior to requested date*    Location: [facility] and No Preference    Additional Scheduling Information: No scheduling concerns    Prep Specifications:Standard prep    Is the patient taking a GLP-1 Agonist:no    Have you attached a patient to this message: yes  "

## 2024-05-13 ENCOUNTER — HOSPITAL ENCOUNTER (OUTPATIENT)
Dept: PULMONOLOGY | Facility: HOSPITAL | Age: 64
Discharge: HOME | End: 2024-05-13
Attending: INTERNAL MEDICINE
Payer: COMMERCIAL

## 2024-05-13 DIAGNOSIS — J45.909 CHILDHOOD ASTHMA WITHOUT COMPLICATION, UNSPECIFIED ASTHMA SEVERITY, UNSPECIFIED WHETHER PERSISTENT: ICD-10-CM

## 2024-05-14 LAB
DLCO SINGLE BREATH LLN: 24.16
DLCO SINGLE BREATH PRE REF: 80.4 %
DLCO SINGLE BREATH REF: 31.09
DLCOC SBVA LLN: 2.94
DLCOC SBVA REF: 4.02
DLCOC SINGLE BREATH LLN: 24.16
DLCOC SINGLE BREATH REF: 31.09
DLCOCSBVAULN: 5.1
DLCOCSINGLEBREATHULN: 38.02
DLCOSINGLEBREATHULN: 38.02
DLCOVA LLN: 2.94
DLCOVA PRE REF: 107.4 %
DLCOVA PRE: 4.32 ML/(MIN*MMHG*L) (ref 2.94–5.1)
DLCOVA REF: 4.02
DLCOVAULN: 5.1
ERV LLN: -16448.77
ERV PRE REF: 17.8 %
ERV REF: 1.23
ERVULN: ABNORMAL
FEF 25 75 LLN: 1.43
FEF 25 75 PRE REF: 140.2 %
FEF 25 75 REF: 3.02
FET100 CHG: 0.5 %
FEV05 LLN: 1.91
FEV05 REF: 3.04
FEV1 CHG: -1.5 %
FEV1 FVC LLN: 64
FEV1 FVC PRE REF: 110.2 %
FEV1 FVC REF: 76
FEV1 LLN: 2.84
FEV1 PRE REF: 90.6 %
FEV1 REF: 3.81
FEV1 VOL CHG: -0.05
FRCPLETH LLN: 2.83
FRCPLETH PREREF: 59.1 %
FRCPLETH REF: 3.82
FRCPLETHULN: 4.8
FVC CHG: -2.6 %
FVC LLN: 3.79
FVC PRE REF: 81.9 %
FVC REF: 5
FVC VOL CHG: -0.11
IVC PRE: 4.26 L (ref 3.79–6.24)
IVC SINGLE BREATH LLN: 3.79
IVC SINGLE BREATH PRE REF: 85.2 %
IVC SINGLE BREATH REF: 5
IVCSINGLEBREATHULN: 6.24
LLN IC: -9999996.33
PEF LLN: 7.16
PEF PRE REF: 87.7 %
PEF REF: 9.68
PHYSICIAN COMMENT: ABNORMAL
POST FEF 25 75: 4.41 L/S (ref 1.43–5.19)
POST FET 100: 6.58 SEC
POST FEV1 FVC: 85.27 % (ref 64.16–87.27)
POST FEV1: 3.4 L (ref 2.84–4.73)
POST FEV5: 2.89 L (ref 1.91–4.18)
POST FVC: 3.99 L (ref 3.79–6.24)
POST PEF: 10.04 L/S (ref 7.16–12.2)
PRE DLCO: 25.01 ML/(MIN*MMHG) (ref 24.16–38.02)
PRE ERV: 0.22 L (ref -16448.77–16451.23)
PRE FEF 25 75: 4.23 L/S (ref 1.43–5.19)
PRE FET 100: 6.55 SEC
PRE FEV05 REF: 94.3 %
PRE FEV1 FVC: 84.29 % (ref 64.16–87.27)
PRE FEV1: 3.45 L (ref 2.84–4.73)
PRE FEV5: 2.87 L (ref 1.91–4.18)
PRE FRC PL: 2.25 L (ref 2.83–4.8)
PRE FVC: 4.09 L (ref 3.79–6.24)
PRE IC: 3.9 L (ref -9999996.33–#######.####)
PRE PEF: 8.49 L/S (ref 7.16–12.2)
PRE REF IC: 106.3 %
PRE RV: 2.04 L (ref 1.91–3.26)
PRE TLC: 6.15 L (ref 6.58–8.89)
RAW PRE REF: 68.8 %
RAW PRE: 2.11 CMH2O*S/L (ref 3.06–3.06)
RAW REF: 3.06
REF IC: 3.67
RV LLN: 1.91
RV PRE REF: 78.7 %
RV REF: 2.59
RVTLC LLN: 30
RVTLC PRE REF: 85.9 %
RVTLC PRE: 33.09 % (ref 29.55–47.51)
RVTLC REF: 39
RVTLCULN: 48
RVULN: 3.26
SGAW PRE REF: 189.2 %
SGAW PRE: 0.16 1/(CMH2O*S) (ref 0.08–0.08)
SGAW REF: 0.08
TLC LLN: 6.58
TLC PRE REF: 79.5 %
TLC REF: 7.74
TLC ULN: 8.89
ULN IC: ABNORMAL
VA PRE: 5.79 L (ref 7.59–7.59)
VA SINGLE BREATH LLN: 7.59
VA SINGLE BREATH PRE REF: 76.4 %
VA SINGLE BREATH REF: 7.59
VASINGLEBREATHULN: 7.59
VC LLN: 3.79
VC PRE REF: 82.3 %
VC PRE: 4.12 L (ref 3.79–6.24)
VC REF: 5
VC ULN: 6.24

## 2024-05-14 PROCEDURE — 94060 EVALUATION OF WHEEZING: CPT | Mod: 26,S$GLB,, | Performed by: INTERNAL MEDICINE

## 2024-05-14 PROCEDURE — 94726 PLETHYSMOGRAPHY LUNG VOLUMES: CPT | Mod: 26,S$GLB,, | Performed by: INTERNAL MEDICINE

## 2024-05-14 PROCEDURE — 94729 DIFFUSING CAPACITY: CPT | Mod: 26,S$GLB,, | Performed by: INTERNAL MEDICINE

## 2024-05-21 ENCOUNTER — OFFICE VISIT (OUTPATIENT)
Dept: PULMONOLOGY | Facility: CLINIC | Age: 64
End: 2024-05-21
Payer: COMMERCIAL

## 2024-05-21 ENCOUNTER — LAB VISIT (OUTPATIENT)
Dept: LAB | Facility: HOSPITAL | Age: 64
End: 2024-05-21
Attending: FAMILY MEDICINE
Payer: COMMERCIAL

## 2024-05-21 VITALS
BODY MASS INDEX: 35.06 KG/M2 | SYSTOLIC BLOOD PRESSURE: 135 MMHG | HEART RATE: 64 BPM | HEIGHT: 73 IN | DIASTOLIC BLOOD PRESSURE: 85 MMHG | OXYGEN SATURATION: 64 % | WEIGHT: 264.56 LBS

## 2024-05-21 DIAGNOSIS — Z13.6 ENCOUNTER FOR SCREENING FOR CARDIOVASCULAR DISORDERS: ICD-10-CM

## 2024-05-21 DIAGNOSIS — I10 ESSENTIAL HYPERTENSION: Primary | ICD-10-CM

## 2024-05-21 DIAGNOSIS — R09.81 CHRONIC NASAL CONGESTION: ICD-10-CM

## 2024-05-21 DIAGNOSIS — I20.89 ANGINA OF EFFORT: ICD-10-CM

## 2024-05-21 DIAGNOSIS — J45.40 MODERATE PERSISTENT CHILDHOOD ASTHMA WITHOUT COMPLICATION: ICD-10-CM

## 2024-05-21 LAB
CREAT SERPL-MCNC: 0.9 MG/DL (ref 0.5–1.4)
EST. GFR  (NO RACE VARIABLE): >60 ML/MIN/1.73 M^2

## 2024-05-21 PROCEDURE — 3075F SYST BP GE 130 - 139MM HG: CPT | Mod: CPTII,S$GLB,, | Performed by: INTERNAL MEDICINE

## 2024-05-21 PROCEDURE — 3079F DIAST BP 80-89 MM HG: CPT | Mod: CPTII,S$GLB,, | Performed by: INTERNAL MEDICINE

## 2024-05-21 PROCEDURE — 36415 COLL VENOUS BLD VENIPUNCTURE: CPT | Performed by: PHYSICIAN ASSISTANT

## 2024-05-21 PROCEDURE — 99999 PR PBB SHADOW E&M-EST. PATIENT-LVL IV: CPT | Mod: PBBFAC,,, | Performed by: INTERNAL MEDICINE

## 2024-05-21 PROCEDURE — 99214 OFFICE O/P EST MOD 30 MIN: CPT | Mod: S$GLB,,, | Performed by: INTERNAL MEDICINE

## 2024-05-21 PROCEDURE — 3008F BODY MASS INDEX DOCD: CPT | Mod: CPTII,S$GLB,, | Performed by: INTERNAL MEDICINE

## 2024-05-21 PROCEDURE — 1159F MED LIST DOCD IN RCRD: CPT | Mod: CPTII,S$GLB,, | Performed by: INTERNAL MEDICINE

## 2024-05-21 PROCEDURE — 82565 ASSAY OF CREATININE: CPT | Performed by: PHYSICIAN ASSISTANT

## 2024-05-21 RX ORDER — BUDESONIDE AND FORMOTEROL FUMARATE DIHYDRATE 160; 4.5 UG/1; UG/1
2 AEROSOL RESPIRATORY (INHALATION) EVERY 12 HOURS
Qty: 30.6 G | Refills: 3 | Status: SHIPPED | OUTPATIENT
Start: 2024-05-21 | End: 2024-05-21

## 2024-05-21 RX ORDER — FLUTICASONE FUROATE AND VILANTEROL 200; 25 UG/1; UG/1
1 POWDER RESPIRATORY (INHALATION) DAILY
Qty: 3 EACH | Refills: 3 | Status: SHIPPED | OUTPATIENT
Start: 2024-05-21 | End: 2024-05-21

## 2024-05-21 RX ORDER — FLUTICASONE FUROATE AND VILANTEROL 200; 25 UG/1; UG/1
1 POWDER RESPIRATORY (INHALATION) DAILY
Qty: 180 EACH | Refills: 3 | Status: SHIPPED | OUTPATIENT
Start: 2024-05-21

## 2024-05-21 NOTE — ASSESSMENT & PLAN NOTE
In process of transitioning off Coreg. Given his asthma history, best to avoid beta blockers unless a strong indication

## 2024-05-21 NOTE — PROGRESS NOTES
Subjective:       Patient ID: Jonathan Villela is a 64 y.o. male.  The patient's last visit with me was on 3/26/2024.     Underwent sinus surgery and recovering well. No persistent sinus pain, congestion or post nasal gtt.    No change in intermittent SOB and DICKINSON.    PFTs reviewed and normal without obstruction or BD response.  Review of Systems    Objective:      Vitals:    05/21/24 1318   BP: 135/85   Pulse: 64     Wt Readings from Last 3 Encounters:   05/21/24 120 kg (264 lb 8.8 oz)   04/29/24 117.9 kg (260 lb)   05/07/24 117.9 kg (260 lb)     Temp Readings from Last 3 Encounters:   02/29/24 97.8 °F (36.6 °C)   04/13/23 98.3 °F (36.8 °C)   01/03/23 98.8 °F (37.1 °C) (Oral)     BP Readings from Last 3 Encounters:   05/21/24 135/85   04/09/24 100/68   03/26/24 132/86     Pulse Readings from Last 3 Encounters:   05/21/24 64   04/09/24 76   03/26/24 69       Physical Exam   Constitutional: He appears well-developed and well-nourished.   Pulmonary/Chest: Effort normal and breath sounds normal. He has no wheezes.   Vitals reviewed.    CBC  Lab Results   Component Value Date    WBC 7.42 12/12/2023    HGB 16.8 12/12/2023    HCT 48.8 12/12/2023    MCV 80 (L) 12/12/2023     12/12/2023         CMP  Sodium   Date Value Ref Range Status   12/12/2023 140 136 - 145 mmol/L Final     Potassium   Date Value Ref Range Status   12/12/2023 4.0 3.5 - 5.1 mmol/L Final     Chloride   Date Value Ref Range Status   12/12/2023 101 95 - 110 mmol/L Final     CO2   Date Value Ref Range Status   12/12/2023 29 23 - 29 mmol/L Final     Glucose   Date Value Ref Range Status   12/12/2023 98 70 - 110 mg/dL Final     BUN   Date Value Ref Range Status   12/12/2023 22 8 - 23 mg/dL Final     Creatinine   Date Value Ref Range Status   12/12/2023 1.2 0.5 - 1.4 mg/dL Final     Calcium   Date Value Ref Range Status   12/12/2023 9.9 8.7 - 10.5 mg/dL Final     Total Protein   Date Value Ref Range Status   12/12/2023 8.3 6.0 - 8.4 g/dL Final  "    Albumin   Date Value Ref Range Status   12/12/2023 4.4 3.5 - 5.2 g/dL Final   04/30/2014 4.8 3.6 - 5.1 g/dL Final     Comment:     @ Test Performed By:  [facility]  Richi Ferreira M.D., FCAP.,   [id number] [place] [id number]  [facility] #[id number]     Total Bilirubin   Date Value Ref Range Status   12/12/2023 0.6 0.1 - 1.0 mg/dL Final     Comment:     For infants and newborns, interpretation of results should be based  on gestational age, weight and in agreement with clinical  observations.    Premature Infant recommended reference ranges:  Up to 24 hours.............<8.0 mg/dL  Up to 48 hours............<12.0 mg/dL  3-5 days..................<15.0 mg/dL  6-29 days.................<15.0 mg/dL       Alkaline Phosphatase   Date Value Ref Range Status   12/12/2023 52 (L) 55 - 135 U/L Final     AST   Date Value Ref Range Status   12/12/2023 42 (H) 10 - 40 U/L Final     ALT   Date Value Ref Range Status   12/12/2023 60 (H) 10 - 44 U/L Final     Anion Gap   Date Value Ref Range Status   12/12/2023 10 8 - 16 mmol/L Final     eGFR   Date Value Ref Range Status   12/12/2023 >60 >60 mL/min/1.73 m^2 Final       ABG  No results found for: "PH", "PO2", "PCO2"        Personal Diagnostic Review  I have personally reviewed the following data and added my own interpretation as below:  ENT notes reviewed      5/21/2024     1:18 PM 5/7/2024     8:53 AM 4/29/2024     4:38 PM 4/9/2024    10:36 AM 3/26/2024     8:05 AM 2/29/2024     1:18 PM 12/12/2023     8:21 AM   Pulmonary Function Tests   SpO2 64 %   92 % 92 % 95 % 93 %   Height 6' 1" (1.854 m) 6' 1" (1.854 m) 6' 1" (1.854 m) 6' 1" (1.854 m) 6' 1" (1.854 m) 6' 1" (1.854 m) 6' 1" (1.854 m)   Weight 120 kg (264 lb 8.8 oz) 117.9 kg (260 lb) 117.9 kg (260 lb) 119.8 kg (264 lb 1.8 oz) 118.4 kg (261 lb 0.4 oz) 119.7 kg (264 lb) 120 kg (264 lb 8.8 oz)   BMI (Calculated) 34.9 34.3 34.3 34.9 34.4 34.8 34.9         Assessment:       1. " Essential hypertension    2. Moderate persistent childhood asthma without complication    3. Chronic nasal congestion        Outpatient Encounter Medications as of 5/21/2024   Medication Sig Dispense Refill    acyclovir (ZOVIRAX) 400 MG tablet Take 1 tablet (400 mg total) by mouth 2 (two) times daily as needed. 30 tablet 6    albuterol (VENTOLIN HFA) 90 mcg/actuation inhaler Inhale 2 puffs into the lungs every 6 (six) hours as needed for Wheezing or Shortness of Breath. Rescue 6.7 g 0    amLODIPine (NORVASC) 5 MG tablet TAKE 1 TABLET(5 MG) BY MOUTH EVERY DAY 90 tablet 3    betamethasone dipropionate (DIPROLENE) 0.05 % ointment Apply topically as needed.      chlorthalidone (HYGROTEN) 25 MG Tab Take 0.5 tablets (12.5 mg total) by mouth once daily. 45 tablet 3    ciclopirox (LOPROX) 0.77 % Crea Apply topically as needed.       clobetasoL (TEMOVATE) 0.05 % external solution Use on scalp one - two times daily as needed for scaling or itching 50 mL 3    CORTISPORIN-TC 3.3-3-10-0.5 mg/mL DrpS Place 4 drops into both ears 2 (two) times daily.      cyclobenzaprine (FLEXERIL) 10 MG tablet Take 10 mg by mouth as needed.       dicyclomine (BENTYL) 10 MG capsule Take 1 capsule (10 mg total) by mouth 3 (three) times daily as needed (abdominal cramping). 30 capsule 1    ergocalciferol (ERGOCALCIFEROL) 50,000 unit Cap Take 1 capsule by mouth twice a week.      fluticasone propionate (FLONASE) 50 mcg/actuation nasal spray 1 spray by Each Nostril route once daily.      LINZESS 145 mcg Cap capsule Take 1 capsule (145 mcg total) by mouth as needed. 90 capsule 3    ofloxacin (FLOXIN) 0.3 % otic solution Place into the right ear.      ondansetron (ZOFRAN-ODT) 8 MG TbDL Take 1 tablet (8 mg total) by mouth every 6 (six) hours as needed (nausea). 20 tablet 6    pantoprazole (PROTONIX) 40 MG tablet Take 1 tablet (40 mg total) by mouth once daily. 90 tablet 3    rosuvastatin (CRESTOR) 10 MG tablet Take 1 tablet (10 mg total)  by mouth once daily. 90 tablet 3    sumatriptan (IMITREX STATDOSE) 6 mg/0.5 mL kit INJECT 0.5 MLS INTO THE SKIN EVERY 2 HOURS AS NEEDED FOR MIGRAINE(UP TO 2 DOSES DAILY. HOLD FOR BLOOD PRESSURE 155/110) 1 mL 6    sumatriptan (IMITREX) 100 MG tablet TAKE 1 TABLET BY MOUTH EVERY 2 HOURS AS NEEDED 9 tablet 4    triamcinolone acetonide 0.1% (KENALOG) 0.1 % cream AAA bid 454 g 3    TURMERIC ORAL Take by mouth once daily.      budesonide-formoterol 160-4.5 mcg (SYMBICORT) 160-4.5 mcg/actuation HFAA Inhale 2 puffs into the lungs every 12 (twelve) hours. Controller 33 g 3    ciprofloxacin-dexamethasone 0.3-0.1% (CIPRODEX) 0.3-0.1 % DrpS Place 4 drops into both ears as needed. (Patient not taking: Reported on 2024)      metoprolol tartrate (LOPRESSOR) 50 MG tablet Take 1 tablet (50 mg total) by mouth once. for 1 dose 1 tablet 0    [] polyethylene glycol (COLYTE) 240-22.72-6.72 -5.84 gram SolR Take 8,000 mLs by mouth once. Take as instructed by Endoscopy nurse . for 1 dose 8000 mL 0     No facility-administered encounter medications on file as of 2024.     1. Essential hypertension    2. Moderate persistent childhood asthma without complication    3. Chronic nasal congestion    Plan:     Problem List Items Addressed This Visit          ENT    Chronic nasal congestion    Current Assessment & Plan     No improvement in respiratory symptoms after sinus surgery despite elimination of sinus symptoms.            Pulmonary    Childhood asthma without complication    Current Assessment & Plan     Suspect recurrence in adulthood. PFTs normal but does not rule out asthma  Has eczema and sinusitis which have worsened in recent years-> significant atopy-> reduced negative predictive value of methacholine  -Symbicort BID, PRN albuterol as therapeutic trial            Cardiac/Vascular    Essential hypertension - Primary    Current Assessment & Plan     In process of transitioning off Coreg. Given his  asthma history, best to avoid beta blockers unless a strong indication              No follow-ups on file.    Future Appointments   Date Time Provider Department Center   5/21/2024  3:00 PM LAB, [facility]   5/23/2024  8:00 AM Manish Joel MD [facility]   5/31/2024  8:00 AM Pending sale to [facility]  CT1 Pending sale to [facility] CTSCAN [place]         Antonio Valadez MD

## 2024-05-21 NOTE — ASSESSMENT & PLAN NOTE
Suspect recurrence in adulthood. PFTs normal but does not rule out asthma  Has eczema and sinusitis which have worsened in recent years-> significant atopy-> reduced negative predictive value of methacholine  -Symbicort BID, PRN albuterol as therapeutic trial

## 2024-05-23 ENCOUNTER — OFFICE VISIT (OUTPATIENT)
Dept: FAMILY MEDICINE | Facility: CLINIC | Age: 64
End: 2024-05-23
Payer: COMMERCIAL

## 2024-05-23 VITALS
BODY MASS INDEX: 35.24 KG/M2 | DIASTOLIC BLOOD PRESSURE: 84 MMHG | HEIGHT: 73 IN | SYSTOLIC BLOOD PRESSURE: 128 MMHG | HEART RATE: 78 BPM | OXYGEN SATURATION: 96 % | WEIGHT: 265.88 LBS

## 2024-05-23 DIAGNOSIS — K40.91 UNILATERAL RECURRENT INGUINAL HERNIA WITHOUT OBSTRUCTION OR GANGRENE: ICD-10-CM

## 2024-05-23 DIAGNOSIS — E29.1 HYPOGONADISM MALE: ICD-10-CM

## 2024-05-23 DIAGNOSIS — E78.2 MIXED HYPERLIPIDEMIA: ICD-10-CM

## 2024-05-23 DIAGNOSIS — I10 HYPERTENSION, UNSPECIFIED TYPE: Primary | ICD-10-CM

## 2024-05-23 DIAGNOSIS — E66.01 SEVERE OBESITY (BMI 35.0-39.9) WITH COMORBIDITY: ICD-10-CM

## 2024-05-23 DIAGNOSIS — R73.03 PREDIABETES: ICD-10-CM

## 2024-05-23 PROCEDURE — 3074F SYST BP LT 130 MM HG: CPT | Mod: CPTII,S$GLB,, | Performed by: FAMILY MEDICINE

## 2024-05-23 PROCEDURE — 1159F MED LIST DOCD IN RCRD: CPT | Mod: CPTII,S$GLB,, | Performed by: FAMILY MEDICINE

## 2024-05-23 PROCEDURE — 99214 OFFICE O/P EST MOD 30 MIN: CPT | Mod: S$GLB,,, | Performed by: FAMILY MEDICINE

## 2024-05-23 PROCEDURE — 3079F DIAST BP 80-89 MM HG: CPT | Mod: CPTII,S$GLB,, | Performed by: FAMILY MEDICINE

## 2024-05-23 PROCEDURE — 99999 PR PBB SHADOW E&M-EST. PATIENT-LVL V: CPT | Mod: PBBFAC,,, | Performed by: FAMILY MEDICINE

## 2024-05-23 PROCEDURE — 3008F BODY MASS INDEX DOCD: CPT | Mod: CPTII,S$GLB,, | Performed by: FAMILY MEDICINE

## 2024-05-23 NOTE — PROGRESS NOTES
(Portions of this note were dictated using voice recognition software and may contain dictation related errors in spelling/grammar/syntax not found on text review)    CC:   Chief Complaint   Patient presents with    GROIN HERNIA     Right side       HPI: 64 y.o. male concerned about inguinal hernia right side, noticed it several months ago, not painful but feels fullness in that side.  No constipation.  No significant urinary issues.  Noted remote CT abdomen pelvis in the chart that shows small fat containing inguinal hernia on the right side.  No history of prior repair of that although he has had a ventral hernia repaired in the past.    Also has hyperlipidemia, had been off Crestor for awhile, had labs done as below.  He has recently restarted Crestor 2 months ago and would like recheck of his labs.      Also has a history of hypogonadism, was seeing at an outside clinic for testosterone pellets.  Started around 02/20/2016.  He stopped a couple of years ago because he felt like it was not really helping and was causing him to gain a lot of weight.  However, since that time he feels fatigue and decreased libido which has not really improved    Past Medical History:   Diagnosis Date    Acute gastric ulcer with hemorrhage 02/15/2017    Bilateral occipital neuralgia     BPH with urinary obstruction 12/31/2015    Chronic constipation     Colitis [id number]    infectious?    Diverticulitis     Erectile dysfunction     Essential hypertension     Fatty liver     Gastroesophageal reflux disease without esophagitis 02/11/2017    HLD (hyperlipidemia)     Hypogonadism in male     IBS (irritable bowel syndrome)     Migraine without aura and without status migrainosus, not intractable 01/31/2014    Obstructive sleep apnea syndrome 02/09/2015    DELVIN (obstructive sleep apnea)     Psoriasis     PUD (peptic ulcer disease)        Past Surgical History:   Procedure Laterality Date    CHOLECYSTECTOMY      COLONOSCOPY N/A 2/17/2017     Procedure: COLONOSCOPY;  Surgeon: Yoshi Erazo MD;  Location: [facility] (2ND FLR);  Service: Endoscopy;  Laterality: N/A;    COLONOSCOPY N/A 5/29/2019    Procedure: COLONOSCOPY;  Surgeon: Yoshi Erazo MD;  Location: [facility] (2ND FLR);  Service: Endoscopy;  Laterality: N/A;    COLONOSCOPY N/A 8/31/2020    Procedure: COLONOSCOPY;  Surgeon: Yoshi Erazo MD;  Location: [facility] (4TH FLR);  Service: Endoscopy;  Laterality: N/A;    COLONOSCOPY N/A 12/6/2021    Procedure: COLONOSCOPY;  Surgeon: Adiel Chan MD;  Location: [facility];  Service: Endoscopy;  Laterality: N/A;  positive covid 8/19/21-[facility]  covid 12/3/21-[facility]    ESOPHAGOGASTRODUODENOSCOPY      ESOPHAGOGASTRODUODENOSCOPY N/A 12/14/2018    Procedure: EGD (ESOPHAGOGASTRODUODENOSCOPY);  Surgeon: Lexii Carlson MD;  Location: [facility] (4TH FLR);  Service: Endoscopy;  Laterality: N/A;    ESOPHAGOGASTRODUODENOSCOPY N/A 5/29/2019    Procedure: EGD (ESOPHAGOGASTRODUODENOSCOPY);  Surgeon: Yoshi Erazo MD;  Location: [facility] (2ND FLR);  Service: Endoscopy;  Laterality: N/A;  per Dr Erazo-schedule on 2nd floor    ESOPHAGOGASTRODUODENOSCOPY N/A 8/31/2020    Procedure: EGD (ESOPHAGOGASTRODUODENOSCOPY);  Surgeon: Yoshi Erazo MD;  Location: [facility] (4TH FLR);  Service: Endoscopy;  Laterality: N/A;  covid test 8/28-[facility] care    ESOPHAGOGASTRODUODENOSCOPY N/A 12/6/2021    Procedure: EGD (ESOPHAGOGASTRODUODENOSCOPY);  Surgeon: Adiel Chan MD;  Location: [facility];  Service: Endoscopy;  Laterality: N/A;  positive covid 8/19/21-[facility]  covid 12/3/21-[facility]    FLEXIBLE LARYNGOSCOPY WITH DRUG-INDUCED SEDATION FOR EVALUATION OF SLEEP APNEA N/A 12/9/2020    Procedure: LARYNGOSCOPY, FLEXIBLE, WITH DRUG-INDUCED SEDATION, FOR SLEEP APNEA ASSESSMENT;  Surgeon: Wilfrido Erazo MD;  Location: [facility];  Service: ENT;  Laterality: N/A;    LEG SURGERY      NASAL SEPTUM SURGERY      UPPER GASTROINTESTINAL ENDOSCOPY         Family History    Problem Relation Name Age of Onset    Crohn's disease Unknown          brother, sister, father, nephew    Heart disease Father      Crohn's disease Father      Inflammatory bowel disease Father      Crohn's disease Sister      Inflammatory bowel disease Sister      Crohn's disease Brother      Inflammatory bowel disease Brother      Kidney disease Mother      Hypertension Mother      Thyroid disease Mother      Cystic fibrosis Maternal Uncle      Crohn's disease Paternal Grandmother      Prostate cancer Neg Hx      Melanoma Neg Hx      Colon cancer Neg Hx      Celiac disease Neg Hx      Cirrhosis Neg Hx      Esophageal cancer Neg Hx      Liver cancer Neg Hx      Rectal cancer Neg Hx      Stomach cancer Neg Hx      Ulcerative colitis Neg Hx      Liver disease Neg Hx         Social History     Tobacco Use    Smoking status: Never    Smokeless tobacco: Never   Substance Use Topics    Alcohol use: Yes     Comment: ocasionally    Drug use: No       Lab Results   Component Value Date    WBC 7.42 12/12/2023    HGB 16.8 12/12/2023    HCT 48.8 12/12/2023    MCV 80 (L) 12/12/2023     12/12/2023    CHOL 244 (H) 12/12/2023    TRIG 332 (H) 12/12/2023    HDL 34 (L) 12/12/2023    ALT 60 (H) 12/12/2023    AST 42 (H) 12/12/2023    BILITOT 0.6 12/12/2023    ALKPHOS 52 (L) 12/12/2023     12/12/2023    K 4.0 12/12/2023     12/12/2023    CREATININE 0.9 05/21/2024    ESTGFRAFRICA >60.0 04/12/2022    EGFRNONAA >60.0 04/12/2022    EGFRNORACEVR >60.0 05/21/2024    CALCIUM 9.9 12/12/2023    ALBUMIN 4.4 12/12/2023    BUN 22 12/12/2023    CO2 29 12/12/2023    TSH 2.427 12/12/2023    PSA 1.8 12/12/2023    PSADIAG 0.95 01/06/2016    INR 1.2 12/09/2020    HGBA1C 5.7 (H) 12/12/2023    LDLCALC 143.6 12/12/2023    GLU 98 12/12/2023    [id number] 46 11/09/2022       Testosterone, Total (ng/dL)   Date Value   01/25/2019 1167   01/06/2016 386   02/09/2015 428               Vital signs reviewed  Vitals:    05/23/24 0813   BP:  "128/84   BP Location: Right arm   Patient Position: Sitting   BP Method: Medium (Manual)   Pulse: 78   SpO2: 96%   Weight: 120.6 kg (265 lb 14 oz)   Height: 6' 1" (1.854 m)       Wt Readings from Last 4 Encounters:   05/23/24 120.6 kg (265 lb 14 oz)   05/21/24 120 kg (264 lb 8.8 oz)   04/29/24 117.9 kg (260 lb)   05/07/24 117.9 kg (260 lb)       PE:   APPEARANCE: Well nourished, well developed, in no acute distress.    HEAD: Normocephalic, atraumatic.  Inguinal:  Right-sided inguinal hernia present, nontender.  No other testicular masses.  No left side hernia.      IMPRESSION  1. Hypertension, unspecified type    2. Prediabetes    3. Mixed hyperlipidemia    4. Unilateral recurrent inguinal hernia without obstruction or gangrene    5. Hypogonadism male            PLAN  Orders Placed This Encounter   Procedures    Comprehensive Metabolic Panel    Hemoglobin A1C    Lipid Panel    TSH    Testosterone    Ambulatory referral/consult to General Surgery         Inguinal hernia: General surgery referral for elective repair     Since he is back on his Crestor now, will recheck Labs above     Given his fatigue and decreased libido concerns and prior history of testosterone replacement therapy stopped 2 years ago, will check 8:00 a.m. total testosterone along with TSH.  Did discuss potential for lower than range testosterone to be implicated by other factors such as weight, sleep apnea.                       Answers submitted by the patient for this visit:  Review of Systems Questionnaire (Submitted on 5/23/2024)  activity change: No  unexpected weight change: No  neck pain: No  hearing loss: No  rhinorrhea: No  trouble swallowing: No  eye discharge: No  visual disturbance: No  chest tightness: No  wheezing: No  chest pain: No  palpitations: No  blood in stool: No  constipation: No  vomiting: No  diarrhea: No  polydipsia: No  polyuria: No  difficulty urinating: No  urgency: No  hematuria: No  joint swelling: No  arthralgias: " No  headaches: No  weakness: No  confusion: No  dysphoric mood: No

## 2024-05-24 ENCOUNTER — LAB VISIT (OUTPATIENT)
Dept: LAB | Facility: HOSPITAL | Age: 64
End: 2024-05-24
Attending: FAMILY MEDICINE
Payer: COMMERCIAL

## 2024-05-24 DIAGNOSIS — E29.1 HYPOGONADISM MALE: ICD-10-CM

## 2024-05-24 DIAGNOSIS — I10 HYPERTENSION, UNSPECIFIED TYPE: ICD-10-CM

## 2024-05-24 DIAGNOSIS — K40.91 UNILATERAL RECURRENT INGUINAL HERNIA WITHOUT OBSTRUCTION OR GANGRENE: ICD-10-CM

## 2024-05-24 DIAGNOSIS — E78.2 MIXED HYPERLIPIDEMIA: ICD-10-CM

## 2024-05-24 DIAGNOSIS — R73.03 PREDIABETES: ICD-10-CM

## 2024-05-24 LAB
ALBUMIN SERPL BCP-MCNC: 4.3 G/DL (ref 3.5–5.2)
ALP SERPL-CCNC: 60 U/L (ref 55–135)
ALT SERPL W/O P-5'-P-CCNC: 67 U/L (ref 10–44)
ANION GAP SERPL CALC-SCNC: 9 MMOL/L (ref 8–16)
AST SERPL-CCNC: 43 U/L (ref 10–40)
BILIRUB SERPL-MCNC: 0.6 MG/DL (ref 0.1–1)
BUN SERPL-MCNC: 17 MG/DL (ref 8–23)
CALCIUM SERPL-MCNC: 9.6 MG/DL (ref 8.7–10.5)
CHLORIDE SERPL-SCNC: 99 MMOL/L (ref 95–110)
CHOLEST SERPL-MCNC: 172 MG/DL (ref 120–199)
CHOLEST/HDLC SERPL: 4.8 {RATIO} (ref 2–5)
CO2 SERPL-SCNC: 30 MMOL/L (ref 23–29)
CREAT SERPL-MCNC: 1.1 MG/DL (ref 0.5–1.4)
EST. GFR  (NO RACE VARIABLE): >60 ML/MIN/1.73 M^2
ESTIMATED AVG GLUCOSE: 128 MG/DL (ref 68–131)
GLUCOSE SERPL-MCNC: 117 MG/DL (ref 70–110)
HBA1C MFR BLD: 6.1 % (ref 4–5.6)
HDLC SERPL-MCNC: 36 MG/DL (ref 40–75)
HDLC SERPL: 20.9 % (ref 20–50)
LDLC SERPL CALC-MCNC: 82.4 MG/DL (ref 63–159)
NONHDLC SERPL-MCNC: 136 MG/DL
POTASSIUM SERPL-SCNC: 3.9 MMOL/L (ref 3.5–5.1)
PROT SERPL-MCNC: 7.6 G/DL (ref 6–8.4)
SODIUM SERPL-SCNC: 138 MMOL/L (ref 136–145)
TESTOST SERPL-MCNC: 262 NG/DL (ref 304–1227)
TRIGL SERPL-MCNC: 268 MG/DL (ref 30–150)
TSH SERPL DL<=0.005 MIU/L-ACNC: 2.35 UIU/ML (ref 0.4–4)

## 2024-05-24 PROCEDURE — 80053 COMPREHEN METABOLIC PANEL: CPT | Performed by: FAMILY MEDICINE

## 2024-05-24 PROCEDURE — 84403 ASSAY OF TOTAL TESTOSTERONE: CPT | Performed by: FAMILY MEDICINE

## 2024-05-24 PROCEDURE — 80061 LIPID PANEL: CPT | Performed by: FAMILY MEDICINE

## 2024-05-24 PROCEDURE — 36415 COLL VENOUS BLD VENIPUNCTURE: CPT | Performed by: FAMILY MEDICINE

## 2024-05-24 PROCEDURE — 83036 HEMOGLOBIN GLYCOSYLATED A1C: CPT | Performed by: FAMILY MEDICINE

## 2024-05-24 PROCEDURE — 84443 ASSAY THYROID STIM HORMONE: CPT | Performed by: FAMILY MEDICINE

## 2024-05-27 ENCOUNTER — PATIENT MESSAGE (OUTPATIENT)
Dept: FAMILY MEDICINE | Facility: CLINIC | Age: 64
End: 2024-05-27
Payer: COMMERCIAL

## 2024-05-27 ENCOUNTER — OFFICE VISIT (OUTPATIENT)
Dept: SURGERY | Facility: CLINIC | Age: 64
End: 2024-05-27
Payer: COMMERCIAL

## 2024-05-27 VITALS
DIASTOLIC BLOOD PRESSURE: 80 MMHG | SYSTOLIC BLOOD PRESSURE: 120 MMHG | HEIGHT: 73 IN | WEIGHT: 265.13 LBS | HEART RATE: 78 BPM | BODY MASS INDEX: 35.14 KG/M2

## 2024-05-27 DIAGNOSIS — R73.03 PREDIABETES: Primary | ICD-10-CM

## 2024-05-27 DIAGNOSIS — E29.1 HYPOGONADISM MALE: ICD-10-CM

## 2024-05-27 DIAGNOSIS — K40.91 UNILATERAL RECURRENT INGUINAL HERNIA WITHOUT OBSTRUCTION OR GANGRENE: Primary | ICD-10-CM

## 2024-05-27 DIAGNOSIS — E78.2 MIXED HYPERLIPIDEMIA: ICD-10-CM

## 2024-05-27 PROCEDURE — 1160F RVW MEDS BY RX/DR IN RCRD: CPT | Mod: CPTII,S$GLB,, | Performed by: STUDENT IN AN ORGANIZED HEALTH CARE EDUCATION/TRAINING PROGRAM

## 2024-05-27 PROCEDURE — 3008F BODY MASS INDEX DOCD: CPT | Mod: CPTII,S$GLB,, | Performed by: STUDENT IN AN ORGANIZED HEALTH CARE EDUCATION/TRAINING PROGRAM

## 2024-05-27 PROCEDURE — 3074F SYST BP LT 130 MM HG: CPT | Mod: CPTII,S$GLB,, | Performed by: STUDENT IN AN ORGANIZED HEALTH CARE EDUCATION/TRAINING PROGRAM

## 2024-05-27 PROCEDURE — 3079F DIAST BP 80-89 MM HG: CPT | Mod: CPTII,S$GLB,, | Performed by: STUDENT IN AN ORGANIZED HEALTH CARE EDUCATION/TRAINING PROGRAM

## 2024-05-27 PROCEDURE — 99204 OFFICE O/P NEW MOD 45 MIN: CPT | Mod: S$GLB,,, | Performed by: STUDENT IN AN ORGANIZED HEALTH CARE EDUCATION/TRAINING PROGRAM

## 2024-05-27 PROCEDURE — 3044F HG A1C LEVEL LT 7.0%: CPT | Mod: CPTII,S$GLB,, | Performed by: STUDENT IN AN ORGANIZED HEALTH CARE EDUCATION/TRAINING PROGRAM

## 2024-05-27 PROCEDURE — 1159F MED LIST DOCD IN RCRD: CPT | Mod: CPTII,S$GLB,, | Performed by: STUDENT IN AN ORGANIZED HEALTH CARE EDUCATION/TRAINING PROGRAM

## 2024-05-27 PROCEDURE — 99999 PR PBB SHADOW E&M-EST. PATIENT-LVL V: CPT | Mod: PBBFAC,,, | Performed by: STUDENT IN AN ORGANIZED HEALTH CARE EDUCATION/TRAINING PROGRAM

## 2024-05-27 RX ORDER — TAMSULOSIN HYDROCHLORIDE 0.4 MG/1
0.4 CAPSULE ORAL DAILY
Qty: 5 CAPSULE | Refills: 11 | Status: SHIPPED | OUTPATIENT
Start: 2024-05-27 | End: 2025-05-27

## 2024-05-27 NOTE — PROGRESS NOTES
Patient ID: Jonathan Villela is a 64 y.o. male.    Chief Complaint: No chief complaint on file.      HPI:  HPI  64M with right groin discomfort for a few months. Noted right groin bulge. No left sided symptoms. No obstructive symptoms. Does have some weak urine stream at times  Hx of jason merritt in 2016    Review of Systems   Constitutional:  Negative for chills, diaphoresis and fever.   HENT:  Negative for trouble swallowing.    Respiratory:  Negative for cough, shortness of breath, wheezing and stridor.    Cardiovascular:  Negative for chest pain and palpitations.   Gastrointestinal:  Negative for abdominal distention, abdominal pain, blood in stool, diarrhea, nausea and vomiting.   Endocrine: Negative for cold intolerance and heat intolerance.   Genitourinary:  Negative for difficulty urinating.   Musculoskeletal:  Negative for back pain.   Skin:  Negative for rash.   Allergic/Immunologic: Negative for immunocompromised state.   Neurological:  Negative for dizziness, syncope and numbness.   Hematological:  Negative for adenopathy.   Psychiatric/Behavioral:  Negative for agitation.        Current Outpatient Medications   Medication Sig Dispense Refill    acyclovir (ZOVIRAX) 400 MG tablet Take 1 tablet (400 mg total) by mouth 2 (two) times daily as needed. 30 tablet 6    amLODIPine (NORVASC) 5 MG tablet TAKE 1 TABLET(5 MG) BY MOUTH EVERY DAY 90 tablet 3    chlorthalidone (HYGROTEN) 25 MG Tab Take 0.5 tablets (12.5 mg total) by mouth once daily. 45 tablet 3    ciclopirox (LOPROX) 0.77 % Crea Apply topically as needed.       dicyclomine (BENTYL) 10 MG capsule Take 1 capsule (10 mg total) by mouth 3 (three) times daily as needed (abdominal cramping). 30 capsule 1    ergocalciferol (ERGOCALCIFEROL) 50,000 unit Cap Take 1 capsule by mouth twice a week.      fluticasone furoate-vilanteroL (BREO ELLIPTA) 200-25 mcg/dose DsDv diskus inhaler Inhale 1 puff into the lungs once daily. Controller 180 each 3    LINZESS 145 mcg  Cap capsule Take 1 capsule (145 mcg total) by mouth as needed. 90 capsule 3    ofloxacin (FLOXIN) 0.3 % otic solution Place into the right ear.      ondansetron (ZOFRAN-ODT) 8 MG TbDL Take 1 tablet (8 mg total) by mouth every 6 (six) hours as needed (nausea). 20 tablet 6    pantoprazole (PROTONIX) 40 MG tablet Take 1 tablet (40 mg total) by mouth once daily. 90 tablet 3    rosuvastatin (CRESTOR) 10 MG tablet Take 1 tablet (10 mg total) by mouth once daily. 90 tablet 3    sumatriptan (IMITREX) 100 MG tablet TAKE 1 TABLET BY MOUTH EVERY 2 HOURS AS NEEDED 9 tablet 4    triamcinolone acetonide 0.1% (KENALOG) 0.1 % cream AAA bid 454 g 3    TURMERIC ORAL Take by mouth once daily.      albuterol (VENTOLIN HFA) 90 mcg/actuation inhaler Inhale 2 puffs into the lungs every 6 (six) hours as needed for Wheezing or Shortness of Breath. Rescue (Patient not taking: Reported on 5/23/2024) 6.7 g 0    betamethasone dipropionate (DIPROLENE) 0.05 % ointment Apply topically as needed. (Patient not taking: Reported on 5/23/2024)      ciprofloxacin-dexamethasone 0.3-0.1% (CIPRODEX) 0.3-0.1 % DrpS Place 4 drops into both ears as needed. (Patient not taking: Reported on 5/21/2024)      clobetasoL (TEMOVATE) 0.05 % external solution Use on scalp one - two times daily as needed for scaling or itching (Patient not taking: Reported on 5/23/2024) 50 mL 3    CORTISPORIN-TC 3.3-3-10-0.5 mg/mL DrpS Place 4 drops into both ears 2 (two) times daily. (Patient not taking: Reported on 5/23/2024)      cyclobenzaprine (FLEXERIL) 10 MG tablet Take 10 mg by mouth as needed.  (Patient not taking: Reported on 5/23/2024)      fluticasone propionate (FLONASE) 50 mcg/actuation nasal spray 1 spray by Each Nostril route once daily. (Patient not taking: Reported on 5/23/2024)      metoprolol tartrate (LOPRESSOR) 50 MG tablet Take 1 tablet (50 mg total) by mouth once. for 1 dose (Patient not taking: Reported on 5/23/2024) 1 tablet 0    sumatriptan (IMITREX STATDOSE)  6 mg/0.5 mL kit INJECT 0.5 MLS INTO THE SKIN EVERY 2 HOURS AS NEEDED FOR MIGRAINE(UP TO 2 DOSES DAILY. HOLD FOR BLOOD PRESSURE 155/110) (Patient not taking: Reported on 5/23/2024) 1 mL 6    tamsulosin (FLOMAX) 0.4 mg Cap Take 1 capsule (0.4 mg total) by mouth once daily. 5 capsule 11     No current facility-administered medications for this visit.       Review of patient's allergies indicates:   Allergen Reactions    Triamterene      Back and lower abdominal pain       Past Medical History:   Diagnosis Date    Acute gastric ulcer with hemorrhage 02/15/2017    Bilateral occipital neuralgia     BPH with urinary obstruction 12/31/2015    Chronic constipation     Colitis [id number]    infectious?    Diverticulitis     Erectile dysfunction     Essential hypertension     Fatty liver     Gastroesophageal reflux disease without esophagitis 02/11/2017    HLD (hyperlipidemia)     Hypogonadism in male     IBS (irritable bowel syndrome)     Migraine without aura and without status migrainosus, not intractable 01/31/2014    Obstructive sleep apnea syndrome 02/09/2015    DELVIN (obstructive sleep apnea)     Psoriasis     PUD (peptic ulcer disease)        Past Surgical History:   Procedure Laterality Date    CHOLECYSTECTOMY      COLONOSCOPY N/A 2/17/2017    Procedure: COLONOSCOPY;  Surgeon: Yoshi Erazo MD;  Location: [facility] (2ND FLR);  Service: Endoscopy;  Laterality: N/A;    COLONOSCOPY N/A 5/29/2019    Procedure: COLONOSCOPY;  Surgeon: Yoshi Erazo MD;  Location: [facility] (2ND FLR);  Service: Endoscopy;  Laterality: N/A;    COLONOSCOPY N/A 8/31/2020    Procedure: COLONOSCOPY;  Surgeon: Yoshi Erazo MD;  Location: [facility] (4TH FLR);  Service: Endoscopy;  Laterality: N/A;    COLONOSCOPY N/A 12/6/2021    Procedure: COLONOSCOPY;  Surgeon: Adiel Chan MD;  Location: [facility] ENDO;  Service: Endoscopy;  Laterality: N/A;  positive covid 8/19/21-[facility]  covid 12/3/21-[facility]    ESOPHAGOGASTRODUODENOSCOPY       ESOPHAGOGASTRODUODENOSCOPY N/A 12/14/2018    Procedure: EGD (ESOPHAGOGASTRODUODENOSCOPY);  Surgeon: Lexii Carlson MD;  Location: [facility] (4TH FLR);  Service: Endoscopy;  Laterality: N/A;    ESOPHAGOGASTRODUODENOSCOPY N/A 5/29/2019    Procedure: EGD (ESOPHAGOGASTRODUODENOSCOPY);  Surgeon: Yoshi Erazo MD;  Location: [facility] (2ND FLR);  Service: Endoscopy;  Laterality: N/A;  per Dr Erazo-schedule on 2nd floor    ESOPHAGOGASTRODUODENOSCOPY N/A 8/31/2020    Procedure: EGD (ESOPHAGOGASTRODUODENOSCOPY);  Surgeon: Yoshi Erazo MD;  Location: [facility] (4TH FLR);  Service: Endoscopy;  Laterality: N/A;  covid test 8/28-[facility] care    ESOPHAGOGASTRODUODENOSCOPY N/A 12/6/2021    Procedure: EGD (ESOPHAGOGASTRODUODENOSCOPY);  Surgeon: Adiel Chan MD;  Location: [facility];  Service: Endoscopy;  Laterality: N/A;  positive covid 8/19/21-[facility]  covid 12/3/21-[facility]-    FLEXIBLE LARYNGOSCOPY WITH DRUG-INDUCED SEDATION FOR EVALUATION OF SLEEP APNEA N/A 12/9/2020    Procedure: LARYNGOSCOPY, FLEXIBLE, WITH DRUG-INDUCED SEDATION, FOR SLEEP APNEA ASSESSMENT;  Surgeon: Wilfrido Erazo MD;  Location: [facility];  Service: ENT;  Laterality: N/A;    LEG SURGERY      NASAL SEPTUM SURGERY      UPPER GASTROINTESTINAL ENDOSCOPY         Social History     Socioeconomic History    Marital status: Single   Tobacco Use    Smoking status: Never    Smokeless tobacco: Never   Substance and Sexual Activity    Alcohol use: Yes     Comment: ocasionally    Drug use: No    Sexual activity: Yes     Partners: Female     Social Determinants of Health     Financial Resource Strain: Low Risk  (12/11/2023)    Overall Financial Resource Strain (CARDIA)     Difficulty of Paying Living Expenses: Not very hard   Food Insecurity: No Food Insecurity (12/11/2023)    Hunger Vital Sign     Worried About Running Out of Food in the Last Year: Never true     Ran Out of Food in the Last Year: Never true   Transportation Needs: No Transportation  Needs (12/11/2023)    PRAPARE - Transportation     Lack of Transportation (Medical): No     Lack of Transportation (Non-Medical): No   Physical Activity: Insufficiently Active (12/11/2023)    Exercise Vital Sign     Days of Exercise per Week: 2 days     Minutes of Exercise per Session: 30 min   Stress: No Stress Concern Present (12/11/2023)    [employer] [place] [employer] - Occupational Stress Questionnaire     Feeling of Stress : Only a little   Housing Stability: Low Risk  (12/11/2023)    Housing Stability Vital Sign     Unable to Pay for Housing in the Last Year: No     Number of Places Lived in the Last Year: 1     Unstable Housing in the Last Year: No       Vitals:    05/27/24 0809   BP: 120/80   Pulse: 78       Physical Exam  Constitutional:       General: He is not in acute distress.  HENT:      Head: Normocephalic and atraumatic.   Eyes:      General: No scleral icterus.  Cardiovascular:      Rate and Rhythm: Normal rate.   Pulmonary:      Effort: Pulmonary effort is normal. No respiratory distress.      Breath sounds: No stridor.   Abdominal:      Palpations: Abdomen is soft.      Tenderness: There is no abdominal tenderness.      Hernia: A hernia is present.      Comments: +RIH  No obvious LIH   Lymphadenopathy:      Cervical: No cervical adenopathy.   Skin:     General: Skin is warm.      Findings: No erythema.   Neurological:      Mental Status: He is alert and oriented to person, place, and time.   Psychiatric:         Behavior: Behavior normal.     Body mass index is 34.98 kg/m².  Cardaic stress test without evidence of ischemia  PSA 1.8  Hga1c 6.1      Assessment & Plan:  64M with RIH  To OR for robot RIHR poss bilateral  Consents done  Flomax periop

## 2024-05-28 ENCOUNTER — TELEPHONE (OUTPATIENT)
Dept: PREADMISSION TESTING | Facility: HOSPITAL | Age: 64
End: 2024-05-28
Payer: COMMERCIAL

## 2024-05-28 DIAGNOSIS — Z01.818 PRE-OP EVALUATION: Primary | ICD-10-CM

## 2024-05-28 DIAGNOSIS — I10 ESSENTIAL HYPERTENSION: ICD-10-CM

## 2024-05-28 NOTE — TELEPHONE ENCOUNTER
Needs updated labs below 8:00 a.m. fasting in the next 3-4 weeks    Orders Placed This Encounter   Procedures    Comprehensive Metabolic Panel    Lipid Panel    Testosterone    LUTEINIZING HORMONE    FOLLICLE STIMULATING HORMONE    PROLACTIN

## 2024-05-29 ENCOUNTER — TELEPHONE (OUTPATIENT)
Dept: CARDIOLOGY | Facility: HOSPITAL | Age: 64
End: 2024-05-29
Payer: COMMERCIAL

## 2024-05-29 ENCOUNTER — PATIENT MESSAGE (OUTPATIENT)
Dept: CARDIOLOGY | Facility: HOSPITAL | Age: 64
End: 2024-05-29
Payer: COMMERCIAL

## 2024-05-29 NOTE — TELEPHONE ENCOUNTER
I had the pleasure of discussing your upcoming Cardiac CTA scheduled for 05/31/2024 at 8:00. To review, we discussed showing up 15-20 minutes before your appointment time. Your test is located at [facility] on the corner of [address], address [address], [place] [address], next door to [employer].     I have reviewed your current medications that you take and I've discussed the Cardiac CTA prep for heart rate management with Dr. Escalante, the interpreting MD. He is ordering for you to take 100mg of Metoprolol (Lopressor) 1-hour prior to the test. I will send him a script to sign to complete the dosage needed to complete the required amount for this.     He is also ordering for you to HOLD your Amlodipine the night before your test and to HOLD your Tamsulosin (Flomax) and Chlorthalidone (Hygroten) the day of the test. Please bring these with you in the event they are required after testing is complete.    Reminder for a 4-hour fasting time, no caffeine the AM of, and you can have water. It is advisable to have someone transport you to and from the test as a safety precaution. Thank you again for your time today. For any questions or concerns: I am available M-F from 7:30-4, please call [phone].

## 2024-05-30 RX ORDER — METOPROLOL TARTRATE 50 MG/1
50 TABLET ORAL
Qty: 2 TABLET | Refills: 0 | Status: SHIPPED | OUTPATIENT
Start: 2024-05-30 | End: 2024-06-18

## 2024-05-31 ENCOUNTER — HOSPITAL ENCOUNTER (OUTPATIENT)
Dept: RADIOLOGY | Facility: HOSPITAL | Age: 64
Discharge: HOME | End: 2024-05-31
Attending: PHYSICIAN ASSISTANT
Payer: COMMERCIAL

## 2024-05-31 ENCOUNTER — CLINICAL SUPPORT (OUTPATIENT)
Dept: LAB | Facility: HOSPITAL | Age: 64
End: 2024-05-31
Attending: NURSE PRACTITIONER
Payer: COMMERCIAL

## 2024-05-31 VITALS
HEART RATE: 66 BPM | SYSTOLIC BLOOD PRESSURE: 137 MMHG | RESPIRATION RATE: 18 BRPM | OXYGEN SATURATION: 93 % | DIASTOLIC BLOOD PRESSURE: 74 MMHG

## 2024-05-31 DIAGNOSIS — R09.89 CARDIAC COMPLAINT: Primary | ICD-10-CM

## 2024-05-31 DIAGNOSIS — Z13.6 ENCOUNTER FOR SCREENING FOR CARDIOVASCULAR DISORDERS: ICD-10-CM

## 2024-05-31 DIAGNOSIS — Z01.818 PRE-OP EVALUATION: ICD-10-CM

## 2024-05-31 DIAGNOSIS — I10 ESSENTIAL HYPERTENSION: ICD-10-CM

## 2024-05-31 DIAGNOSIS — I20.89 ANGINA OF EFFORT: ICD-10-CM

## 2024-05-31 PROCEDURE — 75574 CT ANGIO HRT W/3D IMAGE: CPT | Mod: TC

## 2024-05-31 PROCEDURE — 25000242 PHARM REV CODE 250 ALT 637 W/ HCPCS: Performed by: PHYSICIAN ASSISTANT

## 2024-05-31 PROCEDURE — 93005 ELECTROCARDIOGRAM TRACING: CPT

## 2024-05-31 PROCEDURE — 25500020 PHARM REV CODE 255: Performed by: PHYSICIAN ASSISTANT

## 2024-05-31 PROCEDURE — 75574 CT ANGIO HRT W/3D IMAGE: CPT | Mod: 26,,, | Performed by: STUDENT IN AN ORGANIZED HEALTH CARE EDUCATION/TRAINING PROGRAM

## 2024-05-31 PROCEDURE — 93010 ELECTROCARDIOGRAM REPORT: CPT | Mod: ,,, | Performed by: INTERNAL MEDICINE

## 2024-05-31 RX ORDER — METOPROLOL TARTRATE 1 MG/ML
5 INJECTION, SOLUTION INTRAVENOUS EVERY 5 MIN PRN
Status: DISCONTINUED | OUTPATIENT
Start: 2024-05-31 | End: 2024-06-01 | Stop reason: HOSPADM

## 2024-05-31 RX ORDER — NITROGLYCERIN 0.4 MG/1
0.4 TABLET SUBLINGUAL ONCE
Status: COMPLETED | OUTPATIENT
Start: 2024-05-31 | End: 2024-05-31

## 2024-05-31 RX ORDER — NITROGLYCERIN 0.4 MG/1
0.4 TABLET SUBLINGUAL EVERY 5 MIN PRN
Status: DISCONTINUED | OUTPATIENT
Start: 2024-05-31 | End: 2024-05-31

## 2024-05-31 RX ADMIN — IOHEXOL 100 ML: 350 INJECTION, SOLUTION INTRAVENOUS at 08:05

## 2024-05-31 RX ADMIN — NITROGLYCERIN 0.4 MG: 0.4 TABLET SUBLINGUAL at 08:05

## 2024-05-31 NOTE — PROGRESS NOTES
05/31/24 0823   Vital Signs   Pulse 66   Resp 18   SpO2 (!) 93 %   Device (Oxygen Therapy) room air   /74   MAP (mmHg) 100   BP Location Left arm   Patient Position Standing     Pt VSS post CTA study. Pt exhibiting no adverse effects from medication. Pt able to adequately ambulate with no dizziness or SOB. IV removed, site dressed with gauze and coban. Pt escorted back to [facility] and has ride home. Pt educated on post study instructions. Verbalized understanding.

## 2024-05-31 NOTE — PROGRESS NOTES
05/31/24 0812   Vital Signs   Pulse 60   Resp 16   SpO2 (!) 93 %   Device (Oxygen Therapy) room air   BP (!) 142/87   BP Location Left arm   Patient Position Lying            Pt arrived to [facility] for cardiac CTA.  Pt AAOx4, ambulatory, no distress noted. Pt informed of procedure, need for PIV and side effects of contrast and nitroglycerin.  Pt connected for continuous vital sign monitoring. VS assessed and put in flowsheets.  IV in place.  Pt verbalizes understanding.

## 2024-06-01 ENCOUNTER — PATIENT MESSAGE (OUTPATIENT)
Dept: SURGERY | Facility: CLINIC | Age: 64
End: 2024-06-01
Payer: COMMERCIAL

## 2024-06-02 LAB
OHS QRS DURATION: 98 MS
OHS QTC CALCULATION: 436 MS

## 2024-06-03 ENCOUNTER — TELEPHONE (OUTPATIENT)
Dept: CARDIOLOGY | Facility: CLINIC | Age: 64
End: 2024-06-03
Payer: COMMERCIAL

## 2024-06-03 NOTE — TELEPHONE ENCOUNTER
Attempted to call patient to discuss cardiac CTA results. No answer. Left voicemail with callback number. Also sent message on portal explaining results and recommendations.

## 2024-06-04 ENCOUNTER — PATIENT MESSAGE (OUTPATIENT)
Dept: CARDIOLOGY | Facility: CLINIC | Age: 64
End: 2024-06-04
Payer: COMMERCIAL

## 2024-06-04 RX ORDER — ASPIRIN 81 MG/1
81 TABLET ORAL DAILY
Start: 2024-06-04 | End: 2024-06-18

## 2024-06-04 RX ORDER — ROSUVASTATIN CALCIUM 20 MG/1
20 TABLET, COATED ORAL DAILY
Qty: 90 TABLET | Refills: 3 | Status: SHIPPED | OUTPATIENT
Start: 2024-06-04 | End: 2025-06-04

## 2024-06-04 RX ORDER — ICOSAPENT ETHYL 1 G/1
2 CAPSULE ORAL 2 TIMES DAILY
Qty: 120 CAPSULE | Refills: 11 | Status: SHIPPED | OUTPATIENT
Start: 2024-06-04 | End: 2025-05-30

## 2024-06-05 ENCOUNTER — HOSPITAL ENCOUNTER (OUTPATIENT)
Dept: PREADMISSION TESTING | Facility: HOSPITAL | Age: 64
Discharge: HOME | End: 2024-06-05
Payer: COMMERCIAL

## 2024-06-06 ENCOUNTER — PATIENT MESSAGE (OUTPATIENT)
Dept: CARDIOLOGY | Facility: CLINIC | Age: 64
End: 2024-06-06
Payer: COMMERCIAL

## 2024-06-06 RX ORDER — ICOSAPENT ETHYL 1 G/1
2 CAPSULE ORAL 2 TIMES DAILY
Qty: 360 CAPSULE | Refills: 3 | Status: CANCELLED | OUTPATIENT
Start: 2024-06-06 | End: 2025-06-01

## 2024-06-06 NOTE — TELEPHONE ENCOUNTER
Discussed CT results w pt. Recommend higher dose of Crestor and fish oil as prescribed by AUBREY Nolasco.  Pended a prescription for Vascepa fish oil to [facility]

## 2024-06-18 ENCOUNTER — HOSPITAL ENCOUNTER (OUTPATIENT)
Dept: PREADMISSION TESTING | Facility: HOSPITAL | Age: 64
Discharge: HOME | End: 2024-06-18
Attending: NURSE PRACTITIONER
Payer: COMMERCIAL

## 2024-06-18 NOTE — DISCHARGE INSTRUCTIONS

## 2024-06-18 NOTE — PRE-PROCEDURE INSTRUCTIONS
Mónica.     Allergies, medical, surgical, family and psychosocial histories reviewed with patient. Periop plan of care reviewed. Preop instructions given, including medications to take and to hold. Hibiclens soap and instructions on use given. Time allotted for questions to be addressed.      Arrival time 0830.     Instructed to take Breo, Protonix, Metoprolol the morning of surgery.

## 2024-07-01 ENCOUNTER — ANESTHESIA EVENT (OUTPATIENT)
Dept: SURGERY | Facility: HOSPITAL | Age: 64
End: 2024-07-01
Payer: COMMERCIAL

## 2024-07-01 NOTE — ANESTHESIA PREPROCEDURE EVALUATION
[facility]  Anesthesia Pre-Operative Evaluation        Patient Name: Jonathan Villela  YOB: 1960  MRN: [id number]    SUBJECTIVE:     Pre-operative Evaluation for Procedure(s) (LRB):  ROBOTIC REPAIR, HERNIA, INGUINAL (Right)     07/01/2024    Jonathan Villela is a 64 y.o. male with a PMHx significant for HTN, DELVIN, PUD, early hepatic fibrosis and right inguinal hernia who presents for the above procedures.     Previous Airway: OSH 4/2024 with Mallampati II and 7.5 ETT    Previous Stress ECHO: EF 60%, normal biventricular systolic function, see below for full summary    Review of patient's allergies indicates:   Allergen Reactions    Triamterene      Back and lower abdominal pain     Current Outpatient Medications   Medication Instructions    acyclovir (ZOVIRAX) 400 mg, Oral, 2 times daily PRN    amLODIPine (NORVASC) 5 MG tablet TAKE 1 TABLET(5 MG) BY MOUTH EVERY DAY    chlorthalidone (HYGROTEN) 12.5 mg, Oral, Daily    ciclopirox (LOPROX) 0.77 % Crea Topical (Top), As needed (PRN)    dicyclomine (BENTYL) 10 mg, Oral, 3 times daily PRN    ergocalciferol (ERGOCALCIFEROL) 50,000 unit Cap 1 capsule, Oral, Twice weekly    fluticasone furoate-vilanteroL (BREO ELLIPTA) 200-25 mcg/dose DsDv diskus inhaler 1 puff, Inhalation, Daily, Controller    icosapent ethyL (VASCEPA) 2 g, Oral, 2 times daily    LINZESS 145 mcg, Oral, As needed (PRN)    ofloxacin (FLOXIN) 0.3 % otic solution Right Ear    ondansetron (ZOFRAN-ODT) 8 mg, Oral, Every 6 hours PRN    pantoprazole (PROTONIX) 40 mg, Oral, Daily    rosuvastatin (CRESTOR) 20 mg, Oral, Daily    sumatriptan (IMITREX) 100 MG tablet TAKE 1 TABLET BY MOUTH EVERY 2 HOURS AS NEEDED    tamsulosin (FLOMAX) 0.4 mg, Oral, Daily    triamcinolone acetonide 0.1% (KENALOG) 0.1 % cream AAA bid    TURMERIC ORAL Oral, Daily       Past Surgical History:   Procedure Laterality Date    CHOLECYSTECTOMY      COLONOSCOPY N/A 2/17/2017    Procedure: COLONOSCOPY;   Surgeon: Yoshi Erazo MD;  Location: [facility] (2ND FLR);  Service: Endoscopy;  Laterality: N/A;    COLONOSCOPY N/A 5/29/2019    Procedure: COLONOSCOPY;  Surgeon: Yoshi Erazo MD;  Location: [facility] (2ND FLR);  Service: Endoscopy;  Laterality: N/A;    COLONOSCOPY N/A 8/31/2020    Procedure: COLONOSCOPY;  Surgeon: Yoshi Erazo MD;  Location: [facility] (4TH FLR);  Service: Endoscopy;  Laterality: N/A;    COLONOSCOPY N/A 12/6/2021    Procedure: COLONOSCOPY;  Surgeon: Adiel Chan MD;  Location: [facility];  Service: Endoscopy;  Laterality: N/A;  positive covid 8/19/21-[facility]  covid 12/3/21-[facility]    ESOPHAGOGASTRODUODENOSCOPY      ESOPHAGOGASTRODUODENOSCOPY N/A 12/14/2018    Procedure: EGD (ESOPHAGOGASTRODUODENOSCOPY);  Surgeon: Lexii Carlson MD;  Location: [facility] (4TH FLR);  Service: Endoscopy;  Laterality: N/A;    ESOPHAGOGASTRODUODENOSCOPY N/A 5/29/2019    Procedure: EGD (ESOPHAGOGASTRODUODENOSCOPY);  Surgeon: Yoshi Erazo MD;  Location: [facility] (2ND FLR);  Service: Endoscopy;  Laterality: N/A;  per Dr Erazo-schedule on 2nd floor    ESOPHAGOGASTRODUODENOSCOPY N/A 8/31/2020    Procedure: EGD (ESOPHAGOGASTRODUODENOSCOPY);  Surgeon: Yoshi Erazo MD;  Location: [facility] (4TH FLR);  Service: Endoscopy;  Laterality: N/A;  covid test 8/28-[facility] care    ESOPHAGOGASTRODUODENOSCOPY N/A 12/6/2021    Procedure: EGD (ESOPHAGOGASTRODUODENOSCOPY);  Surgeon: Adiel Chan MD;  Location: [facility];  Service: Endoscopy;  Laterality: N/A;  positive covid 8/19/21-[facility]  covid 12/3/21-[facility]    FLEXIBLE LARYNGOSCOPY WITH DRUG-INDUCED SEDATION FOR EVALUATION OF SLEEP APNEA N/A 12/9/2020    Procedure: LARYNGOSCOPY, FLEXIBLE, WITH DRUG-INDUCED SEDATION, FOR SLEEP APNEA ASSESSMENT;  Surgeon: Wilfrido Erazo MD;  Location: [facility];  Service: ENT;  Laterality: N/A;    LEG SURGERY      NASAL SEPTUM SURGERY      UPPER GASTROINTESTINAL ENDOSCOPY         Social History     Substance and Sexual  Activity   Drug Use No     Alcohol Use: Alcohol Misuse (12/11/2023)    AUDIT-C     Frequency of Alcohol Consumption: 2-3 times a week     Average Number of Drinks: 1 or 2     Frequency of Binge Drinking: Less than monthly     Tobacco Use: Low Risk  (5/27/2024)    Patient History     Smoking Tobacco Use: Never     Smokeless Tobacco Use: Never     Passive Exposure: Not on file       OBJECTIVE:     Vital Signs Range (Last 24H):         Significant Labs    Heme Profile  Lab Results   Component Value Date    WBC 7.42 12/12/2023    HGB 16.8 12/12/2023    HCT 48.8 12/12/2023     12/12/2023     Coagulation Studies  Lab Results   Component Value Date    LABPROT 12.1 04/16/2019    INR 1.2 12/09/2020    APTT 36.7 12/09/2020     BMP  Lab Results   Component Value Date     05/24/2024    K 3.9 05/24/2024    CL 99 05/24/2024    CO2 30 (H) 05/24/2024    BUN 17 05/24/2024    CREATININE 1.1 05/24/2024    MG 1.7 11/09/2022    PHOS 2.6 (L) 02/11/2017       Liver Function Tests  Lab Results   Component Value Date    AST 43 (H) 05/24/2024    ALT 67 (H) 05/24/2024    ALKPHOS 60 05/24/2024    BILITOT 0.6 05/24/2024    PROT 7.6 05/24/2024    ALBUMIN 4.3 05/24/2024       Lipid Profile  Lab Results   Component Value Date    CHOL 172 05/24/2024    HDL 36 (L) 05/24/2024    TRIG 268 (H) 05/24/2024       Endocrine Profile  Lab Results   Component Value Date    HGBA1C 6.1 (H) 05/24/2024    TSH 2.351 05/24/2024     Cardiac Studies    EKG:   Results for orders placed or performed in visit on 05/31/24   EKG 12-lead    Collection Time: 05/31/24  7:54 AM   Result Value Ref Range    QRS Duration 98 ms    [facility] QTC Calculation 436 ms    Narrative    Test Reason : Z01.818,I10,    Vent. Rate : 062 BPM     Atrial Rate : 062 BPM     P-R Int : 198 ms          QRS Dur : 098 ms      QT Int : 430 ms       P-R-T Axes : 054 033 052 degrees     QTc Int : 436 ms    Normal sinus rhythm  Normal ECG  When compared with ECG of 29-APR-2024 16:02,  Previous ECG  has undetermined rhythm, needs review  Confirmed by Royce Coles MD ([id number]) on 6/2/2024 7:10:32 PM    Referred By: MELY ARAUJO           Confirmed By:Royce Coles MD     Stress Echo:  Results for orders placed during the hospital encounter of 04/29/24    Stress Echo Which stress agent will be used? Treadmill Exercise; Color Flow Doppler? No    Interpretation Summary    Stress Protocol: The patient exercised for 5 minutes 50 seconds on a high ramp protocol, corresponding to a functional capacity of 9METS, achieving a peak heart rate of 141 bpm, which is 90% of the age predicted maximum heart rate. Their exercise capacity was below average. The patient reported no symptoms during the stress test. The test was stopped because the patient experienced knee pain.    ECG Conclusion: The ECG portion of the study is negative for ischemia.    Left Ventricle: The left ventricle is normal in size. Normal wall thickness. There is normal systolic function. Ejection fraction by visual approximation is 60%. There is normal diastolic function.    Right Ventricle: Normal right ventricular cavity size. Wall thickness is normal. Systolic function is normal.    Post-stress    Impression  The study is negative with no echocardiographic evidence of stress induced ischemia.    ASSESSMENT/PLAN:     Pre-op Assessment    I have reviewed the Patient Summary Reports.     I have reviewed the Nursing Notes. I have reviewed the NPO Status.   I have reviewed the Medications.     Review of Systems      Physical Exam  General: Well nourished and Alert    Airway:  Mallampati: II     Dental:  Intact        Anesthesia Plan  Type of Anesthesia, risks & benefits discussed:    Anesthesia Type: Gen ETT  Intra-op Monitoring Plan: Standard ASA Monitors  Post Op Pain Control Plan: multimodal analgesia and IV/PO Opioids PRN  Induction:  IV  Airway Plan: Direct and Video  Informed Consent: Informed consent signed with the Patient and all parties  understand the risks and agree with anesthesia plan.  All questions answered. Patient consented to blood products? Yes  ASA Score: 3  Day of Surgery Review of History & Physical: H&P Update referred to the surgeon/provider.    Ready For Surgery From Anesthesia Perspective.     .

## 2024-07-02 ENCOUNTER — HOSPITAL ENCOUNTER (OUTPATIENT)
Facility: HOSPITAL | Age: 64
Discharge: HOME | End: 2024-07-02
Attending: STUDENT IN AN ORGANIZED HEALTH CARE EDUCATION/TRAINING PROGRAM | Admitting: STUDENT IN AN ORGANIZED HEALTH CARE EDUCATION/TRAINING PROGRAM
Payer: COMMERCIAL

## 2024-07-02 ENCOUNTER — ANESTHESIA (OUTPATIENT)
Dept: SURGERY | Facility: HOSPITAL | Age: 64
End: 2024-07-02
Payer: COMMERCIAL

## 2024-07-02 VITALS
DIASTOLIC BLOOD PRESSURE: 74 MMHG | HEART RATE: 82 BPM | SYSTOLIC BLOOD PRESSURE: 131 MMHG | BODY MASS INDEX: 35.12 KG/M2 | TEMPERATURE: 98 F | RESPIRATION RATE: 14 BRPM | OXYGEN SATURATION: 95 % | WEIGHT: 265 LBS | HEIGHT: 73 IN

## 2024-07-02 DIAGNOSIS — K40.90 INGUINAL HERNIA OF RIGHT SIDE WITHOUT OBSTRUCTION OR GANGRENE: Primary | ICD-10-CM

## 2024-07-02 DIAGNOSIS — K40.91 UNILATERAL RECURRENT INGUINAL HERNIA WITHOUT OBSTRUCTION OR GANGRENE: ICD-10-CM

## 2024-07-02 PROCEDURE — 25000003 PHARM REV CODE 250

## 2024-07-02 PROCEDURE — 27201423 OPTIME MED/SURG SUP & DEVICES STERILE SUPPLY: Performed by: STUDENT IN AN ORGANIZED HEALTH CARE EDUCATION/TRAINING PROGRAM

## 2024-07-02 PROCEDURE — 63600175 PHARM REV CODE 636 W HCPCS

## 2024-07-02 PROCEDURE — 71000016 HC POSTOP RECOV ADDL HR: Performed by: STUDENT IN AN ORGANIZED HEALTH CARE EDUCATION/TRAINING PROGRAM

## 2024-07-02 PROCEDURE — C9290 INJ, BUPIVACAINE LIPOSOME: HCPCS | Performed by: STUDENT IN AN ORGANIZED HEALTH CARE EDUCATION/TRAINING PROGRAM

## 2024-07-02 PROCEDURE — 71000015 HC POSTOP RECOV 1ST HR: Performed by: STUDENT IN AN ORGANIZED HEALTH CARE EDUCATION/TRAINING PROGRAM

## 2024-07-02 PROCEDURE — 36000710: Performed by: STUDENT IN AN ORGANIZED HEALTH CARE EDUCATION/TRAINING PROGRAM

## 2024-07-02 PROCEDURE — 63600175 PHARM REV CODE 636 W HCPCS: Performed by: STUDENT IN AN ORGANIZED HEALTH CARE EDUCATION/TRAINING PROGRAM

## 2024-07-02 PROCEDURE — 37000008 HC ANESTHESIA 1ST 15 MINUTES: Performed by: STUDENT IN AN ORGANIZED HEALTH CARE EDUCATION/TRAINING PROGRAM

## 2024-07-02 PROCEDURE — 37000009 HC ANESTHESIA EA ADD 15 MINS: Performed by: STUDENT IN AN ORGANIZED HEALTH CARE EDUCATION/TRAINING PROGRAM

## 2024-07-02 PROCEDURE — C1781 MESH (IMPLANTABLE): HCPCS | Performed by: STUDENT IN AN ORGANIZED HEALTH CARE EDUCATION/TRAINING PROGRAM

## 2024-07-02 PROCEDURE — 36000711: Performed by: STUDENT IN AN ORGANIZED HEALTH CARE EDUCATION/TRAINING PROGRAM

## 2024-07-02 PROCEDURE — 71000033 HC RECOVERY, INTIAL HOUR: Performed by: STUDENT IN AN ORGANIZED HEALTH CARE EDUCATION/TRAINING PROGRAM

## 2024-07-02 PROCEDURE — 49650 LAP ING HERNIA REPAIR INIT: CPT | Mod: RT,,, | Performed by: STUDENT IN AN ORGANIZED HEALTH CARE EDUCATION/TRAINING PROGRAM

## 2024-07-02 DEVICE — LAPAROSCOPIC SELF-FIXATING MESH, RIGHT ANATOMICAL
Type: IMPLANTABLE DEVICE | Site: INGUINAL | Status: FUNCTIONAL
Brand: PROGRIP

## 2024-07-02 RX ORDER — AMOXICILLIN 250 MG
1 CAPSULE ORAL 2 TIMES DAILY
COMMUNITY
Start: 2024-07-02

## 2024-07-02 RX ORDER — LIDOCAINE HYDROCHLORIDE 20 MG/ML
INJECTION, SOLUTION EPIDURAL; INFILTRATION; INTRACAUDAL; PERINEURAL
Status: DISCONTINUED | OUTPATIENT
Start: 2024-07-02 | End: 2024-07-02

## 2024-07-02 RX ORDER — EPHEDRINE SULFATE 50 MG/ML
INJECTION, SOLUTION INTRAVENOUS
Status: DISCONTINUED | OUTPATIENT
Start: 2024-07-02 | End: 2024-07-02

## 2024-07-02 RX ORDER — PROPOFOL 10 MG/ML
VIAL (ML) INTRAVENOUS
Status: DISCONTINUED | OUTPATIENT
Start: 2024-07-02 | End: 2024-07-02

## 2024-07-02 RX ORDER — ONDANSETRON HYDROCHLORIDE 2 MG/ML
4 INJECTION, SOLUTION INTRAVENOUS DAILY PRN
Status: DISCONTINUED | OUTPATIENT
Start: 2024-07-02 | End: 2024-07-02 | Stop reason: HOSPADM

## 2024-07-02 RX ORDER — CEFAZOLIN SODIUM 1 G/3ML
INJECTION, POWDER, FOR SOLUTION INTRAMUSCULAR; INTRAVENOUS
Status: DISCONTINUED | OUTPATIENT
Start: 2024-07-02 | End: 2024-07-02

## 2024-07-02 RX ORDER — MIDAZOLAM HYDROCHLORIDE 1 MG/ML
INJECTION INTRAMUSCULAR; INTRAVENOUS
Status: DISCONTINUED | OUTPATIENT
Start: 2024-07-02 | End: 2024-07-02

## 2024-07-02 RX ORDER — HYDROMORPHONE HYDROCHLORIDE 2 MG/ML
0.2 INJECTION, SOLUTION INTRAMUSCULAR; INTRAVENOUS; SUBCUTANEOUS EVERY 5 MIN PRN
Status: DISCONTINUED | OUTPATIENT
Start: 2024-07-02 | End: 2024-07-02 | Stop reason: HOSPADM

## 2024-07-02 RX ORDER — CEFAZOLIN SODIUM 2 G/50ML
2 SOLUTION INTRAVENOUS
Status: DISCONTINUED | OUTPATIENT
Start: 2024-07-02 | End: 2024-07-02 | Stop reason: HOSPADM

## 2024-07-02 RX ORDER — FENTANYL CITRATE 50 UG/ML
INJECTION, SOLUTION INTRAMUSCULAR; INTRAVENOUS
Status: DISCONTINUED | OUTPATIENT
Start: 2024-07-02 | End: 2024-07-02

## 2024-07-02 RX ORDER — DEXAMETHASONE SODIUM PHOSPHATE 4 MG/ML
INJECTION, SOLUTION INTRA-ARTICULAR; INTRALESIONAL; INTRAMUSCULAR; INTRAVENOUS; SOFT TISSUE
Status: DISCONTINUED | OUTPATIENT
Start: 2024-07-02 | End: 2024-07-02

## 2024-07-02 RX ORDER — ROCURONIUM BROMIDE 10 MG/ML
INJECTION, SOLUTION INTRAVENOUS
Status: DISCONTINUED | OUTPATIENT
Start: 2024-07-02 | End: 2024-07-02

## 2024-07-02 RX ORDER — HYDROCODONE BITARTRATE AND ACETAMINOPHEN 5; 325 MG/1; MG/1
1 TABLET ORAL EVERY 4 HOURS PRN
Qty: 10 TABLET | Refills: 0 | Status: SHIPPED | OUTPATIENT
Start: 2024-07-02

## 2024-07-02 RX ORDER — DEXMEDETOMIDINE HYDROCHLORIDE 100 UG/ML
INJECTION, SOLUTION INTRAVENOUS
Status: DISCONTINUED | OUTPATIENT
Start: 2024-07-02 | End: 2024-07-02

## 2024-07-02 RX ORDER — ONDANSETRON HYDROCHLORIDE 2 MG/ML
INJECTION, SOLUTION INTRAVENOUS
Status: DISCONTINUED | OUTPATIENT
Start: 2024-07-02 | End: 2024-07-02

## 2024-07-02 RX ORDER — BUPIVACAINE HYDROCHLORIDE 5 MG/ML
INJECTION, SOLUTION PERINEURAL
Status: DISCONTINUED | OUTPATIENT
Start: 2024-07-02 | End: 2024-07-02 | Stop reason: HOSPADM

## 2024-07-02 RX ORDER — PHENYLEPHRINE HCL IN 0.9% NACL 1 MG/10 ML
SYRINGE (ML) INTRAVENOUS
Status: DISCONTINUED | OUTPATIENT
Start: 2024-07-02 | End: 2024-07-02

## 2024-07-02 RX ORDER — HYDROMORPHONE HYDROCHLORIDE 2 MG/ML
INJECTION, SOLUTION INTRAMUSCULAR; INTRAVENOUS; SUBCUTANEOUS
Status: DISCONTINUED | OUTPATIENT
Start: 2024-07-02 | End: 2024-07-02

## 2024-07-02 RX ORDER — HYDROCODONE BITARTRATE AND ACETAMINOPHEN 5; 325 MG/1; MG/1
1 TABLET ORAL EVERY 4 HOURS PRN
Status: CANCELLED | OUTPATIENT
Start: 2024-07-02

## 2024-07-02 RX ORDER — SODIUM CHLORIDE 0.9 % (FLUSH) 0.9 %
10 SYRINGE (ML) INJECTION
Status: DISCONTINUED | OUTPATIENT
Start: 2024-07-02 | End: 2024-07-02 | Stop reason: HOSPADM

## 2024-07-02 RX ADMIN — ONDANSETRON 4 MG: 2 INJECTION INTRAMUSCULAR; INTRAVENOUS at 11:07

## 2024-07-02 RX ADMIN — ROCURONIUM BROMIDE 50 MG: 10 INJECTION, SOLUTION INTRAVENOUS at 10:07

## 2024-07-02 RX ADMIN — SODIUM CHLORIDE, SODIUM LACTATE, POTASSIUM CHLORIDE, AND CALCIUM CHLORIDE: .6; .31; .03; .02 INJECTION, SOLUTION INTRAVENOUS at 10:07

## 2024-07-02 RX ADMIN — DEXAMETHASONE SODIUM PHOSPHATE 4 MG: 4 INJECTION, SOLUTION INTRA-ARTICULAR; INTRALESIONAL; INTRAMUSCULAR; INTRAVENOUS; SOFT TISSUE at 11:07

## 2024-07-02 RX ADMIN — HYDROMORPHONE HYDROCHLORIDE 0.4 MG: 2 INJECTION, SOLUTION INTRAMUSCULAR; INTRAVENOUS; SUBCUTANEOUS at 11:07

## 2024-07-02 RX ADMIN — PROPOFOL 200 MG: 10 INJECTION, EMULSION INTRAVENOUS at 10:07

## 2024-07-02 RX ADMIN — MIDAZOLAM 2 MG: 1 INJECTION INTRAMUSCULAR; INTRAVENOUS at 10:07

## 2024-07-02 RX ADMIN — Medication 200 MCG: at 10:07

## 2024-07-02 RX ADMIN — HYDROMORPHONE HYDROCHLORIDE 0.6 MG: 2 INJECTION, SOLUTION INTRAMUSCULAR; INTRAVENOUS; SUBCUTANEOUS at 10:07

## 2024-07-02 RX ADMIN — EPHEDRINE SULFATE 10 MG: 50 INJECTION, SOLUTION INTRAMUSCULAR; INTRAVENOUS; SUBCUTANEOUS at 10:07

## 2024-07-02 RX ADMIN — SODIUM CHLORIDE: 0.9 INJECTION, SOLUTION INTRAVENOUS at 10:07

## 2024-07-02 RX ADMIN — DEXMEDETOMIDINE HCL 16 MCG: 100 INJECTION INTRAVENOUS at 11:07

## 2024-07-02 RX ADMIN — CEFAZOLIN 2 G: 330 INJECTION, POWDER, FOR SOLUTION INTRAMUSCULAR; INTRAVENOUS at 10:07

## 2024-07-02 RX ADMIN — LIDOCAINE HYDROCHLORIDE 100 MG: 20 INJECTION, SOLUTION EPIDURAL; INFILTRATION; INTRACAUDAL; PERINEURAL at 10:07

## 2024-07-02 RX ADMIN — ROCURONIUM BROMIDE 30 MG: 10 INJECTION, SOLUTION INTRAVENOUS at 11:07

## 2024-07-02 RX ADMIN — FENTANYL CITRATE 100 MCG: 50 INJECTION INTRAMUSCULAR; INTRAVENOUS at 10:07

## 2024-07-02 NOTE — PLAN OF CARE
Patient discharge criteria met. IV removed per order with catheter intact.  Patient voided per protocol. Pain well controlled, VSS.  Patient given discharge instructions, verbalized understanding and able to teach back.  No complications noted.

## 2024-07-02 NOTE — ANESTHESIA PROCEDURE NOTES
Intubation    Date/Time: 7/2/2024 10:28 AM    Performed by: Nelly Flores MD  Authorized by: Pernell Blackwell MD    Intubation:     Induction:  Intravenous    Intubated:  Postinduction    Mask Ventilation:  Easy with oral airway    Attempts:  1    Attempted By:  Resident anesthesiologist    Method of Intubation:  Video laryngoscopy    Blade:  Rodriguez 3    Laryngeal View Grade: Grade IIA - cords partially seen      Difficult Airway Encountered?: No      Complications:  None    Airway Device:  Oral endotracheal tube    Airway Device Size:  7.5    Style/Cuff Inflation:  Cuffed    Inflation Amount (mL):  7    Tube secured:  22    Secured at:  The lips    Placement Verified By:  Capnometry    Complicating Factors:  None    Findings Post-Intubation:  BS equal bilateral

## 2024-07-02 NOTE — TRANSFER OF CARE
"Anesthesia Transfer of Care Note    Patient: Jonathan Villela    Procedure(s) Performed: Procedure(s) (LRB):  ROBOTIC REPAIR, HERNIA, INGUINAL (Right)    Patient location: PACU    Transport from OR: Transported from OR on 6-10 L/min O2 by face mask with adequate spontaneous ventilation    Post pain: adequate analgesia    Post assessment: no apparent anesthetic complications    Post vital signs: stable    Level of consciousness: awake    Nausea/Vomiting: no nausea/vomiting    Complications: none    Transfer of care protocol was followed      Last vitals: Visit Vitals  BP (!) 163/80 (BP Location: Right arm, Patient Position: Lying)   Pulse 71   Temp 36.8 °C (98.2 °F) (Tympanic)   Resp 16   Ht 6' 1" (1.854 m)   Wt 120.2 kg (265 lb)   SpO2 98%   BMI 34.96 kg/m²     "

## 2024-07-02 NOTE — OP NOTE
DATE OF PROCEDURE: 07/02/2024       PREOPERATIVE DIAGNOSIS: Right inguinal hernia.     POSTOPERATIVE DIAGNOSIS: Right indirect and direct inguinal hernia.     PROCEDURE: Robotic repair of right inguinal hernia with mesh.     SURGEON: See Elmore M.D.     ASSISTANT: Willis Douglas PGY2     ANESTHESIA: General endotracheal     PREP: Chlorhexidine.     ESTIMATED BLOOD LOSS: Minimal.     INDICATIONS: The patient is a 64 y.o.  male  , who presented to the clinic   with symptomatic right inguinal hernia. The patient was counseled on his surgical options for   treatment and desired laparoscopic repair of inguinal hernia. The   risks of the procedure were described to the patient including bleeding,   infection, pain, scarring, wound complications, injury to local structures such   as the vas deferens or testicular vessels resulting in infertility or testicular  ischemia or necrosis, recurrence, potential need for further surgery. The   patient demonstrated the understanding of these risks and a consent form was   Obtained.     PROCEDURE IN DETAIL: The patient was identified in the Preoperative Unit and   taken back to the Operating Room, laid supine on the operating room table. IV   antibiotics were administered prior to the induction of general anesthesia.   General anesthesia was induced without complication. The patient was then   prepped and draped in the standard sterile fashion. A timeout procedure was   performed in accordance with the hospital protocol. A 8mm incision was made in  Exparel was injected around the right ASIS.   The right periumbilical location with a #15-blade scalpel.    Two 8mm trocars were inserted under direct vision laterally on each side. The peritoneum over the RLQ was incised sharply. The preperitoneal space was dissected and the hernia sac was indentified.  There was a fat containing hernia in the direct space. This was easily reduced. The remainder of the iliopubic tract was cleaned off  laterally. No left sided or femoral hernia sac was noted. The lipoma was resected and the sac reduced into the retroperitoneum.    The direct defect was closed down using 2-0 vloc.    The Progip mesh was then unrolled superiorly to inferiorly. The mesh was flattened out and found to lay smoothly across the area for direct, indirect and femoral hernia.  Once this was achieved and hemostasis was confirmed, the   peritoneum was reapproximated using 2-0 vloc suture.  At the conclusion, we inspected the pelvis and   hemostasis was achieved. The peritoneum was greatly approximated with no mesh   exposed.      At this point, the ports were removed under   direct visualization and CO2 gas was evacuated.   The skin incisions were   closed using 5-0 Monocryl suture in an interrupted fashion.  Dry sterile   dressings were applied.The patient was awakened from general anesthesia without   complication and returned to the Postoperative Recovery Unit in a stable   condition. At the end of the case, sponge, instrument and needle counts were   correct on 2 occasions. I was present and scrubbed throughout the entirety of   the case.     COMPLICATIONS: None.     CONDITION: Stable.

## 2024-07-02 NOTE — H&P
[facility])  General Surgery  History & Physical    Patient Name: Jonathan Villela  MRN: [id number]  Admission Date: 7/2/2024  Attending Physician: See Elmore MD   Primary Care Provider: Manish Joel MD    Patient information was obtained from patient, past medical records, and ER records.     Subjective:     Chief Complaint/Reason for Admission: Elective inguinal hernia repair    History of Present Illness: Mr. Villela is a 64 y.o. male who presents for elective RIH repair, possible bilateral. He denies any significant changes in health history since his clinic visit in late May. He feels well, denies any recent febrile illness, and is eager to proceed with surgery.     No current facility-administered medications on file prior to encounter.     Current Outpatient Medications on File Prior to Encounter   Medication Sig    acyclovir (ZOVIRAX) 400 MG tablet Take 1 tablet (400 mg total) by mouth 2 (two) times daily as needed.    amLODIPine (NORVASC) 5 MG tablet TAKE 1 TABLET(5 MG) BY MOUTH EVERY DAY    chlorthalidone (HYGROTEN) 25 MG Tab Take 0.5 tablets (12.5 mg total) by mouth once daily.    ciclopirox (LOPROX) 0.77 % Crea Apply topically as needed.     dicyclomine (BENTYL) 10 MG capsule Take 1 capsule (10 mg total) by mouth 3 (three) times daily as needed (abdominal cramping).    ergocalciferol (ERGOCALCIFEROL) 50,000 unit Cap Take 1 capsule by mouth twice a week.    fluticasone furoate-vilanteroL (BREO ELLIPTA) 200-25 mcg/dose DsDv diskus inhaler Inhale 1 puff into the lungs once daily. Controller    LINZESS 145 mcg Cap capsule Take 1 capsule (145 mcg total) by mouth as needed.    ofloxacin (FLOXIN) 0.3 % otic solution Place into the right ear.    ondansetron (ZOFRAN-ODT) 8 MG TbDL Take 1 tablet (8 mg total) by mouth every 6 (six) hours as needed (nausea).    pantoprazole (PROTONIX) 40 MG tablet Take 1 tablet (40 mg total) by mouth once daily.    sumatriptan (IMITREX) 100 MG  tablet TAKE 1 TABLET BY MOUTH EVERY 2 HOURS AS NEEDED    tamsulosin (FLOMAX) 0.4 mg Cap Take 1 capsule (0.4 mg total) by mouth once daily.    triamcinolone acetonide 0.1% (KENALOG) 0.1 % cream AAA bid    TURMERIC ORAL Take by mouth once daily.       Review of patient's allergies indicates:   Allergen Reactions    Triamterene      Back and lower abdominal pain       Past Medical History:   Diagnosis Date    Acute gastric ulcer with hemorrhage 02/15/2017    Bilateral occipital neuralgia     BPH with urinary obstruction 12/31/2015    Chronic constipation     Colitis [id number]    infectious?    Diverticulitis     Erectile dysfunction     Essential hypertension     Fatty liver     Gastroesophageal reflux disease without esophagitis 02/11/2017    HLD (hyperlipidemia)     Hypogonadism in male     IBS (irritable bowel syndrome)     Migraine without aura and without status migrainosus, not intractable 01/31/2014    Obstructive sleep apnea syndrome 02/09/2015    DELVIN (obstructive sleep apnea)     Psoriasis     PUD (peptic ulcer disease)      Past Surgical History:   Procedure Laterality Date    CHOLECYSTECTOMY      COLONOSCOPY N/A 2/17/2017    Procedure: COLONOSCOPY;  Surgeon: Yoshi Erazo MD;  Location: [address]);  Service: Endoscopy;  Laterality: N/A;    COLONOSCOPY N/A 5/29/2019    Procedure: COLONOSCOPY;  Surgeon: Yoshi Erazo MD;  Location: [facility] (2ND FLR);  Service: Endoscopy;  Laterality: N/A;    COLONOSCOPY N/A 8/31/2020    Procedure: COLONOSCOPY;  Surgeon: Yoshi Erazo MD;  Location: [facility] (4TH FLR);  Service: Endoscopy;  Laterality: N/A;    COLONOSCOPY N/A 12/6/2021    Procedure: COLONOSCOPY;  Surgeon: Adiel Chan MD;  Location: [facility];  Service: Endoscopy;  Laterality: N/A;  positive covid 8/19/21-[facility]  covid 12/3/21-[facility]    ESOPHAGOGASTRODUODENOSCOPY      ESOPHAGOGASTRODUODENOSCOPY N/A 12/14/2018    Procedure: EGD (ESOPHAGOGASTRODUODENOSCOPY);  Surgeon: Lexii Carlson  MD;  Location: [facility] (4TH FLR);  Service: Endoscopy;  Laterality: N/A;    ESOPHAGOGASTRODUODENOSCOPY N/A 5/29/2019    Procedure: EGD (ESOPHAGOGASTRODUODENOSCOPY);  Surgeon: Yoshi Erazo MD;  Location: [facility] (2ND FLR);  Service: Endoscopy;  Laterality: N/A;  per Dr Erazo-schedule on 2nd floor    ESOPHAGOGASTRODUODENOSCOPY N/A 8/31/2020    Procedure: EGD (ESOPHAGOGASTRODUODENOSCOPY);  Surgeon: Yoshi Erazo MD;  Location: [facility] (4TH FLR);  Service: Endoscopy;  Laterality: N/A;  covid test 8/28-[place] [facility]    ESOPHAGOGASTRODUODENOSCOPY N/A 12/6/2021    Procedure: EGD (ESOPHAGOGASTRODUODENOSCOPY);  Surgeon: Adiel Chan MD;  Location: [facility];  Service: Endoscopy;  Laterality: N/A;  positive covid 8/19/21-[facility]  covid 12/3/21-[facility]-    FLEXIBLE LARYNGOSCOPY WITH DRUG-INDUCED SEDATION FOR EVALUATION OF SLEEP APNEA N/A 12/9/2020    Procedure: LARYNGOSCOPY, FLEXIBLE, WITH DRUG-INDUCED SEDATION, FOR SLEEP APNEA ASSESSMENT;  Surgeon: Wilfrido Erazo MD;  Location: [facility];  Service: ENT;  Laterality: N/A;    LEG SURGERY      NASAL SEPTUM SURGERY      UPPER GASTROINTESTINAL ENDOSCOPY       Family History       Problem Relation (Age of Onset)    Crohn's disease Father, Sister, Brother, Paternal Grandmother    Cystic fibrosis Maternal Uncle    Heart disease Father    Hypertension Mother    Inflammatory bowel disease Father, Sister, Brother    Kidney disease Mother    Thyroid disease Mother          Tobacco Use    Smoking status: Never    Smokeless tobacco: Never   Substance and Sexual Activity    Alcohol use: Yes     Comment: ocasionally    Drug use: No    Sexual activity: Yes     Partners: Female     Review of Systems   All other systems reviewed and are negative.    Objective:     Vital Signs (Most Recent):    Vital Signs (24h Range):           There is no height or weight on file to calculate BMI.     Physical Exam  Constitutional:       General: He is not in acute distress.      Appearance: He is obese. He is not toxic-appearing.   HENT:      Head: Normocephalic and atraumatic.   Eyes:      Extraocular Movements: Extraocular movements intact.   Cardiovascular:      Rate and Rhythm: Normal rate and regular rhythm.   Pulmonary:      Effort: Pulmonary effort is normal. No respiratory distress.   Abdominal:      General: There is no distension.      Palpations: Abdomen is soft.      Tenderness: There is no abdominal tenderness.   Skin:     General: Skin is warm and dry.   Neurological:      General: No focal deficit present.      Mental Status: He is alert and oriented to person, place, and time.            I have reviewed all pertinent lab results within the past 24 hours.    Significant Diagnostics:  I have reviewed all pertinent imaging results/findings within the past 24 hours.    Assessment/Plan:     * Inguinal hernia of right side without obstruction or gangrene  Jonathan Villela is a 64 y.o. male who presents for elective RIH repair, possible bilateral.    -Consent signed in clinic  -To OR as planned.       VTE Risk Mitigation (From admission, onward)           Ordered     IP VTE LOW RISK PATIENT  Once         07/02/24 0806     Place sequential compression device  Until discontinued         07/02/24 0806                    Rashad Acuña MD  General Surgery  [facility])

## 2024-07-02 NOTE — ASSESSMENT & PLAN NOTE
Jonathan VAZQUEZ Byronmykel is a 64 y.o. male who presents for elective RIH repair, possible bilateral.    -Consent signed in clinic  -To OR as planned.

## 2024-07-02 NOTE — HPI
Mr. Villela is a 64 y.o. male who presents for elective RIH repair, possible bilateral. He denies any significant changes in health history since his clinic visit in late May. He feels well, denies any recent febrile illness, and is eager to proceed with surgery.

## 2024-07-02 NOTE — SUBJECTIVE & OBJECTIVE
No current facility-administered medications on file prior to encounter.     Current Outpatient Medications on File Prior to Encounter   Medication Sig    acyclovir (ZOVIRAX) 400 MG tablet Take 1 tablet (400 mg total) by mouth 2 (two) times daily as needed.    amLODIPine (NORVASC) 5 MG tablet TAKE 1 TABLET(5 MG) BY MOUTH EVERY DAY    chlorthalidone (HYGROTEN) 25 MG Tab Take 0.5 tablets (12.5 mg total) by mouth once daily.    ciclopirox (LOPROX) 0.77 % Crea Apply topically as needed.     dicyclomine (BENTYL) 10 MG capsule Take 1 capsule (10 mg total) by mouth 3 (three) times daily as needed (abdominal cramping).    ergocalciferol (ERGOCALCIFEROL) 50,000 unit Cap Take 1 capsule by mouth twice a week.    fluticasone furoate-vilanteroL (BREO ELLIPTA) 200-25 mcg/dose DsDv diskus inhaler Inhale 1 puff into the lungs once daily. Controller    LINZESS 145 mcg Cap capsule Take 1 capsule (145 mcg total) by mouth as needed.    ofloxacin (FLOXIN) 0.3 % otic solution Place into the right ear.    ondansetron (ZOFRAN-ODT) 8 MG TbDL Take 1 tablet (8 mg total) by mouth every 6 (six) hours as needed (nausea).    pantoprazole (PROTONIX) 40 MG tablet Take 1 tablet (40 mg total) by mouth once daily.    sumatriptan (IMITREX) 100 MG tablet TAKE 1 TABLET BY MOUTH EVERY 2 HOURS AS NEEDED    tamsulosin (FLOMAX) 0.4 mg Cap Take 1 capsule (0.4 mg total) by mouth once daily.    triamcinolone acetonide 0.1% (KENALOG) 0.1 % cream AAA bid    TURMERIC ORAL Take by mouth once daily.       Review of patient's allergies indicates:   Allergen Reactions    Triamterene      Back and lower abdominal pain       Past Medical History:   Diagnosis Date    Acute gastric ulcer with hemorrhage 02/15/2017    Bilateral occipital neuralgia     BPH with urinary obstruction 12/31/2015    Chronic constipation     Colitis [id number]    infectious?    Diverticulitis     Erectile dysfunction     Essential hypertension     Fatty liver     Gastroesophageal reflux disease  without esophagitis 02/11/2017    HLD (hyperlipidemia)     Hypogonadism in male     IBS (irritable bowel syndrome)     Migraine without aura and without status migrainosus, not intractable 01/31/2014    Obstructive sleep apnea syndrome 02/09/2015    DELVIN (obstructive sleep apnea)     Psoriasis     PUD (peptic ulcer disease)      Past Surgical History:   Procedure Laterality Date    CHOLECYSTECTOMY      COLONOSCOPY N/A 2/17/2017    Procedure: COLONOSCOPY;  Surgeon: Yoshi Erazo MD;  Location: [facility] (2ND FLR);  Service: Endoscopy;  Laterality: N/A;    COLONOSCOPY N/A 5/29/2019    Procedure: COLONOSCOPY;  Surgeon: Yoshi Erazo MD;  Location: [facility] (2ND FLR);  Service: Endoscopy;  Laterality: N/A;    COLONOSCOPY N/A 8/31/2020    Procedure: COLONOSCOPY;  Surgeon: Yoshi Erazo MD;  Location: [facility] (4TH FLR);  Service: Endoscopy;  Laterality: N/A;    COLONOSCOPY N/A 12/6/2021    Procedure: COLONOSCOPY;  Surgeon: Adiel Chan MD;  Location: [facility];  Service: Endoscopy;  Laterality: N/A;  positive covid 8/19/21-[facility]  covid 12/3/21-[facility]-    ESOPHAGOGASTRODUODENOSCOPY      ESOPHAGOGASTRODUODENOSCOPY N/A 12/14/2018    Procedure: EGD (ESOPHAGOGASTRODUODENOSCOPY);  Surgeon: Lexii Carlson MD;  Location: [facility] (4TH FLR);  Service: Endoscopy;  Laterality: N/A;    ESOPHAGOGASTRODUODENOSCOPY N/A 5/29/2019    Procedure: EGD (ESOPHAGOGASTRODUODENOSCOPY);  Surgeon: Yoshi Erazo MD;  Location: [facility] (2ND FLR);  Service: Endoscopy;  Laterality: N/A;  per Dr Erazo-schedule on 2nd floor    ESOPHAGOGASTRODUODENOSCOPY N/A 8/31/2020    Procedure: EGD (ESOPHAGOGASTRODUODENOSCOPY);  Surgeon: Yoshi Erazo MD;  Location: [facility] (4TH FLR);  Service: Endoscopy;  Laterality: N/A;  covid test 8/28-[facility] care    ESOPHAGOGASTRODUODENOSCOPY N/A 12/6/2021    Procedure: EGD (ESOPHAGOGASTRODUODENOSCOPY);  Surgeon: Adiel Chan MD;  Location: [facility];  Service: Endoscopy;  Laterality:  N/A;  positive covid 8/19/21-[facility]  covid 12/3/21-[facility]    FLEXIBLE LARYNGOSCOPY WITH DRUG-INDUCED SEDATION FOR EVALUATION OF SLEEP APNEA N/A 12/9/2020    Procedure: LARYNGOSCOPY, FLEXIBLE, WITH DRUG-INDUCED SEDATION, FOR SLEEP APNEA ASSESSMENT;  Surgeon: Wilfrido Erazo MD;  Location: [facility];  Service: ENT;  Laterality: N/A;    LEG SURGERY      NASAL SEPTUM SURGERY      UPPER GASTROINTESTINAL ENDOSCOPY       Family History       Problem Relation (Age of Onset)    Crohn's disease Father, Sister, Brother, Paternal Grandmother    Cystic fibrosis Maternal Uncle    Heart disease Father    Hypertension Mother    Inflammatory bowel disease Father, Sister, Brother    Kidney disease Mother    Thyroid disease Mother          Tobacco Use    Smoking status: Never    Smokeless tobacco: Never   Substance and Sexual Activity    Alcohol use: Yes     Comment: ocasionally    Drug use: No    Sexual activity: Yes     Partners: Female     Review of Systems   All other systems reviewed and are negative.    Objective:     Vital Signs (Most Recent):    Vital Signs (24h Range):           There is no height or weight on file to calculate BMI.     Physical Exam  Constitutional:       General: He is not in acute distress.     Appearance: He is obese. He is not toxic-appearing.   HENT:      Head: Normocephalic and atraumatic.   Eyes:      Extraocular Movements: Extraocular movements intact.   Cardiovascular:      Rate and Rhythm: Normal rate and regular rhythm.   Pulmonary:      Effort: Pulmonary effort is normal. No respiratory distress.   Abdominal:      General: There is no distension.      Palpations: Abdomen is soft.      Tenderness: There is no abdominal tenderness.   Skin:     General: Skin is warm and dry.   Neurological:      General: No focal deficit present.      Mental Status: He is alert and oriented to person, place, and time.            I have reviewed all pertinent lab results within the past 24 hours.    Significant  Diagnostics:  I have reviewed all pertinent imaging results/findings within the past 24 hours.

## 2024-07-03 ENCOUNTER — PATIENT MESSAGE (OUTPATIENT)
Dept: FAMILY MEDICINE | Facility: CLINIC | Age: 64
End: 2024-07-03
Payer: COMMERCIAL

## 2024-07-03 DIAGNOSIS — E29.1 HYPOGONADISM MALE: ICD-10-CM

## 2024-07-03 DIAGNOSIS — R73.03 PREDIABETES: Primary | ICD-10-CM

## 2024-07-03 DIAGNOSIS — E78.2 MIXED HYPERLIPIDEMIA: ICD-10-CM

## 2024-07-03 NOTE — ANESTHESIA POSTPROCEDURE EVALUATION
Anesthesia Post Evaluation    Patient: Jonathan Villela    Procedure(s) Performed: Procedure(s) (LRB):  ROBOTIC REPAIR, HERNIA, INGUINAL (Right)    Final Anesthesia Type: general      Patient location during evaluation: PACU  Patient participation: Yes- Able to Participate  Level of consciousness: awake and alert  Post-procedure vital signs: reviewed and stable  Pain management: adequate  Airway patency: patent    PONV status at discharge: No PONV  Anesthetic complications: no      Cardiovascular status: stable  Respiratory status: room air  Hydration status: euvolemic  Follow-up not needed.              Vitals Value Taken Time   /74 07/02/24 1350   Temp 36.6 °C (97.9 °F) 07/02/24 1350   Pulse 82 07/02/24 1350   Resp 14 07/02/24 1350   SpO2 95 % 07/02/24 1350         Event Time   Out of Recovery 12:54:43         Pain/Sara Score: Sara Score: 10 (7/2/2024  1:50 PM)

## 2024-07-05 ENCOUNTER — PATIENT MESSAGE (OUTPATIENT)
Dept: SURGERY | Facility: CLINIC | Age: 64
End: 2024-07-05
Payer: COMMERCIAL

## 2024-07-05 NOTE — TELEPHONE ENCOUNTER
Please schedule 8:00 a.m. one-month fasting test to recheck blood sugar and recheck testosterone   Orders Placed This Encounter   Procedures    Testosterone    Basic Metabolic Panel    Hemoglobin A1C

## 2024-07-09 ENCOUNTER — PATIENT MESSAGE (OUTPATIENT)
Dept: GASTROENTEROLOGY | Facility: CLINIC | Age: 64
End: 2024-07-09
Payer: COMMERCIAL

## 2024-07-10 ENCOUNTER — TELEPHONE (OUTPATIENT)
Dept: ENDOSCOPY | Facility: HOSPITAL | Age: 64
End: 2024-07-10
Payer: COMMERCIAL

## 2024-07-16 ENCOUNTER — OFFICE VISIT (OUTPATIENT)
Dept: SURGERY | Facility: CLINIC | Age: 64
End: 2024-07-16
Payer: COMMERCIAL

## 2024-07-16 VITALS
HEIGHT: 73 IN | WEIGHT: 261.69 LBS | DIASTOLIC BLOOD PRESSURE: 68 MMHG | BODY MASS INDEX: 34.68 KG/M2 | SYSTOLIC BLOOD PRESSURE: 107 MMHG | HEART RATE: 80 BPM

## 2024-07-16 DIAGNOSIS — Z98.890 S/P HERNIA REPAIR: Primary | ICD-10-CM

## 2024-07-16 DIAGNOSIS — Z87.19 S/P HERNIA REPAIR: Primary | ICD-10-CM

## 2024-07-16 PROCEDURE — 1159F MED LIST DOCD IN RCRD: CPT | Mod: CPTII,S$GLB,, | Performed by: STUDENT IN AN ORGANIZED HEALTH CARE EDUCATION/TRAINING PROGRAM

## 2024-07-16 PROCEDURE — 3044F HG A1C LEVEL LT 7.0%: CPT | Mod: CPTII,S$GLB,, | Performed by: STUDENT IN AN ORGANIZED HEALTH CARE EDUCATION/TRAINING PROGRAM

## 2024-07-16 PROCEDURE — 99999 PR PBB SHADOW E&M-EST. PATIENT-LVL IV: CPT | Mod: PBBFAC,,, | Performed by: STUDENT IN AN ORGANIZED HEALTH CARE EDUCATION/TRAINING PROGRAM

## 2024-07-16 PROCEDURE — 1160F RVW MEDS BY RX/DR IN RCRD: CPT | Mod: CPTII,S$GLB,, | Performed by: STUDENT IN AN ORGANIZED HEALTH CARE EDUCATION/TRAINING PROGRAM

## 2024-07-16 PROCEDURE — 99024 POSTOP FOLLOW-UP VISIT: CPT | Mod: S$GLB,,, | Performed by: STUDENT IN AN ORGANIZED HEALTH CARE EDUCATION/TRAINING PROGRAM

## 2024-07-16 PROCEDURE — 3078F DIAST BP <80 MM HG: CPT | Mod: CPTII,S$GLB,, | Performed by: STUDENT IN AN ORGANIZED HEALTH CARE EDUCATION/TRAINING PROGRAM

## 2024-07-16 PROCEDURE — 3074F SYST BP LT 130 MM HG: CPT | Mod: CPTII,S$GLB,, | Performed by: STUDENT IN AN ORGANIZED HEALTH CARE EDUCATION/TRAINING PROGRAM

## 2024-07-16 NOTE — PROGRESS NOTES
"General Surgery  Post-Operative Note    Subjective:       Jonathan Villela is a 64M who presents to the clinic 2 weeks following robotic RIH repair on 7/2. He is eating a regular diet without difficulty. Bowel movements are Normal.  The patient is not having any pain..      Objective:      /68   Pulse 80   Ht 6' 1" (1.854 m)   Wt 118.7 kg (261 lb 11 oz)   BMI 34.53 kg/m²     General:  alert, appears stated age, and cooperative   Abdomen: soft, bowel sounds active, non-tender   Incision:   healing well, no drainage, no erythema, no hernia, no seroma, no swelling, no dehiscence, incision well approximated        Assessment:     Jonathan Villela is a 64M s/p robotic RIH repair. Doing well post op.    Plan:     1. Continue any current medications.  2. Wound care discussed.  3. Continue lifting restrictions of < 20 lbs for next 2-4 weeks  4. Follow up prn    Dashawn Douglas MD  General Surgery  7/16/2024       "

## 2024-07-30 ENCOUNTER — LAB VISIT (OUTPATIENT)
Dept: LAB | Facility: HOSPITAL | Age: 64
End: 2024-07-30
Attending: FAMILY MEDICINE
Payer: COMMERCIAL

## 2024-07-30 ENCOUNTER — PATIENT MESSAGE (OUTPATIENT)
Dept: FAMILY MEDICINE | Facility: CLINIC | Age: 64
End: 2024-07-30
Payer: COMMERCIAL

## 2024-07-30 DIAGNOSIS — E78.2 MIXED HYPERLIPIDEMIA: ICD-10-CM

## 2024-07-30 DIAGNOSIS — Z51.81 MEDICATION MONITORING ENCOUNTER: ICD-10-CM

## 2024-07-30 DIAGNOSIS — R79.89 LOW SERUM TESTOSTERONE LEVEL IN MALE: ICD-10-CM

## 2024-07-30 DIAGNOSIS — R68.82 DECREASED LIBIDO: ICD-10-CM

## 2024-07-30 DIAGNOSIS — E29.1 HYPOGONADISM MALE: Primary | ICD-10-CM

## 2024-07-30 DIAGNOSIS — R73.03 PREDIABETES: ICD-10-CM

## 2024-07-30 DIAGNOSIS — E29.1 HYPOGONADISM MALE: ICD-10-CM

## 2024-07-30 LAB
ANION GAP SERPL CALC-SCNC: 10 MMOL/L (ref 8–16)
BUN SERPL-MCNC: 16 MG/DL (ref 8–23)
CALCIUM SERPL-MCNC: 9.9 MG/DL (ref 8.7–10.5)
CHLORIDE SERPL-SCNC: 101 MMOL/L (ref 95–110)
CO2 SERPL-SCNC: 29 MMOL/L (ref 23–29)
CREAT SERPL-MCNC: 1.1 MG/DL (ref 0.5–1.4)
EST. GFR  (NO RACE VARIABLE): >60 ML/MIN/1.73 M^2
ESTIMATED AVG GLUCOSE: 131 MG/DL (ref 68–131)
GLUCOSE SERPL-MCNC: 113 MG/DL (ref 70–110)
HBA1C MFR BLD: 6.2 % (ref 4–5.6)
POTASSIUM SERPL-SCNC: 4.1 MMOL/L (ref 3.5–5.1)
SODIUM SERPL-SCNC: 140 MMOL/L (ref 136–145)
TESTOST SERPL-MCNC: 269 NG/DL (ref 304–1227)

## 2024-07-30 PROCEDURE — 80048 BASIC METABOLIC PNL TOTAL CA: CPT | Performed by: FAMILY MEDICINE

## 2024-07-30 PROCEDURE — 83036 HEMOGLOBIN GLYCOSYLATED A1C: CPT | Performed by: FAMILY MEDICINE

## 2024-07-30 PROCEDURE — 36415 COLL VENOUS BLD VENIPUNCTURE: CPT | Performed by: FAMILY MEDICINE

## 2024-07-30 PROCEDURE — 84403 ASSAY OF TOTAL TESTOSTERONE: CPT | Performed by: FAMILY MEDICINE

## 2024-07-31 RX ORDER — TESTOSTERONE 20.25 MG/1.25G
20.25 GEL TOPICAL DAILY
Qty: 1 EACH | Refills: 0 | Status: SHIPPED | OUTPATIENT
Start: 2024-07-31

## 2024-07-31 NOTE — TELEPHONE ENCOUNTER
Starting AndroGel 1.62% once daily.  Will check labs in a month       Orders Placed This Encounter   Procedures    CBC Auto Differential    Comprehensive Metabolic Panel    Prostate Specific Antigen, Diagnostic    Lipid Panel    Testosterone

## 2024-08-14 NOTE — TELEPHONE ENCOUNTER
The Prior Authorization for generic VAscepa was not needed as per  insurance:, Brand name is non formulary    Your PA has been resolved, no additional PA is required. For further inquiries please contact the number on the back of the member prescription card. (Message [phone])     Pt was updated per [facility] message

## 2024-08-30 ENCOUNTER — LAB VISIT (OUTPATIENT)
Dept: LAB | Facility: HOSPITAL | Age: 64
End: 2024-08-30
Attending: FAMILY MEDICINE
Payer: COMMERCIAL

## 2024-08-30 DIAGNOSIS — Z51.81 MEDICATION MONITORING ENCOUNTER: ICD-10-CM

## 2024-08-30 DIAGNOSIS — R68.82 DECREASED LIBIDO: ICD-10-CM

## 2024-08-30 DIAGNOSIS — E29.1 HYPOGONADISM MALE: ICD-10-CM

## 2024-08-30 DIAGNOSIS — R79.89 LOW SERUM TESTOSTERONE LEVEL IN MALE: ICD-10-CM

## 2024-08-30 LAB
ALBUMIN SERPL BCP-MCNC: 4.4 G/DL (ref 3.5–5.2)
ALP SERPL-CCNC: 59 U/L (ref 55–135)
ALT SERPL W/O P-5'-P-CCNC: 70 U/L (ref 10–44)
ANION GAP SERPL CALC-SCNC: 8 MMOL/L (ref 8–16)
AST SERPL-CCNC: 52 U/L (ref 10–40)
BASOPHILS # BLD AUTO: 0.06 K/UL (ref 0–0.2)
BASOPHILS NFR BLD: 0.8 % (ref 0–1.9)
BILIRUB SERPL-MCNC: 0.6 MG/DL (ref 0.1–1)
BUN SERPL-MCNC: 20 MG/DL (ref 8–23)
CALCIUM SERPL-MCNC: 9.9 MG/DL (ref 8.7–10.5)
CHLORIDE SERPL-SCNC: 101 MMOL/L (ref 95–110)
CHOLEST SERPL-MCNC: 173 MG/DL (ref 120–199)
CHOLEST/HDLC SERPL: 4.3 {RATIO} (ref 2–5)
CO2 SERPL-SCNC: 30 MMOL/L (ref 23–29)
COMPLEXED PSA SERPL-MCNC: 2 NG/ML (ref 0–4)
CREAT SERPL-MCNC: 1 MG/DL (ref 0.5–1.4)
DIFFERENTIAL METHOD BLD: ABNORMAL
EOSINOPHIL # BLD AUTO: 0.2 K/UL (ref 0–0.5)
EOSINOPHIL NFR BLD: 3 % (ref 0–8)
ERYTHROCYTE [DISTWIDTH] IN BLOOD BY AUTOMATED COUNT: 12.8 % (ref 11.5–14.5)
EST. GFR  (NO RACE VARIABLE): >60 ML/MIN/1.73 M^2
GLUCOSE SERPL-MCNC: 109 MG/DL (ref 70–110)
HCT VFR BLD AUTO: 49.3 % (ref 40–54)
HDLC SERPL-MCNC: 40 MG/DL (ref 40–75)
HDLC SERPL: 23.1 % (ref 20–50)
HGB BLD-MCNC: 16.1 G/DL (ref 14–18)
IMM GRANULOCYTES # BLD AUTO: 0.05 K/UL (ref 0–0.04)
IMM GRANULOCYTES NFR BLD AUTO: 0.7 % (ref 0–0.5)
LDLC SERPL CALC-MCNC: 89.2 MG/DL (ref 63–159)
LYMPHOCYTES # BLD AUTO: 1.9 K/UL (ref 1–4.8)
LYMPHOCYTES NFR BLD: 24.5 % (ref 18–48)
MCH RBC QN AUTO: 29 PG (ref 27–31)
MCHC RBC AUTO-ENTMCNC: 32.7 G/DL (ref 32–36)
MCV RBC AUTO: 89 FL (ref 82–98)
MONOCYTES # BLD AUTO: 0.7 K/UL (ref 0.3–1)
MONOCYTES NFR BLD: 9.5 % (ref 4–15)
NEUTROPHILS # BLD AUTO: 4.7 K/UL (ref 1.8–7.7)
NEUTROPHILS NFR BLD: 61.5 % (ref 38–73)
NONHDLC SERPL-MCNC: 133 MG/DL
NRBC BLD-RTO: 0 /100 WBC
PLATELET # BLD AUTO: 285 K/UL (ref 150–450)
PMV BLD AUTO: 10.1 FL (ref 9.2–12.9)
POTASSIUM SERPL-SCNC: 4.2 MMOL/L (ref 3.5–5.1)
PROT SERPL-MCNC: 7.9 G/DL (ref 6–8.4)
RBC # BLD AUTO: 5.56 M/UL (ref 4.6–6.2)
SODIUM SERPL-SCNC: 139 MMOL/L (ref 136–145)
TESTOST SERPL-MCNC: 418 NG/DL (ref 304–1227)
TRIGL SERPL-MCNC: 219 MG/DL (ref 30–150)
WBC # BLD AUTO: 7.66 K/UL (ref 3.9–12.7)

## 2024-08-30 PROCEDURE — 84403 ASSAY OF TOTAL TESTOSTERONE: CPT | Performed by: FAMILY MEDICINE

## 2024-08-30 PROCEDURE — 80061 LIPID PANEL: CPT | Performed by: FAMILY MEDICINE

## 2024-08-30 PROCEDURE — 80053 COMPREHEN METABOLIC PANEL: CPT | Performed by: FAMILY MEDICINE

## 2024-08-30 PROCEDURE — 85025 COMPLETE CBC W/AUTO DIFF WBC: CPT | Performed by: FAMILY MEDICINE

## 2024-08-30 PROCEDURE — 84153 ASSAY OF PSA TOTAL: CPT | Performed by: FAMILY MEDICINE

## 2024-08-30 PROCEDURE — 36415 COLL VENOUS BLD VENIPUNCTURE: CPT | Performed by: FAMILY MEDICINE

## 2024-09-02 ENCOUNTER — PATIENT MESSAGE (OUTPATIENT)
Dept: CARDIOLOGY | Facility: CLINIC | Age: 64
End: 2024-09-02
Payer: COMMERCIAL

## 2024-09-13 RX ORDER — CHLORTHALIDONE 25 MG/1
12.5 TABLET ORAL
Qty: 45 TABLET | Refills: 3 | Status: SHIPPED | OUTPATIENT
Start: 2024-09-13

## 2024-09-19 ENCOUNTER — TELEPHONE (OUTPATIENT)
Dept: ENDOSCOPY | Facility: HOSPITAL | Age: 64
End: 2024-09-19
Payer: COMMERCIAL

## 2024-09-19 NOTE — TELEPHONE ENCOUNTER
.Spoke to patient for pre-call to confirm scheduled Colonoscopy/EGD and patient verbalized understanding of the following:       Date of Procedure (s)  verified 09/25/24  Arrival Time 0715 verified.  Location of Procedure(s) [address] verified.  NPO status reinforced. Ok to continue clear liquids up until 4 hours prior to the Endoscopy procedure.     Pt confirmed receipt of prep instructions and Rx prep (if applicable).  Instructions provided to patient via [facility]  Pt confirmed ride home after procedure if procedure requires anesthesia.   Pre-call screening questionnaire reviewed and completed with patient.   Appointment details are tentative, including check-in time.  If the patient begins taking any blood thinning medications, injectable weight loss/diabetes medications (other than insulin), or Adipex (phentermine) patient was instructed to contact the endoscopy scheduling department as soon as possible.  Patient was advised to call the endoscopy scheduling department if any questions or concerns arise.       [facility] Scheduling Department

## 2024-09-23 ENCOUNTER — TELEPHONE (OUTPATIENT)
Dept: ENDOSCOPY | Facility: HOSPITAL | Age: 64
End: 2024-09-23
Payer: COMMERCIAL

## 2024-09-23 NOTE — TELEPHONE ENCOUNTER
Pt called to remove case from schedule. He has work conflicts and is not able to take off work at this time. He will call back to reschedule procedure. Pt removed from schedule.

## 2024-09-30 RX ORDER — PANTOPRAZOLE SODIUM 40 MG/1
40 TABLET, DELAYED RELEASE ORAL
Qty: 90 TABLET | Refills: 2 | Status: SHIPPED | OUTPATIENT
Start: 2024-09-30

## 2024-09-30 NOTE — TELEPHONE ENCOUNTER
No care due was identified.  [facility] Embedded Care Due Messages. Reference number: [id number].   9/30/2024 6:58:56 AM CDT

## 2024-09-30 NOTE — TELEPHONE ENCOUNTER
Refill Decision Note   Jonathan Manohar  is requesting a refill authorization.  Brief Assessment and Rationale for Refill:  Approve     Medication Therapy Plan:         Comments:     Note composed:1:53 PM 09/30/2024

## 2024-12-04 ENCOUNTER — TELEPHONE (OUTPATIENT)
Dept: FAMILY MEDICINE | Facility: CLINIC | Age: 64
End: 2024-12-04
Payer: COMMERCIAL

## 2024-12-04 RX ORDER — AMLODIPINE BESYLATE 5 MG/1
TABLET ORAL
Qty: 90 TABLET | Refills: 0 | Status: SHIPPED | OUTPATIENT
Start: 2024-12-04

## 2024-12-04 NOTE — TELEPHONE ENCOUNTER
----- Message from Korey sent at 12/2/2024  1:00 PM CST -----  Contact: pt wife  Type:  Sooner Apoointment Request    Caller is requesting a sooner appointment.  Caller declined first available appointment listed below.  Caller will not accept being placed on the waitlist and is requesting a message be sent to doctor.  Name of Caller:pt wife   When is the first available appointment? Feb   Symptoms:ear infection   Would the patient rather a call back or a response via [facility]? Call   Best Call Back Number:[phone]   Additional Information:

## 2024-12-18 RX ORDER — CARVEDILOL 12.5 MG/1
12.5 TABLET ORAL
Qty: 180 TABLET | Refills: 3 | OUTPATIENT
Start: 2024-12-18

## 2024-12-18 NOTE — TELEPHONE ENCOUNTER
No care due was identified.  [facility] Embedded Care Due Messages. Reference number: [id number].   12/18/2024 8:20:07 AM CST

## 2024-12-19 NOTE — TELEPHONE ENCOUNTER
Refill Decision Note   Jonathan Villela  is requesting a refill authorization.  Brief Assessment and Rationale for Refill:  Quick Discontinue     Medication Therapy Plan: Carvedilol dc'd on 4/9/24      Comments:     Note composed:7:13 PM 12/18/2024

## 2024-12-20 ENCOUNTER — PATIENT MESSAGE (OUTPATIENT)
Dept: FAMILY MEDICINE | Facility: CLINIC | Age: 64
End: 2024-12-20
Payer: COMMERCIAL

## 2024-12-20 RX ORDER — ERGOCALCIFEROL 1.25 MG/1
50000 CAPSULE ORAL
Qty: 16 CAPSULE | Refills: 12 | Status: SHIPPED | OUTPATIENT
Start: 2024-12-23

## 2024-12-20 NOTE — TELEPHONE ENCOUNTER
No care due was identified.  [facility] Embedded Care Due Messages. Reference number: [id number].   12/20/2024 11:11:10 AM CST

## 2025-03-11 NOTE — PROCEDURES
Procedures     Binocular Microscopy    Indication:  Chronic otorrhea    Additional detail was required for the otoscopic examination of the left and right ear.  The operating microscope was used to directly visualize the tympanic membrane and middle ear landmarks.  Findings: Right ear  Canal skin:  dry, excoriated, with otorrhea present suctioned and cultured   TM:  intact   Ossicles:  normal   Effusion:  none       Findings: Left ear  Canal skin:  dry, excoriated, with otorrhea present suctioned    TM:  intact   Ossicles:  normal   Effusion:  none       The patient tolerated the procedure well.    
regular

## 2025-03-18 RX ORDER — AMLODIPINE BESYLATE 5 MG/1
5 TABLET ORAL
Qty: 90 TABLET | Refills: 0 | Status: SHIPPED | OUTPATIENT
Start: 2025-03-18

## 2025-04-08 RX ORDER — ACYCLOVIR 400 MG/1
TABLET ORAL
Qty: 30 TABLET | Refills: 2 | Status: SHIPPED | OUTPATIENT
Start: 2025-04-08

## 2025-04-08 NOTE — TELEPHONE ENCOUNTER
Care Due:                  Date            Visit Type   Department     Provider  --------------------------------------------------------------------------------                                MYCHART                              FOLLOWUP/OF  [facility]ILY  Last Visit: 05-      FICE VISIT   [facility]  Next Visit: None Scheduled  None         None Found                                                            Last  Test          Frequency    Reason                     Performed    Due Date  --------------------------------------------------------------------------------    Office Visit  12 months..  dicyclomine,               05- 05-                             ergocalciferol...........    Vitamin D...  12 months..  ergocalciferol...........  Not Found    Overdue    [employer] Embedded Care Due Messages. Reference number: [id number].   4/08/2025 11:10:18 AM CDT

## 2025-04-09 NOTE — TELEPHONE ENCOUNTER
Provider Staff:  Action required for this patient    Requires appointment      Please see care gap opportunities below in Care Due Message.    Thanks!  [facility]     Appointments      Date Provider   Last Visit   5/23/2024 Manish Joel MD   Next Visit   Visit date not found Manish Joel MD     Refill Decision Note   Jonathan Villela  is requesting a refill authorization.  Brief Assessment and Rationale for Refill:  Approve     Medication Therapy Plan:         Comments:     Note composed:7:29 PM 04/08/2025

## 2025-07-17 ENCOUNTER — OFFICE VISIT (OUTPATIENT)
Dept: NEUROLOGY | Facility: CLINIC | Age: 65
End: 2025-07-17
Payer: COMMERCIAL

## 2025-07-17 ENCOUNTER — LAB VISIT (OUTPATIENT)
Dept: LAB | Facility: OTHER | Age: 65
End: 2025-07-17
Attending: STUDENT IN AN ORGANIZED HEALTH CARE EDUCATION/TRAINING PROGRAM
Payer: COMMERCIAL

## 2025-07-17 VITALS
HEART RATE: 65 BPM | DIASTOLIC BLOOD PRESSURE: 90 MMHG | WEIGHT: 266.63 LBS | HEIGHT: 73 IN | SYSTOLIC BLOOD PRESSURE: 157 MMHG | BODY MASS INDEX: 35.34 KG/M2

## 2025-07-17 DIAGNOSIS — R20.8 VIBRATION SENSORY LOSS: ICD-10-CM

## 2025-07-17 DIAGNOSIS — G62.9 PERIPHERAL POLYNEUROPATHY: ICD-10-CM

## 2025-07-17 DIAGNOSIS — R20.2 PARESTHESIAS: ICD-10-CM

## 2025-07-17 DIAGNOSIS — G62.9 PERIPHERAL POLYNEUROPATHY: Primary | ICD-10-CM

## 2025-07-17 PROBLEM — E66.01 SEVERE OBESITY (BMI 35.0-39.9) WITH COMORBIDITY: Status: RESOLVED | Noted: 2024-05-23 | Resolved: 2025-07-17

## 2025-07-17 LAB — VIT B12 SERPL-MCNC: 487 PG/ML (ref 210–950)

## 2025-07-17 PROCEDURE — 99204 OFFICE O/P NEW MOD 45 MIN: CPT | Mod: S$GLB,,, | Performed by: STUDENT IN AN ORGANIZED HEALTH CARE EDUCATION/TRAINING PROGRAM

## 2025-07-17 PROCEDURE — 86334 IMMUNOFIX E-PHORESIS SERUM: CPT | Mod: ,,, | Performed by: PATHOLOGY

## 2025-07-17 PROCEDURE — 1160F RVW MEDS BY RX/DR IN RCRD: CPT | Mod: CPTII,S$GLB,, | Performed by: STUDENT IN AN ORGANIZED HEALTH CARE EDUCATION/TRAINING PROGRAM

## 2025-07-17 PROCEDURE — 86334 IMMUNOFIX E-PHORESIS SERUM: CPT

## 2025-07-17 PROCEDURE — 3008F BODY MASS INDEX DOCD: CPT | Mod: CPTII,S$GLB,, | Performed by: STUDENT IN AN ORGANIZED HEALTH CARE EDUCATION/TRAINING PROGRAM

## 2025-07-17 PROCEDURE — 84207 ASSAY OF VITAMIN B-6: CPT

## 2025-07-17 PROCEDURE — 82525 ASSAY OF COPPER: CPT

## 2025-07-17 PROCEDURE — 84630 ASSAY OF ZINC: CPT

## 2025-07-17 PROCEDURE — 83825 ASSAY OF MERCURY: CPT

## 2025-07-17 PROCEDURE — 86038 ANTINUCLEAR ANTIBODIES: CPT

## 2025-07-17 PROCEDURE — 82607 VITAMIN B-12: CPT

## 2025-07-17 PROCEDURE — 99999 PR PBB SHADOW E&M-EST. PATIENT-LVL III: CPT | Mod: PBBFAC,,, | Performed by: STUDENT IN AN ORGANIZED HEALTH CARE EDUCATION/TRAINING PROGRAM

## 2025-07-17 PROCEDURE — 1159F MED LIST DOCD IN RCRD: CPT | Mod: CPTII,S$GLB,, | Performed by: STUDENT IN AN ORGANIZED HEALTH CARE EDUCATION/TRAINING PROGRAM

## 2025-07-17 PROCEDURE — 3080F DIAST BP >= 90 MM HG: CPT | Mod: CPTII,S$GLB,, | Performed by: STUDENT IN AN ORGANIZED HEALTH CARE EDUCATION/TRAINING PROGRAM

## 2025-07-17 PROCEDURE — 3077F SYST BP >= 140 MM HG: CPT | Mod: CPTII,S$GLB,, | Performed by: STUDENT IN AN ORGANIZED HEALTH CARE EDUCATION/TRAINING PROGRAM

## 2025-07-17 PROCEDURE — 84165 PROTEIN E-PHORESIS SERUM: CPT

## 2025-07-17 PROCEDURE — 84165 PROTEIN E-PHORESIS SERUM: CPT | Mod: ,,, | Performed by: PATHOLOGY

## 2025-07-17 PROCEDURE — 36415 COLL VENOUS BLD VENIPUNCTURE: CPT

## 2025-07-17 NOTE — PROGRESS NOTES
Patient ID: [id number]  Referring Physician: Manish Joel MD    Chief Complaint/Reason for Consult: numbness/tingling  Subjective:     HPI  Jonathan Villela is a 65 y.o. RH male who  has a past medical history of Acute gastric ulcer with hemorrhage, Bilateral occipital neuralgia, BPH with urinary obstruction, Chronic constipation, Colitis, Diverticulitis, Erectile dysfunction, Essential hypertension, Fatty liver, Gastroesophageal reflux disease without esophagitis, HLD (hyperlipidemia), Hypogonadism in male, IBS (irritable bowel syndrome), Migraine without aura and without status migrainosus, not intractable, Obstructive sleep apnea syndrome, DELVIN (obstructive sleep apnea), Psoriasis, and PUD (peptic ulcer disease). he is presenting today for evaluation of numbness/tingling.     Patient reports numbness in both feet for 2-3 years. The numbness extends as high as the ankles but is primarily concentrated in the feet, affecting both the top and bottom. He describes the sensation as numbness rather than tingling, without consistent paresthesia. Symptoms worsen when resting or preparing for bed.    Recently, within the last couple of weeks, he has developed a sharp pain in one spot on the bottom of the right foot, noticeable when walking or standing. He likens this sensation to standing on a small, hard object.    He also mentions shooting pain in both groins for about a year, particularly noticeable when bending over at the sink while brushing his teeth or putting on pants. He needs to squat down when rinsing his mouth while brushing teeth to avoid the shooting pain.    He denies significant changes in balance, has never fallen, and does not feel unsteady. There are no reported symptoms in the hands. He notes some changes in bladder control, describing it as decreased strength, which he attributes to being 65 years old. He reports having to urinate again shortly after his first morning urination.    He  discontinued his blood pressure and cholesterol medications 4-5 months ago due to the numbness in his feet, but the symptoms did not improve after stopping the medication.    He has a history of GI issues, having been previously diagnosed with Crohn's disease in 2013, though subsequent evaluations by multiple doctors concluded he does not have Crohn's. He reports having IBS and notes difficulty with certain foods, particularly pastas and rice, which cause abdominal pain shortly after consumption. He suspects a gluten reaction but has been checked for celiac disease.    Relevant history:   Diabetes Mellitus: (--)  Thyroid dysfunction: (--)  Bariatric surgery: (--)  Vegan/vegetarian: (--)  Celiac/Crohn's/UC:(+) (2013,   Substance use (vidhi. Nitrous oxide): (--), drinks x3-6 alcoholic drinks per 2-week period  Cervical/lumbar spondylosis: (--)  History of chemotherapy: (--)  List of tried relevant medications/procedures: none      Review of Systems  General: -fever, -chills, -fatigue, -weight gain, -weight loss  Eyes: -vision changes, -redness, -discharge  ENT: -ear pain, -nasal congestion, -sore throat  Cardiovascular: -chest pain, -palpitations, -lower extremity edema  Respiratory: -cough, -shortness of breath  Gastrointestinal: -abdominal pain, -nausea, -vomiting, -diarrhea, -constipation, -blood in stool  Genitourinary: -dysuria, -hematuria, +frequency  Musculoskeletal: -joint pain, -muscle pain, +shooting pain sensation  Skin: -rash, -lesion  Neurological: -headache, -dizziness, +numbness, -tingling, +decreased sensation in extremities  Psychiatric: -anxiety, -depression, -sleep difficulty         Past Medical History:  -------------------------------------    Acute gastric ulcer with hemorrhage    Bilateral occipital neuralgia    BPH with urinary obstruction    Chronic constipation    Colitis    infectious?    Diverticulitis    Erectile dysfunction    Essential hypertension    Fatty liver    Gastroesophageal  reflux disease without esophagitis    HLD (hyperlipidemia)    Hypogonadism in male    IBS (irritable bowel syndrome)    Migraine without aura and without status migrainosus, not intractable    Obstructive sleep apnea syndrome    DELVIN (obstructive sleep apnea)    Psoriasis    PUD (peptic ulcer disease)       Allergies:  Review of patient's allergies indicates:   Allergen Reactions    Triamterene      Back and lower abdominal pain       Pertinent Family History:  Family History   Problem Relation Name Age of Onset    Crohn's disease Unknown          brother, sister, father, nephew    Heart disease Father      Crohn's disease Father      Inflammatory bowel disease Father      Crohn's disease Sister      Inflammatory bowel disease Sister      Crohn's disease Brother      Inflammatory bowel disease Brother      Kidney disease Mother      Hypertension Mother      Thyroid disease Mother      Cystic fibrosis Maternal Uncle      Crohn's disease Paternal Grandmother      Prostate cancer Neg Hx      Melanoma Neg Hx      Colon cancer Neg Hx      Celiac disease Neg Hx      Cirrhosis Neg Hx      Esophageal cancer Neg Hx      Liver cancer Neg Hx      Rectal cancer Neg Hx      Stomach cancer Neg Hx      Ulcerative colitis Neg Hx      Liver disease Neg Hx         Pertinent Social History:  Social History[1]    Medications:  Current Outpatient Medications   Medication Instructions    acyclovir (ZOVIRAX) 400 MG tablet TAKE 1 TABLET(400 MG) BY MOUTH TWICE DAILY AS NEEDED    amLODIPine (NORVASC) 5 mg, Oral    chlorthalidone (HYGROTEN) 12.5 mg, Oral    ciclopirox (LOPROX) 0.77 % Crea As needed (PRN)    dicyclomine (BENTYL) 10 mg, Oral, 3 times daily PRN    ergocalciferol (ERGOCALCIFEROL) 50,000 Units, Oral, Twice weekly    fluticasone furoate-vilanteroL (BREO ELLIPTA) 200-25 mcg/dose DsDv diskus inhaler 1 puff, Inhalation, Daily, Controller    HYDROcodone-acetaminophen (NORCO) 5-325 mg per tablet 1 tablet, Oral, Every 4 hours PRN     LINZESS 145 mcg, Oral, As needed (PRN)    ofloxacin (FLOXIN) 0.3 % otic solution Place into the right ear.    ondansetron (ZOFRAN-ODT) 8 mg, Oral, Every 6 hours PRN    pantoprazole (PROTONIX) 40 mg, Oral    rosuvastatin (CRESTOR) 20 mg, Oral, Daily    senna-docusate 8.6-50 mg (PERICOLACE) 8.6-50 mg per tablet 1 tablet, Oral, 2 times daily    sumatriptan (IMITREX) 100 MG tablet TAKE 1 TABLET BY MOUTH EVERY 2 HOURS AS NEEDED    tamsulosin (FLOMAX) 0.4 mg, Oral, Daily    testosterone (ANDROGEL) 20.25 mg, Transdermal, Daily    triamcinolone acetonide 0.1% (KENALOG) 0.1 % cream AAA bid    TURMERIC ORAL Daily        Objective:     Vitals:    07/17/25 1307   BP: (!) 157/90   Pulse: 65        General:  Well-appearing, well-nourished, NAD, cooperative    Neurologic Exam:   Awake, alert and oriented x3  Speech spontaneous and fluent, intact comprehension.   Adequate fund of knowledge, vocabulary.    CN II - CN XII:  PERRLA. EOM intact. No Nystagmus. No ophthalmoplegia.   Facial sensation is normal to light touch.   Facial expression is full and symmetric.   Hearing is intact bilaterally.   Palate elevates symmetrically.   SCM and Trapezius full strength bilaterally.   Tongue is midline.     Motor:  Normal bulk and tone in all four limbs.   There are no atrophy or fasciculations. No tremor.     Shoulder  Abd Shoulder Add Elbow   Flex Elbow  Ext Wrist   Flex Wrist  Ext Finger  Flex Finger  Ext Finger  Abd Finger   Add IO Opposition   Right 5 5 5 5       5    Left 5 5 5 5       5       Hip  Flex Hip  Ext Thigh   Abd Thigh  Add Knee  Flex Knee  Ext Plantar  Flex Dorsiflex   Right 5 5   5 5 5 5   Left 5 5   5 5 5 5     Sensory:  Light touch: normal tactile sense throughout  Temperature: reduced on RLE and on LLE  Pinprick: present on RLE and on LLE  Vibration: vibratory sense absent on RLE and on LLE up to the ankles  Proprioception: proprioceptive sense present on RLE and on LLE  Romberg is negative.    DTRs:   Biceps  Brachioradialis Triceps Tamiko Patellar Ankle Plantar   Right 2+ 2+  - 2+ 2+    Left 2+ 2+  - 2+ 2+      Coordination:  Finger to nose is normal bilaterally.  Normal fine finger movements and rapid alternating movements.    Gait:  Normal casual and tandem gait.      Pertinent lab results  Lab Results   Component Value Date    FOLATE 14.4 08/20/2020    ORAUHVSQ31 867 11/09/2022    [id number] 46 11/09/2022     Lab Results   Component Value Date    PATHINTPSPE REVIEWED 05/03/2019     Lab Results   Component Value Date    ANASCREEN Negative <1:160 04/16/2019    SEDRATE 9 05/03/2019    TTGIGA 6 08/07/2020     Lab Results   Component Value Date    HEPAIGG Positive (A) 08/07/2020    HEPBSAG Negative 10/18/2018    HEPBSAB 37.53 11/10/2022    HEPBSAB Reactive 11/10/2022    HEPBCAB Non-reactive 11/10/2022    STRONGANTIGG Negative 05/28/2019    TBGOLDPLUS Negative 11/10/2022     Lab Results   Component Value Date    IGGSERUM 1480 04/16/2019     08/07/2020     03/07/2018    TSH 2.351 05/24/2024    FREET4 0.88 01/25/2019    WBC 7.66 08/30/2024    LYMPH 1.9 08/30/2024    LYMPH 24.5 08/30/2024    RBC 5.56 08/30/2024    HGB 16.1 08/30/2024    HCT 49.3 08/30/2024    MCV 89 08/30/2024     08/30/2024     08/30/2024    K 4.2 08/30/2024    CO2 30 (H) 08/30/2024    BUN 20 08/30/2024    CREATININE 1.0 08/30/2024    CALCIUM 9.9 08/30/2024    AST 52 (H) 08/30/2024    ALT 70 (H) 08/30/2024       Pertinent imaging results  *No relevant imaging available to review     Other pertinent studies  None    Assessment:   Jonathan Villela is a 65 y.o. RH male with medical Hx mentioned above who presents with at least 2 years of distal paresthesia and numbness in lower extremities.    - Noted diminished temperature sensation and absent vibration sensation in feet, suggestive of peripheral neuropathy.  - Considered potential causes of peripheral neuropathy, including vitamin deficiencies and absorption issues related to  Crohn's disease history.  - Planning to investigate for peripheral neuropathy through labs and nerve conduction studies/EMG.  - Considered possibility of idiopathic peripheral neuropathy if no clear cause is identified.  - Noted bilateral groin pain when bending, potentially related to back issues, but prioritizing peripheral neuropathy workup before further investigation.    1. Peripheral polyneuropathy    2. Paresthesias    3. Vibration sensory loss       Plan:     - DANNY Screen w/Reflex; Future  - Copper, Serum; Future  - Vitamin B6; Future  - Vitamin B12; Future  - Protein Electrophoresis, Serum; Future  - Immunofixation, Serum; Future  - Zinc; Future  - Heavy Metals Screen, Blood (Quantitative); Future  - EMG W/ ULTRASOUND AND NERVE CONDUCTION TEST 2 Extremities; Future  - Follow up: VV in 2-3 months with results      Plan was discussed in detail with the patient, who is in agreement.        This note was generated with the assistance of ambient listening technology. Verbal consent was obtained by the patient and accompanying visitor(s) for the recording of patient appointment to facilitate this note. I attest to having reviewed and edited the generated note for accuracy, though some syntax or spelling errors may persist. Please contact the author of this note for any clarification.      Based on our encounter today, my overall Medical Decision Making is a Level 4     Complexity of Problem: Moderate (1 undiagnosed new problem with uncertain prognosis)  Complexity of Data: Moderate (Review of >3 unique test results and Ordering >3 unique tests )  Risk of Complications and/or morbidity/mortality of Management: Low risk of morbidity from additional diagnostic testing or treatment          Lucy Cheema MD    [facility]  07/17/2025         [1]   Social History  Tobacco Use    Smoking status: Never    Smokeless tobacco: Never   Substance Use Topics    Alcohol use: Yes     Comment: ocasionally    Drug  use: No

## 2025-07-18 LAB
ALBUMIN, SPE (OHS): 4.68 G/DL (ref 3.35–5.55)
ALPHA 1 GLOB (OHS): 0.23 GM/DL (ref 0.17–0.41)
ALPHA 2 GLOB (OHS): 0.58 GM/DL (ref 0.43–0.99)
ANA (OHS): NORMAL
BETA GLOB (OHS): 0.96 GM/DL (ref 0.5–1.1)
GAMMA GLOBULIN (OHS): 1.16 GM/DL (ref 0.67–1.58)
PATHOLOGIST INTERPRETATION - IFE SERUM (OHS): NORMAL
PATHOLOGIST REVIEW - SPE (OHS): NORMAL
PROT SERPL-MCNC: 7.6 GM/DL (ref 6–8.4)

## 2025-07-19 LAB
ADDRESS: NORMAL
ARSENIC BLD-MCNC: <1 NG/ML
ATTENDING PHYSICIAN NAME: NORMAL
CADMIUM BLD-MCNC: 0.2 NG/ML
COUNTY OF RESIDENCE: NORMAL
EMPLOYER NAME: NORMAL
FACILITY PHONE #: NORMAL
HX OF OCCUPATION: NORMAL
LEAD BLDV-MCNC: <1 MCG/DL
M HEALTH CARE PROVIDER PHONE: NORMAL
M PATIENT CITY: NORMAL
MERCURY BLD-MCNC: 2 NG/ML
PHONE #: NORMAL
PROVIDER CITY: NORMAL
PROVIDER POSTAL CODE: NORMAL
PROVIDER STATE: NORMAL
REFER PHYSICIAN ADDR: NORMAL
SPECIMEN SOURCE: NORMAL

## 2025-07-21 ENCOUNTER — RESULTS FOLLOW-UP (OUTPATIENT)
Dept: NEUROLOGY | Facility: CLINIC | Age: 65
End: 2025-07-21
Payer: COMMERCIAL

## 2025-07-21 LAB — W ZINC: 69 UG/DL

## 2025-07-22 LAB
W COPPER: 1035 UG/L
W VITAMIN B6: 58 UG/L

## 2025-08-12 ENCOUNTER — PROCEDURE VISIT (OUTPATIENT)
Dept: NEUROLOGY | Facility: CLINIC | Age: 65
End: 2025-08-12
Payer: COMMERCIAL

## 2025-08-12 DIAGNOSIS — G62.9 PERIPHERAL POLYNEUROPATHY: ICD-10-CM

## 2025-08-12 DIAGNOSIS — R20.2 PARESTHESIAS: ICD-10-CM

## 2025-08-12 PROCEDURE — 95886 MUSC TEST DONE W/N TEST COMP: CPT | Mod: S$GLB,,, | Performed by: PHYSICAL MEDICINE & REHABILITATION

## 2025-08-12 PROCEDURE — 95912 NRV CNDJ TEST 11-12 STUDIES: CPT | Mod: S$GLB,,, | Performed by: PHYSICAL MEDICINE & REHABILITATION

## 2025-08-18 ENCOUNTER — OFFICE VISIT (OUTPATIENT)
Dept: FAMILY MEDICINE | Facility: CLINIC | Age: 65
End: 2025-08-18
Payer: COMMERCIAL

## 2025-08-18 VITALS
OXYGEN SATURATION: 94 % | WEIGHT: 266.31 LBS | SYSTOLIC BLOOD PRESSURE: 130 MMHG | TEMPERATURE: 98 F | HEIGHT: 73 IN | DIASTOLIC BLOOD PRESSURE: 82 MMHG | HEART RATE: 81 BPM | BODY MASS INDEX: 35.3 KG/M2

## 2025-08-18 DIAGNOSIS — R73.03 PREDIABETES: ICD-10-CM

## 2025-08-18 DIAGNOSIS — I70.0 ABDOMINAL AORTIC ATHEROSCLEROSIS: ICD-10-CM

## 2025-08-18 DIAGNOSIS — I10 HYPERTENSION, UNSPECIFIED TYPE: ICD-10-CM

## 2025-08-18 DIAGNOSIS — M54.17 LUMBOSACRAL RADICULOPATHY AT L5: ICD-10-CM

## 2025-08-18 DIAGNOSIS — M54.50 CHRONIC LOW BACK PAIN, UNSPECIFIED BACK PAIN LATERALITY, UNSPECIFIED WHETHER SCIATICA PRESENT: ICD-10-CM

## 2025-08-18 DIAGNOSIS — L40.59 OTHER PSORIATIC ARTHROPATHY: ICD-10-CM

## 2025-08-18 DIAGNOSIS — Z00.00 ROUTINE GENERAL MEDICAL EXAMINATION AT A HEALTH CARE FACILITY: Primary | ICD-10-CM

## 2025-08-18 DIAGNOSIS — G89.29 CHRONIC LOW BACK PAIN, UNSPECIFIED BACK PAIN LATERALITY, UNSPECIFIED WHETHER SCIATICA PRESENT: ICD-10-CM

## 2025-08-18 DIAGNOSIS — G62.9 PERIPHERAL POLYNEUROPATHY: ICD-10-CM

## 2025-08-18 DIAGNOSIS — E78.2 MIXED HYPERLIPIDEMIA: ICD-10-CM

## 2025-08-18 DIAGNOSIS — G47.33 OSA (OBSTRUCTIVE SLEEP APNEA): ICD-10-CM

## 2025-08-18 PROCEDURE — 3079F DIAST BP 80-89 MM HG: CPT | Mod: CPTII,S$GLB,, | Performed by: FAMILY MEDICINE

## 2025-08-18 PROCEDURE — 3008F BODY MASS INDEX DOCD: CPT | Mod: CPTII,S$GLB,, | Performed by: FAMILY MEDICINE

## 2025-08-18 PROCEDURE — 99396 PREV VISIT EST AGE 40-64: CPT | Mod: S$GLB,,, | Performed by: FAMILY MEDICINE

## 2025-08-18 PROCEDURE — 3075F SYST BP GE 130 - 139MM HG: CPT | Mod: CPTII,S$GLB,, | Performed by: FAMILY MEDICINE

## 2025-08-18 PROCEDURE — 1159F MED LIST DOCD IN RCRD: CPT | Mod: CPTII,S$GLB,, | Performed by: FAMILY MEDICINE

## 2025-08-18 PROCEDURE — 99999 PR PBB SHADOW E&M-EST. PATIENT-LVL IV: CPT | Mod: PBBFAC,,, | Performed by: FAMILY MEDICINE

## 2025-08-18 RX ORDER — CARVEDILOL 12.5 MG/1
12.5 TABLET ORAL 2 TIMES DAILY WITH MEALS
Start: 2025-08-18

## 2025-08-26 ENCOUNTER — LAB VISIT (OUTPATIENT)
Dept: LAB | Facility: HOSPITAL | Age: 65
End: 2025-08-26
Payer: COMMERCIAL

## 2025-08-26 DIAGNOSIS — M54.17 LUMBOSACRAL RADICULOPATHY AT L5: ICD-10-CM

## 2025-08-26 DIAGNOSIS — E78.2 MIXED HYPERLIPIDEMIA: ICD-10-CM

## 2025-08-26 DIAGNOSIS — Z00.00 ROUTINE GENERAL MEDICAL EXAMINATION AT A HEALTH CARE FACILITY: ICD-10-CM

## 2025-08-26 DIAGNOSIS — G47.33 OSA (OBSTRUCTIVE SLEEP APNEA): ICD-10-CM

## 2025-08-26 DIAGNOSIS — G89.29 CHRONIC LOW BACK PAIN, UNSPECIFIED BACK PAIN LATERALITY, UNSPECIFIED WHETHER SCIATICA PRESENT: ICD-10-CM

## 2025-08-26 DIAGNOSIS — M54.50 CHRONIC LOW BACK PAIN, UNSPECIFIED BACK PAIN LATERALITY, UNSPECIFIED WHETHER SCIATICA PRESENT: ICD-10-CM

## 2025-08-26 DIAGNOSIS — G62.9 PERIPHERAL POLYNEUROPATHY: ICD-10-CM

## 2025-08-26 DIAGNOSIS — I70.0 ABDOMINAL AORTIC ATHEROSCLEROSIS: ICD-10-CM

## 2025-08-26 DIAGNOSIS — I10 HYPERTENSION, UNSPECIFIED TYPE: ICD-10-CM

## 2025-08-26 DIAGNOSIS — R73.03 PREDIABETES: ICD-10-CM

## 2025-08-26 LAB
ABSOLUTE EOSINOPHIL (OHS): 0.11 K/UL
ABSOLUTE MONOCYTE (OHS): 0.58 K/UL (ref 0.3–1)
ABSOLUTE NEUTROPHIL COUNT (OHS): 3.36 K/UL (ref 1.8–7.7)
ALBUMIN SERPL BCP-MCNC: 4.3 G/DL (ref 3.5–5.2)
ALP SERPL-CCNC: 60 UNIT/L (ref 40–150)
ALT SERPL W/O P-5'-P-CCNC: 47 UNIT/L (ref 10–44)
ANION GAP (OHS): 10 MMOL/L (ref 8–16)
AST SERPL-CCNC: 45 UNIT/L (ref 11–45)
BASOPHILS # BLD AUTO: 0.06 K/UL
BASOPHILS NFR BLD AUTO: 1 %
BILIRUB SERPL-MCNC: 0.7 MG/DL (ref 0.1–1)
BUN SERPL-MCNC: 18 MG/DL (ref 8–23)
CALCIUM SERPL-MCNC: 9.7 MG/DL (ref 8.7–10.5)
CHLORIDE SERPL-SCNC: 102 MMOL/L (ref 95–110)
CHOLEST SERPL-MCNC: 193 MG/DL (ref 120–199)
CHOLEST/HDLC SERPL: 6 {RATIO} (ref 2–5)
CO2 SERPL-SCNC: 28 MMOL/L (ref 23–29)
CREAT SERPL-MCNC: 0.9 MG/DL (ref 0.5–1.4)
EAG (OHS): 131 MG/DL (ref 68–131)
ERYTHROCYTE [DISTWIDTH] IN BLOOD BY AUTOMATED COUNT: 13 % (ref 11.5–14.5)
GFR SERPLBLD CREATININE-BSD FMLA CKD-EPI: >60 ML/MIN/1.73/M2
GLUCOSE SERPL-MCNC: 100 MG/DL (ref 70–110)
HBA1C MFR BLD: 6.2 % (ref 4–5.6)
HCT VFR BLD AUTO: 47.5 % (ref 40–54)
HDLC SERPL-MCNC: 32 MG/DL (ref 40–75)
HDLC SERPL: 16.6 % (ref 20–50)
HGB BLD-MCNC: 15.9 GM/DL (ref 14–18)
IMM GRANULOCYTES # BLD AUTO: 0.04 K/UL (ref 0–0.04)
IMM GRANULOCYTES NFR BLD AUTO: 0.6 % (ref 0–0.5)
LDLC SERPL CALC-MCNC: 122.4 MG/DL (ref 63–159)
LYMPHOCYTES # BLD AUTO: 2.1 K/UL (ref 1–4.8)
MCH RBC QN AUTO: 27.7 PG (ref 27–31)
MCHC RBC AUTO-ENTMCNC: 33.5 G/DL (ref 32–36)
MCV RBC AUTO: 83 FL (ref 82–98)
NONHDLC SERPL-MCNC: 161 MG/DL
NUCLEATED RBC (/100WBC) (OHS): 0 /100 WBC
PLATELET # BLD AUTO: 259 K/UL (ref 150–450)
PMV BLD AUTO: 9.5 FL (ref 9.2–12.9)
POTASSIUM SERPL-SCNC: 3.6 MMOL/L (ref 3.5–5.1)
PROT SERPL-MCNC: 7.5 GM/DL (ref 6–8.4)
PSA SERPL-MCNC: 2.09 NG/ML
RBC # BLD AUTO: 5.75 M/UL (ref 4.6–6.2)
RELATIVE EOSINOPHIL (OHS): 1.8 %
RELATIVE LYMPHOCYTE (OHS): 33.6 % (ref 18–48)
RELATIVE MONOCYTE (OHS): 9.3 % (ref 4–15)
RELATIVE NEUTROPHIL (OHS): 53.7 % (ref 38–73)
SODIUM SERPL-SCNC: 140 MMOL/L (ref 136–145)
TRIGL SERPL-MCNC: 193 MG/DL (ref 30–150)
TSH SERPL-ACNC: 2.37 UIU/ML (ref 0.4–4)
WBC # BLD AUTO: 6.25 K/UL (ref 3.9–12.7)

## 2025-08-26 PROCEDURE — 84153 ASSAY OF PSA TOTAL: CPT

## 2025-08-26 PROCEDURE — 84443 ASSAY THYROID STIM HORMONE: CPT

## 2025-08-26 PROCEDURE — 82040 ASSAY OF SERUM ALBUMIN: CPT

## 2025-08-26 PROCEDURE — 85025 COMPLETE CBC W/AUTO DIFF WBC: CPT

## 2025-08-26 PROCEDURE — 36415 COLL VENOUS BLD VENIPUNCTURE: CPT

## 2025-08-26 PROCEDURE — 80061 LIPID PANEL: CPT

## 2025-08-26 PROCEDURE — 83036 HEMOGLOBIN GLYCOSYLATED A1C: CPT

## 2025-09-04 ENCOUNTER — PATIENT MESSAGE (OUTPATIENT)
Dept: FAMILY MEDICINE | Facility: CLINIC | Age: 65
End: 2025-09-04
Payer: COMMERCIAL

## 2025-09-04 DIAGNOSIS — G43.709 CHRONIC MIGRAINE WITHOUT AURA WITHOUT STATUS MIGRAINOSUS, NOT INTRACTABLE: ICD-10-CM

## 2025-09-04 RX ORDER — SUCRALFATE 1 G/1
TABLET ORAL
Qty: 180 TABLET | Refills: 3 | Status: SHIPPED | OUTPATIENT
Start: 2025-09-04

## 2025-09-04 RX ORDER — SUMATRIPTAN SUCCINATE 100 MG/1
TABLET ORAL
Qty: 9 TABLET | Refills: 4 | Status: SHIPPED | OUTPATIENT
Start: 2025-09-04

## 2025-09-04 RX ORDER — SUCRALFATE 1 G/1
1 TABLET ORAL 2 TIMES DAILY PRN
Qty: 60 TABLET | Refills: 1 | Status: SHIPPED | OUTPATIENT
Start: 2025-09-04 | End: 2025-09-04

## (undated) DEVICE — SUT VLOC 90 3-0 V-20 NDL 9

## (undated) DEVICE — GOWN POLY REINF BRTH SLV LG

## (undated) DEVICE — Device

## (undated) DEVICE — DRAPE TOP 53X102IN

## (undated) DEVICE — COVER MAYO STND XL 30X57IN

## (undated) DEVICE — PAD PINK TRENDELENBURG POS XL

## (undated) DEVICE — TUBING INSUFFLATION W/LUER LOK

## (undated) DEVICE — DRAPE COLUMN DAVINCI XI

## (undated) DEVICE — ADHESIVE DERMABOND ADVANCED

## (undated) DEVICE — OBTURATOR BLADELESS 8MM XI CLR

## (undated) DEVICE — GOWN POLY REINF BRTH SLV XL

## (undated) DEVICE — SEAL UNIVERSAL 5MM-8MM XI

## (undated) DEVICE — NDL HYPO REG 25G X 1 1/2

## (undated) DEVICE — SUT MCRYL PLUS 4-0 PS2 27IN

## (undated) DEVICE — ELECTRODE REM PLYHSV RETURN 9

## (undated) DEVICE — GLOVE SURGICAL LATEX SZ 7

## (undated) DEVICE — COVER TIP CURVED SCISSORS XI

## (undated) DEVICE — DRAPE ARM DAVINCI XI